# Patient Record
Sex: FEMALE | Race: WHITE | Employment: OTHER | URBAN - NONMETROPOLITAN AREA
[De-identification: names, ages, dates, MRNs, and addresses within clinical notes are randomized per-mention and may not be internally consistent; named-entity substitution may affect disease eponyms.]

---

## 2017-04-26 ENCOUNTER — HOSPITAL ENCOUNTER (OUTPATIENT)
Dept: PHYSICAL THERAPY | Age: 72
Discharge: HOME | End: 2017-04-30
Attending: PSYCHIATRY & NEUROLOGY | Admitting: PSYCHIATRY & NEUROLOGY

## 2017-05-01 ENCOUNTER — HOSPITAL ENCOUNTER (OUTPATIENT)
Dept: GENERAL RADIOLOGY | Age: 72
Discharge: HOME | End: 2017-05-01
Attending: PODIATRIST | Admitting: PODIATRIST

## 2017-05-01 ENCOUNTER — HOSPITAL ENCOUNTER (OUTPATIENT)
Dept: WOUND CARE | Age: 72
Discharge: HOME | End: 2017-05-01
Attending: PODIATRIST | Admitting: PODIATRIST

## 2017-05-01 ENCOUNTER — HOSPITAL ENCOUNTER (OUTPATIENT)
Dept: PHYSICAL THERAPY | Age: 72
Discharge: HOME | End: 2017-05-31
Attending: PSYCHIATRY & NEUROLOGY | Admitting: PSYCHIATRY & NEUROLOGY

## 2017-05-01 VITALS
DIASTOLIC BLOOD PRESSURE: 70 MMHG | WEIGHT: 215 LBS | TEMPERATURE: 96.8 F | HEIGHT: 63 IN | SYSTOLIC BLOOD PRESSURE: 189 MMHG | BODY MASS INDEX: 38.09 KG/M2 | RESPIRATION RATE: 16 BRPM | HEART RATE: 100 BPM

## 2017-05-01 DIAGNOSIS — L97.509 TYPE 2 DIABETES MELLITUS WITH FOOT ULCER, WITH LONG-TERM CURRENT USE OF INSULIN (HCC): ICD-10-CM

## 2017-05-01 DIAGNOSIS — M20.11 HALLUX VALGUS (ACQUIRED), RIGHT FOOT: ICD-10-CM

## 2017-05-01 DIAGNOSIS — L03.119 CELLULITIS AND ABSCESS OF FOOT, EXCEPT TOES: ICD-10-CM

## 2017-05-01 DIAGNOSIS — L97.512 ULCER OF RIGHT FOOT WITH FAT LAYER EXPOSED (HCC): Primary | ICD-10-CM

## 2017-05-01 DIAGNOSIS — L97.512 RIGHT FOOT ULCER, WITH FAT LAYER EXPOSED (HCC): ICD-10-CM

## 2017-05-01 DIAGNOSIS — Z79.4 TYPE 2 DIABETES MELLITUS WITH FOOT ULCER, WITH LONG-TERM CURRENT USE OF INSULIN (HCC): ICD-10-CM

## 2017-05-01 DIAGNOSIS — L02.619 CELLULITIS AND ABSCESS OF FOOT, EXCEPT TOES: ICD-10-CM

## 2017-05-01 DIAGNOSIS — E11.621 TYPE 2 DIABETES MELLITUS WITH FOOT ULCER, WITH LONG-TERM CURRENT USE OF INSULIN (HCC): ICD-10-CM

## 2017-05-01 RX ORDER — GABAPENTIN 300 MG/1
300 CAPSULE ORAL NIGHTLY
COMMUNITY
End: 2018-11-25 | Stop reason: ALTCHOICE

## 2017-05-01 RX ORDER — PIOGLITAZONEHYDROCHLORIDE 15 MG/1
15 TABLET ORAL DAILY
COMMUNITY
End: 2018-11-25 | Stop reason: ALTCHOICE

## 2017-05-01 RX ORDER — ARIPIPRAZOLE 2 MG/1
2 TABLET ORAL DAILY
COMMUNITY
End: 2018-11-25 | Stop reason: ALTCHOICE

## 2017-05-01 RX ORDER — AMLODIPINE BESYLATE 10 MG/1
10 TABLET ORAL DAILY
Status: ON HOLD | COMMUNITY
End: 2020-08-13 | Stop reason: SDUPTHER

## 2017-05-01 RX ORDER — ATORVASTATIN CALCIUM 20 MG/1
20 TABLET, FILM COATED ORAL DAILY
COMMUNITY

## 2017-05-05 LAB
CULTURE: NORMAL
ORGANISM: NORMAL
REPORT STATUS: NORMAL
REQUEST PROBLEM: NORMAL
SPECIMEN: NORMAL

## 2017-05-08 ENCOUNTER — HOSPITAL ENCOUNTER (OUTPATIENT)
Dept: WOUND CARE | Age: 72
Discharge: HOME | End: 2017-05-08
Attending: PODIATRIST | Admitting: PODIATRIST

## 2017-05-08 VITALS
SYSTOLIC BLOOD PRESSURE: 169 MMHG | RESPIRATION RATE: 16 BRPM | TEMPERATURE: 96.6 F | DIASTOLIC BLOOD PRESSURE: 77 MMHG | HEART RATE: 74 BPM

## 2017-05-08 DIAGNOSIS — L97.512 ULCER OF RIGHT FOOT WITH FAT LAYER EXPOSED (HCC): ICD-10-CM

## 2017-05-08 DIAGNOSIS — L97.509 TYPE 2 DIABETES MELLITUS WITH FOOT ULCER, WITH LONG-TERM CURRENT USE OF INSULIN (HCC): ICD-10-CM

## 2017-05-08 DIAGNOSIS — Z79.4 TYPE 2 DIABETES MELLITUS WITH FOOT ULCER, WITH LONG-TERM CURRENT USE OF INSULIN (HCC): ICD-10-CM

## 2017-05-08 DIAGNOSIS — E11.621 TYPE 2 DIABETES MELLITUS WITH FOOT ULCER, WITH LONG-TERM CURRENT USE OF INSULIN (HCC): ICD-10-CM

## 2017-05-08 DIAGNOSIS — M20.11 HALLUX VALGUS (ACQUIRED), RIGHT FOOT: ICD-10-CM

## 2017-05-08 DIAGNOSIS — R65.10 SIRS (SYSTEMIC INFLAMMATORY RESPONSE SYNDROME) (HCC): Primary | ICD-10-CM

## 2017-05-22 ENCOUNTER — HOSPITAL ENCOUNTER (OUTPATIENT)
Dept: WOUND CARE | Age: 72
Discharge: HOME | End: 2017-05-22
Attending: PODIATRIST | Admitting: PODIATRIST

## 2017-05-22 VITALS
SYSTOLIC BLOOD PRESSURE: 124 MMHG | DIASTOLIC BLOOD PRESSURE: 77 MMHG | RESPIRATION RATE: 16 BRPM | TEMPERATURE: 97.4 F | HEART RATE: 86 BPM

## 2017-05-22 DIAGNOSIS — L97.512 ULCER OF RIGHT FOOT WITH FAT LAYER EXPOSED (HCC): ICD-10-CM

## 2017-05-22 DIAGNOSIS — L97.509 TYPE 2 DIABETES MELLITUS WITH FOOT ULCER, WITH LONG-TERM CURRENT USE OF INSULIN (HCC): ICD-10-CM

## 2017-05-22 DIAGNOSIS — E11.621 TYPE 2 DIABETES MELLITUS WITH FOOT ULCER, WITH LONG-TERM CURRENT USE OF INSULIN (HCC): ICD-10-CM

## 2017-05-22 DIAGNOSIS — Z79.4 TYPE 2 DIABETES MELLITUS WITH FOOT ULCER, WITH LONG-TERM CURRENT USE OF INSULIN (HCC): ICD-10-CM

## 2017-05-22 DIAGNOSIS — M20.11 HALLUX VALGUS (ACQUIRED), RIGHT FOOT: ICD-10-CM

## 2017-05-27 ENCOUNTER — HOSPITAL ENCOUNTER (OUTPATIENT)
Dept: MRI IMAGING | Age: 72
Discharge: HOME | End: 2017-05-27
Attending: PODIATRIST | Admitting: PODIATRIST

## 2017-05-27 DIAGNOSIS — L97.519 DIABETIC ULCER OF RIGHT FOOT DUE TO TYPE 2 DIABETES MELLITUS (HCC): ICD-10-CM

## 2017-05-27 DIAGNOSIS — E11.621 DIABETIC ULCER OF RIGHT FOOT DUE TO TYPE 2 DIABETES MELLITUS (HCC): ICD-10-CM

## 2017-06-01 ENCOUNTER — HOSPITAL ENCOUNTER (OUTPATIENT)
Dept: PHYSICAL THERAPY | Age: 72
Discharge: HOME | End: 2017-06-30
Attending: PSYCHIATRY & NEUROLOGY | Admitting: PSYCHIATRY & NEUROLOGY

## 2017-06-05 ENCOUNTER — HOSPITAL ENCOUNTER (OUTPATIENT)
Dept: WOUND CARE | Age: 72
Discharge: HOME | End: 2017-06-05
Attending: PODIATRIST | Admitting: PODIATRIST

## 2017-06-05 VITALS
DIASTOLIC BLOOD PRESSURE: 91 MMHG | SYSTOLIC BLOOD PRESSURE: 169 MMHG | TEMPERATURE: 96.5 F | HEART RATE: 99 BPM | RESPIRATION RATE: 16 BRPM

## 2017-06-05 DIAGNOSIS — E11.621 TYPE 2 DIABETES MELLITUS WITH FOOT ULCER, WITH LONG-TERM CURRENT USE OF INSULIN (HCC): Primary | ICD-10-CM

## 2017-06-05 DIAGNOSIS — L97.512 ULCER OF RIGHT FOOT WITH FAT LAYER EXPOSED (HCC): ICD-10-CM

## 2017-06-05 DIAGNOSIS — L97.509 TYPE 2 DIABETES MELLITUS WITH FOOT ULCER, WITH LONG-TERM CURRENT USE OF INSULIN (HCC): Primary | ICD-10-CM

## 2017-06-05 DIAGNOSIS — Z79.4 TYPE 2 DIABETES MELLITUS WITH FOOT ULCER, WITH LONG-TERM CURRENT USE OF INSULIN (HCC): Primary | ICD-10-CM

## 2017-06-05 DIAGNOSIS — M20.11 HALLUX VALGUS (ACQUIRED), RIGHT FOOT: ICD-10-CM

## 2017-06-05 RX ORDER — HYDRALAZINE HYDROCHLORIDE 25 MG/1
25 TABLET, FILM COATED ORAL 3 TIMES DAILY
Status: ON HOLD | COMMUNITY
End: 2017-07-16 | Stop reason: HOSPADM

## 2017-06-05 ASSESSMENT — PAIN DESCRIPTION - LOCATION: LOCATION: FOOT

## 2017-06-05 ASSESSMENT — PAIN DESCRIPTION - ONSET: ONSET: ON-GOING

## 2017-06-05 ASSESSMENT — PAIN DESCRIPTION - DESCRIPTORS: DESCRIPTORS: ACHING

## 2017-06-05 ASSESSMENT — PAIN DESCRIPTION - PROGRESSION: CLINICAL_PROGRESSION: NOT CHANGED

## 2017-06-05 ASSESSMENT — PAIN DESCRIPTION - ORIENTATION: ORIENTATION: RIGHT

## 2017-06-05 ASSESSMENT — PAIN DESCRIPTION - FREQUENCY: FREQUENCY: CONTINUOUS

## 2017-06-21 ENCOUNTER — HOSPITAL ENCOUNTER (OUTPATIENT)
Dept: PHYSICAL THERAPY | Age: 72
Discharge: HOME | End: 2017-06-21

## 2017-06-26 ENCOUNTER — HOSPITAL ENCOUNTER (OUTPATIENT)
Dept: WOUND CARE | Age: 72
Discharge: HOME | End: 2017-06-26
Attending: PODIATRIST | Admitting: PODIATRIST

## 2017-06-26 VITALS
RESPIRATION RATE: 18 BRPM | SYSTOLIC BLOOD PRESSURE: 162 MMHG | DIASTOLIC BLOOD PRESSURE: 85 MMHG | TEMPERATURE: 97.6 F | HEART RATE: 97 BPM

## 2017-06-26 DIAGNOSIS — L97.509 TYPE 2 DIABETES MELLITUS WITH FOOT ULCER, WITH LONG-TERM CURRENT USE OF INSULIN (HCC): ICD-10-CM

## 2017-06-26 DIAGNOSIS — M20.11 HALLUX VALGUS (ACQUIRED), RIGHT FOOT: Primary | ICD-10-CM

## 2017-06-26 DIAGNOSIS — L97.512 ULCER OF RIGHT FOOT WITH FAT LAYER EXPOSED (HCC): ICD-10-CM

## 2017-06-26 DIAGNOSIS — E11.621 TYPE 2 DIABETES MELLITUS WITH FOOT ULCER, WITH LONG-TERM CURRENT USE OF INSULIN (HCC): ICD-10-CM

## 2017-06-26 DIAGNOSIS — Z79.4 TYPE 2 DIABETES MELLITUS WITH FOOT ULCER, WITH LONG-TERM CURRENT USE OF INSULIN (HCC): ICD-10-CM

## 2017-06-26 ASSESSMENT — PAIN DESCRIPTION - PROGRESSION: CLINICAL_PROGRESSION: NOT CHANGED

## 2017-06-26 ASSESSMENT — PAIN DESCRIPTION - LOCATION: LOCATION: FOOT

## 2017-06-26 ASSESSMENT — PAIN DESCRIPTION - DESCRIPTORS: DESCRIPTORS: ACHING

## 2017-06-26 ASSESSMENT — PAIN DESCRIPTION - ORIENTATION: ORIENTATION: RIGHT

## 2017-06-26 ASSESSMENT — PAIN DESCRIPTION - PAIN TYPE: TYPE: CHRONIC PAIN

## 2017-06-26 ASSESSMENT — PAIN SCALES - GENERAL: PAINLEVEL_OUTOF10: 3

## 2017-06-26 ASSESSMENT — PAIN DESCRIPTION - FREQUENCY: FREQUENCY: CONTINUOUS

## 2017-06-26 ASSESSMENT — PAIN DESCRIPTION - ONSET: ONSET: ON-GOING

## 2017-06-28 ENCOUNTER — HOSPITAL ENCOUNTER (OUTPATIENT)
Dept: PHYSICAL THERAPY | Age: 72
Discharge: HOME | End: 2017-06-28

## 2017-07-01 ENCOUNTER — HOSPITAL ENCOUNTER (OUTPATIENT)
Dept: PHYSICAL THERAPY | Age: 72
Discharge: HOME | End: 2017-07-31
Attending: PSYCHIATRY & NEUROLOGY | Admitting: PSYCHIATRY & NEUROLOGY

## 2017-07-07 ENCOUNTER — HOSPITAL ENCOUNTER (OUTPATIENT)
Dept: PHYSICAL THERAPY | Age: 72
Discharge: HOME | End: 2017-07-07

## 2017-07-10 ENCOUNTER — HOSPITAL ENCOUNTER (OUTPATIENT)
Dept: WOUND CARE | Age: 72
Discharge: HOME | End: 2017-07-10
Attending: PODIATRIST | Admitting: PODIATRIST

## 2017-07-10 VITALS — TEMPERATURE: 98.6 F

## 2017-07-10 DIAGNOSIS — L97.509 TYPE 2 DIABETES MELLITUS WITH FOOT ULCER, WITH LONG-TERM CURRENT USE OF INSULIN (HCC): ICD-10-CM

## 2017-07-10 DIAGNOSIS — L97.512 ULCER OF RIGHT FOOT WITH FAT LAYER EXPOSED (HCC): ICD-10-CM

## 2017-07-10 DIAGNOSIS — Z79.4 TYPE 2 DIABETES MELLITUS WITH FOOT ULCER, WITH LONG-TERM CURRENT USE OF INSULIN (HCC): ICD-10-CM

## 2017-07-10 DIAGNOSIS — M20.11 HALLUX VALGUS (ACQUIRED), RIGHT FOOT: Primary | ICD-10-CM

## 2017-07-10 DIAGNOSIS — E11.621 TYPE 2 DIABETES MELLITUS WITH FOOT ULCER, WITH LONG-TERM CURRENT USE OF INSULIN (HCC): ICD-10-CM

## 2017-07-12 PROBLEM — I10 HTN (HYPERTENSION): Status: ACTIVE | Noted: 2017-07-12

## 2017-07-24 ENCOUNTER — HOSPITAL ENCOUNTER (OUTPATIENT)
Dept: WOUND CARE | Age: 72
Discharge: HOME | End: 2017-07-24
Attending: PODIATRIST | Admitting: PODIATRIST

## 2017-07-24 VITALS
DIASTOLIC BLOOD PRESSURE: 67 MMHG | TEMPERATURE: 97.8 F | SYSTOLIC BLOOD PRESSURE: 169 MMHG | RESPIRATION RATE: 18 BRPM | HEART RATE: 78 BPM

## 2017-07-24 DIAGNOSIS — E11.621 TYPE 2 DIABETES MELLITUS WITH FOOT ULCER, WITH LONG-TERM CURRENT USE OF INSULIN (HCC): ICD-10-CM

## 2017-07-24 DIAGNOSIS — L97.509 TYPE 2 DIABETES MELLITUS WITH FOOT ULCER, WITH LONG-TERM CURRENT USE OF INSULIN (HCC): ICD-10-CM

## 2017-07-24 DIAGNOSIS — L97.512 ULCER OF RIGHT FOOT WITH FAT LAYER EXPOSED (HCC): ICD-10-CM

## 2017-07-24 DIAGNOSIS — M20.11 HALLUX VALGUS (ACQUIRED), RIGHT FOOT: Primary | ICD-10-CM

## 2017-07-24 DIAGNOSIS — Z79.4 TYPE 2 DIABETES MELLITUS WITH FOOT ULCER, WITH LONG-TERM CURRENT USE OF INSULIN (HCC): ICD-10-CM

## 2017-08-01 ENCOUNTER — HOSPITAL ENCOUNTER (OUTPATIENT)
Dept: PHYSICAL THERAPY | Age: 72
Discharge: HOME | End: 2017-08-31
Attending: PSYCHIATRY & NEUROLOGY | Admitting: PSYCHIATRY & NEUROLOGY

## 2017-08-07 ENCOUNTER — HOSPITAL ENCOUNTER (OUTPATIENT)
Dept: WOUND CARE | Age: 72
Discharge: HOME | End: 2017-08-07
Attending: PODIATRIST | Admitting: PODIATRIST

## 2017-08-07 VITALS
RESPIRATION RATE: 16 BRPM | TEMPERATURE: 96 F | DIASTOLIC BLOOD PRESSURE: 82 MMHG | HEART RATE: 66 BPM | SYSTOLIC BLOOD PRESSURE: 173 MMHG

## 2017-08-07 DIAGNOSIS — L97.512 ULCER OF RIGHT FOOT WITH FAT LAYER EXPOSED (HCC): ICD-10-CM

## 2017-08-07 DIAGNOSIS — E11.621 TYPE 2 DIABETES MELLITUS WITH FOOT ULCER, WITH LONG-TERM CURRENT USE OF INSULIN (HCC): Primary | ICD-10-CM

## 2017-08-07 DIAGNOSIS — Z79.4 TYPE 2 DIABETES MELLITUS WITH FOOT ULCER, WITH LONG-TERM CURRENT USE OF INSULIN (HCC): Primary | ICD-10-CM

## 2017-08-07 DIAGNOSIS — M20.11 HALLUX VALGUS (ACQUIRED), RIGHT FOOT: ICD-10-CM

## 2017-08-07 DIAGNOSIS — L97.509 TYPE 2 DIABETES MELLITUS WITH FOOT ULCER, WITH LONG-TERM CURRENT USE OF INSULIN (HCC): Primary | ICD-10-CM

## 2017-08-07 PROBLEM — G89.4 CHRONIC PAIN SYNDROME: Chronic | Status: ACTIVE | Noted: 2017-08-07

## 2017-08-07 PROBLEM — E13.10 DIABETIC KETOSIS (HCC): Status: ACTIVE | Noted: 2017-08-07

## 2017-08-07 PROBLEM — N34.2 INFECTIVE URETHRITIS: Status: ACTIVE | Noted: 2017-08-07

## 2017-08-09 PROBLEM — E13.10 DIABETIC KETOSIS (HCC): Status: RESOLVED | Noted: 2017-08-07 | Resolved: 2017-08-09

## 2017-08-21 ENCOUNTER — HOSPITAL ENCOUNTER (OUTPATIENT)
Dept: WOUND CARE | Age: 72
Discharge: HOME | End: 2017-08-21
Attending: PODIATRIST | Admitting: PODIATRIST

## 2017-08-21 DIAGNOSIS — L97.509 TYPE 2 DIABETES MELLITUS WITH FOOT ULCER, WITH LONG-TERM CURRENT USE OF INSULIN (HCC): Primary | ICD-10-CM

## 2017-08-21 DIAGNOSIS — L97.512 ULCER OF RIGHT FOOT WITH FAT LAYER EXPOSED (HCC): ICD-10-CM

## 2017-08-21 DIAGNOSIS — M20.11 HALLUX VALGUS (ACQUIRED), RIGHT FOOT: ICD-10-CM

## 2017-08-21 DIAGNOSIS — E11.621 TYPE 2 DIABETES MELLITUS WITH FOOT ULCER, WITH LONG-TERM CURRENT USE OF INSULIN (HCC): Primary | ICD-10-CM

## 2017-08-21 DIAGNOSIS — Z79.4 TYPE 2 DIABETES MELLITUS WITH FOOT ULCER, WITH LONG-TERM CURRENT USE OF INSULIN (HCC): Primary | ICD-10-CM

## 2017-08-28 ENCOUNTER — HOSPITAL ENCOUNTER (OUTPATIENT)
Dept: WOUND CARE | Age: 72
Discharge: HOME | End: 2017-08-28
Attending: PODIATRIST | Admitting: PODIATRIST

## 2017-08-28 VITALS
HEART RATE: 80 BPM | SYSTOLIC BLOOD PRESSURE: 143 MMHG | DIASTOLIC BLOOD PRESSURE: 80 MMHG | TEMPERATURE: 98.6 F | RESPIRATION RATE: 16 BRPM

## 2017-08-28 DIAGNOSIS — Z79.4 TYPE 2 DIABETES MELLITUS WITH FOOT ULCER, WITH LONG-TERM CURRENT USE OF INSULIN (HCC): ICD-10-CM

## 2017-08-28 DIAGNOSIS — L97.509 TYPE 2 DIABETES MELLITUS WITH FOOT ULCER, WITH LONG-TERM CURRENT USE OF INSULIN (HCC): ICD-10-CM

## 2017-08-28 DIAGNOSIS — E11.621 TYPE 2 DIABETES MELLITUS WITH FOOT ULCER, WITH LONG-TERM CURRENT USE OF INSULIN (HCC): ICD-10-CM

## 2017-08-28 DIAGNOSIS — L97.512 ULCER OF RIGHT FOOT WITH FAT LAYER EXPOSED (HCC): ICD-10-CM

## 2017-08-28 DIAGNOSIS — M20.11 HALLUX VALGUS (ACQUIRED), RIGHT FOOT: ICD-10-CM

## 2017-09-11 ENCOUNTER — HOSPITAL ENCOUNTER (OUTPATIENT)
Dept: WOUND CARE | Age: 72
Discharge: HOME | End: 2017-09-11
Attending: PODIATRIST | Admitting: PODIATRIST

## 2017-09-11 VITALS
SYSTOLIC BLOOD PRESSURE: 162 MMHG | HEART RATE: 65 BPM | DIASTOLIC BLOOD PRESSURE: 84 MMHG | RESPIRATION RATE: 16 BRPM | TEMPERATURE: 97.2 F

## 2017-09-11 DIAGNOSIS — E11.621 TYPE 2 DIABETES MELLITUS WITH FOOT ULCER, WITH LONG-TERM CURRENT USE OF INSULIN (HCC): Primary | ICD-10-CM

## 2017-09-11 DIAGNOSIS — L97.512 ULCER OF RIGHT FOOT WITH FAT LAYER EXPOSED (HCC): ICD-10-CM

## 2017-09-11 DIAGNOSIS — L97.509 TYPE 2 DIABETES MELLITUS WITH FOOT ULCER, WITH LONG-TERM CURRENT USE OF INSULIN (HCC): Primary | ICD-10-CM

## 2017-09-11 DIAGNOSIS — M20.11 HALLUX VALGUS (ACQUIRED), RIGHT FOOT: ICD-10-CM

## 2017-09-11 DIAGNOSIS — Z79.4 TYPE 2 DIABETES MELLITUS WITH FOOT ULCER, WITH LONG-TERM CURRENT USE OF INSULIN (HCC): Primary | ICD-10-CM

## 2017-09-25 ENCOUNTER — HOSPITAL ENCOUNTER (OUTPATIENT)
Dept: WOUND CARE | Age: 72
Discharge: HOME | End: 2017-09-25
Attending: PODIATRIST | Admitting: PODIATRIST

## 2017-09-25 VITALS
TEMPERATURE: 97 F | SYSTOLIC BLOOD PRESSURE: 172 MMHG | RESPIRATION RATE: 16 BRPM | DIASTOLIC BLOOD PRESSURE: 76 MMHG | HEART RATE: 103 BPM

## 2017-09-25 DIAGNOSIS — M20.11 HALLUX VALGUS (ACQUIRED), RIGHT FOOT: Primary | ICD-10-CM

## 2017-09-25 DIAGNOSIS — E11.621 TYPE 2 DIABETES MELLITUS WITH FOOT ULCER, WITH LONG-TERM CURRENT USE OF INSULIN (HCC): ICD-10-CM

## 2017-09-25 DIAGNOSIS — Z79.4 TYPE 2 DIABETES MELLITUS WITH FOOT ULCER, WITH LONG-TERM CURRENT USE OF INSULIN (HCC): ICD-10-CM

## 2017-09-25 DIAGNOSIS — L97.509 TYPE 2 DIABETES MELLITUS WITH FOOT ULCER, WITH LONG-TERM CURRENT USE OF INSULIN (HCC): ICD-10-CM

## 2017-09-25 DIAGNOSIS — L97.512 ULCER OF RIGHT FOOT WITH FAT LAYER EXPOSED (HCC): ICD-10-CM

## 2017-10-09 ENCOUNTER — HOSPITAL ENCOUNTER (OUTPATIENT)
Dept: WOUND CARE | Age: 72
Discharge: HOME | End: 2017-10-09
Attending: PODIATRIST | Admitting: PODIATRIST

## 2017-10-09 VITALS
TEMPERATURE: 96.8 F | SYSTOLIC BLOOD PRESSURE: 150 MMHG | HEART RATE: 77 BPM | DIASTOLIC BLOOD PRESSURE: 76 MMHG | RESPIRATION RATE: 16 BRPM

## 2017-10-09 DIAGNOSIS — Z79.4 TYPE 2 DIABETES MELLITUS WITH FOOT ULCER, WITH LONG-TERM CURRENT USE OF INSULIN (HCC): Primary | ICD-10-CM

## 2017-10-09 DIAGNOSIS — L97.512 ULCER OF RIGHT FOOT WITH FAT LAYER EXPOSED (HCC): ICD-10-CM

## 2017-10-09 DIAGNOSIS — M20.11 HALLUX VALGUS (ACQUIRED), RIGHT FOOT: ICD-10-CM

## 2017-10-09 DIAGNOSIS — E11.621 TYPE 2 DIABETES MELLITUS WITH FOOT ULCER, WITH LONG-TERM CURRENT USE OF INSULIN (HCC): Primary | ICD-10-CM

## 2017-10-09 DIAGNOSIS — L97.509 TYPE 2 DIABETES MELLITUS WITH FOOT ULCER, WITH LONG-TERM CURRENT USE OF INSULIN (HCC): Primary | ICD-10-CM

## 2017-10-09 ASSESSMENT — PAIN DESCRIPTION - LOCATION: LOCATION: FOOT

## 2017-10-09 ASSESSMENT — PAIN DESCRIPTION - PAIN TYPE: TYPE: CHRONIC PAIN

## 2017-10-09 ASSESSMENT — PAIN SCALES - GENERAL: PAINLEVEL_OUTOF10: 1

## 2017-10-09 ASSESSMENT — PAIN DESCRIPTION - DESCRIPTORS: DESCRIPTORS: ACHING

## 2017-10-09 NOTE — PROGRESS NOTES
[facility] Progress Note       Toby Waters  AGE: 71 y.o. GENDER: female  : 1945  TODAY'S DATE:  10/9/2017        Subjective:     Chief Complaint   Patient presents with    Wound Check     Right foot          HISTORY of PRESENT ILLNESS     Toby Waters is a 71 y.o. female who presents today for wound evaluation of Chronic diabetic wound(s) of Rt. foot sub 1st metatarsal head. The wound is of mild severity. The underlying cause of the wound is DM. Pt has underlying bunion that we have discussed fixing. Her blood sugar, however, was entirely too high for serious discussion regarding surgery. She has been following with an endocrinologist who has adjusted her medications and she is doing much better at this time. Pain is improved along with probable wound being healed. Wound Pain Timing/Severity: none  Quality of pain: N/A  Severity of pain:  0 / 10   Modifying Factors: diabetes and chronic pressure  Associated Signs/Symptoms: none        PAST MEDICAL HISTORY        Diagnosis Date    Arthritis     Cellulitis of right foot     Chronic kidney disease     Depression     Diabetes mellitus (HCC)     Frequent falls     GERD (gastroesophageal reflux disease)     Hallux valgus (acquired), right foot 2017    Hypertension     Other disorders of kidney and ureter     1 kidney.  Thyroid disease     hypothyroid    Type 2 diabetes mellitus with foot ulcer, with long-term current use of insulin ([address] 2012    Ulcer of right foot with fat layer exposed ([address] 2013       PAST SURGICAL HISTORY    Past Surgical History:   Procedure Laterality Date    ABDOMEN SURGERY      CYST REMOVAL      from kidney.     DILATATION, ESOPHAGUS      ENDOSCOPY, COLON, DIAGNOSTIC      FOOT SURGERY  11    had infection  and a biopsy was sent away for analysis    HERNIA REPAIR      HYSTERECTOMY     [facility] HISTORY    Family History   Problem Relation Age of Onset    Arthritis Mother     Cancer Mother     Diabetes Mother     High Blood Pressure Mother     Asthma Father     Heart Disease Father     Diabetes Brother        SOCIAL HISTORY    Social History   Substance Use Topics    Smoking status: Never Smoker    Smokeless tobacco: Never Used    Alcohol use No       ALLERGIES    Allergies   Allergen Reactions    Ritalin [Methylphenidate] Rash    Rocephin [Ceftriaxone Sodium-Lidocaine] Rash       MEDICATIONS    Current Outpatient Prescriptions on File Prior to Encounter   Medication Sig Dispense Refill    insulin regular human (HUMULIN R) 500 UNIT/ML concentrated injection vial Inject 80 Units into the skin 3 times daily (before meals)      insulin glargine (LANTUS) 100 UNIT/ML injection pen Inject 80 Units into the skin nightly 5 Pen 0    insulin lispro (HUMALOG) 100 UNIT/ML injection vial Inject 30 Units into the skin 3 times daily (with meals) 2 vial 0    pantoprazole (PROTONIX) 40 MG tablet   4    DULoxetine (CYMBALTA) 60 MG extended release capsule   0    ARIPiprazole (ABILIFY) 2 MG tablet Take 2 mg by mouth daily      pioglitazone (ACTOS) 15 MG tablet Take 15 mg by mouth daily      atorvastatin (LIPITOR) 20 MG tablet Take 20 mg by mouth daily      amLODIPine (NORVASC) 10 MG tablet Take 10 mg by mouth daily      vitamin D (CHOLECALCIFEROL) 1000 UNIT TABS tablet Take 1,000 Units by mouth daily      gabapentin (NEURONTIN) 300 MG capsule Take 300 mg by mouth nightly      Multiple Vitamins-Minerals (TRUEPLUS DIABETIC MULTIVITAMIN) TABS Take 1 tablet by mouth daily.  atenolol (TENORMIN) 50 MG tablet Take 50 mg by mouth 2 times daily.  levothyroxine (SYNTHROID) 200 MCG tablet Take 175 mcg by mouth Daily.  aspirin 81 MG EC tablet Take 81 mg by mouth daily.  fish oil-omega-3 fatty acids 1000 MG capsule Take 2 g by mouth daily.          No current facility-administered medications on file prior to encounter. REVIEW OF SYSTEMS    Pertinent items are noted in HPI. Constitutional: Negative for systemic symptoms including fever, chills and malaise. Objective:      BP (!) 150/76   Pulse 77   Temp 96.8 °F (36 °C) (Temporal)   Resp 16     PHYSICAL EXAM    General: The patient is in no acute distress. Mental status:  Patient is appropriate, is  oriented to place and plan of care. Dermatologic exam: Visual inspection of the periwound reveals the skin to be edematous. Wound exam:  see wound description below     All active wounds listed below with today's date are evaluated      Wound 08/21/17 Skin tear Wound #9 Right 4th Toe Plantar (onset x 1 week) (Active)   Wound Image   10/9/2017 10:08 AM   Wound Type Wound 8/28/2017 10:14 AM   Dressing Status Clean;Dry; Intact 9/25/2017 11:05 AM   Dressing Changed Changed/New 9/25/2017 11:05 AM   Dressing/Treatment Silver dressing 8/21/2017 11:21 AM   Wound Cleansed Rinsed/Irrigated with saline 10/9/2017 10:08 AM   Wound Length (cm) 0 cm 10/9/2017 10:08 AM   Wound Width (cm) 0 cm 10/9/2017 10:08 AM   Wound Depth (cm)  0 10/9/2017 10:08 AM   Calculated Wound Size (cm^2) (l*w) 0 cm^2 10/9/2017 10:08 AM   Change in Wound Size % (l*w) 100 10/9/2017 10:08 AM   Distance Tunneling (cm) 0 cm 10/9/2017 10:08 AM   Tunneling Position ___ O'Clock 0 10/9/2017 10:08 AM   Undermining Starts ___ O'Clock 0 10/9/2017 10:08 AM   Undermining Ends___ O'Clock 0 10/9/2017 10:08 AM   Undermining Maxium Distance (cm) 0 10/9/2017 10:08 AM   Wound Assessment Black 10/9/2017 10:08 AM   Margins Attached edges 10/9/2017 10:08 AM   Ava-wound Assessment Calloused;Dry 10/9/2017 10:08 AM   Non-staged Wound Description Not applicable 91/8/0330 07:45 AM   [place]%Wound Bed 0 10/9/2017 10:08 AM   Red%Wound Bed 0 10/9/2017 10:08 AM   Yellow%Wound Bed 0 10/9/2017 10:08 AM   Black%Wound Bed 100 10/9/2017 10:08 AM   Purple%Wound Bed 0 10/9/2017 10:08 AM   Other%Wound Bed 0 9/25/2017 10:26 AM   Drainage Amount None 10/9/2017 10:08 AM   Drainage Description Serosanguinous 8/28/2017 10:14 AM   Odor None 10/9/2017 10:08 AM   Debridement per physician Subcutaneous 9/11/2017 11:09 AM   Number of days: 49       Wound 08/21/17 Skin tear Wound #11 Right mid Plantar (Onset x 1 week) (Active)   Wound Image   10/9/2017 10:08 AM   Wound Type Wound 10/9/2017 10:08 AM   Dressing Status Clean;Dry; Intact 9/25/2017 11:05 AM   Dressing Changed Changed/New 9/25/2017 11:05 AM   Dressing/Treatment Silver dressing 8/21/2017 11:21 AM   Wound Cleansed Wound cleanser 10/9/2017 10:08 AM   Wound Length (cm) 0.2 cm 10/9/2017 10:08 AM   Wound Width (cm) 0.2 cm 10/9/2017 10:08 AM   Wound Depth (cm)  0.1 10/9/2017 10:08 AM   Calculated Wound Size (cm^2) (l*w) 0.04 cm^2 10/9/2017 10:08 AM   Change in Wound Size % (l*w) 97.78 10/9/2017 10:08 AM   Distance Tunneling (cm) 0 cm 10/9/2017 10:08 AM   Tunneling Position ___ O'Clock 0 10/9/2017 10:08 AM   Undermining Starts ___ O'Clock 0 10/9/2017 10:08 AM   Undermining Ends___ O'Clock 0 10/9/2017 10:08 AM   Undermining Maxium Distance (cm) 0 10/9/2017 10:08 AM   Wound Assessment Clean;Dry 10/9/2017 10:08 AM   Margins Attached edges 10/9/2017 10:08 AM   Ava-wound Assessment Calloused;Dry 10/9/2017 10:08 AM   Non-staged Wound Description Not applicable 05/6/8497 70:34 AM   Cyril%Wound Bed 0 10/9/2017 10:08 AM   Red%Wound Bed 100 10/9/2017 10:08 AM   Yellow%Wound Bed 0 10/9/2017 10:08 AM   Black%Wound Bed 0 10/9/2017 10:08 AM   Purple%Wound Bed 0 10/9/2017 10:08 AM   Other%Wound Bed 0 10/9/2017 10:08 AM   Drainage Amount None 10/9/2017 10:08 AM   Drainage Description Serosanguinous 8/21/2017 10:10 AM   Odor None 10/9/2017 10:08 AM   Debridement per physician Subcutaneous 9/11/2017 11:09 AM   Number of days: 49       Assessment:       Problem List Items Addressed This Visit     Type 2 diabetes mellitus with foot ulcer, with long-term current use of insulin ([address] - Primary    Ulcer of clinic    Follow up with Dr [facility] within 1 month for foot check      Call [phone] for any questions or concerns.      Date__________ Time____________        Treatment Note      Written Patient Dismissal Instructions Given            Electronically signed by Kenia Dean DPM on 10/9/2017 at 10:50 AM

## 2018-11-25 ENCOUNTER — HOSPITAL ENCOUNTER (EMERGENCY)
Age: 73
Discharge: HOME | End: 2018-11-25
Attending: EMERGENCY MEDICINE
Payer: MEDICARE

## 2018-11-25 VITALS
HEART RATE: 81 BPM | HEIGHT: 63 IN | RESPIRATION RATE: 14 BRPM | BODY MASS INDEX: 35.79 KG/M2 | TEMPERATURE: 98.7 F | OXYGEN SATURATION: 95 % | SYSTOLIC BLOOD PRESSURE: 162 MMHG | WEIGHT: 202 LBS | DIASTOLIC BLOOD PRESSURE: 61 MMHG

## 2018-11-25 DIAGNOSIS — T78.40XA ALLERGIC REACTION, INITIAL ENCOUNTER: Primary | ICD-10-CM

## 2018-11-25 PROCEDURE — 6360000002 HC RX W HCPCS

## 2018-11-25 PROCEDURE — S0028 INJECTION, FAMOTIDINE, 20 MG: HCPCS | Performed by: EMERGENCY MEDICINE

## 2018-11-25 PROCEDURE — 2500000003 HC RX 250 WO HCPCS: Performed by: EMERGENCY MEDICINE

## 2018-11-25 PROCEDURE — 6360000002 HC RX W HCPCS: Performed by: EMERGENCY MEDICINE

## 2018-11-25 PROCEDURE — 94640 AIRWAY INHALATION TREATMENT: CPT

## 2018-11-25 PROCEDURE — 96374 THER/PROPH/DIAG INJ IV PUSH: CPT

## 2018-11-25 PROCEDURE — 99283 EMERGENCY DEPT VISIT LOW MDM: CPT

## 2018-11-25 PROCEDURE — 96375 TX/PRO/DX INJ NEW DRUG ADDON: CPT

## 2018-11-25 RX ORDER — METHYLPREDNISOLONE SODIUM SUCCINATE 125 MG/2ML
60 INJECTION, POWDER, LYOPHILIZED, FOR SOLUTION INTRAMUSCULAR; INTRAVENOUS ONCE
Status: COMPLETED | OUTPATIENT
Start: 2018-11-25 | End: 2018-11-25

## 2018-11-25 RX ORDER — ALBUTEROL SULFATE 2.5 MG/3ML
2.5 SOLUTION RESPIRATORY (INHALATION) ONCE
Status: COMPLETED | OUTPATIENT
Start: 2018-11-25 | End: 2018-11-25

## 2018-11-25 RX ORDER — ONDANSETRON 2 MG/ML
4 INJECTION INTRAMUSCULAR; INTRAVENOUS EVERY 30 MIN PRN
Status: DISCONTINUED | OUTPATIENT
Start: 2018-11-25 | End: 2018-11-25 | Stop reason: HOSPADM

## 2018-11-25 RX ORDER — HYDROXYZINE 50 MG/1
50 TABLET, FILM COATED ORAL 3 TIMES DAILY PRN
Qty: 24 TABLET | Refills: 0 | Status: SHIPPED | OUTPATIENT
Start: 2018-11-25 | End: 2019-04-10 | Stop reason: ALTCHOICE

## 2018-11-25 RX ORDER — DIPHENHYDRAMINE HYDROCHLORIDE 50 MG/ML
50 INJECTION INTRAMUSCULAR; INTRAVENOUS ONCE
Status: COMPLETED | OUTPATIENT
Start: 2018-11-25 | End: 2018-11-25

## 2018-11-25 RX ORDER — ONDANSETRON 2 MG/ML
INJECTION INTRAMUSCULAR; INTRAVENOUS
Status: COMPLETED
Start: 2018-11-25 | End: 2018-11-25

## 2018-11-25 RX ORDER — PREDNISONE 20 MG/1
40 TABLET ORAL DAILY
Qty: 10 TABLET | Refills: 0 | Status: SHIPPED | OUTPATIENT
Start: 2018-11-25 | End: 2018-11-30

## 2018-11-25 RX ADMIN — METHYLPREDNISOLONE SODIUM SUCCINATE 60 MG: 125 INJECTION, POWDER, FOR SOLUTION INTRAMUSCULAR; INTRAVENOUS at 17:05

## 2018-11-25 RX ADMIN — DIPHENHYDRAMINE HYDROCHLORIDE 50 MG: 50 INJECTION, SOLUTION INTRAMUSCULAR; INTRAVENOUS at 17:05

## 2018-11-25 RX ADMIN — FAMOTIDINE 20 MG: 10 INJECTION, SOLUTION INTRAVENOUS at 17:05

## 2018-11-25 RX ADMIN — ALBUTEROL SULFATE 2.5 MG: 2.5 SOLUTION RESPIRATORY (INHALATION) at 17:13

## 2018-11-25 RX ADMIN — ONDANSETRON 4 MG: 2 INJECTION INTRAMUSCULAR; INTRAVENOUS at 17:16

## 2018-11-25 NOTE — ED PROVIDER NOTES
insulin ([facility] 1/18/2012    Ulcer of right foot with fat layer exposed ([facility] 2/6/2013     Past Surgical History:   Procedure Laterality Date    ABDOMEN SURGERY      CYST REMOVAL      from kidney.  DILATATION, ESOPHAGUS      ENDOSCOPY, COLON, DIAGNOSTIC      FOOT SURGERY  12/30/11    had infection  and a biopsy was sent away for analysis    HERNIA REPAIR      HYSTERECTOMY      KIDNEY REMOVAL  1973     Family History   Problem Relation Age of Onset    Arthritis Mother     Cancer Mother     Diabetes Mother     High Blood Pressure Mother     Asthma Father     Heart Disease Father     Diabetes Brother      Social History     Social History    Marital status:      Spouse name: N/A    Number of children: N/A    Years of education: N/A     Occupational History    Not on file.      Social History Main Topics    Smoking status: Never Smoker    Smokeless tobacco: Never Used    Alcohol use No    Drug use: No    Sexual activity: Not on file     Other Topics Concern    Not on file     Social History Narrative    No narrative on file     Current Facility-Administered Medications   Medication Dose Route Frequency Provider Last Rate Last Dose    ondansetron (ZOFRAN) injection 4 mg  4 mg Intravenous Q30 Min PRN Price Hernandez DO   4 mg at 11/25/18 0056     Current Outpatient Prescriptions   Medication Sig Dispense Refill    insulin regular human (HUMULIN R) 500 UNIT/ML concentrated injection vial Inject 60 Units into the skin 3 times daily (before meals)       insulin lispro (HUMALOG) 100 UNIT/ML injection vial Inject 30 Units into the skin 3 times daily (with meals) (Patient taking differently: Inject 50 Units into the skin 3 times daily (with meals) ) 2 vial 0    atorvastatin (LIPITOR) 20 MG tablet Take 20 mg by mouth daily      amLODIPine (NORVASC) 10 MG tablet Take 10 mg by mouth daily      vitamin D (CHOLECALCIFEROL) 1000 UNIT TABS tablet Take 1,000 Units by mouth daily      Multiple Vitamins-Minerals (TRUEPLUS DIABETIC MULTIVITAMIN) TABS Take 1 tablet by mouth daily.  atenolol (TENORMIN) 50 MG tablet Take 50 mg by mouth 2 times daily.  levothyroxine (SYNTHROID) 200 MCG tablet Take 300 mcg by mouth Daily       aspirin 81 MG EC tablet Take 81 mg by mouth daily.  fish oil-omega-3 fatty acids 1000 MG capsule Take 2 g by mouth daily. Allergies   Allergen Reactions    Ritalin [Methylphenidate] Rash    Rocephin [Ceftriaxone Sodium-Lidocaine] Rash       Nursing Notes Reviewed    Physical Exam:  ED Triage Vitals [11/25/18 1648]   Enc Vitals Group      BP (!) 202/62      Pulse 106      Resp 21      Temp 98.7 °F (37.1 °C)      Temp Source Oral      SpO2 100 %      Weight 202 lb (91.6 kg)      Height 5' 3\" (1.6 m)      Head Circumference       Peak Flow       Pain Score       Pain Loc       Pain Edu? Excl. in [address]? GENERAL APPEARANCE: Awake and alert. Cooperative. No acute distress. Nontoxic in appearance  HEAD: Normocephalic. Atraumatic. EYES: EOM's grossly intact. Sclera anicteric. ENT: Tolerates saliva. No trismus. NECK: Supple. Trachea midline. The patient does have some have some angioedema on the right submandibular region. Her tongue is slightly swollen. Her voice is hoarse. CARDIO: RRR. Radial pulse 2+. LUNGS: Respirations unlabored. CTAB. no wheezing  ABDOMEN: Soft. Non-distended. Non-tender. EXTREMITIES: No acute deformities. SKIN: Warm and dry. Patient does have an urticarial rash present on the right side of her neck, her back, her groin, and her upper extremities. NEUROLOGICAL: No gross facial drooping. Moves all 4 extremities spontaneously. PSYCHIATRIC: Normal mood. Labs:  No results found for this visit on 11/25/18.         Radiographs (if obtained):  [] The following radiograph was interpreted by myself in the absence of a radiologist:  [x] Radiologist's Report reviewed at time of ED visit:  No orders to display       ED Course and

## 2018-11-26 NOTE — ED PROVIDER NOTES
ADDENDUM:    Care of the patient was assumed  from Dr. Susy Grimaldo. I have reviewed the notes, assessments, and/or procedures performed, I concur with her/his documentation on [facility]. I reviewed the medical record and evaluated the patient. ED COURSE/MDM:  Laboratory and imaging data were reviewed and care plan was arranged with the patient(see separate lab/imaging reports). RADIOLOGY:  Already resulted studies have been reviewed. No orders to display       Labs Reviewed - No data to display    Medications   ondansetron (ZOFRAN) injection 4 mg (4 mg Intravenous Given 11/25/18 1716)   diphenhydrAMINE (BENADRYL) injection 50 mg (50 mg Intravenous Given 11/25/18 1705)   methylPREDNISolone sodium (SOLU-MEDROL) injection 60 mg (60 mg Intravenous Given 11/25/18 1705)   famotidine (PEPCID) injection 20 mg (20 mg Intravenous Given 11/25/18 1705)   albuterol (PROVENTIL) nebulizer solution 2.5 mg (2.5 mg Nebulization Given 11/25/18 1713)       Vitals:    11/25/18 1648 11/25/18 1715 11/25/18 1830   BP: (!) 202/62 (!) 161/58 (!) 161/58   Pulse: 106 87 79   Resp: 21 9 15   Temp: 98.7 °F (37.1 °C)     TempSrc: Oral     SpO2: 100% 100% 95%   Weight: 202 lb (91.6 kg)     Height: 5' 3\" (1.6 m)         Patient improved no rash and no SOB. Prednisone and Atarax  My typical dicussion, presentation, and considerations for this patients' chief complaint, diagnosis, differential diagnosis, medications, medication use,  medication safety and medication interactions have been explained and outlined to this patient for this patient encounter. I have stressed need for follow up and reexamination for this encounter and or return to the emergency department if any changes or any concern. FINAL IMPRESSION:  1.  Allergic reaction, initial encounter        New Prescriptions    HYDROXYZINE (ATARAX) 50 MG TABLET    Take 1 tablet by mouth 3 times daily as needed for Itching    PREDNISONE (DELTASONE) 20 MG TABLET    Take 2 tablets by mouth daily for 5 days                    Simi Aparicio DO  11/25/18 1919

## 2019-01-06 ENCOUNTER — HOSPITAL ENCOUNTER (OUTPATIENT)
Age: 74
Setting detail: OBSERVATION
Discharge: HOME | End: 2019-01-07
Attending: EMERGENCY MEDICINE | Admitting: HOSPITALIST
Payer: MEDICARE

## 2019-01-06 ENCOUNTER — APPOINTMENT (OUTPATIENT)
Dept: GENERAL RADIOLOGY | Age: 74
End: 2019-01-06
Payer: MEDICARE

## 2019-01-06 VITALS
DIASTOLIC BLOOD PRESSURE: 69 MMHG | WEIGHT: 205 LBS | OXYGEN SATURATION: 96 % | HEART RATE: 76 BPM | RESPIRATION RATE: 18 BRPM | BODY MASS INDEX: 36.32 KG/M2 | SYSTOLIC BLOOD PRESSURE: 157 MMHG | TEMPERATURE: 96.3 F | HEIGHT: 63 IN

## 2019-01-06 DIAGNOSIS — R07.9 CHEST PAIN, UNSPECIFIED TYPE: ICD-10-CM

## 2019-01-06 DIAGNOSIS — R06.00 DYSPNEA, UNSPECIFIED TYPE: ICD-10-CM

## 2019-01-06 DIAGNOSIS — R73.9 HYPERGLYCEMIA WITHOUT KETOSIS: ICD-10-CM

## 2019-01-06 DIAGNOSIS — R11.2 NAUSEA VOMITING AND DIARRHEA: Primary | ICD-10-CM

## 2019-01-06 DIAGNOSIS — R19.7 NAUSEA VOMITING AND DIARRHEA: Primary | ICD-10-CM

## 2019-01-06 PROBLEM — K21.9 GERD (GASTROESOPHAGEAL REFLUX DISEASE): Status: ACTIVE | Noted: 2019-01-06

## 2019-01-06 PROBLEM — E11.9 TYPE 2 DIABETES MELLITUS (HCC): Status: ACTIVE | Noted: 2019-01-06

## 2019-01-06 PROBLEM — I10 HYPERTENSION: Status: ACTIVE | Noted: 2019-01-06

## 2019-01-06 LAB
ALBUMIN SERPL-MCNC: 3.3 GM/DL (ref 3.4–5)
ALP BLD-CCNC: 98 IU/L (ref 40–129)
ALT SERPL-CCNC: 19 U/L (ref 10–40)
ANION GAP SERPL CALCULATED.3IONS-SCNC: 15 MMOL/L (ref 4–16)
AST SERPL-CCNC: 28 IU/L (ref 15–37)
BASOPHILS ABSOLUTE: 0 K/CU MM
BASOPHILS RELATIVE PERCENT: 0.2 % (ref 0–1)
BILIRUB SERPL-MCNC: 0.4 MG/DL (ref 0–1)
BUN BLDV-MCNC: 20 MG/DL (ref 6–23)
C-REACTIVE PROTEIN, HIGH SENSITIVITY: 31.1 MG/L
CALCIUM SERPL-MCNC: 8.8 MG/DL (ref 8.3–10.6)
CHLORIDE BLD-SCNC: 94 MMOL/L (ref 99–110)
CO2: 24 MMOL/L (ref 21–32)
CREAT SERPL-MCNC: 1 MG/DL (ref 0.6–1.1)
DIFFERENTIAL TYPE: ABNORMAL
EKG ATRIAL RATE: 97 BPM
EKG DIAGNOSIS: NORMAL
EKG P AXIS: 49 DEGREES
EKG P-R INTERVAL: 164 MS
EKG Q-T INTERVAL: 376 MS
EKG QRS DURATION: 82 MS
EKG QTC CALCULATION (BAZETT): 477 MS
EKG R AXIS: 30 DEGREES
EKG T AXIS: 66 DEGREES
EKG VENTRICULAR RATE: 97 BPM
EOSINOPHILS ABSOLUTE: 0.1 K/CU MM
EOSINOPHILS RELATIVE PERCENT: 1.4 % (ref 0–3)
GFR AFRICAN AMERICAN: >60 ML/MIN/1.73M2
GFR NON-AFRICAN AMERICAN: 55 ML/MIN/1.73M2
GLUCOSE BLD-MCNC: 210 MG/DL (ref 70–99)
GLUCOSE BLD-MCNC: 463 MG/DL (ref 70–99)
HCT VFR BLD CALC: 42.3 % (ref 37–47)
HEMOGLOBIN: 13.4 GM/DL (ref 12.5–16)
IMMATURE NEUTROPHIL %: 1 % (ref 0–0.43)
LIPASE: 29 IU/L (ref 13–60)
LYMPHOCYTES ABSOLUTE: 1.7 K/CU MM
LYMPHOCYTES RELATIVE PERCENT: 16.8 % (ref 24–44)
MCH RBC QN AUTO: 29.1 PG (ref 27–31)
MCHC RBC AUTO-ENTMCNC: 31.7 % (ref 32–36)
MCV RBC AUTO: 91.8 FL (ref 78–100)
MONOCYTES ABSOLUTE: 0.3 K/CU MM
MONOCYTES RELATIVE PERCENT: 3.2 % (ref 0–4)
PDW BLD-RTO: 14.1 % (ref 11.7–14.9)
PLATELET # BLD: 283 K/CU MM (ref 140–440)
PMV BLD AUTO: 10.7 FL (ref 7.5–11.1)
POTASSIUM SERPL-SCNC: 4.5 MMOL/L (ref 3.5–5.1)
PRO-BNP: 258.8 PG/ML
RBC # BLD: 4.61 M/CU MM (ref 4.2–5.4)
SEGMENTED NEUTROPHILS ABSOLUTE COUNT: 7.6 K/CU MM
SEGMENTED NEUTROPHILS RELATIVE PERCENT: 77.4 % (ref 36–66)
SODIUM BLD-SCNC: 133 MMOL/L (ref 135–145)
TOTAL IMMATURE NEUTOROPHIL: 0.1 K/CU MM
TOTAL PROTEIN: 6.9 GM/DL (ref 6.4–8.2)
TROPONIN T: <0.01 NG/ML
TROPONIN T: <0.01 NG/ML
WBC # BLD: 9.9 K/CU MM (ref 4–10.5)

## 2019-01-06 PROCEDURE — 93010 ELECTROCARDIOGRAM REPORT: CPT | Performed by: INTERNAL MEDICINE

## 2019-01-06 PROCEDURE — G0378 HOSPITAL OBSERVATION PER HR: HCPCS

## 2019-01-06 PROCEDURE — 96374 THER/PROPH/DIAG INJ IV PUSH: CPT

## 2019-01-06 PROCEDURE — 93005 ELECTROCARDIOGRAM TRACING: CPT | Performed by: EMERGENCY MEDICINE

## 2019-01-06 PROCEDURE — 36415 COLL VENOUS BLD VENIPUNCTURE: CPT

## 2019-01-06 PROCEDURE — 96372 THER/PROPH/DIAG INJ SC/IM: CPT

## 2019-01-06 PROCEDURE — 6360000002 HC RX W HCPCS: Performed by: EMERGENCY MEDICINE

## 2019-01-06 PROCEDURE — 86140 C-REACTIVE PROTEIN: CPT

## 2019-01-06 PROCEDURE — 80053 COMPREHEN METABOLIC PANEL: CPT

## 2019-01-06 PROCEDURE — 6370000000 HC RX 637 (ALT 250 FOR IP): Performed by: HOSPITALIST

## 2019-01-06 PROCEDURE — 6370000000 HC RX 637 (ALT 250 FOR IP): Performed by: NURSE PRACTITIONER

## 2019-01-06 PROCEDURE — 6360000002 HC RX W HCPCS: Performed by: HOSPITALIST

## 2019-01-06 PROCEDURE — 83880 ASSAY OF NATRIURETIC PEPTIDE: CPT

## 2019-01-06 PROCEDURE — 85025 COMPLETE CBC W/AUTO DIFF WBC: CPT

## 2019-01-06 PROCEDURE — 84484 ASSAY OF TROPONIN QUANT: CPT

## 2019-01-06 PROCEDURE — 6370000000 HC RX 637 (ALT 250 FOR IP): Performed by: EMERGENCY MEDICINE

## 2019-01-06 PROCEDURE — 2580000003 HC RX 258: Performed by: HOSPITALIST

## 2019-01-06 PROCEDURE — 83690 ASSAY OF LIPASE: CPT

## 2019-01-06 PROCEDURE — 99284 EMERGENCY DEPT VISIT MOD MDM: CPT

## 2019-01-06 PROCEDURE — 2580000003 HC RX 258: Performed by: EMERGENCY MEDICINE

## 2019-01-06 PROCEDURE — 71045 X-RAY EXAM CHEST 1 VIEW: CPT

## 2019-01-06 PROCEDURE — 82962 GLUCOSE BLOOD TEST: CPT

## 2019-01-06 RX ORDER — ATORVASTATIN CALCIUM 40 MG/1
40 TABLET, FILM COATED ORAL NIGHTLY
Status: DISCONTINUED | OUTPATIENT
Start: 2019-01-06 | End: 2019-01-07 | Stop reason: HOSPADM

## 2019-01-06 RX ORDER — DEXTROSE MONOHYDRATE 25 G/50ML
12.5 INJECTION, SOLUTION INTRAVENOUS PRN
Status: DISCONTINUED | OUTPATIENT
Start: 2019-01-06 | End: 2019-01-07 | Stop reason: HOSPADM

## 2019-01-06 RX ORDER — NITROGLYCERIN 0.4 MG/1
0.4 TABLET SUBLINGUAL EVERY 5 MIN PRN
Status: DISCONTINUED | OUTPATIENT
Start: 2019-01-06 | End: 2019-01-07 | Stop reason: HOSPADM

## 2019-01-06 RX ORDER — MAGNESIUM SULFATE 1 G/100ML
1 INJECTION INTRAVENOUS PRN
Status: DISCONTINUED | OUTPATIENT
Start: 2019-01-06 | End: 2019-01-07 | Stop reason: HOSPADM

## 2019-01-06 RX ORDER — ONDANSETRON 2 MG/ML
4 INJECTION INTRAMUSCULAR; INTRAVENOUS EVERY 30 MIN PRN
Status: DISCONTINUED | OUTPATIENT
Start: 2019-01-06 | End: 2019-01-07 | Stop reason: HOSPADM

## 2019-01-06 RX ORDER — POTASSIUM CHLORIDE 7.45 MG/ML
10 INJECTION INTRAVENOUS PRN
Status: DISCONTINUED | OUTPATIENT
Start: 2019-01-06 | End: 2019-01-07 | Stop reason: HOSPADM

## 2019-01-06 RX ORDER — DEXTROSE MONOHYDRATE 50 MG/ML
100 INJECTION, SOLUTION INTRAVENOUS PRN
Status: DISCONTINUED | OUTPATIENT
Start: 2019-01-06 | End: 2019-01-07 | Stop reason: HOSPADM

## 2019-01-06 RX ORDER — ESOMEPRAZOLE MAGNESIUM 40 MG/1
40 FOR SUSPENSION ORAL DAILY
COMMUNITY
End: 2019-04-16

## 2019-01-06 RX ORDER — SODIUM CHLORIDE 0.9 % (FLUSH) 0.9 %
10 SYRINGE (ML) INJECTION PRN
Status: DISCONTINUED | OUTPATIENT
Start: 2019-01-06 | End: 2019-01-07 | Stop reason: HOSPADM

## 2019-01-06 RX ORDER — 0.9 % SODIUM CHLORIDE 0.9 %
1000 INTRAVENOUS SOLUTION INTRAVENOUS ONCE
Status: COMPLETED | OUTPATIENT
Start: 2019-01-06 | End: 2019-01-06

## 2019-01-06 RX ORDER — SODIUM CHLORIDE 0.9 % (FLUSH) 0.9 %
10 SYRINGE (ML) INJECTION EVERY 12 HOURS SCHEDULED
Status: DISCONTINUED | OUTPATIENT
Start: 2019-01-06 | End: 2019-01-07 | Stop reason: HOSPADM

## 2019-01-06 RX ORDER — NICOTINE POLACRILEX 4 MG
15 LOZENGE BUCCAL PRN
Status: DISCONTINUED | OUTPATIENT
Start: 2019-01-06 | End: 2019-01-07 | Stop reason: HOSPADM

## 2019-01-06 RX ORDER — SODIUM CHLORIDE 0.9 % (FLUSH) 0.9 %
10 SYRINGE (ML) INJECTION 2 TIMES DAILY
Status: DISCONTINUED | OUTPATIENT
Start: 2019-01-06 | End: 2019-01-07 | Stop reason: HOSPADM

## 2019-01-06 RX ORDER — ASPIRIN 81 MG/1
81 TABLET, CHEWABLE ORAL DAILY
Status: DISCONTINUED | OUTPATIENT
Start: 2019-01-07 | End: 2019-01-07 | Stop reason: HOSPADM

## 2019-01-06 RX ORDER — POTASSIUM CHLORIDE 20 MEQ/1
40 TABLET, EXTENDED RELEASE ORAL PRN
Status: DISCONTINUED | OUTPATIENT
Start: 2019-01-06 | End: 2019-01-07 | Stop reason: HOSPADM

## 2019-01-06 RX ORDER — LOPERAMIDE HYDROCHLORIDE 2 MG/1
2 CAPSULE ORAL ONCE
Status: COMPLETED | OUTPATIENT
Start: 2019-01-06 | End: 2019-01-06

## 2019-01-06 RX ORDER — POTASSIUM CHLORIDE 20MEQ/15ML
40 LIQUID (ML) ORAL PRN
Status: DISCONTINUED | OUTPATIENT
Start: 2019-01-06 | End: 2019-01-07 | Stop reason: HOSPADM

## 2019-01-06 RX ORDER — LOSARTAN POTASSIUM 25 MG/1
25 TABLET ORAL DAILY
COMMUNITY
End: 2019-04-10

## 2019-01-06 RX ORDER — ONDANSETRON 2 MG/ML
4 INJECTION INTRAMUSCULAR; INTRAVENOUS EVERY 6 HOURS PRN
Status: DISCONTINUED | OUTPATIENT
Start: 2019-01-06 | End: 2019-01-07 | Stop reason: HOSPADM

## 2019-01-06 RX ADMIN — ATORVASTATIN CALCIUM 40 MG: 40 TABLET, FILM COATED ORAL at 19:41

## 2019-01-06 RX ADMIN — ENOXAPARIN SODIUM 30 MG: 30 INJECTION SUBCUTANEOUS at 19:41

## 2019-01-06 RX ADMIN — SODIUM CHLORIDE, PRESERVATIVE FREE 10 ML: 5 INJECTION INTRAVENOUS at 19:46

## 2019-01-06 RX ADMIN — SODIUM CHLORIDE, PRESERVATIVE FREE 10 ML: 5 INJECTION INTRAVENOUS at 15:22

## 2019-01-06 RX ADMIN — INSULIN LISPRO 1 UNITS: 100 INJECTION, SOLUTION INTRAVENOUS; SUBCUTANEOUS at 19:42

## 2019-01-06 RX ADMIN — ONDANSETRON 4 MG: 2 INJECTION INTRAMUSCULAR; INTRAVENOUS at 15:35

## 2019-01-06 RX ADMIN — SODIUM CHLORIDE 1000 ML: 9 INJECTION, SOLUTION INTRAVENOUS at 15:35

## 2019-01-06 RX ADMIN — INSULIN HUMAN 10 UNITS: 100 INJECTION, SOLUTION PARENTERAL at 17:29

## 2019-01-06 RX ADMIN — LOPERAMIDE HYDROCHLORIDE 2 MG: 2 CAPSULE ORAL at 15:35

## 2019-01-06 ASSESSMENT — PAIN SCALES - GENERAL: PAINLEVEL_OUTOF10: 0

## 2019-01-06 ASSESSMENT — HEART SCORE: ECG: 0

## 2019-01-07 LAB
ANION GAP SERPL CALCULATED.3IONS-SCNC: 14 MMOL/L (ref 4–16)
BUN BLDV-MCNC: 16 MG/DL (ref 6–23)
CALCIUM SERPL-MCNC: 8.7 MG/DL (ref 8.3–10.6)
CHLORIDE BLD-SCNC: 102 MMOL/L (ref 99–110)
CHOLESTEROL: 151 MG/DL
CO2: 24 MMOL/L (ref 21–32)
CREAT SERPL-MCNC: 0.8 MG/DL (ref 0.6–1.1)
EKG ATRIAL RATE: 86 BPM
EKG DIAGNOSIS: NORMAL
EKG P AXIS: 52 DEGREES
EKG P-R INTERVAL: 194 MS
EKG Q-T INTERVAL: 412 MS
EKG QRS DURATION: 86 MS
EKG QTC CALCULATION (BAZETT): 493 MS
EKG R AXIS: 13 DEGREES
EKG T AXIS: 83 DEGREES
EKG VENTRICULAR RATE: 86 BPM
ESTIMATED AVERAGE GLUCOSE: 220 MG/DL
GFR AFRICAN AMERICAN: >60 ML/MIN/1.73M2
GFR NON-AFRICAN AMERICAN: >60 ML/MIN/1.73M2
GLUCOSE BLD-MCNC: 132 MG/DL (ref 70–99)
GLUCOSE BLD-MCNC: 199 MG/DL (ref 70–99)
GLUCOSE BLD-MCNC: 323 MG/DL (ref 70–99)
HBA1C MFR BLD: 9.3 % (ref 4.2–6.3)
HCT VFR BLD CALC: 37.6 % (ref 37–47)
HDLC SERPL-MCNC: 40 MG/DL
HEMOGLOBIN: 11.7 GM/DL (ref 12.5–16)
LDL CHOLESTEROL DIRECT: 101 MG/DL
MCH RBC QN AUTO: 28.6 PG (ref 27–31)
MCHC RBC AUTO-ENTMCNC: 31.1 % (ref 32–36)
MCV RBC AUTO: 91.9 FL (ref 78–100)
PDW BLD-RTO: 14.4 % (ref 11.7–14.9)
PLATELET # BLD: 239 K/CU MM (ref 140–440)
PMV BLD AUTO: 10.7 FL (ref 7.5–11.1)
POTASSIUM SERPL-SCNC: 4.2 MMOL/L (ref 3.5–5.1)
RBC # BLD: 4.09 M/CU MM (ref 4.2–5.4)
SODIUM BLD-SCNC: 140 MMOL/L (ref 135–145)
TRIGL SERPL-MCNC: 154 MG/DL
TROPONIN T: <0.01 NG/ML
WBC # BLD: 9.1 K/CU MM (ref 4–10.5)

## 2019-01-07 PROCEDURE — 2580000003 HC RX 258: Performed by: HOSPITALIST

## 2019-01-07 PROCEDURE — 36415 COLL VENOUS BLD VENIPUNCTURE: CPT

## 2019-01-07 PROCEDURE — 6360000002 HC RX W HCPCS: Performed by: HOSPITALIST

## 2019-01-07 PROCEDURE — 96372 THER/PROPH/DIAG INJ SC/IM: CPT

## 2019-01-07 PROCEDURE — 6370000000 HC RX 637 (ALT 250 FOR IP): Performed by: NURSE PRACTITIONER

## 2019-01-07 PROCEDURE — 93010 ELECTROCARDIOGRAM REPORT: CPT | Performed by: INTERNAL MEDICINE

## 2019-01-07 PROCEDURE — 84484 ASSAY OF TROPONIN QUANT: CPT

## 2019-01-07 PROCEDURE — 85027 COMPLETE CBC AUTOMATED: CPT

## 2019-01-07 PROCEDURE — 93005 ELECTROCARDIOGRAM TRACING: CPT | Performed by: HOSPITALIST

## 2019-01-07 PROCEDURE — 83036 HEMOGLOBIN GLYCOSYLATED A1C: CPT

## 2019-01-07 PROCEDURE — 80048 BASIC METABOLIC PNL TOTAL CA: CPT

## 2019-01-07 PROCEDURE — 83721 ASSAY OF BLOOD LIPOPROTEIN: CPT

## 2019-01-07 PROCEDURE — 6370000000 HC RX 637 (ALT 250 FOR IP): Performed by: HOSPITALIST

## 2019-01-07 PROCEDURE — 80061 LIPID PANEL: CPT

## 2019-01-07 PROCEDURE — 82962 GLUCOSE BLOOD TEST: CPT

## 2019-01-07 PROCEDURE — G0378 HOSPITAL OBSERVATION PER HR: HCPCS

## 2019-01-07 PROCEDURE — 2580000003 HC RX 258: Performed by: EMERGENCY MEDICINE

## 2019-01-07 RX ORDER — DIPHENHYDRAMINE HCL 25 MG
25 TABLET ORAL ONCE
Status: COMPLETED | OUTPATIENT
Start: 2019-01-07 | End: 2019-01-07

## 2019-01-07 RX ADMIN — ENOXAPARIN SODIUM 30 MG: 30 INJECTION SUBCUTANEOUS at 09:23

## 2019-01-07 RX ADMIN — ASPIRIN 81 MG 81 MG: 81 TABLET ORAL at 09:25

## 2019-01-07 RX ADMIN — SODIUM CHLORIDE, PRESERVATIVE FREE 10 ML: 5 INJECTION INTRAVENOUS at 09:25

## 2019-01-07 RX ADMIN — SODIUM CHLORIDE, PRESERVATIVE FREE 10 ML: 5 INJECTION INTRAVENOUS at 09:52

## 2019-01-07 RX ADMIN — INSULIN LISPRO 1 UNITS: 100 INJECTION, SOLUTION INTRAVENOUS; SUBCUTANEOUS at 09:24

## 2019-01-07 RX ADMIN — DIPHENHYDRAMINE HYDROCHLORIDE 25 MG: 25 CAPSULE ORAL at 14:44

## 2019-01-07 ASSESSMENT — PAIN SCALES - GENERAL: PAINLEVEL_OUTOF10: 0

## 2019-04-10 ENCOUNTER — HOSPITAL ENCOUNTER (EMERGENCY)
Age: 74
Discharge: HOME | End: 2019-04-10
Attending: EMERGENCY MEDICINE
Payer: MEDICARE

## 2019-04-10 VITALS
WEIGHT: 205 LBS | BODY MASS INDEX: 36.32 KG/M2 | SYSTOLIC BLOOD PRESSURE: 151 MMHG | OXYGEN SATURATION: 95 % | DIASTOLIC BLOOD PRESSURE: 62 MMHG | TEMPERATURE: 98.9 F | HEART RATE: 79 BPM | HEIGHT: 63 IN | RESPIRATION RATE: 16 BRPM

## 2019-04-10 DIAGNOSIS — T78.3XXA ANGIOEDEMA, INITIAL ENCOUNTER: Primary | ICD-10-CM

## 2019-04-10 PROCEDURE — 96375 TX/PRO/DX INJ NEW DRUG ADDON: CPT

## 2019-04-10 PROCEDURE — 96374 THER/PROPH/DIAG INJ IV PUSH: CPT

## 2019-04-10 PROCEDURE — 6360000002 HC RX W HCPCS: Performed by: EMERGENCY MEDICINE

## 2019-04-10 PROCEDURE — 2500000003 HC RX 250 WO HCPCS: Performed by: EMERGENCY MEDICINE

## 2019-04-10 PROCEDURE — 99282 EMERGENCY DEPT VISIT SF MDM: CPT

## 2019-04-10 PROCEDURE — 94640 AIRWAY INHALATION TREATMENT: CPT

## 2019-04-10 PROCEDURE — 96376 TX/PRO/DX INJ SAME DRUG ADON: CPT

## 2019-04-10 RX ORDER — ALBUTEROL SULFATE 2.5 MG/3ML
2.5 SOLUTION RESPIRATORY (INHALATION) ONCE
Status: COMPLETED | OUTPATIENT
Start: 2019-04-10 | End: 2019-04-10

## 2019-04-10 RX ORDER — DIPHENHYDRAMINE HYDROCHLORIDE 50 MG/ML
50 INJECTION INTRAMUSCULAR; INTRAVENOUS ONCE
Status: COMPLETED | OUTPATIENT
Start: 2019-04-10 | End: 2019-04-10

## 2019-04-10 RX ORDER — DIPHENHYDRAMINE HCL 25 MG
25 CAPSULE ORAL EVERY 4 HOURS PRN
Qty: 30 CAPSULE | Refills: 0 | Status: SHIPPED | OUTPATIENT
Start: 2019-04-10 | End: 2019-04-16

## 2019-04-10 RX ORDER — METHYLPREDNISOLONE SODIUM SUCCINATE 125 MG/2ML
125 INJECTION, POWDER, LYOPHILIZED, FOR SOLUTION INTRAMUSCULAR; INTRAVENOUS ONCE
Status: COMPLETED | OUTPATIENT
Start: 2019-04-10 | End: 2019-04-10

## 2019-04-10 RX ORDER — PANTOPRAZOLE SODIUM 40 MG/1
40 TABLET, DELAYED RELEASE ORAL DAILY
Refills: 3 | COMMUNITY
Start: 2019-04-04

## 2019-04-10 RX ORDER — METHYLPREDNISOLONE 4 MG/1
TABLET ORAL
Qty: 1 KIT | Refills: 0 | Status: SHIPPED | OUTPATIENT
Start: 2019-04-10 | End: 2019-04-16

## 2019-04-10 RX ADMIN — DIPHENHYDRAMINE HYDROCHLORIDE 50 MG: 50 INJECTION INTRAMUSCULAR; INTRAVENOUS at 15:24

## 2019-04-10 RX ADMIN — FAMOTIDINE 20 MG: 10 INJECTION, SOLUTION INTRAVENOUS at 15:24

## 2019-04-10 RX ADMIN — ALBUTEROL SULFATE 2.5 MG: 2.5 SOLUTION RESPIRATORY (INHALATION) at 15:32

## 2019-04-10 RX ADMIN — DIPHENHYDRAMINE HYDROCHLORIDE 25 MG: 50 INJECTION INTRAMUSCULAR; INTRAVENOUS at 17:28

## 2019-04-10 RX ADMIN — METHYLPREDNISOLONE SODIUM SUCCINATE 125 MG: 125 INJECTION, POWDER, FOR SOLUTION INTRAMUSCULAR; INTRAVENOUS at 15:24

## 2019-04-10 NOTE — ED PROVIDER NOTES
Emergency Department Encounter  Location: [address]    Patient: Edgardo Sofia  MRN: [id number]  : 1945  Date of evaluation: 4/10/2019  ED Provider: Marisa Roth DO, FACEP    Chief Complaint:    Allergic Reaction (pt sts she feels as though her throat is beginning to swell; sts she's had similar reaction twice in the past; sts ears itching and beginning to have splotchy rash on bilateral forearms; sts she had haircolor done this morn and then went to [employer] for lunch; denies any changes in foods or meds; pt advised to check with hairstylist regarding possible change in product used)    [place]:  Edgardo Sofia is a 68 y.o. female that presents to the emergency department with complaints of her throat closing. The patient states she's had a reaction like this twice before. She complains for ear itching beginning to have splotchy rash on her bilateral forearms. She states about 3 PM she she started to feel like her throat was closing down. She denies any new education was and denies any known allergies. The patient is had reactions like this twice previously. ROS:  At least 10 systems reviewed and otherwise acutely negative except as in the [address] Past Medical History:   Diagnosis Date    Arthritis     Cellulitis of right foot     Chronic kidney disease     Depression     Diabetes mellitus ([address]     Frequent falls     GERD (gastroesophageal reflux disease)     Hallux valgus (acquired), right foot 2017    Hypertension     Other disorders of kidney and ureter     1 kidney.  Thyroid disease     hypothyroid    Type 2 diabetes mellitus with foot ulcer, with long-term current use of insulin ([address] 2012    Ulcer of right foot with fat layer exposed ([address] 2013     Past Surgical History:   Procedure Laterality Date    ABDOMEN SURGERY      CYST REMOVAL      from kidney.     DILATATION, ESOPHAGUS      ENDOSCOPY, COLON, DIAGNOSTIC      FOOT SURGERY  12/30/11    had infection  and a biopsy was sent away for analysis    HERNIA REPAIR      HYSTERECTOMY      KIDNEY REMOVAL  1973     Family History   Problem Relation Age of Onset    Arthritis Mother     Cancer Mother     Diabetes Mother     High Blood Pressure Mother     Asthma Father     Heart Disease Father     Diabetes Brother      Social History     Socioeconomic History    Marital status:      Spouse name: Not on file    Number of children: Not on file    Years of education: Not on file    Highest education level: Not on file   Occupational History    Not on file   Social Needs    Financial resource strain: Not on file    Food insecurity:     Worry: Not on file     Inability: Not on file    Transportation needs:     Medical: Not on file     Non-medical: Not on file   Tobacco Use    Smoking status: Never Smoker    Smokeless tobacco: Never Used   Substance and Sexual Activity    Alcohol use: No    Drug use: No    Sexual activity: Not on file   Lifestyle    Physical activity:     Days per week: Not on file     Minutes per session: Not on file    Stress: Not on file   Relationships    Social connections:     Talks on phone: Not on file     Gets together: Not on file     Attends Hindu service: Not on file     Active member of club or organization: Not on file     Attends meetings of clubs or organizations: Not on file     Relationship status: Not on file    Intimate partner violence:     Fear of current or ex partner: Not on file     Emotionally abused: Not on file     Physically abused: Not on file     Forced sexual activity: Not on file   Other Topics Concern    Not on file   Social History Narrative    Not on file     No current facility-administered medications for this encounter. Current Outpatient Medications   Medication Sig Dispense Refill    methylPREDNISolone (MEDROL, NAA,) 4 MG tablet Take by mouth.  1 kit 0    diphenhydrAMINE (BENADRYL) 25 MG capsule Soft. Non-distended. Non-tender. EXTREMITIES: No acute deformities. SKIN: Warm and dry. Blotchy rash present on her forearms and chest  NEUROLOGICAL: No gross facial drooping. Moves all 4 extremities spontaneously. PSYCHIATRIC: Normal mood. Labs:  No results found for this visit on 04/10/19. Radiographs (if obtained):  [] The following radiograph was interpreted by myself in the absence of a radiologist:  [x] Radiologist's Report reviewed at time of ED visit:  No orders to display       ED Course and MDM:  The patient is feeling better after having an albuterol breathing treatments some Benadryl, Pepcid and Solu-Medrol. The swelling is decreased significantly. Today this patient appeared more characteristic of angioedema event of an acute allergic reaction and I think this may be secondary to her Cozaar medication. I'm going to have her discontinue the Cozaar medication and follow-up with her primary caregiver for evaluation of her blood pressure and a substitute medication. She will be continued on steroids and Benadryl. She is instructed to return if her condition worsens. Final Impression:  1. Angioedema, initial encounter      DISPOSITION Decision To Discharge    Patient referred to:  Brent Prescott, [facility]  [phone]    In 2 days  For follow up    Discharge medications:  New Prescriptions    DIPHENHYDRAMINE (BENADRYL) 25 MG CAPSULE    Take 1 capsule by mouth every 4 hours as needed for Itching    METHYLPREDNISOLONE (MEDROL, NAA,) 4 MG TABLET    Take by mouth.      (Please note that portions of this note may have been completed with a voice recognition program. Efforts were made to edit the dictations but occasionally words are mis-transcribed.)    Jessica Wan DO, [facility]  Board certified in [address] [place] [address]  04/10/19 2841

## 2019-04-16 ENCOUNTER — APPOINTMENT (OUTPATIENT)
Dept: GENERAL RADIOLOGY | Age: 74
End: 2019-04-16
Payer: MEDICARE

## 2019-04-16 ENCOUNTER — HOSPITAL ENCOUNTER (EMERGENCY)
Age: 74
Discharge: HOME | End: 2019-04-16
Attending: EMERGENCY MEDICINE
Payer: MEDICARE

## 2019-04-16 VITALS
HEIGHT: 63 IN | RESPIRATION RATE: 15 BRPM | TEMPERATURE: 98.5 F | OXYGEN SATURATION: 97 % | WEIGHT: 205 LBS | DIASTOLIC BLOOD PRESSURE: 55 MMHG | HEART RATE: 71 BPM | SYSTOLIC BLOOD PRESSURE: 158 MMHG | BODY MASS INDEX: 36.32 KG/M2

## 2019-04-16 DIAGNOSIS — R00.2 PALPITATIONS: Primary | ICD-10-CM

## 2019-04-16 LAB
ALBUMIN SERPL-MCNC: 3.8 GM/DL (ref 3.4–5)
ALP BLD-CCNC: 105 IU/L (ref 40–129)
ALT SERPL-CCNC: 38 U/L (ref 10–40)
ANION GAP SERPL CALCULATED.3IONS-SCNC: 9 MMOL/L (ref 4–16)
AST SERPL-CCNC: 32 IU/L (ref 15–37)
BASOPHILS ABSOLUTE: 0 K/CU MM
BASOPHILS RELATIVE PERCENT: 0.3 % (ref 0–1)
BILIRUB SERPL-MCNC: 0.4 MG/DL (ref 0–1)
BILIRUBIN DIRECT: 0.2 MG/DL (ref 0–0.3)
BILIRUBIN, INDIRECT: 0.2 MG/DL (ref 0–0.7)
BUN BLDV-MCNC: 20 MG/DL (ref 6–23)
CALCIUM SERPL-MCNC: 9.2 MG/DL (ref 8.3–10.6)
CHLORIDE BLD-SCNC: 100 MMOL/L (ref 99–110)
CO2: 33 MMOL/L (ref 21–32)
CREAT SERPL-MCNC: 0.9 MG/DL (ref 0.6–1.1)
D DIMER: <200 NG/ML(DDU)
DIFFERENTIAL TYPE: ABNORMAL
EOSINOPHILS ABSOLUTE: 0.3 K/CU MM
EOSINOPHILS RELATIVE PERCENT: 2.9 % (ref 0–3)
GFR AFRICAN AMERICAN: >60 ML/MIN/1.73M2
GFR NON-AFRICAN AMERICAN: >60 ML/MIN/1.73M2
GLUCOSE BLD-MCNC: 171 MG/DL (ref 70–99)
HCT VFR BLD CALC: 41.4 % (ref 37–47)
HEMOGLOBIN: 13 GM/DL (ref 12.5–16)
IMMATURE NEUTROPHIL %: 1.2 % (ref 0–0.43)
LIPASE: 24 IU/L (ref 13–60)
LYMPHOCYTES ABSOLUTE: 2.1 K/CU MM
LYMPHOCYTES RELATIVE PERCENT: 22.3 % (ref 24–44)
MCH RBC QN AUTO: 29.2 PG (ref 27–31)
MCHC RBC AUTO-ENTMCNC: 31.4 % (ref 32–36)
MCV RBC AUTO: 93 FL (ref 78–100)
MONOCYTES ABSOLUTE: 0.5 K/CU MM
MONOCYTES RELATIVE PERCENT: 5.3 % (ref 0–4)
PDW BLD-RTO: 14.1 % (ref 11.7–14.9)
PLATELET # BLD: 255 K/CU MM (ref 140–440)
PMV BLD AUTO: 10.5 FL (ref 7.5–11.1)
POTASSIUM SERPL-SCNC: 4.4 MMOL/L (ref 3.5–5.1)
RBC # BLD: 4.45 M/CU MM (ref 4.2–5.4)
SEGMENTED NEUTROPHILS ABSOLUTE COUNT: 6.5 K/CU MM
SEGMENTED NEUTROPHILS RELATIVE PERCENT: 68 % (ref 36–66)
SODIUM BLD-SCNC: 142 MMOL/L (ref 135–145)
TOTAL IMMATURE NEUTOROPHIL: 0.11 K/CU MM
TOTAL PROTEIN: 7.2 GM/DL (ref 6.4–8.2)
TROPONIN T: <0.01 NG/ML
TSH HIGH SENSITIVITY: 1.56 UIU/ML (ref 0.27–4.2)
WBC # BLD: 9.5 K/CU MM (ref 4–10.5)

## 2019-04-16 PROCEDURE — 85025 COMPLETE CBC W/AUTO DIFF WBC: CPT

## 2019-04-16 PROCEDURE — 84436 ASSAY OF TOTAL THYROXINE: CPT

## 2019-04-16 PROCEDURE — 84443 ASSAY THYROID STIM HORMONE: CPT

## 2019-04-16 PROCEDURE — 99285 EMERGENCY DEPT VISIT HI MDM: CPT

## 2019-04-16 PROCEDURE — 84484 ASSAY OF TROPONIN QUANT: CPT

## 2019-04-16 PROCEDURE — 85379 FIBRIN DEGRADATION QUANT: CPT

## 2019-04-16 PROCEDURE — 80076 HEPATIC FUNCTION PANEL: CPT

## 2019-04-16 PROCEDURE — 83690 ASSAY OF LIPASE: CPT

## 2019-04-16 PROCEDURE — 80048 BASIC METABOLIC PNL TOTAL CA: CPT

## 2019-04-16 PROCEDURE — 71045 X-RAY EXAM CHEST 1 VIEW: CPT

## 2019-04-16 ASSESSMENT — HEART SCORE: ECG: 0

## 2019-04-16 NOTE — ED NOTES
The patient presents to the er today with complaints of a fluttering feeling in chest. She reports some shortness of breath, but does not appear to be in any distress.               Flavia Holley RN  04/16/19 0723

## 2019-04-17 NOTE — ED PROVIDER NOTES
Triage Chief Complaint:   Shortness of Breath (states fluttery feeling in abd and chest, denies pain, intermittent)    [place]:  Anne Kerns is a 68 y.o. female that presents to the emergency department today stating that she's had a fluttery feeling in her abdominal region and also her chest.  She states she's not had any chest pains in this fluttering is been intermittent. She states that she came in today for further evaluation. Patient states that she hasn't had feelings like this in the past.  She states that she wanted to be evaluated for this. She does have a scheduled doctor's appointment tomorrow. She states that she just was concerned that there could be something going on and wanted to have her heart evaluated. She states that there is no radiation of this discomfort that radiates into her back neck or jaw. She states that she is not any nausea or vomiting. She denies any dizziness or lightheadedness. Patient states that she has had a stress test that was years ago and she was unable to complete it but has not had any other cardiac complaints.     ROS:  General:  No fevers, no weakness  Eyes:  no discharge  ENT:  No sore throat, no nasal congestion  Cardiovascular:  No chest pain, + palpitations  Respiratory:  No shortness of breath, no cough, no wheezing  Gastrointestinal:  No pain, no nausea, no vomiting, no diarrhea  Musculoskeletal:  No muscle pain, no joint pain  Skin:  No rash, no pruritis  Neurologic:  No speech problems, no headache, no extremity numbness, no extremity tingling, no extremity weakness  Psychiatric:  No anxiety  Genitourinary:  No dysuria, no hematuria  Endocrine:  No unexpected weight gain, no unexpected weight loss  Extremities:  no edema, no pain    Past Medical History:   Diagnosis Date    Arthritis     Cellulitis of right foot     Chronic kidney disease     Depression     Diabetes mellitus (HCC)     Frequent falls     GERD (gastroesophageal reflux disease)  Hallux valgus (acquired), right foot 5/1/2017    Hypertension     Other disorders of kidney and ureter     1 kidney.  Thyroid disease     hypothyroid    Type 2 diabetes mellitus with foot ulcer, with long-term current use of insulin ([facility] 1/18/2012    Ulcer of right foot with fat layer exposed ([facility] 2/6/2013     Past Surgical History:   Procedure Laterality Date    ABDOMEN SURGERY      CYST REMOVAL      from kidney.     DILATATION, ESOPHAGUS      ENDOSCOPY, COLON, DIAGNOSTIC      FOOT SURGERY  12/30/11    had infection  and a biopsy was sent away for analysis    HERNIA REPAIR      HYSTERECTOMY      KIDNEY REMOVAL  1973     Family History   Problem Relation Age of Onset    Arthritis Mother     Cancer Mother     Diabetes Mother     High Blood Pressure Mother     Asthma Father     Heart Disease Father     Diabetes Brother      Social History     Socioeconomic History    Marital status:      Spouse name: Not on file    Number of children: Not on file    Years of education: Not on file    Highest education level: Not on file   Occupational History    Not on file   Social Needs    Financial resource strain: Not on file    Food insecurity:     Worry: Not on file     Inability: Not on file    Transportation needs:     Medical: Not on file     Non-medical: Not on file   Tobacco Use    Smoking status: Never Smoker    Smokeless tobacco: Never Used   Substance and Sexual Activity    Alcohol use: No    Drug use: No    Sexual activity: Not on file   Lifestyle    Physical activity:     Days per week: Not on file     Minutes per session: Not on file    Stress: Not on file   Relationships    Social connections:     Talks on phone: Not on file     Gets together: Not on file     Attends [employer] service: Not on file     Active member of club or organization: Not on file     Attends meetings of clubs or organizations: Not on file     Relationship status: Not on file    Intimate partner violence:     Fear of current or ex partner: Not on file     Emotionally abused: Not on file     Physically abused: Not on file     Forced sexual activity: Not on file   Other Topics Concern    Not on file   Social History Narrative    Not on file     No current facility-administered medications for this encounter. Current Outpatient Medications   Medication Sig Dispense Refill    pantoprazole (PROTONIX) 40 MG tablet   3    insulin lispro (HUMALOG) 100 UNIT/ML injection vial Inject 30 Units into the skin 3 times daily (with meals) (Patient taking differently: Inject 50 Units into the skin 3 times daily (with meals) ) 2 vial 0    atorvastatin (LIPITOR) 20 MG tablet Take 20 mg by mouth daily      amLODIPine (NORVASC) 10 MG tablet Take 10 mg by mouth daily      vitamin D (CHOLECALCIFEROL) 1000 UNIT TABS tablet Take 1,000 Units by mouth daily      Multiple Vitamins-Minerals (TRUEPLUS DIABETIC MULTIVITAMIN) TABS Take 1 tablet by mouth daily.  atenolol (TENORMIN) 50 MG tablet Take 50 mg by mouth 2 times daily.  levothyroxine (SYNTHROID) 200 MCG tablet Take 300 mcg by mouth Daily       aspirin 81 MG EC tablet Take 81 mg by mouth daily. Allergies   Allergen Reactions    Ritalin [Methylphenidate] Rash    Rocephin [Ceftriaxone Sodium-Lidocaine] Rash       Nursing Notes Reviewed    Physical Exam:  ED Triage Vitals [04/16/19 1752]   Enc Vitals Group      BP (!) 166/70      Pulse 75      Resp 16      Temp 98.5 °F (36.9 °C)      Temp Source Oral      SpO2 97 %      Weight 205 lb (93 kg)      Height 5' 3\" (1.6 m)      Head Circumference       Peak Flow       Pain Score       Pain Loc       Pain Edu? Excl. in [address]? My pulse ox interpretation is - normal    General appearance:  No acute distress. Skin:  Warm. Dry. Eye:  Extraocular movements intact. Ears, nose, mouth and throat:  Oral mucosa moist   Neck:  Trachea midline. Extremity:  No swelling.   Normal ROM     Heart:  Regular Hepatic Function Panel   Result Value Ref Range    Alb 3.8 3.4 - 5.0 GM/DL    Total Bilirubin 0.4 0.0 - 1.0 MG/DL    Bilirubin, Direct 0.2 0.0 - 0.3 MG/DL    Bilirubin, Indirect 0.2 0 - 0.7 MG/DL    Alkaline Phosphatase 105 40 - 129 IU/L    AST 32 15 - 37 IU/L    ALT 38 10 - 40 U/L    Total Protein 7.2 6.4 - 8.2 GM/DL   Lipase   Result Value Ref Range    Lipase 24 13 - 60 IU/L   D-Dimer, Quantitative   Result Value Ref Range    D-Dimer, Quant <200 <230 NG/mL(DDU)      Radiographs (if obtained):  [] The following radiograph was interpreted by myself in the absence of a radiologist:   [] Radiologist's Report Reviewed:  XR CHEST PORTABLE   Final Result   1. No acute cardiopulmonary process. EKG (if obtained): (All EKG's are interpreted by myself in the absence of a cardiologist)    EKG today shows normal sinus rhythm with a normal EKG. This sounds a ventricular rate of 74  QTc of 448. No previous for comparison. Chart review shows recent radiographs:  Xr Chest Portable    Result Date: 4/16/2019  EXAMINATION: SINGLE XRAY VIEW OF THE CHEST 4/16/2019 6:22 pm COMPARISON: Chest x-ray January 6, 2019 HISTORY: ORDERING SYSTEM PROVIDED HISTORY: sob TECHNOLOGIST PROVIDED HISTORY: Reason for exam:->sob Ordering Physician Provided Reason for Exam: Shortness of Breath (states fluttery feeling in abd and chest, denies pain, intermittent Acuity: Acute Type of Exam: Initial Relevant Medical/Surgical History: Shortness of Breath (states fluttery feeling in abd and chest, denies pain, intermittent FINDINGS: Cardiac silhouette is stable. No focal consolidation, pleural effusion, or pneumothorax identified. Osseous structures appear stable     1. No acute cardiopulmonary process. MDM:  Patient presented with palpitations. Given history and presentation cardiac workup initiated. Patient had labs, EKG, x-ray and troponin ordered. Patient was placed on monitor.   Patient's pulse ox is normal. Patient's chest x-ray is read by radiology as negative for acute abnormality making clinically significant pneumonia, pneumothorax or aortic pathology less likely. The patient's initial troponin is within the normal range. Patient's EKG did not show any acute diagnostic ischemic changes. The patient did have a heart score of 4 but this is primarily given her age. The patient has no discomfort at this time. Patient does not have any acute hemodynamic instability, given presentation, and patient's history, patient will require further cardiac evaluation and states that she would like to do this as an outpatient. This does seem reasonable and we shared and this decision after I offered the ability for this patient to be observed in the hospital overnight. Family was agreeable with this disposition and plan and the patient was discharged. Clinical Impression:  1. Palpitations      Disposition referral (if applicable):  Ana Jurado, Mason Vazquez [address]  [phone]    Go in 1 day  as scheduled for your appt tomorrow    Disposition medications (if applicable):  New Prescriptions    No medications on file       Comment: Please note this report has been produced using speech recognition software and may contain errors related to that system including errors in grammar, punctuation, and spelling, as well as words and phrases that may be inappropriate. If there are any questions or concerns please feel free to contact the dictating provider for clarification.      Cherise Davis DO  04/16/19 2050

## 2019-04-17 NOTE — ED NOTES
Discharge instructions given. Voices understanding. Ambulates without difficulty to private vehicle.       Med Hutchinson RN  04/16/19 4731

## 2019-04-19 LAB
T4 TOTAL: 11.24 UG/DL (ref 5.1–14.1)
T4 TOTAL: NORMAL UG/DL (ref 5.1–14.1)

## 2019-05-02 ENCOUNTER — HOSPITAL ENCOUNTER (OUTPATIENT)
Age: 74
Discharge: HOME | End: 2019-05-02
Payer: MEDICARE

## 2019-05-02 LAB
ALBUMIN SERPL-MCNC: 3.8 GM/DL (ref 3.4–5)
ALP BLD-CCNC: 106 IU/L (ref 40–129)
ALT SERPL-CCNC: 47 U/L (ref 10–40)
ANION GAP SERPL CALCULATED.3IONS-SCNC: 9 MMOL/L (ref 4–16)
AST SERPL-CCNC: 51 IU/L (ref 15–37)
BASOPHILS ABSOLUTE: 0 K/CU MM
BASOPHILS RELATIVE PERCENT: 0.5 % (ref 0–1)
BILIRUB SERPL-MCNC: 0.4 MG/DL (ref 0–1)
BUN BLDV-MCNC: 17 MG/DL (ref 6–23)
CALCIUM SERPL-MCNC: 9.2 MG/DL (ref 8.3–10.6)
CHLORIDE BLD-SCNC: 96 MMOL/L (ref 99–110)
CO2: 31 MMOL/L (ref 21–32)
CREAT SERPL-MCNC: 0.8 MG/DL (ref 0.6–1.1)
DIFFERENTIAL TYPE: ABNORMAL
EOSINOPHILS ABSOLUTE: 0.2 K/CU MM
EOSINOPHILS RELATIVE PERCENT: 3.1 % (ref 0–3)
ERYTHROCYTE SEDIMENTATION RATE: 45 MM/HR (ref 0–30)
GFR AFRICAN AMERICAN: >60 ML/MIN/1.73M2
GFR NON-AFRICAN AMERICAN: >60 ML/MIN/1.73M2
GLUCOSE BLD-MCNC: 451 MG/DL (ref 70–99)
HCT VFR BLD CALC: 41.3 % (ref 37–47)
HEMOGLOBIN: 12.8 GM/DL (ref 12.5–16)
IMMATURE NEUTROPHIL %: 1.3 % (ref 0–0.43)
LYMPHOCYTES ABSOLUTE: 1.8 K/CU MM
LYMPHOCYTES RELATIVE PERCENT: 23.9 % (ref 24–44)
MCH RBC QN AUTO: 29.4 PG (ref 27–31)
MCHC RBC AUTO-ENTMCNC: 31 % (ref 32–36)
MCV RBC AUTO: 94.7 FL (ref 78–100)
MONOCYTES ABSOLUTE: 0.3 K/CU MM
MONOCYTES RELATIVE PERCENT: 4.2 % (ref 0–4)
PDW BLD-RTO: 14.4 % (ref 11.7–14.9)
PLATELET # BLD: 267 K/CU MM (ref 140–440)
PMV BLD AUTO: 10.8 FL (ref 7.5–11.1)
POTASSIUM SERPL-SCNC: 4.5 MMOL/L (ref 3.5–5.1)
RBC # BLD: 4.36 M/CU MM (ref 4.2–5.4)
SEGMENTED NEUTROPHILS ABSOLUTE COUNT: 5.1 K/CU MM
SEGMENTED NEUTROPHILS RELATIVE PERCENT: 67 % (ref 36–66)
SODIUM BLD-SCNC: 136 MMOL/L (ref 135–145)
TOTAL IMMATURE NEUTOROPHIL: 0.1 K/CU MM
TOTAL PROTEIN: 7 GM/DL (ref 6.4–8.2)
WBC # BLD: 7.7 K/CU MM (ref 4–10.5)

## 2019-05-02 PROCEDURE — 85335 FACTOR INHIBITOR TEST: CPT

## 2019-05-02 PROCEDURE — 85652 RBC SED RATE AUTOMATED: CPT

## 2019-05-02 PROCEDURE — 36415 COLL VENOUS BLD VENIPUNCTURE: CPT

## 2019-05-02 PROCEDURE — 85025 COMPLETE CBC W/AUTO DIFF WBC: CPT

## 2019-05-02 PROCEDURE — 80053 COMPREHEN METABOLIC PANEL: CPT

## 2019-05-02 PROCEDURE — 86160 COMPLEMENT ANTIGEN: CPT

## 2019-05-02 PROCEDURE — 86332 IMMUNE COMPLEX ASSAY: CPT

## 2019-05-03 LAB
COMPLEMENT C4: 31 MG/DL (ref 10–40)
COMPLEMENT C4: NORMAL MG/DL (ref 10–40)

## 2019-05-05 LAB
ALLERGEN CLAMS IGE: <0.1 KU/L
ALLERGEN CODFISH IGE: <0.1 KU/L
ALLERGEN CORN IGE: <0.1 KU/L
ALLERGEN COW MILK IGE: <0.1 KU/L
ALLERGEN EGG WHITE IGE: <0.1 KU/L
ALLERGEN PEANUT (F13) IGE: <0.1 KU/L
ALLERGEN SCALLOP IGE: <0.1 KU/L
ALLERGEN SHRIMP IGE: <0.1 KU/L
ALLERGEN SOYBEAN IGE: <0.1 KU/L
ALLERGEN WALNUT IGE: <0.1 KU/L
ALLERGEN WHEAT IGE: <0.1 KU/L
C1 ESTERASE INHIBITOR FUNCTIONAL ASSAY: 106 %
C1 ESTERASE INHIBITOR FUNCTIONAL ASSAY: NORMAL %
IMMUNOGLOBULIN E: 256 KU/L
IMMUNOGLOBULIN E: ABNORMAL KU/L
INTERPRETATION: ABNORMAL
INTERPRETATION: ABNORMAL

## 2019-05-06 LAB
C1Q BINDING: 1.9 UG EQ/ML (ref 0–3.9)
C1Q BINDING: NORMAL UG EQ/ML (ref 0–3.9)

## 2019-05-22 ENCOUNTER — HOSPITAL ENCOUNTER (OUTPATIENT)
Dept: GENERAL RADIOLOGY | Age: 74
Discharge: HOME | End: 2019-05-22
Payer: MEDICARE

## 2019-05-22 ENCOUNTER — HOSPITAL ENCOUNTER (OUTPATIENT)
Age: 74
Discharge: HOME | End: 2019-05-22
Payer: MEDICARE

## 2019-05-22 DIAGNOSIS — R05.9 COUGH: ICD-10-CM

## 2019-05-22 LAB — ERYTHROCYTE SEDIMENTATION RATE: 82 MM/HR (ref 0–30)

## 2019-05-22 PROCEDURE — 71046 X-RAY EXAM CHEST 2 VIEWS: CPT

## 2019-05-22 PROCEDURE — 85652 RBC SED RATE AUTOMATED: CPT

## 2019-05-22 PROCEDURE — 85335 FACTOR INHIBITOR TEST: CPT

## 2019-05-22 PROCEDURE — 36415 COLL VENOUS BLD VENIPUNCTURE: CPT

## 2019-05-22 PROCEDURE — 83520 IMMUNOASSAY QUANT NOS NONAB: CPT

## 2019-05-24 LAB
TRYPTASE: 12.5 UG/L
TRYPTASE: ABNORMAL UG/L

## 2019-05-25 LAB
C1 ESTERASE INHIBITOR FUNCTIONAL ASSAY: 102 %
C1 ESTERASE INHIBITOR FUNCTIONAL ASSAY: NORMAL %

## 2019-08-08 ENCOUNTER — HOSPITAL ENCOUNTER (INPATIENT)
Age: 74
LOS: 2 days | Discharge: HOME | DRG: 638 | End: 2019-08-10
Attending: EMERGENCY MEDICINE | Admitting: INTERNAL MEDICINE
Payer: MEDICARE

## 2019-08-08 ENCOUNTER — APPOINTMENT (OUTPATIENT)
Dept: GENERAL RADIOLOGY | Age: 74
DRG: 638 | End: 2019-08-08
Payer: MEDICARE

## 2019-08-08 DIAGNOSIS — R73.9 HYPERGLYCEMIA: Primary | ICD-10-CM

## 2019-08-08 DIAGNOSIS — E08.10 DIABETIC KETOACIDOSIS WITHOUT COMA ASSOCIATED WITH DIABETES MELLITUS DUE TO UNDERLYING CONDITION (HCC): ICD-10-CM

## 2019-08-08 PROBLEM — E11.10 DKA, TYPE 2, NOT AT GOAL (HCC): Status: ACTIVE | Noted: 2019-08-08

## 2019-08-08 LAB
ALBUMIN SERPL-MCNC: 3.9 GM/DL (ref 3.4–5)
ALP BLD-CCNC: 129 IU/L (ref 40–129)
ALT SERPL-CCNC: 34 U/L (ref 10–40)
ANION GAP SERPL CALCULATED.3IONS-SCNC: 16 MMOL/L (ref 4–16)
ANION GAP SERPL CALCULATED.3IONS-SCNC: 17 MMOL/L (ref 4–16)
AST SERPL-CCNC: 33 IU/L (ref 15–37)
BACTERIA: ABNORMAL /HPF
BASOPHILS ABSOLUTE: 0 K/CU MM
BASOPHILS RELATIVE PERCENT: 0.2 % (ref 0–1)
BETA-HYDROXYBUTYRATE: 10 MG/DL (ref 0–3)
BILIRUB SERPL-MCNC: 0.9 MG/DL (ref 0–1)
BILIRUBIN URINE: NEGATIVE MG/DL
BLOOD, URINE: ABNORMAL
BUN BLDV-MCNC: 17 MG/DL (ref 6–23)
BUN BLDV-MCNC: 17 MG/DL (ref 6–23)
CALCIUM SERPL-MCNC: 9.4 MG/DL (ref 8.3–10.6)
CALCIUM SERPL-MCNC: 9.5 MG/DL (ref 8.3–10.6)
CAST TYPE: ABNORMAL /HPF
CHLORIDE BLD-SCNC: 91 MMOL/L (ref 99–110)
CHLORIDE BLD-SCNC: 93 MMOL/L (ref 99–110)
CHP ED QC CHECK: ABNORMAL
CLARITY: CLEAR
CO2: 23 MMOL/L (ref 21–32)
CO2: 25 MMOL/L (ref 21–32)
COLOR: YELLOW
CREAT SERPL-MCNC: 1 MG/DL (ref 0.6–1.1)
CREAT SERPL-MCNC: 1.1 MG/DL (ref 0.6–1.1)
CRYSTAL TYPE: ABNORMAL /HPF
DIFFERENTIAL TYPE: ABNORMAL
EOSINOPHILS ABSOLUTE: 0.1 K/CU MM
EOSINOPHILS RELATIVE PERCENT: 0.6 % (ref 0–3)
EPITHELIAL CELLS, UA: ABNORMAL /HPF
GFR AFRICAN AMERICAN: 59 ML/MIN/1.73M2
GFR AFRICAN AMERICAN: >60 ML/MIN/1.73M2
GFR NON-AFRICAN AMERICAN: 49 ML/MIN/1.73M2
GFR NON-AFRICAN AMERICAN: 55 ML/MIN/1.73M2
GLUCOSE BLD-MCNC: 551 MG/DL (ref 70–99)
GLUCOSE BLD-MCNC: 561 MG/DL (ref 70–99)
GLUCOSE BLD-MCNC: 574 MG/DL (ref 70–99)
GLUCOSE BLD-MCNC: 648 MG/DL (ref 70–99)
GLUCOSE, URINE: >1000 MG/DL
HCT VFR BLD CALC: 44.5 % (ref 37–47)
HEMOGLOBIN: 14 GM/DL (ref 12.5–16)
IMMATURE NEUTROPHIL %: 0.6 % (ref 0–0.43)
KETONES, URINE: ABNORMAL MG/DL
LEUKOCYTE ESTERASE, URINE: NEGATIVE
LYMPHOCYTES ABSOLUTE: 1.3 K/CU MM
LYMPHOCYTES RELATIVE PERCENT: 11.6 % (ref 24–44)
MCH RBC QN AUTO: 28.5 PG (ref 27–31)
MCHC RBC AUTO-ENTMCNC: 31.5 % (ref 32–36)
MCV RBC AUTO: 90.6 FL (ref 78–100)
MONOCYTES ABSOLUTE: 0.4 K/CU MM
MONOCYTES RELATIVE PERCENT: 3.5 % (ref 0–4)
MUCUS: NEGATIVE HPF
NITRITE URINE, QUANTITATIVE: POSITIVE
PDW BLD-RTO: 14.3 % (ref 11.7–14.9)
PH VENOUS: 7.3 (ref 7.32–7.42)
PH, URINE: 6 (ref 5–8)
PLATELET # BLD: 292 K/CU MM (ref 140–440)
PMV BLD AUTO: 10.7 FL (ref 7.5–11.1)
POTASSIUM SERPL-SCNC: 4.3 MMOL/L (ref 3.5–5.1)
POTASSIUM SERPL-SCNC: 5.2 MMOL/L (ref 3.5–5.1)
PROTEIN UA: 100 MG/DL
RBC # BLD: 4.91 M/CU MM (ref 4.2–5.4)
RBC URINE: ABNORMAL /HPF (ref 0–6)
SEGMENTED NEUTROPHILS ABSOLUTE COUNT: 9.1 K/CU MM
SEGMENTED NEUTROPHILS RELATIVE PERCENT: 83.5 % (ref 36–66)
SODIUM BLD-SCNC: 132 MMOL/L (ref 135–145)
SODIUM BLD-SCNC: 133 MMOL/L (ref 135–145)
SPECIFIC GRAVITY UA: 1.01 (ref 1–1.03)
TOTAL IMMATURE NEUTOROPHIL: 0.07 K/CU MM
TOTAL PROTEIN: 7.6 GM/DL (ref 6.4–8.2)
UROBILINOGEN, URINE: 1 MG/DL (ref 0.2–1)
VOLUME, (UVOL): 12 ML (ref 10–12)
WBC # BLD: 10.9 K/CU MM (ref 4–10.5)
WBC UA: ABNORMAL /HPF (ref 0–5)

## 2019-08-08 PROCEDURE — 99291 CRITICAL CARE FIRST HOUR: CPT

## 2019-08-08 PROCEDURE — 83930 ASSAY OF BLOOD OSMOLALITY: CPT

## 2019-08-08 PROCEDURE — 6370000000 HC RX 637 (ALT 250 FOR IP): Performed by: EMERGENCY MEDICINE

## 2019-08-08 PROCEDURE — 71045 X-RAY EXAM CHEST 1 VIEW: CPT

## 2019-08-08 PROCEDURE — 83036 HEMOGLOBIN GLYCOSYLATED A1C: CPT

## 2019-08-08 PROCEDURE — 2580000003 HC RX 258: Performed by: EMERGENCY MEDICINE

## 2019-08-08 PROCEDURE — 80053 COMPREHEN METABOLIC PANEL: CPT

## 2019-08-08 PROCEDURE — 82010 KETONE BODYS QUAN: CPT

## 2019-08-08 PROCEDURE — 81001 URINALYSIS AUTO W/SCOPE: CPT

## 2019-08-08 PROCEDURE — 85025 COMPLETE CBC W/AUTO DIFF WBC: CPT

## 2019-08-08 PROCEDURE — 2580000003 HC RX 258: Performed by: NURSE PRACTITIONER

## 2019-08-08 PROCEDURE — 6370000000 HC RX 637 (ALT 250 FOR IP): Performed by: NURSE PRACTITIONER

## 2019-08-08 PROCEDURE — 82800 BLOOD PH: CPT

## 2019-08-08 PROCEDURE — 2000000000 HC ICU R&B

## 2019-08-08 PROCEDURE — 36415 COLL VENOUS BLD VENIPUNCTURE: CPT

## 2019-08-08 PROCEDURE — 80048 BASIC METABOLIC PNL TOTAL CA: CPT

## 2019-08-08 RX ORDER — 0.9 % SODIUM CHLORIDE 0.9 %
1000 INTRAVENOUS SOLUTION INTRAVENOUS ONCE
Status: COMPLETED | OUTPATIENT
Start: 2019-08-08 | End: 2019-08-08

## 2019-08-08 RX ORDER — INSULIN HUMAN 500 [IU]/ML
100 INJECTION, SOLUTION SUBCUTANEOUS
Refills: 11 | Status: ON HOLD | COMMUNITY
Start: 2019-07-27 | End: 2019-08-10 | Stop reason: HOSPADM

## 2019-08-08 RX ORDER — ASPIRIN 81 MG/1
81 TABLET ORAL DAILY
Status: DISCONTINUED | OUTPATIENT
Start: 2019-08-09 | End: 2019-08-10 | Stop reason: HOSPADM

## 2019-08-08 RX ORDER — SODIUM CHLORIDE 450 MG/100ML
INJECTION, SOLUTION INTRAVENOUS CONTINUOUS
Status: DISCONTINUED | OUTPATIENT
Start: 2019-08-08 | End: 2019-08-09

## 2019-08-08 RX ORDER — DEXTROSE AND SODIUM CHLORIDE 5; .45 G/100ML; G/100ML
INJECTION, SOLUTION INTRAVENOUS CONTINUOUS PRN
Status: DISCONTINUED | OUTPATIENT
Start: 2019-08-08 | End: 2019-08-10 | Stop reason: HOSPADM

## 2019-08-08 RX ORDER — POTASSIUM CHLORIDE 7.45 MG/ML
10 INJECTION INTRAVENOUS PRN
Status: DISCONTINUED | OUTPATIENT
Start: 2019-08-08 | End: 2019-08-08

## 2019-08-08 RX ORDER — M-VIT,TX,IRON,MINS/CALC/FOLIC 27MG-0.4MG
1 TABLET ORAL DAILY
Status: DISCONTINUED | OUTPATIENT
Start: 2019-08-09 | End: 2019-08-10 | Stop reason: HOSPADM

## 2019-08-08 RX ORDER — PANTOPRAZOLE SODIUM 40 MG/1
40 TABLET, DELAYED RELEASE ORAL
Status: DISCONTINUED | OUTPATIENT
Start: 2019-08-09 | End: 2019-08-10 | Stop reason: HOSPADM

## 2019-08-08 RX ORDER — HYDRALAZINE HYDROCHLORIDE 10 MG/1
10 TABLET, FILM COATED ORAL 2 TIMES DAILY
Status: DISCONTINUED | OUTPATIENT
Start: 2019-08-08 | End: 2019-08-10 | Stop reason: HOSPADM

## 2019-08-08 RX ORDER — HYDRALAZINE HYDROCHLORIDE 10 MG/1
10 TABLET, FILM COATED ORAL 2 TIMES DAILY
Refills: 1 | Status: ON HOLD | COMMUNITY
Start: 2019-07-31 | End: 2020-08-13 | Stop reason: SDUPTHER

## 2019-08-08 RX ORDER — LEVOTHYROXINE SODIUM 0.15 MG/1
300 TABLET ORAL DAILY
Status: DISCONTINUED | OUTPATIENT
Start: 2019-08-09 | End: 2019-08-10 | Stop reason: HOSPADM

## 2019-08-08 RX ORDER — DULOXETIN HYDROCHLORIDE 60 MG/1
60 CAPSULE, DELAYED RELEASE ORAL DAILY
Refills: 1 | COMMUNITY
Start: 2019-07-24

## 2019-08-08 RX ORDER — DEXTROSE MONOHYDRATE 25 G/50ML
12.5 INJECTION, SOLUTION INTRAVENOUS PRN
Status: DISCONTINUED | OUTPATIENT
Start: 2019-08-08 | End: 2019-08-10 | Stop reason: HOSPADM

## 2019-08-08 RX ORDER — AMLODIPINE BESYLATE 10 MG/1
10 TABLET ORAL DAILY
Status: DISCONTINUED | OUTPATIENT
Start: 2019-08-09 | End: 2019-08-10 | Stop reason: HOSPADM

## 2019-08-08 RX ORDER — ATENOLOL 50 MG/1
50 TABLET ORAL 2 TIMES DAILY
Status: DISCONTINUED | OUTPATIENT
Start: 2019-08-08 | End: 2019-08-10 | Stop reason: HOSPADM

## 2019-08-08 RX ORDER — ATORVASTATIN CALCIUM 20 MG/1
20 TABLET, FILM COATED ORAL DAILY
Status: DISCONTINUED | OUTPATIENT
Start: 2019-08-09 | End: 2019-08-10 | Stop reason: HOSPADM

## 2019-08-08 RX ORDER — DULOXETIN HYDROCHLORIDE 30 MG/1
60 CAPSULE, DELAYED RELEASE ORAL DAILY
Status: DISCONTINUED | OUTPATIENT
Start: 2019-08-09 | End: 2019-08-10 | Stop reason: HOSPADM

## 2019-08-08 RX ORDER — MAGNESIUM SULFATE 1 G/100ML
1 INJECTION INTRAVENOUS PRN
Status: DISCONTINUED | OUTPATIENT
Start: 2019-08-08 | End: 2019-08-09

## 2019-08-08 RX ADMIN — SODIUM CHLORIDE 1000 ML: 9 INJECTION, SOLUTION INTRAVENOUS at 19:33

## 2019-08-08 RX ADMIN — SODIUM CHLORIDE 7 UNITS/HR: 9 INJECTION, SOLUTION INTRAVENOUS at 20:32

## 2019-08-08 RX ADMIN — SODIUM CHLORIDE: 4.5 INJECTION, SOLUTION INTRAVENOUS at 22:07

## 2019-08-08 RX ADMIN — ATENOLOL 50 MG: 50 TABLET ORAL at 22:16

## 2019-08-08 RX ADMIN — HYDRALAZINE HYDROCHLORIDE 10 MG: 10 TABLET, FILM COATED ORAL at 22:15

## 2019-08-08 ASSESSMENT — ENCOUNTER SYMPTOMS
SHORTNESS OF BREATH: 0
NAUSEA: 1
EYES NEGATIVE: 1
ABDOMINAL PAIN: 0
RESPIRATORY NEGATIVE: 1
BLURRED VISION: 0

## 2019-08-08 NOTE — ED NOTES
Assumed care of this patient. Pt is alert and oriented. Converses appropriately. Moves with all extremities spontaneously. Speech clear with appropriate responses. Airway patent with unlabored respirations. Good inspiratory depth bilaterally. Skin intact with good turgor; cap refill rapid all distal extremities. Plan of care reviewed with patient. Pt acknowledges understanding and voices agreement.        Yvonne Galvez RN  08/08/19 4677

## 2019-08-09 LAB
ANION GAP SERPL CALCULATED.3IONS-SCNC: 12 MMOL/L (ref 4–16)
ANION GAP SERPL CALCULATED.3IONS-SCNC: 12 MMOL/L (ref 4–16)
BASOPHILS ABSOLUTE: 0 K/CU MM
BASOPHILS RELATIVE PERCENT: 0.5 % (ref 0–1)
BUN BLDV-MCNC: 15 MG/DL (ref 6–23)
BUN BLDV-MCNC: 16 MG/DL (ref 6–23)
CALCIUM SERPL-MCNC: 9.1 MG/DL (ref 8.3–10.6)
CALCIUM SERPL-MCNC: 9.4 MG/DL (ref 8.3–10.6)
CHLORIDE BLD-SCNC: 104 MMOL/L (ref 99–110)
CHLORIDE BLD-SCNC: 99 MMOL/L (ref 99–110)
CO2: 25 MMOL/L (ref 21–32)
CO2: 25 MMOL/L (ref 21–32)
CREAT SERPL-MCNC: 0.9 MG/DL (ref 0.6–1.1)
CREAT SERPL-MCNC: 0.9 MG/DL (ref 0.6–1.1)
DIFFERENTIAL TYPE: ABNORMAL
EOSINOPHILS ABSOLUTE: 0.2 K/CU MM
EOSINOPHILS RELATIVE PERCENT: 2.6 % (ref 0–3)
ESTIMATED AVERAGE GLUCOSE: 237 MG/DL
GFR AFRICAN AMERICAN: >60 ML/MIN/1.73M2
GFR AFRICAN AMERICAN: >60 ML/MIN/1.73M2
GFR NON-AFRICAN AMERICAN: >60 ML/MIN/1.73M2
GFR NON-AFRICAN AMERICAN: >60 ML/MIN/1.73M2
GLUCOSE BLD-MCNC: 187 MG/DL (ref 70–99)
GLUCOSE BLD-MCNC: 203 MG/DL (ref 70–99)
GLUCOSE BLD-MCNC: 203 MG/DL (ref 70–99)
GLUCOSE BLD-MCNC: 209 MG/DL (ref 70–99)
GLUCOSE BLD-MCNC: 210 MG/DL (ref 70–99)
GLUCOSE BLD-MCNC: 227 MG/DL (ref 70–99)
GLUCOSE BLD-MCNC: 229 MG/DL (ref 70–99)
GLUCOSE BLD-MCNC: 294 MG/DL (ref 70–99)
GLUCOSE BLD-MCNC: 300 MG/DL (ref 70–99)
GLUCOSE BLD-MCNC: 332 MG/DL (ref 70–99)
GLUCOSE BLD-MCNC: 378 MG/DL (ref 70–99)
GLUCOSE BLD-MCNC: 413 MG/DL (ref 70–99)
HBA1C MFR BLD: 9.9 % (ref 4.2–6.3)
HCT VFR BLD CALC: 34.3 % (ref 37–47)
HEMOGLOBIN: 10.9 GM/DL (ref 12.5–16)
IMMATURE NEUTROPHIL %: 0.3 % (ref 0–0.43)
LYMPHOCYTES ABSOLUTE: 3 K/CU MM
LYMPHOCYTES RELATIVE PERCENT: 34.7 % (ref 24–44)
MAGNESIUM: 1.7 MG/DL (ref 1.8–2.4)
MAGNESIUM: 1.7 MG/DL (ref 1.8–2.4)
MAGNESIUM: 1.8 MG/DL (ref 1.8–2.4)
MCH RBC QN AUTO: 28.5 PG (ref 27–31)
MCHC RBC AUTO-ENTMCNC: 31.8 % (ref 32–36)
MCV RBC AUTO: 89.8 FL (ref 78–100)
MONOCYTES ABSOLUTE: 0.5 K/CU MM
MONOCYTES RELATIVE PERCENT: 6 % (ref 0–4)
PDW BLD-RTO: 14.1 % (ref 11.7–14.9)
PH VENOUS: 4.38 (ref 7.32–7.42)
PH VENOUS: 7.34 (ref 7.32–7.42)
PH VENOUS: 7.37 (ref 7.32–7.42)
PHOSPHORUS: 3 MG/DL (ref 2.5–4.9)
PHOSPHORUS: 3.1 MG/DL (ref 2.5–4.9)
PHOSPHORUS: 3.1 MG/DL (ref 2.5–4.9)
PLATELET # BLD: 210 K/CU MM (ref 140–440)
PMV BLD AUTO: 10.8 FL (ref 7.5–11.1)
POTASSIUM SERPL-SCNC: 3.7 MMOL/L (ref 3.5–5.1)
POTASSIUM SERPL-SCNC: 4 MMOL/L (ref 3.5–5.1)
RBC # BLD: 3.82 M/CU MM (ref 4.2–5.4)
SEGMENTED NEUTROPHILS ABSOLUTE COUNT: 4.9 K/CU MM
SEGMENTED NEUTROPHILS RELATIVE PERCENT: 55.9 % (ref 36–66)
SODIUM BLD-SCNC: 136 MMOL/L (ref 135–145)
SODIUM BLD-SCNC: 141 MMOL/L (ref 135–145)
TOTAL IMMATURE NEUTOROPHIL: 0.03 K/CU MM
WBC # BLD: 8.7 K/CU MM (ref 4–10.5)

## 2019-08-09 PROCEDURE — 83735 ASSAY OF MAGNESIUM: CPT

## 2019-08-09 PROCEDURE — 87086 URINE CULTURE/COLONY COUNT: CPT

## 2019-08-09 PROCEDURE — 2580000003 HC RX 258: Performed by: INTERNAL MEDICINE

## 2019-08-09 PROCEDURE — 82800 BLOOD PH: CPT

## 2019-08-09 PROCEDURE — 6360000002 HC RX W HCPCS: Performed by: NURSE PRACTITIONER

## 2019-08-09 PROCEDURE — 80048 BASIC METABOLIC PNL TOTAL CA: CPT

## 2019-08-09 PROCEDURE — 2580000003 HC RX 258: Performed by: NURSE PRACTITIONER

## 2019-08-09 PROCEDURE — 2140000000 HC CCU INTERMEDIATE R&B

## 2019-08-09 PROCEDURE — 82962 GLUCOSE BLOOD TEST: CPT

## 2019-08-09 PROCEDURE — 36415 COLL VENOUS BLD VENIPUNCTURE: CPT

## 2019-08-09 PROCEDURE — 87186 SC STD MICRODIL/AGAR DIL: CPT

## 2019-08-09 PROCEDURE — 2580000003 HC RX 258

## 2019-08-09 PROCEDURE — 84100 ASSAY OF PHOSPHORUS: CPT

## 2019-08-09 PROCEDURE — 6370000000 HC RX 637 (ALT 250 FOR IP): Performed by: INTERNAL MEDICINE

## 2019-08-09 PROCEDURE — 87077 CULTURE AEROBIC IDENTIFY: CPT

## 2019-08-09 PROCEDURE — 85025 COMPLETE CBC W/AUTO DIFF WBC: CPT

## 2019-08-09 PROCEDURE — 6370000000 HC RX 637 (ALT 250 FOR IP): Performed by: NURSE PRACTITIONER

## 2019-08-09 PROCEDURE — 6360000002 HC RX W HCPCS: Performed by: INTERNAL MEDICINE

## 2019-08-09 RX ORDER — NICOTINE POLACRILEX 4 MG
15 LOZENGE BUCCAL PRN
Status: DISCONTINUED | OUTPATIENT
Start: 2019-08-09 | End: 2019-08-10 | Stop reason: HOSPADM

## 2019-08-09 RX ORDER — MAGNESIUM SULFATE IN WATER 40 MG/ML
2 INJECTION, SOLUTION INTRAVENOUS ONCE
Status: COMPLETED | OUTPATIENT
Start: 2019-08-09 | End: 2019-08-09

## 2019-08-09 RX ORDER — SODIUM CHLORIDE 9 MG/ML
INJECTION, SOLUTION INTRAVENOUS CONTINUOUS
Status: DISCONTINUED | OUTPATIENT
Start: 2019-08-09 | End: 2019-08-10

## 2019-08-09 RX ORDER — DEXTROSE MONOHYDRATE 50 MG/ML
100 INJECTION, SOLUTION INTRAVENOUS PRN
Status: DISCONTINUED | OUTPATIENT
Start: 2019-08-09 | End: 2019-08-10 | Stop reason: HOSPADM

## 2019-08-09 RX ORDER — CIPROFLOXACIN 2 MG/ML
400 INJECTION, SOLUTION INTRAVENOUS EVERY 12 HOURS
Status: DISCONTINUED | OUTPATIENT
Start: 2019-08-09 | End: 2019-08-09

## 2019-08-09 RX ORDER — DEXTROSE MONOHYDRATE 25 G/50ML
12.5 INJECTION, SOLUTION INTRAVENOUS PRN
Status: DISCONTINUED | OUTPATIENT
Start: 2019-08-09 | End: 2019-08-10 | Stop reason: HOSPADM

## 2019-08-09 RX ORDER — NITROFURANTOIN 25; 75 MG/1; MG/1
100 CAPSULE ORAL EVERY 12 HOURS SCHEDULED
Status: DISCONTINUED | OUTPATIENT
Start: 2019-08-09 | End: 2019-08-09

## 2019-08-09 RX ADMIN — ENOXAPARIN SODIUM 30 MG: 30 INJECTION SUBCUTANEOUS at 09:53

## 2019-08-09 RX ADMIN — ATORVASTATIN CALCIUM 20 MG: 20 TABLET, FILM COATED ORAL at 09:50

## 2019-08-09 RX ADMIN — INSULIN LISPRO 8 UNITS: 100 INJECTION, SOLUTION INTRAVENOUS; SUBCUTANEOUS at 16:52

## 2019-08-09 RX ADMIN — HYDRALAZINE HYDROCHLORIDE 10 MG: 10 TABLET, FILM COATED ORAL at 09:51

## 2019-08-09 RX ADMIN — ASPIRIN 81 MG: 81 TABLET, COATED ORAL at 09:51

## 2019-08-09 RX ADMIN — SODIUM CHLORIDE: 9 INJECTION, SOLUTION INTRAVENOUS at 09:49

## 2019-08-09 RX ADMIN — INSULIN LISPRO 6 UNITS: 100 INJECTION, SOLUTION INTRAVENOUS; SUBCUTANEOUS at 20:34

## 2019-08-09 RX ADMIN — INSULIN LISPRO 8 UNITS: 100 INJECTION, SOLUTION INTRAVENOUS; SUBCUTANEOUS at 17:09

## 2019-08-09 RX ADMIN — HYDRALAZINE HYDROCHLORIDE 10 MG: 10 TABLET, FILM COATED ORAL at 16:52

## 2019-08-09 RX ADMIN — AMLODIPINE BESYLATE 10 MG: 10 TABLET ORAL at 09:51

## 2019-08-09 RX ADMIN — MULTIPLE VITAMINS W/ MINERALS TAB 1 TABLET: TAB at 09:50

## 2019-08-09 RX ADMIN — INSULIN LISPRO 6 UNITS: 100 INJECTION, SOLUTION INTRAVENOUS; SUBCUTANEOUS at 12:16

## 2019-08-09 RX ADMIN — VITAMIN D, TAB 1000IU (100/BT) 1000 UNITS: 25 TAB at 09:51

## 2019-08-09 RX ADMIN — ATENOLOL 50 MG: 50 TABLET ORAL at 09:51

## 2019-08-09 RX ADMIN — DEXTROSE AND SODIUM CHLORIDE: 5; 450 INJECTION, SOLUTION INTRAVENOUS at 03:34

## 2019-08-09 RX ADMIN — SODIUM CHLORIDE: 9 INJECTION, SOLUTION INTRAVENOUS at 20:27

## 2019-08-09 RX ADMIN — LEVOTHYROXINE SODIUM 300 MCG: 150 TABLET ORAL at 06:08

## 2019-08-09 RX ADMIN — ATENOLOL 50 MG: 50 TABLET ORAL at 20:27

## 2019-08-09 RX ADMIN — INSULIN LISPRO 8 UNITS: 100 INJECTION, SOLUTION INTRAVENOUS; SUBCUTANEOUS at 09:52

## 2019-08-09 RX ADMIN — INSULIN GLARGINE 30 UNITS: 100 INJECTION, SOLUTION SUBCUTANEOUS at 20:34

## 2019-08-09 RX ADMIN — INSULIN LISPRO 8 UNITS: 100 INJECTION, SOLUTION INTRAVENOUS; SUBCUTANEOUS at 12:11

## 2019-08-09 RX ADMIN — INSULIN LISPRO 4 UNITS: 100 INJECTION, SOLUTION INTRAVENOUS; SUBCUTANEOUS at 09:54

## 2019-08-09 RX ADMIN — INSULIN GLARGINE 23 UNITS: 100 INJECTION, SOLUTION SUBCUTANEOUS at 09:52

## 2019-08-09 RX ADMIN — MAGNESIUM SULFATE HEPTAHYDRATE 2 G: 40 INJECTION, SOLUTION INTRAVENOUS at 09:50

## 2019-08-09 RX ADMIN — DULOXETINE HYDROCHLORIDE 60 MG: 30 CAPSULE, DELAYED RELEASE ORAL at 09:50

## 2019-08-09 RX ADMIN — PANTOPRAZOLE SODIUM 40 MG: 40 TABLET, DELAYED RELEASE ORAL at 06:08

## 2019-08-09 ASSESSMENT — PAIN SCALES - GENERAL
PAINLEVEL_OUTOF10: 0

## 2019-08-09 NOTE — H&P
History and Physical      Name:  Cathie Larsen /Age/Sex:   (68 y.o. female)   MRN & CSN:  [id number] & [id number] Admission Date/Time: 2019  6:17 PM   Location:   PCP: Keanu Helms, [address] [place] [address] Crossing Day: 1    Assessment and Plan:   Cathie Larsen is a 68 y.o.  female  who presents with     1. DKA, Type 2 not at goal - POCT glucose 648. WBC 10.9. K 5.2. UA show positive nitrite. Venous pH 7.30. Beta hydroxybutyrate 10. sNA 132. Mag 1.7. Anion gap 16. UTI appears to be what started patient into DKA         Admit to ICU        Insulin gtt        POCT glucose hourly        BMP, Mg, Phos q 4 hour        PO Macrobid   2. Hypertension- BP: (120-163)/(39-73) Stable. Continue home atenolol, amlodipine, and hydralazine  3. Chronic kidney disease- CrCl 55 BUN 17. Cr 1.1. IVF Hydration     Diet NPO    DVT Prophylaxis [x] Lovenox, []  Heparin, [] SCDs, [] Ambulation   GI Prophylaxis [x] PPI,  [] H2 Blocker,  [] Carafate,  [] Diet/Tube Feeds   Code Status FULL   Disposition Patient requires continued admission due to need for IV insulin therapy to control DKA    MDM [] Low, [] Moderate,[x]  High  Patient's risk as above due to  complexity of medical data reviewed and treatment options available        History of Present Illness:     Chief Complaint:   Chief Complaint   Patient presents with    Hyperglycemia     the patient presents to the er today by medics reports that she had went to eat with her grandchildren was sitting in her chair when she felt like she was going to have diarrhea went to the bathroom and then felt like her throat was tight and then felt \"out of it\" medics has a blood sugar of 408     Pharyngitis       Cathie Larsen is a 68 y.o.  female  who presents with hyperglycemia, diarrhea, and overall feeling out of it. Patient was at [address] [place] [address] with grandson this afternoon after she ate a turkey sandwich she started to feel ill.  She had a bout of diarrhea and

## 2019-08-09 NOTE — ED PROVIDER NOTES
tenderness or deformity. Neurological: She is alert. No cranial nerve deficit. GCS eye subscore is 4. GCS verbal subscore is 5. GCS motor subscore is 6. Skin: Capillary refill takes less than 2 seconds. There is pallor.        MDM:    Results for orders placed or performed during the hospital encounter of 08/08/19   CBC Auto Differential   Result Value Ref Range    WBC 10.9 (H) 4.0 - 10.5 K/CU MM    RBC 4.91 4.2 - 5.4 M/CU MM    Hemoglobin 14.0 12.5 - 16.0 GM/DL    Hematocrit 44.5 37 - 47 %    MCV 90.6 78 - 100 FL    MCH 28.5 27 - 31 PG    MCHC 31.5 (L) 32.0 - 36.0 %    RDW 14.3 11.7 - 14.9 %    Platelets 260 953 - 699 K/CU MM    MPV 10.7 7.5 - 11.1 FL    Differential Type AUTOMATED DIFFERENTIAL     Segs Relative 83.5 (H) 36 - 66 %    Lymphocytes % 11.6 (L) 24 - 44 %    Monocytes % 3.5 0 - 4 %    Eosinophils % 0.6 0 - 3 %    Basophils % 0.2 0 - 1 %    Segs Absolute 9.1 K/CU MM    Lymphocytes # 1.3 K/CU MM    Monocytes # 0.4 K/CU MM    Eosinophils # 0.1 K/CU MM    Basophils # 0.0 K/CU MM    Immature Neutrophil % 0.6 (H) 0 - 0.43 %    Total Immature Neutrophil 0.07 K/CU MM   CMP   Result Value Ref Range    Sodium 132 (L) 135 - 145 MMOL/L    Potassium 5.2 (H) 3.5 - 5.1 MMOL/L    Chloride 91 (L) 99 - 110 mMol/L    CO2 25 21 - 32 MMOL/L    BUN 17 6 - 23 MG/DL    CREATININE 1.1 0.6 - 1.1 MG/DL    Glucose 648 (HH) 70 - 99 MG/DL    Calcium 9.5 8.3 - 10.6 MG/DL    Alb 3.9 3.4 - 5.0 GM/DL    Total Protein 7.6 6.4 - 8.2 GM/DL    Total Bilirubin 0.9 0.0 - 1.0 MG/DL    ALT 34 10 - 40 U/L    AST 33 15 - 37 IU/L    Alkaline Phosphatase 129 40 - 129 IU/L    GFR Non- 49 (L) >60 mL/min/1.73m2    GFR  59 (L) >60 mL/min/1.73m2    Anion Gap 16 4 - 16   pH, venous   Result Value Ref Range    pH, Jose 7.30 (L) 7.32 - 7.42   Beta-Hydroxybutyrate   Result Value Ref Range    Beta-Hydroxybutyrate 10.0 (H) 0.0 - 3.0 MG/DL   POC Blood Glucose   Result Value Ref Range    QC OK? ok            My typical

## 2019-08-09 NOTE — ED NOTES
Pt ambulated to the bathroom without difficulty. Urine sample obtained and sent to lab.      Hannah Mcknight RN  08/08/19 2015

## 2019-08-10 VITALS
HEIGHT: 63 IN | HEART RATE: 77 BPM | OXYGEN SATURATION: 91 % | BODY MASS INDEX: 36.68 KG/M2 | SYSTOLIC BLOOD PRESSURE: 144 MMHG | DIASTOLIC BLOOD PRESSURE: 59 MMHG | RESPIRATION RATE: 19 BRPM | WEIGHT: 207 LBS | TEMPERATURE: 96.8 F

## 2019-08-10 PROBLEM — E11.10 DKA, TYPE 2, NOT AT GOAL (HCC): Status: RESOLVED | Noted: 2019-08-08 | Resolved: 2019-08-10

## 2019-08-10 LAB
ANION GAP SERPL CALCULATED.3IONS-SCNC: 12 MMOL/L (ref 4–16)
BASOPHILS ABSOLUTE: 0 K/CU MM
BASOPHILS RELATIVE PERCENT: 0.5 % (ref 0–1)
BUN BLDV-MCNC: 9 MG/DL (ref 6–23)
CALCIUM SERPL-MCNC: 9.2 MG/DL (ref 8.3–10.6)
CHLORIDE BLD-SCNC: 101 MMOL/L (ref 99–110)
CO2: 26 MMOL/L (ref 21–32)
CREAT SERPL-MCNC: 0.7 MG/DL (ref 0.6–1.1)
DIFFERENTIAL TYPE: ABNORMAL
EOSINOPHILS ABSOLUTE: 0.3 K/CU MM
EOSINOPHILS RELATIVE PERCENT: 3.3 % (ref 0–3)
FERRITIN: 52 NG/ML (ref 15–150)
FOLATE: 9.4 NG/ML (ref 3.1–17.5)
GFR AFRICAN AMERICAN: >60 ML/MIN/1.73M2
GFR NON-AFRICAN AMERICAN: >60 ML/MIN/1.73M2
GLUCOSE BLD-MCNC: 287 MG/DL (ref 70–99)
GLUCOSE BLD-MCNC: 291 MG/DL (ref 70–99)
GLUCOSE BLD-MCNC: 314 MG/DL (ref 70–99)
GLUCOSE BLD-MCNC: 354 MG/DL (ref 70–99)
HCT VFR BLD CALC: 37.7 % (ref 37–47)
HEMOGLOBIN: 11.8 GM/DL (ref 12.5–16)
IMMATURE NEUTROPHIL %: 0.2 % (ref 0–0.43)
IRON: 49 UG/DL (ref 37–145)
LYMPHOCYTES ABSOLUTE: 2.5 K/CU MM
LYMPHOCYTES RELATIVE PERCENT: 31 % (ref 24–44)
MAGNESIUM: 1.7 MG/DL (ref 1.8–2.4)
MCH RBC QN AUTO: 28.4 PG (ref 27–31)
MCHC RBC AUTO-ENTMCNC: 31.3 % (ref 32–36)
MCV RBC AUTO: 90.6 FL (ref 78–100)
MONOCYTES ABSOLUTE: 0.4 K/CU MM
MONOCYTES RELATIVE PERCENT: 4.7 % (ref 0–4)
OSMOLALITY: ABNORMAL MOS/L (ref 280–300)
PCT TRANSFERRIN: 19 % (ref 10–44)
PDW BLD-RTO: 14.2 % (ref 11.7–14.9)
PHOSPHORUS: 2.8 MG/DL (ref 2.5–4.9)
PLATELET # BLD: 222 K/CU MM (ref 140–440)
PMV BLD AUTO: 10.9 FL (ref 7.5–11.1)
POTASSIUM SERPL-SCNC: 4.4 MMOL/L (ref 3.5–5.1)
RBC # BLD: 4.16 M/CU MM (ref 4.2–5.4)
SEGMENTED NEUTROPHILS ABSOLUTE COUNT: 4.9 K/CU MM
SEGMENTED NEUTROPHILS RELATIVE PERCENT: 60.3 % (ref 36–66)
SODIUM BLD-SCNC: 139 MMOL/L (ref 135–145)
TOTAL IMMATURE NEUTOROPHIL: 0.02 K/CU MM
TOTAL IRON BINDING CAPACITY: 260 UG/DL (ref 250–450)
TSH HIGH SENSITIVITY: 1.03 UIU/ML (ref 0.27–4.2)
UNSATURATED IRON BINDING CAPACITY: 211 UG/DL (ref 110–370)
VITAMIN B-12: 365.9 PG/ML (ref 211–911)
WBC # BLD: 8.2 K/CU MM (ref 4–10.5)

## 2019-08-10 PROCEDURE — 6370000000 HC RX 637 (ALT 250 FOR IP): Performed by: FAMILY MEDICINE

## 2019-08-10 PROCEDURE — 83540 ASSAY OF IRON: CPT

## 2019-08-10 PROCEDURE — 82728 ASSAY OF FERRITIN: CPT

## 2019-08-10 PROCEDURE — 6370000000 HC RX 637 (ALT 250 FOR IP): Performed by: INTERNAL MEDICINE

## 2019-08-10 PROCEDURE — 83735 ASSAY OF MAGNESIUM: CPT

## 2019-08-10 PROCEDURE — 80048 BASIC METABOLIC PNL TOTAL CA: CPT

## 2019-08-10 PROCEDURE — 82746 ASSAY OF FOLIC ACID SERUM: CPT

## 2019-08-10 PROCEDURE — 36415 COLL VENOUS BLD VENIPUNCTURE: CPT

## 2019-08-10 PROCEDURE — 84443 ASSAY THYROID STIM HORMONE: CPT

## 2019-08-10 PROCEDURE — 83550 IRON BINDING TEST: CPT

## 2019-08-10 PROCEDURE — 82607 VITAMIN B-12: CPT

## 2019-08-10 PROCEDURE — 82962 GLUCOSE BLOOD TEST: CPT

## 2019-08-10 PROCEDURE — 84100 ASSAY OF PHOSPHORUS: CPT

## 2019-08-10 PROCEDURE — 85025 COMPLETE CBC W/AUTO DIFF WBC: CPT

## 2019-08-10 RX ORDER — CIPROFLOXACIN 500 MG/1
500 TABLET, FILM COATED ORAL EVERY 12 HOURS SCHEDULED
Qty: 14 TABLET | Refills: 0 | Status: SHIPPED | OUTPATIENT
Start: 2019-08-10 | End: 2019-08-17

## 2019-08-10 RX ORDER — GLIMEPIRIDE 4 MG/1
4 TABLET ORAL EVERY MORNING
Qty: 30 TABLET | Refills: 0 | Status: SHIPPED | OUTPATIENT
Start: 2019-08-10 | End: 2019-12-04

## 2019-08-10 RX ORDER — CIPROFLOXACIN 500 MG/1
500 TABLET, FILM COATED ORAL EVERY 12 HOURS SCHEDULED
Status: DISCONTINUED | OUTPATIENT
Start: 2019-08-10 | End: 2019-08-10 | Stop reason: HOSPADM

## 2019-08-10 RX ADMIN — MULTIPLE VITAMINS W/ MINERALS TAB 1 TABLET: TAB at 09:12

## 2019-08-10 RX ADMIN — INSULIN LISPRO 12 UNITS: 100 INJECTION, SOLUTION INTRAVENOUS; SUBCUTANEOUS at 09:16

## 2019-08-10 RX ADMIN — INSULIN LISPRO 6 UNITS: 100 INJECTION, SOLUTION INTRAVENOUS; SUBCUTANEOUS at 09:19

## 2019-08-10 RX ADMIN — CIPROFLOXACIN 500 MG: 500 TABLET, FILM COATED ORAL at 09:16

## 2019-08-10 RX ADMIN — AMLODIPINE BESYLATE 10 MG: 10 TABLET ORAL at 09:11

## 2019-08-10 RX ADMIN — INSULIN LISPRO 12 UNITS: 100 INJECTION, SOLUTION INTRAVENOUS; SUBCUTANEOUS at 12:25

## 2019-08-10 RX ADMIN — ATENOLOL 50 MG: 50 TABLET ORAL at 09:12

## 2019-08-10 RX ADMIN — ATORVASTATIN CALCIUM 20 MG: 20 TABLET, FILM COATED ORAL at 09:11

## 2019-08-10 RX ADMIN — VITAMIN D, TAB 1000IU (100/BT) 1000 UNITS: 25 TAB at 09:11

## 2019-08-10 RX ADMIN — DULOXETINE HYDROCHLORIDE 60 MG: 30 CAPSULE, DELAYED RELEASE ORAL at 09:11

## 2019-08-10 RX ADMIN — INSULIN LISPRO 8 UNITS: 100 INJECTION, SOLUTION INTRAVENOUS; SUBCUTANEOUS at 12:23

## 2019-08-10 RX ADMIN — PANTOPRAZOLE SODIUM 40 MG: 40 TABLET, DELAYED RELEASE ORAL at 06:40

## 2019-08-10 RX ADMIN — LEVOTHYROXINE SODIUM 300 MCG: 150 TABLET ORAL at 06:39

## 2019-08-10 RX ADMIN — ASPIRIN 81 MG: 81 TABLET, COATED ORAL at 09:12

## 2019-08-10 RX ADMIN — HYDRALAZINE HYDROCHLORIDE 10 MG: 10 TABLET, FILM COATED ORAL at 09:11

## 2019-08-10 NOTE — PROGRESS NOTES
Prescriptions called in over the telephone to the [facility] in [place] per patient request. Patient also was educated the she would need to call her PCP on Monday to schedule a follow-up appointment within the week,

## 2019-08-10 NOTE — DISCHARGE SUMMARY
TABS  Take 1 tablet by mouth daily. pantoprazole (PROTONIX) 40 MG tablet  Take 40 mg by mouth daily              vitamin D (CHOLECALCIFEROL) 1000 UNIT TABS tablet  Take 1,000 Units by mouth daily                  Code Status: Full Code     Consults: None    Diet: Diabetic    Activity: As tolerated   work:    Discharged Condition: Stable    Prognosis: Good    Disposition: Home      Follow-up with   1. PCP within   2-3    Days    Follow up labs: Basic       Discharge Physician Signed: Phoenix Rincon M.D. Time spent on discharge in the examination, evaluation, counseling and review of medications and discharge plan: 45 minutes    Electronically signed by Phoenix Rincon MD on 8/10/2019 at 10:23 AM      Comment: Please note this report has been produced using speech recognition software and may contain errors related to that system including errors in grammar, punctuation, and spelling, as well as words and phrases that may be inappropriate.  If there are any questions or concerns please feel free to contact the dictating provider for clarification

## 2019-08-12 LAB
CULTURE: ABNORMAL
CULTURE: ABNORMAL
Lab: ABNORMAL
SPECIMEN: ABNORMAL
TOTAL COLONY COUNT: ABNORMAL

## 2019-08-13 NOTE — PROGRESS NOTES
Late Entry: C&S results received. Patient sent home on Cipro and is resistant to Cipro. New prescription called into [facility] for Augmentin 625mg BID x7days. Patient notified via telephone.      8/13/19 2672

## 2019-08-23 ENCOUNTER — APPOINTMENT (OUTPATIENT)
Dept: CT IMAGING | Age: 74
End: 2019-08-23
Payer: MEDICARE

## 2019-08-23 ENCOUNTER — HOSPITAL ENCOUNTER (EMERGENCY)
Age: 74
Discharge: HOME | End: 2019-08-23
Attending: EMERGENCY MEDICINE
Payer: MEDICARE

## 2019-08-23 VITALS
SYSTOLIC BLOOD PRESSURE: 144 MMHG | HEIGHT: 63 IN | WEIGHT: 205 LBS | HEART RATE: 86 BPM | TEMPERATURE: 97.8 F | OXYGEN SATURATION: 96 % | DIASTOLIC BLOOD PRESSURE: 77 MMHG | BODY MASS INDEX: 36.32 KG/M2 | RESPIRATION RATE: 14 BRPM

## 2019-08-23 DIAGNOSIS — E83.42 HYPOMAGNESEMIA: ICD-10-CM

## 2019-08-23 DIAGNOSIS — D72.829 LEUKOCYTOSIS, UNSPECIFIED TYPE: ICD-10-CM

## 2019-08-23 DIAGNOSIS — R10.9 ABDOMINAL PAIN, UNSPECIFIED ABDOMINAL LOCATION: ICD-10-CM

## 2019-08-23 DIAGNOSIS — R19.7 DIARRHEA, UNSPECIFIED TYPE: Primary | ICD-10-CM

## 2019-08-23 DIAGNOSIS — R11.0 NAUSEA: ICD-10-CM

## 2019-08-23 LAB
ALBUMIN SERPL-MCNC: 3.5 GM/DL (ref 3.4–5)
ALP BLD-CCNC: 107 IU/L (ref 40–129)
ALT SERPL-CCNC: 17 U/L (ref 10–40)
ANION GAP SERPL CALCULATED.3IONS-SCNC: 18 MMOL/L (ref 4–16)
AST SERPL-CCNC: 20 IU/L (ref 15–37)
BASOPHILS ABSOLUTE: 0.1 K/CU MM
BASOPHILS RELATIVE PERCENT: 0.6 % (ref 0–1)
BILIRUB SERPL-MCNC: 0.4 MG/DL (ref 0–1)
BUN BLDV-MCNC: 19 MG/DL (ref 6–23)
CALCIUM SERPL-MCNC: 9.5 MG/DL (ref 8.3–10.6)
CHLORIDE BLD-SCNC: 101 MMOL/L (ref 99–110)
CO2: 21 MMOL/L (ref 21–32)
CREAT SERPL-MCNC: 1.1 MG/DL (ref 0.6–1.1)
DIFFERENTIAL TYPE: ABNORMAL
EOSINOPHILS ABSOLUTE: 2.1 K/CU MM
EOSINOPHILS RELATIVE PERCENT: 15 % (ref 0–3)
GFR AFRICAN AMERICAN: 59 ML/MIN/1.73M2
GFR NON-AFRICAN AMERICAN: 49 ML/MIN/1.73M2
GLUCOSE BLD-MCNC: 340 MG/DL (ref 70–99)
HCT VFR BLD CALC: 42.3 % (ref 37–47)
HEMOGLOBIN: 13.3 GM/DL (ref 12.5–16)
IMMATURE NEUTROPHIL %: 5.1 % (ref 0–0.43)
LACTATE: 1.7 MMOL/L (ref 0.4–2)
LIPASE: 22 IU/L (ref 13–60)
LYMPHOCYTES ABSOLUTE: 4 K/CU MM
LYMPHOCYTES RELATIVE PERCENT: 28.6 % (ref 24–44)
MAGNESIUM: 1.3 MG/DL (ref 1.8–2.4)
MCH RBC QN AUTO: 28.4 PG (ref 27–31)
MCHC RBC AUTO-ENTMCNC: 31.4 % (ref 32–36)
MCV RBC AUTO: 90.4 FL (ref 78–100)
MONOCYTES ABSOLUTE: 0.6 K/CU MM
MONOCYTES RELATIVE PERCENT: 4.4 % (ref 0–4)
PDW BLD-RTO: 15 % (ref 11.7–14.9)
PLATELET # BLD: 242 K/CU MM (ref 140–440)
PMV BLD AUTO: 9.8 FL (ref 7.5–11.1)
POTASSIUM SERPL-SCNC: 3.9 MMOL/L (ref 3.5–5.1)
RBC # BLD: 4.68 M/CU MM (ref 4.2–5.4)
SEGMENTED NEUTROPHILS ABSOLUTE COUNT: 6.5 K/CU MM
SEGMENTED NEUTROPHILS RELATIVE PERCENT: 46.3 % (ref 36–66)
SODIUM BLD-SCNC: 140 MMOL/L (ref 135–145)
TOTAL IMMATURE NEUTOROPHIL: 0.71 K/CU MM
TOTAL PROTEIN: 6.7 GM/DL (ref 6.4–8.2)
WBC # BLD: 14 K/CU MM (ref 4–10.5)

## 2019-08-23 PROCEDURE — 80053 COMPREHEN METABOLIC PANEL: CPT

## 2019-08-23 PROCEDURE — 96372 THER/PROPH/DIAG INJ SC/IM: CPT

## 2019-08-23 PROCEDURE — 2500000003 HC RX 250 WO HCPCS: Performed by: EMERGENCY MEDICINE

## 2019-08-23 PROCEDURE — 87040 BLOOD CULTURE FOR BACTERIA: CPT

## 2019-08-23 PROCEDURE — 6360000004 HC RX CONTRAST MEDICATION: Performed by: EMERGENCY MEDICINE

## 2019-08-23 PROCEDURE — 83605 ASSAY OF LACTIC ACID: CPT

## 2019-08-23 PROCEDURE — 83735 ASSAY OF MAGNESIUM: CPT

## 2019-08-23 PROCEDURE — 99284 EMERGENCY DEPT VISIT MOD MDM: CPT

## 2019-08-23 PROCEDURE — 85025 COMPLETE CBC W/AUTO DIFF WBC: CPT

## 2019-08-23 PROCEDURE — 96365 THER/PROPH/DIAG IV INF INIT: CPT

## 2019-08-23 PROCEDURE — 74177 CT ABD & PELVIS W/CONTRAST: CPT

## 2019-08-23 PROCEDURE — 83690 ASSAY OF LIPASE: CPT

## 2019-08-23 PROCEDURE — 6360000002 HC RX W HCPCS: Performed by: EMERGENCY MEDICINE

## 2019-08-23 PROCEDURE — 96375 TX/PRO/DX INJ NEW DRUG ADDON: CPT

## 2019-08-23 PROCEDURE — 6370000000 HC RX 637 (ALT 250 FOR IP): Performed by: EMERGENCY MEDICINE

## 2019-08-23 PROCEDURE — 2580000003 HC RX 258: Performed by: EMERGENCY MEDICINE

## 2019-08-23 RX ORDER — VANCOMYCIN HYDROCHLORIDE 125 MG/1
125 CAPSULE ORAL 4 TIMES DAILY
Qty: 40 CAPSULE | Refills: 0 | Status: SHIPPED | OUTPATIENT
Start: 2019-08-23 | End: 2019-09-02

## 2019-08-23 RX ORDER — DICYCLOMINE HYDROCHLORIDE 10 MG/ML
20 INJECTION INTRAMUSCULAR ONCE
Status: COMPLETED | OUTPATIENT
Start: 2019-08-23 | End: 2019-08-23

## 2019-08-23 RX ORDER — FAMOTIDINE 20 MG/1
20 TABLET, FILM COATED ORAL DAILY
Qty: 14 TABLET | Refills: 0 | Status: SHIPPED | OUTPATIENT
Start: 2019-08-23 | End: 2019-12-04

## 2019-08-23 RX ORDER — ONDANSETRON 2 MG/ML
4 INJECTION INTRAMUSCULAR; INTRAVENOUS EVERY 30 MIN PRN
Status: DISCONTINUED | OUTPATIENT
Start: 2019-08-23 | End: 2019-08-23 | Stop reason: HOSPADM

## 2019-08-23 RX ORDER — ONDANSETRON 4 MG/1
4 TABLET, ORALLY DISINTEGRATING ORAL EVERY 8 HOURS PRN
Qty: 15 TABLET | Refills: 0 | Status: SHIPPED | OUTPATIENT
Start: 2019-08-23 | End: 2019-12-04

## 2019-08-23 RX ORDER — 0.9 % SODIUM CHLORIDE 0.9 %
1000 INTRAVENOUS SOLUTION INTRAVENOUS ONCE
Status: COMPLETED | OUTPATIENT
Start: 2019-08-23 | End: 2019-08-23

## 2019-08-23 RX ORDER — DICYCLOMINE HYDROCHLORIDE 10 MG/1
10 CAPSULE ORAL 3 TIMES DAILY
Qty: 15 CAPSULE | Refills: 3 | Status: SHIPPED | OUTPATIENT
Start: 2019-08-23 | End: 2019-12-04

## 2019-08-23 RX ORDER — MAGNESIUM SULFATE 1 G/100ML
1 INJECTION INTRAVENOUS ONCE
Status: COMPLETED | OUTPATIENT
Start: 2019-08-23 | End: 2019-08-23

## 2019-08-23 RX ORDER — VANCOMYCIN HYDROCHLORIDE 125 MG/1
125 CAPSULE ORAL ONCE
Status: COMPLETED | OUTPATIENT
Start: 2019-08-23 | End: 2019-08-23

## 2019-08-23 RX ADMIN — IOPAMIDOL 75 ML: 755 INJECTION, SOLUTION INTRAVENOUS at 13:12

## 2019-08-23 RX ADMIN — SODIUM CHLORIDE 1000 ML: 900 INJECTION INTRAVENOUS at 11:50

## 2019-08-23 RX ADMIN — DICYCLOMINE HYDROCHLORIDE 20 MG: 20 INJECTION, SOLUTION INTRAMUSCULAR at 11:56

## 2019-08-23 RX ADMIN — VANCOMYCIN HYDROCHLORIDE 125 MG: 125 CAPSULE ORAL at 14:47

## 2019-08-23 RX ADMIN — ONDANSETRON 4 MG: 2 INJECTION INTRAMUSCULAR; INTRAVENOUS at 11:51

## 2019-08-23 RX ADMIN — FAMOTIDINE 20 MG: 10 INJECTION, SOLUTION INTRAVENOUS at 11:51

## 2019-08-23 RX ADMIN — MAGNESIUM SULFATE HEPTAHYDRATE 1 G: 1 INJECTION, SOLUTION INTRAVENOUS at 13:12

## 2019-08-23 ASSESSMENT — ENCOUNTER SYMPTOMS
NAUSEA: 1
DIARRHEA: 1
EYE DISCHARGE: 0
ABDOMINAL PAIN: 1
VOMITING: 1
EYES NEGATIVE: 1
RESPIRATORY NEGATIVE: 1
ALLERGIC/IMMUNOLOGIC NEGATIVE: 1

## 2019-08-28 LAB
CULTURE: NORMAL
Lab: NORMAL
SPECIMEN: NORMAL

## 2019-12-04 ENCOUNTER — HOSPITAL ENCOUNTER (INPATIENT)
Age: 74
LOS: 1 days | Discharge: HOME HEALTH | DRG: 690 | End: 2019-12-05
Attending: EMERGENCY MEDICINE | Admitting: FAMILY MEDICINE
Payer: MEDICARE

## 2019-12-04 ENCOUNTER — APPOINTMENT (OUTPATIENT)
Dept: GENERAL RADIOLOGY | Age: 74
DRG: 690 | End: 2019-12-04
Payer: MEDICARE

## 2019-12-04 DIAGNOSIS — L97.509 TYPE 2 DIABETES MELLITUS WITH FOOT ULCER, WITH LONG-TERM CURRENT USE OF INSULIN (HCC): ICD-10-CM

## 2019-12-04 DIAGNOSIS — R11.0 NAUSEA: Primary | ICD-10-CM

## 2019-12-04 DIAGNOSIS — Z79.4 TYPE 2 DIABETES MELLITUS WITH FOOT ULCER, WITH LONG-TERM CURRENT USE OF INSULIN (HCC): ICD-10-CM

## 2019-12-04 DIAGNOSIS — R94.31 T WAVE INVERSION IN EKG: ICD-10-CM

## 2019-12-04 DIAGNOSIS — E11.621 TYPE 2 DIABETES MELLITUS WITH FOOT ULCER, WITH LONG-TERM CURRENT USE OF INSULIN (HCC): ICD-10-CM

## 2019-12-04 PROBLEM — R07.2 PRECORDIAL PAIN: Status: ACTIVE | Noted: 2019-12-04

## 2019-12-04 PROBLEM — N39.0 UTI (URINARY TRACT INFECTION): Status: ACTIVE | Noted: 2019-12-04

## 2019-12-04 PROBLEM — E07.9 THYROID DISEASE: Status: ACTIVE | Noted: 2019-12-04

## 2019-12-04 LAB
ALBUMIN SERPL-MCNC: 4.2 GM/DL (ref 3.4–5)
ALP BLD-CCNC: 107 IU/L (ref 40–129)
ALT SERPL-CCNC: 8 U/L (ref 10–40)
ANION GAP SERPL CALCULATED.3IONS-SCNC: 16 MMOL/L (ref 4–16)
ANION GAP SERPL CALCULATED.3IONS-SCNC: 8 MMOL/L (ref 4–16)
AST SERPL-CCNC: 37 IU/L (ref 15–37)
BACTERIA: ABNORMAL /HPF
BASOPHILS ABSOLUTE: 0.1 K/CU MM
BASOPHILS RELATIVE PERCENT: 0.4 % (ref 0–1)
BILIRUB SERPL-MCNC: 0.4 MG/DL (ref 0–1)
BILIRUBIN URINE: NEGATIVE MG/DL
BLOOD, URINE: ABNORMAL
BUN BLDV-MCNC: 11 MG/DL (ref 6–23)
BUN BLDV-MCNC: 12 MG/DL (ref 6–23)
CALCIUM SERPL-MCNC: 8.6 MG/DL (ref 8.3–10.6)
CALCIUM SERPL-MCNC: 9.6 MG/DL (ref 8.3–10.6)
CAST TYPE: ABNORMAL /HPF
CHLORIDE BLD-SCNC: 102 MMOL/L (ref 99–110)
CHLORIDE BLD-SCNC: 99 MMOL/L (ref 99–110)
CLARITY: CLEAR
CO2: 26 MMOL/L (ref 21–32)
CO2: 31 MMOL/L (ref 21–32)
COLOR: YELLOW
CREAT SERPL-MCNC: 0.7 MG/DL (ref 0.6–1.1)
CREAT SERPL-MCNC: 0.8 MG/DL (ref 0.6–1.1)
CRYSTAL TYPE: ABNORMAL /HPF
D DIMER: <200 NG/ML(DDU)
DIFFERENTIAL TYPE: ABNORMAL
EKG ATRIAL RATE: 69 BPM
EKG DIAGNOSIS: NORMAL
EKG P AXIS: -25 DEGREES
EKG P-R INTERVAL: 164 MS
EKG Q-T INTERVAL: 416 MS
EKG QRS DURATION: 84 MS
EKG QTC CALCULATION (BAZETT): 445 MS
EKG R AXIS: -1 DEGREES
EKG T AXIS: 127 DEGREES
EKG VENTRICULAR RATE: 69 BPM
EOSINOPHILS ABSOLUTE: 0.8 K/CU MM
EOSINOPHILS RELATIVE PERCENT: 7.4 % (ref 0–3)
EPITHELIAL CELLS, UA: ABNORMAL /HPF
GFR AFRICAN AMERICAN: >60 ML/MIN/1.73M2
GFR AFRICAN AMERICAN: >60 ML/MIN/1.73M2
GFR NON-AFRICAN AMERICAN: >60 ML/MIN/1.73M2
GFR NON-AFRICAN AMERICAN: >60 ML/MIN/1.73M2
GLUCOSE BLD-MCNC: 135 MG/DL (ref 70–99)
GLUCOSE BLD-MCNC: 186 MG/DL (ref 70–99)
GLUCOSE BLD-MCNC: 204 MG/DL (ref 70–99)
GLUCOSE BLD-MCNC: 209 MG/DL (ref 70–99)
GLUCOSE BLD-MCNC: 63 MG/DL (ref 70–99)
GLUCOSE BLD-MCNC: 73 MG/DL (ref 70–99)
GLUCOSE BLD-MCNC: 86 MG/DL (ref 70–99)
GLUCOSE, URINE: 100 MG/DL
HCT VFR BLD CALC: 39.3 % (ref 37–47)
HCT VFR BLD CALC: 43.4 % (ref 37–47)
HEMOGLOBIN: 12 GM/DL (ref 12.5–16)
HEMOGLOBIN: 13.4 GM/DL (ref 12.5–16)
HIGH SENSITIVE C-REACTIVE PROTEIN: 13.4 MG/L
IMMATURE NEUTROPHIL %: 0.7 % (ref 0–0.43)
KETONES, URINE: NEGATIVE MG/DL
LEUKOCYTE ESTERASE, URINE: ABNORMAL
LIPASE: 52 IU/L (ref 13–60)
LYMPHOCYTES ABSOLUTE: 3.7 K/CU MM
LYMPHOCYTES RELATIVE PERCENT: 32.2 % (ref 24–44)
MCH RBC QN AUTO: 28.2 PG (ref 27–31)
MCH RBC QN AUTO: 28.4 PG (ref 27–31)
MCHC RBC AUTO-ENTMCNC: 30.5 % (ref 32–36)
MCHC RBC AUTO-ENTMCNC: 30.9 % (ref 32–36)
MCV RBC AUTO: 91.9 FL (ref 78–100)
MCV RBC AUTO: 92.5 FL (ref 78–100)
MONOCYTES ABSOLUTE: 0.7 K/CU MM
MONOCYTES RELATIVE PERCENT: 5.8 % (ref 0–4)
MUCUS: NEGATIVE HPF
NITRITE URINE, QUANTITATIVE: NEGATIVE
PDW BLD-RTO: 14.7 % (ref 11.7–14.9)
PDW BLD-RTO: 14.9 % (ref 11.7–14.9)
PH, URINE: 7 (ref 5–8)
PLATELET # BLD: 262 K/CU MM (ref 140–440)
PLATELET # BLD: 322 K/CU MM (ref 140–440)
PMV BLD AUTO: 10.2 FL (ref 7.5–11.1)
PMV BLD AUTO: 10.7 FL (ref 7.5–11.1)
POTASSIUM SERPL-SCNC: 4 MMOL/L (ref 3.5–5.1)
POTASSIUM SERPL-SCNC: 4.2 MMOL/L (ref 3.5–5.1)
PRO-BNP: 784.3 PG/ML
PROTEIN UA: >300 MG/DL
RBC # BLD: 4.25 M/CU MM (ref 4.2–5.4)
RBC # BLD: 4.72 M/CU MM (ref 4.2–5.4)
RBC URINE: ABNORMAL /HPF (ref 0–6)
SEGMENTED NEUTROPHILS ABSOLUTE COUNT: 6.1 K/CU MM
SEGMENTED NEUTROPHILS RELATIVE PERCENT: 53.5 % (ref 36–66)
SODIUM BLD-SCNC: 141 MMOL/L (ref 135–145)
SODIUM BLD-SCNC: 141 MMOL/L (ref 135–145)
SPECIFIC GRAVITY UA: 1.02 (ref 1–1.03)
TOTAL IMMATURE NEUTOROPHIL: 0.08 K/CU MM
TOTAL PROTEIN: 8 GM/DL (ref 6.4–8.2)
TROPONIN T: <0.01 NG/ML
UROBILINOGEN, URINE: 4 MG/DL (ref 0.2–1)
WBC # BLD: 11.4 K/CU MM (ref 4–10.5)
WBC # BLD: 9.5 K/CU MM (ref 4–10.5)
WBC UA: ABNORMAL /HPF (ref 0–5)

## 2019-12-04 PROCEDURE — 87086 URINE CULTURE/COLONY COUNT: CPT

## 2019-12-04 PROCEDURE — 2580000003 HC RX 258: Performed by: EMERGENCY MEDICINE

## 2019-12-04 PROCEDURE — 87186 SC STD MICRODIL/AGAR DIL: CPT

## 2019-12-04 PROCEDURE — 80048 BASIC METABOLIC PNL TOTAL CA: CPT

## 2019-12-04 PROCEDURE — 99285 EMERGENCY DEPT VISIT HI MDM: CPT

## 2019-12-04 PROCEDURE — 80053 COMPREHEN METABOLIC PANEL: CPT

## 2019-12-04 PROCEDURE — 85027 COMPLETE CBC AUTOMATED: CPT

## 2019-12-04 PROCEDURE — 2580000003 HC RX 258: Performed by: NURSE PRACTITIONER

## 2019-12-04 PROCEDURE — 93005 ELECTROCARDIOGRAM TRACING: CPT | Performed by: EMERGENCY MEDICINE

## 2019-12-04 PROCEDURE — 71046 X-RAY EXAM CHEST 2 VIEWS: CPT

## 2019-12-04 PROCEDURE — 6370000000 HC RX 637 (ALT 250 FOR IP): Performed by: NURSE PRACTITIONER

## 2019-12-04 PROCEDURE — 96374 THER/PROPH/DIAG INJ IV PUSH: CPT

## 2019-12-04 PROCEDURE — 6360000002 HC RX W HCPCS: Performed by: NURSE PRACTITIONER

## 2019-12-04 PROCEDURE — 6370000000 HC RX 637 (ALT 250 FOR IP): Performed by: FAMILY MEDICINE

## 2019-12-04 PROCEDURE — 81001 URINALYSIS AUTO W/SCOPE: CPT

## 2019-12-04 PROCEDURE — 87077 CULTURE AEROBIC IDENTIFY: CPT

## 2019-12-04 PROCEDURE — 82962 GLUCOSE BLOOD TEST: CPT

## 2019-12-04 PROCEDURE — 86141 C-REACTIVE PROTEIN HS: CPT

## 2019-12-04 PROCEDURE — 2140000000 HC CCU INTERMEDIATE R&B

## 2019-12-04 PROCEDURE — 83690 ASSAY OF LIPASE: CPT

## 2019-12-04 PROCEDURE — 93010 ELECTROCARDIOGRAM REPORT: CPT | Performed by: INTERNAL MEDICINE

## 2019-12-04 PROCEDURE — 85379 FIBRIN DEGRADATION QUANT: CPT

## 2019-12-04 PROCEDURE — 6370000000 HC RX 637 (ALT 250 FOR IP): Performed by: EMERGENCY MEDICINE

## 2019-12-04 PROCEDURE — 85025 COMPLETE CBC W/AUTO DIFF WBC: CPT

## 2019-12-04 PROCEDURE — 83880 ASSAY OF NATRIURETIC PEPTIDE: CPT

## 2019-12-04 PROCEDURE — 6360000002 HC RX W HCPCS: Performed by: EMERGENCY MEDICINE

## 2019-12-04 PROCEDURE — 36415 COLL VENOUS BLD VENIPUNCTURE: CPT

## 2019-12-04 PROCEDURE — 84484 ASSAY OF TROPONIN QUANT: CPT

## 2019-12-04 RX ORDER — GLIMEPIRIDE 2 MG/1
4 TABLET ORAL
Status: DISCONTINUED | OUTPATIENT
Start: 2019-12-04 | End: 2019-12-05 | Stop reason: HOSPADM

## 2019-12-04 RX ORDER — NICOTINE POLACRILEX 4 MG
15 LOZENGE BUCCAL PRN
Status: DISCONTINUED | OUTPATIENT
Start: 2019-12-04 | End: 2019-12-05 | Stop reason: HOSPADM

## 2019-12-04 RX ORDER — FAMOTIDINE 20 MG/1
20 TABLET, FILM COATED ORAL DAILY
Status: DISCONTINUED | OUTPATIENT
Start: 2019-12-04 | End: 2019-12-05 | Stop reason: HOSPADM

## 2019-12-04 RX ORDER — AMLODIPINE BESYLATE 10 MG/1
10 TABLET ORAL DAILY
Status: DISCONTINUED | OUTPATIENT
Start: 2019-12-04 | End: 2019-12-05 | Stop reason: HOSPADM

## 2019-12-04 RX ORDER — ATORVASTATIN CALCIUM 20 MG/1
20 TABLET, FILM COATED ORAL DAILY
Status: DISCONTINUED | OUTPATIENT
Start: 2019-12-04 | End: 2019-12-04 | Stop reason: SDUPTHER

## 2019-12-04 RX ORDER — SULFAMETHOXAZOLE AND TRIMETHOPRIM 800; 160 MG/1; MG/1
1 TABLET ORAL EVERY 12 HOURS SCHEDULED
Status: DISCONTINUED | OUTPATIENT
Start: 2019-12-04 | End: 2019-12-05 | Stop reason: HOSPADM

## 2019-12-04 RX ORDER — ONDANSETRON 2 MG/ML
4 INJECTION INTRAMUSCULAR; INTRAVENOUS EVERY 6 HOURS PRN
Status: DISCONTINUED | OUTPATIENT
Start: 2019-12-04 | End: 2019-12-05 | Stop reason: HOSPADM

## 2019-12-04 RX ORDER — HYDRALAZINE HYDROCHLORIDE 10 MG/1
10 TABLET, FILM COATED ORAL 2 TIMES DAILY
Status: DISCONTINUED | OUTPATIENT
Start: 2019-12-04 | End: 2019-12-05 | Stop reason: HOSPADM

## 2019-12-04 RX ORDER — ACETAMINOPHEN 325 MG/1
650 TABLET ORAL EVERY 4 HOURS PRN
Status: DISCONTINUED | OUTPATIENT
Start: 2019-12-04 | End: 2019-12-05 | Stop reason: HOSPADM

## 2019-12-04 RX ORDER — VITAMIN B COMPLEX
1000 TABLET ORAL DAILY
Status: DISCONTINUED | OUTPATIENT
Start: 2019-12-04 | End: 2019-12-05 | Stop reason: HOSPADM

## 2019-12-04 RX ORDER — ATENOLOL 50 MG/1
50 TABLET ORAL 2 TIMES DAILY
Status: DISCONTINUED | OUTPATIENT
Start: 2019-12-04 | End: 2019-12-05 | Stop reason: HOSPADM

## 2019-12-04 RX ORDER — DEXTROSE MONOHYDRATE 50 MG/ML
100 INJECTION, SOLUTION INTRAVENOUS PRN
Status: DISCONTINUED | OUTPATIENT
Start: 2019-12-04 | End: 2019-12-05 | Stop reason: HOSPADM

## 2019-12-04 RX ORDER — ONDANSETRON 2 MG/ML
4 INJECTION INTRAMUSCULAR; INTRAVENOUS EVERY 6 HOURS PRN
Status: DISCONTINUED | OUTPATIENT
Start: 2019-12-04 | End: 2019-12-04 | Stop reason: SDUPTHER

## 2019-12-04 RX ORDER — DEXTROSE MONOHYDRATE 25 G/50ML
12.5 INJECTION, SOLUTION INTRAVENOUS PRN
Status: DISCONTINUED | OUTPATIENT
Start: 2019-12-04 | End: 2019-12-05 | Stop reason: HOSPADM

## 2019-12-04 RX ORDER — SODIUM CHLORIDE 0.9 % (FLUSH) 0.9 %
10 SYRINGE (ML) INJECTION PRN
Status: DISCONTINUED | OUTPATIENT
Start: 2019-12-04 | End: 2019-12-05 | Stop reason: HOSPADM

## 2019-12-04 RX ORDER — PANTOPRAZOLE SODIUM 40 MG/1
40 TABLET, DELAYED RELEASE ORAL DAILY
Status: DISCONTINUED | OUTPATIENT
Start: 2019-12-04 | End: 2019-12-05 | Stop reason: HOSPADM

## 2019-12-04 RX ORDER — ATORVASTATIN CALCIUM 40 MG/1
40 TABLET, FILM COATED ORAL NIGHTLY
Status: DISCONTINUED | OUTPATIENT
Start: 2019-12-04 | End: 2019-12-05 | Stop reason: HOSPADM

## 2019-12-04 RX ORDER — M-VIT,TX,IRON,MINS/CALC/FOLIC 27MG-0.4MG
1 TABLET ORAL DAILY
Status: DISCONTINUED | OUTPATIENT
Start: 2019-12-04 | End: 2019-12-05 | Stop reason: HOSPADM

## 2019-12-04 RX ORDER — LEVOTHYROXINE SODIUM 0.15 MG/1
300 TABLET ORAL DAILY
Status: DISCONTINUED | OUTPATIENT
Start: 2019-12-04 | End: 2019-12-05 | Stop reason: HOSPADM

## 2019-12-04 RX ORDER — ASPIRIN 81 MG/1
81 TABLET, CHEWABLE ORAL DAILY
Status: DISCONTINUED | OUTPATIENT
Start: 2019-12-05 | End: 2019-12-05 | Stop reason: HOSPADM

## 2019-12-04 RX ORDER — NITROGLYCERIN 0.4 MG/1
0.4 TABLET SUBLINGUAL EVERY 5 MIN PRN
Status: DISCONTINUED | OUTPATIENT
Start: 2019-12-04 | End: 2019-12-05 | Stop reason: HOSPADM

## 2019-12-04 RX ORDER — SODIUM CHLORIDE 0.9 % (FLUSH) 0.9 %
10 SYRINGE (ML) INJECTION EVERY 12 HOURS SCHEDULED
Status: DISCONTINUED | OUTPATIENT
Start: 2019-12-04 | End: 2019-12-05 | Stop reason: HOSPADM

## 2019-12-04 RX ORDER — DULOXETIN HYDROCHLORIDE 30 MG/1
60 CAPSULE, DELAYED RELEASE ORAL DAILY
Status: DISCONTINUED | OUTPATIENT
Start: 2019-12-04 | End: 2019-12-05 | Stop reason: HOSPADM

## 2019-12-04 RX ORDER — 0.9 % SODIUM CHLORIDE 0.9 %
1000 INTRAVENOUS SOLUTION INTRAVENOUS ONCE
Status: COMPLETED | OUTPATIENT
Start: 2019-12-04 | End: 2019-12-04

## 2019-12-04 RX ORDER — ASPIRIN 81 MG/1
81 TABLET ORAL DAILY
Status: DISCONTINUED | OUTPATIENT
Start: 2019-12-04 | End: 2019-12-04 | Stop reason: SDUPTHER

## 2019-12-04 RX ORDER — ONDANSETRON 4 MG/1
4 TABLET, ORALLY DISINTEGRATING ORAL EVERY 8 HOURS PRN
Status: DISCONTINUED | OUTPATIENT
Start: 2019-12-04 | End: 2019-12-05 | Stop reason: HOSPADM

## 2019-12-04 RX ORDER — ASPIRIN 81 MG/1
324 TABLET, CHEWABLE ORAL ONCE
Status: COMPLETED | OUTPATIENT
Start: 2019-12-04 | End: 2019-12-04

## 2019-12-04 RX ADMIN — HYDRALAZINE HYDROCHLORIDE 10 MG: 10 TABLET, FILM COATED ORAL at 10:32

## 2019-12-04 RX ADMIN — ASPIRIN 81 MG 324 MG: 81 TABLET ORAL at 03:17

## 2019-12-04 RX ADMIN — PANTOPRAZOLE SODIUM 40 MG: 40 TABLET, DELAYED RELEASE ORAL at 06:16

## 2019-12-04 RX ADMIN — ENOXAPARIN SODIUM 40 MG: 40 INJECTION SUBCUTANEOUS at 10:31

## 2019-12-04 RX ADMIN — ONDANSETRON 4 MG: 2 INJECTION INTRAMUSCULAR; INTRAVENOUS at 02:11

## 2019-12-04 RX ADMIN — VITAMIN D, TAB 1000IU (100/BT) 1000 UNITS: 25 TAB at 10:31

## 2019-12-04 RX ADMIN — SODIUM CHLORIDE, PRESERVATIVE FREE 10 ML: 5 INJECTION INTRAVENOUS at 20:27

## 2019-12-04 RX ADMIN — ACETAMINOPHEN 650 MG: 325 TABLET ORAL at 20:28

## 2019-12-04 RX ADMIN — SODIUM CHLORIDE 1000 ML: 9 INJECTION, SOLUTION INTRAVENOUS at 02:11

## 2019-12-04 RX ADMIN — ATORVASTATIN CALCIUM 40 MG: 40 TABLET, FILM COATED ORAL at 20:28

## 2019-12-04 RX ADMIN — INSULIN LISPRO 10 UNITS: 100 INJECTION, SOLUTION INTRAVENOUS; SUBCUTANEOUS at 13:45

## 2019-12-04 RX ADMIN — INSULIN LISPRO 10 UNITS: 100 INJECTION, SOLUTION INTRAVENOUS; SUBCUTANEOUS at 17:49

## 2019-12-04 RX ADMIN — INSULIN GLARGINE 30 UNITS: 100 INJECTION, SOLUTION SUBCUTANEOUS at 20:27

## 2019-12-04 RX ADMIN — ATENOLOL 50 MG: 50 TABLET ORAL at 10:32

## 2019-12-04 RX ADMIN — HYDRALAZINE HYDROCHLORIDE 10 MG: 10 TABLET, FILM COATED ORAL at 20:28

## 2019-12-04 RX ADMIN — SULFAMETHOXAZOLE AND TRIMETHOPRIM 1 TABLET: 800; 160 TABLET ORAL at 20:28

## 2019-12-04 RX ADMIN — INSULIN LISPRO 10 UNITS: 100 INJECTION, SOLUTION INTRAVENOUS; SUBCUTANEOUS at 10:39

## 2019-12-04 RX ADMIN — ATENOLOL 50 MG: 50 TABLET ORAL at 20:28

## 2019-12-04 RX ADMIN — GLIMEPIRIDE 4 MG: 2 TABLET ORAL at 10:32

## 2019-12-04 RX ADMIN — SODIUM CHLORIDE, PRESERVATIVE FREE 10 ML: 5 INJECTION INTRAVENOUS at 10:31

## 2019-12-04 RX ADMIN — SULFAMETHOXAZOLE AND TRIMETHOPRIM 1 TABLET: 800; 160 TABLET ORAL at 10:32

## 2019-12-04 RX ADMIN — LEVOTHYROXINE SODIUM 300 MCG: 150 TABLET ORAL at 06:16

## 2019-12-04 RX ADMIN — FAMOTIDINE 20 MG: 20 TABLET, FILM COATED ORAL at 13:44

## 2019-12-04 RX ADMIN — AMLODIPINE BESYLATE 10 MG: 10 TABLET ORAL at 10:32

## 2019-12-04 RX ADMIN — DULOXETINE HYDROCHLORIDE 60 MG: 30 CAPSULE, DELAYED RELEASE ORAL at 10:32

## 2019-12-04 ASSESSMENT — PAIN SCALES - GENERAL
PAINLEVEL_OUTOF10: 0
PAINLEVEL_OUTOF10: 2
PAINLEVEL_OUTOF10: 0

## 2019-12-05 VITALS
OXYGEN SATURATION: 95 % | BODY MASS INDEX: 35.42 KG/M2 | TEMPERATURE: 97.7 F | DIASTOLIC BLOOD PRESSURE: 72 MMHG | HEART RATE: 68 BPM | RESPIRATION RATE: 16 BRPM | SYSTOLIC BLOOD PRESSURE: 166 MMHG | HEIGHT: 63 IN | WEIGHT: 199.9 LBS

## 2019-12-05 PROBLEM — R07.2 PRECORDIAL PAIN: Status: RESOLVED | Noted: 2019-12-04 | Resolved: 2019-12-05

## 2019-12-05 PROBLEM — R07.9 CHEST PAIN: Status: RESOLVED | Noted: 2019-01-06 | Resolved: 2019-12-05

## 2019-12-05 LAB
ANION GAP SERPL CALCULATED.3IONS-SCNC: 15 MMOL/L (ref 4–16)
ANION GAP SERPL CALCULATED.3IONS-SCNC: 16 MMOL/L (ref 4–16)
BUN BLDV-MCNC: 15 MG/DL (ref 6–23)
BUN BLDV-MCNC: 15 MG/DL (ref 6–23)
CALCIUM SERPL-MCNC: 9.1 MG/DL (ref 8.3–10.6)
CALCIUM SERPL-MCNC: 9.1 MG/DL (ref 8.3–10.6)
CHLORIDE BLD-SCNC: 101 MMOL/L (ref 99–110)
CHLORIDE BLD-SCNC: 101 MMOL/L (ref 99–110)
CHOLESTEROL: 179 MG/DL
CO2: 22 MMOL/L (ref 21–32)
CO2: 24 MMOL/L (ref 21–32)
CREAT SERPL-MCNC: 0.9 MG/DL (ref 0.6–1.1)
CREAT SERPL-MCNC: 0.9 MG/DL (ref 0.6–1.1)
EKG ATRIAL RATE: 73 BPM
EKG DIAGNOSIS: NORMAL
EKG P AXIS: 58 DEGREES
EKG P-R INTERVAL: 200 MS
EKG Q-T INTERVAL: 438 MS
EKG QRS DURATION: 84 MS
EKG QTC CALCULATION (BAZETT): 482 MS
EKG R AXIS: 29 DEGREES
EKG T AXIS: 93 DEGREES
EKG VENTRICULAR RATE: 73 BPM
GFR AFRICAN AMERICAN: >60 ML/MIN/1.73M2
GFR AFRICAN AMERICAN: >60 ML/MIN/1.73M2
GFR NON-AFRICAN AMERICAN: >60 ML/MIN/1.73M2
GFR NON-AFRICAN AMERICAN: >60 ML/MIN/1.73M2
GLUCOSE BLD-MCNC: 209 MG/DL (ref 70–99)
GLUCOSE BLD-MCNC: 220 MG/DL (ref 70–99)
GLUCOSE BLD-MCNC: 255 MG/DL (ref 70–99)
GLUCOSE BLD-MCNC: 257 MG/DL (ref 70–99)
HCT VFR BLD CALC: 40.5 % (ref 37–47)
HDLC SERPL-MCNC: 39 MG/DL
HEMOGLOBIN: 12.2 GM/DL (ref 12.5–16)
HIGH SENSITIVE C-REACTIVE PROTEIN: 11.8 MG/L
LDL CHOLESTEROL DIRECT: 122 MG/DL
MCH RBC QN AUTO: 28.2 PG (ref 27–31)
MCHC RBC AUTO-ENTMCNC: 30.1 % (ref 32–36)
MCV RBC AUTO: 93.5 FL (ref 78–100)
PDW BLD-RTO: 15.2 % (ref 11.7–14.9)
PLATELET # BLD: 244 K/CU MM (ref 140–440)
PMV BLD AUTO: 10.2 FL (ref 7.5–11.1)
POTASSIUM SERPL-SCNC: 5 MMOL/L (ref 3.5–5.1)
POTASSIUM SERPL-SCNC: 5.1 MMOL/L (ref 3.5–5.1)
RBC # BLD: 4.33 M/CU MM (ref 4.2–5.4)
SODIUM BLD-SCNC: 139 MMOL/L (ref 135–145)
SODIUM BLD-SCNC: 140 MMOL/L (ref 135–145)
TRIGL SERPL-MCNC: 164 MG/DL
WBC # BLD: 8.8 K/CU MM (ref 4–10.5)

## 2019-12-05 PROCEDURE — 83721 ASSAY OF BLOOD LIPOPROTEIN: CPT

## 2019-12-05 PROCEDURE — 80048 BASIC METABOLIC PNL TOTAL CA: CPT

## 2019-12-05 PROCEDURE — 2580000003 HC RX 258: Performed by: NURSE PRACTITIONER

## 2019-12-05 PROCEDURE — 85027 COMPLETE CBC AUTOMATED: CPT

## 2019-12-05 PROCEDURE — 6360000002 HC RX W HCPCS: Performed by: NURSE PRACTITIONER

## 2019-12-05 PROCEDURE — 80061 LIPID PANEL: CPT

## 2019-12-05 PROCEDURE — 6370000000 HC RX 637 (ALT 250 FOR IP): Performed by: FAMILY MEDICINE

## 2019-12-05 PROCEDURE — 82962 GLUCOSE BLOOD TEST: CPT

## 2019-12-05 PROCEDURE — 36415 COLL VENOUS BLD VENIPUNCTURE: CPT

## 2019-12-05 PROCEDURE — 86141 C-REACTIVE PROTEIN HS: CPT

## 2019-12-05 PROCEDURE — 6370000000 HC RX 637 (ALT 250 FOR IP): Performed by: NURSE PRACTITIONER

## 2019-12-05 RX ORDER — GLIMEPIRIDE 4 MG/1
4 TABLET ORAL
Qty: 30 TABLET | Refills: 0 | Status: ON HOLD
Start: 2019-12-06 | End: 2020-08-13 | Stop reason: HOSPADM

## 2019-12-05 RX ORDER — SULFAMETHOXAZOLE AND TRIMETHOPRIM 800; 160 MG/1; MG/1
1 TABLET ORAL EVERY 12 HOURS SCHEDULED
Qty: 18 TABLET | Refills: 0 | Status: SHIPPED | OUTPATIENT
Start: 2019-12-05 | End: 2019-12-14

## 2019-12-05 RX ADMIN — SULFAMETHOXAZOLE AND TRIMETHOPRIM 1 TABLET: 800; 160 TABLET ORAL at 08:31

## 2019-12-05 RX ADMIN — LEVOTHYROXINE SODIUM 300 MCG: 150 TABLET ORAL at 06:16

## 2019-12-05 RX ADMIN — DULOXETINE HYDROCHLORIDE 60 MG: 30 CAPSULE, DELAYED RELEASE ORAL at 08:32

## 2019-12-05 RX ADMIN — GLIMEPIRIDE 4 MG: 2 TABLET ORAL at 08:31

## 2019-12-05 RX ADMIN — MULTIPLE VITAMINS W/ MINERALS TAB 1 TABLET: TAB at 08:32

## 2019-12-05 RX ADMIN — SODIUM CHLORIDE, PRESERVATIVE FREE 10 ML: 5 INJECTION INTRAVENOUS at 08:35

## 2019-12-05 RX ADMIN — FAMOTIDINE 20 MG: 20 TABLET, FILM COATED ORAL at 08:31

## 2019-12-05 RX ADMIN — ATENOLOL 50 MG: 50 TABLET ORAL at 08:32

## 2019-12-05 RX ADMIN — INSULIN LISPRO 10 UNITS: 100 INJECTION, SOLUTION INTRAVENOUS; SUBCUTANEOUS at 08:29

## 2019-12-05 RX ADMIN — ASPIRIN 81 MG 81 MG: 81 TABLET ORAL at 08:32

## 2019-12-05 RX ADMIN — AMLODIPINE BESYLATE 10 MG: 10 TABLET ORAL at 08:31

## 2019-12-05 RX ADMIN — INSULIN LISPRO 10 UNITS: 100 INJECTION, SOLUTION INTRAVENOUS; SUBCUTANEOUS at 12:09

## 2019-12-05 RX ADMIN — PANTOPRAZOLE SODIUM 40 MG: 40 TABLET, DELAYED RELEASE ORAL at 06:16

## 2019-12-05 RX ADMIN — ENOXAPARIN SODIUM 40 MG: 40 INJECTION SUBCUTANEOUS at 08:31

## 2019-12-05 RX ADMIN — HYDRALAZINE HYDROCHLORIDE 10 MG: 10 TABLET, FILM COATED ORAL at 08:32

## 2019-12-05 RX ADMIN — VITAMIN D, TAB 1000IU (100/BT) 1000 UNITS: 25 TAB at 08:31

## 2019-12-05 ASSESSMENT — PAIN SCALES - GENERAL: PAINLEVEL_OUTOF10: 0

## 2019-12-06 LAB
CULTURE: ABNORMAL
CULTURE: ABNORMAL
Lab: ABNORMAL
SPECIMEN: ABNORMAL
TOTAL COLONY COUNT: ABNORMAL

## 2019-12-10 ENCOUNTER — HOSPITAL ENCOUNTER (EMERGENCY)
Age: 74
Discharge: HOME | End: 2019-12-11
Attending: EMERGENCY MEDICINE
Payer: MEDICARE

## 2019-12-10 ENCOUNTER — APPOINTMENT (OUTPATIENT)
Dept: GENERAL RADIOLOGY | Age: 74
End: 2019-12-10
Payer: MEDICARE

## 2019-12-10 DIAGNOSIS — R73.9 HYPERGLYCEMIA: Primary | ICD-10-CM

## 2019-12-10 LAB
ALBUMIN SERPL-MCNC: 3.7 GM/DL (ref 3.4–5)
ALP BLD-CCNC: 112 IU/L (ref 40–129)
ALT SERPL-CCNC: 29 U/L (ref 10–40)
ANION GAP SERPL CALCULATED.3IONS-SCNC: 16 MMOL/L (ref 4–16)
AST SERPL-CCNC: 59 IU/L (ref 15–37)
BASOPHILS ABSOLUTE: 0 K/CU MM
BASOPHILS RELATIVE PERCENT: 0.4 % (ref 0–1)
BETA-HYDROXYBUTYRATE: 0.9 MG/DL (ref 0–3)
BILIRUB SERPL-MCNC: 0.3 MG/DL (ref 0–1)
BUN BLDV-MCNC: 20 MG/DL (ref 6–23)
CALCIUM SERPL-MCNC: 9.7 MG/DL (ref 8.3–10.6)
CHLORIDE BLD-SCNC: 90 MMOL/L (ref 99–110)
CO2: 24 MMOL/L (ref 21–32)
CREAT SERPL-MCNC: 0.9 MG/DL (ref 0.6–1.1)
DIFFERENTIAL TYPE: ABNORMAL
EOSINOPHILS ABSOLUTE: 0.2 K/CU MM
EOSINOPHILS RELATIVE PERCENT: 2.8 % (ref 0–3)
GFR AFRICAN AMERICAN: >60 ML/MIN/1.73M2
GFR NON-AFRICAN AMERICAN: >60 ML/MIN/1.73M2
GLUCOSE BLD-MCNC: 506 MG/DL (ref 70–99)
HCT VFR BLD CALC: 39.1 % (ref 37–47)
HEMOGLOBIN: 12.2 GM/DL (ref 12.5–16)
IMMATURE NEUTROPHIL %: 0.7 % (ref 0–0.43)
LYMPHOCYTES ABSOLUTE: 2.1 K/CU MM
LYMPHOCYTES RELATIVE PERCENT: 29.1 % (ref 24–44)
MCH RBC QN AUTO: 28.6 PG (ref 27–31)
MCHC RBC AUTO-ENTMCNC: 31.2 % (ref 32–36)
MCV RBC AUTO: 91.8 FL (ref 78–100)
MONOCYTES ABSOLUTE: 0.4 K/CU MM
MONOCYTES RELATIVE PERCENT: 5.1 % (ref 0–4)
PDW BLD-RTO: 15.1 % (ref 11.7–14.9)
PH VENOUS: 7.4 (ref 7.32–7.42)
PLATELET # BLD: 214 K/CU MM (ref 140–440)
PMV BLD AUTO: 10.6 FL (ref 7.5–11.1)
POTASSIUM SERPL-SCNC: 4.3 MMOL/L (ref 3.5–5.1)
RBC # BLD: 4.26 M/CU MM (ref 4.2–5.4)
SEGMENTED NEUTROPHILS ABSOLUTE COUNT: 4.4 K/CU MM
SEGMENTED NEUTROPHILS RELATIVE PERCENT: 61.9 % (ref 36–66)
SODIUM BLD-SCNC: 130 MMOL/L (ref 135–145)
TOTAL IMMATURE NEUTOROPHIL: 0.05 K/CU MM
TOTAL PROTEIN: 7.4 GM/DL (ref 6.4–8.2)
TROPONIN T: <0.01 NG/ML
WBC # BLD: 7.1 K/CU MM (ref 4–10.5)

## 2019-12-10 PROCEDURE — 82800 BLOOD PH: CPT

## 2019-12-10 PROCEDURE — 84484 ASSAY OF TROPONIN QUANT: CPT

## 2019-12-10 PROCEDURE — 71045 X-RAY EXAM CHEST 1 VIEW: CPT

## 2019-12-10 PROCEDURE — 2580000003 HC RX 258: Performed by: EMERGENCY MEDICINE

## 2019-12-10 PROCEDURE — 93005 ELECTROCARDIOGRAM TRACING: CPT | Performed by: EMERGENCY MEDICINE

## 2019-12-10 PROCEDURE — 82010 KETONE BODYS QUAN: CPT

## 2019-12-10 PROCEDURE — 85025 COMPLETE CBC W/AUTO DIFF WBC: CPT

## 2019-12-10 PROCEDURE — 80053 COMPREHEN METABOLIC PANEL: CPT

## 2019-12-10 PROCEDURE — 99285 EMERGENCY DEPT VISIT HI MDM: CPT

## 2019-12-10 RX ORDER — 0.9 % SODIUM CHLORIDE 0.9 %
1000 INTRAVENOUS SOLUTION INTRAVENOUS ONCE
Status: COMPLETED | OUTPATIENT
Start: 2019-12-10 | End: 2019-12-11

## 2019-12-10 RX ADMIN — SODIUM CHLORIDE 1000 ML: 9 INJECTION, SOLUTION INTRAVENOUS at 23:37

## 2019-12-11 VITALS
HEART RATE: 76 BPM | TEMPERATURE: 99 F | WEIGHT: 205 LBS | SYSTOLIC BLOOD PRESSURE: 162 MMHG | OXYGEN SATURATION: 98 % | HEIGHT: 63 IN | DIASTOLIC BLOOD PRESSURE: 68 MMHG | RESPIRATION RATE: 16 BRPM | BODY MASS INDEX: 36.32 KG/M2

## 2019-12-11 LAB
BACTERIA: ABNORMAL /HPF
BILIRUBIN URINE: NEGATIVE MG/DL
BLOOD, URINE: ABNORMAL
CAST TYPE: ABNORMAL /HPF
CLARITY: CLEAR
COLOR: YELLOW
CRYSTAL TYPE: ABNORMAL /HPF
EPITHELIAL CELLS, UA: ABNORMAL /HPF
GLUCOSE BLD-MCNC: 260 MG/DL (ref 70–99)
GLUCOSE, URINE: >1000 MG/DL
KETONES, URINE: NEGATIVE MG/DL
LEUKOCYTE ESTERASE, URINE: NEGATIVE
MUCUS: NEGATIVE HPF
NITRITE URINE, QUANTITATIVE: NEGATIVE
PH, URINE: 6 (ref 5–8)
PROTEIN UA: 100 MG/DL
RAPID INFLUENZA  B AGN: NEGATIVE
RAPID INFLUENZA A AGN: NEGATIVE
RBC URINE: ABNORMAL /HPF (ref 0–6)
SPECIFIC GRAVITY UA: 1.02 (ref 1–1.03)
UROBILINOGEN, URINE: 0.2 MG/DL (ref 0.2–1)
VOLUME, (UVOL): 12 ML (ref 10–12)
WBC UA: ABNORMAL /HPF (ref 0–5)

## 2019-12-11 PROCEDURE — 93010 ELECTROCARDIOGRAM REPORT: CPT | Performed by: INTERNAL MEDICINE

## 2019-12-11 PROCEDURE — 96374 THER/PROPH/DIAG INJ IV PUSH: CPT

## 2019-12-11 PROCEDURE — 82962 GLUCOSE BLOOD TEST: CPT

## 2019-12-11 PROCEDURE — 81001 URINALYSIS AUTO W/SCOPE: CPT

## 2019-12-11 PROCEDURE — 6370000000 HC RX 637 (ALT 250 FOR IP): Performed by: EMERGENCY MEDICINE

## 2019-12-11 PROCEDURE — 87804 INFLUENZA ASSAY W/OPTIC: CPT

## 2019-12-11 RX ADMIN — INSULIN HUMAN 10 UNITS: 100 INJECTION, SOLUTION PARENTERAL at 00:27

## 2019-12-11 ASSESSMENT — ENCOUNTER SYMPTOMS
BACK PAIN: 0
SHORTNESS OF BREATH: 0
COUGH: 0
SORE THROAT: 0
ABDOMINAL PAIN: 0
COLOR CHANGE: 0

## 2019-12-12 LAB
EKG ATRIAL RATE: 87 BPM
EKG DIAGNOSIS: NORMAL
EKG P AXIS: 26 DEGREES
EKG P-R INTERVAL: 200 MS
EKG Q-T INTERVAL: 372 MS
EKG QRS DURATION: 82 MS
EKG QTC CALCULATION (BAZETT): 447 MS
EKG R AXIS: 0 DEGREES
EKG T AXIS: 103 DEGREES
EKG VENTRICULAR RATE: 87 BPM

## 2019-12-28 ENCOUNTER — HOSPITAL ENCOUNTER (OUTPATIENT)
Dept: CT IMAGING | Age: 74
Discharge: HOME | End: 2019-12-28
Payer: MEDICARE

## 2019-12-28 DIAGNOSIS — S06.0X0A BRAIN CONCUSSION, WITHOUT LOSS OF CONSCIOUSNESS, INITIAL ENCOUNTER: ICD-10-CM

## 2019-12-28 PROCEDURE — 70450 CT HEAD/BRAIN W/O DYE: CPT

## 2020-01-04 PROBLEM — N39.0 UTI (URINARY TRACT INFECTION): Status: RESOLVED | Noted: 2019-12-04 | Resolved: 2020-01-04

## 2020-01-17 ENCOUNTER — HOSPITAL ENCOUNTER (EMERGENCY)
Age: 75
Discharge: HOME | End: 2020-01-17
Attending: EMERGENCY MEDICINE
Payer: MEDICARE

## 2020-01-17 VITALS
HEART RATE: 89 BPM | RESPIRATION RATE: 18 BRPM | HEIGHT: 63 IN | WEIGHT: 197 LBS | OXYGEN SATURATION: 97 % | TEMPERATURE: 96.8 F | SYSTOLIC BLOOD PRESSURE: 169 MMHG | DIASTOLIC BLOOD PRESSURE: 69 MMHG | BODY MASS INDEX: 34.91 KG/M2

## 2020-01-17 LAB
ALBUMIN SERPL-MCNC: 3.9 GM/DL (ref 3.4–5)
ALP BLD-CCNC: 110 IU/L (ref 40–129)
ALT SERPL-CCNC: 33 U/L (ref 10–40)
ANION GAP SERPL CALCULATED.3IONS-SCNC: 17 MMOL/L (ref 4–16)
AST SERPL-CCNC: 41 IU/L (ref 15–37)
BASE EXCESS MIXED: 0.4 (ref 0–2.3)
BASE EXCESS: ABNORMAL (ref 0–2.4)
BASOPHILS ABSOLUTE: 0 K/CU MM
BASOPHILS RELATIVE PERCENT: 0.4 % (ref 0–1)
BETA-HYDROXYBUTYRATE: 3.3 MG/DL (ref 0–3)
BILIRUB SERPL-MCNC: 0.3 MG/DL (ref 0–1)
BUN BLDV-MCNC: 18 MG/DL (ref 6–23)
CALCIUM SERPL-MCNC: 9.5 MG/DL (ref 8.3–10.6)
CHLORIDE BLD-SCNC: 88 MMOL/L (ref 99–110)
CHP ED QC CHECK: YES
CO2 CONTENT: 28.1 MMOL/L (ref 19–24)
CO2: 25 MMOL/L (ref 21–32)
CREAT SERPL-MCNC: 0.9 MG/DL (ref 0.6–1.1)
DIFFERENTIAL TYPE: ABNORMAL
EOSINOPHILS ABSOLUTE: 0.1 K/CU MM
EOSINOPHILS RELATIVE PERCENT: 1.5 % (ref 0–3)
GFR AFRICAN AMERICAN: >60 ML/MIN/1.73M2
GFR NON-AFRICAN AMERICAN: >60 ML/MIN/1.73M2
GLUCOSE BLD-MCNC: 311 MG/DL
GLUCOSE BLD-MCNC: 311 MG/DL (ref 70–99)
GLUCOSE BLD-MCNC: ABNORMAL MG/DL (ref 70–99)
HCO3 VENOUS: 26.7 MMOL/L (ref 19–25)
HCT VFR BLD CALC: 40.3 % (ref 37–47)
HEMOGLOBIN: 12.8 GM/DL (ref 12.5–16)
IMMATURE NEUTROPHIL %: 0.4 % (ref 0–0.43)
LYMPHOCYTES ABSOLUTE: 2.2 K/CU MM
LYMPHOCYTES RELATIVE PERCENT: 28 % (ref 24–44)
MAGNESIUM: 1.7 MG/DL (ref 1.8–2.4)
MCH RBC QN AUTO: 28.9 PG (ref 27–31)
MCHC RBC AUTO-ENTMCNC: 31.8 % (ref 32–36)
MCV RBC AUTO: 91 FL (ref 78–100)
MONOCYTES ABSOLUTE: 0.3 K/CU MM
MONOCYTES RELATIVE PERCENT: 4.3 % (ref 0–4)
O2 SAT, VEN: 48.2 % (ref 50–70)
PCO2, VEN: 48 MMHG (ref 38–52)
PDW BLD-RTO: 13.9 % (ref 11.7–14.9)
PH VENOUS: 7.35 (ref 7.32–7.42)
PH VENOUS: 7.35 (ref 7.32–7.42)
PLATELET # BLD: 258 K/CU MM (ref 140–440)
PMV BLD AUTO: 10.4 FL (ref 7.5–11.1)
PO2, VEN: 27.7 MMHG (ref 28–48)
POTASSIUM SERPL-SCNC: 4.8 MMOL/L (ref 3.5–5.1)
RBC # BLD: 4.43 M/CU MM (ref 4.2–5.4)
SEGMENTED NEUTROPHILS ABSOLUTE COUNT: 5.1 K/CU MM
SEGMENTED NEUTROPHILS RELATIVE PERCENT: 65.4 % (ref 36–66)
SODIUM BLD-SCNC: 130 MMOL/L (ref 135–145)
SOURCE, BLOOD GAS: ABNORMAL
TOTAL IMMATURE NEUTOROPHIL: 0.03 K/CU MM
TOTAL PROTEIN: 7.2 GM/DL (ref 6.4–8.2)
WBC # BLD: 7.8 K/CU MM (ref 4–10.5)

## 2020-01-17 PROCEDURE — 80053 COMPREHEN METABOLIC PANEL: CPT

## 2020-01-17 PROCEDURE — 83735 ASSAY OF MAGNESIUM: CPT

## 2020-01-17 PROCEDURE — 85025 COMPLETE CBC W/AUTO DIFF WBC: CPT

## 2020-01-17 PROCEDURE — 2580000003 HC RX 258: Performed by: EMERGENCY MEDICINE

## 2020-01-17 PROCEDURE — 82800 BLOOD PH: CPT

## 2020-01-17 PROCEDURE — 82010 KETONE BODYS QUAN: CPT

## 2020-01-17 PROCEDURE — 82962 GLUCOSE BLOOD TEST: CPT

## 2020-01-17 PROCEDURE — 99284 EMERGENCY DEPT VISIT MOD MDM: CPT

## 2020-01-17 RX ORDER — 0.9 % SODIUM CHLORIDE 0.9 %
1000 INTRAVENOUS SOLUTION INTRAVENOUS ONCE
Status: COMPLETED | OUTPATIENT
Start: 2020-01-17 | End: 2020-01-17

## 2020-01-17 RX ORDER — 0.9 % SODIUM CHLORIDE 0.9 %
1000 INTRAVENOUS SOLUTION INTRAVENOUS ONCE
Status: DISCONTINUED | OUTPATIENT
Start: 2020-01-17 | End: 2020-01-17

## 2020-01-17 RX ADMIN — SODIUM CHLORIDE 1000 ML: 9 INJECTION, SOLUTION INTRAVENOUS at 19:22

## 2020-01-17 NOTE — ED PROVIDER NOTES
Triage Chief Complaint:   Hyperglycemia (States has had an elevated blood sugar. States her meter read high which means it is over 600. States checked it around 1530 and 1730 with the same reading. States has not spoken to her physician. Denies nausea or vomiting. States is feeling weak. )    [place]:  Michael Crystal is a 69 y.o. female that presents with hyperglycemia. The patient states that over the course of today she has felt generally weak and somewhat faint. Denies any nausea, vomiting, diarrhea, abdominal pain, chest pain, shortness of breath, fever, cough, urinary symptoms. States that she has been struggling for years controlling her blood sugars. She is on insulin U500 and takes 120 units in the morning and the afternoon and then 70 units in the evening. She denies any recent changes to insulin regimen and she has been compliant with this. States that starting the afternoon, her blood glucose readings were unreadable. ROS:  At least 14 systems reviewed and otherwise acutely negative except as in the [address] Past Medical History:   Diagnosis Date    Arthritis     Cellulitis of right foot     Chronic kidney disease     Depression     Diabetes mellitus ([address]     Frequent falls     GERD (gastroesophageal reflux disease)     Hallux valgus (acquired), right foot 5/1/2017    Hypertension     Other disorders of kidney and ureter     1 kidney.  Thyroid disease     hypothyroid    Type 2 diabetes mellitus with foot ulcer, with long-term current use of insulin ([address] 1/18/2012    Ulcer of right foot with fat layer exposed ([address] 2/6/2013     Past Surgical History:   Procedure Laterality Date    ABDOMEN SURGERY      CYST REMOVAL      from kidney.     DILATATION, ESOPHAGUS      ENDOSCOPY, COLON, DIAGNOSTIC      FOOT SURGERY  12/30/11    had infection  and a biopsy was sent away for analysis    HERNIA REPAIR      HYSTERECTOMY      KIDNEY REMOVAL  1973     Family History   Problem Relation

## 2020-01-18 NOTE — ED NOTES
Pt discharged with instructions and to follow up with PCP. Pt stated understanding and wheeled out to wait car where  is waiting.       Lnyne Kennedy RN  01/17/20 4273

## 2020-04-06 ENCOUNTER — HOSPITAL ENCOUNTER (EMERGENCY)
Age: 75
Discharge: HOME | End: 2020-04-06
Attending: EMERGENCY MEDICINE
Payer: MEDICARE

## 2020-04-06 VITALS
HEART RATE: 86 BPM | WEIGHT: 195 LBS | SYSTOLIC BLOOD PRESSURE: 138 MMHG | OXYGEN SATURATION: 97 % | TEMPERATURE: 98.6 F | BODY MASS INDEX: 34.55 KG/M2 | DIASTOLIC BLOOD PRESSURE: 82 MMHG | RESPIRATION RATE: 18 BRPM | HEIGHT: 63 IN

## 2020-04-06 LAB
ALBUMIN SERPL-MCNC: 3.9 GM/DL (ref 3.4–5)
ALP BLD-CCNC: 103 IU/L (ref 40–129)
ALT SERPL-CCNC: 48 U/L (ref 10–40)
ANION GAP SERPL CALCULATED.3IONS-SCNC: 12 MMOL/L (ref 4–16)
AST SERPL-CCNC: 59 IU/L (ref 15–37)
BASOPHILS ABSOLUTE: 0.1 K/CU MM
BASOPHILS RELATIVE PERCENT: 0.5 % (ref 0–1)
BILIRUB SERPL-MCNC: 0.3 MG/DL (ref 0–1)
BUN BLDV-MCNC: 19 MG/DL (ref 6–23)
CALCIUM SERPL-MCNC: 9.6 MG/DL (ref 8.3–10.6)
CHLORIDE BLD-SCNC: 95 MMOL/L (ref 99–110)
CHP ED QC CHECK: YES
CO2: 30 MMOL/L (ref 21–32)
CREAT SERPL-MCNC: 0.8 MG/DL (ref 0.6–1.1)
DIFFERENTIAL TYPE: ABNORMAL
EKG ATRIAL RATE: 77 BPM
EKG DIAGNOSIS: NORMAL
EKG P AXIS: -10 DEGREES
EKG P-R INTERVAL: 158 MS
EKG Q-T INTERVAL: 410 MS
EKG QRS DURATION: 82 MS
EKG QTC CALCULATION (BAZETT): 463 MS
EKG R AXIS: 1 DEGREES
EKG T AXIS: 122 DEGREES
EKG VENTRICULAR RATE: 77 BPM
EOSINOPHILS ABSOLUTE: 0.2 K/CU MM
EOSINOPHILS RELATIVE PERCENT: 2.2 % (ref 0–3)
GFR AFRICAN AMERICAN: >60 ML/MIN/1.73M2
GFR NON-AFRICAN AMERICAN: >60 ML/MIN/1.73M2
GLUCOSE BLD-MCNC: 214 MG/DL
GLUCOSE BLD-MCNC: 214 MG/DL (ref 70–99)
GLUCOSE BLD-MCNC: 224 MG/DL (ref 70–99)
HCT VFR BLD CALC: 40.8 % (ref 37–47)
HEMOGLOBIN: 12.7 GM/DL (ref 12.5–16)
IMMATURE NEUTROPHIL %: 0.9 % (ref 0–0.43)
LYMPHOCYTES ABSOLUTE: 2.9 K/CU MM
LYMPHOCYTES RELATIVE PERCENT: 28.4 % (ref 24–44)
MCH RBC QN AUTO: 28.2 PG (ref 27–31)
MCHC RBC AUTO-ENTMCNC: 31.1 % (ref 32–36)
MCV RBC AUTO: 90.5 FL (ref 78–100)
MONOCYTES ABSOLUTE: 0.5 K/CU MM
MONOCYTES RELATIVE PERCENT: 5 % (ref 0–4)
PDW BLD-RTO: 13.9 % (ref 11.7–14.9)
PLATELET # BLD: 269 K/CU MM (ref 140–440)
PMV BLD AUTO: 10.5 FL (ref 7.5–11.1)
POTASSIUM SERPL-SCNC: 4.6 MMOL/L (ref 3.5–5.1)
RBC # BLD: 4.51 M/CU MM (ref 4.2–5.4)
SEGMENTED NEUTROPHILS ABSOLUTE COUNT: 6.4 K/CU MM
SEGMENTED NEUTROPHILS RELATIVE PERCENT: 63 % (ref 36–66)
SODIUM BLD-SCNC: 137 MMOL/L (ref 135–145)
TOTAL IMMATURE NEUTOROPHIL: 0.09 K/CU MM
TOTAL PROTEIN: 7.1 GM/DL (ref 6.4–8.2)
TROPONIN T: <0.01 NG/ML
WBC # BLD: 10.2 K/CU MM (ref 4–10.5)

## 2020-04-06 PROCEDURE — 84484 ASSAY OF TROPONIN QUANT: CPT

## 2020-04-06 PROCEDURE — 82962 GLUCOSE BLOOD TEST: CPT

## 2020-04-06 PROCEDURE — 93005 ELECTROCARDIOGRAM TRACING: CPT | Performed by: EMERGENCY MEDICINE

## 2020-04-06 PROCEDURE — 99285 EMERGENCY DEPT VISIT HI MDM: CPT

## 2020-04-06 PROCEDURE — 85025 COMPLETE CBC W/AUTO DIFF WBC: CPT

## 2020-04-06 PROCEDURE — 93010 ELECTROCARDIOGRAM REPORT: CPT | Performed by: INTERNAL MEDICINE

## 2020-04-06 PROCEDURE — 6370000000 HC RX 637 (ALT 250 FOR IP): Performed by: EMERGENCY MEDICINE

## 2020-04-06 PROCEDURE — 80053 COMPREHEN METABOLIC PANEL: CPT

## 2020-04-06 RX ORDER — ONDANSETRON 4 MG/1
4 TABLET, ORALLY DISINTEGRATING ORAL EVERY 8 HOURS PRN
Qty: 15 TABLET | Refills: 0 | Status: SHIPPED | OUTPATIENT
Start: 2020-04-06 | End: 2020-09-17

## 2020-04-06 RX ORDER — ONDANSETRON 4 MG/1
4 TABLET, ORALLY DISINTEGRATING ORAL ONCE
Status: COMPLETED | OUTPATIENT
Start: 2020-04-06 | End: 2020-04-06

## 2020-04-06 RX ADMIN — ONDANSETRON 4 MG: 4 TABLET, ORALLY DISINTEGRATING ORAL at 01:36

## 2020-04-06 NOTE — ED PROVIDER NOTES
tablet Take 10 mg by mouth daily      vitamin D (CHOLECALCIFEROL) 1000 UNIT TABS tablet Take 1,000 Units by mouth daily      Multiple Vitamins-Minerals (TRUEPLUS DIABETIC MULTIVITAMIN) TABS Take 1 tablet by mouth daily.  atenolol (TENORMIN) 50 MG tablet Take 50 mg by mouth 2 times daily.  levothyroxine (SYNTHROID) 300 MCG tablet Take 300 mcg by mouth Daily       aspirin 81 MG EC tablet Take 81 mg by mouth daily. Allergies   Allergen Reactions    Ritalin [Methylphenidate] Rash    Rocephin [Ceftriaxone Sodium-Lidocaine] Rash       Nursing Notes Reviewed    Physical Exam:  ED Triage Vitals [04/06/20 0058]   Enc Vitals Group      BP (!) 146/70      Pulse 78      Resp 18      Temp 98.6 °F (37 °C)      Temp Source Oral      SpO2 96 %      Weight 195 lb (88.5 kg)      Height 5' 3\" (1.6 m)      Head Circumference       Peak Flow       Pain Score       Pain Loc       Pain Edu? Excl. in [address]? GENERAL APPEARANCE: Awake and alert. Cooperative. No acute distress. HEAD: Normocephalic. Atraumatic. EYES: EOM's grossly intact. Sclera anicteric. ENT: Mucous membranes are moist. Tolerates saliva. No trismus. NECK: No meningismus. HEART:  Extremities pink  LUNGS: Respirations unlabored. Even chest rise bilaterally  ABDOMEN: Non distended. Non pulsatile  EXTREMITIES: No acute deformities. SKIN: Dry  NEUROLOGICAL: No gross facial drooping. Moves all 4 extremities spontaneously. PSYCHIATRIC: Normal mood.     I have reviewed and interpreted all of the currently available lab results from this visit (if applicable):  Results for orders placed or performed during the hospital encounter of 04/06/20   CBC Auto Differential   Result Value Ref Range    WBC 10.2 4.0 - 10.5 K/CU MM    RBC 4.51 4.2 - 5.4 M/CU MM    Hemoglobin 12.7 12.5 - 16.0 GM/DL    Hematocrit 40.8 37 - 47 %    MCV 90.5 78 - 100 FL    MCH 28.2 27 - 31 PG    MCHC 31.1 (L) 32.0 - 36.0 %    RDW 13.9 11.7 - 14.9 %    Platelets 180 153 - 695 K/CU

## 2020-04-09 ENCOUNTER — HOSPITAL ENCOUNTER (EMERGENCY)
Age: 75
Discharge: HOME | End: 2020-04-09
Attending: EMERGENCY MEDICINE
Payer: MEDICARE

## 2020-04-09 ENCOUNTER — APPOINTMENT (OUTPATIENT)
Dept: CT IMAGING | Age: 75
End: 2020-04-09
Payer: MEDICARE

## 2020-04-09 VITALS
SYSTOLIC BLOOD PRESSURE: 174 MMHG | OXYGEN SATURATION: 96 % | HEART RATE: 73 BPM | RESPIRATION RATE: 13 BRPM | HEIGHT: 63 IN | TEMPERATURE: 98.2 F | WEIGHT: 195 LBS | DIASTOLIC BLOOD PRESSURE: 69 MMHG | BODY MASS INDEX: 34.55 KG/M2

## 2020-04-09 LAB
ALBUMIN SERPL-MCNC: 4 GM/DL (ref 3.4–5)
ALP BLD-CCNC: 127 IU/L (ref 40–129)
ALT SERPL-CCNC: 33 U/L (ref 10–40)
ANION GAP SERPL CALCULATED.3IONS-SCNC: 13 MMOL/L (ref 4–16)
AST SERPL-CCNC: 35 IU/L (ref 15–37)
BASOPHILS ABSOLUTE: 0.1 K/CU MM
BASOPHILS RELATIVE PERCENT: 0.6 % (ref 0–1)
BILIRUB SERPL-MCNC: 0.3 MG/DL (ref 0–1)
BUN BLDV-MCNC: 22 MG/DL (ref 6–23)
CALCIUM SERPL-MCNC: 10.1 MG/DL (ref 8.3–10.6)
CHLORIDE BLD-SCNC: 87 MMOL/L (ref 99–110)
CO2: 30 MMOL/L (ref 21–32)
CREAT SERPL-MCNC: 0.8 MG/DL (ref 0.6–1.1)
DIFFERENTIAL TYPE: ABNORMAL
EOSINOPHILS ABSOLUTE: 0.2 K/CU MM
EOSINOPHILS RELATIVE PERCENT: 1.8 % (ref 0–3)
GFR AFRICAN AMERICAN: >60 ML/MIN/1.73M2
GFR NON-AFRICAN AMERICAN: >60 ML/MIN/1.73M2
GLUCOSE BLD-MCNC: 373 MG/DL (ref 70–99)
GLUCOSE BLD-MCNC: 524 MG/DL (ref 70–99)
GLUCOSE BLD-MCNC: 543 MG/DL (ref 70–99)
HCT VFR BLD CALC: 42.6 % (ref 37–47)
HEMOGLOBIN: 13.6 GM/DL (ref 12.5–16)
IMMATURE NEUTROPHIL %: 0.9 % (ref 0–0.43)
LIPASE: 82 IU/L (ref 13–60)
LYMPHOCYTES ABSOLUTE: 2.8 K/CU MM
LYMPHOCYTES RELATIVE PERCENT: 30.5 % (ref 24–44)
MCH RBC QN AUTO: 28.5 PG (ref 27–31)
MCHC RBC AUTO-ENTMCNC: 31.9 % (ref 32–36)
MCV RBC AUTO: 89.1 FL (ref 78–100)
MONOCYTES ABSOLUTE: 0.5 K/CU MM
MONOCYTES RELATIVE PERCENT: 4.9 % (ref 0–4)
PDW BLD-RTO: 14.1 % (ref 11.7–14.9)
PLATELET # BLD: 258 K/CU MM (ref 140–440)
PMV BLD AUTO: 10.6 FL (ref 7.5–11.1)
POTASSIUM SERPL-SCNC: 4.7 MMOL/L (ref 3.5–5.1)
RBC # BLD: 4.78 M/CU MM (ref 4.2–5.4)
SEGMENTED NEUTROPHILS ABSOLUTE COUNT: 5.7 K/CU MM
SEGMENTED NEUTROPHILS RELATIVE PERCENT: 61.3 % (ref 36–66)
SODIUM BLD-SCNC: 130 MMOL/L (ref 135–145)
TOTAL IMMATURE NEUTOROPHIL: 0.08 K/CU MM
TOTAL PROTEIN: 7.7 GM/DL (ref 6.4–8.2)
WBC # BLD: 9.2 K/CU MM (ref 4–10.5)

## 2020-04-09 PROCEDURE — 80053 COMPREHEN METABOLIC PANEL: CPT

## 2020-04-09 PROCEDURE — 83690 ASSAY OF LIPASE: CPT

## 2020-04-09 PROCEDURE — 70450 CT HEAD/BRAIN W/O DYE: CPT

## 2020-04-09 PROCEDURE — 2580000003 HC RX 258: Performed by: EMERGENCY MEDICINE

## 2020-04-09 PROCEDURE — 85025 COMPLETE CBC W/AUTO DIFF WBC: CPT

## 2020-04-09 PROCEDURE — 6370000000 HC RX 637 (ALT 250 FOR IP): Performed by: EMERGENCY MEDICINE

## 2020-04-09 PROCEDURE — 99285 EMERGENCY DEPT VISIT HI MDM: CPT

## 2020-04-09 PROCEDURE — 82962 GLUCOSE BLOOD TEST: CPT

## 2020-04-09 PROCEDURE — 96372 THER/PROPH/DIAG INJ SC/IM: CPT

## 2020-04-09 RX ORDER — 0.9 % SODIUM CHLORIDE 0.9 %
1000 INTRAVENOUS SOLUTION INTRAVENOUS ONCE
Status: COMPLETED | OUTPATIENT
Start: 2020-04-09 | End: 2020-04-09

## 2020-04-09 RX ADMIN — INSULIN HUMAN 20 UNITS: 100 INJECTION, SOLUTION PARENTERAL at 21:24

## 2020-04-09 RX ADMIN — SODIUM CHLORIDE 1000 ML: 9 INJECTION, SOLUTION INTRAVENOUS at 20:50

## 2020-04-10 ASSESSMENT — ENCOUNTER SYMPTOMS
RESPIRATORY NEGATIVE: 1
NAUSEA: 1
VOMITING: 0
EYES NEGATIVE: 1
SHORTNESS OF BREATH: 0
BLURRED VISION: 0

## 2020-04-10 NOTE — ED PROVIDER NOTES
insecurity     Worry: Not on file     Inability: Not on file    Transportation needs     Medical: Not on file     Non-medical: Not on file   Tobacco Use    Smoking status: Never Smoker    Smokeless tobacco: Never Used   Substance and Sexual Activity    Alcohol use: No    Drug use: No    Sexual activity: Not on file   Lifestyle    Physical activity     Days per week: Not on file     Minutes per session: Not on file    Stress: Not on file   Relationships    Social connections     Talks on phone: Not on file     Gets together: Not on file     Attends L[employer] day service: Not on file     Active member of club or organization: Not on file     Attends meetings of clubs or organizations: Not on file     Relationship status: Not on file    Intimate partner violence     Fear of current or ex partner: Not on file     Emotionally abused: Not on file     Physically abused: Not on file     Forced sexual activity: Not on file   Other Topics Concern    Not on file   Social History Narrative    Not on file     Past Surgical History:   Procedure Laterality Date    ABDOMEN SURGERY      CYST REMOVAL      from kidney.  DILATATION, ESOPHAGUS      ENDOSCOPY, COLON, DIAGNOSTIC      FOOT SURGERY  12/30/11    had infection  and a biopsy was sent away for analysis    HERNIA REPAIR      HYSTERECTOMY      KIDNEY REMOVAL  1973     Past Medical History:   Diagnosis Date    Arthritis     Cellulitis of right foot     Chronic kidney disease     Depression     Diabetes mellitus ([address]     Frequent falls     GERD (gastroesophageal reflux disease)     Hallux valgus (acquired), right foot 5/1/2017    Hypertension     Other disorders of kidney and ureter     1 kidney.     Thyroid disease     hypothyroid    Type 2 diabetes mellitus with foot ulcer, with long-term current use of insulin ([address] 1/18/2012    Ulcer of right foot with fat layer exposed ([address] 2/6/2013     Allergies   Allergen Reactions    Ritalin [Methylphenidate] Rash    Rocephin [Ceftriaxone Sodium-Lidocaine] Rash     Prior to Admission medications    Medication Sig Start Date End Date Taking? Authorizing Provider   ondansetron (ZOFRAN ODT) 4 MG disintegrating tablet Take 1 tablet by mouth every 8 hours as needed for Nausea 4/6/20   Marianna Clemons MD   glimepiride (AMARYL) 4 MG tablet Take 1 tablet by mouth daily (with breakfast) 12/6/19   Selam Flanagan MD   DULoxetine (CYMBALTA) 60 MG extended release capsule Take 60 mg by mouth daily  7/24/19   Historical Provider, MD   hydrALAZINE (APRESOLINE) 10 MG tablet Take 10 mg by mouth 2 times daily  7/31/19   Historical Provider, MD   pantoprazole (PROTONIX) 40 MG tablet Take 40 mg by mouth daily  4/4/19   Historical Provider, MD   atorvastatin (LIPITOR) 20 MG tablet Take 20 mg by mouth daily    Historical Provider, MD   amLODIPine (NORVASC) 10 MG tablet Take 10 mg by mouth daily    Historical Provider, MD   vitamin D (CHOLECALCIFEROL) 1000 UNIT TABS tablet Take 1,000 Units by mouth daily    Historical Provider, MD   Multiple Vitamins-Minerals (TRUEPLUS DIABETIC MULTIVITAMIN) TABS Take 1 tablet by mouth daily. Historical Provider, MD   atenolol (TENORMIN) 50 MG tablet Take 50 mg by mouth 2 times daily. Historical Provider, MD   levothyroxine (SYNTHROID) 300 MCG tablet Take 300 mcg by mouth Daily     Historical Provider, MD   aspirin 81 MG EC tablet Take 81 mg by mouth daily. Historical Provider, MD       BP (!) 174/69   Pulse 73   Temp 98.2 °F (36.8 °C) (Oral)   Resp 13   Ht 5' 3\" (1.6 m)   Wt 195 lb (88.5 kg)   SpO2 96%   BMI 34.54 kg/m²     Physical Exam  Constitutional:       Appearance: She is well-developed. HENT:      Head: Normocephalic and atraumatic. Right Ear: External ear normal.      Left Ear: External ear normal.      Nose: Nose normal.   Eyes:      Conjunctiva/sclera: Conjunctivae normal.      Pupils: Pupils are equal, round, and reactive to light.    Neck:

## 2020-04-10 NOTE — ED NOTES
Spoke with Patients , regarding patients mentality, confusion, and inability to voice what she is thinking. Lokesh Sernanohemy, her  stated that he noticed this a couple days ago and become worse this evening, so they decided to have her be evaluated.       Hailey Krishna RN  04/09/20 2026

## 2020-04-10 NOTE — ED NOTES
Pt placed on cardiac monitor and continuous pulse oximetry and blood pressure monitoring     Clementine Scott RN  04/09/20 2038

## 2020-07-08 NOTE — ED TRIAGE NOTES
Arrived ambulatory to room  for triage. Tolerated without difficulty. Bed in lowest position. Call light given.  Gowned for exam. Name band;

## 2020-07-11 ENCOUNTER — APPOINTMENT (OUTPATIENT)
Dept: CT IMAGING | Age: 75
End: 2020-07-11
Payer: MEDICARE

## 2020-07-11 ENCOUNTER — HOSPITAL ENCOUNTER (EMERGENCY)
Age: 75
Discharge: HOME | End: 2020-07-11
Attending: EMERGENCY MEDICINE
Payer: MEDICARE

## 2020-07-11 VITALS
WEIGHT: 195 LBS | SYSTOLIC BLOOD PRESSURE: 154 MMHG | BODY MASS INDEX: 31.34 KG/M2 | DIASTOLIC BLOOD PRESSURE: 54 MMHG | HEART RATE: 80 BPM | TEMPERATURE: 98.4 F | RESPIRATION RATE: 16 BRPM | OXYGEN SATURATION: 98 % | HEIGHT: 66 IN

## 2020-07-11 LAB
ANION GAP SERPL CALCULATED.3IONS-SCNC: 17 MMOL/L (ref 4–16)
BACTERIA: ABNORMAL /HPF
BASOPHILS ABSOLUTE: 0.1 K/CU MM
BASOPHILS RELATIVE PERCENT: 0.5 % (ref 0–1)
BILIRUBIN URINE: NEGATIVE MG/DL
BLOOD, URINE: ABNORMAL
BUN BLDV-MCNC: 20 MG/DL (ref 6–23)
CALCIUM SERPL-MCNC: 10.2 MG/DL (ref 8.3–10.6)
CAST TYPE: ABNORMAL /HPF
CHLORIDE BLD-SCNC: 99 MMOL/L (ref 99–110)
CLARITY: ABNORMAL
CO2: 23 MMOL/L (ref 21–32)
COLOR: YELLOW
CREAT SERPL-MCNC: 0.8 MG/DL (ref 0.6–1.1)
CRYSTAL TYPE: ABNORMAL /HPF
DIFFERENTIAL TYPE: ABNORMAL
EOSINOPHILS ABSOLUTE: 0.3 K/CU MM
EOSINOPHILS RELATIVE PERCENT: 2.8 % (ref 0–3)
EPITHELIAL CELLS, UA: ABNORMAL /HPF
GFR AFRICAN AMERICAN: >60 ML/MIN/1.73M2
GFR NON-AFRICAN AMERICAN: >60 ML/MIN/1.73M2
GLUCOSE BLD-MCNC: 85 MG/DL (ref 70–99)
GLUCOSE BLD-MCNC: 86 MG/DL (ref 70–99)
GLUCOSE, URINE: 500 MG/DL
HCT VFR BLD CALC: 45.4 % (ref 37–47)
HEMOGLOBIN: 14.7 GM/DL (ref 12.5–16)
IMMATURE NEUTROPHIL %: 0.5 % (ref 0–0.43)
KETONES, URINE: NEGATIVE MG/DL
LEUKOCYTE ESTERASE, URINE: ABNORMAL
LYMPHOCYTES ABSOLUTE: 4.3 K/CU MM
LYMPHOCYTES RELATIVE PERCENT: 39.1 % (ref 24–44)
MCH RBC QN AUTO: 29 PG (ref 27–31)
MCHC RBC AUTO-ENTMCNC: 32.4 % (ref 32–36)
MCV RBC AUTO: 89.5 FL (ref 78–100)
MONOCYTES ABSOLUTE: 0.7 K/CU MM
MONOCYTES RELATIVE PERCENT: 5.9 % (ref 0–4)
MUCUS: ABNORMAL HPF
NITRITE URINE, QUANTITATIVE: POSITIVE
PDW BLD-RTO: 13.6 % (ref 11.7–14.9)
PH, URINE: 5.5 (ref 5–8)
PLATELET # BLD: 258 K/CU MM (ref 140–440)
PMV BLD AUTO: 10.3 FL (ref 7.5–11.1)
POTASSIUM SERPL-SCNC: 3.8 MMOL/L (ref 3.5–5.1)
PROTEIN UA: >300 MG/DL
RBC # BLD: 5.07 M/CU MM (ref 4.2–5.4)
RBC URINE: ABNORMAL /HPF (ref 0–6)
SEGMENTED NEUTROPHILS ABSOLUTE COUNT: 5.7 K/CU MM
SEGMENTED NEUTROPHILS RELATIVE PERCENT: 51.2 % (ref 36–66)
SODIUM BLD-SCNC: 139 MMOL/L (ref 135–145)
SPECIFIC GRAVITY UA: 1.03 (ref 1–1.03)
TOTAL IMMATURE NEUTOROPHIL: 0.06 K/CU MM
UROBILINOGEN, URINE: 2 MG/DL (ref 0.2–1)
VOLUME, (UVOL): 12 ML (ref 10–12)
WBC # BLD: 11.1 K/CU MM (ref 4–10.5)
WBC UA: ABNORMAL /HPF (ref 0–5)

## 2020-07-11 PROCEDURE — 81001 URINALYSIS AUTO W/SCOPE: CPT

## 2020-07-11 PROCEDURE — 96375 TX/PRO/DX INJ NEW DRUG ADDON: CPT

## 2020-07-11 PROCEDURE — 70450 CT HEAD/BRAIN W/O DYE: CPT

## 2020-07-11 PROCEDURE — 6360000002 HC RX W HCPCS: Performed by: EMERGENCY MEDICINE

## 2020-07-11 PROCEDURE — 99285 EMERGENCY DEPT VISIT HI MDM: CPT

## 2020-07-11 PROCEDURE — 80048 BASIC METABOLIC PNL TOTAL CA: CPT

## 2020-07-11 PROCEDURE — 96365 THER/PROPH/DIAG IV INF INIT: CPT

## 2020-07-11 PROCEDURE — 2580000003 HC RX 258: Performed by: EMERGENCY MEDICINE

## 2020-07-11 PROCEDURE — 82962 GLUCOSE BLOOD TEST: CPT

## 2020-07-11 PROCEDURE — 85025 COMPLETE CBC W/AUTO DIFF WBC: CPT

## 2020-07-11 RX ORDER — SODIUM CHLORIDE 9 MG/ML
INJECTION, SOLUTION INTRAVENOUS CONTINUOUS
Status: DISCONTINUED | OUTPATIENT
Start: 2020-07-11 | End: 2020-07-11 | Stop reason: HOSPADM

## 2020-07-11 RX ORDER — CIPROFLOXACIN 2 MG/ML
400 INJECTION, SOLUTION INTRAVENOUS EVERY 12 HOURS
Status: DISCONTINUED | OUTPATIENT
Start: 2020-07-11 | End: 2020-07-11 | Stop reason: HOSPADM

## 2020-07-11 RX ORDER — CIPROFLOXACIN 500 MG/1
500 TABLET, FILM COATED ORAL 2 TIMES DAILY
Qty: 20 TABLET | Refills: 0 | Status: ON HOLD | OUTPATIENT
Start: 2020-07-11 | End: 2020-07-15 | Stop reason: ALTCHOICE

## 2020-07-11 RX ORDER — ONDANSETRON 2 MG/ML
4 INJECTION INTRAMUSCULAR; INTRAVENOUS ONCE
Status: COMPLETED | OUTPATIENT
Start: 2020-07-11 | End: 2020-07-11

## 2020-07-11 RX ORDER — PROMETHAZINE HYDROCHLORIDE 25 MG/1
25 TABLET ORAL EVERY 6 HOURS PRN
Qty: 12 TABLET | Refills: 0 | Status: SHIPPED | OUTPATIENT
Start: 2020-07-11 | End: 2020-07-14

## 2020-07-11 RX ORDER — MORPHINE SULFATE 4 MG/ML
4 INJECTION, SOLUTION INTRAMUSCULAR; INTRAVENOUS ONCE
Status: COMPLETED | OUTPATIENT
Start: 2020-07-11 | End: 2020-07-11

## 2020-07-11 RX ADMIN — ONDANSETRON 4 MG: 2 INJECTION INTRAMUSCULAR; INTRAVENOUS at 06:13

## 2020-07-11 RX ADMIN — ONDANSETRON 4 MG: 2 INJECTION INTRAMUSCULAR; INTRAVENOUS at 06:30

## 2020-07-11 RX ADMIN — CIPROFLOXACIN 400 MG: 2 INJECTION, SOLUTION INTRAVENOUS at 06:30

## 2020-07-11 RX ADMIN — SODIUM CHLORIDE: 9 INJECTION, SOLUTION INTRAVENOUS at 06:13

## 2020-07-11 RX ADMIN — MORPHINE SULFATE 4 MG: 4 INJECTION, SOLUTION INTRAMUSCULAR; INTRAVENOUS at 06:30

## 2020-07-11 ASSESSMENT — ENCOUNTER SYMPTOMS
SHORTNESS OF BREATH: 0
RESPIRATORY NEGATIVE: 1
EYES NEGATIVE: 1
VOMITING: 0
GASTROINTESTINAL NEGATIVE: 1
VISUAL CHANGE: 0

## 2020-07-11 ASSESSMENT — PAIN SCALES - GENERAL
PAINLEVEL_OUTOF10: 8
PAINLEVEL_OUTOF10: 8

## 2020-07-11 ASSESSMENT — PAIN DESCRIPTION - PAIN TYPE: TYPE: ACUTE PAIN

## 2020-07-11 ASSESSMENT — PAIN DESCRIPTION - LOCATION: LOCATION: HEAD

## 2020-07-11 NOTE — ED NOTES
Call placed to  to let him know pt is ready to be discharge to home.       Petra Tucker RN  07/11/20 [id number]

## 2020-07-11 NOTE — ED NOTES
Patient to bedside commode.  Returned to bed with assistance     Jose Raul Hercules RN  07/11/20 [id number]

## 2020-07-11 NOTE — ED NOTES
Discharge instructions and Rx given. Voices understanding. Pt refused to help with transfer to [facility]. Pt to private vehicle per . Son and nurse assisted into vehicle.       Jake Barakat RN  07/11/20 4158

## 2020-07-11 NOTE — ED NOTES
While patient in CT, she seemed to be unable to get up safely from CT table with assistance from Radiology. I went to CT to encourage the patient to get up. She was able to stand, turn and sit back into the ER bed.       Brittney Enciso RN  07/11/20 0895

## 2020-07-11 NOTE — ED PROVIDER NOTES
Patient transferred to room 111 via CT. Report given to Felix RUBIO   The history is provided by the patient. Hypoglycemia   Initial blood sugar:  49  Blood sugar after intervention:  90  Severity:  Moderate  Timing:  Sporadic  Progression:  Waxing and waning  Chronicity:  Recurrent  Diabetic status:  Controlled with insulin  Context: diet changes    Context: not recent illness and not treatment noncompliance    Context comment:  Patient has been taking her normal insulin regime and not eating as much and she feels tired. Relieved by:  Nothing  Ineffective treatments:  None tried  Associated symptoms: obesity    Associated symptoms: no altered mental status, no anxiety, no decreased responsiveness, no dizziness, no shortness of breath, no speech difficulty, no sweats, no tremors, no visual change, no vomiting and no weakness    Associated symptoms comment:  Patient states that she has had progressive ongoing weakness for the past week. She states she has had intermittent headaches for past couple of days. states that appetite is decreasing. BS was HI yesterday on meter at home and 49 for EMS . 7g oral glucose given by EMS PTA       Review of Systems   Constitutional: Positive for appetite change and fatigue. Negative for activity change, chills, decreased responsiveness, diaphoresis, fever and unexpected weight change. HENT: Negative. Eyes: Negative. Respiratory: Negative. Negative for shortness of breath. Cardiovascular: Negative. Gastrointestinal: Negative. Negative for vomiting. Genitourinary: Negative. Musculoskeletal: Negative. Skin: Negative. Neurological: Negative. Negative for dizziness, tremors, speech difficulty and weakness. Psychiatric/Behavioral: The patient is not nervous/anxious. All other systems reviewed and are negative.       Family History   Problem Relation Age of Onset    Arthritis Mother     Cancer Mother     Diabetes Mother     High Blood Pressure Mother     Asthma Father     Heart Disease Father     Diabetes Brother Hypertension     Other disorders of kidney and ureter     1 kidney.  Thyroid disease     hypothyroid    Type 2 diabetes mellitus with foot ulcer, with long-term current use of insulin ([facility] [address] 1/18/2012    [facility] right [facility] 2/6/2013     Allergies   Allergen Reactions    Ritalin [Methylphenidate] Rash    Rocephin [Ceftriaxone Sodium-Lidocaine] Rash     Prior to Admission medications    Medication Sig Start Date End Date Taking? Authorizing Provider   ciprofloxacin (CIPRO) 500 MG tablet Take 1 tablet by mouth 2 times daily for 10 days 7/11/20 7/21/20 Yes Marigene Alpha Ray, DO   promethazine (PHENERGAN) 25 MG tablet Take 1 tablet by mouth every 6 hours as needed for Nausea 7/11/20 7/14/20 Yes Marigene Alpha Ray, DO   insulin regular human (HUMULIN R U-500) 500 UNIT/ML concentrated injection vial Inject into the skin    Historical Provider, MD   ondansetron (ZOFRAN ODT) 4 MG disintegrating tablet Take 1 tablet by mouth every 8 hours as needed for Nausea 4/6/20   Kalyan Gandhi MD   glimepiride (AMARYL) 4 MG tablet Take 1 tablet by mouth daily (with breakfast) 12/6/19   Cezar Blanchard MD   DULoxetine (CYMBALTA) 60 MG extended release capsule Take 60 mg by mouth daily  7/24/19   Historical Provider, MD   hydrALAZINE (APRESOLINE) 10 MG tablet Take 10 mg by mouth 2 times daily  7/31/19   Historical Provider, MD   pantoprazole (PROTONIX) 40 MG tablet Take 40 mg by mouth daily  4/4/19   Historical Provider, MD   atorvastatin (LIPITOR) 20 MG tablet Take 20 mg by mouth daily    Historical Provider, MD   amLODIPine (NORVASC) 10 MG tablet Take 10 mg by mouth daily    Historical Provider, MD   vitamin D (CHOLECALCIFEROL) 1000 UNIT TABS tablet Take 1,000 Units by mouth daily    Historical Provider, MD   Multiple Vitamins-Minerals (TRUEPLUS DIABETIC MULTIVITAMIN) TABS Take 1 tablet by mouth daily. Historical Provider, MD   atenolol (TENORMIN) 50 MG tablet Take 50 mg by mouth 2 times daily. Historical Provider, MD   levothyroxine (SYNTHROID) 300 MCG tablet Take 300 mcg by mouth Daily     Historical Provider, MD   aspirin 81 MG EC tablet Take 81 mg by mouth daily. Historical Provider, MD       BP (!) 142/86   Pulse 68   Temp 98.4 °F (36.9 °C) (Oral)   Resp 18   Ht 5' 6\" (1.676 m)   Wt 195 lb (88.5 kg)   SpO2 96%   BMI 31.47 kg/m²     Physical Exam  Constitutional:       General: She is not in acute distress. Appearance: She is well-developed. She is not ill-appearing, toxic-appearing or diaphoretic. HENT:      Head: Normocephalic and atraumatic. Right Ear: External ear normal.      Left Ear: External ear normal.      Nose: Nose normal.   Eyes:      Conjunctiva/sclera: Conjunctivae normal.      Pupils: Pupils are equal, round, and reactive to light. Neck:      Musculoskeletal: Normal range of motion and neck supple. Cardiovascular:      Rate and Rhythm: Normal rate and regular rhythm. Heart sounds: Normal heart sounds. Pulmonary:      Effort: Pulmonary effort is normal.      Breath sounds: Normal breath sounds. Abdominal:      General: Bowel sounds are normal. There is no distension. Palpations: Abdomen is soft. Tenderness: There is no abdominal tenderness. There is no guarding or rebound. Musculoskeletal: Normal range of motion. General: No tenderness or deformity. Skin:     General: Skin is warm and dry. Neurological:      General: No focal deficit present. Mental Status: She is alert and oriented to person, place, and time. Mental status is at baseline. GCS: GCS eye subscore is 4. GCS verbal subscore is 5. GCS motor subscore is 6. Cranial Nerves: No cranial nerve deficit. Sensory: No sensory deficit. Motor: No weakness.       Coordination: Coordination normal.      Gait: Gait normal.      Deep Tendon Reflexes: Reflexes normal.         MDM:    Results for orders placed or performed during the hospital encounter of 07/11/20   CBC Auto Differential   Result Value Ref Range    WBC 11.1 (H) 4.0 - 10.5 K/CU MM    RBC 5.07 4.2 - 5.4 M/CU MM    Hemoglobin 14.7 12.5 - 16.0 GM/DL    Hematocrit 45.4 37 - 47 %    MCV 89.5 78 - 100 FL    MCH 29.0 27 - 31 PG    MCHC 32.4 32.0 - 36.0 %    RDW 13.6 11.7 - 14.9 %    Platelets 511 729 - 992 K/CU MM    MPV 10.3 7.5 - 11.1 FL    Differential Type AUTOMATED DIFFERENTIAL     Segs Relative 51.2 36 - 66 %    Lymphocytes % 39.1 24 - 44 %    Monocytes % 5.9 (H) 0 - 4 %    Eosinophils % 2.8 0 - 3 %    Basophils % 0.5 0 - 1 %    Segs Absolute 5.7 K/CU MM    Lymphocytes Absolute 4.3 K/CU MM    Monocytes Absolute 0.7 K/CU MM    Eosinophils Absolute 0.3 K/CU MM    Basophils Absolute 0.1 K/CU MM    Immature Neutrophil % 0.5 (H) 0 - 0.43 %    Total Immature Neutrophil 0.06 K/CU MM   Basic Metabolic Panel   Result Value Ref Range    Sodium 139 135 - 145 MMOL/L    Potassium 3.8 3.5 - 5.1 MMOL/L    Chloride 99 99 - 110 mMol/L    CO2 23 21 - 32 MMOL/L    Anion Gap 17 (H) 4 - 16    BUN 20 6 - 23 MG/DL    CREATININE 0.8 0.6 - 1.1 MG/DL    Glucose 85 70 - 99 MG/DL    Calcium 10.2 8.3 - 10.6 MG/DL    GFR Non-African American >60 >60 mL/min/1.73m2    GFR African American >60 >60 mL/min/1.73m2   Urinalysis (Lab)   Result Value Ref Range    Color, UA YELLOW YELLOW    Clarity, UA TURBID (A) CLEAR    Glucose, Urine 500 (A) NEGATIVE MG/DL    Bilirubin Urine NEGATIVE NEGATIVE MG/DL    Ketones, Urine NEGATIVE NEGATIVE MG/DL    Specific Gravity, UA 1.026 1.001 - 1.035    Blood, Urine SMALL (A) NEGATIVE    pH, Urine 5.5 5.0 - 8.0    Protein, UA >300 (HH) NEGATIVE MG/DL    Urobilinogen, Urine 2.0 (H) 0.2 - 1.0 MG/DL    Nitrite Urine, Quantitative POSITIVE (A) NEGATIVE    Leukocyte Esterase, Urine MODERATE (A) NEGATIVE    Volume, (UVOL) 12 10 - 12 ML    RBC, UA 6 TO 10 0 - 6 /HPF    WBC, UA TOO NUMEROUS TO COUNT 0 - 5 /HPF    Epithelial Cells, UA 2 TO 3 /HPF    Cast Type NO CAST FORMS SEEN NO CAST FORMS SEEN /HPF    Bacteria, UA MANY (A) NEGATIVE /HPF    Crystal Type NONE SEEN NEGATIVE /HPF    Mucus, UA 3+ (A) NEGATIVE HPF   POCT Glucose   Result Value Ref Range    POC Glucose 86 70 - 99 MG/DL         Patient was given IVF and glucose by EMS. Her fatigue is most likely secondary to her fluctuating blood sugars and non compliance with her eating habits. My typical dicussion, presentation,and considerations for this patients' chief complaint, diagnosis, differential diagnosis, medications, medication use,  medication safety and medication interactions have been explained and outlined to this patient for thispatient encounter. I have stressed need for follow up and reexamination for this encounter and or return to the emergency department if any changes or any concern. Final Impression    1. Fatigue, unspecified type    2. Hypoglycemia    3.  Urinary tract infection with hematuria, site unspecified              Marcella Jimenez DO  07/11/20 1632

## 2020-07-12 ENCOUNTER — HOSPITAL ENCOUNTER (INPATIENT)
Age: 75
LOS: 3 days | Discharge: NURSING, REHAB OR LONG-TERM CARE | DRG: 690 | End: 2020-07-15
Attending: EMERGENCY MEDICINE | Admitting: HOSPITALIST
Payer: MEDICARE

## 2020-07-12 ENCOUNTER — APPOINTMENT (OUTPATIENT)
Dept: GENERAL RADIOLOGY | Age: 75
DRG: 690 | End: 2020-07-12
Payer: MEDICARE

## 2020-07-12 PROBLEM — N39.0 UTI (URINARY TRACT INFECTION): Status: ACTIVE | Noted: 2020-07-12

## 2020-07-12 LAB
ALBUMIN SERPL-MCNC: 3.7 GM/DL (ref 3.4–5)
ALP BLD-CCNC: 109 IU/L (ref 40–129)
ALT SERPL-CCNC: 32 U/L (ref 10–40)
ANION GAP SERPL CALCULATED.3IONS-SCNC: 12 MMOL/L (ref 4–16)
ANION GAP SERPL CALCULATED.3IONS-SCNC: 13 MMOL/L (ref 4–16)
ANION GAP SERPL CALCULATED.3IONS-SCNC: 16 MMOL/L (ref 4–16)
AST SERPL-CCNC: 33 IU/L (ref 15–37)
BACTERIA: ABNORMAL /HPF
BASOPHILS ABSOLUTE: 0 K/CU MM
BASOPHILS RELATIVE PERCENT: 0.6 % (ref 0–1)
BETA-HYDROXYBUTYRATE: 16.8 MG/DL (ref 0–3)
BILIRUB SERPL-MCNC: 0.6 MG/DL (ref 0–1)
BILIRUBIN URINE: NEGATIVE MG/DL
BLOOD, URINE: ABNORMAL
BUN BLDV-MCNC: 22 MG/DL (ref 6–23)
BUN BLDV-MCNC: 24 MG/DL (ref 6–23)
BUN BLDV-MCNC: 30 MG/DL (ref 6–23)
CALCIUM SERPL-MCNC: 8.7 MG/DL (ref 8.3–10.6)
CALCIUM SERPL-MCNC: 9.1 MG/DL (ref 8.3–10.6)
CALCIUM SERPL-MCNC: 9.6 MG/DL (ref 8.3–10.6)
CAST TYPE: NEGATIVE /HPF
CHLORIDE BLD-SCNC: 102 MMOL/L (ref 99–110)
CHLORIDE BLD-SCNC: 103 MMOL/L (ref 99–110)
CHLORIDE BLD-SCNC: 93 MMOL/L (ref 99–110)
CLARITY: ABNORMAL
CO2: 24 MMOL/L (ref 21–32)
COLOR: YELLOW
CREAT SERPL-MCNC: 1 MG/DL (ref 0.6–1.1)
CREAT SERPL-MCNC: 1 MG/DL (ref 0.6–1.1)
CREAT SERPL-MCNC: 1.3 MG/DL (ref 0.6–1.1)
CRYSTAL TYPE: NEGATIVE /HPF
DIFFERENTIAL TYPE: ABNORMAL
EOSINOPHILS ABSOLUTE: 0.1 K/CU MM
EOSINOPHILS RELATIVE PERCENT: 1.6 % (ref 0–3)
EPITHELIAL CELLS, UA: ABNORMAL /HPF
ESTIMATED AVERAGE GLUCOSE: 298 MG/DL
GFR AFRICAN AMERICAN: 49 ML/MIN/1.73M2
GFR AFRICAN AMERICAN: >60 ML/MIN/1.73M2
GFR AFRICAN AMERICAN: >60 ML/MIN/1.73M2
GFR NON-AFRICAN AMERICAN: 40 ML/MIN/1.73M2
GFR NON-AFRICAN AMERICAN: 54 ML/MIN/1.73M2
GFR NON-AFRICAN AMERICAN: 54 ML/MIN/1.73M2
GLUCOSE BLD-MCNC: 175 MG/DL (ref 70–99)
GLUCOSE BLD-MCNC: 184 MG/DL (ref 70–99)
GLUCOSE BLD-MCNC: 191 MG/DL (ref 70–99)
GLUCOSE BLD-MCNC: 195 MG/DL (ref 70–99)
GLUCOSE BLD-MCNC: 208 MG/DL (ref 70–99)
GLUCOSE BLD-MCNC: 292 MG/DL (ref 70–99)
GLUCOSE BLD-MCNC: 326 MG/DL (ref 70–99)
GLUCOSE BLD-MCNC: 414 MG/DL (ref 70–99)
GLUCOSE BLD-MCNC: 478 MG/DL (ref 70–99)
GLUCOSE BLD-MCNC: >550 MG/DL (ref 70–99)
GLUCOSE BLD-MCNC: >550 MG/DL (ref 70–99)
GLUCOSE BLD-MCNC: ABNORMAL MG/DL (ref 70–99)
GLUCOSE, URINE: >1000 MG/DL
HBA1C MFR BLD: 12 % (ref 4.2–6.3)
HCT VFR BLD CALC: 46 % (ref 37–47)
HEMOGLOBIN: 14 GM/DL (ref 12.5–16)
IMMATURE NEUTROPHIL %: 0.3 % (ref 0–0.43)
KETONES, URINE: ABNORMAL MG/DL
LACTATE: 1.3 MMOL/L (ref 0.4–2)
LEUKOCYTE ESTERASE, URINE: ABNORMAL
LIPASE: 31 IU/L (ref 13–60)
LYMPHOCYTES ABSOLUTE: 1.8 K/CU MM
LYMPHOCYTES RELATIVE PERCENT: 25.7 % (ref 24–44)
MAGNESIUM: 2.2 MG/DL (ref 1.8–2.4)
MCH RBC QN AUTO: 28.8 PG (ref 27–31)
MCHC RBC AUTO-ENTMCNC: 30.4 % (ref 32–36)
MCV RBC AUTO: 94.7 FL (ref 78–100)
MONOCYTES ABSOLUTE: 0.4 K/CU MM
MONOCYTES RELATIVE PERCENT: 6.3 % (ref 0–4)
NITRITE URINE, QUANTITATIVE: NEGATIVE
PDW BLD-RTO: 14.2 % (ref 11.7–14.9)
PH, URINE: 5 (ref 5–8)
PHOSPHORUS: 5.2 MG/DL (ref 2.5–4.9)
PLATELET # BLD: 186 K/CU MM (ref 140–440)
PMV BLD AUTO: 10.5 FL (ref 7.5–11.1)
POTASSIUM SERPL-SCNC: 4.2 MMOL/L (ref 3.5–5.1)
POTASSIUM SERPL-SCNC: 4.4 MMOL/L (ref 3.5–5.1)
POTASSIUM SERPL-SCNC: 5.2 MMOL/L (ref 3.5–5.1)
PRO-BNP: 271 PG/ML
PROTEIN UA: 30 MG/DL
RBC # BLD: 4.86 M/CU MM (ref 4.2–5.4)
RBC URINE: ABNORMAL /HPF (ref 0–6)
SEGMENTED NEUTROPHILS ABSOLUTE COUNT: 4.5 K/CU MM
SEGMENTED NEUTROPHILS RELATIVE PERCENT: 65.5 % (ref 36–66)
SODIUM BLD-SCNC: 133 MMOL/L (ref 135–145)
SODIUM BLD-SCNC: 139 MMOL/L (ref 135–145)
SODIUM BLD-SCNC: 139 MMOL/L (ref 135–145)
SPECIFIC GRAVITY UA: 1.02 (ref 1–1.03)
TOTAL IMMATURE NEUTOROPHIL: 0.02 K/CU MM
TOTAL PROTEIN: 7.4 GM/DL (ref 6.4–8.2)
TROPONIN T: <0.01 NG/ML
UROBILINOGEN, URINE: 1 MG/DL (ref 0.2–1)
WBC # BLD: 6.9 K/CU MM (ref 4–10.5)
WBC UA: ABNORMAL /HPF (ref 0–5)

## 2020-07-12 PROCEDURE — 96372 THER/PROPH/DIAG INJ SC/IM: CPT

## 2020-07-12 PROCEDURE — 82010 KETONE BODYS QUAN: CPT

## 2020-07-12 PROCEDURE — 87186 SC STD MICRODIL/AGAR DIL: CPT

## 2020-07-12 PROCEDURE — 99285 EMERGENCY DEPT VISIT HI MDM: CPT

## 2020-07-12 PROCEDURE — 87086 URINE CULTURE/COLONY COUNT: CPT

## 2020-07-12 PROCEDURE — 2140000000 HC CCU INTERMEDIATE R&B

## 2020-07-12 PROCEDURE — 83690 ASSAY OF LIPASE: CPT

## 2020-07-12 PROCEDURE — 71045 X-RAY EXAM CHEST 1 VIEW: CPT

## 2020-07-12 PROCEDURE — 2580000003 HC RX 258: Performed by: EMERGENCY MEDICINE

## 2020-07-12 PROCEDURE — 93010 ELECTROCARDIOGRAM REPORT: CPT | Performed by: INTERNAL MEDICINE

## 2020-07-12 PROCEDURE — 83036 HEMOGLOBIN GLYCOSYLATED A1C: CPT

## 2020-07-12 PROCEDURE — 6360000002 HC RX W HCPCS: Performed by: EMERGENCY MEDICINE

## 2020-07-12 PROCEDURE — 6360000002 HC RX W HCPCS: Performed by: HOSPITALIST

## 2020-07-12 PROCEDURE — 96361 HYDRATE IV INFUSION ADD-ON: CPT

## 2020-07-12 PROCEDURE — 83880 ASSAY OF NATRIURETIC PEPTIDE: CPT

## 2020-07-12 PROCEDURE — 84484 ASSAY OF TROPONIN QUANT: CPT

## 2020-07-12 PROCEDURE — 87077 CULTURE AEROBIC IDENTIFY: CPT

## 2020-07-12 PROCEDURE — 82962 GLUCOSE BLOOD TEST: CPT

## 2020-07-12 PROCEDURE — 83735 ASSAY OF MAGNESIUM: CPT

## 2020-07-12 PROCEDURE — 96365 THER/PROPH/DIAG IV INF INIT: CPT

## 2020-07-12 PROCEDURE — 80048 BASIC METABOLIC PNL TOTAL CA: CPT

## 2020-07-12 PROCEDURE — 83605 ASSAY OF LACTIC ACID: CPT

## 2020-07-12 PROCEDURE — 80053 COMPREHEN METABOLIC PANEL: CPT

## 2020-07-12 PROCEDURE — 6370000000 HC RX 637 (ALT 250 FOR IP): Performed by: EMERGENCY MEDICINE

## 2020-07-12 PROCEDURE — 36415 COLL VENOUS BLD VENIPUNCTURE: CPT

## 2020-07-12 PROCEDURE — 85025 COMPLETE CBC W/AUTO DIFF WBC: CPT

## 2020-07-12 PROCEDURE — 84100 ASSAY OF PHOSPHORUS: CPT

## 2020-07-12 PROCEDURE — 6370000000 HC RX 637 (ALT 250 FOR IP): Performed by: HOSPITALIST

## 2020-07-12 PROCEDURE — 87040 BLOOD CULTURE FOR BACTERIA: CPT

## 2020-07-12 PROCEDURE — 93005 ELECTROCARDIOGRAM TRACING: CPT | Performed by: EMERGENCY MEDICINE

## 2020-07-12 PROCEDURE — 81001 URINALYSIS AUTO W/SCOPE: CPT

## 2020-07-12 PROCEDURE — 2580000003 HC RX 258: Performed by: HOSPITALIST

## 2020-07-12 RX ORDER — SODIUM CHLORIDE 0.9 % (FLUSH) 0.9 %
10 SYRINGE (ML) INJECTION EVERY 12 HOURS SCHEDULED
Status: DISCONTINUED | OUTPATIENT
Start: 2020-07-12 | End: 2020-07-15 | Stop reason: HOSPADM

## 2020-07-12 RX ORDER — CIPROFLOXACIN 2 MG/ML
400 INJECTION, SOLUTION INTRAVENOUS EVERY 12 HOURS
Status: DISCONTINUED | OUTPATIENT
Start: 2020-07-12 | End: 2020-07-14

## 2020-07-12 RX ORDER — ACETAMINOPHEN 650 MG/1
650 SUPPOSITORY RECTAL EVERY 6 HOURS PRN
Status: DISCONTINUED | OUTPATIENT
Start: 2020-07-12 | End: 2020-07-15 | Stop reason: HOSPADM

## 2020-07-12 RX ORDER — POLYETHYLENE GLYCOL 3350 17 G/17G
17 POWDER, FOR SOLUTION ORAL DAILY PRN
Status: DISCONTINUED | OUTPATIENT
Start: 2020-07-12 | End: 2020-07-15 | Stop reason: HOSPADM

## 2020-07-12 RX ORDER — ATORVASTATIN CALCIUM 20 MG/1
20 TABLET, FILM COATED ORAL DAILY
Status: DISCONTINUED | OUTPATIENT
Start: 2020-07-12 | End: 2020-07-15 | Stop reason: HOSPADM

## 2020-07-12 RX ORDER — ONDANSETRON 2 MG/ML
4 INJECTION INTRAMUSCULAR; INTRAVENOUS EVERY 6 HOURS PRN
Status: DISCONTINUED | OUTPATIENT
Start: 2020-07-12 | End: 2020-07-15 | Stop reason: HOSPADM

## 2020-07-12 RX ORDER — PANTOPRAZOLE SODIUM 40 MG/1
40 TABLET, DELAYED RELEASE ORAL DAILY
Status: DISCONTINUED | OUTPATIENT
Start: 2020-07-12 | End: 2020-07-15 | Stop reason: HOSPADM

## 2020-07-12 RX ORDER — AMLODIPINE BESYLATE 10 MG/1
10 TABLET ORAL DAILY
Status: DISCONTINUED | OUTPATIENT
Start: 2020-07-12 | End: 2020-07-15 | Stop reason: HOSPADM

## 2020-07-12 RX ORDER — ACETAMINOPHEN 325 MG/1
650 TABLET ORAL EVERY 6 HOURS PRN
Status: DISCONTINUED | OUTPATIENT
Start: 2020-07-12 | End: 2020-07-15 | Stop reason: HOSPADM

## 2020-07-12 RX ORDER — ASPIRIN 81 MG/1
81 TABLET ORAL DAILY
Status: DISCONTINUED | OUTPATIENT
Start: 2020-07-12 | End: 2020-07-15 | Stop reason: HOSPADM

## 2020-07-12 RX ORDER — 0.9 % SODIUM CHLORIDE 0.9 %
1000 INTRAVENOUS SOLUTION INTRAVENOUS ONCE
Status: COMPLETED | OUTPATIENT
Start: 2020-07-12 | End: 2020-07-12

## 2020-07-12 RX ORDER — LEVOTHYROXINE SODIUM 0.15 MG/1
300 TABLET ORAL DAILY
Status: DISCONTINUED | OUTPATIENT
Start: 2020-07-12 | End: 2020-07-15 | Stop reason: HOSPADM

## 2020-07-12 RX ORDER — DEXTROSE MONOHYDRATE 50 MG/ML
100 INJECTION, SOLUTION INTRAVENOUS PRN
Status: DISCONTINUED | OUTPATIENT
Start: 2020-07-12 | End: 2020-07-15 | Stop reason: HOSPADM

## 2020-07-12 RX ORDER — DULOXETIN HYDROCHLORIDE 30 MG/1
60 CAPSULE, DELAYED RELEASE ORAL DAILY
Status: DISCONTINUED | OUTPATIENT
Start: 2020-07-12 | End: 2020-07-13 | Stop reason: ALTCHOICE

## 2020-07-12 RX ORDER — DEXTROSE MONOHYDRATE 50 MG/ML
100 INJECTION, SOLUTION INTRAVENOUS PRN
Status: DISCONTINUED | OUTPATIENT
Start: 2020-07-12 | End: 2020-07-12 | Stop reason: SDUPTHER

## 2020-07-12 RX ORDER — ATENOLOL 50 MG/1
50 TABLET ORAL 2 TIMES DAILY
Status: DISCONTINUED | OUTPATIENT
Start: 2020-07-12 | End: 2020-07-15 | Stop reason: HOSPADM

## 2020-07-12 RX ORDER — NICOTINE POLACRILEX 4 MG
15 LOZENGE BUCCAL PRN
Status: DISCONTINUED | OUTPATIENT
Start: 2020-07-12 | End: 2020-07-12 | Stop reason: SDUPTHER

## 2020-07-12 RX ORDER — DEXTROSE MONOHYDRATE 25 G/50ML
12.5 INJECTION, SOLUTION INTRAVENOUS PRN
Status: DISCONTINUED | OUTPATIENT
Start: 2020-07-12 | End: 2020-07-15 | Stop reason: HOSPADM

## 2020-07-12 RX ORDER — SODIUM CHLORIDE 9 MG/ML
INJECTION, SOLUTION INTRAVENOUS CONTINUOUS
Status: DISCONTINUED | OUTPATIENT
Start: 2020-07-12 | End: 2020-07-13

## 2020-07-12 RX ORDER — NICOTINE POLACRILEX 4 MG
15 LOZENGE BUCCAL PRN
Status: DISCONTINUED | OUTPATIENT
Start: 2020-07-12 | End: 2020-07-15 | Stop reason: HOSPADM

## 2020-07-12 RX ORDER — SODIUM CHLORIDE 0.9 % (FLUSH) 0.9 %
10 SYRINGE (ML) INJECTION PRN
Status: DISCONTINUED | OUTPATIENT
Start: 2020-07-12 | End: 2020-07-15 | Stop reason: HOSPADM

## 2020-07-12 RX ORDER — CIPROFLOXACIN 2 MG/ML
400 INJECTION, SOLUTION INTRAVENOUS EVERY 12 HOURS
Status: DISCONTINUED | OUTPATIENT
Start: 2020-07-12 | End: 2020-07-12 | Stop reason: SDUPTHER

## 2020-07-12 RX ORDER — DEXTROSE MONOHYDRATE 25 G/50ML
12.5 INJECTION, SOLUTION INTRAVENOUS PRN
Status: DISCONTINUED | OUTPATIENT
Start: 2020-07-12 | End: 2020-07-12 | Stop reason: SDUPTHER

## 2020-07-12 RX ORDER — HYDRALAZINE HYDROCHLORIDE 10 MG/1
10 TABLET, FILM COATED ORAL 2 TIMES DAILY
Status: DISCONTINUED | OUTPATIENT
Start: 2020-07-12 | End: 2020-07-15 | Stop reason: HOSPADM

## 2020-07-12 RX ADMIN — LEVOTHYROXINE SODIUM 300 MCG: 0.15 TABLET ORAL at 13:48

## 2020-07-12 RX ADMIN — ATENOLOL 50 MG: 50 TABLET ORAL at 13:48

## 2020-07-12 RX ADMIN — ATORVASTATIN CALCIUM 20 MG: 20 TABLET, FILM COATED ORAL at 20:46

## 2020-07-12 RX ADMIN — INSULIN LISPRO 2 UNITS: 100 INJECTION, SOLUTION INTRAVENOUS; SUBCUTANEOUS at 17:08

## 2020-07-12 RX ADMIN — CIPROFLOXACIN 400 MG: 2 INJECTION, SOLUTION INTRAVENOUS at 09:20

## 2020-07-12 RX ADMIN — ASPIRIN 81 MG: 81 TABLET, COATED ORAL at 13:48

## 2020-07-12 RX ADMIN — SODIUM CHLORIDE 1000 ML: 9 INJECTION, SOLUTION INTRAVENOUS at 07:59

## 2020-07-12 RX ADMIN — PANTOPRAZOLE SODIUM 40 MG: 40 TABLET, DELAYED RELEASE ORAL at 13:48

## 2020-07-12 RX ADMIN — INSULIN GLARGINE 40 UNITS: 100 INJECTION, SOLUTION SUBCUTANEOUS at 20:45

## 2020-07-12 RX ADMIN — AMLODIPINE BESYLATE 10 MG: 10 TABLET ORAL at 13:49

## 2020-07-12 RX ADMIN — SODIUM CHLORIDE: 9 INJECTION, SOLUTION INTRAVENOUS at 18:24

## 2020-07-12 RX ADMIN — CIPROFLOXACIN 400 MG: 2 INJECTION, SOLUTION INTRAVENOUS at 20:46

## 2020-07-12 RX ADMIN — HYDRALAZINE HYDROCHLORIDE 10 MG: 10 TABLET, FILM COATED ORAL at 13:49

## 2020-07-12 RX ADMIN — DULOXETINE HYDROCHLORIDE 60 MG: 30 CAPSULE, DELAYED RELEASE ORAL at 13:48

## 2020-07-12 RX ADMIN — ENOXAPARIN SODIUM 40 MG: 40 INJECTION SUBCUTANEOUS at 13:48

## 2020-07-12 RX ADMIN — SODIUM CHLORIDE 0.5 UNITS/HR: 9 INJECTION, SOLUTION INTRAVENOUS at 09:45

## 2020-07-12 RX ADMIN — INSULIN HUMAN 15 UNITS: 100 INJECTION, SOLUTION PARENTERAL at 08:00

## 2020-07-12 RX ADMIN — SODIUM CHLORIDE: 9 INJECTION, SOLUTION INTRAVENOUS at 06:58

## 2020-07-12 RX ADMIN — ATENOLOL 50 MG: 50 TABLET ORAL at 20:46

## 2020-07-12 RX ADMIN — HYDRALAZINE HYDROCHLORIDE 10 MG: 10 TABLET, FILM COATED ORAL at 20:45

## 2020-07-12 RX ADMIN — SODIUM CHLORIDE 150 ML/HR: 9 INJECTION, SOLUTION INTRAVENOUS at 08:00

## 2020-07-12 ASSESSMENT — PAIN SCALES - GENERAL
PAINLEVEL_OUTOF10: 0

## 2020-07-12 NOTE — ED NOTES
Patients BG was checked and our meter read HIGH, labs drawn and sent to the lab.       Liana Bryan RN  07/12/20 1538

## 2020-07-12 NOTE — ED PROVIDER NOTES
Basic blood work, interventions, and/or imaging studies have been preordered in anticipation of need for the patient who has been placed in Emergency Department room.  This patient will be evaluated by next physician and additional testing and or imaging may be required based on their examination           287 Rosalind Mcgill DO  07/12/20 0436

## 2020-07-12 NOTE — ED PROVIDER NOTES
Emergency Department Encounter  Location: [facility] [address] [place] [address]    Patient: Desi Macias  MRN: [id number]  : 1945  Date of evaluation: 2020  ED Provider: Baldev Cardozo DO, FACEP    Chief Complaint:    Hyperglycemia (Was seen here yesterday, treated for UTI, now here because BS is 431)    [place]:  Desi Macias is a 69 y.o. female that presents to the emergency department for the second consecutive day with complaints today of generalized weakness and inability to ambulate secondary to general weakness. She is also having a blood sugar that is in the 400 range. The patient was seen yesterday and was diagnosed with urinary tract infection. She was started on Cipro. Here in the ER today she returns after starting the Cipro.  states she had 1 dose ED yesterday evening. She is unable to control her bladder and has wet herself. She states she just generally feels weak and unable to care for herself. The patient is morbidly obese and states that her  and her son try to get her to the bathroom today but she was having difficulty standing even with their help. She denies fever or chills. She denies chest pain or shortness of breath. She is had no nausea vomiting or diarrhea. She continues to have urinary symptoms. ROS - see HPI, below listed is current ROS at time of my eval:  At least 10 systems reviewed and otherwise acutely negative except as in the [address]   General:  No fevers, no chills, no weakness  Eyes:  No recent vison changes, no discharge  ENT:  No sore throat, no nasal congestion, no hearing changes  Cardiovascular:  No chest pain, no palpitations  Respiratory:  No shortness of breath, no cough, no wheezing  Gastrointestinal:  No pain, no nausea, no vomiting, no diarrhea  Musculoskeletal:  No muscle pain, no joint pain  Skin:  No rash, no pruritis, no easy bruising  Neurologic:  No speech problems, no headache, no extremity numbness, no extremity tingling, no extremity weakness  Psychiatric:  No anxiety  Genitourinary: Positive for incontinence  Endocrine:  No unexpected weight gain, no unexpected weight loss  Extremities:  no edema, no pain    Past Medical History:   Diagnosis Date    Arthritis     Cellulitis of right foot     Chronic kidney disease     Depression     Diabetes mellitus ([address]     Frequent falls     GERD (gastroesophageal reflux disease)     Hallux valgus (acquired), right foot 5/1/2017    Hypertension     Other disorders of kidney and ureter     1 kidney.  Thyroid disease     hypothyroid    Type 2 diabetes mellitus with foot ulcer, with long-term current use of insulin ([facility] 1/18/2012    Ulcer of right foot with fat layer exposed ([facility] 2/6/2013     Past Surgical History:   Procedure Laterality Date    ABDOMEN SURGERY      CYST REMOVAL      from kidney.     DILATATION, ESOPHAGUS      ENDOSCOPY, COLON, DIAGNOSTIC      FOOT SURGERY  12/30/11    had infection  and a biopsy was sent away for analysis    HERNIA REPAIR      HYSTERECTOMY      KIDNEY REMOVAL  1973     Family History   Problem Relation Age of Onset    Arthritis Mother     Cancer Mother     Diabetes Mother     High Blood Pressure Mother     Asthma Father     Heart Disease Father     Diabetes Brother      Social History     Socioeconomic History    Marital status:      Spouse name: Not on file    Number of children: Not on file    Years of education: Not on file    Highest education level: Not on file   Occupational History    Not on file   Social Needs    Financial resource strain: Not on file    Food insecurity     Worry: Not on file     Inability: Not on file    Transportation needs     Medical: Not on file     Non-medical: Not on file   Tobacco Use    Smoking status: Never Smoker    Smokeless tobacco: Never Used   Substance and Sexual Activity    Alcohol use: No    Drug use: No    Sexual activity: Not on file   Lifestyle    Physical activity     Days per week: Not on file     Minutes per session: Not on file    Stress: Not on file   Relationships    Social connections     Talks on phone: Not on file     Gets together: Not on file     Attends Tenri[employer] service: Not on file     Active member of club or organization: Not on file     Attends meetings of clubs or organizations: Not on file     Relationship status: Not on file    Intimate partner violence     Fear of current or ex partner: Not on file     Emotionally abused: Not on file     Physically abused: Not on file     Forced sexual activity: Not on file   Other Topics Concern    Not on file   Social History Narrative    Not on file     Current Facility-Administered Medications   Medication Dose Route Frequency Provider Last Rate Last Dose    0.9 % sodium chloride infusion   Intravenous Continuous Trinity Ana,  mL/hr at 07/12/20 0800 150 mL/hr at 07/12/20 0800    insulin regular (HUMULIN R;NOVOLIN R) 100 Units in sodium chloride 0.9 % 100 mL infusion  0.5 Units/hr Intravenous Continuous Ghanshyam Ka, DO        glucose (GLUTOSE) 40 % oral gel 15 g  15 g Oral PRN Ghanshyam Ka, DO        dextrose 50 % IV solution  12.5 g Intravenous PRN Ghanshyam Ka, DO        glucagon (rDNA) injection 1 mg  1 mg Intramuscular PRN Ghanshyam Ka, DO        dextrose 5 % solution  100 mL/hr Intravenous PRN Ghanshyam Ka, DO        ciprofloxacin (CIPRO) IVPB 400 mg  400 mg Intravenous Q12H Ghanshyam Ka,  mL/hr at 07/12/20 0920 400 mg at 07/12/20 0920     Current Outpatient Medications   Medication Sig Dispense Refill    insulin regular human (HUMULIN R U-500) 500 UNIT/ML concentrated injection vial Inject into the skin      ciprofloxacin (CIPRO) 500 MG tablet Take 1 tablet by mouth 2 times daily for 10 days 20 tablet 0    promethazine (PHENERGAN) 25 MG tablet Take 1 tablet by mouth every 6 hours as needed for Nausea 12 tablet 0    ondansetron (ZOFRAN ODT) 4 MG disintegrating tablet Take 1 tablet by mouth every 8 hours as needed for Nausea 15 tablet 0    glimepiride (AMARYL) 4 MG tablet Take 1 tablet by mouth daily (with breakfast) 30 tablet 0    DULoxetine (CYMBALTA) 60 MG extended release capsule Take 60 mg by mouth daily   1    hydrALAZINE (APRESOLINE) 10 MG tablet Take 10 mg by mouth 2 times daily   1    pantoprazole (PROTONIX) 40 MG tablet Take 40 mg by mouth daily   3    atorvastatin (LIPITOR) 20 MG tablet Take 20 mg by mouth daily      amLODIPine (NORVASC) 10 MG tablet Take 10 mg by mouth daily      vitamin D (CHOLECALCIFEROL) 1000 UNIT TABS tablet Take 1,000 Units by mouth daily      Multiple Vitamins-Minerals (TRUEPLUS DIABETIC MULTIVITAMIN) TABS Take 1 tablet by mouth daily.  atenolol (TENORMIN) 50 MG tablet Take 50 mg by mouth 2 times daily.  levothyroxine (SYNTHROID) 300 MCG tablet Take 300 mcg by mouth Daily       aspirin 81 MG EC tablet Take 81 mg by mouth daily. Allergies   Allergen Reactions    Ritalin [Methylphenidate] Rash    Rocephin [Ceftriaxone Sodium-Lidocaine] Rash       Nursing Notes Reviewed    Physical Exam:  ED Triage Vitals   Enc Vitals Group      BP 07/12/20 0635 (!) 152/68      Pulse 07/12/20 0635 85      Resp 07/12/20 0635 12      Temp 07/12/20 0635 97.8 °F (36.6 °C)      Temp Source 07/12/20 0635 Oral      SpO2 07/12/20 0635 (!) 88 %      Weight 07/12/20 0700 195 lb (88.5 kg)      Height 07/12/20 0700 5' 6\" (1.676 m)      Head Circumference --       Peak Flow --       Pain Score --       Pain Loc --       Pain Edu? --       Excl. in [address]? --      GENERAL APPEARANCE: Awake and alert. Cooperative. No acute distress. She appears generally weak in the bed. She is morbidly obese  HEAD: Normocephalic. Atraumatic. EYES: EOM's grossly intact. Sclera anicteric. ENT: Tolerates saliva. No trismus. NECK: Supple. Trachea midline. CARDIO: RRR. Radial pulse 2+. LUNGS: Respirations unlabored. CTAB. ABDOMEN: Soft. Non-distended. Non-tender. EXTREMITIES: No acute deformities. SKIN: Warm and dry. NEUROLOGICAL: No gross facial drooping. Moves all 4 extremities spontaneously. There is no pronator drift. She is able to hold her legs up bilaterally. Sensation is intact in her feet bilaterally. She is got good pulses in her lower extremities bilaterally. There is no pronator drift in the upper extremities. No evidence of CVA seen. PSYCHIATRIC: Normal mood.      Labs:  Results for orders placed or performed during the hospital encounter of 07/12/20   CBC Auto Differential   Result Value Ref Range    WBC 6.9 4.0 - 10.5 K/CU MM    RBC 4.86 4.2 - 5.4 M/CU MM    Hemoglobin 14.0 12.5 - 16.0 GM/DL    Hematocrit 46.0 37 - 47 %    MCV 94.7 78 - 100 FL    MCH 28.8 27 - 31 PG    MCHC 30.4 (L) 32.0 - 36.0 %    RDW 14.2 11.7 - 14.9 %    Platelets 585 779 - 080 K/CU MM    MPV 10.5 7.5 - 11.1 FL    Differential Type AUTOMATED DIFFERENTIAL     Segs Relative 65.5 36 - 66 %    Lymphocytes % 25.7 24 - 44 %    Monocytes % 6.3 (H) 0 - 4 %    Eosinophils % 1.6 0 - 3 %    Basophils % 0.6 0 - 1 %    Segs Absolute 4.5 K/CU MM    Lymphocytes Absolute 1.8 K/CU MM    Monocytes Absolute 0.4 K/CU MM    Eosinophils Absolute 0.1 K/CU MM    Basophils Absolute 0.0 K/CU MM    Immature Neutrophil % 0.3 0 - 0.43 %    Total Immature Neutrophil 0.02 K/CU MM   Comprehensive Metabolic Panel   Result Value Ref Range    Sodium 133 (L) 135 - 145 MMOL/L    Potassium 5.2 (H) 3.5 - 5.1 MMOL/L    Chloride 93 (L) 99 - 110 mMol/L    CO2 24 21 - 32 MMOL/L    BUN 30 (H) 6 - 23 MG/DL    CREATININE 1.3 (H) 0.6 - 1.1 MG/DL    Glucose  70 - 99 MG/DL     GLUCOSE 682 CALLED AND RB TO ED AMASSIE RN [id number] LUCY TOM    Calcium 9.6 8.3 - 10.6 MG/DL    Alb 3.7 3.4 - 5.0 GM/DL    Total Protein 7.4 6.4 - 8.2 GM/DL    Total Bilirubin 0.6 0.0 - 1.0 MG/DL    ALT 32 10 - 40 U/L    AST 33 15 - 37 IU/L    Alkaline Phosphatase 109 40 - 129 IU/L    GFR Non- 40 (L) >60 mL/min/1.73m2    GFR  49 (L) >60 mL/min/1.73m2    Anion Gap 16 4 - 16   Lipase   Result Value Ref Range    Lipase 31 13 - 60 IU/L   Lactic Acid, Plasma   Result Value Ref Range    Lactate 1.3 0.4 - 2.0 mMOL/L   Urinalysis with Microscopic   Result Value Ref Range    Color, UA YELLOW YELLOW    Clarity, UA CLOUDY CLEAR    Glucose, Urine >1,000 (A) NEGATIVE MG/DL    Bilirubin Urine NEGATIVE NEGATIVE MG/DL    Ketones, Urine TRACE NEGATIVE MG/DL    Specific Gravity, UA 1.020 1.001 - 1.035    Blood, Urine MODERATE NEGATIVE    pH, Urine 5.0 5.0 - 8.0    Protein, UA 30 (A) NEGATIVE MG/DL    Urobilinogen, Urine 1.0 0.2 - 1.0 MG/DL    Nitrite Urine, Quantitative NEGATIVE NEGATIVE    Leukocyte Esterase, Urine MODERATE NEGATIVE    RBC, UA 20 TO 31 0 - 6 /HPF    WBC, UA 58 TO 83 0 - 5 /HPF    Epithelial Cells, UA 2 TO 4 /HPF    Cast Type NEGATIVE (A) NO CAST FORMS SEEN /HPF    Bacteria, UA MANY NEGATIVE /HPF    Crystal Type NEGATIVE NEGATIVE /HPF   Beta-Hydroxybutyrate   Result Value Ref Range    Beta-Hydroxybutyrate 16.8 (H) 0.0 - 3.0 MG/DL   Troponin   Result Value Ref Range    Troponin T <0.010 <0.01 NG/ML   Brain Natriuretic Peptide   Result Value Ref Range    Pro-.0 <300 PG/ML   EKG 12 Lead   Result Value Ref Range    Ventricular Rate 80 BPM    Atrial Rate 80 BPM    P-R Interval 210 ms    QRS Duration 98 ms    Q-T Interval 422 ms    QTc Calculation (Bazett) 486 ms    P Axis 38 degrees    R Axis 8 degrees    T Axis 111 degrees    Diagnosis       Sinus rhythm with 1st degree AV block  Cannot rule out Inferior infarct (cited on or before 06-APR-2020)  Abnormal ECG  When compared with ECG of 06-APR-2020 01:12,  KS interval has increased         EKG (if obtained): (All EKG's are interpreted by myself in the absence of a cardiologist)  The Ekg interpreted by me in the absence of a cardiologist shows.   normal sinus rhythm with a rate of 80 with first-degree AV block noted  Axis is   Normal  QTc is  486  Intervals and Durations are unremarkable. No specific ST-T wave changes appreciated. No evidence of acute ischemia. No significant change from prior EKG dated 6 April 2020      Radiographs (if obtained):  [] The following radiograph was interpreted by myself in the absence of a radiologist:  [x] Radiologist's Report reviewed at time of ED visit:  XR CHEST PORTABLE   Final Result   No acute cardiopulmonary disease. ED Course and MDM:  This patient presents to the emergency department with hyperglycemia and decreased ability to ambulate. She is complaining of general weakness. She is fairly ketotic with a beta hydroxybutyrate that is elevated. She has received IV fluids and is started on an insulin drip at this time she is awake and alert. I think the etiology of her problems is a urinary tract infection. She was hypoglycemic yesterday. Today she has not taken any of her medications. We have started her on an insulin drip and I discussed her care with Dr. Malik Araya the hospitalist who is agreed to accept this patient to the floor on the insulin drip. She has remained stable through her ER course. Final Impression:  1. Hyperglycemia    2. Ketosis ([address]    3. Generalized weakness    4. Acute cystitis with hematuria      DISPOSITION Decision To Admit    Patient referred to: No follow-up provider specified.   Discharge medications:  New Prescriptions    No medications on file     (Please note that portions of this note may have been completed with a voice recognition program. Efforts were made to edit the dictations but occasionally words are mis-transcribed.)    Mable Buck DO, [facility]  Board certified in [address] [place] [address]  07/12/20 6790

## 2020-07-12 NOTE — ED NOTES
Pt incontinent of urine. Pt wearing same gown and hospital underwear that she was wearing when discharged yesterday. Wet gown placed into pt garment bag. Linen changed. Ava care completed. Straight cath for urine. Warm blanket given.       Lars Velarde RN  07/12/20 1950

## 2020-07-12 NOTE — ED NOTES
Patient presents to the ED per EMS, with complaint of Hyperglycemia, she was treated and discharged from our facility yesterday morning   Patient also states that she is unable to walk, when asked why patient states that when she got Morphine yesterday while in our ED it caused her to no longer walk. I explained to the patient that Morphine has a short half life and normally would not cause her to not be able to walk, patient then states well my BS is high. When asked if the patient took any insulin, she answered no, stating that her  wanted her to come to the ED to be evaluated.       Daniella Brito RN  07/12/20 3131

## 2020-07-12 NOTE — H&P
History and Physical      Name:  Rodney Ceja /Age/Sex:   (79 y.o. female)   MRN & CSN:  [id number] & [id number] Admission Date/Time: 2020  6:06 AM   Location:   PCP: TOÑITO Gage       Hospital Day: 1    Assessment and Plan:   Rodney Ceja is a 69 y.o.  female  who presents with <principal problem not specified>    1.  Uncontrolled hyperglycemia: Anion gap 16  Continue on insulin drip and check blood sugar every 1 hour  Continue on IV fluid  cc/h  Once blood sugar below 250, can switch to long-acting insulin and SSI  Closely monitor BMP q6h  Check serum magnesium and serum phosphorus    2-UTI failed outpatient treatment: Continue on IV Cipro and follow-up blood and urine culture  He still has urinary incontinence but no dysuria    3-hypertension resume on home blood pressure medication    4-hypothyroidism resume on home dose levothyroxine    5-insulin-dependent diabetes the patient has history of fluctuating blood sugar  She is on Humulin 500, 130 units in the morning afternoon and then 70 units at evening  Patient also may have hypoglycemia unawareness due to neuropathy      6-generalized weakness secondary to uncontrolled hyperglycemia and UTI  PT OT evaluation  Patient has no back pain    7-hyperlipidemia on statin    8-GERD on PPI      Diet No diet orders on file   DVT Prophylaxis [x] Lovenox, []  Heparin, [] SCDs, [] Ambulation   GI Prophylaxis [x] PPI,  [] H2 Blocker,  [] Carafate,  [] Diet/Tube Feeds   Code Status Prior   Disposition Patient requires continued admission due to    MDM [] Low, [] Moderate,[]  High  Patient's risk as above due to      History of Present Illness:     Chief Complaint: Generalized weakness with difficult ambulation, high blood sugar  Rodney Ceja is a 69 y.o.  female with history of insulin-dependent diabetes on high dose of insulin obesity hypertension, hypothyroidism presented to the ED with chief complaint of generalized weakness and high blood sugar. Patient had ER visits several days ago because of the UTI and was discharged on oral ciprofloxacin. Patient reported that she could not walk since then because of the generalized weakness and leg weakness, could not eat well, she has history of highly fluctuating blood sugar with hypoglycemia and hyperglycemia, she checked her blood sugar today was found above 400. In the ED blood sugar initially was 83 then more than 600 started on insulin drip  Started on IV antibiotic for UTI  Patient denies chest pain shortness of breath denies facial numbness extremity numbness, denies slurred speech or back pain. No fever or chills       Ten point ROS reviewed negative, unless as noted above    Objective:   No intake or output data in the 24 hours ending 07/12/20 1052   Vitals:   Vitals:    07/12/20 0717   BP:    Pulse: 90   Resp: 13   Temp:    SpO2: 100%     Physical Exam:   GEN Awake. Alert , not in respiratory distress, not in pain, looks dry  HEENT: PEERLA, , supple neck,   Chest: air entry equal bilaterally, no wheezing or crepitation  Heart: S1 and S2 heard, no murmur, no gallop or rub, regular rate  Abdomen: soft, ND , Nt, +BS  Extremities: no cyanosis, tenderness or erythema, peripheral pulses audible  Neurology: alert, awake  able to move 4 limbs    Past Medical History:      Past Medical History:   Diagnosis Date    Arthritis     Cellulitis of right foot     Chronic kidney disease     Depression     Diabetes mellitus ([address]     Frequent falls     GERD (gastroesophageal reflux disease)     Hallux valgus (acquired), right foot 5/1/2017    Hypertension     Other disorders of kidney and ureter     1 kidney.     Thyroid disease     hypothyroid    Type 2 diabetes mellitus with foot ulcer, with long-term current use of insulin ([address] 1/18/2012    Ulcer of right foot with fat layer exposed ([address] 2/6/2013     PSHX:  has a past surgical history that includes Hysterectomy; Foot surgery (12/30/11); cyst removal; Kidney removal (1973); Abdomen surgery; hernia repair; Endoscopy, colon, diagnostic; and Dilatation, esophagus. Allergies: Allergies   Allergen Reactions    Ritalin [Methylphenidate] Rash    Rocephin [Ceftriaxone Sodium-Lidocaine] Rash       FAM HX: family history includes Arthritis in her mother; Asthma in her father; Cancer in her mother; Diabetes in her brother and mother; Heart Disease in her father; High Blood Pressure in her mother.   Soc HX:   Social History     Socioeconomic History    Marital status:      Spouse name: None    Number of children: None    Years of education: None    Highest education level: None   Occupational History    None   Social Needs    Financial resource strain: None    Food insecurity     Worry: None     Inability: None    Transportation needs     Medical: None     Non-medical: None   Tobacco Use    Smoking status: Never Smoker    Smokeless tobacco: Never Used   Substance and Sexual Activity    Alcohol use: No    Drug use: No    Sexual activity: None   Lifestyle    Physical activity     Days per week: None     Minutes per session: None    Stress: None   Relationships    Social connections     Talks on phone: None     Gets together: None     Attends [employer]ism service: None     Active member of club or organization: None     Attends meetings of clubs or organizations: None     Relationship status: None    Intimate partner violence     Fear of current or ex partner: None     Emotionally abused: None     Physically abused: None     Forced sexual activity: None   Other Topics Concern    None   Social History Narrative    None       Medications:   Medications:    ciprofloxacin  400 mg Intravenous Q12H      Infusions:    sodium chloride 150 mL/hr (07/12/20 0800)    insulin 18.6 Units/hr (07/12/20 0948)    dextrose       PRN Meds: glucose, 15 g, PRN  dextrose, 12.5 g, PRN  glucagon (rDNA), 1 mg, PRN  dextrose, 100 mL/hr, PRN          Electronically signed by Joan Mckeon MD on 7/12/2020 at 10:52 AM

## 2020-07-12 NOTE — PROGRESS NOTES
Patient reports that she was feeling unwell for a few days last month as so she stopped taking all of her medicines except for her insulin and the baby aspirin.

## 2020-07-12 NOTE — PLAN OF CARE
Problem: Falls - Risk of:  Goal: Will remain free from falls  Description: Will remain free from falls  Outcome: Met This Shift  Goal: Absence of physical injury  Description: Absence of physical injury  Outcome: Met This Shift     Problem: Skin Integrity:  Goal: Will show no infection signs and symptoms  Description: Will show no infection signs and symptoms  Outcome: Met This Shift  Goal: Absence of new skin breakdown  Description: Absence of new skin breakdown  Outcome: Met This Shift     Problem: Serum Glucose Level - Abnormal:  Goal: Ability to maintain appropriate glucose levels will improve  Description: Ability to maintain appropriate glucose levels will improve  Outcome: Met This Shift

## 2020-07-12 NOTE — ED NOTES
During triage patient was falling asleep, and had to be addressed several times to answer questions. Her  is at the bed side and states that when he got her home yesterday that he and their son had to assist her with ambulation to and from the bathroom, that she could barley walk or stand.      Rob Ladd RN  07/12/20 3744

## 2020-07-13 LAB
ALBUMIN SERPL-MCNC: 3 GM/DL (ref 3.4–5)
ALP BLD-CCNC: 86 IU/L (ref 40–129)
ALT SERPL-CCNC: 17 U/L (ref 10–40)
ANION GAP SERPL CALCULATED.3IONS-SCNC: 10 MMOL/L (ref 4–16)
ANION GAP SERPL CALCULATED.3IONS-SCNC: 8 MMOL/L (ref 4–16)
AST SERPL-CCNC: 20 IU/L (ref 15–37)
BASOPHILS ABSOLUTE: 0 K/CU MM
BASOPHILS RELATIVE PERCENT: 0.5 % (ref 0–1)
BILIRUB SERPL-MCNC: 0.3 MG/DL (ref 0–1)
BUN BLDV-MCNC: 18 MG/DL (ref 6–23)
BUN BLDV-MCNC: 20 MG/DL (ref 6–23)
CALCIUM SERPL-MCNC: 8.6 MG/DL (ref 8.3–10.6)
CALCIUM SERPL-MCNC: 8.7 MG/DL (ref 8.3–10.6)
CHLORIDE BLD-SCNC: 101 MMOL/L (ref 99–110)
CHLORIDE BLD-SCNC: 101 MMOL/L (ref 99–110)
CO2: 23 MMOL/L (ref 21–32)
CO2: 26 MMOL/L (ref 21–32)
CREAT SERPL-MCNC: 0.9 MG/DL (ref 0.6–1.1)
CREAT SERPL-MCNC: 1 MG/DL (ref 0.6–1.1)
DIFFERENTIAL TYPE: ABNORMAL
EOSINOPHILS ABSOLUTE: 0.2 K/CU MM
EOSINOPHILS RELATIVE PERCENT: 2.9 % (ref 0–3)
GFR AFRICAN AMERICAN: >60 ML/MIN/1.73M2
GFR AFRICAN AMERICAN: >60 ML/MIN/1.73M2
GFR NON-AFRICAN AMERICAN: 54 ML/MIN/1.73M2
GFR NON-AFRICAN AMERICAN: >60 ML/MIN/1.73M2
GLUCOSE BLD-MCNC: 253 MG/DL (ref 70–99)
GLUCOSE BLD-MCNC: 286 MG/DL (ref 70–99)
GLUCOSE BLD-MCNC: 334 MG/DL (ref 70–99)
GLUCOSE BLD-MCNC: 372 MG/DL (ref 70–99)
GLUCOSE BLD-MCNC: 392 MG/DL (ref 70–99)
GLUCOSE BLD-MCNC: 418 MG/DL (ref 70–99)
GLUCOSE BLD-MCNC: 420 MG/DL (ref 70–99)
HCT VFR BLD CALC: 44 % (ref 37–47)
HEMOGLOBIN: 12.9 GM/DL (ref 12.5–16)
IMMATURE NEUTROPHIL %: 0.4 % (ref 0–0.43)
LACTATE: 1.8 MMOL/L (ref 0.4–2)
LYMPHOCYTES ABSOLUTE: 2.9 K/CU MM
LYMPHOCYTES RELATIVE PERCENT: 35 % (ref 24–44)
MCH RBC QN AUTO: 29.2 PG (ref 27–31)
MCHC RBC AUTO-ENTMCNC: 29.3 % (ref 32–36)
MCV RBC AUTO: 99.5 FL (ref 78–100)
MONOCYTES ABSOLUTE: 0.5 K/CU MM
MONOCYTES RELATIVE PERCENT: 6.5 % (ref 0–4)
PDW BLD-RTO: 14.2 % (ref 11.7–14.9)
PLATELET # BLD: 172 K/CU MM (ref 140–440)
PMV BLD AUTO: 10.4 FL (ref 7.5–11.1)
POTASSIUM SERPL-SCNC: 4.2 MMOL/L (ref 3.5–5.1)
POTASSIUM SERPL-SCNC: 4.6 MMOL/L (ref 3.5–5.1)
RBC # BLD: 4.42 M/CU MM (ref 4.2–5.4)
SEGMENTED NEUTROPHILS ABSOLUTE COUNT: 4.5 K/CU MM
SEGMENTED NEUTROPHILS RELATIVE PERCENT: 54.7 % (ref 36–66)
SODIUM BLD-SCNC: 134 MMOL/L (ref 135–145)
SODIUM BLD-SCNC: 135 MMOL/L (ref 135–145)
T4 FREE: 0.85 NG/DL (ref 0.9–1.8)
TOTAL IMMATURE NEUTOROPHIL: 0.03 K/CU MM
TOTAL PROTEIN: 6.5 GM/DL (ref 6.4–8.2)
TSH HIGH SENSITIVITY: 22.32 UIU/ML (ref 0.27–4.2)
WBC # BLD: 8.3 K/CU MM (ref 4–10.5)

## 2020-07-13 PROCEDURE — 97530 THERAPEUTIC ACTIVITIES: CPT

## 2020-07-13 PROCEDURE — 80048 BASIC METABOLIC PNL TOTAL CA: CPT

## 2020-07-13 PROCEDURE — 2140000000 HC CCU INTERMEDIATE R&B

## 2020-07-13 PROCEDURE — 84439 ASSAY OF FREE THYROXINE: CPT

## 2020-07-13 PROCEDURE — 2580000003 HC RX 258: Performed by: HOSPITALIST

## 2020-07-13 PROCEDURE — 97166 OT EVAL MOD COMPLEX 45 MIN: CPT

## 2020-07-13 PROCEDURE — 85025 COMPLETE CBC W/AUTO DIFF WBC: CPT

## 2020-07-13 PROCEDURE — 36415 COLL VENOUS BLD VENIPUNCTURE: CPT

## 2020-07-13 PROCEDURE — 82962 GLUCOSE BLOOD TEST: CPT

## 2020-07-13 PROCEDURE — 97162 PT EVAL MOD COMPLEX 30 MIN: CPT

## 2020-07-13 PROCEDURE — 84443 ASSAY THYROID STIM HORMONE: CPT

## 2020-07-13 PROCEDURE — 99222 1ST HOSP IP/OBS MODERATE 55: CPT | Performed by: PSYCHIATRY & NEUROLOGY

## 2020-07-13 PROCEDURE — 80053 COMPREHEN METABOLIC PANEL: CPT

## 2020-07-13 PROCEDURE — 6360000002 HC RX W HCPCS: Performed by: HOSPITALIST

## 2020-07-13 PROCEDURE — 6370000000 HC RX 637 (ALT 250 FOR IP): Performed by: HOSPITALIST

## 2020-07-13 PROCEDURE — 83605 ASSAY OF LACTIC ACID: CPT

## 2020-07-13 RX ORDER — DULOXETIN HYDROCHLORIDE 30 MG/1
60 CAPSULE, DELAYED RELEASE ORAL DAILY
Status: DISCONTINUED | OUTPATIENT
Start: 2020-07-14 | End: 2020-07-15 | Stop reason: HOSPADM

## 2020-07-13 RX ADMIN — PANTOPRAZOLE SODIUM 40 MG: 40 TABLET, DELAYED RELEASE ORAL at 08:00

## 2020-07-13 RX ADMIN — LEVOTHYROXINE SODIUM 300 MCG: 0.15 TABLET ORAL at 05:40

## 2020-07-13 RX ADMIN — SODIUM CHLORIDE: 9 INJECTION, SOLUTION INTRAVENOUS at 05:39

## 2020-07-13 RX ADMIN — INSULIN GLARGINE 50 UNITS: 100 INJECTION, SOLUTION SUBCUTANEOUS at 20:25

## 2020-07-13 RX ADMIN — SODIUM CHLORIDE, PRESERVATIVE FREE 10 ML: 5 INJECTION INTRAVENOUS at 20:27

## 2020-07-13 RX ADMIN — AMLODIPINE BESYLATE 10 MG: 10 TABLET ORAL at 08:00

## 2020-07-13 RX ADMIN — CIPROFLOXACIN 400 MG: 2 INJECTION, SOLUTION INTRAVENOUS at 08:01

## 2020-07-13 RX ADMIN — ASPIRIN 81 MG: 81 TABLET, COATED ORAL at 08:00

## 2020-07-13 RX ADMIN — SODIUM CHLORIDE, PRESERVATIVE FREE 10 ML: 5 INJECTION INTRAVENOUS at 08:02

## 2020-07-13 RX ADMIN — ATORVASTATIN CALCIUM 20 MG: 20 TABLET, FILM COATED ORAL at 20:25

## 2020-07-13 RX ADMIN — INSULIN LISPRO 10 UNITS: 100 INJECTION, SOLUTION INTRAVENOUS; SUBCUTANEOUS at 08:02

## 2020-07-13 RX ADMIN — HYDRALAZINE HYDROCHLORIDE 10 MG: 10 TABLET, FILM COATED ORAL at 08:00

## 2020-07-13 RX ADMIN — INSULIN LISPRO 12 UNITS: 100 INJECTION, SOLUTION INTRAVENOUS; SUBCUTANEOUS at 12:45

## 2020-07-13 RX ADMIN — CIPROFLOXACIN 400 MG: 2 INJECTION, SOLUTION INTRAVENOUS at 20:24

## 2020-07-13 RX ADMIN — HYDRALAZINE HYDROCHLORIDE 10 MG: 10 TABLET, FILM COATED ORAL at 20:25

## 2020-07-13 RX ADMIN — INSULIN LISPRO 6 UNITS: 100 INJECTION, SOLUTION INTRAVENOUS; SUBCUTANEOUS at 17:15

## 2020-07-13 RX ADMIN — DULOXETINE HYDROCHLORIDE 60 MG: 30 CAPSULE, DELAYED RELEASE ORAL at 08:00

## 2020-07-13 RX ADMIN — ATENOLOL 50 MG: 50 TABLET ORAL at 08:00

## 2020-07-13 RX ADMIN — ENOXAPARIN SODIUM 40 MG: 40 INJECTION SUBCUTANEOUS at 08:01

## 2020-07-13 RX ADMIN — ATENOLOL 50 MG: 50 TABLET ORAL at 20:25

## 2020-07-13 ASSESSMENT — PAIN SCALES - GENERAL
PAINLEVEL_OUTOF10: 0

## 2020-07-13 NOTE — PROGRESS NOTES
Lab called a physician alert to [facility]. Blood glucose with lab draw is 422 at 0200. It was 392 for this ALYSON Dill CNP notified of current blood glucose, no new orders at this time.    Ladi Vidal  2:55 AM

## 2020-07-13 NOTE — PROGRESS NOTES
Spoke with Jaydon Lezama at [facility], and notified her that patient is agreeable to Swing bed program.

## 2020-07-13 NOTE — CARE COORDINATION
SWING BED PROGRAM PRE-ADMISSION ASSESSMENT  (TRADITIONAL MEDICARE PATIENTS)  Patient Name: Edelmira Lipscomb      :   (24 y.o.) Gender: female   Inpatient Admission Date:  2020  Room:  MRN: [id number]      Inpatient Admission Date: 2020 Date & Time of Referral: 2020  10:40am     Referred By: Queta Santos MD Referred from:  [x] Alexandra   [] Other:     # of Skilled Care Days Used in Last 60 days: Insurance: [x]  Medicare                      []  Secondary - Type:     Present Condition/Diagnosis:    Ketosis ([address] [E88.89]  UTI (urinary tract infection) [N39.0]  Hyperglycemia [R73.9]  Generalized weakness [R53.1]  Acute cystitis with hematuria [N30.01]      Previous Medical History:   Past Medical History:   Diagnosis Date    Arthritis     Cellulitis of right foot     Chronic kidney disease     Depression     Diabetes mellitus ([address]     Frequent falls     GERD (gastroesophageal reflux disease)     Hallux valgus (acquired), right foot 2017    Hypertension     Other disorders of kidney and ureter     1 kidney.  Thyroid disease     hypothyroid    Type 2 diabetes mellitus with foot ulcer, with long-term current use of insulin ([address] 2012    Ulcer of right foot with fat layer exposed ([address] 2013        COGNITIVE/BEHAVIORAL  Change in cognitive status in last 90 days:  ( )no change  ( ) improved  ( ) deteriorated  Behavioral changes in last seven days:  ( ) wandering  ( ) verbally abusive  ( )phys.  Abusive                                                                         ( ) socially inappropriate  ( ) resists care  ADL Performance Last Seven (7) Days (Please Score)   Patients performance over all shifts during last seven (7) days   0 = Independent - No help or oversight  1 = Supervision - Oversight, encouragement or cueing provided  2 = Limited Assist - Receives physical help in guided maneuvering of limbs or other non-weight bearing assistance  3 = Extensive Assist - Patient performs part of the activity, weight bearing support was provided  4 = Dependence = Full staff performance of activity *NOTE: USE THE FOLLOWING SCORING FOR EATING ONLY:  1 = Supervision or Independent  2 = Limited Assist  3 = Dependent or Extensive Assist     SCORE   BED MOBILITY - How client moves to and from lying position, turns side to side, and positions body while in bed 3   TRANSFER - How resident moves between surfaces - to/from: bed, chair, wheelchair, standing position (EXCLUDE to/from bath/toilet) 3   TOILET USE - How client uses the toilet room (or commode, bedpan, urinal);transfers on/off toilet, cleanses, changes pad, manages ostomy or catheter, adjusts clothes 4   EATING (SCORE 1-3 ONLY*) - How client eats and drinks (regardless of skill). Includes intake of nourishment by other means (e.g., tube feeding, total parenteral nutrition) 1   Estimated Pre-Admission ADL Value: 11     PATIENT WILL RECEIVE THE FOLLOWING SKILLED SERVICES AS A SWING BED PATIENT:       REHABILITATION (PT/OT/SP)    [] ULTRA HIGH 720 or more minutes minimum per week of at least two (2) disciplines - 1st for at least five (5) days and 2nd for at least three (3) days   [] VERY HIGH 500 or more minutes minimum per week of at least one (1) discipline for at least five (5) days   [x] HIGH 325 or more minutes minimum per week of at least one (1) discipline for at least five (5) days   [] MEDIUM 150 or more minutes minimum per week at least five (5) days of any combination with three (3) therapies   [] LOW Restorative nursing at least six (6) days, two (2) activities, or therapies for at least three (3) days at least forty-five (45) minute per week minimum services.         EXTENSIVE SERVICES   [] Tracheostomy Care   [] Ventilator/Respirator   [] Infection Isolation   SPECIAL CARE HIGH   [] MS with ADL greater than or equal to 10  [] Quadriplegic with ADL greater than or equal to 5  [] emphysema/COPD and

## 2020-07-13 NOTE — CARE COORDINATION
LSW read PT/Ot notes and they are recommending SNF. RN spoke with pt and pt is agreeable to go to the Swing Bed Unit. Hattie Mauricio with admissions is looking over pt information.

## 2020-07-13 NOTE — PROGRESS NOTES
Occupational Therapy   Occupational Therapy Initial Assessment  Date: 2020   Patient Name: Jane Constantino  MRN: [id number]     : 1945    Date of Service: 2020    Discharge Recommendations:  Subacute/Skilled Nursing Facility       Assessment   Performance deficits / Impairments: Decreased functional mobility ; Decreased ADL status; Decreased strength;Decreased ROM; Decreased safe awareness;Decreased cognition;Decreased vision/visual deficit; Decreased balance;Decreased posture;Decreased endurance  Assessment: 68 yo female admitted to the hospital with hyperglycemia and UTI. She has a history of falls and when admitted was unable to stand for 2 up for 2 days. She states she had been independent using a 4 ww. She presents friendly and cooperative. She required mod x2 for supine to sit and sit to stand. She was able to side step with assist of 2 but unable to step forward to do a transfer. She was Min A for UB bathing and max A for LB, D for donning socks. Pt also seemed to have decreased STM and some delayed processing. Pt will benefit from OT and PT and a staying in a SNF for continues therapy to increase I  OT Education: OT Role;Plan of Care;Transfer Training;Orientation  Activity Tolerance  Activity Tolerance: Patient limited by fatigue(weakness)  Safety Devices  Safety Devices in place: Yes  Type of devices: Bed alarm in place;Call light within reach;Gait belt;Left in bed           Patient Diagnosis(es): The primary encounter diagnosis was Hyperglycemia. Diagnoses of Ketosis ([address] Generalized weakness, and Acute cystitis with hematuria were also pertinent to this visit.      has a past medical history of Arthritis, Cellulitis of right foot, Chronic kidney disease, Depression, Diabetes mellitus ([address] Frequent falls, GERD (gastroesophageal reflux disease), Hallux valgus (acquired), right foot, Hypertension, Other disorders of kidney and ureter, Thyroid disease, Type 2 diabetes mellitus with foot ulcer, with long-term current use of insulin ([facility] and Ulcer of right foot with fat layer exposed ([facility] has a past surgical history that includes Hysterectomy; Foot surgery (12/30/11); cyst removal; Kidney removal (1973); Abdomen surgery; hernia repair; Endoscopy, colon, diagnostic; and Dilatation, esophagus. Restrictions  Restrictions/Precautions  Restrictions/Precautions: Fall Risk  Position Activity Restriction  Other position/activity restrictions: telemery. O2 3L    Subjective   General  Chart Reviewed: Yes  Family / Caregiver Present: No  Patient Currently in Pain: No  Vital Signs  Patient Currently in Pain: No  Social/Functional History  Social/Functional History  Lives With: Spouse  Type of Home: House  Home Layout: One level, Laundry in basement  Home Access: Stairs to enter with rails  Entrance Stairs - Number of Steps: 5  Entrance Stairs - Rails: Both  Bathroom Shower/Tub: Tub/Shower unit  Bathroom Toilet: Standard  Bathroom Equipment: Grab bars in shower(has suction grab bars)  Home Equipment: 4 wheeled walker  ADL Assistance: [address] [place] [address]: Independent  Ambulation Assistance: Needs assistance  Transfer Assistance: Needs assistance  Active : No  Leisure & Hobbies: reading       Objective   Vision: Impaired  Vision Exceptions: Wears glasses at all times  Hearing: Exceptions to [facility]  Hearing Exceptions: Hard of hearing/hearing concerns    Orientation  Overall Orientation Status: Within Normal Limits  Observation/Palpation  Posture: Fair  Observation: lying in bed  Balance  Sitting Balance: Stand by assistance  Standing Balance: Moderate assistance  Functional Mobility  Functional - Mobility Device: Rolling Walker  Activity: (side step to [facility])  Assist Level:  Moderate assistance  Toilet Transfers  Toilet - Technique: (not assessed; weak and unable to attempt)  Toilet Transfer: Unable to assess  ADL  Feeding: Setup  UE Bathing: Contact guard assistance(slow movements)  LE Bathing: Maximum assistance(was unable to stand w/o assist to reach bottom; was unable to reach legs and feet)  LE Dressing: Dependent/Total  Toileting: Dependent/Total  Additional Comments: has pur wick  Tone RUE  RUE Tone: Normotonic  Tone LUE  LUE Tone: Normotonic  Coordination  Movements Are Fluid And Coordinated: Yes     Bed mobility  Bridgin Person assistance;Minimal assistance;Maximum assistance  Supine to Sit: 2 Person assistance; Moderate assistance  Sit to Supine: 2 Person assistance;Maximum assistance  Scooting: Minimal assistance  Transfers  Sit to stand: Moderate assistance;2 Person assistance  Stand to sit: Moderate assistance;2 Person assistance(+ max vc for hand placement)     Cognition  Overall Cognitive Status: Exceptions  Arousal/Alertness: Appropriate responses to stimuli  Following Commands: Follows one step commands with increased time; Follows one step commands with repetition  Attention Span: Attends with cues to redirect  Memory: Decreased recall of recent events;Decreased short term memory  Problem Solving: Assistance required to identify errors made;Assistance required to correct errors made;Decreased awareness of errors;Assistance required to generate solutions;Assistance required to implement solutions  Insights: Decreased awareness of deficits  Initiation: Requires cues for some  Sequencing: Requires cues for some        Sensation  Overall Sensation Status: WFL        LUE PROM (degrees)  LUE PROM: WFL  LUE AROM (degrees)  LUE General AROM: slightly decreased shoulder flex 0-95 and abd 0-95, elbow, wrist WFL  Left Hand AROM (degrees)  Left Hand AROM: WFL  RUE AROM (degrees)  RUE AROM : WFL  RUE General AROM: slightly decreased Shoulder flex/abd 0-95  LUE Strength  Gross LUE Strength: Exceptions to WFL(4-/5)  RUE Strength  Gross RUE Strength: Exceptions to WFL(4-/5)                   Plan   Plan  Times per week: 3+  Current Treatment Recommendations: Strengthening, ROM, Balance Training, Functional Mobility Training, Endurance Training, Safety Education & Training, Self-Care / ADL, Patient/Caregiver Education & Training, Cognitive/Perceptual Training    G-Code     OutComes Score                                                  AM-PAC Score  AM-PAC 6 click short form for inpatient daily activity:   How much help from another person does the patient currently need. .. Unable  Dep A Lot  Max A A Lot   Mod A A Little  Min A A Little   CGA  SBA None   Mod I  Indep  Sup   1. Putting on and taking off regular lower body clothing? [x] 1    [] 2   [] 2   [] 3   [] 3   [] 4      2. Bathing (including washing, rinsing, drying)? [] 1   [x] 2   [] 2 [] 3 [] 3 [] 4   3. Toileting, which includes using toilet, bedpan, or urinal? [x] 1    [] 2   [] 2   [] 3   [] 3   [] 4     4. Putting on and taking off regular upper body clothing? [] 1   [] 2   [x] 2   [] 3   [] 3    [] 4      5. Taking care of personal grooming such as brushing teeth? [] 1   [] 2    [] 2 [] 3    [x] 3   [] 4      6. Eating meals? [] 1   [] 2   [] 2   [] 3   [] 3   [x] 4      Raw Score:  13/24 60-79% impaired     [24=0% impaired(CH), 23=1-19%(CI), 20-22=20-39%(CJ), 15-19=40-59%(CK), 10-14=60-79%(CL), 7-9=80-99%(CM), 6=100%(CN)]                 Goals  Short term goals  Time Frame for Short term goals: until discharge or 1 wk 7/20/20  Short term goal 1: Pt will be min A for UB/LB bathing using good energy conservation techniques and with min vc  Short term goal 2: Pt will be Min A for UB/LB dressing using good energy conservation techniques and with min vc  Short term goal 3: Pt will be CGA for toilet transfers and SBA for toilet transfers  Patient Goals   Patient goals :  To be able to walk and to get stronger       Therapy Time   Individual Concurrent Group Co-treatment   Time In Rochelle Sawyer 45     0840   Time Out 0922     Rochelle Sawyer 45   Minutes 5     [address] [place], [place]

## 2020-07-13 NOTE — PROGRESS NOTES
Spoke with patient regarding the swing bed program and patient is agreeable to the program, but would like for someone to call her , Richard Eliana, and explain to him what the details of the program would be. I called Fred Bee and she is going to call Richard Monroy and talk to him.

## 2020-07-13 NOTE — PROGRESS NOTES
Physical Therapy    Facility/Department: [facility]  Initial Assessment    NAME: Jane [place]  : [birth year]  MRN: [id number]    Date of Service: 2020    Discharge Recommendations:  Subacute/Skilled Nursing Facility        Assessment   Body structures, Functions, Activity limitations: Decreased endurance;Decreased functional mobility ; Decreased high-level IADLs  Treatment Diagnosis: impaired mobility/debility  Prognosis: Good  Decision Making: Medium Complexity  History: PF  - Hx of falls  Exam: bed mobility /leg strength  Clinical Presentation: changing characterstics of function due to weakness/dbility  Barriers to Learning: none  REQUIRES PT FOLLOW UP: Yes  Activity Tolerance  Activity Tolerance: Patient Tolerated treatment well;Patient limited by endurance       Patient Diagnosis(es): The primary encounter diagnosis was Hyperglycemia. Diagnoses of Ketosis ([address] Generalized weakness, and Acute cystitis with hematuria were also pertinent to this visit. has a past medical history of Arthritis, Cellulitis of right foot, Chronic kidney disease, Depression, Diabetes mellitus ([address] Frequent falls, GERD (gastroesophageal reflux disease), Hallux valgus (acquired), right foot, Hypertension, Other disorders of kidney and ureter, Thyroid disease, Type 2 diabetes mellitus with foot ulcer, with long-term current use of insulin ([address] and Ulcer of right foot with fat layer exposed ([address] has a past surgical history that includes Hysterectomy; Foot surgery (11); cyst removal; Kidney removal (); Abdomen surgery; hernia repair; Endoscopy, colon, diagnostic; and Dilatation, esophagus. Restrictions  Restrictions/Precautions  Restrictions/Precautions: Fall Risk  Position Activity Restriction  Other position/activity restrictions: telemery.  O2 3L  Vision/Hearing  Vision: Impaired  Vision Exceptions: Wears glasses at all times  Hearing: Within functional limits     Subjective  General  Chart Training  Safety Devices  Type of devices: Bed alarm in place, Left in bed, Call light within reach, Gait belt    G-Code       OutComes Score                                                  AM-PAC Score        Basic Mobility Six Clicks Form [facility] AM-PAC Score Conversion Table   How much difficulty does the patient currently have Unable   (pt is unable to do activity) A Lot   (activity is a struggle, requires great effort/time) A Little   (pt can manage, but takes more effort/time than should) None   (pt has no difficulty) Raw Score Standardized Score CMS -100% Score CMS Modifier        6 23.55 100% CN   Turning over in bed (including adjusting bedclothes, sheets, and blankets)? []1 [x]2  []3  []4  7 26.42 92.36% CM        8 28.58 86.62% CM   Sitting down on and standing up from a chair with arms (e.g. wheelchair, bedside commode, etc.)? []1 [x]2 []3   []4   9 30.55 81.38% CM        10 32.29 76.75% CL   Moving from lying on back to sitting on the side of the bed? []1 [x]2  []3   []4   11 33.86 72.57% CL        12 35.33 68.66% CL   How much help from another person does the patient currently need Total   (Total/Dependent Assist) A Lot   (Max/Mod Assist) A Little   (Min/CGA/Supervision) None   (No human assistance) 13 36.74 64.91% CL        14 38.1 61.29% CL   Moving to and from a bed to a chair (including a wheelchair)? []1  [x]2   []3  []4   15 39.45 57.70% CK        16 40.78 54.16% CK   To walk in a hospital room? [x]1 []2   []3    []4  17 42.13 50.57% CK        18 43.63 46.58% CK   Climbing 3-5 steps with a railing?  [x]1  []2   []3    []4  19 45.44 41.77% CK        20 47.67 35.83% CJ   Raw Score 10  21 50.25 28.97% CJ   Standardized Score   22 53.28 20.91% CJ   CMS 0-100% Score   23 56.93 11.20% CI   CMS Modifier  24 61.14 0.00% CH     CH = 0% impaired  CI = 1-20% impaired  CJ = 20-40% impaired  CK = 40-60% impaired  CL = 60-80% impaired  CM = % impaired  CN = 100% impaired

## 2020-07-13 NOTE — PROGRESS NOTES
7/13/2020 0827  Last data filed at 7/13/2020 0802  Gross per 24 hour   Intake 2822 ml   Output 750 ml   Net 2072 ml      Vitals:   Vitals:    07/13/20 0715   BP: (!) 175/74   Pulse: 61   Resp: 13   Temp: 96.6 °F (35.9 °C)   SpO2: 99%     Physical Exam:   GEN Awake.  Alert , not in respiratory distress, not in pain  HEENT: PEERLA, , supple neck,   Chest: air entry equal bilaterally, no wheezing or crepitation  Heart: S1 and S2 heard, no murmur, no gallop or rub, regular rate  Abdomen: soft, ND , Nt, +BS  Extremities: no cyanosis, tenderness or erythema, peripheral pulses audible  Neurology: alert, oriented x3, able to move 4 limbs    Medications:   Medications:    insulin glargine  50 Units Subcutaneous Nightly    insulin lispro  15 Units Subcutaneous TID     [START ON 7/14/2020] DULoxetine  60 mg Oral Daily    amLODIPine  10 mg Oral Daily    aspirin  81 mg Oral Daily    atenolol  50 mg Oral BID    atorvastatin  20 mg Oral Daily    hydrALAZINE  10 mg Oral BID    levothyroxine  300 mcg Oral Daily    pantoprazole  40 mg Oral Daily    sodium chloride flush  10 mL Intravenous 2 times per day    enoxaparin  40 mg Subcutaneous Daily    ciprofloxacin  400 mg Intravenous Q12H    insulin lispro  0-12 Units Subcutaneous TID WC    insulin lispro  0-6 Units Subcutaneous Nightly      Infusions:    dextrose       PRN Meds: sodium chloride flush, 10 mL, PRN  acetaminophen, 650 mg, Q6H PRN    Or  acetaminophen, 650 mg, Q6H PRN  polyethylene glycol, 17 g, Daily PRN  ondansetron, 4 mg, Q6H PRN  glucose, 15 g, PRN  dextrose, 12.5 g, PRN  glucagon (rDNA), 1 mg, PRN  dextrose, 100 mL/hr, PRN          Electronically signed by Brown Mccormack MD on 7/13/2020 at 8:27 AM

## 2020-07-13 NOTE — CONSULTS
Initial Psychiatric History and Physical    Kimmie Tracey  [id number]  7/12/2020 07/13/20    ID: Patient is a 69 yrs y.o. female    CC: I am depressed but better     HPI:  Pt is a 70 yo  female who presents for exacerbation of depression. Pt noted recent exacarbation of mood but feels safe on her current medications. Pt noted she currently feels safe and comfortable on the unit. Pt was in agreement with treatment team.  Pt was polite and cordial during the interview process. Pt noted she is doing [place] today. \"  Pt noted she is sleeping \"okay. ..about 6 hours last night. \"  Pt noted her apptetite is okay. Pt rated her depresssion a \"4,\" on a scale of zero to ten with ten being the worst and zero being none. Pt rated her anxiety a \"4,\" on the same scale. Pt denied any thoughts to harm herself or anyone else. Pt denied any auditory or visiual hallucintations. Pt denied any hx of seizures, TBIs, Hep C or HIV  No TD noted, AIMS=0,     Pt noted hx of previous inpt psychiatric admissions  Pt denied any previous suicide attempts  Pt denied any family hx of suicides  Pt denied any family mental health hx    Pt denied any hx of abuse trauma or neglect, physical sexual or emotional.      Alcohol: denies any current  Street drugs: denies any current  Tobacco: denies any current  Caffeine: 2-3 per day      Past Psychiatric History:   See note above    Family Psychiatric History:   See note above    Family Psychiatric History:   Family History   Problem Relation Age of Onset    Arthritis Mother     Cancer Mother     Diabetes Mother     High Blood Pressure Mother     Asthma Father     Heart Disease Father     Diabetes Brother         Allergies:   Allergies   Allergen Reactions    Ritalin [Methylphenidate] Rash    Rocephin [Ceftriaxone Sodium-Lidocaine] Rash        OBJECTIVE  Vital Signs:  Vitals:    07/13/20 0715   BP: (!) 175/74   Pulse: 61   Resp: 13   Temp: 96.6 °F (35.9 °C)   SpO2: 99% Beta-Hydroxybutyrate    Collection Time: 07/12/20  7:15 AM   Result Value Ref Range    Beta-Hydroxybutyrate 16.8 (H) 0.0 - 3.0 MG/DL   Troponin    Collection Time: 07/12/20  7:15 AM   Result Value Ref Range    Troponin T <0.010 <0.01 NG/ML   Brain Natriuretic Peptide    Collection Time: 07/12/20  7:15 AM   Result Value Ref Range    Pro-.0 <300 PG/ML   Urinalysis with Microscopic    Collection Time: 07/12/20  7:30 AM   Result Value Ref Range    Color, UA YELLOW YELLOW    Clarity, UA CLOUDY CLEAR    Glucose, Urine >1,000 (A) NEGATIVE MG/DL    Bilirubin Urine NEGATIVE NEGATIVE MG/DL    Ketones, Urine TRACE NEGATIVE MG/DL    Specific Gravity, UA 1.020 1.001 - 1.035    Blood, Urine MODERATE NEGATIVE    pH, Urine 5.0 5.0 - 8.0    Protein, UA 30 (A) NEGATIVE MG/DL    Urobilinogen, Urine 1.0 0.2 - 1.0 MG/DL    Nitrite Urine, Quantitative NEGATIVE NEGATIVE    Leukocyte Esterase, Urine MODERATE NEGATIVE    RBC, UA 20 TO 31 0 - 6 /HPF    WBC, UA 58 TO 83 0 - 5 /HPF    Epithelial Cells, UA 2 TO 4 /HPF    Cast Type NEGATIVE (A) NO CAST FORMS SEEN /HPF    Bacteria, UA MANY NEGATIVE /HPF    Crystal Type NEGATIVE NEGATIVE /HPF   EKG 12 Lead    Collection Time: 07/12/20  7:45 AM   Result Value Ref Range    Ventricular Rate 80 BPM    Atrial Rate 80 BPM    P-R Interval 210 ms    QRS Duration 98 ms    Q-T Interval 422 ms    QTc Calculation (Bazett) 486 ms    P Axis 38 degrees    R Axis 8 degrees    T Axis 111 degrees    Diagnosis       Sinus rhythm with 1st degree AV block  Cannot rule out Inferior infarct (cited on or before 06-APR-2020)  Abnormal ECG  When compared with ECG of 06-APR-2020 01:12,  No significant change was found    Confirmed by [facility] Kalyn DAVISON ([id number]) on 7/12/2020 4:35:34 PM     POCT Glucose    Collection Time: 07/12/20  8:34 AM   Result Value Ref Range    POC Glucose >550 (H) 70 - 99 MG/DL   Hemoglobin A1c    Collection Time: 07/12/20  8:45 AM   Result Value Ref Range    Hemoglobin A1C 12.0 (H) 4.2 - 6.3 % eAG 298 mg/dL   Phosphorus    Collection Time: 07/12/20 11:00 AM   Result Value Ref Range    Phosphorus 5.2 (H) 2.5 - 4.9 MG/DL   Magnesium    Collection Time: 07/12/20 11:00 AM   Result Value Ref Range    Magnesium 2.2 1.8 - 2.4 mg/dl   POCT Glucose    Collection Time: 07/12/20 11:06 AM   Result Value Ref Range    POC Glucose 478 (H) 70 - 99 MG/DL   POCT Glucose    Collection Time: 07/12/20 12:11 PM   Result Value Ref Range    POC Glucose 414 (H) 70 - 99 MG/DL   POCT Glucose    Collection Time: 07/12/20  1:23 PM   Result Value Ref Range    POC Glucose 326 (H) 70 - 99 MG/DL   POCT Glucose    Collection Time: 07/12/20  2:15 PM   Result Value Ref Range    POC Glucose 292 (H) 70 - 99 MG/DL   POCT Glucose    Collection Time: 07/12/20  3:11 PM   Result Value Ref Range    POC Glucose 184 (H) 70 - 99 MG/DL   Basic metabolic panel    Collection Time: 07/12/20  4:00 PM   Result Value Ref Range    Sodium 139 135 - 145 MMOL/L    Potassium 4.2 3.5 - 5.1 MMOL/L    Chloride 102 99 - 110 mMol/L    CO2 24 21 - 32 MMOL/L    Anion Gap 13 4 - 16    BUN 24 (H) 6 - 23 MG/DL    CREATININE 1.0 0.6 - 1.1 MG/DL    Glucose 175 (H) 70 - 99 MG/DL    Calcium 9.1 8.3 - 10.6 MG/DL    GFR Non- 54 (L) >60 mL/min/1.73m2    GFR African American >60 >60 mL/min/1.73m2   POCT Glucose    Collection Time: 07/12/20  4:16 PM   Result Value Ref Range    POC Glucose 191 (H) 70 - 99 MG/DL   Basic metabolic panel    Collection Time: 07/12/20  8:00 PM   Result Value Ref Range    Sodium 139 135 - 145 MMOL/L    Potassium 4.4 3.5 - 5.1 MMOL/L    Chloride 103 99 - 110 mMol/L    CO2 24 21 - 32 MMOL/L    Anion Gap 12 4 - 16    BUN 22 6 - 23 MG/DL    CREATININE 1.0 0.6 - 1.1 MG/DL    Glucose 208 (H) 70 - 99 MG/DL    Calcium 8.7 8.3 - 10.6 MG/DL    GFR Non- 54 (L) >60 mL/min/1.73m2    GFR African American >60 >60 mL/min/1.73m2   POCT Glucose    Collection Time: 07/12/20  8:38 PM   Result Value Ref Range    POC Glucose 195 (H) 70 - 99 MG/DL   Comprehensive Metabolic Panel w/ Reflex to MG    Collection Time: 07/13/20  2:00 AM   Result Value Ref Range    Sodium 134 (L) 135 - 145 MMOL/L    Potassium 4.6 3.5 - 5.1 MMOL/L    Chloride 101 99 - 110 mMol/L    CO2 23 21 - 32 MMOL/L    BUN 20 6 - 23 MG/DL    CREATININE 1.0 0.6 - 1.1 MG/DL    Glucose 420 (HH) 70 - 99 MG/DL    Calcium 8.6 8.3 - 10.6 MG/DL    Alb 3.0 (L) 3.4 - 5.0 GM/DL    Total Protein 6.5 6.4 - 8.2 GM/DL    Total Bilirubin 0.3 0.0 - 1.0 MG/DL    ALT 17 10 - 40 U/L    AST 20 15 - 37 IU/L    Alkaline Phosphatase 86 40 - 129 IU/L    GFR Non- 54 (L) >60 mL/min/1.73m2    GFR African American >60 >60 mL/min/1.73m2    Anion Gap 10 4 - 16   Lactic acid, plasma    Collection Time: 07/13/20  2:00 AM   Result Value Ref Range    Lactate 1.8 0.4 - 2.0 mMOL/L   CBC auto differential    Collection Time: 07/13/20  2:00 AM   Result Value Ref Range    WBC 8.3 4.0 - 10.5 K/CU MM    RBC 4.42 4.2 - 5.4 M/CU MM    Hemoglobin 12.9 12.5 - 16.0 GM/DL    Hematocrit 44.0 37 - 47 %    MCV 99.5 78 - 100 FL    MCH 29.2 27 - 31 PG    MCHC 29.3 (L) 32.0 - 36.0 %    RDW 14.2 11.7 - 14.9 %    Platelets 789 882 - 247 K/CU MM    MPV 10.4 7.5 - 11.1 FL    Differential Type AUTOMATED DIFFERENTIAL     Segs Relative 54.7 36 - 66 %    Lymphocytes % 35.0 24 - 44 %    Monocytes % 6.5 (H) 0 - 4 %    Eosinophils % 2.9 0 - 3 %    Basophils % 0.5 0 - 1 %    Segs Absolute 4.5 K/CU MM    Lymphocytes Absolute 2.9 K/CU MM    Monocytes Absolute 0.5 K/CU MM    Eosinophils Absolute 0.2 K/CU MM    Basophils Absolute 0.0 K/CU MM    Immature Neutrophil % 0.4 0 - 0.43 %    Total Immature Neutrophil 0.03 K/CU MM   POCT Glucose    Collection Time: 07/13/20  2:01 AM   Result Value Ref Range    POC Glucose 392 (H) 70 - 99 MG/DL   POCT Glucose    Collection Time: 07/13/20  7:37 AM   Result Value Ref Range    POC Glucose 372 (H) 70 - 99 MG/DL   Basic Metabolic Panel    Collection Time: 07/13/20  8:39 AM   Result Value Ref Range    Sodium 135 135 - 145 MMOL/L    Potassium 4.2 3.5 - 5.1 MMOL/L    Chloride 101 99 - 110 mMol/L    CO2 26 21 - 32 MMOL/L    Anion Gap 8 4 - 16    BUN 18 6 - 23 MG/DL    CREATININE 0.9 0.6 - 1.1 MG/DL    Glucose 418 (HH) 70 - 99 MG/DL    Calcium 8.7 8.3 - 10.6 MG/DL    GFR Non-African American >60 >60 mL/min/1.73m2    GFR African American >60 >60 mL/min/1.73m2       Review of Systems:  Reports of no current cardiovascular, respiratory, gastrointestinal, genitourinary, integumentary, neurological, muscuoskeletal, or immunological symptoms today. PSYCHIATRIC: See HPI above. Neurologic examination:  Mental status: The patient is alert, attentive, and oriented. Speech is clear and fluent with good repetition, comprehension, and naming. She recalls 3/3 objects at 5 minutes. PSYCHIATRIC EXAMINATION / MENTAL STATUS EXAM    CONSTITUTIONAL:    Vitals:   Vitals:    07/13/20 0715   BP: (!) 175/74   Pulse: 61   Resp: 13   Temp: 96.6 °F (35.9 °C)   SpO2: 99%      General appearance: [x] appears age, []  appears older than stated age,               [x]  adequately dressed and groomed, [] disheveled,               [x]  in no acute distress, [] appears mildly distressed, [] other           MUSCULOSKELETAL:   Gait:   [] normal, [] antalgic, [] unsteady, [x] gait not evaluated   Station:             [] erect, [] sitting, [x] recumbent, [] other        Strength/tone:  [x] muscle strength and tone appear consistent with age and                                        condition     [] atrophy      [] abnormal movements  PSYCHIATRIC:    Appearance: appears stated age. alert and oriented to person, place, time & situation. no acute distress. Adequate grooming and hygeine. Good eye contact. No prominent physical abnormalities. Attitude: Manner is cooperative and pleasant  Motor: No psychomotor agitation, retardation or abnormal movements noted  Speech: Clearly articulated; normal rate, volume, tone & amount.   Language: intact understanding and production  Mood: okay  Affect: euthymic, full range, non-labile, congruent with mood and content of speech  Thought Production: Spontaneous. Thought Form: Coherent, linear, logical & goal-directed. No tangentiality or circumstantiality. No flight of ideas or loosening of associations. Thought Content/Perceptions: No BRANDYN, no AVH, no delusion  Insight: fair  Judgment fair  Memory: Immediate, recent, and remote appear intact, though not formally tested. Attention: maintained throughout interview  Fund of knowledge: Average  Gait/Balance: WNL/WNL           Impression:   MDD severe recurrent    Problem List:   <principal problem not specified>    Plan:  1. Reviewed treatment plan with patient including medication risks, benefits, side effects. Obtained informed consent for treatment. 2. Psychiatric management:medication initiation and titration, recommend outpt mental health follow up, safe and theraputic environment. 3. Status of problem/condition: ?Improving  4. Medical co-morbidities: Management per [facility], appreciate assistance  5. Legal Status: voluntary  6. The treatment team reviewed with the patient the diagnosis and treatment recommendations to include the risks, benefits, and side effects of chosen medications. 7. The patient verbalized understanding and agreed with the treatment regimen as outlined above. 8. Medical records, Labs, Diagnotic tests reviewed  9. Interval History. 10. Review current labs  11. Continue current medications  12. Supportive Therapy Provided  13. Pt had an opportunity to ask questions and address concerns  14. Pt encouraged to continue outpt  Therapy. 15. Pt was in agreement with treatment plan. 16. The risks benefits and side effects of medications were discussed with the patient, including alternatives and no treatment.           Electronically signed by Angle Larsen DO on 7/13/2020 at 11:29 AM

## 2020-07-13 NOTE — PLAN OF CARE
Problem: Falls - Risk of:  Goal: Will remain free from falls  Description: Will remain free from falls  7/12/2020 2148 by Guillaume Plata RN  Outcome: Ongoing  7/12/2020 1455 by Shi Adair RN  Outcome: Met This Shift  Goal: Absence of physical injury  Description: Absence of physical injury  7/12/2020 2148 by Guillaume Plata RN  Outcome: Ongoing  7/12/2020 1455 by Shi Adair RN  Outcome: Met This Shift     Problem: Skin Integrity:  Goal: Will show no infection signs and symptoms  Description: Will show no infection signs and symptoms  7/12/2020 2148 by Guillaume Plata RN  Outcome: Ongoing  7/12/2020 1455 by Shi Adair RN  Outcome: Met This Shift  Goal: Absence of new skin breakdown  Description: Absence of new skin breakdown  7/12/2020 2148 by Guillaume Plata RN  Outcome: Ongoing  7/12/2020 1455 by Shi Adair RN  Outcome: Met This Shift     Problem:  Activity:  Goal: Ability to tolerate increased activity will improve  Description: Ability to tolerate increased activity will improve  7/12/2020 2148 by Guillaume Plata RN  Outcome: Ongoing  7/12/2020 1455 by Shi Adair RN  Outcome: Ongoing     Problem: Health Behavior:  Goal: Compliance with therapeutic regimen will improve  Description: Compliance with therapeutic regimen will improve  7/12/2020 2148 by Guillaume Plata RN  Outcome: Ongoing  7/12/2020 1455 by Shi Adair RN  Outcome: Ongoing  Goal: Ability to keep healthcare appointments will improve  Description: Ability to keep healthcare appointments will improve  7/12/2020 2148 by Guillaume Plata RN  Outcome: Ongoing  7/12/2020 1455 by Shi Adair RN  Outcome: Ongoing     Problem: Discharge Planning:  Goal: Discharged to appropriate level of care  Description: Discharged to appropriate level of care  7/12/2020 2148 by Guillaume Plata RN  Outcome: Ongoing  7/12/2020 1455 by Shi Adair RN  Outcome: Ongoing     Problem: Serum Glucose Level - Abnormal:  Goal: Ability to maintain appropriate glucose levels will improve  Description: Ability to maintain appropriate glucose levels will improve  7/12/2020 2148 by Filemon Mota RN  Outcome: Ongoing  7/12/2020 1455 by Vandana Reinoso RN  Outcome: Met This Shift     Problem: Pain:  Goal: Pain level will decrease  Description: Pain level will decrease  Outcome: Ongoing  Goal: Control of acute pain  Description: Control of acute pain  Outcome: Ongoing  Goal: Control of chronic pain  Description: Control of chronic pain  Outcome: Ongoing

## 2020-07-14 LAB
ALBUMIN SERPL-MCNC: 3.3 GM/DL (ref 3.4–5)
ALP BLD-CCNC: 75 IU/L (ref 40–129)
ALT SERPL-CCNC: 13 U/L (ref 10–40)
ANION GAP SERPL CALCULATED.3IONS-SCNC: 15 MMOL/L (ref 4–16)
AST SERPL-CCNC: 28 IU/L (ref 15–37)
BILIRUB SERPL-MCNC: 0.4 MG/DL (ref 0–1)
BUN BLDV-MCNC: 19 MG/DL (ref 6–23)
CALCIUM SERPL-MCNC: 9.1 MG/DL (ref 8.3–10.6)
CHLORIDE BLD-SCNC: 101 MMOL/L (ref 99–110)
CO2: 22 MMOL/L (ref 21–32)
CREAT SERPL-MCNC: 1 MG/DL (ref 0.6–1.1)
CULTURE: ABNORMAL
GFR AFRICAN AMERICAN: >60 ML/MIN/1.73M2
GFR NON-AFRICAN AMERICAN: 54 ML/MIN/1.73M2
GLUCOSE BLD-MCNC: 251 MG/DL (ref 70–99)
GLUCOSE BLD-MCNC: 287 MG/DL (ref 70–99)
GLUCOSE BLD-MCNC: 299 MG/DL (ref 70–99)
GLUCOSE BLD-MCNC: 310 MG/DL (ref 70–99)
GLUCOSE BLD-MCNC: 355 MG/DL (ref 70–99)
HCT VFR BLD CALC: 42.9 % (ref 37–47)
HEMOGLOBIN: 13.2 GM/DL (ref 12.5–16)
Lab: ABNORMAL
MAGNESIUM: 1.7 MG/DL (ref 1.8–2.4)
MCH RBC QN AUTO: 28.9 PG (ref 27–31)
MCHC RBC AUTO-ENTMCNC: 30.8 % (ref 32–36)
MCV RBC AUTO: 94.1 FL (ref 78–100)
PDW BLD-RTO: 13.9 % (ref 11.7–14.9)
PHOSPHORUS: 2.9 MG/DL (ref 2.5–4.9)
PLATELET # BLD: 197 K/CU MM (ref 140–440)
PMV BLD AUTO: 10.6 FL (ref 7.5–11.1)
POTASSIUM SERPL-SCNC: 4.7 MMOL/L (ref 3.5–5.1)
RBC # BLD: 4.56 M/CU MM (ref 4.2–5.4)
SODIUM BLD-SCNC: 138 MMOL/L (ref 135–145)
SPECIMEN: ABNORMAL
TOTAL PROTEIN: 6.7 GM/DL (ref 6.4–8.2)
WBC # BLD: 8.8 K/CU MM (ref 4–10.5)

## 2020-07-14 PROCEDURE — 84100 ASSAY OF PHOSPHORUS: CPT

## 2020-07-14 PROCEDURE — 97530 THERAPEUTIC ACTIVITIES: CPT

## 2020-07-14 PROCEDURE — 6370000000 HC RX 637 (ALT 250 FOR IP): Performed by: INTERNAL MEDICINE

## 2020-07-14 PROCEDURE — 80053 COMPREHEN METABOLIC PANEL: CPT

## 2020-07-14 PROCEDURE — 83735 ASSAY OF MAGNESIUM: CPT

## 2020-07-14 PROCEDURE — 6370000000 HC RX 637 (ALT 250 FOR IP): Performed by: HOSPITALIST

## 2020-07-14 PROCEDURE — 36415 COLL VENOUS BLD VENIPUNCTURE: CPT

## 2020-07-14 PROCEDURE — 2580000003 HC RX 258: Performed by: HOSPITALIST

## 2020-07-14 PROCEDURE — 6360000002 HC RX W HCPCS: Performed by: INTERNAL MEDICINE

## 2020-07-14 PROCEDURE — 82962 GLUCOSE BLOOD TEST: CPT

## 2020-07-14 PROCEDURE — 2140000000 HC CCU INTERMEDIATE R&B

## 2020-07-14 PROCEDURE — 6360000002 HC RX W HCPCS: Performed by: HOSPITALIST

## 2020-07-14 PROCEDURE — 85027 COMPLETE CBC AUTOMATED: CPT

## 2020-07-14 RX ORDER — SULFAMETHOXAZOLE AND TRIMETHOPRIM 800; 160 MG/1; MG/1
1 TABLET ORAL EVERY 12 HOURS SCHEDULED
Status: DISCONTINUED | OUTPATIENT
Start: 2020-07-14 | End: 2020-07-15 | Stop reason: HOSPADM

## 2020-07-14 RX ORDER — MAGNESIUM SULFATE IN WATER 40 MG/ML
2 INJECTION, SOLUTION INTRAVENOUS ONCE
Status: COMPLETED | OUTPATIENT
Start: 2020-07-14 | End: 2020-07-14

## 2020-07-14 RX ORDER — LISINOPRIL 5 MG/1
5 TABLET ORAL DAILY
Status: DISCONTINUED | OUTPATIENT
Start: 2020-07-14 | End: 2020-07-15 | Stop reason: HOSPADM

## 2020-07-14 RX ADMIN — MAGNESIUM SULFATE 2 G: 2 INJECTION INTRAVENOUS at 17:45

## 2020-07-14 RX ADMIN — CIPROFLOXACIN 400 MG: 2 INJECTION, SOLUTION INTRAVENOUS at 09:17

## 2020-07-14 RX ADMIN — INSULIN LISPRO 8 UNITS: 100 INJECTION, SOLUTION INTRAVENOUS; SUBCUTANEOUS at 17:39

## 2020-07-14 RX ADMIN — HYDRALAZINE HYDROCHLORIDE 10 MG: 10 TABLET, FILM COATED ORAL at 12:54

## 2020-07-14 RX ADMIN — ATENOLOL 50 MG: 50 TABLET ORAL at 20:49

## 2020-07-14 RX ADMIN — SODIUM CHLORIDE, PRESERVATIVE FREE 10 ML: 5 INJECTION INTRAVENOUS at 23:41

## 2020-07-14 RX ADMIN — HYDRALAZINE HYDROCHLORIDE 10 MG: 10 TABLET, FILM COATED ORAL at 20:50

## 2020-07-14 RX ADMIN — LEVOTHYROXINE SODIUM 300 MCG: 0.15 TABLET ORAL at 05:50

## 2020-07-14 RX ADMIN — DULOXETINE HYDROCHLORIDE 60 MG: 30 CAPSULE, DELAYED RELEASE ORAL at 12:53

## 2020-07-14 RX ADMIN — SULFAMETHOXAZOLE AND TRIMETHOPRIM 1 TABLET: 800; 160 TABLET ORAL at 20:49

## 2020-07-14 RX ADMIN — INSULIN LISPRO 6 UNITS: 100 INJECTION, SOLUTION INTRAVENOUS; SUBCUTANEOUS at 09:17

## 2020-07-14 RX ADMIN — ATORVASTATIN CALCIUM 20 MG: 20 TABLET, FILM COATED ORAL at 20:50

## 2020-07-14 RX ADMIN — AMLODIPINE BESYLATE 10 MG: 10 TABLET ORAL at 12:53

## 2020-07-14 RX ADMIN — INSULIN LISPRO 10 UNITS: 100 INJECTION, SOLUTION INTRAVENOUS; SUBCUTANEOUS at 12:54

## 2020-07-14 RX ADMIN — LISINOPRIL 5 MG: 5 TABLET ORAL at 12:53

## 2020-07-14 RX ADMIN — ATENOLOL 50 MG: 50 TABLET ORAL at 12:53

## 2020-07-14 RX ADMIN — ASPIRIN 81 MG: 81 TABLET, COATED ORAL at 12:53

## 2020-07-14 RX ADMIN — ENOXAPARIN SODIUM 40 MG: 40 INJECTION SUBCUTANEOUS at 08:30

## 2020-07-14 RX ADMIN — INSULIN GLARGINE 50 UNITS: 100 INJECTION, SOLUTION SUBCUTANEOUS at 20:55

## 2020-07-14 RX ADMIN — PANTOPRAZOLE SODIUM 40 MG: 40 TABLET, DELAYED RELEASE ORAL at 08:30

## 2020-07-14 ASSESSMENT — PAIN SCALES - GENERAL
PAINLEVEL_OUTOF10: 0

## 2020-07-14 NOTE — PROGRESS NOTES
Hospitalist Progress Note       Sonia Neil M.D.  7/14/2020 7:50 AM  Admit Date: 7/12/2020    PCP: Marilyn Duverney, PA     Assessment and Plan:   1.  E.coli uti  - sensitivities pending: resistant to cipro  - will change to PO once ready- bactrim  - patient is medically cleared for discharge, awaiting ptot assessment    2. Uncontrolled DM2 with hyperglycemia with HAGMA 16 a1c on 7/12 is 12  - insulin infusion dc yesterday  - is on amaryl at home; not on this here  - may be related to uti  - gap reopening  - was started on lantus 50 units at night, and is on medium sliding scale along with fixed dose humalog TID; fixed dose is increased from 15 to 18 units tid w meals  - insulin may need adjustments further, possible dc back to [facility] tomorrow    3. HTN  - BP reviewed  - is obese as well bmi 32.2; may benefit from spironolactone, however is poorly controlled dm2, may consider lisinopril instead    4. Obesity with BMI 32.2  - diet and lifestyle modification    5. weakness and debility  - ptot  - medically stable for discharge    6. Hypomagnesemia  - replace    Patient Active Problem List:     SIRS (systemic inflammatory response syndrome) (HCC)     Type 2 diabetes mellitus with foot ulcer, with long-term current use of insulin (HCC)     Ulcer of right foot with fat layer exposed ([address]     Hallux valgus (acquired), right foot     Essential hypertension     Chronic pain syndrome     Infective urethritis     Chest pain     Hypertension     Type 2 diabetes mellitus (HCC)     GERD (gastroesophageal reflux disease)     Nausea and vomiting     Thyroid disease     UTI (urinary tract infection)      Subjective:     Chief Complaint   Patient presents with    Hyperglycemia     Was seen here yesterday, treated for UTI, now here because BS is 431       F/U:  Interval History: just feels weak, not light headed is not dizzy, denied fever chills nausea vomiting constipation or diarrhea.  Reports that for a couple of weeks she has been feeling more weak,normally able to get around with her walker, now less     Objective: Intake/Output Summary (Last 24 hours) at 7/14/2020 0750  Last data filed at 7/13/2020 1825  Gross per 24 hour   Intake 400 ml   Output 1400 ml   Net -1000 ml      Vitals:   Vitals:    07/14/20 0010   BP: 135/75   Pulse: 66   Resp: 12   Temp: 98.6 °F (37 °C)   SpO2:      Physical Exam:  Gen:  awake, alert, cooperative, no apparent distress   EYES:  Lids and lashes normal, pupils equal, round and reactive to light, extra ocular muscles intact, sclera clear, conjunctiva normal  ENT:  Normocephalic, oral pharynx with moist mucus membranes, tonsils without erythema or exudates,  NECK:  Supple, symmetrical, trachea midline, no adenopathy,  LUNGS:  Clear to auscultate bilaterally, no rales ronchi or wheezing noted. CARDIOVASCULAR:  regular rate and rhythm, normal S1 and S2, no S3 or S4, and no murmur noted  ABDOMEN: Normal BS, Non tender, non distended, no HSM noted. MUSCULOSKELETAL: rom intact throughout, proximal muscle weakness, inability to maintaine erect posture on own  NEUROLOGIC: AOx 3,  Cranial nerves II-XII are grossly intact. Motor is 5 out of 5 bilaterally. Sensory is intact  SKIN:  no bruising or bleeding, normal skin color, texture, turgor and no redness, warmth, or swelling    Ct Head Wo Contrast    Result Date: 7/11/2020  EXAMINATION: CT OF THE HEAD WITHOUT CONTRAST  7/11/2020 4:37 am TECHNIQUE: CT of the head was performed without the administration of intravenous contrast. Dose modulation, iterative reconstruction, and/or weight based adjustment of the mA/kV was utilized to reduce the radiation dose to as low as reasonably achievable. COMPARISON: 04/09/2020 HISTORY: ORDERING SYSTEM PROVIDED HISTORY: head pain TECHNOLOGIST PROVIDED HISTORY: Has a \"code stroke\" or \"stroke alert\" been called? ->No Reason for exam:->head pain Reason for Exam: headache Acuity: Acute Type of Exam: Initial Additional signs and symptoms: headache Relevant Medical/Surgical History: headache FINDINGS: BRAIN/VENTRICLES: There is no acute intracranial hemorrhage, mass effect or midline shift. No abnormal extra-axial fluid collection. The gray-white differentiation is maintained without evidence of an acute infarct. There is no evidence of hydrocephalus. Mild generalized age-appropriate cortical atrophy. Periventricular and subcortical white matter low attenuation is seen. No wedge-shaped area of acute ischemia is identified. No intracranial mass is identified. No basilar cistern or sulcal effacement is identified. ORBITS: The visualized portion of the orbits demonstrate no acute abnormality. SINUSES: The visualized paranasal sinuses and mastoid air cells demonstrate no acute abnormality. SOFT TISSUES/SKULL:  No acute abnormality of the visualized skull or soft tissues. Stable appearing CT scan of the head without acute intracranial abnormality. Chronic microvascular ischemic changes and age-appropriate atrophy. Xr Chest Portable    Result Date: 7/12/2020  EXAMINATION: ONE XRAY VIEW OF THE CHEST 7/12/2020 7:46 am COMPARISON: 12/10/2019 HISTORY: ORDERING SYSTEM PROVIDED HISTORY: chest pain TECHNOLOGIST PROVIDED HISTORY: Reason for exam:->chest pain Reason for Exam: pt. states trouble with blood sugar Acuity: Acute Type of Exam: Initial Additional signs and symptoms: pt. states trouble with blood sugar Relevant Medical/Surgical History: pt. states trouble with blood sugar FINDINGS: Monitor wires project over the thorax. Lungs are clear. No pleural effusion or pneumothorax. Normal cardiomediastinal silhouette and pulmonary vascularity. No acute osseous abnormality.      No acute cardiopulmonary disease.   -    DATA:    CBC   Recent Labs     07/12/20  0630 07/13/20  0200 07/14/20  0455   WBC 6.9 8.3 8.8   HGB 14.0 12.9 13.2   HCT 46.0 44.0 42.9    172 197      BMP   Recent Labs     07/12/20  1100 07/13/20  0200 07/13/20  0839 07/14/20  0455   NA  --    < > 134* 135 138   K  --    < > 4.6 4.2 4.7   CL  --    < > 101 101 101   CO2  --    < > 23 26 22   PHOS 5.2*  --   --   --  2.9   BUN  --    < > 20 18 19   CREATININE  --    < > 1.0 0.9 1.0    < > = values in this interval not displayed. LFT'S   Recent Labs     07/12/20  0630 07/13/20  0200 07/14/20 0455   AST 33 20 28   ALT 32 17 13   BILITOT 0.6 0.3 0.4   ALKPHOS 109 86 75     COAG No results for input(s): INR in the last 72 hours. CARDIAC ENZYMES  No results for input(s): CKTOTAL, CKMB, CKMBINDEX, TROPONINI in the last 72 hours.   U/A:    Lab Results   Component Value Date    COLORU YELLOW 07/12/2020    WBCUA 58 TO 83 07/12/2020    RBCUA 20 TO 31 07/12/2020    MUCUS 3+ 07/11/2020    BACTERIA MANY 07/12/2020    CLARITYU CLOUDY 07/12/2020    SPECGRAV 1.020 07/12/2020    LEUKOCYTESUR MODERATE 07/12/2020    BLOODU MODERATE 07/12/2020         Lilly Garcia MD  [facility]

## 2020-07-14 NOTE — PROGRESS NOTES
Occupational Therapy    Occupational Therapy Treatment Note  Name: Blanca Negron MRN: [id number] :   1945   Date:  2020   Admission Date: 2020 Room:  005/005-01   Restrictions/Precautions:  Restrictions/Precautions  Restrictions/Precautions: Fall Risk   Communication with other providers:   Cleared for treatment by RN. Subjective:  Patient states: \"I need to get up but I'm scared\"  Pain:   Location, Type, Intensity (0/10 to 10/10):  0/10  Objective:    Observation:  Pt alert and oriented. Objective Measures:  HR 68  Treatment, including education:  Transfers  Supine to sit :Mod A  Scooting : Max A   Rolling : Mod A  Sit to stand : Mod A  Stand to sit :Min A  SPT:Mod A          Therapeutic Activity Training:   Therapeutic activity training was instructed today. Cues were given for safety, sequence, UE/LE placement, awareness, and balance. Activities performed today included bed mobility training, sup-sit, sit-stand, SPT. Pt sat at EOB x 10 min with SBA. Pt completed SPT with Mod A requiring assistance and cues to turns hips towards chair but pt able to make small movements with stepping and was slow for initiating movements. Pt completed static standing for 1 min before requiring rest break with CGA to Min A for support and mod verbal cues for posture. Safety Measures: Gait belt used, Left in bed, Pull/Bed Alarm activated and call light left in reach          Assessment / Impression:        Patient's tolerance of treatment: Good   Adverse Reaction: None  Significant change in status and impact:  None  Barriers to improvement:  Dec strength and endurance    Plan for Next Session:    Continue with POC. Time in:  1135  Time out:  1205  Timed treatment minutes:  30  Total treatment time:  30  Electronically signed by:    AVERY Barr 2020, 12:06 PM    Previously filed values:  Patient Goals   Patient goals :  To be able to walk and to get stronger  Short term goals  Time Frame for Short term goals: until discharge or 1 wk 7/20/20  Short term goal 1: Pt will be min A for UB/LB bathing using good energy conservation techniques and with min vc  Short term goal 2: Pt will be Min A for UB/LB dressing using good energy conservation techniques and with min vc  Short term goal 3: Pt will be CGA for toilet transfers and SBA for toilet transfers

## 2020-07-15 ENCOUNTER — HOSPITAL ENCOUNTER (INPATIENT)
Age: 75
LOS: 29 days | Discharge: HOME HEALTH | DRG: 948 | End: 2020-08-13
Attending: INTERNAL MEDICINE | Admitting: INTERNAL MEDICINE
Payer: MEDICARE

## 2020-07-15 VITALS
HEIGHT: 66 IN | TEMPERATURE: 98.4 F | SYSTOLIC BLOOD PRESSURE: 142 MMHG | HEART RATE: 65 BPM | BODY MASS INDEX: 31.34 KG/M2 | OXYGEN SATURATION: 95 % | WEIGHT: 195 LBS | DIASTOLIC BLOOD PRESSURE: 73 MMHG | RESPIRATION RATE: 13 BRPM

## 2020-07-15 PROBLEM — R53.1 WEAKNESS: Status: ACTIVE | Noted: 2020-07-15

## 2020-07-15 LAB
GLUCOSE BLD-MCNC: 123 MG/DL (ref 70–99)
GLUCOSE BLD-MCNC: 286 MG/DL (ref 70–99)
GLUCOSE BLD-MCNC: 290 MG/DL (ref 70–99)
GLUCOSE BLD-MCNC: 344 MG/DL (ref 70–99)
MAGNESIUM: 2 MG/DL (ref 1.8–2.4)

## 2020-07-15 PROCEDURE — 97162 PT EVAL MOD COMPLEX 30 MIN: CPT

## 2020-07-15 PROCEDURE — 6370000000 HC RX 637 (ALT 250 FOR IP): Performed by: INTERNAL MEDICINE

## 2020-07-15 PROCEDURE — 6360000002 HC RX W HCPCS: Performed by: HOSPITALIST

## 2020-07-15 PROCEDURE — 2580000003 HC RX 258: Performed by: HOSPITALIST

## 2020-07-15 PROCEDURE — 97116 GAIT TRAINING THERAPY: CPT

## 2020-07-15 PROCEDURE — 83735 ASSAY OF MAGNESIUM: CPT

## 2020-07-15 PROCEDURE — 97530 THERAPEUTIC ACTIVITIES: CPT

## 2020-07-15 PROCEDURE — 97166 OT EVAL MOD COMPLEX 45 MIN: CPT

## 2020-07-15 PROCEDURE — 82962 GLUCOSE BLOOD TEST: CPT

## 2020-07-15 PROCEDURE — 6370000000 HC RX 637 (ALT 250 FOR IP): Performed by: HOSPITALIST

## 2020-07-15 PROCEDURE — 36415 COLL VENOUS BLD VENIPUNCTURE: CPT

## 2020-07-15 PROCEDURE — 1200000002 HC SEMI PRIVATE SWING BED

## 2020-07-15 RX ORDER — DULOXETIN HYDROCHLORIDE 30 MG/1
60 CAPSULE, DELAYED RELEASE ORAL DAILY
Status: CANCELLED | OUTPATIENT
Start: 2020-07-16

## 2020-07-15 RX ORDER — POLYETHYLENE GLYCOL 3350 17 G/17G
17 POWDER, FOR SOLUTION ORAL DAILY PRN
Status: DISCONTINUED | OUTPATIENT
Start: 2020-07-15 | End: 2020-08-13 | Stop reason: HOSPADM

## 2020-07-15 RX ORDER — ACETAMINOPHEN 325 MG/1
650 TABLET ORAL EVERY 6 HOURS PRN
Status: DISCONTINUED | OUTPATIENT
Start: 2020-07-15 | End: 2020-08-13 | Stop reason: HOSPADM

## 2020-07-15 RX ORDER — ACETAMINOPHEN 650 MG/1
650 SUPPOSITORY RECTAL EVERY 6 HOURS PRN
Status: CANCELLED | OUTPATIENT
Start: 2020-07-15

## 2020-07-15 RX ORDER — SODIUM CHLORIDE 0.9 % (FLUSH) 0.9 %
10 SYRINGE (ML) INJECTION PRN
Status: DISCONTINUED | OUTPATIENT
Start: 2020-07-15 | End: 2020-08-13 | Stop reason: HOSPADM

## 2020-07-15 RX ORDER — NICOTINE POLACRILEX 4 MG
15 LOZENGE BUCCAL PRN
Status: DISCONTINUED | OUTPATIENT
Start: 2020-07-15 | End: 2020-08-13 | Stop reason: HOSPADM

## 2020-07-15 RX ORDER — AMLODIPINE BESYLATE 10 MG/1
10 TABLET ORAL DAILY
Status: CANCELLED | OUTPATIENT
Start: 2020-07-16

## 2020-07-15 RX ORDER — PREDNISONE 1 MG/1
5 TABLET ORAL DAILY
Status: DISCONTINUED | OUTPATIENT
Start: 2020-07-15 | End: 2020-07-16

## 2020-07-15 RX ORDER — LISINOPRIL 5 MG/1
5 TABLET ORAL DAILY
Status: CANCELLED | OUTPATIENT
Start: 2020-07-16

## 2020-07-15 RX ORDER — SODIUM CHLORIDE 0.9 % (FLUSH) 0.9 %
10 SYRINGE (ML) INJECTION EVERY 12 HOURS SCHEDULED
Status: DISCONTINUED | OUTPATIENT
Start: 2020-07-15 | End: 2020-07-20 | Stop reason: ALTCHOICE

## 2020-07-15 RX ORDER — ATORVASTATIN CALCIUM 20 MG/1
20 TABLET, FILM COATED ORAL NIGHTLY
Status: DISCONTINUED | OUTPATIENT
Start: 2020-07-15 | End: 2020-08-13 | Stop reason: HOSPADM

## 2020-07-15 RX ORDER — SODIUM CHLORIDE 0.9 % (FLUSH) 0.9 %
10 SYRINGE (ML) INJECTION EVERY 12 HOURS SCHEDULED
Status: CANCELLED | OUTPATIENT
Start: 2020-07-15

## 2020-07-15 RX ORDER — POLYETHYLENE GLYCOL 3350 17 G/17G
17 POWDER, FOR SOLUTION ORAL DAILY PRN
Status: CANCELLED | OUTPATIENT
Start: 2020-07-15

## 2020-07-15 RX ORDER — SULFAMETHOXAZOLE AND TRIMETHOPRIM 800; 160 MG/1; MG/1
1 TABLET ORAL EVERY 12 HOURS SCHEDULED
Status: COMPLETED | OUTPATIENT
Start: 2020-07-15 | End: 2020-07-19

## 2020-07-15 RX ORDER — ASPIRIN 81 MG/1
81 TABLET ORAL DAILY
Status: DISCONTINUED | OUTPATIENT
Start: 2020-07-16 | End: 2020-08-13 | Stop reason: HOSPADM

## 2020-07-15 RX ORDER — ATENOLOL 50 MG/1
50 TABLET ORAL 2 TIMES DAILY
Status: CANCELLED | OUTPATIENT
Start: 2020-07-15

## 2020-07-15 RX ORDER — PANTOPRAZOLE SODIUM 40 MG/1
40 TABLET, DELAYED RELEASE ORAL DAILY
Status: CANCELLED | OUTPATIENT
Start: 2020-07-16

## 2020-07-15 RX ORDER — LEVOTHYROXINE SODIUM 0.15 MG/1
300 TABLET ORAL DAILY
Status: DISCONTINUED | OUTPATIENT
Start: 2020-07-16 | End: 2020-08-12

## 2020-07-15 RX ORDER — ACETAMINOPHEN 325 MG/1
650 TABLET ORAL EVERY 6 HOURS PRN
Status: CANCELLED | OUTPATIENT
Start: 2020-07-15

## 2020-07-15 RX ORDER — ACETAMINOPHEN 650 MG/1
650 SUPPOSITORY RECTAL EVERY 6 HOURS PRN
Status: DISCONTINUED | OUTPATIENT
Start: 2020-07-15 | End: 2020-08-13 | Stop reason: HOSPADM

## 2020-07-15 RX ORDER — NICOTINE POLACRILEX 4 MG
15 LOZENGE BUCCAL PRN
Status: CANCELLED | OUTPATIENT
Start: 2020-07-15

## 2020-07-15 RX ORDER — LISINOPRIL 5 MG/1
5 TABLET ORAL DAILY
Status: DISCONTINUED | OUTPATIENT
Start: 2020-07-16 | End: 2020-07-19

## 2020-07-15 RX ORDER — ONDANSETRON 2 MG/ML
4 INJECTION INTRAMUSCULAR; INTRAVENOUS EVERY 6 HOURS PRN
Status: DISCONTINUED | OUTPATIENT
Start: 2020-07-15 | End: 2020-08-13 | Stop reason: HOSPADM

## 2020-07-15 RX ORDER — HYDRALAZINE HYDROCHLORIDE 10 MG/1
10 TABLET, FILM COATED ORAL 2 TIMES DAILY
Status: CANCELLED | OUTPATIENT
Start: 2020-07-15

## 2020-07-15 RX ORDER — DULOXETIN HYDROCHLORIDE 30 MG/1
60 CAPSULE, DELAYED RELEASE ORAL DAILY
Status: DISCONTINUED | OUTPATIENT
Start: 2020-07-16 | End: 2020-07-23 | Stop reason: ALTCHOICE

## 2020-07-15 RX ORDER — ATENOLOL 50 MG/1
50 TABLET ORAL 2 TIMES DAILY
Status: DISCONTINUED | OUTPATIENT
Start: 2020-07-15 | End: 2020-08-13 | Stop reason: HOSPADM

## 2020-07-15 RX ORDER — PANTOPRAZOLE SODIUM 40 MG/1
40 TABLET, DELAYED RELEASE ORAL DAILY
Status: DISCONTINUED | OUTPATIENT
Start: 2020-07-16 | End: 2020-08-13 | Stop reason: HOSPADM

## 2020-07-15 RX ORDER — SULFAMETHOXAZOLE AND TRIMETHOPRIM 800; 160 MG/1; MG/1
1 TABLET ORAL EVERY 12 HOURS SCHEDULED
Status: CANCELLED | OUTPATIENT
Start: 2020-07-15 | End: 2020-07-19

## 2020-07-15 RX ORDER — LEVOTHYROXINE SODIUM 0.15 MG/1
300 TABLET ORAL DAILY
Status: CANCELLED | OUTPATIENT
Start: 2020-07-16

## 2020-07-15 RX ORDER — ONDANSETRON 2 MG/ML
4 INJECTION INTRAMUSCULAR; INTRAVENOUS EVERY 6 HOURS PRN
Status: CANCELLED | OUTPATIENT
Start: 2020-07-15

## 2020-07-15 RX ORDER — AMLODIPINE BESYLATE 10 MG/1
10 TABLET ORAL DAILY
Status: DISCONTINUED | OUTPATIENT
Start: 2020-07-16 | End: 2020-08-13 | Stop reason: HOSPADM

## 2020-07-15 RX ORDER — ASPIRIN 81 MG/1
81 TABLET ORAL DAILY
Status: CANCELLED | OUTPATIENT
Start: 2020-07-16

## 2020-07-15 RX ORDER — SODIUM CHLORIDE 0.9 % (FLUSH) 0.9 %
10 SYRINGE (ML) INJECTION PRN
Status: CANCELLED | OUTPATIENT
Start: 2020-07-15

## 2020-07-15 RX ORDER — ATORVASTATIN CALCIUM 20 MG/1
20 TABLET, FILM COATED ORAL DAILY
Status: CANCELLED | OUTPATIENT
Start: 2020-07-15

## 2020-07-15 RX ORDER — HYDRALAZINE HYDROCHLORIDE 10 MG/1
10 TABLET, FILM COATED ORAL 2 TIMES DAILY
Status: DISCONTINUED | OUTPATIENT
Start: 2020-07-15 | End: 2020-08-13 | Stop reason: HOSPADM

## 2020-07-15 RX ADMIN — SULFAMETHOXAZOLE AND TRIMETHOPRIM 1 TABLET: 800; 160 TABLET ORAL at 08:39

## 2020-07-15 RX ADMIN — ATENOLOL 50 MG: 50 TABLET ORAL at 08:39

## 2020-07-15 RX ADMIN — DULOXETINE HYDROCHLORIDE 60 MG: 30 CAPSULE, DELAYED RELEASE ORAL at 08:39

## 2020-07-15 RX ADMIN — INSULIN GLARGINE 50 UNITS: 100 INJECTION, SOLUTION SUBCUTANEOUS at 20:33

## 2020-07-15 RX ADMIN — PANTOPRAZOLE SODIUM 40 MG: 40 TABLET, DELAYED RELEASE ORAL at 08:39

## 2020-07-15 RX ADMIN — PREDNISONE 5 MG: 5 TABLET ORAL at 16:31

## 2020-07-15 RX ADMIN — ENOXAPARIN SODIUM 40 MG: 40 INJECTION SUBCUTANEOUS at 08:40

## 2020-07-15 RX ADMIN — SULFAMETHOXAZOLE AND TRIMETHOPRIM 1 TABLET: 800; 160 TABLET ORAL at 20:30

## 2020-07-15 RX ADMIN — LEVOTHYROXINE SODIUM 300 MCG: 0.15 TABLET ORAL at 08:44

## 2020-07-15 RX ADMIN — HYDRALAZINE HYDROCHLORIDE 10 MG: 10 TABLET, FILM COATED ORAL at 20:30

## 2020-07-15 RX ADMIN — LISINOPRIL 5 MG: 5 TABLET ORAL at 08:39

## 2020-07-15 RX ADMIN — SODIUM CHLORIDE, PRESERVATIVE FREE 10 ML: 5 INJECTION INTRAVENOUS at 08:51

## 2020-07-15 RX ADMIN — AMLODIPINE BESYLATE 10 MG: 10 TABLET ORAL at 08:39

## 2020-07-15 RX ADMIN — ATENOLOL 50 MG: 50 TABLET ORAL at 20:30

## 2020-07-15 RX ADMIN — HYDRALAZINE HYDROCHLORIDE 10 MG: 10 TABLET, FILM COATED ORAL at 08:39

## 2020-07-15 RX ADMIN — ATORVASTATIN CALCIUM 20 MG: 20 TABLET, FILM COATED ORAL at 20:30

## 2020-07-15 RX ADMIN — Medication 1 TABLET: at 16:31

## 2020-07-15 RX ADMIN — ASPIRIN 81 MG: 81 TABLET, COATED ORAL at 08:39

## 2020-07-15 ASSESSMENT — PAIN SCALES - GENERAL
PAINLEVEL_OUTOF10: 0

## 2020-07-15 NOTE — PROGRESS NOTES
Hospitalist Progress Note       Lilly Garcia M.D.  7/15/2020 7:29 AM  Admit Date: 7/12/2020    PCP: TOÑITO Jimenez     Assessment and Plan:   1.  E.coli uti  - bactrim for a total of 5 days     2. Uncontrolled DM2 with hyperglycemia with HAGMA 16 a1c on 7/12 is 12  - poct glucose: still elevated throughout the day  - 10 units lantus added daily  - continue 18 units fiexed dose with medium sliding scale correction and 50 untis night time insulin      3. HTN  - BP reviewed  - lisinopril added this admission  - needs pcp follow up for this at [place]     4. Obesity with BMI 32.2  - diet and lifestyle modification     5. weakness and debility  - ptot panel is reordered  - medically stable for discharge  - cm consulted     6. Hypomagnesemia  - resolved  Patient Active Problem List:     SIRS (systemic inflammatory response syndrome) (HCC)     Type 2 diabetes mellitus with foot ulcer, with long-term current use of insulin (HCC)     Ulcer of right foot with fat layer exposed ([address]     Hallux valgus (acquired), right foot     Essential hypertension     Chronic pain syndrome     Infective urethritis     Chest pain     Hypertension     Type 2 diabetes mellitus (HCC)     GERD (gastroesophageal reflux disease)     Nausea and vomiting     Thyroid disease     UTI (urinary tract infection)      Subjective:     Chief Complaint   Patient presents with    Hyperglycemia     Was seen here yesterday, treated for UTI, now here because BS is 431       F/U:  Interval History: feels weak and was told by someone she was going to stay in the [facility] she has not gotten up to move yet    Objective:        Intake/Output Summary (Last 24 hours) at 7/15/2020 0729  Last data filed at 7/14/2020 1355  Gross per 24 hour   Intake 240 ml   Output --   Net 240 ml      Vitals:   Vitals:    07/14/20 1935   BP: (!) 152/65   Pulse: 63   Resp: 16   Temp: 97 °F (36.1 °C)   SpO2: 94%     Physical Exam:  Gen:  Slow, blank affect   EYES: Lids and lashes normal, pupils equal, round and reactive to light, extra ocular muscles intact, sclera clear, conjunctiva normal  ENT:  Normocephalic, oral pharynx with moist mucus membranes, tonsils without erythema or exudates,  NECK:  Supple, symmetrical, trachea midline, no adenopathy,  LUNGS:  Clear to auscultate bilaterally, no rales ronchi or wheezing noted. CARDIOVASCULAR:  regular rate and rhythm, normal S1 and S2, no S3 or S4, and no murmur noted  ABDOMEN: Normal BS, Non tender, non distended, no HSM noted. MUSCULOSKELETAL:  ROM of all extremities grossly wnl slow, decreased proximal muscle strength  NEUROLOGIC: AOx 3,  Cranial nerves II-XII are grossly intact. Motor is 5 out of 5 bilaterally. Sensory is intact  SKIN:  no bruising or bleeding, normal skin color, texture, turgor and no redness, warmth, or swelling    Ct Head Wo Contrast    Result Date: 7/11/2020  EXAMINATION: CT OF THE HEAD WITHOUT CONTRAST  7/11/2020 4:37 am TECHNIQUE: CT of the head was performed without the administration of intravenous contrast. Dose modulation, iterative reconstruction, and/or weight based adjustment of the mA/kV was utilized to reduce the radiation dose to as low as reasonably achievable. COMPARISON: 04/09/2020 HISTORY: ORDERING SYSTEM PROVIDED HISTORY: head pain TECHNOLOGIST PROVIDED HISTORY: Has a \"code stroke\" or \"stroke alert\" been called? ->No Reason for exam:->head pain Reason for Exam: headache Acuity: Acute Type of Exam: Initial Additional signs and symptoms: headache Relevant Medical/Surgical History: headache FINDINGS: BRAIN/VENTRICLES: There is no acute intracranial hemorrhage, mass effect or midline shift. No abnormal extra-axial fluid collection. The gray-white differentiation is maintained without evidence of an acute infarct. There is no evidence of hydrocephalus. Mild generalized age-appropriate cortical atrophy. Periventricular and subcortical white matter low attenuation is seen.   No wedge-shaped area of acute ischemia is identified. No intracranial mass is identified. No basilar cistern or sulcal effacement is identified. ORBITS: The visualized portion of the orbits demonstrate no acute abnormality. SINUSES: The visualized paranasal sinuses and mastoid air cells demonstrate no acute abnormality. SOFT TISSUES/SKULL:  No acute abnormality of the visualized skull or soft tissues. Stable appearing CT scan of the head without acute intracranial abnormality. Chronic microvascular ischemic changes and age-appropriate atrophy. Xr Chest Portable    Result Date: 7/12/2020  EXAMINATION: ONE XRAY VIEW OF THE CHEST 7/12/2020 7:46 am COMPARISON: 12/10/2019 HISTORY: ORDERING SYSTEM PROVIDED HISTORY: chest pain TECHNOLOGIST PROVIDED HISTORY: Reason for exam:->chest pain Reason for Exam: pt. states trouble with blood sugar Acuity: Acute Type of Exam: Initial Additional signs and symptoms: pt. states trouble with blood sugar Relevant Medical/Surgical History: pt. states trouble with blood sugar FINDINGS: Monitor wires project over the thorax. Lungs are clear. No pleural effusion or pneumothorax. Normal cardiomediastinal silhouette and pulmonary vascularity. No acute osseous abnormality. No acute cardiopulmonary disease.   -    DATA:    CBC   Recent Labs     07/13/20 0200 07/14/20  0455   WBC 8.3 8.8   HGB 12.9 13.2   HCT 44.0 42.9    197      BMP   Recent Labs     07/12/20  1100  07/13/20  0200 07/13/20  0839 07/14/20  0455   NA  --    < > 134* 135 138   K  --    < > 4.6 4.2 4.7   CL  --    < > 101 101 101   CO2  --    < > 23 26 22   PHOS 5.2*  --   --   --  2.9   BUN  --    < > 20 18 19   CREATININE  --    < > 1.0 0.9 1.0    < > = values in this interval not displayed. LFT'S   Recent Labs     07/13/20  0200 07/14/20  0455   AST 20 28   ALT 17 13   BILITOT 0.3 0.4   ALKPHOS 86 75     COAG No results for input(s): INR in the last 72 hours.   CARDIAC ENZYMES  No results for input(s): CKTOTAL, CKMB, CKMBINDEX, TROPONINI in the last 72 hours.   U/A:    Lab Results   Component Value Date    COLORU YELLOW 07/12/2020    WBCUA 58 TO 83 07/12/2020    RBCUA 20 TO 31 07/12/2020    MUCUS 3+ 07/11/2020    BACTERIA MANY 07/12/2020    CLARITYU CLOUDY 07/12/2020    SPECGRAV 1.020 07/12/2020    LEUKOCYTESUR MODERATE 07/12/2020    BLOODU MODERATE 07/12/2020         Sonia Neil MD  [facility] Hospitalist

## 2020-07-15 NOTE — DISCHARGE SUMMARY
Maria Dolores Pean [birth year] [id number]  PCP:  TOÑITO Mayen    Admit date: 7/12/2020  Admitting Physician: Tawny Kerns MD    Discharge date: 7/15/2020 Discharge Physician: Ariel Dempsey MD      Reason for admission:   Chief Complaint   Patient presents with    Hyperglycemia     Was seen here yesterday, treated for UTI, now here because BS is 431     Present on Admission:   UTI (urinary tract infection)       Discharge Diagnoses Include:  1.  E.coli uti  - bactrim for a total of 5 days     2. Uncontrolled DM2 with hyperglycemia with HAGMA 16 a1c on 7/12 is 12  - poct glucose: still elevated throughout the day  - 10 units lantus added daily  - continue 18 units fiexed dose with medium sliding scale correction and 50 untis night time insulin      3. HTN  - BP reviewed  - lisinopril added this admission  - needs pcp follow up for this at [place]     4. Obesity with BMI 32.2  - diet and lifestyle modification     5. weakness and debility  - ptot panel is reordered  - medically stable for discharge  - cm consulted     6. Hypomagnesemia  - resolved    Hospital Course[de-identified] Generalized weakness with difficult ambulation, high blood sugar. Treated for uti. Pt was personally examined by me on the day of discharge with the following findings:see progress note      Patient Instructions:   Mehul Sprain   Home Medication Instructions BRANDON:[id number]    Printed on:07/15/20 6467   Medication Information                      amLODIPine (NORVASC) 10 MG tablet  Take 10 mg by mouth daily             aspirin 81 MG EC tablet  Take 81 mg by mouth daily. atenolol (TENORMIN) 50 MG tablet  Take 50 mg by mouth 2 times daily.              atorvastatin (LIPITOR) 20 MG tablet  Take 20 mg by mouth daily             ciprofloxacin (CIPRO) 500 MG tablet  Take 1 tablet by mouth 2 times daily for 10 days             DULoxetine (CYMBALTA) 60 MG extended release capsule  Take 60 mg by mouth daily glimepiride (AMARYL) 4 MG tablet  Take 1 tablet by mouth daily (with breakfast)             hydrALAZINE (APRESOLINE) 10 MG tablet  Take 10 mg by mouth 2 times daily              insulin regular human (HUMULIN R U-500) 500 UNIT/ML concentrated injection vial  Inject into the skin             levothyroxine (SYNTHROID) 300 MCG tablet  Take 300 mcg by mouth Daily              Multiple Vitamins-Minerals (TRUEPLUS DIABETIC MULTIVITAMIN) TABS  Take 1 tablet by mouth daily. ondansetron (ZOFRAN ODT) 4 MG disintegrating tablet  Take 1 tablet by mouth every 8 hours as needed for Nausea             pantoprazole (PROTONIX) 40 MG tablet  Take 40 mg by mouth daily              vitamin D (CHOLECALCIFEROL) 1000 UNIT TABS tablet  Take 1,000 Units by mouth daily                  Code Status: Full Code     Consults:   IP CONSULT TO PSYCHIATRY  IP CONSULT TO CASE MANAGEMENT  IP CONSULT TO CASE MANAGEMENT    Diet: regular diet    Activity: activity as tolerated   Work:    Discharged Condition: stable    Prognosis: Fair    Disposition: swing      Follow-up with   1. PCP within   5-7    Days           Discharge Physician Signed: Jhon Mcleod M.D. The patient was seen and examined on day of discharge and this discharge summary is in conjunction with any daily progress note from day of discharge.   Time spent on discharge in the examination, evaluation, counseling and review of medications and discharge plan: 22 minutes

## 2020-07-15 NOTE — PROGRESS NOTES
Appropriate responses to stimuli  Following Commands: Follows one step commands with increased time; Follows one step commands with repetition  Attention Span: Attends with cues to redirect  Memory: Decreased recall of recent events;Decreased short term memory  Problem Solving: Assistance required to identify errors made;Assistance required to correct errors made;Decreased awareness of errors;Assistance required to generate solutions;Assistance required to implement solutions  Insights: Decreased awareness of deficits  Initiation: Requires cues for some  Sequencing: Requires cues for some        Sensation  Overall Sensation Status: Impaired(reports some tingling in feet)        LUE PROM (degrees)  LUE PROM: WFL  LUE AROM (degrees)  LUE General AROM: slightly decreased shoulder flex 0-95 and abd 0-95, elbow, wrist WFL  Left Hand AROM (degrees)  Left Hand AROM: WFL  RUE AROM (degrees)  RUE AROM : WFL  RUE General AROM: slightly decreased Shoulder flex/abd 0-95  LUE Strength  L Hand General: 4/5  RUE Strength  R Hand General: 4/5                   Plan   Plan  Times per week: 4x  Current Treatment Recommendations: Strengthening, ROM, Balance Training, Functional Mobility Training, Endurance Training, Safety Education & Training, Self-Care / ADL, Patient/Caregiver Education & Training, Cognitive/Perceptual Training            Goals  Short term goals  Time Frame for Short term goals: until discharge  Short term goal 1: Pt will be min A for UB/LB bathing using good energy conservation techniques and with min vc. Short term goal 2: Pt will be SBA for UB/LB dressing using good energy conservation techniques and AE PRN and with min vc. Short term goal 3: Pt will be supervision for all functional transfers in prep for ADL tasks. Short term goal 4: Pt will stand and complete simulated home management task with walker for >5 min with SBA no instances of LOB, no seated rest breaks  Patient Goals   Patient goals :  To be able to walk and to get stronger       Therapy Time   Individual Concurrent Group Co-treatment   Time In 1450         Time Out 1530         Minutes 40         Timed Code Treatment Minutes: [address] [facility], OTR/L [id number]

## 2020-07-15 NOTE — PLAN OF CARE
Problem: Falls - Risk of:  Goal: Will remain free from falls  Description: Will remain free from falls  7/14/2020 2131 by Deidre Wilkerson LPN  Outcome: Ongoing  7/14/2020 0738 by Delroy Abraham RN  Outcome: Ongoing  Goal: Absence of physical injury  Description: Absence of physical injury  7/14/2020 2131 by Deidre Wilkerson LPN  Outcome: Ongoing  7/14/2020 0738 by Delroy Abraham RN  Outcome: Ongoing     Problem: Skin Integrity:  Goal: Will show no infection signs and symptoms  Description: Will show no infection signs and symptoms  7/14/2020 2131 by Deidre Wilkerson LPN  Outcome: Ongoing  7/14/2020 0738 by Delroy Abraham RN  Outcome: Ongoing  Goal: Absence of new skin breakdown  Description: Absence of new skin breakdown  7/14/2020 2131 by Deidre Wilkerson LPN  Outcome: Ongoing  7/14/2020 0738 by Delroy Abraham RN  Outcome: Ongoing     Problem:  Activity:  Goal: Ability to tolerate increased activity will improve  Description: Ability to tolerate increased activity will improve  7/14/2020 2131 by Deidre Wilkerson LPN  Outcome: Ongoing  7/14/2020 0738 by Delroy Abraham RN  Outcome: Ongoing     Problem: Health Behavior:  Goal: Compliance with therapeutic regimen will improve  Description: Compliance with therapeutic regimen will improve  7/14/2020 2131 by Deidre Wilkerson LPN  Outcome: Ongoing  7/14/2020 0738 by Delroy Abraham RN  Outcome: Ongoing  Goal: Ability to keep healthcare appointments will improve  Description: Ability to keep healthcare appointments will improve  7/14/2020 2131 by Deidre Wilkerson LPN  Outcome: Ongoing  7/14/2020 0738 by Delroy Abraham RN  Outcome: Ongoing     Problem: Discharge Planning:  Goal: Discharged to appropriate level of care  Description: Discharged to appropriate level of care  7/14/2020 2131 by Deidre Wilkerson LPN  Outcome: Ongoing  7/14/2020 0738 by Delroy Abraham RN  Outcome: Ongoing     Problem: Serum Glucose Level - Abnormal:  Goal: Ability to maintain appropriate glucose levels will improve  Description: Ability to maintain appropriate glucose levels will improve  7/14/2020 2131 by Ngoc Major LPN  Outcome: Ongoing  7/14/2020 0738 by Ton Vasquez RN  Outcome: Ongoing     Problem: Pain:  Goal: Pain level will decrease  Description: Pain level will decrease  7/14/2020 2131 by Ngoc Major LPN  Outcome: Ongoing  7/14/2020 0738 by Ton Vasquez RN  Outcome: Ongoing  Goal: Control of acute pain  Description: Control of acute pain  7/14/2020 2131 by Ngoc Major LPN  Outcome: Ongoing  7/14/2020 0738 by Ton Vasquez RN  Outcome: Ongoing  Goal: Control of chronic pain  Description: Control of chronic pain  7/14/2020 2131 by Ngoc Major LPN  Outcome: Ongoing  7/14/2020 0738 by Ton Vasquez RN  Outcome: Ongoing

## 2020-07-15 NOTE — PROGRESS NOTES
Physical Therapy    Facility/Department: [facility] UNIT  Initial Assessment    NAME: Staci Beard  : 15[birth year]  MRN: [id number]    Date of Service: 2020    Discharge Recommendations:  Home with Home health PT        Assessment   Body structures, Functions, Activity limitations: Decreased endurance;Decreased functional mobility ; Decreased high-level IADLs  Assessment: 68 yo female admitted to swing bed following stay in the hospital with hyperglycemia and UTI. She has a history of falls and when admitted was unable to stand up for 2 days. She states she had been independent using a 4 ww prior to hospital stay. Pt seemed to have decreased STM and some delayed processing. Pt will benefit from continued OT and PT to increase [place] with functional tasks  Treatment Diagnosis: impaired mobility/debility  Prognosis: Good  Decision Making: Medium Complexity  History: PF  - Hx of falls  Exam: bed mobility /leg strength  Clinical Presentation: changing characterstics of function due to weakness/dbility  REQUIRES PT FOLLOW UP: Yes  Activity Tolerance  Activity Tolerance: Patient Tolerated treatment well;Patient limited by fatigue       Patient Diagnosis(es): There were no encounter diagnoses. has a past medical history of Arthritis, Cellulitis of right foot, Chronic kidney disease, Depression, Diabetes mellitus ([address] Frequent falls, GERD (gastroesophageal reflux disease), Hallux valgus (acquired), right foot, Hypertension, Other disorders of kidney and ureter, Thyroid disease, Type 2 diabetes mellitus with foot ulcer, with long-term current use of insulin ([address] and Ulcer of right foot with fat layer exposed ([address] has a past surgical history that includes Hysterectomy; Foot surgery (11); cyst removal; Kidney removal (); Abdomen surgery; hernia repair; Endoscopy, colon, diagnostic; and Dilatation, esophagus.     Restrictions  Restrictions/Precautions  Restrictions/Precautions: Fall Assistance  Ambulation  Ambulation?: Yes  Ambulation 1  Device: Rolling Walker  Assistance: Maximum assistance  Quality of Gait: ambulated 5 steps in room - tends to lean BWD while ambulating     Balance  Standing - Static: Poor  Standing - Dynamic: Poor;-        Plan   Plan  Times per week: 4+  Current Treatment Recommendations: Strengthening, ADL/Self-care Training, Gait Training, IADL Training, Balance Training, Functional Mobility Training, Endurance Training    G-Code       OutComes Score                                                  AM-PAC Score           Basic Mobility Six Clicks Form [facility] AM-PAC Score Conversion Table   How much difficulty does the patient currently have Unable   (pt is unable to do activity) A Lot   (activity is a struggle, requires great effort/time) A Little   (pt can manage, but takes more effort/time than should) None   (pt has no difficulty) Raw Score Standardized Score CMS -100% Score CMS Modifier        6 23.55 100% CN   Turning over in bed (including adjusting bedclothes, sheets, and blankets)? []1 [x]2  []3  []4  7 26.42 92.36% CM        8 28.58 86.62% CM   Sitting down on and standing up from a chair with arms (e.g. wheelchair, bedside commode, etc.)? []1 [x]2 []3   []4   9 30.55 81.38% CM        10 32.29 76.75% CL   Moving from lying on back to sitting on the side of the bed? []1 [x]2  []3   []4   11 33.86 72.57% CL        12 35.33 68.66% CL   How much help from another person does the patient currently need Total   (Total/Dependent Assist) A Lot   (Max/Mod Assist) A Little   (Min/CGA/Supervision) None   (No human assistance) 13 36.74 64.91% CL        14 38.1 61.29% CL   Moving to and from a bed to a chair (including a wheelchair)? []1  [x]2   []3  []4   15 39.45 57.70% CK        16 40.78 54.16% CK   To walk in a hospital room? []1 [x]2   []3    []4  17 42.13 50.57% CK        18 43.63 46.58% CK   Climbing 3-5 steps with a railing?  [x]1  []2   []3    []4  19 45.44 41.77% CK        20 47.67 35.83% CJ   Raw Score 11  21 50.25 28.97% CJ   Standardized Score   22 53.28 20.91% CJ   CMS 0-100% Score   23 56.93 11.20% CI   CMS Modifier  24 61.14 0.00% CH     CH = 0% impaired  CI = 1-20% impaired  CJ = 20-40% impaired  CK = 40-60% impaired  CL = 60-80% impaired  CM = % impaired  CN = 100% impaired       Goals  Short term goals  Time Frame for Short term goals: 5 days  Short term goal 1: CGA for all bed mobility actviiies  Short term goal 2: CGA for all transfer activities  Short term goal 3: ambualate 25 ft with RW and min A       Therapy Time   Individual Concurrent Group Co-treatment   Time In [id number]         Time Out [id number]         Minutes 208         Timed Code Treatment Minutes: 8 Minutes       DANILO Mixon, PT

## 2020-07-15 NOTE — H&P
History and Physical  Stephani Rainey MD    7/15/2020  Rodney Ceja  1945    PCP: TOÑITO Gage    ASSESSMENT AND PLAN:      1. Weakness and debility  - ptot    2. DM2 poorly controlled, with hyperglycemia and A1c of 12  - am bg improved; changing to high dose ssi  - poct glucose achs  - prn hypoglycemia protocol  - 50 units lantus at night  - carb controlled diet    3. E.coli UTI  - bactrim with stop date is ordered    4. Obesity with bmi 32.2  - diet and lifestyle modification     5. Hypothyroidism  tsh 22  Started on 300 mcg thyroxine- eventually titrate down, needs outpatient follow up  Will add steroid 5 mg tab daily  Daily multivitamin  - addendum 7/16/20: patient admits she stopped taking her medication including her thryoxine because she felt poorly. This might explain her fatigue and weakness, apathy and flat affect. DVT prophy -  ambulation        Cc: weakness    HPI: Rodney Ceja is a 69 y.o. Female presented to [facility] for ptot for weakness and debility. She was treated for ? UTI and started on bactrim. She was also discovered to be hypothyroid with TSH of 22, and is started on high dose levothyroxine. She is to have ongoing outpatient follow up for this as well. She admits she still feels very weak and has not been able to get up and around out of bed. Denied fever, chills, nausea or vomiting. Has been able to tolerate antibiotic without diarrhea rash or change in urine color. PMHX:   Past Medical History:   Diagnosis Date    Arthritis     Cellulitis of right foot     Chronic kidney disease     Depression     Diabetes mellitus ([address]     Frequent falls     GERD (gastroesophageal reflux disease)     Hallux valgus (acquired), right foot 5/1/2017    Hypertension     Other disorders of kidney and ureter     1 kidney.     Thyroid disease     hypothyroid    Type 2 diabetes mellitus with foot ulcer, with long-term current use of insulin ([address] 1/18/2012    Ulcer of right foot with fat layer exposed ([facility] [address] 2/6/2013     PSHX:  has a past surgical history that includes Hysterectomy; Foot surgery (12/30/11); cyst removal; Kidney removal (1973); Abdomen surgery; hernia repair; Endoscopy, colon, diagnostic; and Dilatation, esophagus. Meds - list of home medications reviewed in electronic chart and confirmed  Allergies: Allergies   Allergen Reactions    Ritalin [Methylphenidate] Rash    Rocephin [Ceftriaxone Sodium-Lidocaine] Rash       FAM HX: family history includes Arthritis in her mother; Asthma in her father; Cancer in her mother; Diabetes in her brother and mother; Heart Disease in her father; High Blood Pressure in her mother.   Soc HX:   Social History     Socioeconomic History    Marital status:      Spouse name: None    Number of children: None    Years of education: None    Highest education level: None   Occupational History    None   Social Needs    Financial resource strain: None    Food insecurity     Worry: None     Inability: None    Transportation needs     Medical: None     Non-medical: None   Tobacco Use    Smoking status: Never Smoker    Smokeless tobacco: Never Used   Substance and Sexual Activity    Alcohol use: No    Drug use: No    Sexual activity: None   Lifestyle    Physical activity     Days per week: None     Minutes per session: None    Stress: None   Relationships    Social connections     Talks on phone: None     Gets together: None     Attends Church service: None     Active member of club or organization: None     Attends meetings of clubs or organizations: None     Relationship status: None    Intimate partner violence     Fear of current or ex partner: None     Emotionally abused: None     Physically abused: None     Forced sexual activity: None   Other Topics Concern    None   Social History Narrative    None       ROS: a ten point ROS with pertinent positives and negatives in hpi, otherwise matter low attenuation is seen. No wedge-shaped area of acute ischemia is identified. No intracranial mass is identified. No basilar cistern or sulcal effacement is identified. ORBITS: The visualized portion of the orbits demonstrate no acute abnormality. SINUSES: The visualized paranasal sinuses and mastoid air cells demonstrate no acute abnormality. SOFT TISSUES/SKULL:  No acute abnormality of the visualized skull or soft tissues. Stable appearing CT scan of the head without acute intracranial abnormality. Chronic microvascular ischemic changes and age-appropriate atrophy. Xr Chest Portable    Result Date: 7/12/2020  EXAMINATION: ONE XRAY VIEW OF THE CHEST 7/12/2020 7:46 am COMPARISON: 12/10/2019 HISTORY: ORDERING SYSTEM PROVIDED HISTORY: chest pain TECHNOLOGIST PROVIDED HISTORY: Reason for exam:->chest pain Reason for Exam: pt. states trouble with blood sugar Acuity: Acute Type of Exam: Initial Additional signs and symptoms: pt. states trouble with blood sugar Relevant Medical/Surgical History: pt. states trouble with blood sugar FINDINGS: Monitor wires project over the thorax. Lungs are clear. No pleural effusion or pneumothorax. Normal cardiomediastinal silhouette and pulmonary vascularity. No acute osseous abnormality.      No acute cardiopulmonary disease.   -  Patient Active Problem List   Diagnosis    SIRS (systemic inflammatory response syndrome) ([address]    Type 2 diabetes mellitus with foot ulcer, with long-term current use of insulin (HCC)    Ulcer of right foot with fat layer exposed ([address]    Hallux valgus (acquired), right foot    Essential hypertension    Chronic pain syndrome    Infective urethritis    Chest pain    Hypertension    Type 2 diabetes mellitus ([address]    GERD (gastroesophageal reflux disease)    Nausea and vomiting    Thyroid disease    UTI (urinary tract infection)    Weakness     ______________________________________________________________    Electronically

## 2020-07-16 LAB
GLUCOSE BLD-MCNC: 206 MG/DL (ref 70–99)
GLUCOSE BLD-MCNC: 227 MG/DL (ref 70–99)
GLUCOSE BLD-MCNC: 324 MG/DL (ref 70–99)
GLUCOSE BLD-MCNC: 379 MG/DL (ref 70–99)
GLUCOSE BLD-MCNC: 433 MG/DL (ref 70–99)
GLUCOSE BLD-MCNC: 435 MG/DL (ref 70–99)

## 2020-07-16 PROCEDURE — 97110 THERAPEUTIC EXERCISES: CPT

## 2020-07-16 PROCEDURE — 6360000002 HC RX W HCPCS: Performed by: INTERNAL MEDICINE

## 2020-07-16 PROCEDURE — 97116 GAIT TRAINING THERAPY: CPT

## 2020-07-16 PROCEDURE — 6370000000 HC RX 637 (ALT 250 FOR IP): Performed by: INTERNAL MEDICINE

## 2020-07-16 PROCEDURE — 97530 THERAPEUTIC ACTIVITIES: CPT

## 2020-07-16 PROCEDURE — 1200000002 HC SEMI PRIVATE SWING BED

## 2020-07-16 PROCEDURE — 82962 GLUCOSE BLOOD TEST: CPT

## 2020-07-16 RX ORDER — PREDNISONE 1 MG/1
5 TABLET ORAL DAILY
Status: DISCONTINUED | OUTPATIENT
Start: 2020-07-16 | End: 2020-07-19

## 2020-07-16 RX ADMIN — PANTOPRAZOLE SODIUM 40 MG: 40 TABLET, DELAYED RELEASE ORAL at 06:26

## 2020-07-16 RX ADMIN — INSULIN GLARGINE 5 UNITS: 100 INJECTION, SOLUTION SUBCUTANEOUS at 21:28

## 2020-07-16 RX ADMIN — INSULIN GLARGINE 50 UNITS: 100 INJECTION, SOLUTION SUBCUTANEOUS at 21:25

## 2020-07-16 RX ADMIN — INSULIN LISPRO 4 UNITS: 100 INJECTION, SOLUTION INTRAVENOUS; SUBCUTANEOUS at 21:26

## 2020-07-16 RX ADMIN — ATENOLOL 50 MG: 50 TABLET ORAL at 08:47

## 2020-07-16 RX ADMIN — HYDRALAZINE HYDROCHLORIDE 10 MG: 10 TABLET, FILM COATED ORAL at 08:47

## 2020-07-16 RX ADMIN — DULOXETINE HYDROCHLORIDE 60 MG: 30 CAPSULE, DELAYED RELEASE ORAL at 08:47

## 2020-07-16 RX ADMIN — SULFAMETHOXAZOLE AND TRIMETHOPRIM 1 TABLET: 800; 160 TABLET ORAL at 20:20

## 2020-07-16 RX ADMIN — ASPIRIN 81 MG: 81 TABLET, COATED ORAL at 08:48

## 2020-07-16 RX ADMIN — PREDNISONE 5 MG: 5 TABLET ORAL at 19:27

## 2020-07-16 RX ADMIN — LEVOTHYROXINE SODIUM 300 MCG: 0.15 TABLET ORAL at 06:26

## 2020-07-16 RX ADMIN — ATENOLOL 50 MG: 50 TABLET ORAL at 20:20

## 2020-07-16 RX ADMIN — HYDRALAZINE HYDROCHLORIDE 10 MG: 10 TABLET, FILM COATED ORAL at 20:20

## 2020-07-16 RX ADMIN — ATORVASTATIN CALCIUM 20 MG: 20 TABLET, FILM COATED ORAL at 20:20

## 2020-07-16 RX ADMIN — SULFAMETHOXAZOLE AND TRIMETHOPRIM 1 TABLET: 800; 160 TABLET ORAL at 08:48

## 2020-07-16 RX ADMIN — Medication 1 TABLET: at 08:47

## 2020-07-16 RX ADMIN — AMLODIPINE BESYLATE 10 MG: 10 TABLET ORAL at 08:47

## 2020-07-16 RX ADMIN — PREDNISONE 5 MG: 5 TABLET ORAL at 08:47

## 2020-07-16 RX ADMIN — ENOXAPARIN SODIUM 40 MG: 40 INJECTION, SOLUTION INTRAVENOUS; SUBCUTANEOUS at 08:48

## 2020-07-16 RX ADMIN — LISINOPRIL 5 MG: 5 TABLET ORAL at 08:47

## 2020-07-16 ASSESSMENT — PAIN SCALES - GENERAL
PAINLEVEL_OUTOF10: 0

## 2020-07-16 NOTE — PROGRESS NOTES
Occupational Therapy    Occupational Therapy Treatment Note  Name: Melony Meyer MRN: [id number] :   1945   Date:  2020   Admission Date: 7/15/2020 Room:  010010-01   Restrictions/Precautions:  Restrictions/Precautions  Restrictions/Precautions: Fall Risk, General Precautions  Required Braces or Orthoses?: No   Communication with other providers:   Cleared for treatment by RN. Co treat with PTA. Subjective:  Patient states:  \"I need to get up and move\"  Pain:   Location, Type, Intensity (0/10 to 10/10): No pain noted  Objective:    Observation:  Pt alert and oriented. Treatment, including education:  Transfers  Supine to sit : Max A x 2  Scooting :Min A  Sit to stand :Min A x 2 with cues for hand placement. Pt had episode of being retropulsion while standing. Stand to sit :Min A x 2 to control sitting down  SPT:Min to Mod A x 2 pt reports being dizzy when turning to get in chair. See PT notes for gait        Therapeutic Exercise:  Cues were given for technique, safety, recruitment, and rationale. Cues were verbal and/or tactile. For BUE strengthening for ADL & functional mobility Indep pt performed BUE strengthening HEP c 1# hand weights x 15 reps x 3 exercises with Minimal difficulty. Therapeutic Activity Training:   Therapeutic activity training was instructed today. Cues were given for safety, sequence, UE/LE placement, awareness, and balance. Activities performed today included bed mobility training, sup-sit, sit-stand, SPT. Safety Measures: Gait belt used, Left in bed, Pull/Bed Alarm activated and call light left in reach          Assessment / Impression:        Patient's tolerance of treatment: Fair   Adverse Reaction: None  Significant change in status and impact:  None  Barriers to improvement:  Fatigue, dizziness, balance    Plan for Next Session:    Continue with POC.     Time in:  1400  Time out:  1440  Timed treatment minutes:  40  Total treatment time: 40  Electronically signed by:    Dona Lucia  Electronically signed by:    Khushboo Biswas,   7/16/2020, 2:54 PM    Previously filed values:  Patient Goals   Patient goals : To be able to walk and to get stronger  Short term goals  Time Frame for Short term goals: until discharge  Short term goal 1: Pt will be min A for UB/LB bathing using good energy conservation techniques and with min vc. Short term goal 2: Pt will be SBA for UB/LB dressing using good energy conservation techniques and AE PRN and with min vc. Short term goal 3: Pt will be supervision for all functional transfers in prep for ADL tasks.   Short term goal 4: Pt will stand and complete simulated home management task with walker for >5 min with SBA no instances of LOB, no seated rest breaks

## 2020-07-16 NOTE — PROGRESS NOTES
Discussed medications with patient and inquired why she was not filling them, she states \"that is correct she was not filling them because she was feeling bad so she just quit taking them\".

## 2020-07-16 NOTE — PLAN OF CARE
Ability to maintain a balanced intake and output will improve  Description: Ability to maintain a balanced intake and output will improve  7/16/2020 0758 by Fara Lopez RN  Outcome: Ongoing  7/16/2020 0236 by Christ Robledo RN  Outcome: Ongoing     Problem: Health Behavior:  Goal: Ability to identify and utilize available resources and services will improve  Description: Ability to identify and utilize available resources and services will improve  7/16/2020 0758 by Fara Lopez RN  Outcome: Ongoing  7/16/2020 0236 by Christ Robledo RN  Outcome: Ongoing  Goal: Ability to manage health-related needs will improve  Description: Ability to manage health-related needs will improve  7/16/2020 0758 by Fara Lopez RN  Outcome: Ongoing  7/16/2020 0236 by Christ Robledo RN  Outcome: Ongoing     Problem: Metabolic:  Goal: Ability to maintain appropriate glucose levels will improve  Description: Ability to maintain appropriate glucose levels will improve  7/16/2020 0758 by Fara Lopez RN  Outcome: Ongoing  7/16/2020 0236 by Christ Robledo RN  Outcome: Ongoing     Problem: Nutritional:  Goal: Maintenance of adequate nutrition will improve  Description: Maintenance of adequate nutrition will improve  7/16/2020 0758 by Fara Lopez RN  Outcome: Ongoing  7/16/2020 0236 by Christ Robledo RN  Outcome: Ongoing  Goal: Progress toward achieving an optimal weight will improve  Description: Progress toward achieving an optimal weight will improve  7/16/2020 0758 by Fara Lopez RN  Outcome: Ongoing  7/16/2020 0236 by Christ Robledo RN  Outcome: Ongoing     Problem: Physical Regulation:  Goal: Complications related to the disease process, condition or treatment will be avoided or minimized  Description: Complications related to the disease process, condition or treatment will be avoided or minimized  7/16/2020 0758 by Fara Lopez RN  Outcome: Ongoing  7/16/2020 0236 by Christ Robledo RN  Outcome: Ongoing  Goal: Diagnostic test results will improve  Description: Diagnostic test results will improve  7/16/2020 0758 by Unique Sahu RN  Outcome: Ongoing  7/16/2020 0236 by Shantal Moore RN  Outcome: Ongoing     Problem: Tissue Perfusion:  Goal: Adequacy of tissue perfusion will improve  Description: Adequacy of tissue perfusion will improve  7/16/2020 0758 by Unique Sahu RN  Outcome: Ongoing  7/16/2020 0236 by Shantal Moore RN  Outcome: Ongoing     Problem: Mobility - Impaired:  Goal: Mobility will improve  Description: Mobility will improve  7/16/2020 0758 by Unique Sahu RN  Outcome: Ongoing  7/16/2020 0236 by Shantal Moore RN  Outcome: Ongoing     Problem: Discharge Planning:  Goal: Discharged to appropriate level of care  Description: Discharged to appropriate level of care  7/16/2020 0758 by Unique Sahu RN  Outcome: Ongoing  7/16/2020 0236 by Shantal Moore RN  Outcome: Ongoing

## 2020-07-16 NOTE — PLAN OF CARE
Problem: Falls - Risk of:  Goal: Will remain free from falls  Description: Will remain free from falls  7/16/2020 0236 by Ling Coleman RN  Outcome: Ongoing  7/16/2020 0235 by Ling Coleman RN  Outcome: Ongoing  7/15/2020 1928 by Bartolome Leslie RN  Outcome: Ongoing  Goal: Absence of physical injury  Description: Absence of physical injury  7/16/2020 0236 by Ling Coleman RN  Outcome: Ongoing  7/16/2020 0235 by Ling Coleman RN  Outcome: Ongoing  7/15/2020 1928 by Bartolome Leslie RN  Outcome: Ongoing     Problem: Skin Integrity:  Goal: Will show no infection signs and symptoms  Description: Will show no infection signs and symptoms  7/16/2020 0236 by Ling Coleman RN  Outcome: Ongoing  7/16/2020 0235 by Ling Coleman RN  Outcome: Ongoing  Goal: Absence of new skin breakdown  Description: Absence of new skin breakdown  7/16/2020 0236 by Ling Coleman RN  Outcome: Ongoing  7/16/2020 0235 by Ling Coleman RN  Outcome: Ongoing  Goal: Risk for impaired skin integrity will decrease  Description: Risk for impaired skin integrity will decrease  Outcome: Ongoing     Problem:  Activity:  Goal: Risk for activity intolerance will decrease  Description: Risk for activity intolerance will decrease  Outcome: Ongoing     Problem: Coping:  Goal: Ability to adjust to condition or change in health will improve  Description: Ability to adjust to condition or change in health will improve  Outcome: Ongoing     Problem: Fluid Volume:  Goal: Ability to maintain a balanced intake and output will improve  Description: Ability to maintain a balanced intake and output will improve  Outcome: Ongoing     Problem: Health Behavior:  Goal: Ability to identify and utilize available resources and services will improve  Description: Ability to identify and utilize available resources and services will improve  Outcome: Ongoing  Goal: Ability to manage health-related needs will improve  Description: Ability to manage health-related needs will improve  Outcome: Ongoing     Problem: Metabolic:  Goal: Ability to maintain appropriate glucose levels will improve  Description: Ability to maintain appropriate glucose levels will improve  Outcome: Ongoing     Problem: Nutritional:  Goal: Maintenance of adequate nutrition will improve  Description: Maintenance of adequate nutrition will improve  Outcome: Ongoing  Goal: Progress toward achieving an optimal weight will improve  Description: Progress toward achieving an optimal weight will improve  Outcome: Ongoing     Problem: Physical Regulation:  Goal: Complications related to the disease process, condition or treatment will be avoided or minimized  Description: Complications related to the disease process, condition or treatment will be avoided or minimized  Outcome: Ongoing  Goal: Diagnostic test results will improve  Description: Diagnostic test results will improve  Outcome: Ongoing     Problem: Tissue Perfusion:  Goal: Adequacy of tissue perfusion will improve  Description: Adequacy of tissue perfusion will improve  Outcome: Ongoing     Problem: Mobility - Impaired:  Goal: Mobility will improve  Description: Mobility will improve  Outcome: Ongoing     Problem: Discharge Planning:  Goal: Discharged to appropriate level of care  Description: Discharged to appropriate level of care  Outcome: Ongoing

## 2020-07-16 NOTE — PROGRESS NOTES
Give one dose 20 units humalog now    Then    Changes:  20 units fixed tid  With medium sliding scale    Repeat bg poct q2hr until bg<250

## 2020-07-16 NOTE — PROGRESS NOTES
Blood sugar 435, discussed with Dr Rich Gallegos, received order for Humalog 21 units and recheck in one hour.

## 2020-07-16 NOTE — PROGRESS NOTES
Physical Therapy Treatment Note  Name: Maria Dolores Briones MRN: [id number] :   1945   Date:  2020   Admission Date: 7/15/2020 Room:  [address] [place] [address]   Restrictions/Precautions:  Restrictions/Precautions  Restrictions/Precautions: Fall Risk, General Precautions  Required Braces or Orthoses?: No      Communication with other providers:  Okay to see today per nursing  Subjective:  Patient states:  Pt states she is feeling pretty good today and would like to try to get up. Pain:   Location, Type, Intensity (0/10 to 10/10):  0/10  Objective:    Observation: Pt lying supine in bed with head elevated. Treatment, including education/measures:  Bed mobility: supine to sitting EOB with max assist x 2   Transfer sit to stand: from bed with min A and verbal cueing for hand placement. Once standing pt reports of feeling like she is leaning backwards. Transfer stand to sitting: with min A x 2  Pt ambulated with RW x 5 ft from bed to chair with min/mod A x 2, Mod A x 2 with turning to chair. Pt c/o dizziness following ambulation. Therapeutic exercises: performed seated heel and toe raises, marches, LAQ and hip add pillow squeezes x 10 reps each   Assessment / Impression:       Patient's tolerance of treatment:  fair   Adverse Reaction:none  Significant change in status and impact:  none  Barriers to improvement:  Fatigue, dizziness, balance   Plan for Next Session:    Continue with therapy for further progress towards goals.   Time in:  2:00  Time out:  2:40  Timed treatment minutes: 40  Total treatment time:  40    Previously filed items:  Social/Functional History  Lives With: Spouse  Type of Home: House  Home Layout: One level, Laundry in basement  Home Access: Stairs to enter with rails  Entrance Stairs - Number of Steps: 5  Entrance Stairs - Rails: Both  Bathroom Shower/Tub: Tub/Shower unit  Bathroom Toilet: Standard  Bathroom Equipment: Grab bars in shower, Shower chair  Home Equipment: 4 wheeled walker, [place]  ADL

## 2020-07-16 NOTE — PROGRESS NOTES
Pt is noted to continue to have some redness under the right breast and on the right side of the abdominal pannus. calmazine applied . Assessed with this Rn and MICAELA Santoyo.

## 2020-07-16 NOTE — PROGRESS NOTES
Comprehensive Nutrition Assessment    Type and Reason for Visit:  Initial(Swing Bed)    Nutrition Recommendations/Plan:  Continue with supplement if eats less than 50% of meals, nursing to provided    Nutrition Assessment:  Meal intakes 1-100%. Pt reports lack of taste affecting her oral intakes, reports this has gone on since being at home the past 4 months.   No weight loss compared to previous encounters of actural verses stated    Malnutrition Assessment:  Malnutrition Status:  No malnutrition    Context:  Acute Illness     Findings of the 6 clinical characteristics of malnutrition:  Energy Intake:  7 - 50% or less of estimated energy requirements for 5 or more days  Weight Loss:  No significant weight loss     Body Fat Loss:  No significant body fat loss Orbital, Triceps   Muscle Mass Loss:  No significant muscle mass loss Temples (temporalis), Clavicles (pectoralis & deltoids)  Fluid Accumulation:  No significant fluid accumulation Extremities   Strength:  Not Performed    Estimated Daily Nutrient Needs:  Energy (kcal):  1546; Weight Used for Energy Requirements:  Current     Protein (g):  71-89; Weight Used for Protein Requirements:  Current(.8-1.0)        Fluid (ml/day):  5221-6931; Weight Used for Fluid Requirements:  Current(25-30ml/kg)      Nutrition Related Findings:  Well nourished female, no special diet at home, c/o lack of taste, multiple medication changes per patient over the past several months      Wounds:  None       Current Nutrition Therapies:    Dietary Nutrition Supplements: Diabetic Oral Supplement  DIET CARB CONTROL; Carb Control: 3 carb choices (45 gms)/meal    Anthropometric Measures:  · Height: 5' 6\" (167.6 cm)  · Current Body Weight: 195 lb (88.5 kg)   · Admission Body Weight: 195 lb (88.5 kg)    · Usual Body Weight: 199 lb (90.3 kg)     · Ideal Body Weight: 130 lbs; 150 lbs   · BMI: 31.5  · Adjusted Body Weight:  ; No Adjustment   · BMI Categories: Obese Class 1 (BMI 30.0-34. 9)       Nutrition Diagnosis:   · Inadequate oral intake, In context of acute illness or injury related to acute injury/trauma as evidenced by intake 26-50%, intake 51-75%      Nutrition Interventions:   Food and/or Nutrient Delivery:  Continue Current Diet, Start Oral Nutrition Supplement  Nutrition Education/Counseling:  Survival skills/brief education completed   Coordination of Nutrition Care:  Continued Inpatient Monitoring, Coordination of Community Care    Goals:  Oral intakes will improve to greater than 50% of meals consistently during her stay       Nutrition Monitoring and Evaluation:   Behavioral-Environmental Outcomes:  Beliefs and Attitutes   Food/Nutrient Intake Outcomes:  Food and Nutrient Intake, Supplement Intake  Physical Signs/Symptoms Outcomes:  Biochemical Data, Meal Time Behavior     Discharge Planning:     Too soon to determine     Electronically signed by Cyndy Moreno RD, LD on 7/16/20 at 4:15 PM EDT    Contact: [phone]

## 2020-07-17 LAB
GLUCOSE BLD-MCNC: 214 MG/DL (ref 70–99)
GLUCOSE BLD-MCNC: 222 MG/DL (ref 70–99)
GLUCOSE BLD-MCNC: 223 MG/DL (ref 70–99)
GLUCOSE BLD-MCNC: 226 MG/DL (ref 70–99)
GLUCOSE BLD-MCNC: 329 MG/DL (ref 70–99)

## 2020-07-17 PROCEDURE — 97110 THERAPEUTIC EXERCISES: CPT

## 2020-07-17 PROCEDURE — 97530 THERAPEUTIC ACTIVITIES: CPT

## 2020-07-17 PROCEDURE — 82962 GLUCOSE BLOOD TEST: CPT

## 2020-07-17 PROCEDURE — 6370000000 HC RX 637 (ALT 250 FOR IP): Performed by: INTERNAL MEDICINE

## 2020-07-17 PROCEDURE — 97116 GAIT TRAINING THERAPY: CPT

## 2020-07-17 PROCEDURE — 6360000002 HC RX W HCPCS: Performed by: INTERNAL MEDICINE

## 2020-07-17 PROCEDURE — 1200000002 HC SEMI PRIVATE SWING BED

## 2020-07-17 RX ADMIN — ATENOLOL 50 MG: 50 TABLET ORAL at 08:19

## 2020-07-17 RX ADMIN — INSULIN GLARGINE 5 UNITS: 100 INJECTION, SOLUTION SUBCUTANEOUS at 20:36

## 2020-07-17 RX ADMIN — SULFAMETHOXAZOLE AND TRIMETHOPRIM 1 TABLET: 800; 160 TABLET ORAL at 08:19

## 2020-07-17 RX ADMIN — LISINOPRIL 5 MG: 5 TABLET ORAL at 08:19

## 2020-07-17 RX ADMIN — LEVOTHYROXINE SODIUM 300 MCG: 0.15 TABLET ORAL at 06:19

## 2020-07-17 RX ADMIN — DULOXETINE HYDROCHLORIDE 60 MG: 30 CAPSULE, DELAYED RELEASE ORAL at 08:20

## 2020-07-17 RX ADMIN — INSULIN LISPRO 4 UNITS: 100 INJECTION, SOLUTION INTRAVENOUS; SUBCUTANEOUS at 20:35

## 2020-07-17 RX ADMIN — HYDRALAZINE HYDROCHLORIDE 10 MG: 10 TABLET, FILM COATED ORAL at 08:20

## 2020-07-17 RX ADMIN — SULFAMETHOXAZOLE AND TRIMETHOPRIM 1 TABLET: 800; 160 TABLET ORAL at 20:34

## 2020-07-17 RX ADMIN — INSULIN LISPRO 4 UNITS: 100 INJECTION, SOLUTION INTRAVENOUS; SUBCUTANEOUS at 12:13

## 2020-07-17 RX ADMIN — PANTOPRAZOLE SODIUM 40 MG: 40 TABLET, DELAYED RELEASE ORAL at 06:19

## 2020-07-17 RX ADMIN — PREDNISONE 5 MG: 5 TABLET ORAL at 08:20

## 2020-07-17 RX ADMIN — INSULIN GLARGINE 50 UNITS: 100 INJECTION, SOLUTION SUBCUTANEOUS at 20:35

## 2020-07-17 RX ADMIN — INSULIN LISPRO 4 UNITS: 100 INJECTION, SOLUTION INTRAVENOUS; SUBCUTANEOUS at 08:22

## 2020-07-17 RX ADMIN — ASPIRIN 81 MG: 81 TABLET, COATED ORAL at 08:20

## 2020-07-17 RX ADMIN — Medication 1 TABLET: at 08:20

## 2020-07-17 RX ADMIN — ATENOLOL 50 MG: 50 TABLET ORAL at 20:35

## 2020-07-17 RX ADMIN — AMLODIPINE BESYLATE 10 MG: 10 TABLET ORAL at 08:20

## 2020-07-17 RX ADMIN — HYDRALAZINE HYDROCHLORIDE 10 MG: 10 TABLET, FILM COATED ORAL at 20:35

## 2020-07-17 RX ADMIN — ATORVASTATIN CALCIUM 20 MG: 20 TABLET, FILM COATED ORAL at 20:35

## 2020-07-17 RX ADMIN — INSULIN LISPRO 4 UNITS: 100 INJECTION, SOLUTION INTRAVENOUS; SUBCUTANEOUS at 17:31

## 2020-07-17 RX ADMIN — ENOXAPARIN SODIUM 40 MG: 40 INJECTION, SOLUTION INTRAVENOUS; SUBCUTANEOUS at 08:19

## 2020-07-17 ASSESSMENT — PAIN SCALES - GENERAL
PAINLEVEL_OUTOF10: 0

## 2020-07-17 NOTE — PROGRESS NOTES
walker, Cane  ADL Assistance: Independent  Homemaking Assistance: Independent  Homemaking Responsibilities: Yes  Ambulation Assistance: Needs assistance  Transfer Assistance: Needs assistance  Active : No  Leisure & Hobbies: reading  Short term goals  Time Frame for Short term goals: 5 days  Short term goal 1: CGA for all bed mobility actviiies  Short term goal 2: CGA for all transfer activities  Short term goal 3: ambualate 25 ft with RW and min A       Electronically signed by:    Poppy Reynolds PTA  7/17/2020, 9:53 AM

## 2020-07-17 NOTE — PLAN OF CARE
optimal weight will improve  Outcome: Ongoing     Problem: Physical Regulation:  Goal: Complications related to the disease process, condition or treatment will be avoided or minimized  Description: Complications related to the disease process, condition or treatment will be avoided or minimized  Outcome: Ongoing  Goal: Diagnostic test results will improve  Description: Diagnostic test results will improve  Outcome: Ongoing     Problem: Tissue Perfusion:  Goal: Adequacy of tissue perfusion will improve  Description: Adequacy of tissue perfusion will improve  Outcome: Ongoing     Problem: Mobility - Impaired:  Goal: Mobility will improve  Description: Mobility will improve  Outcome: Ongoing     Problem: Discharge Planning:  Goal: Discharged to appropriate level of care  Description: Discharged to appropriate level of care  Outcome: Ongoing

## 2020-07-17 NOTE — PROGRESS NOTES
Occupational Therapy    Occupational Therapy Treatment Note  Name: Melony Meyer MRN: [id number] :   1945   Date:  2020   Admission Date: 7/15/2020 Room:  [address] [place] [address]   Restrictions/Precautions:  Restrictions/Precautions  Restrictions/Precautions: Fall Risk, General Precautions  Required Braces or Orthoses?: No   Communication with other providers:   Cleared for treatment by RN. Co treat with PTA. Subjective:  Patient states:  \"I need to go to the bathroom  Pain:   Location, Type, Intensity (0/10 to 10/10): No pain noted  Objective:    Observation:  Pt alert and oriented. Treatment, including education:  Transfers  Supine to sit :Mod A x1  Scooting :Min A  Sit to stand :Min A x1 with cues for hand placement. + vc to lean forward  Stand to sit :Min A x 1 to control sitting down    Toilet trransfer: Min A x2 for [facility] transfer; D for wiping; Min A x2 sit to stand from [facility]. Ambulated 5 ft to chair Min A x 2        Therapeutic Exercise:  Cues were given for technique, safety, recruitment, and rationale. Cues were verbal and/or tactile. F      Therapeutic Activity Training:   Therapeutic activity training was instructed today. Cues were given for safety, sequence, UE/LE placement, awareness, and balance. Activities performed today included bed mobility training, sup-sit, sit-stand, SPT. Safety Measures: Gait belt used, Left in chair. Chair alarm on          Assessment / Impression:        Patient's tolerance of treatment: Fair   Adverse Reaction: None  Significant change in status and impact:  None  Barriers to improvement:  Fatigue, dizziness, balance    Plan for Next Session:    Continue with POC. Time in:  1010  1300  Time out:  1033 1305  Timed treatment minutes:  25  Total treatment time:  25    Electronically signed by:    Aditi Silvestre,   2020, 4:41 PM    Previously filed values:  Patient Goals   Patient goals :  To be able to walk and to get stronger  Short term goals  Time Frame for Short term goals: until discharge  Short term goal 1: Pt will be min A for UB/LB bathing using good energy conservation techniques and with min vc. Short term goal 2: Pt will be SBA for UB/LB dressing using good energy conservation techniques and AE PRN and with min vc. Short term goal 3: Pt will be supervision for all functional transfers in prep for ADL tasks.   Short term goal 4: Pt will stand and complete simulated home management task with walker for >5 min with SBA no instances of LOB, no seated rest breaks

## 2020-07-17 NOTE — PROGRESS NOTES
fsbs 222, notified Victoriano Fleming who stated to continue with the normal checks before breakfast.

## 2020-07-18 LAB
CULTURE: NORMAL
CULTURE: NORMAL
GLUCOSE BLD-MCNC: 184 MG/DL (ref 70–99)
GLUCOSE BLD-MCNC: 199 MG/DL (ref 70–99)
GLUCOSE BLD-MCNC: 268 MG/DL (ref 70–99)
GLUCOSE BLD-MCNC: 92 MG/DL (ref 70–99)
Lab: NORMAL
Lab: NORMAL
SPECIMEN: NORMAL
SPECIMEN: NORMAL

## 2020-07-18 PROCEDURE — 97110 THERAPEUTIC EXERCISES: CPT

## 2020-07-18 PROCEDURE — 82962 GLUCOSE BLOOD TEST: CPT

## 2020-07-18 PROCEDURE — 1200000002 HC SEMI PRIVATE SWING BED

## 2020-07-18 PROCEDURE — 6360000002 HC RX W HCPCS: Performed by: INTERNAL MEDICINE

## 2020-07-18 PROCEDURE — 97116 GAIT TRAINING THERAPY: CPT

## 2020-07-18 PROCEDURE — 6370000000 HC RX 637 (ALT 250 FOR IP): Performed by: INTERNAL MEDICINE

## 2020-07-18 RX ADMIN — SULFAMETHOXAZOLE AND TRIMETHOPRIM 1 TABLET: 800; 160 TABLET ORAL at 08:39

## 2020-07-18 RX ADMIN — INSULIN LISPRO 1 UNITS: 100 INJECTION, SOLUTION INTRAVENOUS; SUBCUTANEOUS at 20:36

## 2020-07-18 RX ADMIN — HYDRALAZINE HYDROCHLORIDE 10 MG: 10 TABLET, FILM COATED ORAL at 20:32

## 2020-07-18 RX ADMIN — Medication 1 TABLET: at 08:39

## 2020-07-18 RX ADMIN — INSULIN GLARGINE 50 UNITS: 100 INJECTION, SOLUTION SUBCUTANEOUS at 20:36

## 2020-07-18 RX ADMIN — PANTOPRAZOLE SODIUM 40 MG: 40 TABLET, DELAYED RELEASE ORAL at 06:12

## 2020-07-18 RX ADMIN — ASPIRIN 81 MG: 81 TABLET, COATED ORAL at 08:39

## 2020-07-18 RX ADMIN — ATENOLOL 50 MG: 50 TABLET ORAL at 08:39

## 2020-07-18 RX ADMIN — ATENOLOL 50 MG: 50 TABLET ORAL at 20:32

## 2020-07-18 RX ADMIN — ATORVASTATIN CALCIUM 20 MG: 20 TABLET, FILM COATED ORAL at 20:32

## 2020-07-18 RX ADMIN — DULOXETINE HYDROCHLORIDE 60 MG: 30 CAPSULE, DELAYED RELEASE ORAL at 08:39

## 2020-07-18 RX ADMIN — HYDRALAZINE HYDROCHLORIDE 10 MG: 10 TABLET, FILM COATED ORAL at 08:39

## 2020-07-18 RX ADMIN — LEVOTHYROXINE SODIUM 300 MCG: 0.15 TABLET ORAL at 06:12

## 2020-07-18 RX ADMIN — LISINOPRIL 5 MG: 5 TABLET ORAL at 08:39

## 2020-07-18 RX ADMIN — AMLODIPINE BESYLATE 10 MG: 10 TABLET ORAL at 08:39

## 2020-07-18 RX ADMIN — PREDNISONE 5 MG: 5 TABLET ORAL at 08:39

## 2020-07-18 RX ADMIN — SULFAMETHOXAZOLE AND TRIMETHOPRIM 1 TABLET: 800; 160 TABLET ORAL at 20:32

## 2020-07-18 RX ADMIN — INSULIN LISPRO 6 UNITS: 100 INJECTION, SOLUTION INTRAVENOUS; SUBCUTANEOUS at 13:16

## 2020-07-18 RX ADMIN — ENOXAPARIN SODIUM 40 MG: 40 INJECTION, SOLUTION INTRAVENOUS; SUBCUTANEOUS at 08:39

## 2020-07-18 ASSESSMENT — PAIN SCALES - GENERAL
PAINLEVEL_OUTOF10: 0

## 2020-07-18 NOTE — PROGRESS NOTES
Physical Therapy Treatment Note  Name: Nano Prince MRN: [id number] :   1945   Date:  2020   Admission Date: 7/15/2020 Room:  [address] [place] [address]   Restrictions/Precautions:  Restrictions/Precautions  Restrictions/Precautions: Fall Risk, General Precautions  Required Braces or Orthoses?: No      Communication with other providers:  Okay to see today per nursing  Subjective:  Patient states:  Pt states she  Feels jittery   Pain:   Location, Type, Intensity (0/10 to 10/10):  0/10 - denies any pain  Objective:    Observation: Pt lying supine in bed with head elevated. Treatment, including education/measures:  Bed mobility: supine to right sidelying then to sitting EOB with mod assist x 1  Transfer sit <> stand: from bedwith min A and verbal cueing for hand placement. - periodic posterior LOB  Pt ambulated with RW x 10 ft x 3 reps in room with mod A x 1, Has some difficulty advancing RLE  Therapeutic exercises: performed SLR,.heel slides, AP, hip AABD/ADD, LAQ  All R/L x 15 reps each   Assessment / Impression:       Patient's tolerance of treatment:  Good - no complaints of dizziness today   Adverse Reaction:none  Significant change in status and impact:  none  Barriers to improvement:  Fatigue, dizziness, balance   Plan for Next Session:    Continue with therapy for further progress towards goals.   Time in:  1025  Time out:1050  Timed treatment minutes: 25  Total treatment time:  25    Previously filed items:  Social/Functional History  Lives With: Spouse  Type of Home: House  Home Layout: One level, Laundry in basement  Home Access: Stairs to enter with rails  Entrance Stairs - Number of Steps: 5  Entrance Stairs - Rails: Both  Bathroom Shower/Tub: Tub/Shower unit  Bathroom Toilet: Standard  Bathroom Equipment: Grab bars in shower, Shower chair  Home Equipment: 4 wheeled walker, Cane  ADL Assistance: Independent  Homemaking Assistance: Independent  Homemaking Responsibilities: Yes  Ambulation Assistance: Needs assistance  Transfer Assistance: Needs assistance  Active : No  Leisure & Hobbies: reading  Short term goals  Time Frame for Short term goals: 5 days  Short term goal 1: CGA for all bed mobility actviiies  Short term goal 2: CGA for all transfer activities  Short term goal 3: ambualate 25 ft with RW and min A       Electronically signed by:    Milton Mason, PT  7/18/2020, 11:35 AM

## 2020-07-18 NOTE — PLAN OF CARE
Problem: Falls - Risk of:  Goal: Will remain free from falls  Description: Will remain free from falls  Outcome: Ongoing  Goal: Absence of physical injury  Description: Absence of physical injury  Outcome: Ongoing     Problem: Skin Integrity:  Goal: Will show no infection signs and symptoms  Description: Will show no infection signs and symptoms  Outcome: Ongoing  Goal: Absence of new skin breakdown  Description: Absence of new skin breakdown  Outcome: Ongoing  Goal: Risk for impaired skin integrity will decrease  Description: Risk for impaired skin integrity will decrease  Outcome: Ongoing     Problem:  Activity:  Goal: Risk for activity intolerance will decrease  Description: Risk for activity intolerance will decrease  Outcome: Ongoing     Problem: Coping:  Goal: Ability to adjust to condition or change in health will improve  Description: Ability to adjust to condition or change in health will improve  Outcome: Ongoing     Problem: Fluid Volume:  Goal: Ability to maintain a balanced intake and output will improve  Description: Ability to maintain a balanced intake and output will improve  Outcome: Ongoing     Problem: Health Behavior:  Goal: Ability to identify and utilize available resources and services will improve  Description: Ability to identify and utilize available resources and services will improve  Outcome: Ongoing  Goal: Ability to manage health-related needs will improve  Description: Ability to manage health-related needs will improve  Outcome: Ongoing     Problem: Metabolic:  Goal: Ability to maintain appropriate glucose levels will improve  Description: Ability to maintain appropriate glucose levels will improve  Outcome: Ongoing     Problem: Nutritional:  Goal: Maintenance of adequate nutrition will improve  Description: Maintenance of adequate nutrition will improve  Outcome: Ongoing  Goal: Progress toward achieving an optimal weight will improve  Description: Progress toward achieving an optimal weight will improve  Outcome: Ongoing     Problem: Physical Regulation:  Goal: Complications related to the disease process, condition or treatment will be avoided or minimized  Description: Complications related to the disease process, condition or treatment will be avoided or minimized  Outcome: Ongoing  Goal: Diagnostic test results will improve  Description: Diagnostic test results will improve  Outcome: Ongoing     Problem: Tissue Perfusion:  Goal: Adequacy of tissue perfusion will improve  Description: Adequacy of tissue perfusion will improve  Outcome: Ongoing     Problem: Mobility - Impaired:  Goal: Mobility will improve  Description: Mobility will improve  Outcome: Ongoing     Problem: Discharge Planning:  Goal: Discharged to appropriate level of care  Description: Discharged to appropriate level of care  Outcome: Ongoing

## 2020-07-19 LAB
GLUCOSE BLD-MCNC: 172 MG/DL (ref 70–99)
GLUCOSE BLD-MCNC: 208 MG/DL (ref 70–99)
GLUCOSE BLD-MCNC: 236 MG/DL (ref 70–99)
GLUCOSE BLD-MCNC: 93 MG/DL (ref 70–99)

## 2020-07-19 PROCEDURE — 1200000002 HC SEMI PRIVATE SWING BED

## 2020-07-19 PROCEDURE — 6360000002 HC RX W HCPCS: Performed by: INTERNAL MEDICINE

## 2020-07-19 PROCEDURE — 6370000000 HC RX 637 (ALT 250 FOR IP): Performed by: INTERNAL MEDICINE

## 2020-07-19 PROCEDURE — 2580000003 HC RX 258: Performed by: INTERNAL MEDICINE

## 2020-07-19 PROCEDURE — 82962 GLUCOSE BLOOD TEST: CPT

## 2020-07-19 RX ORDER — LISINOPRIL 20 MG/1
20 TABLET ORAL DAILY
Status: DISCONTINUED | OUTPATIENT
Start: 2020-07-20 | End: 2020-08-13

## 2020-07-19 RX ADMIN — LEVOTHYROXINE SODIUM 300 MCG: 0.15 TABLET ORAL at 08:21

## 2020-07-19 RX ADMIN — ENOXAPARIN SODIUM 40 MG: 40 INJECTION, SOLUTION INTRAVENOUS; SUBCUTANEOUS at 08:20

## 2020-07-19 RX ADMIN — PREDNISONE 5 MG: 5 TABLET ORAL at 08:21

## 2020-07-19 RX ADMIN — ASPIRIN 81 MG: 81 TABLET, COATED ORAL at 08:21

## 2020-07-19 RX ADMIN — HYDRALAZINE HYDROCHLORIDE 10 MG: 10 TABLET, FILM COATED ORAL at 21:51

## 2020-07-19 RX ADMIN — DULOXETINE HYDROCHLORIDE 60 MG: 30 CAPSULE, DELAYED RELEASE ORAL at 08:21

## 2020-07-19 RX ADMIN — AMLODIPINE BESYLATE 10 MG: 10 TABLET ORAL at 08:21

## 2020-07-19 RX ADMIN — ATENOLOL 50 MG: 50 TABLET ORAL at 21:51

## 2020-07-19 RX ADMIN — INSULIN GLARGINE 50 UNITS: 100 INJECTION, SOLUTION SUBCUTANEOUS at 21:52

## 2020-07-19 RX ADMIN — LISINOPRIL 5 MG: 5 TABLET ORAL at 08:21

## 2020-07-19 RX ADMIN — PANTOPRAZOLE SODIUM 40 MG: 40 TABLET, DELAYED RELEASE ORAL at 08:21

## 2020-07-19 RX ADMIN — INSULIN LISPRO 4 UNITS: 100 INJECTION, SOLUTION INTRAVENOUS; SUBCUTANEOUS at 13:32

## 2020-07-19 RX ADMIN — Medication 1 TABLET: at 08:21

## 2020-07-19 RX ADMIN — HYDRALAZINE HYDROCHLORIDE 10 MG: 10 TABLET, FILM COATED ORAL at 08:21

## 2020-07-19 RX ADMIN — INSULIN LISPRO 4 UNITS: 100 INJECTION, SOLUTION INTRAVENOUS; SUBCUTANEOUS at 17:31

## 2020-07-19 RX ADMIN — ATORVASTATIN CALCIUM 20 MG: 20 TABLET, FILM COATED ORAL at 21:51

## 2020-07-19 RX ADMIN — INSULIN LISPRO 1 UNITS: 100 INJECTION, SOLUTION INTRAVENOUS; SUBCUTANEOUS at 22:01

## 2020-07-19 RX ADMIN — ATENOLOL 50 MG: 50 TABLET ORAL at 08:21

## 2020-07-19 RX ADMIN — SULFAMETHOXAZOLE AND TRIMETHOPRIM 1 TABLET: 800; 160 TABLET ORAL at 08:21

## 2020-07-19 ASSESSMENT — PAIN SCALES - GENERAL
PAINLEVEL_OUTOF10: 0

## 2020-07-20 LAB
GLUCOSE BLD-MCNC: 119 MG/DL (ref 70–99)
GLUCOSE BLD-MCNC: 201 MG/DL (ref 70–99)
GLUCOSE BLD-MCNC: 218 MG/DL (ref 70–99)
GLUCOSE BLD-MCNC: 244 MG/DL (ref 70–99)
GLUCOSE BLD-MCNC: 262 MG/DL (ref 70–99)

## 2020-07-20 PROCEDURE — 82962 GLUCOSE BLOOD TEST: CPT

## 2020-07-20 PROCEDURE — 6370000000 HC RX 637 (ALT 250 FOR IP): Performed by: INTERNAL MEDICINE

## 2020-07-20 PROCEDURE — 97116 GAIT TRAINING THERAPY: CPT

## 2020-07-20 PROCEDURE — 6360000002 HC RX W HCPCS: Performed by: INTERNAL MEDICINE

## 2020-07-20 PROCEDURE — 97110 THERAPEUTIC EXERCISES: CPT

## 2020-07-20 PROCEDURE — 1200000002 HC SEMI PRIVATE SWING BED

## 2020-07-20 PROCEDURE — 97530 THERAPEUTIC ACTIVITIES: CPT

## 2020-07-20 RX ADMIN — HYDRALAZINE HYDROCHLORIDE 10 MG: 10 TABLET, FILM COATED ORAL at 21:21

## 2020-07-20 RX ADMIN — ATENOLOL 50 MG: 50 TABLET ORAL at 10:50

## 2020-07-20 RX ADMIN — HYDRALAZINE HYDROCHLORIDE 10 MG: 10 TABLET, FILM COATED ORAL at 10:50

## 2020-07-20 RX ADMIN — AMLODIPINE BESYLATE 10 MG: 10 TABLET ORAL at 10:50

## 2020-07-20 RX ADMIN — ASPIRIN 81 MG: 81 TABLET, COATED ORAL at 10:51

## 2020-07-20 RX ADMIN — LEVOTHYROXINE SODIUM 300 MCG: 0.15 TABLET ORAL at 06:22

## 2020-07-20 RX ADMIN — Medication 1 TABLET: at 10:51

## 2020-07-20 RX ADMIN — ATENOLOL 50 MG: 50 TABLET ORAL at 21:22

## 2020-07-20 RX ADMIN — ATORVASTATIN CALCIUM 20 MG: 20 TABLET, FILM COATED ORAL at 21:21

## 2020-07-20 RX ADMIN — INSULIN LISPRO 2 UNITS: 100 INJECTION, SOLUTION INTRAVENOUS; SUBCUTANEOUS at 21:22

## 2020-07-20 RX ADMIN — INSULIN LISPRO 4 UNITS: 100 INJECTION, SOLUTION INTRAVENOUS; SUBCUTANEOUS at 17:21

## 2020-07-20 RX ADMIN — INSULIN GLARGINE 50 UNITS: 100 INJECTION, SOLUTION SUBCUTANEOUS at 21:22

## 2020-07-20 RX ADMIN — INSULIN LISPRO 6 UNITS: 100 INJECTION, SOLUTION INTRAVENOUS; SUBCUTANEOUS at 12:50

## 2020-07-20 RX ADMIN — PANTOPRAZOLE SODIUM 40 MG: 40 TABLET, DELAYED RELEASE ORAL at 06:22

## 2020-07-20 RX ADMIN — ENOXAPARIN SODIUM 40 MG: 40 INJECTION, SOLUTION INTRAVENOUS; SUBCUTANEOUS at 10:49

## 2020-07-20 RX ADMIN — DULOXETINE HYDROCHLORIDE 60 MG: 30 CAPSULE, DELAYED RELEASE ORAL at 10:50

## 2020-07-20 RX ADMIN — LISINOPRIL 20 MG: 20 TABLET ORAL at 10:50

## 2020-07-20 ASSESSMENT — PAIN SCALES - GENERAL
PAINLEVEL_OUTOF10: 0

## 2020-07-20 NOTE — PROGRESS NOTES
Responsibilities: Yes  Ambulation Assistance: Needs assistance  Transfer Assistance: Needs assistance  Active : No  Leisure & Hobbies: reading  Short term goals  Time Frame for Short term goals: 5 days  Short term goal 1: CGA for all bed mobility actviiies  Short term goal 2: CGA for all transfer activities  Short term goal 3: ambualate 25 ft with RW and min A       Electronically signed by:    Jodi Palm, PT  7/20/2020, 3:58 PM

## 2020-07-20 NOTE — PROGRESS NOTES
Occupational Therapy    Occupational Therapy Treatment Note  Name: Humberto Castillo MRN: [id number] :   1945   Date:  2020   Admission Date: 7/15/2020 Room:  010/010-01   Restrictions/Precautions:  Restrictions/Precautions  Restrictions/Precautions: Fall Risk, General Precautions  Required Braces or Orthoses?: No     Communication with other providers:   Cleared for treatment by RN. Subjective:  Patient states:  \"I will try to get my  to bring in clothes\"  Pain:   Location, Type, Intensity (0/10 to 10/10): No Pain  Objective:    Observation: Pt alert and oriented. Treatment, including education:    Therapeutic Exercise:  Cues were given for technique, safety, recruitment, and rationale. Cues were verbal and/or tactile. For BUE strengthening for ADL & functional mobility Indep pt performed BUE strengthening HEP c 2# cane x 20 reps x 6 exercises with Minimal difficulty. For BUE strengthening for ADL & functional mobility Indep pt performed BUE strengthening HEP using Medium strength theraband  X 3 exer for R/L UE x 15 reps c minimal rest breaks. Therapeutic Activity Training:   Therapeutic activity training was instructed today. Cues were given for safety, sequence, UE/LE placement, awareness, and balance. Activities performed today included bed mobility training, sup-sit, sit-stand, SPT. Pt able to kerline/doff socks with supervision. Safety Measures: Gait belt used, Left in bed, Pull/Bed Alarm activated and call light left in reach          Assessment / Impression:        Patient's tolerance of treatment: Good   Adverse Reaction: None  Significant change in status and impact:  None  Barriers to improvement:  Decreased strength and endurance. Plan for Next Session:    Continue with POC.     Time in:  1550  Time out:  1630  Timed treatment minutes:  40  Total treatment time:  40  Electronically signed by:    Lenin [address]    2020, 3:55 PM    Previously filed values:  Patient Goals   Patient goals : To be able to walk and to get stronger  Short term goals  Time Frame for Short term goals: until discharge  Short term goal 1: Pt will be min A for UB/LB bathing using good energy conservation techniques and with min vc. Short term goal 2: Pt will be SBA for UB/LB dressing using good energy conservation techniques and AE PRN and with min vc. Short term goal 3: Pt will be supervision for all functional transfers in prep for ADL tasks.   Short term goal 4: Pt will stand and complete simulated home management task with walker for >5 min with SBA no instances of LOB, no seated rest breaks

## 2020-07-20 NOTE — PLAN OF CARE
Problem: Falls - Risk of:  Goal: Will remain free from falls  Description: Will remain free from falls  7/20/2020 1337 by Brittney Sandoval RN  Outcome: Met This Shift  7/20/2020 0405 by Neeraj Watts RN  Outcome: Ongoing  Goal: Absence of physical injury  Description: Absence of physical injury  7/20/2020 1337 by Brittney Sandoval RN  Outcome: Met This Shift  7/20/2020 0405 by Neeraj Watts RN  Outcome: Ongoing     Problem: Skin Integrity:  Goal: Will show no infection signs and symptoms  Description: Will show no infection signs and symptoms  7/20/2020 1337 by Brittney Sandoval RN  Outcome: Met This Shift  7/20/2020 0405 by Neeraj Watts RN  Outcome: Ongoing  Goal: Absence of new skin breakdown  Description: Absence of new skin breakdown  7/20/2020 1337 by Brittney Sandoval RN  Outcome: Met This Shift  7/20/2020 0405 by Neeraj Watts RN  Outcome: Ongoing  Goal: Risk for impaired skin integrity will decrease  Description: Risk for impaired skin integrity will decrease  7/20/2020 1337 by Brittney Sandoval RN  Outcome: Met This Shift  7/20/2020 0405 by Neeraj Watts RN  Outcome: Ongoing     Problem: Coping:  Goal: Ability to adjust to condition or change in health will improve  Description: Ability to adjust to condition or change in health will improve  7/20/2020 1337 by Brittney Sandoval RN  Outcome: Met This Shift  7/20/2020 0405 by Neeraj Watts RN  Outcome: Ongoing     Problem: Fluid Volume:  Goal: Ability to maintain a balanced intake and output will improve  Description: Ability to maintain a balanced intake and output will improve  7/20/2020 1337 by Brittney Sandoval RN  Outcome: Met This Shift  7/20/2020 0405 by Neeraj Watts RN  Outcome: Ongoing     Problem: Health Behavior:  Goal: Ability to identify and utilize available resources and services will improve  Description: Ability to identify and utilize available resources and services will improve  7/20/2020 1337 by Brittney Sandoval RN  Outcome: Met This Shift  7/20/2020 0405 by Ian Berry RN  Outcome: Ongoing  Goal: Ability to manage health-related needs will improve  Description: Ability to manage health-related needs will improve  7/20/2020 1337 by Kirstie Wong RN  Outcome: Met This Shift  7/20/2020 0405 by Ian Berry RN  Outcome: Ongoing     Problem: Metabolic:  Goal: Ability to maintain appropriate glucose levels will improve  Description: Ability to maintain appropriate glucose levels will improve  7/20/2020 1337 by Kirstie Wong RN  Outcome: Met This Shift  7/20/2020 0405 by Ian Berry RN  Outcome: Ongoing     Problem: Nutritional:  Goal: Maintenance of adequate nutrition will improve  Description: Maintenance of adequate nutrition will improve  7/20/2020 1337 by Kirstie Wong RN  Outcome: Met This Shift  7/20/2020 0405 by Ian Berry RN  Outcome: Ongoing  Goal: Progress toward achieving an optimal weight will improve  Description: Progress toward achieving an optimal weight will improve  7/20/2020 1337 by Kirstie Wong RN  Outcome: Met This Shift  7/20/2020 0405 by Ian Berry RN  Outcome: Ongoing     Problem: Physical Regulation:  Goal: Complications related to the disease process, condition or treatment will be avoided or minimized  Description: Complications related to the disease process, condition or treatment will be avoided or minimized  7/20/2020 1337 by Kirstie Wong RN  Outcome: Met This Shift  7/20/2020 0405 by Ian Berry RN  Outcome: Ongoing  Goal: Diagnostic test results will improve  Description: Diagnostic test results will improve  7/20/2020 1337 by Kirstie Wong RN  Outcome: Met This Shift  7/20/2020 0405 by Ian Berry RN  Outcome: Ongoing     Problem: Tissue Perfusion:  Goal: Adequacy of tissue perfusion will improve  Description: Adequacy of tissue perfusion will improve  7/20/2020 1337 by Kirstie Wong RN  Outcome: Met This Shift  7/20/2020 0405 by Samra Mendoza Hay Mercado RN  Outcome: Ongoing     Problem: Mobility - Impaired:  Goal: Mobility will improve  Description: Mobility will improve  7/20/2020 1337 by Husam Lazcano RN  Outcome: Met This Shift  7/20/2020 0405 by Mag Riggs RN  Outcome: Ongoing

## 2020-07-21 LAB
GLUCOSE BLD-MCNC: 119 MG/DL (ref 70–99)
GLUCOSE BLD-MCNC: 191 MG/DL (ref 70–99)
GLUCOSE BLD-MCNC: 220 MG/DL (ref 70–99)
GLUCOSE BLD-MCNC: 230 MG/DL (ref 70–99)

## 2020-07-21 PROCEDURE — 97110 THERAPEUTIC EXERCISES: CPT

## 2020-07-21 PROCEDURE — 6370000000 HC RX 637 (ALT 250 FOR IP): Performed by: INTERNAL MEDICINE

## 2020-07-21 PROCEDURE — 6360000002 HC RX W HCPCS: Performed by: INTERNAL MEDICINE

## 2020-07-21 PROCEDURE — 97530 THERAPEUTIC ACTIVITIES: CPT

## 2020-07-21 PROCEDURE — 97535 SELF CARE MNGMENT TRAINING: CPT

## 2020-07-21 PROCEDURE — 1200000002 HC SEMI PRIVATE SWING BED

## 2020-07-21 PROCEDURE — 97116 GAIT TRAINING THERAPY: CPT

## 2020-07-21 PROCEDURE — 82962 GLUCOSE BLOOD TEST: CPT

## 2020-07-21 RX ADMIN — INSULIN LISPRO 1 UNITS: 100 INJECTION, SOLUTION INTRAVENOUS; SUBCUTANEOUS at 20:39

## 2020-07-21 RX ADMIN — ATENOLOL 50 MG: 50 TABLET ORAL at 20:39

## 2020-07-21 RX ADMIN — PANTOPRAZOLE SODIUM 40 MG: 40 TABLET, DELAYED RELEASE ORAL at 05:45

## 2020-07-21 RX ADMIN — ATENOLOL 50 MG: 50 TABLET ORAL at 08:27

## 2020-07-21 RX ADMIN — DULOXETINE HYDROCHLORIDE 60 MG: 30 CAPSULE, DELAYED RELEASE ORAL at 08:27

## 2020-07-21 RX ADMIN — LEVOTHYROXINE SODIUM 300 MCG: 0.15 TABLET ORAL at 05:44

## 2020-07-21 RX ADMIN — ENOXAPARIN SODIUM 40 MG: 40 INJECTION, SOLUTION INTRAVENOUS; SUBCUTANEOUS at 08:40

## 2020-07-21 RX ADMIN — LISINOPRIL 20 MG: 20 TABLET ORAL at 08:27

## 2020-07-21 RX ADMIN — INSULIN GLARGINE 50 UNITS: 100 INJECTION, SOLUTION SUBCUTANEOUS at 20:39

## 2020-07-21 RX ADMIN — HYDRALAZINE HYDROCHLORIDE 10 MG: 10 TABLET, FILM COATED ORAL at 08:27

## 2020-07-21 RX ADMIN — Medication 1 TABLET: at 08:27

## 2020-07-21 RX ADMIN — HYDRALAZINE HYDROCHLORIDE 10 MG: 10 TABLET, FILM COATED ORAL at 20:38

## 2020-07-21 RX ADMIN — ASPIRIN 81 MG: 81 TABLET, COATED ORAL at 08:27

## 2020-07-21 RX ADMIN — AMLODIPINE BESYLATE 10 MG: 10 TABLET ORAL at 08:27

## 2020-07-21 RX ADMIN — INSULIN LISPRO 4 UNITS: 100 INJECTION, SOLUTION INTRAVENOUS; SUBCUTANEOUS at 12:36

## 2020-07-21 RX ADMIN — ATORVASTATIN CALCIUM 20 MG: 20 TABLET, FILM COATED ORAL at 20:39

## 2020-07-21 RX ADMIN — INSULIN LISPRO 4 UNITS: 100 INJECTION, SOLUTION INTRAVENOUS; SUBCUTANEOUS at 08:28

## 2020-07-21 NOTE — PROGRESS NOTES
Occupational Therapy    Occupational Therapy Treatment Note  Name: Xiao Hudson MRN: [id number] :   1945   Date:  2020   Admission Date: 7/15/2020 Room:  010/010-01   Restrictions/Precautions:  Restrictions/Precautions  Restrictions/Precautions: Fall Risk, General Precautions  Required Braces or Orthoses?: No     Communication with other providers:   Cleared for treatment by RN. CO treat with PT. Subjective:  Patient states:  \"I'm kind of tired today\"  Pain:   Location, Type, Intensity (0/10 to 10/10): None noted  Objective:    Observation:  Pt alert and oriented. Treatment, including education:  Transfers    Sit to stand :SBA  Stand to sit :SBA with min verbal cues to reach back for chair  SPT:SBA          Therapeutic Exercise:  Cues were given for technique, safety, recruitment, and rationale. Cues were verbal and/or tactile. For BUE strengthening for ADL & functional mobility Indep pt performed BUE strengthening HEP using Medium strength theraband  X 3 exer for R/L UE x 15 reps c minimal rest breaks. Therapeutic Activity Training:   Therapeutic activity training was instructed today. Cues were given for safety, sequence, UE/LE placement, awareness, and balance. Activities performed today included bed mobility training, sup-sit, sit-stand, SPT. Pt completed functional mobility x 40-50' with RW x 2 attempts. Pt completed 4\" step with CGA with min verbal cues. Safety Measures: Gait belt used, up in chair, Pull/Bed Alarm activated and call light left in reach          Assessment / Impression:        Patient's tolerance of treatment: Good   Adverse Reaction: None  Significant change in status and impact:  None  Barriers to improvement:  Decreased strength and endurance. Plan for Next Session:    Continue with POC.     Time in:  1417  Time out:  1456  Timed treatment minutes:  39  Total treatment time:  39  Electronically signed by:    EMILY Linares/DEBI HORN 1991 7/21/2020, 4:08 PM    Previously filed values:  Patient Goals   Patient goals : To be able to walk and to get stronger  Short term goals  Time Frame for Short term goals: until discharge  Short term goal 1: Pt will be min A for UB/LB bathing using good energy conservation techniques and with min vc. Short term goal 2: Pt will be SBA for UB/LB dressing using good energy conservation techniques and AE PRN and with min vc. Short term goal 3: Pt will be supervision for all functional transfers in prep for ADL tasks.   Short term goal 4: Pt will stand and complete simulated home management task with walker for >5 min with SBA no instances of LOB, no seated rest breaks

## 2020-07-21 NOTE — PROGRESS NOTES
Occupational Therapy  Physical Rehabilitation: OCCUPATIONAL THERAPY     [x] daily progress note       [] discharge       Patient Name:  Colleen Goncalves   :  [birth year] MRN: [id number]  Room:  [address] [address] Date of Admission: 7/15/2020  Rehabilitation Diagnosis:   Debility [R53.81]  Weakness [R53.1]       Date 2020       Day of ARU Week:  7   Time IN/OUT 1617/1644   Individual Tx Minutes 27   Group Tx Minutes    Co-Treat Minutes    Concurrent Tx Minutes    TOTAL Tx Time Mins 27   Variance Time    Variance Time []   Refusal due to:     []   Medical hold/reason:    []   Illness   []   Off Unit for test/procedure  []   Extra time needed to complete task  []   Therapeutic need  []   Other (specify):   Restrictions Restrictions/Precautions  Restrictions/Precautions: Fall Risk, General Precautions  Required Braces or Orthoses?: No  Position Activity Restriction  Other position/activity restrictions: fall risk   Communication with other providers: [x]   OK to see per nursing:     []   Spoke with team member regarding:      Subjective observations and cognitive status: Pt wanted to go to the bathroom   Pain level/location:  0  /10       Location:    Discharge recommendations  Anticipated discharge date:   To be determined  Destination: []home alone   []home alone w assist prn [] home w/ family    [] Continuous supervision       []SNF            [] Assisted living     [] Other:   Continued therapy: []HHC OT  []OUTPATIENT  OT   [] No Further OT  Equipment needs:x   (HIT F2 to transition between stars)    Eating/Feeding:     x  Extremity Used:        []    R UE []    L UE         Dentures: []   N/A    []    Partial []    Full  Adaptive Equipment Used:        Grooming:   x  Oral Hygiene:  x      Bathing:   D for wiping bottom      Dressing:      Upper Body Dressing:  x  Lower Body Dressing: Min A for donning pull up  Footwear: SBA    Toileting:   D      Toilet Transfers:   CGA/SBA  Device Used:    [x]   Standard Toilet []   Thedore Jose           []  Bedside Commode       []   Elevated Toilet          []   Other:        Tub/Shower Transfer:  x  Device Used:    []   Shower Bench      []   Shower Chair      []   Tub Transfer Bench           []   Bathtub    []   Shower         []   Other:         Bed Mobility:           [x]   Pt received out of bed       Transfers:    Sit--> Stand: SBA  Stand --> Sit:  SBA  Stand-Pivot:  xOther:    Assistive device required for transfer:  RW      Functional Mobility:  To/from toiletAssistance: x  Device:   [x]   Rolling Walker     []   Standard Walker []   Wheelchair        []   [address] [facility]       []   4-Wheeled Minh Mitul         []   Cardiac Minh Mitul       []   Other:        Homemaking Tasks:  x    Additional Therapeutic activities/exercises completed this date:     [x]   ADL Training   [x]   Balance/Postural training     []   Transfer Training   []   Endurance Training   []   Neuromuscular Re-ed   []   Nu-step:  Time:        Level:         #Steps:       []   Rebounder:    []  Seated     []  Standing        []   Supine Ther Ex (reps/sets):     []   Seated Ther Ex (reps/sets):     []   Standing Ther Ex (reps/sets):     []   Other:      Comments:      Patient/Caregiver Education and Training:   []   [employer] Equipment Use  [x]   Eli Tucker Technique/Safety  []   Energy Conservation Tips  []   Family training  [x]   Postural Awareness  [x]   Safety During Functional Activities  []   Reinforced Patient's Precautions   []   Progress was updated and reviewed in Rehabtracker with patient and/or family this         date.      Treatment Plan for Next Session: adls, transfers, or exercise    Assessment:        Treatment/Activity Tolerance:   [x] Tolerated treatment with no adverse effects    [] Patient limited by fatigue  [] Patient limited by pain   [] Patient limited by medical complications:    [] Adverse reaction to Tx:   [] Significant change in status    Safety:       []  bed alarm set    []  chair alarm set    []  Pt

## 2020-07-21 NOTE — PATIENT CARE CONFERENCE
Physical Therapy     SWING BED WEEKLY TEAM SHEET      Agus Ricks   7/21/2020             WEEK# 2    PHYSICAL THERAPY       Ambulation: Distance:  48'    Device:   RW  Assist: CGA     Wt bearing: full     W/C skills:  n/a   Stairs:  Amount/size:  2 4\"     Assist:    Min A    Device: RW     Pain:  none   Transfers:   Sit to stand:  CGA  Stand to sit:  CGA             Bed Mobility:  Rolls right:  CGA   Rolls left:     Positioning:     Supine to sit:   CGA    Sit to supine:          Strength/ROM: 3+/5 strength, WFL ROM     Balance:     Static sitting    [] P  [] F-   [] F    [] F+    [x] G               Dynamic Sitting           [] P  [] F-   [] F    [] F+    [x] G                     Static standing            [] P  [] F-   [x] F    [] F+    [] G   [x]  With  [] Without device Dynamic standing       [] P  [] F-   [x] F    [] F+    [] G   [x]  With  [] Without device  (P= poor   F= fair   G= good)    Issues to be resolved before discharge stair negotiation, balance    Goals of previous week  [] 1st team   [x] met   [] partially met    [] not met                                          Why the goals were not met n/a goals updated to return to PLOF    Goals:    Short term goal 1: CGA for all bed mobility actviiies  Short term goal 2: CGA for all transfer activities  Short term goal 3: ambualate 25 ft with RW and min A    Updated Goals:   Short term goal 1: P will perform bed mobility with lisa  Short term goal 2: P will perform transfer to LRAD with Lisa  Short term goal 3: P will ambulate 48' with RW Lisa  Short term goal 4: P will ascend/descend 5 steps with KILEY HRs and Lisa.        Physical Therapy Signature:  Sandro Rosado, PT

## 2020-07-21 NOTE — CARE COORDINATION
SWING BED WEEKLY TEAM SHEET     Staci Kisha   7/21/2020 WEEK # 1    Care Management    Issues to be resolved before discharge: Increased strength, balance, and mobility    Family Education: Patient/staff to update    Discharge Plan: Home with [facility]  Patient/Family Adjustment: N/A     Goals of previous week:   [] 1st team   [] met   [] partially met    [x] not met             Why goals were not met: Continued weakness and unsteady gait    Skilled Level of Care Remains   [x] yes   [] no    Estimated length of stay: 1-2 weeks   Patient/Family Concerns/input: N/A    Patient/Family  Signature _________________  7/21/2020       Care Management Signature:  Luisana Nicholas RN

## 2020-07-21 NOTE — PATIENT CARE CONFERENCE
SWING BED WEEKLY TEAM SHEET     Victor M Taylor   7/21/2020   WEEK #2    Occupational Therapy    Feeding  [x] Independ [] SBA [] MIN assist  [] mod assist  [] max assist [] tot assist  Grooming [x] Independ [] SBA [] MIN assist  [] mod assist  [] max assist [] tot assist   Bathing upper body [] Independ [x] SBA [] MIN assist  [] mod assist  [] max assist              [] tot assist    Dressing upper body [] Independ [x] SBA [] MIN assist  [] mod assist  [] max assist              [] tot assist    Dressing lower body [] Independ [] SBA [x] MIN assist  [] mod assist  [] max assist              [] tot assist   Toileting [] Independ [] SBA [] MIN assist  [] mod assist  [] max assist [x] tot assist    Home Management: x    Cognitive/Perception: difficulties problem solving using walker , standing and wiping    Upper extremity motor control: WFL    Otherx  Goals of previous week:   [] 1st team   [] met   [x] partially met    [] not met             Why goals were not met    Issues to be resolved before discharge:  Improved I with adls, transfers;  Pt needs to be able to stand with walker and be I with wiping self and pulling up underwear    Occupational Therapy Signature: Jacey Pollock, OT

## 2020-07-21 NOTE — PLAN OF CARE
Problem: Falls - Risk of:  Goal: Will remain free from falls  Description: Will remain free from falls  Outcome: Met This Shift  Goal: Absence of physical injury  Description: Absence of physical injury  Outcome: Met This Shift     Problem: Skin Integrity:  Goal: Will show no infection signs and symptoms  Description: Will show no infection signs and symptoms  Outcome: Met This Shift  Goal: Absence of new skin breakdown  Description: Absence of new skin breakdown  Outcome: Met This Shift  Goal: Risk for impaired skin integrity will decrease  Description: Risk for impaired skin integrity will decrease  Outcome: Met This Shift     Problem:  Activity:  Goal: Risk for activity intolerance will decrease  Description: Risk for activity intolerance will decrease  Outcome: Met This Shift     Problem: Coping:  Goal: Ability to adjust to condition or change in health will improve  Description: Ability to adjust to condition or change in health will improve  Outcome: Met This Shift     Problem: Fluid Volume:  Goal: Ability to maintain a balanced intake and output will improve  Description: Ability to maintain a balanced intake and output will improve  Outcome: Met This Shift     Problem: Health Behavior:  Goal: Ability to identify and utilize available resources and services will improve  Description: Ability to identify and utilize available resources and services will improve  Outcome: Met This Shift  Goal: Ability to manage health-related needs will improve  Description: Ability to manage health-related needs will improve  Outcome: Met This Shift     Problem: Metabolic:  Goal: Ability to maintain appropriate glucose levels will improve  Description: Ability to maintain appropriate glucose levels will improve  Outcome: Met This Shift     Problem: Nutritional:  Goal: Maintenance of adequate nutrition will improve  Description: Maintenance of adequate nutrition will improve  Outcome: Met This Shift  Goal: Progress toward achieving an optimal weight will improve  Description: Progress toward achieving an optimal weight will improve  Outcome: Met This Shift     Problem: Physical Regulation:  Goal: Complications related to the disease process, condition or treatment will be avoided or minimized  Description: Complications related to the disease process, condition or treatment will be avoided or minimized  Outcome: Met This Shift  Goal: Diagnostic test results will improve  Description: Diagnostic test results will improve  Outcome: Met This Shift     Problem: Tissue Perfusion:  Goal: Adequacy of tissue perfusion will improve  Description: Adequacy of tissue perfusion will improve  Outcome: Met This Shift     Problem: Mobility - Impaired:  Goal: Mobility will improve  Description: Mobility will improve  Outcome: Met This Shift     Problem: Discharge Planning:  Goal: Discharged to appropriate level of care  Description: Discharged to appropriate level of care  Outcome: Met This Shift

## 2020-07-21 NOTE — PROGRESS NOTES
Physical Therapy    Physical Therapy Treatment Note  Name: Jessica Moore MRN: [id number] :   1945   Date:  2020   Admission Date: 7/15/2020 Room:  [address] [place] [address]   Restrictions/Precautions:  Restrictions/Precautions  Restrictions/Precautions: Fall Risk, General Precautions  Required Braces or Orthoses?: No       Communication with other providers:  Co-treat with OT. Nursing aware PT is working with patient. Subjective:  Patient states:  P agreeable to working with therapy. Pain:   Location, Type, Intensity (0/10 to 10/10):  denies  Objective:    Observation:  P sitting up in chair. Treatment, including education/measures:  Transfers: Sit <> stand with CGA from chair x 4 reps  Gait: P ambulates with RW x 50' with CGA with decreased step length on R side. P benefits from cues for stepping into RW. P ambulates x 15' x 2 bouts. Stairs: P performs step up/down 4\" block with RW with BUE support. P with CGA to min A to correct for 1 LOB posteriorly. There. Ex: P performs seated marching and LAQ x 15 reps with cues for range and hold. P with decreased strength on R compared to L  P left sitting up in chair with call light within reach. Assessment / Impression:    P with improved consistency with transfers. P with continued deficits in gait and stairs.  Recommend continued skilled PT to address deficits and maximize functional potential.   Patient's tolerance of treatment:  good   Adverse Reaction: none  Significant change in status and impact:  Improved gait  Barriers to improvement:  none  Plan for Next Session:    Continue working on stairs, gait and standing balance  Time in:  1417  Time out:  1456  Timed treatment minutes:  39  Total treatment time:  39    Previously filed items:  Social/Functional History  Lives With: Spouse  Type of Home: House  Home Layout: One level, Laundry in basement  Home Access: Stairs to enter with rails  Entrance Stairs - Number of Steps: 5  Entrance Stairs - Rails:

## 2020-07-22 LAB
GLUCOSE BLD-MCNC: 113 MG/DL (ref 70–99)
GLUCOSE BLD-MCNC: 177 MG/DL (ref 70–99)
GLUCOSE BLD-MCNC: 199 MG/DL (ref 70–99)
GLUCOSE BLD-MCNC: 248 MG/DL (ref 70–99)

## 2020-07-22 PROCEDURE — 97116 GAIT TRAINING THERAPY: CPT

## 2020-07-22 PROCEDURE — 6370000000 HC RX 637 (ALT 250 FOR IP): Performed by: INTERNAL MEDICINE

## 2020-07-22 PROCEDURE — 97110 THERAPEUTIC EXERCISES: CPT

## 2020-07-22 PROCEDURE — 97112 NEUROMUSCULAR REEDUCATION: CPT

## 2020-07-22 PROCEDURE — 82962 GLUCOSE BLOOD TEST: CPT

## 2020-07-22 PROCEDURE — 1200000002 HC SEMI PRIVATE SWING BED

## 2020-07-22 PROCEDURE — 97530 THERAPEUTIC ACTIVITIES: CPT

## 2020-07-22 PROCEDURE — 6360000002 HC RX W HCPCS: Performed by: INTERNAL MEDICINE

## 2020-07-22 RX ADMIN — INSULIN LISPRO 2 UNITS: 100 INJECTION, SOLUTION INTRAVENOUS; SUBCUTANEOUS at 09:22

## 2020-07-22 RX ADMIN — DULOXETINE HYDROCHLORIDE 60 MG: 30 CAPSULE, DELAYED RELEASE ORAL at 09:22

## 2020-07-22 RX ADMIN — INSULIN LISPRO 2 UNITS: 100 INJECTION, SOLUTION INTRAVENOUS; SUBCUTANEOUS at 18:15

## 2020-07-22 RX ADMIN — ATORVASTATIN CALCIUM 20 MG: 20 TABLET, FILM COATED ORAL at 21:36

## 2020-07-22 RX ADMIN — LISINOPRIL 20 MG: 20 TABLET ORAL at 09:22

## 2020-07-22 RX ADMIN — LEVOTHYROXINE SODIUM 300 MCG: 0.15 TABLET ORAL at 06:00

## 2020-07-22 RX ADMIN — ATENOLOL 50 MG: 50 TABLET ORAL at 09:23

## 2020-07-22 RX ADMIN — HYDRALAZINE HYDROCHLORIDE 10 MG: 10 TABLET, FILM COATED ORAL at 21:36

## 2020-07-22 RX ADMIN — ENOXAPARIN SODIUM 40 MG: 40 INJECTION, SOLUTION INTRAVENOUS; SUBCUTANEOUS at 09:22

## 2020-07-22 RX ADMIN — AMLODIPINE BESYLATE 10 MG: 10 TABLET ORAL at 09:22

## 2020-07-22 RX ADMIN — HYDRALAZINE HYDROCHLORIDE 10 MG: 10 TABLET, FILM COATED ORAL at 09:22

## 2020-07-22 RX ADMIN — ASPIRIN 81 MG: 81 TABLET, COATED ORAL at 09:23

## 2020-07-22 RX ADMIN — Medication 1 TABLET: at 09:22

## 2020-07-22 RX ADMIN — INSULIN LISPRO 4 UNITS: 100 INJECTION, SOLUTION INTRAVENOUS; SUBCUTANEOUS at 12:53

## 2020-07-22 RX ADMIN — ATENOLOL 50 MG: 50 TABLET ORAL at 21:36

## 2020-07-22 RX ADMIN — PANTOPRAZOLE SODIUM 40 MG: 40 TABLET, DELAYED RELEASE ORAL at 06:01

## 2020-07-22 ASSESSMENT — PAIN SCALES - GENERAL
PAINLEVEL_OUTOF10: 0

## 2020-07-22 NOTE — PLAN OF CARE
Problem: Falls - Risk of:  Goal: Will remain free from falls  Description: Will remain free from falls  7/22/2020 0052 by Jaky Fournier LPN  Outcome: Ongoing  7/21/2020 1539 by Grace See RN  Outcome: Met This Shift  Goal: Absence of physical injury  Description: Absence of physical injury  7/22/2020 0052 by Jaky Fournier LPN  Outcome: Ongoing  7/21/2020 1539 by Grace See RN  Outcome: Met This Shift     Problem: Skin Integrity:  Goal: Will show no infection signs and symptoms  Description: Will show no infection signs and symptoms  7/22/2020 0052 by Jaky Fournier LPN  Outcome: Ongoing  7/21/2020 1539 by Grace See RN  Outcome: Met This Shift  Goal: Absence of new skin breakdown  Description: Absence of new skin breakdown  7/22/2020 0052 by Jaky Fournier LPN  Outcome: Ongoing  7/21/2020 1539 by Grace See RN  Outcome: Met This Shift  Goal: Risk for impaired skin integrity will decrease  Description: Risk for impaired skin integrity will decrease  7/22/2020 0052 by Jaky Fournier LPN  Outcome: Ongoing  7/21/2020 1539 by Grace See RN  Outcome: Met This Shift     Problem:  Activity:  Goal: Risk for activity intolerance will decrease  Description: Risk for activity intolerance will decrease  7/22/2020 0052 by Jaky Fournier LPN  Outcome: Ongoing  7/21/2020 1539 by Grace See RN  Outcome: Met This Shift     Problem: Coping:  Goal: Ability to adjust to condition or change in health will improve  Description: Ability to adjust to condition or change in health will improve  7/22/2020 0052 by Jaky Fournier LPN  Outcome: Ongoing  7/21/2020 1539 by Grace See RN  Outcome: Met This Shift     Problem: Fluid Volume:  Goal: Ability to maintain a balanced intake and output will improve  Description: Ability to maintain a balanced intake and output will improve  7/22/2020 0052 by Jaky Fournier LPN  Outcome: Ongoing  7/21/2020 1539 by Grace See RN  Outcome: Met This Shift     Problem: Health Behavior:  Goal: Ability to identify and utilize available resources and services will improve  Description: Ability to identify and utilize available resources and services will improve  7/22/2020 0052 by Tasia Archuleta LPN  Outcome: Ongoing  7/21/2020 1539 by Greta Suazo RN  Outcome: Met This Shift  Goal: Ability to manage health-related needs will improve  Description: Ability to manage health-related needs will improve  7/22/2020 0052 by Tasia Archuleta LPN  Outcome: Ongoing  7/21/2020 1539 by Greta Suazo RN  Outcome: Met This Shift     Problem: Metabolic:  Goal: Ability to maintain appropriate glucose levels will improve  Description: Ability to maintain appropriate glucose levels will improve  7/22/2020 0052 by Tasia Archuleta LPN  Outcome: Ongoing  7/21/2020 1539 by Greta Suazo RN  Outcome: Met This Shift     Problem: Nutritional:  Goal: Maintenance of adequate nutrition will improve  Description: Maintenance of adequate nutrition will improve  7/22/2020 0052 by Tasia Archuleta LPN  Outcome: Ongoing  7/21/2020 1539 by Greta Suazo RN  Outcome: Met This Shift  Goal: Progress toward achieving an optimal weight will improve  Description: Progress toward achieving an optimal weight will improve  7/22/2020 0052 by Tasia Archuleta LPN  Outcome: Ongoing  7/21/2020 1539 by Greta Suazo RN  Outcome: Met This Shift     Problem: Physical Regulation:  Goal: Complications related to the disease process, condition or treatment will be avoided or minimized  Description: Complications related to the disease process, condition or treatment will be avoided or minimized  7/22/2020 0052 by Tasia Archuleta LPN  Outcome: Ongoing  7/21/2020 1539 by Greta Suazo RN  Outcome: Met This Shift  Goal: Diagnostic test results will improve  Description: Diagnostic test results will improve  7/22/2020 0052 by Tasia Archuleta LPN  Outcome: Ongoing  7/21/2020 1539 by Elle Anguiano RN  Outcome: Met This Shift     Problem: Tissue Perfusion:  Goal: Adequacy of tissue perfusion will improve  Description: Adequacy of tissue perfusion will improve  7/22/2020 0052 by Riccardo Rice LPN  Outcome: Ongoing  7/21/2020 1539 by Elle Anguiano RN  Outcome: Met This Shift     Problem: Mobility - Impaired:  Goal: Mobility will improve  Description: Mobility will improve  7/22/2020 0052 by Riccardo Rice LPN  Outcome: Ongoing  7/21/2020 1539 by Elle Anguiano RN  Outcome: Met This Shift     Problem: Discharge Planning:  Goal: Discharged to appropriate level of care  Description: Discharged to appropriate level of care  7/22/2020 0052 by Riccardo Rice LPN  Outcome: Ongoing  7/21/2020 1539 by Elle Anguiano RN  Outcome: Met This Shift

## 2020-07-22 NOTE — PROGRESS NOTES
Physical Therapy    Physical Therapy Treatment Note  Name: Carlton Mitchell MRN: [id number] :   1945   Date:  2020   Admission Date: 7/15/2020 Room:  [facility]   Restrictions/Precautions:  Restrictions/Precautions  Restrictions/Precautions: Fall Risk, General Precautions  Required Braces or Orthoses?: No       Communication with other providers:  Co-treat with OT. Nursing aware PT is working with patient. Subjective:  Patient states:  Pt agreeable to working with therapy. Pain:   Location, Type, Intensity (0/10 to 10/10):  denies  Objective:    Observation:  Pt sitting up in chair at bedside. Treatment, including education/measures:  Transfers: Sit <> stand with SBA from chair x 4 reps with verbal cueing to scoot fwd and for hand placement. Gait: Pt ambulates with RW x 80' with CGA with decreased step length on R side and occasionally kicking walker on R side. Pt benefits from cues for stepping into RW. Stairs:  Ther. Ex: Pt performs seated marching , hip abd with YTB and LAQ x 15 reps ea with cues for range and hold. Standing heel raises, marches and hip abd x 10 reps ea holding onto walker and with CGA. Pt with decreased strength on R compared to L  Standing Balance: performed wt shifts fwb/back, side/side with light UE support on walker and CGA, static holds w/o UE support 20 sec x 2 reps with SBA/CGA, slow head turns R/L x 5 reps SBA/CGA. Demonstrates intermittent LOB posteriorly with balance activities. Pt left sitting up in chair with call light within reach. Assessment / Impression:    Pt with improved consistency with transfers. Pt with continued deficits in gait, balance and stairs.  Recommend continued skilled PT to address deficits and maximize functional potential.   Patient's tolerance of treatment:  good   Adverse Reaction: none  Significant change in status and impact:  Improved gait  Barriers to improvement:  none  Plan for Next Session:    Continue working on stairs, gait and standing balance  Time in:  1030  Time out:  1109  Timed treatment minutes:  39  Total treatment time:  39    Previously filed items:  Social/Functional History  Lives With: Spouse  Type of Home: House  Home Layout: One level, Laundry in basement  Home Access: Stairs to enter with rails  Entrance Stairs - Number of Steps: 5  Entrance Stairs - Rails: Both  Bathroom Shower/Tub: Tub/Shower unit  Bathroom Toilet: Standard  Bathroom Equipment: Grab bars in shower, Shower chair  Home Equipment: 4 wheeled walker, Cane  ADL Assistance: Independent  Homemaking Assistance: Independent  Homemaking Responsibilities: Yes  Ambulation Assistance: Needs assistance  Transfer Assistance: Needs assistance  Active : No  Leisure & Hobbies: reading  Short term goals  Time Frame for Short term goals: 1 week  Short term goal 1: P will perform bed mobility with lisa  Short term goal 2: P will perform transfer to LRAD with Lisa  Short term goal 3: P will ambulate 48' with RW Lisa  Short term goal 4: P will ascend/descend 5 steps with KILEY HRs and Lisa.        Electronically signed by:    Daisy Goodman PTA  7/22/2020, 11:19 AM

## 2020-07-22 NOTE — PROGRESS NOTES
Occupational Therapy    Occupational Therapy Treatment Note  Name: Xiao Hudson MRN: [id number] :   1945   Date:  2020   Admission Date: 7/15/2020 Room:  [address] [place] [address]   Restrictions/Precautions:  Restrictions/Precautions  Restrictions/Precautions: Fall Risk, General Precautions  Required Braces or Orthoses?: No  Communication with other providers: Nursing  Subjective:  Patient states: \"I have to go to the bathroom. \"  Pain:   Location, Type, Intensity (0/10 to 10/10): Does not report  Objective:    Observation: Pt supine in bed     Treatment, including education:  Therapeutic Activity Training:   Therapeutic activity training was instructed today. Cues were given for safety, sequence, UE/LE placement, awareness, and balance. Activities performed today included bed mobility training, sup-sit, sit-stand. Supine to sit with min a for trunk. Sit<>stand from EOB using RW with CGA cues for walker safety. Toilet transfer sit<>stand CGA using RW cues for use of hand rail  toileting min A to assist with clothing management pulling depends up. Kalina gown supervision with setup on toilet. Pt performs functional mobility to recliner with RW CGA with cues for walker safety. Pt left in recliner with all needs in reach nursing aware. Assessment / Impression:     Patient's tolerance of treatment:  fair   Adverse Reaction: none noted   Significant change in status and impact:  Pt demos some decreased performance with functional tasks, reports she is feeling tired. Barriers to improvement:  confusion  Plan for Next Session:    Supine<>sit transfers, toileting, ADLs, strength  Time in: 908  Time out: 938  Timed treatment minutes:  30  Total treatment time:  30  Electronically signed by:    JAMES Chaney/DEBI [id number]  2020, 12:25 PM    Previously filed values:  Patient Goals   Patient goals :  To be able to walk and to get stronger  Short term goals  Time Frame for Short term goals: until discharge  Short term goal 1: Pt will be min A for UB/LB bathing using good energy conservation techniques and with min vc. Short term goal 2: Pt will be SBA for UB/LB dressing using good energy conservation techniques and AE PRN and with min vc. Short term goal 3: Pt will be supervision for all functional transfers in prep for ADL tasks.   Short term goal 4: Pt will stand and complete simulated home management task with walker for >5 min with SBA no instances of LOB, no seated rest breaks

## 2020-07-23 LAB
GLUCOSE BLD-MCNC: 176 MG/DL (ref 70–99)
GLUCOSE BLD-MCNC: 204 MG/DL (ref 70–99)
GLUCOSE BLD-MCNC: 213 MG/DL (ref 70–99)
GLUCOSE BLD-MCNC: 301 MG/DL (ref 70–99)

## 2020-07-23 PROCEDURE — 82962 GLUCOSE BLOOD TEST: CPT

## 2020-07-23 PROCEDURE — 1200000002 HC SEMI PRIVATE SWING BED

## 2020-07-23 PROCEDURE — 97116 GAIT TRAINING THERAPY: CPT

## 2020-07-23 PROCEDURE — 97535 SELF CARE MNGMENT TRAINING: CPT

## 2020-07-23 PROCEDURE — 6360000002 HC RX W HCPCS: Performed by: INTERNAL MEDICINE

## 2020-07-23 PROCEDURE — 97530 THERAPEUTIC ACTIVITIES: CPT

## 2020-07-23 PROCEDURE — 6370000000 HC RX 637 (ALT 250 FOR IP): Performed by: INTERNAL MEDICINE

## 2020-07-23 RX ORDER — DULOXETIN HYDROCHLORIDE 60 MG/1
60 CAPSULE, DELAYED RELEASE ORAL DAILY
Status: DISCONTINUED | OUTPATIENT
Start: 2020-07-24 | End: 2020-08-13 | Stop reason: HOSPADM

## 2020-07-23 RX ADMIN — ENOXAPARIN SODIUM 40 MG: 40 INJECTION, SOLUTION INTRAVENOUS; SUBCUTANEOUS at 09:33

## 2020-07-23 RX ADMIN — INSULIN LISPRO 8 UNITS: 100 INJECTION, SOLUTION INTRAVENOUS; SUBCUTANEOUS at 12:30

## 2020-07-23 RX ADMIN — HYDRALAZINE HYDROCHLORIDE 10 MG: 10 TABLET, FILM COATED ORAL at 20:59

## 2020-07-23 RX ADMIN — HYDRALAZINE HYDROCHLORIDE 10 MG: 10 TABLET, FILM COATED ORAL at 09:34

## 2020-07-23 RX ADMIN — Medication 1 TABLET: at 09:34

## 2020-07-23 RX ADMIN — INSULIN LISPRO 4 UNITS: 100 INJECTION, SOLUTION INTRAVENOUS; SUBCUTANEOUS at 09:36

## 2020-07-23 RX ADMIN — LISINOPRIL 20 MG: 20 TABLET ORAL at 09:34

## 2020-07-23 RX ADMIN — LEVOTHYROXINE SODIUM 300 MCG: 0.15 TABLET ORAL at 06:19

## 2020-07-23 RX ADMIN — DULOXETINE HYDROCHLORIDE 60 MG: 30 CAPSULE, DELAYED RELEASE ORAL at 09:34

## 2020-07-23 RX ADMIN — ASPIRIN 81 MG: 81 TABLET, COATED ORAL at 09:34

## 2020-07-23 RX ADMIN — INSULIN LISPRO 24 UNITS: 100 INJECTION, SOLUTION INTRAVENOUS; SUBCUTANEOUS at 17:21

## 2020-07-23 RX ADMIN — ATENOLOL 50 MG: 50 TABLET ORAL at 20:59

## 2020-07-23 RX ADMIN — ATORVASTATIN CALCIUM 20 MG: 20 TABLET, FILM COATED ORAL at 20:59

## 2020-07-23 RX ADMIN — INSULIN LISPRO 4 UNITS: 100 INJECTION, SOLUTION INTRAVENOUS; SUBCUTANEOUS at 17:29

## 2020-07-23 RX ADMIN — AMLODIPINE BESYLATE 10 MG: 10 TABLET ORAL at 09:34

## 2020-07-23 RX ADMIN — INSULIN GLARGINE 50 UNITS: 100 INJECTION, SOLUTION SUBCUTANEOUS at 20:59

## 2020-07-23 RX ADMIN — ATENOLOL 50 MG: 50 TABLET ORAL at 09:34

## 2020-07-23 RX ADMIN — PANTOPRAZOLE SODIUM 40 MG: 40 TABLET, DELAYED RELEASE ORAL at 06:19

## 2020-07-23 ASSESSMENT — PAIN SCALES - GENERAL
PAINLEVEL_OUTOF10: 0

## 2020-07-23 NOTE — PROGRESS NOTES
Occupational Therapy  Physical Rehabilitation: OCCUPATIONAL THERAPY     [x] daily progress note       [] discharge       Patient Name:  Cecilia Rice   :   MRN: [id number]  Room:  [address] [place] [address] Date of Admission: 7/15/2020  Rehabilitation Diagnosis:   Debility [R53.81]  Weakness [R53.1]       Date 2020       Day of ARU Week:  2   Time IN/OUT 1420/1514   Individual Tx Minutes 47   Group Tx Minutes    Co-Treat Minutes    Concurrent Tx Minutes    TOTAL Tx Time Mins 54   Variance Time    Variance Time []   Refusal due to:     []   Medical hold/reason:    []   Illness   []   Off Unit for test/procedure  []   Extra time needed to complete task  []   Therapeutic need  []   Other (specify):   Restrictions Restrictions/Precautions  Restrictions/Precautions: Fall Risk, General Precautions  Required Braces or Orthoses?: No  Position Activity Restriction  Other position/activity restrictions: fall risk   Communication with other providers: [x]   OK to see per nursing:     []   Spoke with team member regarding:      Subjective observations and cognitive status:   I will wash up   Pain level/location:  0  /10       Location:    Discharge recommendations  Anticipated discharge date:to be determined  Destination: []home alone   []home alone w assist prn [] home w/ family    [] Continuous supervision       []SNF            [] Assisted living     [] Other:   Continued therapy: []HHC OT  []OUTPATIENT  OT   [] No Further OT  Equipment needs: x   (HIT F2 to transition between stars)    Eating/Feeding:    x  Extremity Used:        []    R UE []    L UE         Dentures: []   N/A    []    Partial []    Full  Adaptive Equipment Used:        Grooming:   SBA  Oral Hygiene:  SBA after set up    Bathing:  UB SBA/LB min A for feet      Dressing:      Upper Body Dressing:  SBA to don shirt  Lower Body Dressing: Min-Mod A to don pants over feet and then to pull all the way up over her bottom  Footwear: SBA     Toileting:   x Toilet Transfers:   x  Device Used:    []   Standard Toilet         []   [facility]           []  Bedside Commode       []   Elevated Toilet          []   Other:        Tub/Shower Transfer:  x  Device Used:    []   Shower Bench      []   Shower Chair      []   Tub Transfer Bench           []   Bathtub    []   Shower         []   Other:         Bed Mobility:           [x]   Pt received out of bed       Transfers:    Sit--> Stand:  CGA/SBA + min vc to lean forward  Stand --> Sit:  CGA  Stand-Pivot:   x  Other:    Assistive device required for transfer: RW      Functional Mobility:    Assistance:  Just did sit to stands for bathing and dressing  Device:   [x]   Rolling Walker     []   Standard Walker []   Wheelchair        []   [employer]       []   4-Wheeled [employer]ley [employer]         []   Cardiac Presley Todd       []   Other:        Homemaking Tasks:  x  Additional Therapeutic activities/exercises completed this date:     [x]   ADL Training   [x]   Balance/Postural training     []   Bed/Transfer Training   [x]   Endurance Training   []   Neuromuscular Re-ed   []   Nu-step:  Time:        Level:         #Steps:       []   Rebounder:    []  Seated     []  Standing        []   Supine Ther Ex (reps/sets):     []   Seated Ther Ex (reps/sets):     []   Standing Ther Ex (reps/sets):     []   Other:      Comments:      Patient/Caregiver Education and Training:   []   [employer] Equipment Use  []   Bed Mobility/Transfer Technique/Safety  []   Energy Conservation Tips  []   Family training  [x]   Postural Awareness  [x]   Safety During Functional Activities  []   Reinforced Patient's Precautions   []   Progress was updated and reviewed in Rehabtracker with patient and/or family this         date.      Treatment Plan for Next Session: shower if possible      Assessment:        Treatment/Activity Tolerance:   [x] Tolerated treatment with no adverse effects    [] Patient limited by fatigue  [] Patient limited by pain   [] Patient limited by medical complications:    [] Adverse reaction to Tx:   [] Significant change in status    Safety:       []  bed alarm set    []  chair alarm set    []  Pt refused alarms                []  Telesitter activated      []  Gait belt used during tx session      []other:       Number of Minutes/Billable Intervention  Therapeutic Exercise    ADL Self-care 44   Neuro Re-Ed    Therapeutic Activity 10   Group    Other:    TOTAL 54       Social History  Social/Functional History  Lives With: Spouse  Type of Home: House  Home Layout: One level, Laundry in basement  Home Access: Stairs to enter with rails  Entrance Stairs - Number of Steps: 5  Entrance Stairs - Rails: Both  Bathroom Shower/Tub: Tub/Shower unit  Bathroom Toilet: Standard  Bathroom Equipment: Grab bars in shower, Shower chair  Home Equipment: 4 wheeled walker, Cane  ADL Assistance: Independent  Homemaking Assistance: Independent  Homemaking Responsibilities: Yes  Ambulation Assistance: Needs assistance  Transfer Assistance: Needs assistance  Active : No  Leisure & Hobbies: reading    Objective                                                                                    Goals:  (Update in navigator)  Short term goals  Time Frame for Short term goals: until discharge  Short term goal 1: Pt will be min A for UB/LB bathing using good energy conservation techniques and with min vc. Short term goal 2: Pt will be SBA for UB/LB dressing using good energy conservation techniques and AE PRN and with min vc. Short term goal 3: Pt will be supervision for all functional transfers in prep for ADL tasks. Short term goal 4: Pt will stand and complete simulated home management task with walker for >5 min with SBA no instances of LOB, no seated rest breaks:   :        Plan of Care                                                                              Times per week: 5 days per week for a minimum of 60 minutes/day plus group as appropriate for 60 minutes.   Treatment to include Plan  Times per week: 4x  Current Treatment Recommendations: Strengthening, ROM, Balance Training, Functional Mobility Training, Endurance Training, Safety Education & Training, Self-Care / ADL, Patient/Caregiver Education & Training, Cognitive/Perceptual Training    Electronically signed by   Yadira López,  7/23/2020, 4:02 PM

## 2020-07-23 NOTE — PROGRESS NOTES
Nutrition Assessment     Type and Reason for Visit: Reassess(Swing Bed)    Nutrition Recommendations/Plan: no new interventions    Nutrition Assessment:  Meal intakes improved to % of most meals which is appropriate to meet her nutritional needs    Malnutrition Assessment:  Malnutrition Status: No malnutrition    Estimated Daily Nutrient Needs:  Energy (kcal): 1546; Weight Used for Energy Requirements:  Current     Protein (g): 71-89; Weight Used for Protein Requirements:  Current(.8-1.0)        Fluid (ml/day): 7952-8118; Weight Used for Fluid Requirements:  Current(25-30ml/kg)      Nutrition Related Findings: Well nourished female, no special diet at home, c/o lack of taste, multiple medication changes per patient over the past several months      Current Nutrition Therapies:    Dietary Nutrition Supplements: Diabetic Oral Supplement  DIET CARB CONTROL; Carb Control: 3 carb choices (45 gms)/meal    Anthropometric Measures:  · Height: 5' 6\" (167.6 cm)  · Current Body Wt: 194 lb (88 kg)   · BMI: 31.3    Nutrition Diagnosis:   · Inadequate oral intake, In context of acute illness or injury related to acute injury/trauma as evidenced by intake 26-50%, intake 51-75%      Nutrition Interventions:   Food and/or Nutrient Delivery:  Continue Current Diet  Nutrition Education/Counseling:  Survival skills/brief education completed   Coordination of Nutrition Care:  Continued Inpatient Monitoring, Coordination of Community Care    Goals:  Oral intakes will improve to greater than 50% of meals consistently during her stay       Nutrition Monitoring and Evaluation:   Behavioral-Environmental Outcomes:  Beliefs and Attitutes   Food/Nutrient Intake Outcomes:  Food and Nutrient Intake  Physical Signs/Symptoms Outcomes:  Biochemical Data, Meal Time Behavior, Weight     Discharge Planning:    Continue current diet     Electronically signed by Amairani Mcadams RD, LD on 7/23/20 at 4:23 PM EDT    Contact: [phone]

## 2020-07-23 NOTE — PROGRESS NOTES
Hospitalist Progress Note         Admit Date: 7/15/2020    PCP: TOÑITO Ricci     No chief complaint on file. Assessment and Plan:     -Weakness/debility continue with PT OT recommendations.  -Diabetes mellitus with A1c of 12. Continue current Lantus. Blood sugar still uncontrolled-increase prandial insulin dose. Continue SSI and monitor Accu-Cheks. -UTI from E. coli continue Bactrim. -Morbid obesity diet and exercise counseling.  -Hypothyroidism with TSH 22 continue Synthroid 300 MCG. Need outpatient follow-up for TSH monitoring in 4 to 6 weeks. Blood sugar slightly uncontrolled due to prednisone. -HTN continue lisinopril, Norvasc and hydralazine.   Follow vitals      VTE prophylaxis LMWH    Current Facility-Administered Medications   Medication Dose Route Frequency Provider Last Rate Last Dose    [START ON 7/24/2020] DULoxetine (CYMBALTA) extended release capsule 60 mg  60 mg Oral Daily Calin Zapata MD        insulin lispro (HUMALOG) injection vial 24 Units  24 Units Subcutaneous TID  Sheree Chaney MD        lisinopril (PRINIVIL;ZESTRIL) tablet 20 mg  20 mg Oral Daily Calin Zapata MD   20 mg at 07/23/20 0934    insulin glargine (LANTUS;BASAGLAR) injection pen 5 Units  5 Units Subcutaneous Daily PRN Calin Zapata MD   5 Units at 07/17/20 2036    insulin lispro (HUMALOG) injection vial 0-12 Units  0-12 Units Subcutaneous TID  Timoteo Broussard MD   8 Units at 07/23/20 1230    insulin lispro (HUMALOG) injection vial 0-6 Units  0-6 Units Subcutaneous Nightly Calin Zapata MD   1 Units at 07/21/20 2039    acetaminophen (TYLENOL) tablet 650 mg  650 mg Oral Q6H PRN Calin Zapata MD        Or    acetaminophen (TYLENOL) suppository 650 mg  650 mg Rectal Q6H PRN Calin Zapata MD        enoxaparin (LOVENOX) injection 40 mg  40 mg Subcutaneous Daily Fazal Broussard MD   40 mg at 07/23/20 0933    ondansetron (ZOFRAN) injection 4 mg  4 mg Intravenous Q6H PRN Shira Fairbanks MD        polyethylene glycol (GLYCOLAX) packet 17 g  17 g Oral Daily PRN Shira Fairbanks MD        sodium chloride flush 0.9 % injection 10 mL  10 mL Intravenous PRN Shira Fairbanks MD        glucagon (rDNA) injection 1 mg  1 mg Intramuscular PRN Shira Fairbanks MD        glucose (GLUTOSE) 40 % oral gel 15 g  15 g Oral PRN Shira Fairbanks MD        insulin glargine (LANTUS;BASAGLAR) injection pen 50 Units  50 Units Subcutaneous Nightly Shira Fairbanks MD   50 Units at 07/21/20 2039    amLODIPine (NORVASC) tablet 10 mg  10 mg Oral Daily Shira Fairbanks MD   10 mg at 07/23/20 0934    aspirin EC tablet 81 mg  81 mg Oral Daily Fazal Broussard MD   81 mg at 07/23/20 0934    atenolol (TENORMIN) tablet 50 mg  50 mg Oral BID Shira Fairbanks MD   50 mg at 07/23/20 0934    atorvastatin (LIPITOR) tablet 20 mg  20 mg Oral Nightly Shira Fairbanks MD   20 mg at 07/22/20 2136    hydrALAZINE (APRESOLINE) tablet 10 mg  10 mg Oral BID Shira Fairbanks MD   10 mg at 07/23/20 0934    levothyroxine (SYNTHROID) tablet 300 mcg  300 mcg Oral Daily Shira Fairbanks MD   300 mcg at 07/23/20 7123    pantoprazole (PROTONIX) tablet 40 mg  40 mg Oral Daily Shira Fairbanks MD   40 mg at 07/23/20 0634    b complex-C-E-zinc (STRESS FORMULA W/ ZINC) 1 tablet  1 tablet Oral Daily Shira Fairbanks MD   1 tablet at 07/23/20 2477       Subjective:     Patient denied any new complaints. No acute overnight events  Working with PT/OT      Objective:        Intake/Output Summary (Last 24 hours) at 7/23/2020 1342  Last data filed at 7/23/2020 1310  Gross per 24 hour   Intake 240 ml   Output --   Net 240 ml      Vitals:   Vitals:    07/23/20 0804   BP: (!) 140/63   Pulse: 66   Resp: 16   Temp: 97.5 °F (36.4 °C)   SpO2: 96%     Physical Exam:  General Appearance:    Alert, cooperative, no distress,  Head:      Normocephalic, without obvious abnormality, atraumatic  Eyes:       Conjunctiva/corneas clear, EOM's intact  Lungs:    Clear to auscultation bilaterally, respirations unlabored  Heart:                Regular rate and rhythm, S1 and S2 normal, no murmur,   rub or gallop  Abdomen:     Soft, non-tender, bowel sounds active, no masses, no organomegaly  Extremities:   Extremities normal, atraumatic, no cyanosis or edema  Neurological:   Grossly Intact. Significant Diagnostic Studies:   DATA:    CBC No results for input(s): WBC, HGB, HCT, PLT in the last 72 hours. BMP No results for input(s): NA, K, CL, CO2, PHOS, BUN, CREATININE in the last 72 hours. Invalid input(s): CA  LFT'S No results for input(s): AST, ALT, ALB, BILIDIR, BILITOT, ALKPHOS in the last 72 hours. COAG No results for input(s): INR in the last 72 hours. POC:   Lab Results   Component Value Date    POCGLU 301 07/23/2020    POCGLU 204 07/23/2020    POCGLU 113 07/22/2020    POCGLU 177 07/22/2020     [id number]:  Lab Results   Component Value Date    LABA1C 12.0 07/12/2020     CARDIAC ENZYMES  No results for input(s): CKTOTAL, CKMB, CKMBINDEX, TROPONINI in the last 72 hours. Troponin: No results for input(s): TROPONINT in the last 72 hours. BNP: No results for input(s): PROBNP in the last 72 hours. U/A:    Lab Results   Component Value Date    COLORU YELLOW 07/12/2020    WBCUA 58 TO 83 07/12/2020    RBCUA 20 TO 31 07/12/2020    MUCUS 3+ 07/11/2020    BACTERIA MANY 07/12/2020    CLARITYU CLOUDY 07/12/2020    SPECGRAV 1.020 07/12/2020    LEUKOCYTESUR MODERATE 07/12/2020    BLOODU MODERATE 07/12/2020       Ct Head Wo Contrast    Result Date: 7/11/2020  EXAMINATION: CT OF THE HEAD WITHOUT CONTRAST  7/11/2020 4:37 am TECHNIQUE: CT of the head was performed without the administration of intravenous contrast. Dose modulation, iterative reconstruction, and/or weight based adjustment of the mA/kV was utilized to reduce the radiation dose to as low as reasonably achievable.  COMPARISON: 04/09/2020 HISTORY: ORDERING SYSTEM PROVIDED HISTORY: head pain TECHNOLOGIST PROVIDED HISTORY: Has a \"code stroke\" or \"stroke alert\" been called? ->No Reason for exam:->head pain Reason for Exam: headache Acuity: Acute Type of Exam: Initial Additional signs and symptoms: headache Relevant Medical/Surgical History: headache FINDINGS: BRAIN/VENTRICLES: There is no acute intracranial hemorrhage, mass effect or midline shift. No abnormal extra-axial fluid collection. The gray-white differentiation is maintained without evidence of an acute infarct. There is no evidence of hydrocephalus. Mild generalized age-appropriate cortical atrophy. Periventricular and subcortical white matter low attenuation is seen. No wedge-shaped area of acute ischemia is identified. No intracranial mass is identified. No basilar cistern or sulcal effacement is identified. ORBITS: The visualized portion of the orbits demonstrate no acute abnormality. SINUSES: The visualized paranasal sinuses and mastoid air cells demonstrate no acute abnormality. SOFT TISSUES/SKULL:  No acute abnormality of the visualized skull or soft tissues. Stable appearing CT scan of the head without acute intracranial abnormality. Chronic microvascular ischemic changes and age-appropriate atrophy. Xr Chest Portable    Result Date: 7/12/2020  EXAMINATION: ONE XRAY VIEW OF THE CHEST 7/12/2020 7:46 am COMPARISON: 12/10/2019 HISTORY: ORDERING SYSTEM PROVIDED HISTORY: chest pain TECHNOLOGIST PROVIDED HISTORY: Reason for exam:->chest pain Reason for Exam: pt. states trouble with blood sugar Acuity: Acute Type of Exam: Initial Additional signs and symptoms: pt. states trouble with blood sugar Relevant Medical/Surgical History: pt. states trouble with blood sugar FINDINGS: Monitor wires project over the thorax. Lungs are clear. No pleural effusion or pneumothorax. Normal cardiomediastinal silhouette and pulmonary vascularity. No acute osseous abnormality. No acute cardiopulmonary disease.            Alie Salvage [employer]

## 2020-07-23 NOTE — PROGRESS NOTES
Physical Therapy    Physical Therapy Treatment Note  Name: Melony Meyer MRN: [id number] :   1945   Date:  2020   Admission Date: 7/15/2020 Room:  [address] [place] [address]   Restrictions/Precautions:  Restrictions/Precautions  Restrictions/Precautions: Fall Risk, General Precautions  Required Braces or Orthoses?: No       Communication with other providers:  Spoke to OT regarding progress  Subjective:  Patient states: \"The other girl was here a bit ago\"  Pain:   Location, Type, Intensity (0/10 to 10/10):  denies  Objective:    Observation:  P sitting up in chair  Treatment, including education/measures:  Transfers: sit <> stand to RW with CGA  Gait: Ambulates x 90' with CGA and RW. Mod cues to improve step length and posture. P with R foot stepping outside of walker despite cueing. P demos increased fatigue in RLE compared to LLE  There. Ex: P performs seated marching, LAQ, lateral step out x 12 reps KILEY. P with mod cues to maintain activity and demonstrates impaired command following requiring more cues  P educated on plan of care  P left sitting up in chair with call light within reach  Assessment / Impression:    P with improved transfers and no evidence of retropulsion. P continues to demonstrate deficits in balance and gait.  Recommend skilled PT to address deficits and maximize functional potential.   Patient's tolerance of treatment:  good   Adverse Reaction: fatigue  Significant change in status and impact:  Improved transfers  Barriers to improvement:  Weakness  Plan for Next Session:    Continue working on balance and stairs  Time in:  1531  Time out:  1545  Timed treatment minutes:  14  Total treatment time:  14    Previously filed items:  Social/Functional History  Lives With: Spouse  Type of Home: House  Home Layout: One level, Laundry in basement  Home Access: Stairs to enter with rails  Entrance Stairs - Number of Steps: 5  Entrance Stairs - Rails: Both  Bathroom Shower/Tub: Tub/Shower unit  Bathroom Toilet: Standard  Bathroom Equipment: Grab bars in shower, Shower chair  Home Equipment: 4 wheeled walker, Cane  ADL Assistance: Independent  Homemaking Assistance: Independent  Homemaking Responsibilities: Yes  Ambulation Assistance: Needs assistance  Transfer Assistance: Needs assistance  Active : No  Leisure & Hobbies: reading  Short term goals  Time Frame for Short term goals: 1 week  Short term goal 1: P will perform bed mobility with lisa  Short term goal 2: P will perform transfer to LRAD with Lisa  Short term goal 3: P will ambulate 48' with RW Lisa  Short term goal 4: P will ascend/descend 5 steps with KILEY HRs and Lisa.        Electronically signed by:    Steven Wen, PT  7/23/2020, 4:08 PM

## 2020-07-24 LAB
GLUCOSE BLD-MCNC: 203 MG/DL (ref 70–99)
GLUCOSE BLD-MCNC: 212 MG/DL (ref 70–99)
GLUCOSE BLD-MCNC: 212 MG/DL (ref 70–99)
GLUCOSE BLD-MCNC: 241 MG/DL (ref 70–99)

## 2020-07-24 PROCEDURE — 6360000002 HC RX W HCPCS: Performed by: INTERNAL MEDICINE

## 2020-07-24 PROCEDURE — 97530 THERAPEUTIC ACTIVITIES: CPT

## 2020-07-24 PROCEDURE — 97535 SELF CARE MNGMENT TRAINING: CPT

## 2020-07-24 PROCEDURE — 6370000000 HC RX 637 (ALT 250 FOR IP): Performed by: INTERNAL MEDICINE

## 2020-07-24 PROCEDURE — 1200000002 HC SEMI PRIVATE SWING BED

## 2020-07-24 PROCEDURE — 82962 GLUCOSE BLOOD TEST: CPT

## 2020-07-24 RX ADMIN — LEVOTHYROXINE SODIUM 300 MCG: 0.15 TABLET ORAL at 06:54

## 2020-07-24 RX ADMIN — LISINOPRIL 20 MG: 20 TABLET ORAL at 09:01

## 2020-07-24 RX ADMIN — ATENOLOL 50 MG: 50 TABLET ORAL at 20:53

## 2020-07-24 RX ADMIN — ATORVASTATIN CALCIUM 20 MG: 20 TABLET, FILM COATED ORAL at 20:53

## 2020-07-24 RX ADMIN — INSULIN LISPRO 4 UNITS: 100 INJECTION, SOLUTION INTRAVENOUS; SUBCUTANEOUS at 17:59

## 2020-07-24 RX ADMIN — INSULIN GLARGINE 50 UNITS: 100 INJECTION, SOLUTION SUBCUTANEOUS at 20:50

## 2020-07-24 RX ADMIN — INSULIN LISPRO 24 UNITS: 100 INJECTION, SOLUTION INTRAVENOUS; SUBCUTANEOUS at 09:10

## 2020-07-24 RX ADMIN — PANTOPRAZOLE SODIUM 40 MG: 40 TABLET, DELAYED RELEASE ORAL at 06:54

## 2020-07-24 RX ADMIN — INSULIN LISPRO 4 UNITS: 100 INJECTION, SOLUTION INTRAVENOUS; SUBCUTANEOUS at 12:46

## 2020-07-24 RX ADMIN — ATENOLOL 50 MG: 50 TABLET ORAL at 09:02

## 2020-07-24 RX ADMIN — Medication 1 TABLET: at 09:01

## 2020-07-24 RX ADMIN — HYDRALAZINE HYDROCHLORIDE 10 MG: 10 TABLET, FILM COATED ORAL at 09:01

## 2020-07-24 RX ADMIN — AMLODIPINE BESYLATE 10 MG: 10 TABLET ORAL at 09:01

## 2020-07-24 RX ADMIN — ENOXAPARIN SODIUM 40 MG: 40 INJECTION, SOLUTION INTRAVENOUS; SUBCUTANEOUS at 09:02

## 2020-07-24 RX ADMIN — INSULIN LISPRO 24 UNITS: 100 INJECTION, SOLUTION INTRAVENOUS; SUBCUTANEOUS at 18:00

## 2020-07-24 RX ADMIN — ASPIRIN 81 MG: 81 TABLET, COATED ORAL at 09:01

## 2020-07-24 RX ADMIN — INSULIN LISPRO 4 UNITS: 100 INJECTION, SOLUTION INTRAVENOUS; SUBCUTANEOUS at 09:12

## 2020-07-24 RX ADMIN — DULOXETINE 60 MG: 60 CAPSULE, DELAYED RELEASE ORAL at 09:01

## 2020-07-24 RX ADMIN — HYDRALAZINE HYDROCHLORIDE 10 MG: 10 TABLET, FILM COATED ORAL at 20:53

## 2020-07-24 RX ADMIN — INSULIN LISPRO 24 UNITS: 100 INJECTION, SOLUTION INTRAVENOUS; SUBCUTANEOUS at 12:51

## 2020-07-24 RX ADMIN — INSULIN LISPRO 2 UNITS: 100 INJECTION, SOLUTION INTRAVENOUS; SUBCUTANEOUS at 20:49

## 2020-07-24 ASSESSMENT — PAIN SCALES - GENERAL: PAINLEVEL_OUTOF10: 0

## 2020-07-24 NOTE — PROGRESS NOTES
Occupational Therapy  Physical Rehabilitation: OCCUPATIONAL THERAPY     [x] daily progress note       [] discharge       Patient Name:  Jane Constantino   :   MRN: [id number]  Room:  [address] [place] [address] Date of Admission: 7/15/2020  Rehabilitation Diagnosis:   Debility [R53.81]  Weakness [R53.1]       Date 2020       Day of ARU Week:  3   Time IN/OUT 1525/1510    39   Group Tx Minutes    Co-Treat Minutes    Concurrent Tx Minutes    TOTAL Tx Time Mins 45   Variance Time    Variance Time []   Refusal due to:     []   Medical hold/reason:    []   Illness   []   Off Unit for test/procedure  []   Extra time needed to complete task  []   Therapeutic need  []   Other (specify):   Restrictions Restrictions/Precautions  Restrictions/Precautions: Fall Risk, General Precautions  Required Braces or Orthoses?: No  Position Activity Restriction  Other position/activity restrictions: fall risk   Communication with other providers: [x]   OK to see per nursing:     []   Spoke with team member regarding:      Subjective observations and cognitive status:   I will wash up   Pain level/location:  0  /10       Location:    Discharge recommendations  Anticipated discharge date:to be determined  Destination: []home alone   []home alone w assist prn [] home w/ family    [] Continuous supervision       []SNF            [] Assisted living     [] Other:   Continued therapy: []HHC OT  []OUTPATIENT  OT   [] No Further OT  Equipment needs: x   (HIT F2 to transition between stars)    Eating/Feeding:    x  Extremity Used:        []    R UE []    L UE         Dentures: []   N/A    []    Partial []    Full  Adaptive Equipment Used:        Grooming:   SBA  Oral Hygiene:  SBA after set up    Bathing:  UB SBA/LB min A for feet and CGA bottom for thoroughness      Dressing:      Upper Body Dressing:  SBA to don shirt  Lower Body Dressing: Min-Mod A to don pants over feet and then to pull all the way up over her bottom  Footwear: SBA Toileting:   x       Toilet Transfers:   x  Device Used:    []   Standard Toilet         []   Nina Spindle           []  Bedside Commode       []   Elevated Toilet          []   Other:        Tub/Shower Transfer: Min A to tub transfer bench  Device Used:    []   Shower Bench      []   Shower Chair      [x]   Tub Transfer Bench           []   Bathtub    []   Shower         []   Other:         Bed Mobility:           [x]   Pt received out of bed       Transfers:    Sit--> Stand:  SBA   Stand --> Sit:  SBA  Stand-Pivot:   x  Other:    Assistive device required for transfer: RW      Functional Mobility:    Assistance:  Just did sit to stands for bathing and dressing  Device:   [x]   Rolling Walker     []   Standard Walker []   Wheelchair        []   [employer]       []   4-Wheeled Duaine Rueter         []   Cardiac Duaine Rueter       []   Other:        Homemaking Tasks:  x  Additional Therapeutic activities/exercises completed this date:     [x]   ADL Training   [x]   Balance/Postural training     []   Bed/Transfer Training   [x]   Endurance Training   []   Neuromuscular Re-ed   []   Nu-step:  Time:        Level:         #Steps:       []   Rebounder:    []  Seated     []  Standing        []   Supine Ther Ex (reps/sets):     []   Seated Ther Ex (reps/sets):     []   Standing Ther Ex (reps/sets):     []   Other:      Comments:      Patient/Caregiver Education and Training:   []   [employer] Equipment Use  [x]   Bed Mobility/Transfer Technique/Safety  []   Energy Conservation Tips  []   Family training  [x]   Postural Awareness  [x]   Safety During Functional Activities  []   Reinforced Patient's Precautions   []   Progress was updated and reviewed in Rehabtracker with patient and/or family this         date.      Treatment Plan for Next Session: adls,or transfers or exercise    Assessment:        Treatment/Activity Tolerance:   [x] Tolerated treatment with no adverse effects    [] Patient limited by fatigue  [] Patient limited by pain   [] Patient limited by medical complications:    [] Adverse reaction to Tx:   [] Significant change in status    Safety:       []  bed alarm set    []  chair alarm set    []  Pt refused alarms                []  Telesitter activated      []  Gait belt used during tx session      []other:       Number of Minutes/Billable Intervention  Therapeutic Exercise    ADL Self-care 30   Neuro Re-Ed    Therapeutic Activity 15   Group    Other:    TOTAL 45       Social History  Social/Functional History  Lives With: Spouse  Type of Home: House  Home Layout: One level, Laundry in basement  Home Access: Stairs to enter with rails  Entrance Stairs - Number of Steps: 5  Entrance Stairs - Rails: Both  Bathroom Shower/Tub: Tub/Shower unit  Bathroom Toilet: Standard  Bathroom Equipment: Grab bars in shower, Shower chair  Home Equipment: 4 wheeled walker, Cane  ADL Assistance: Independent  Homemaking Assistance: Independent  Homemaking Responsibilities: Yes  Ambulation Assistance: Needs assistance  Transfer Assistance: Needs assistance  Active : No  Leisure & Hobbies: reading    Objective                                                                                    Goals:  (Update in navigator)  Short term goals  Time Frame for Short term goals: until discharge  Short term goal 1: Pt will be min A for UB/LB bathing using good energy conservation techniques and with min vc. Short term goal 2: Pt will be SBA for UB/LB dressing using good energy conservation techniques and AE PRN and with min vc. Short term goal 3: Pt will be supervision for all functional transfers in prep for ADL tasks.   Short term goal 4: Pt will stand and complete simulated home management task with walker for >5 min with SBA no instances of LOB, no seated rest breaks:   :        Plan of Care                                                                              Times per week: 5 days per week for a minimum of 60 minutes/day plus group as appropriate for 60 minutes.   Treatment to include Plan  Times per week: 4x  Current Treatment Recommendations: Strengthening, ROM, Balance Training, Functional Mobility Training, Endurance Training, Safety Education & Training, Self-Care / ADL, Patient/Caregiver Education & Training, Cognitive/Perceptual Training    Electronically signed by   Aditi Silvestre,  7/24/2020, 5:00 PM

## 2020-07-24 NOTE — PLAN OF CARE
Problem: Falls - Risk of:  Goal: Will remain free from falls  Description: Will remain free from falls  7/24/2020 0007 by Negro Espinosa LPN  Outcome: Ongoing  7/23/2020 1018 by Eduar Roberts RN  Outcome: Ongoing  Note: Pt at risk for falls. Bed is low. Bed alarm is activated. Call light and assistive devices within reach. Goal: Absence of physical injury  Description: Absence of physical injury  7/24/2020 0007 by Negro Espinosa LPN  Outcome: Ongoing  7/23/2020 1018 by Eduar Roberts RN  Outcome: Ongoing     Problem: Skin Integrity:  Goal: Will show no infection signs and symptoms  Description: Will show no infection signs and symptoms  7/24/2020 0007 by Negro Espinosa LPN  Outcome: Ongoing  7/23/2020 1018 by Eduar Roberts RN  Outcome: Ongoing  Goal: Absence of new skin breakdown  Description: Absence of new skin breakdown  7/24/2020 0007 by Negro Espinosa LPN  Outcome: Ongoing  7/23/2020 1018 by Eduar Roberts RN  Outcome: Ongoing  Goal: Risk for impaired skin integrity will decrease  Description: Risk for impaired skin integrity will decrease  7/24/2020 0007 by Negro Espinosa LPN  Outcome: Ongoing  7/23/2020 1018 by Eduar Roberts RN  Outcome: Ongoing     Problem:  Activity:  Goal: Risk for activity intolerance will decrease  Description: Risk for activity intolerance will decrease  7/24/2020 0007 by Negro Espinosa LPN  Outcome: Ongoing  7/23/2020 1018 by Eduar Roberts RN  Outcome: Ongoing     Problem: Coping:  Goal: Ability to adjust to condition or change in health will improve  Description: Ability to adjust to condition or change in health will improve  7/24/2020 0007 by Negro Espinosa LPN  Outcome: Ongoing  7/23/2020 1018 by Eduar Roberts RN  Outcome: Ongoing     Problem: Fluid Volume:  Goal: Ability to maintain a balanced intake and output will improve  Description: Ability to maintain a balanced intake and output will improve  7/24/2020 improve  7/24/2020 0007 by Kobe Herrera LPN  Outcome: Ongoing  7/23/2020 1018 by Emilia Alexadnre RN  Outcome: Ongoing     Problem: Tissue Perfusion:  Goal: Adequacy of tissue perfusion will improve  Description: Adequacy of tissue perfusion will improve  7/24/2020 0007 by Kobe Herrera LPN  Outcome: Ongoing  7/23/2020 1018 by Emilia Alexandre RN  Outcome: Ongoing     Problem: Mobility - Impaired:  Goal: Mobility will improve  Description: Mobility will improve  7/24/2020 0007 by Kobe Herrera LPN  Outcome: Ongoing  7/23/2020 1018 by Emilia Alexandre RN  Outcome: Ongoing     Problem: Discharge Planning:  Goal: Discharged to appropriate level of care  Description: Discharged to appropriate level of care  7/24/2020 0007 by Kobe Herrera LPN  Outcome: Ongoing  7/23/2020 1018 by Emilia Alexandre RN  Outcome: Ongoing

## 2020-07-24 NOTE — PLAN OF CARE
improve  7/24/2020 1201 by Agata Kaye RN  Outcome: Ongoing  7/24/2020 0007 by Lexie Hartman LPN  Outcome: Ongoing     Problem: Nutritional:  Goal: Maintenance of adequate nutrition will improve  7/24/2020 1201 by Agata Kaye RN  Outcome: Ongoing  7/24/2020 0007 by Lexie Hartman LPN  Outcome: Ongoing  Goal: Progress toward achieving an optimal weight will improve  7/24/2020 1201 by Agata Kaye RN  Outcome: Ongoing  7/24/2020 0007 by Lexie Hartman LPN  Outcome: Ongoing     Problem: Physical Regulation:  Goal: Complications related to the disease process, condition or treatment will be avoided or minimized  7/24/2020 1201 by Agata Kaye RN  Outcome: Ongoing  7/24/2020 0007 by Lexie Hartman LPN  Outcome: Ongoing  Goal: Diagnostic test results will improve  7/24/2020 1201 by Agata Kaye RN  Outcome: Ongoing  7/24/2020 0007 by Lexie Hartman LPN  Outcome: Ongoing     Problem: Tissue Perfusion:  Goal: Adequacy of tissue perfusion will improve  7/24/2020 1201 by Agata Kaye RN  Outcome: Ongoing  7/24/2020 0007 by Lexie Hartman LPN  Outcome: Ongoing     Problem: Mobility - Impaired:  Goal: Mobility will improve  7/24/2020 1201 by Agata Kaye RN  Outcome: Ongoing  7/24/2020 0007 by Lexie Hartman LPN  Outcome: Ongoing     Problem: Discharge Planning:  Goal: Discharged to appropriate level of care  7/24/2020 1201 by Agata Kaye RN  Outcome: Ongoing  7/24/2020 0007 by Lexie Hartman LPN  Outcome: Ongoing

## 2020-07-25 LAB
GLUCOSE BLD-MCNC: 117 MG/DL (ref 70–99)
GLUCOSE BLD-MCNC: 149 MG/DL (ref 70–99)
GLUCOSE BLD-MCNC: 185 MG/DL (ref 70–99)
GLUCOSE BLD-MCNC: 296 MG/DL (ref 70–99)

## 2020-07-25 PROCEDURE — 1200000002 HC SEMI PRIVATE SWING BED

## 2020-07-25 PROCEDURE — 6370000000 HC RX 637 (ALT 250 FOR IP): Performed by: INTERNAL MEDICINE

## 2020-07-25 PROCEDURE — 82962 GLUCOSE BLOOD TEST: CPT

## 2020-07-25 PROCEDURE — 97110 THERAPEUTIC EXERCISES: CPT

## 2020-07-25 PROCEDURE — 6360000002 HC RX W HCPCS: Performed by: INTERNAL MEDICINE

## 2020-07-25 PROCEDURE — 97116 GAIT TRAINING THERAPY: CPT

## 2020-07-25 RX ADMIN — LEVOTHYROXINE SODIUM 300 MCG: 0.15 TABLET ORAL at 06:14

## 2020-07-25 RX ADMIN — PANTOPRAZOLE SODIUM 40 MG: 40 TABLET, DELAYED RELEASE ORAL at 06:14

## 2020-07-25 RX ADMIN — INSULIN LISPRO 2 UNITS: 100 INJECTION, SOLUTION INTRAVENOUS; SUBCUTANEOUS at 10:38

## 2020-07-25 RX ADMIN — LISINOPRIL 20 MG: 20 TABLET ORAL at 10:37

## 2020-07-25 RX ADMIN — DULOXETINE 60 MG: 60 CAPSULE, DELAYED RELEASE ORAL at 10:37

## 2020-07-25 RX ADMIN — INSULIN LISPRO 6 UNITS: 100 INJECTION, SOLUTION INTRAVENOUS; SUBCUTANEOUS at 12:17

## 2020-07-25 RX ADMIN — Medication 1 TABLET: at 10:37

## 2020-07-25 RX ADMIN — AMLODIPINE BESYLATE 10 MG: 10 TABLET ORAL at 10:37

## 2020-07-25 RX ADMIN — ENOXAPARIN SODIUM 40 MG: 40 INJECTION, SOLUTION INTRAVENOUS; SUBCUTANEOUS at 10:37

## 2020-07-25 RX ADMIN — ATENOLOL 50 MG: 50 TABLET ORAL at 20:29

## 2020-07-25 RX ADMIN — INSULIN LISPRO 24 UNITS: 100 INJECTION, SOLUTION INTRAVENOUS; SUBCUTANEOUS at 12:17

## 2020-07-25 RX ADMIN — HYDRALAZINE HYDROCHLORIDE 10 MG: 10 TABLET, FILM COATED ORAL at 10:37

## 2020-07-25 RX ADMIN — HYDRALAZINE HYDROCHLORIDE 10 MG: 10 TABLET, FILM COATED ORAL at 20:29

## 2020-07-25 RX ADMIN — INSULIN LISPRO 2 UNITS: 100 INJECTION, SOLUTION INTRAVENOUS; SUBCUTANEOUS at 17:54

## 2020-07-25 RX ADMIN — ATENOLOL 50 MG: 50 TABLET ORAL at 10:37

## 2020-07-25 RX ADMIN — INSULIN GLARGINE 50 UNITS: 100 INJECTION, SOLUTION SUBCUTANEOUS at 21:06

## 2020-07-25 RX ADMIN — ASPIRIN 81 MG: 81 TABLET, COATED ORAL at 10:37

## 2020-07-25 RX ADMIN — ATORVASTATIN CALCIUM 20 MG: 20 TABLET, FILM COATED ORAL at 20:29

## 2020-07-25 RX ADMIN — INSULIN LISPRO 24 UNITS: 100 INJECTION, SOLUTION INTRAVENOUS; SUBCUTANEOUS at 17:55

## 2020-07-25 ASSESSMENT — PAIN SCALES - GENERAL
PAINLEVEL_OUTOF10: 0

## 2020-07-25 NOTE — FLOWSHEET NOTE
Physical Therapy DailyTreatment Note   Date: 2020 Room: [unfilled]   Name: Ara De Santiago :    MRN: [id number]   Admission Date:7/15/2020        Rehabilitation Diagnosis: Debility [R53.81]  Weakness [R53.1]    Objective                                                                                    Goals:  (Update in navigator)  Short term goals  Time Frame for Short term goals: 1 week  Short term goal 1: P will perform bed mobility with lisa  Short term goal 2: P will perform transfer to LRAD with Lisa  Short term goal 3: P will ambulate 48' with RW Lisa  Short term goal 4: P will ascend/descend 5 steps with KILEY HRs and Lisa.:   :        Plan of Care                                                                              Times per week: 4+ days per week for a minimum of 15 minutes/day  Treatment to include Current Treatment Recommendations: Strengthening, ADL/Self-care Training, Gait Training, IADL Training, Balance Training, Functional Mobility Training, Endurance Training    Date  2020   TIMES IN/OUT   2:05-2:30 pm   Restrictions/Precautions Restrictions/Precautions: Fall Risk, General Precautions     Current Diet/Swallowing Issues Dietary Nutrition Supplements: Diabetic Oral Supplement  DIET CARB CONTROL; Carb Control: 3 carb choices (45 gms)/meal   Communication with other providers: [x] Ok to see per nursing  [] Medical hold and reason  [] Spoke with (team member) regarding   Subjective observations:  Pt presented in chair with feet up. Pt reports she is sleepy. [x]   Gait belt used during tx session   Pain level/location: Pre-tx 0/10  Post-tx 0/10   Bed Mobility   Not performed     Transfers Sit to stand CGA with cues for hand placement in 3/3 trials. Stand to sit CGA with cues for hand placement in 3/3 trials. Commode not performed   Standing Tolerance Not performed    Amb/Locomotion: AD/Distance/Assist Pt ambulated 60 feet with CGA and RW.     Pt ambulated 140 feet with CGA fatigue/pain:       [] Patient limited by medical complications:    [] Adverse Reaction to Tx:   [] Significant change in status    Plan:   [x] Continue per plan of care [ ] Juan David Carpenter current plan   [ ] Plan of care initiated [ ] Hold pending MD visit [ ] Discharged    Billing:  Billed units: 1 therapeutic exercise, 1 gait training      Signed: Lauryn Mccracken DPT [id number]  7/25/2020, 2:40 PM

## 2020-07-26 LAB
ANION GAP SERPL CALCULATED.3IONS-SCNC: 8 MMOL/L (ref 4–16)
BUN BLDV-MCNC: 20 MG/DL (ref 6–23)
CALCIUM SERPL-MCNC: 9 MG/DL (ref 8.3–10.6)
CHLORIDE BLD-SCNC: 108 MMOL/L (ref 99–110)
CO2: 27 MMOL/L (ref 21–32)
CREAT SERPL-MCNC: 0.9 MG/DL (ref 0.6–1.1)
GFR AFRICAN AMERICAN: >60 ML/MIN/1.73M2
GFR NON-AFRICAN AMERICAN: >60 ML/MIN/1.73M2
GLUCOSE BLD-MCNC: 153 MG/DL (ref 70–99)
GLUCOSE BLD-MCNC: 162 MG/DL (ref 70–99)
GLUCOSE BLD-MCNC: 164 MG/DL (ref 70–99)
GLUCOSE BLD-MCNC: 173 MG/DL (ref 70–99)
GLUCOSE BLD-MCNC: 179 MG/DL (ref 70–99)
HCT VFR BLD CALC: 35.1 % (ref 37–47)
HEMOGLOBIN: 10.8 GM/DL (ref 12.5–16)
MCH RBC QN AUTO: 29.1 PG (ref 27–31)
MCHC RBC AUTO-ENTMCNC: 30.8 % (ref 32–36)
MCV RBC AUTO: 94.6 FL (ref 78–100)
PDW BLD-RTO: 13.8 % (ref 11.7–14.9)
PLATELET # BLD: 169 K/CU MM (ref 140–440)
PMV BLD AUTO: 10 FL (ref 7.5–11.1)
POTASSIUM SERPL-SCNC: 4.5 MMOL/L (ref 3.5–5.1)
RBC # BLD: 3.71 M/CU MM (ref 4.2–5.4)
SODIUM BLD-SCNC: 143 MMOL/L (ref 135–145)
WBC # BLD: 7.4 K/CU MM (ref 4–10.5)

## 2020-07-26 PROCEDURE — 82962 GLUCOSE BLOOD TEST: CPT

## 2020-07-26 PROCEDURE — 6360000002 HC RX W HCPCS: Performed by: INTERNAL MEDICINE

## 2020-07-26 PROCEDURE — 1200000002 HC SEMI PRIVATE SWING BED

## 2020-07-26 PROCEDURE — 6370000000 HC RX 637 (ALT 250 FOR IP): Performed by: INTERNAL MEDICINE

## 2020-07-26 PROCEDURE — 36415 COLL VENOUS BLD VENIPUNCTURE: CPT

## 2020-07-26 PROCEDURE — 80048 BASIC METABOLIC PNL TOTAL CA: CPT

## 2020-07-26 PROCEDURE — 85027 COMPLETE CBC AUTOMATED: CPT

## 2020-07-26 RX ADMIN — ASPIRIN 81 MG: 81 TABLET, COATED ORAL at 09:47

## 2020-07-26 RX ADMIN — ENOXAPARIN SODIUM 40 MG: 40 INJECTION, SOLUTION INTRAVENOUS; SUBCUTANEOUS at 09:47

## 2020-07-26 RX ADMIN — AMLODIPINE BESYLATE 10 MG: 10 TABLET ORAL at 09:47

## 2020-07-26 RX ADMIN — INSULIN LISPRO 2 UNITS: 100 INJECTION, SOLUTION INTRAVENOUS; SUBCUTANEOUS at 09:47

## 2020-07-26 RX ADMIN — LISINOPRIL 20 MG: 20 TABLET ORAL at 09:47

## 2020-07-26 RX ADMIN — INSULIN LISPRO 1 UNITS: 100 INJECTION, SOLUTION INTRAVENOUS; SUBCUTANEOUS at 20:03

## 2020-07-26 RX ADMIN — HYDRALAZINE HYDROCHLORIDE 10 MG: 10 TABLET, FILM COATED ORAL at 09:52

## 2020-07-26 RX ADMIN — ATENOLOL 50 MG: 50 TABLET ORAL at 20:02

## 2020-07-26 RX ADMIN — INSULIN LISPRO 24 UNITS: 100 INJECTION, SOLUTION INTRAVENOUS; SUBCUTANEOUS at 09:48

## 2020-07-26 RX ADMIN — ATORVASTATIN CALCIUM 20 MG: 20 TABLET, FILM COATED ORAL at 20:02

## 2020-07-26 RX ADMIN — ATENOLOL 50 MG: 50 TABLET ORAL at 09:47

## 2020-07-26 RX ADMIN — Medication 1 TABLET: at 09:47

## 2020-07-26 RX ADMIN — LEVOTHYROXINE SODIUM 300 MCG: 0.15 TABLET ORAL at 05:35

## 2020-07-26 RX ADMIN — PANTOPRAZOLE SODIUM 40 MG: 40 TABLET, DELAYED RELEASE ORAL at 05:35

## 2020-07-26 RX ADMIN — INSULIN LISPRO 2 UNITS: 100 INJECTION, SOLUTION INTRAVENOUS; SUBCUTANEOUS at 17:20

## 2020-07-26 RX ADMIN — HYDRALAZINE HYDROCHLORIDE 10 MG: 10 TABLET, FILM COATED ORAL at 20:02

## 2020-07-26 RX ADMIN — INSULIN LISPRO 2 UNITS: 100 INJECTION, SOLUTION INTRAVENOUS; SUBCUTANEOUS at 12:58

## 2020-07-26 RX ADMIN — INSULIN GLARGINE 50 UNITS: 100 INJECTION, SOLUTION SUBCUTANEOUS at 20:03

## 2020-07-26 RX ADMIN — DULOXETINE 60 MG: 60 CAPSULE, DELAYED RELEASE ORAL at 09:47

## 2020-07-26 RX ADMIN — INSULIN LISPRO 24 UNITS: 100 INJECTION, SOLUTION INTRAVENOUS; SUBCUTANEOUS at 17:23

## 2020-07-26 RX ADMIN — INSULIN LISPRO 24 UNITS: 100 INJECTION, SOLUTION INTRAVENOUS; SUBCUTANEOUS at 12:59

## 2020-07-26 ASSESSMENT — PAIN SCALES - GENERAL
PAINLEVEL_OUTOF10: 0

## 2020-07-27 LAB
GLUCOSE BLD-MCNC: 134 MG/DL (ref 70–99)
GLUCOSE BLD-MCNC: 179 MG/DL (ref 70–99)
GLUCOSE BLD-MCNC: 208 MG/DL (ref 70–99)
GLUCOSE BLD-MCNC: 274 MG/DL (ref 70–99)

## 2020-07-27 PROCEDURE — 97110 THERAPEUTIC EXERCISES: CPT

## 2020-07-27 PROCEDURE — 82962 GLUCOSE BLOOD TEST: CPT

## 2020-07-27 PROCEDURE — 97535 SELF CARE MNGMENT TRAINING: CPT

## 2020-07-27 PROCEDURE — 97530 THERAPEUTIC ACTIVITIES: CPT

## 2020-07-27 PROCEDURE — 1200000002 HC SEMI PRIVATE SWING BED

## 2020-07-27 PROCEDURE — 6360000002 HC RX W HCPCS: Performed by: INTERNAL MEDICINE

## 2020-07-27 PROCEDURE — 97116 GAIT TRAINING THERAPY: CPT

## 2020-07-27 PROCEDURE — 6370000000 HC RX 637 (ALT 250 FOR IP): Performed by: INTERNAL MEDICINE

## 2020-07-27 RX ADMIN — INSULIN LISPRO 4 UNITS: 100 INJECTION, SOLUTION INTRAVENOUS; SUBCUTANEOUS at 10:09

## 2020-07-27 RX ADMIN — PANTOPRAZOLE SODIUM 40 MG: 40 TABLET, DELAYED RELEASE ORAL at 06:30

## 2020-07-27 RX ADMIN — LISINOPRIL 20 MG: 20 TABLET ORAL at 10:19

## 2020-07-27 RX ADMIN — ATORVASTATIN CALCIUM 20 MG: 20 TABLET, FILM COATED ORAL at 20:09

## 2020-07-27 RX ADMIN — ENOXAPARIN SODIUM 40 MG: 40 INJECTION, SOLUTION INTRAVENOUS; SUBCUTANEOUS at 10:19

## 2020-07-27 RX ADMIN — INSULIN LISPRO 1 UNITS: 100 INJECTION, SOLUTION INTRAVENOUS; SUBCUTANEOUS at 20:12

## 2020-07-27 RX ADMIN — ATENOLOL 50 MG: 50 TABLET ORAL at 10:19

## 2020-07-27 RX ADMIN — HYDRALAZINE HYDROCHLORIDE 10 MG: 10 TABLET, FILM COATED ORAL at 10:18

## 2020-07-27 RX ADMIN — INSULIN LISPRO 24 UNITS: 100 INJECTION, SOLUTION INTRAVENOUS; SUBCUTANEOUS at 13:09

## 2020-07-27 RX ADMIN — INSULIN GLARGINE 50 UNITS: 100 INJECTION, SOLUTION SUBCUTANEOUS at 20:12

## 2020-07-27 RX ADMIN — HYDRALAZINE HYDROCHLORIDE 10 MG: 10 TABLET, FILM COATED ORAL at 20:10

## 2020-07-27 RX ADMIN — Medication 1 TABLET: at 10:19

## 2020-07-27 RX ADMIN — DULOXETINE 60 MG: 60 CAPSULE, DELAYED RELEASE ORAL at 10:18

## 2020-07-27 RX ADMIN — INSULIN LISPRO 24 UNITS: 100 INJECTION, SOLUTION INTRAVENOUS; SUBCUTANEOUS at 10:17

## 2020-07-27 RX ADMIN — ASPIRIN 81 MG: 81 TABLET, COATED ORAL at 10:18

## 2020-07-27 RX ADMIN — ATENOLOL 50 MG: 50 TABLET ORAL at 20:10

## 2020-07-27 RX ADMIN — AMLODIPINE BESYLATE 10 MG: 10 TABLET ORAL at 10:19

## 2020-07-27 RX ADMIN — INSULIN LISPRO 24 UNITS: 100 INJECTION, SOLUTION INTRAVENOUS; SUBCUTANEOUS at 17:51

## 2020-07-27 RX ADMIN — INSULIN LISPRO 6 UNITS: 100 INJECTION, SOLUTION INTRAVENOUS; SUBCUTANEOUS at 13:08

## 2020-07-27 RX ADMIN — LEVOTHYROXINE SODIUM 300 MCG: 0.15 TABLET ORAL at 06:30

## 2020-07-27 ASSESSMENT — PAIN SCALES - GENERAL
PAINLEVEL_OUTOF10: 0

## 2020-07-27 NOTE — PROGRESS NOTES
Occupational Therapy    Occupational Therapy Treatment Note  Name: Ara De Santiago MRN: [id number] :   1945   Date:  2020   Admission Date: 7/15/2020 Room:  [address] [place] [address]   Restrictions/Precautions:  Restrictions/Precautions  Restrictions/Precautions: Fall Risk, General Precautions  Required Braces or Orthoses?: No  Communication with other providers:  Co trat in AM with PT, OK to see per nursing  Subjective:  Patient states: I need to use the bathroom; I would like to get dressed  Pain:   Location, Type, Intensity (0/10 to 10/10): 0  Objective:    Observation:  Pt was up in the chair  Objective Measures:  Pt was seen for transfers to toilet 2x, ambulate to therapy room and  dressing  Treatment, including education:  Pt was SBA sit to stand from chair. Toilet transfer CGA-SBA + min vc for moving to R towards toilet. Pt was S for wiping and min A to pull pants up over hips. Pt was S for donning shirt. Pt walked to therapy room CGA/SBA and was S stand to sit in chair  In pm, pt needed to use the bathroom. Pt was SBA sit to stand from chair and walk to the toilet. She required min A to wipe bottom from bowel movement and Min A to change her pull up and remove her pants. She required min A to don pull up over her feet but pulled it up S. She required min A to don pants putting over her feet and pull them up over her hips. Pt was SBA to stand from toilet and ambulate to her chair  Assessment / Impression:        Patient's tolerance of treatment:  good  Adverse Reaction:no  Significant change in status and impact:  no  Barriers to improvement:  incontinent of urine  Plan for Next Session:    adls or there ex or transfers  Time in:  1130  1600  Time out: 1145  1610  Timed treatment minutes: 25  Total treatment time:  25  Electronically signed by:    Connor Gutiérrez,   2020, 5:36 PM    Previously filed values:  Patient Goals   Patient goals :  To be able to walk and to get stronger  Short term goals  Time Frame for Short term goals: until discharge  Short term goal 1: Pt will be min A for UB/LB bathing using good energy conservation techniques and with min vc. Short term goal 2: Pt will be SBA for UB/LB dressing using good energy conservation techniques and AE PRN and with min vc. Short term goal 3: Pt will be supervision for all functional transfers in prep for ADL tasks.   Short term goal 4: Pt will stand and complete simulated home management task with walker for >5 min with SBA no instances of LOB, no seated rest breaks

## 2020-07-27 NOTE — PLAN OF CARE
Patient continues to work with therapy twice daily to increase independence and mobility.    Problem: Mobility - Impaired:  Goal: Mobility will improve  Description: Mobility will improve  Outcome: Ongoing

## 2020-07-27 NOTE — PROGRESS NOTES
Physical Therapy    Physical Therapy Treatment Note  Name: Maria Dolores Briones MRN: [id number] :   1945   Date:  2020   Admission Date: 7/15/2020 Room:  [address] [place] [address]   Restrictions/Precautions:  Restrictions/Precautions  Restrictions/Precautions: Fall Risk, General Precautions  Required Braces or Orthoses?: No       Communication with other providers:    Subjective:  Patient states:  Pt agreeable to working with therapy. Pain:   Location, Type, Intensity (0/10 to 10/10): Denies having any pain  Objective:    Observation:  Pt sitting up in chair at bedside in both a.m. and p.m. .  Treatment, including education/measures:  Transfers: Sit <> stand with SBA from chair x 6 reps with verbal cueing for hand placement. Required CGA from commode for safety. Was able to balance herself to pull pants up with CGA. Gait: Pt ambulates with RW  75' x 2 in a.m. and in p.m., to and from therapy room, with CGA with decreased step length on R side. Pt benefits from cues for stepping into RW. Stairs: ambulated up and down 6\" step x 4 reps in parallel bars with use of bilat handrails and CGA. Verbal cueing for sequencing. In p.m. practiced 6\" step using single handrail but with both hands on railing with CGA and mod cues. Pt has only single handrail at home. Ther. Ex: Pt performed standing heel raises, marches and hip abd 1x10 ea . Also performed LE alt touch on 4\" step x 10 reps using bilat handrails for balance. In p.m performed Nustep x 7 min WL 3.  Standing Balance:   Pt left sitting up in chair with call light within reach. Assessment / Impression:    Pt with improved consistency with transfers. Pt with continued deficits in gait, balance and stairs.  Recommend continued skilled PT to address deficits and maximize functional potential.   Patient's tolerance of treatment:  good   Adverse Reaction: none  Significant change in status and impact:  Improved gait  Barriers to improvement:  none  Plan for Next Session: Continue working on stairs, gait and standing balance  Time in:  1130/1423  Time out:  1205/1455  Timed treatment minutes:  35+ 32  Total treatment time:  79    Previously filed items:  Social/Functional History  Lives With: Spouse  Type of Home: House  Home Layout: One level, Laundry in basement  Home Access: Stairs to enter with rails  Entrance Stairs - Number of Steps: 5  Entrance Stairs - Rails: Both  Bathroom Shower/Tub: Tub/Shower unit  Bathroom Toilet: Standard  Bathroom Equipment: Grab bars in shower, Shower chair  Home Equipment: 4 wheeled walker, Cane  ADL Assistance: Independent  Homemaking Assistance: Independent  Homemaking Responsibilities: Yes  Ambulation Assistance: Needs assistance  Transfer Assistance: Needs assistance  Active : No  Leisure & Hobbies: reading  Short term goals  Time Frame for Short term goals: 1 week  Short term goal 1: P will perform bed mobility with lisa  Short term goal 2: P will perform transfer to LRAD with Lisa  Short term goal 3: P will ambulate 48' with RW Lisa  Short term goal 4: P will ascend/descend 5 steps with KILEY HRs and Lisa.        Electronically signed by:    Poppy Reynolds PTA  7/27/2020, 1:01 PM

## 2020-07-28 LAB
GLUCOSE BLD-MCNC: 166 MG/DL (ref 70–99)
GLUCOSE BLD-MCNC: 178 MG/DL (ref 70–99)
GLUCOSE BLD-MCNC: 190 MG/DL (ref 70–99)
GLUCOSE BLD-MCNC: 234 MG/DL (ref 70–99)

## 2020-07-28 PROCEDURE — 97110 THERAPEUTIC EXERCISES: CPT

## 2020-07-28 PROCEDURE — 97116 GAIT TRAINING THERAPY: CPT

## 2020-07-28 PROCEDURE — 97530 THERAPEUTIC ACTIVITIES: CPT

## 2020-07-28 PROCEDURE — 6370000000 HC RX 637 (ALT 250 FOR IP): Performed by: INTERNAL MEDICINE

## 2020-07-28 PROCEDURE — 1200000002 HC SEMI PRIVATE SWING BED

## 2020-07-28 PROCEDURE — 6360000002 HC RX W HCPCS: Performed by: INTERNAL MEDICINE

## 2020-07-28 PROCEDURE — 82962 GLUCOSE BLOOD TEST: CPT

## 2020-07-28 RX ADMIN — ATORVASTATIN CALCIUM 20 MG: 20 TABLET, FILM COATED ORAL at 20:30

## 2020-07-28 RX ADMIN — INSULIN LISPRO 4 UNITS: 100 INJECTION, SOLUTION INTRAVENOUS; SUBCUTANEOUS at 11:43

## 2020-07-28 RX ADMIN — ENOXAPARIN SODIUM 40 MG: 40 INJECTION, SOLUTION INTRAVENOUS; SUBCUTANEOUS at 09:07

## 2020-07-28 RX ADMIN — HYDRALAZINE HYDROCHLORIDE 10 MG: 10 TABLET, FILM COATED ORAL at 09:05

## 2020-07-28 RX ADMIN — PANTOPRAZOLE SODIUM 40 MG: 40 TABLET, DELAYED RELEASE ORAL at 05:51

## 2020-07-28 RX ADMIN — ATENOLOL 50 MG: 50 TABLET ORAL at 09:05

## 2020-07-28 RX ADMIN — ATENOLOL 50 MG: 50 TABLET ORAL at 20:30

## 2020-07-28 RX ADMIN — INSULIN LISPRO 24 UNITS: 100 INJECTION, SOLUTION INTRAVENOUS; SUBCUTANEOUS at 09:10

## 2020-07-28 RX ADMIN — INSULIN GLARGINE 50 UNITS: 100 INJECTION, SOLUTION SUBCUTANEOUS at 20:30

## 2020-07-28 RX ADMIN — ASPIRIN 81 MG: 81 TABLET, COATED ORAL at 09:05

## 2020-07-28 RX ADMIN — DULOXETINE 60 MG: 60 CAPSULE, DELAYED RELEASE ORAL at 09:05

## 2020-07-28 RX ADMIN — INSULIN LISPRO 2 UNITS: 100 INJECTION, SOLUTION INTRAVENOUS; SUBCUTANEOUS at 17:09

## 2020-07-28 RX ADMIN — INSULIN LISPRO 2 UNITS: 100 INJECTION, SOLUTION INTRAVENOUS; SUBCUTANEOUS at 09:07

## 2020-07-28 RX ADMIN — LEVOTHYROXINE SODIUM 300 MCG: 0.15 TABLET ORAL at 05:51

## 2020-07-28 RX ADMIN — HYDRALAZINE HYDROCHLORIDE 10 MG: 10 TABLET, FILM COATED ORAL at 20:30

## 2020-07-28 RX ADMIN — INSULIN LISPRO 24 UNITS: 100 INJECTION, SOLUTION INTRAVENOUS; SUBCUTANEOUS at 11:44

## 2020-07-28 RX ADMIN — INSULIN LISPRO 1 UNITS: 100 INJECTION, SOLUTION INTRAVENOUS; SUBCUTANEOUS at 20:30

## 2020-07-28 RX ADMIN — AMLODIPINE BESYLATE 10 MG: 10 TABLET ORAL at 09:05

## 2020-07-28 RX ADMIN — Medication 1 TABLET: at 09:05

## 2020-07-28 RX ADMIN — INSULIN LISPRO 24 UNITS: 100 INJECTION, SOLUTION INTRAVENOUS; SUBCUTANEOUS at 17:12

## 2020-07-28 RX ADMIN — LISINOPRIL 20 MG: 20 TABLET ORAL at 09:05

## 2020-07-28 ASSESSMENT — PAIN SCALES - GENERAL
PAINLEVEL_OUTOF10: 0

## 2020-07-28 NOTE — PROGRESS NOTES
Physical Therapy    Physical Therapy Treatment Note  Name: Sebas Khalil MRN: [id number] :   1945   Date:  2020   Admission Date: 7/15/2020 Room:  [address] [place] [address]   Restrictions/Precautions:  Restrictions/Precautions  Restrictions/Precautions: Fall Risk, General Precautions  Required Braces or Orthoses?: No       Communication with other providers:  Co treat with DIAZ  Subjective:  Patient states:  Pt agreeable to working with therapy. Pain:   Location, Type, Intensity (0/10 to 10/10): Denies having any pain  Objective:    Observation:  Patient sitting up in chair    Treatment, including education/measures:  Transfers: Sit <> stand with SBA from chair   Gait: Pt ambulates with RW  67' x 2   Stairs: ambulated up and down 6\" step x 7 reps in parallel bars with use of bilat handrails and CGA. Ther. Ex:None today  Standing Balance:   Pt left sitting up in chair with call light within reach. Assessment / Impression:    Patient very slow with ambulation today. Verbal cues provided for taking bigger steps. Required multiple, short standing rest breaks to complete ambulation.    Patient's tolerance of treatment:  good   Adverse Reaction: none  Significant change in status and impact:  Improved gait  Barriers to improvement:  none  Plan for Next Session:    Continue working on stairs, gait and standing balance  Time in:  1355  Time out:  1435  Timed treatment minutes:  40'  Total treatment time:  36'    Previously filed items:  Social/Functional History  Lives With: Spouse  Type of Home: House  Home Layout: One level, Laundry in basement  Home Access: Stairs to enter with rails  Entrance Stairs - Number of Steps: 5  Entrance Stairs - Rails: Both  Bathroom Shower/Tub: Tub/Shower unit  Bathroom Toilet: Standard  Bathroom Equipment: Grab bars in shower, Shower chair  Home Equipment: 4 wheeled walker, [address]: Tania: [address] Responsibilities: Yes  Ambulation Assistance: Needs assistance  Transfer Assistance: Needs assistance  Active : No  Leisure & Hobbies: reading  Short term goals  Time Frame for Short term goals: 1 week  Short term goal 1: P will perform bed mobility with lisa  Short term goal 2: P will perform transfer to LRAD with Lisa  Short term goal 3: P will ambulate 48' with RW Lisa  Short term goal 4: P will ascend/descend 5 steps with KILEY HRs and Lisa.        Electronically signed by:    Shruthi Robins PTA  7/28/2020, 2:58 PM

## 2020-07-28 NOTE — PLAN OF CARE
Problem: Falls - Risk of:  Goal: Will remain free from falls  Description: Will remain free from falls  7/27/2020 2322 by Yoana Quiñones LPN  Outcome: Ongoing  7/27/2020 1636 by Lashanda Blancas RN  Outcome: Ongoing  Goal: Absence of physical injury  Description: Absence of physical injury  7/27/2020 2322 by Yoana Quiñones LPN  Outcome: Ongoing  7/27/2020 1636 by Lashanda Blancas RN  Outcome: Ongoing     Problem: Skin Integrity:  Goal: Will show no infection signs and symptoms  Description: Will show no infection signs and symptoms  7/27/2020 2322 by Yoana Quiñones LPN  Outcome: Ongoing  7/27/2020 1636 by Lashanda Blancas RN  Outcome: Ongoing  Goal: Absence of new skin breakdown  Description: Absence of new skin breakdown  7/27/2020 2322 by Yoana Quiñones LPN  Outcome: Ongoing  7/27/2020 1636 by Lashanda Blancas RN  Outcome: Ongoing  Goal: Risk for impaired skin integrity will decrease  Description: Risk for impaired skin integrity will decrease  7/27/2020 2322 by Yoana Quiñones LPN  Outcome: Ongoing  7/27/2020 1636 by Lashanda Blancas RN  Outcome: Ongoing     Problem:  Activity:  Goal: Risk for activity intolerance will decrease  Description: Risk for activity intolerance will decrease  7/27/2020 2322 by Yoana Quiñones LPN  Outcome: Ongoing  7/27/2020 1636 by Lashanda Blancas RN  Outcome: Ongoing     Problem: Coping:  Goal: Ability to adjust to condition or change in health will improve  Description: Ability to adjust to condition or change in health will improve  7/27/2020 2322 by Yoana Quiñones LPN  Outcome: Ongoing  7/27/2020 1636 by Lashanda Blancas RN  Outcome: Ongoing     Problem: Fluid Volume:  Goal: Ability to maintain a balanced intake and output will improve  Description: Ability to maintain a balanced intake and output will improve  7/27/2020 2322 by Yoana Quiñones LPN  Outcome: Ongoing  7/27/2020 1636 by Lashanda Blancas RN  Outcome: Ongoing     Problem: Health Behavior:  Goal: Ability to identify and utilize available resources and services will improve  Description: Ability to identify and utilize available resources and services will improve  7/27/2020 2322 by Pretty Lloyd LPN  Outcome: Ongoing  7/27/2020 1636 by Carmelina Zhou RN  Outcome: Ongoing  Goal: Ability to manage health-related needs will improve  Description: Ability to manage health-related needs will improve  7/27/2020 2322 by Pretty Lloyd LPN  Outcome: Ongoing  7/27/2020 1636 by Carmelina Zhou RN  Outcome: Ongoing     Problem: Metabolic:  Goal: Ability to maintain appropriate glucose levels will improve  Description: Ability to maintain appropriate glucose levels will improve  7/27/2020 2322 by Pretty Lloyd LPN  Outcome: Ongoing  7/27/2020 1636 by Carmelina Zhou RN  Outcome: Ongoing     Problem: Nutritional:  Goal: Maintenance of adequate nutrition will improve  Description: Maintenance of adequate nutrition will improve  7/27/2020 2322 by Pretty Lloyd LPN  Outcome: Ongoing  7/27/2020 1636 by Carmelina Zhou RN  Outcome: Ongoing  Goal: Progress toward achieving an optimal weight will improve  Description: Progress toward achieving an optimal weight will improve  7/27/2020 2322 by Pretty Lloyd LPN  Outcome: Ongoing  7/27/2020 1636 by Carmelina Zhou RN  Outcome: Ongoing     Problem: Physical Regulation:  Goal: Complications related to the disease process, condition or treatment will be avoided or minimized  Description: Complications related to the disease process, condition or treatment will be avoided or minimized  7/27/2020 2322 by Pretty Lloyd LPN  Outcome: Ongoing  7/27/2020 1636 by Carmelina Zhou RN  Outcome: Ongoing  Goal: Diagnostic test results will improve  Description: Diagnostic test results will improve  7/27/2020 2322 by Pretty Lloyd LPN  Outcome: Ongoing  7/27/2020 1636 by Carmelina Zhou RN  Outcome: Ongoing     Problem: Tissue Perfusion:  Goal: Adequacy of

## 2020-07-28 NOTE — PROGRESS NOTES
Occupational Therapy    Occupational Therapy Treatment Note  Name: Melony Meyer MRN: [id number] :   1945   Date:  2020   Admission Date: 7/15/2020 Room:  [address] [place] [address]   Restrictions/Precautions:  Restrictions/Precautions  Restrictions/Precautions: Fall Risk, General Precautions  Required Braces or Orthoses?: No   Communication with other providers:  Naomy Mcgraw to see per nurseSubjective:  Patient states:  My feet did not want to work today    Pain:   Location, Type, Intensity (0/10 to 10/10): 0/10  Objective:    Observation: Pt was slouched down, almost out of the recliner. Objective Measures:Pt was seen for sit to stand and repositioning in chair and exercise  Treatment, including education:  Pt was SBA sit to stand from chair, OT asked pt to take a deep bow to scoot back into the chair. Pt was able to scoot further back in chair. Pt did 10 reps of shoulder flex/ext, diagonals, shoulder abd/add, elbow flex/ext, wrist curls  Assessment / Impression:        Patient's tolerance of treatment: good  Adverse Reaction:no  Significant change in status and impact:  Pt had more difficulties walking today, shorter steps and decreased endurance  Barriers to improvement: decreased cognition, general weakness  Plan for Next Session:    Transfers, adls or exercise  Time in:  1630  Time NNI:[id number]  Timed treatment minutes:  15  Total treatment time:15  Electronically signed by:    Aditi Silvestre,   2020, 5:02 PM    Previously filed values:  Patient Goals   Patient goals : To be able to walk and to get stronger  Short term goals  Time Frame for Short term goals: until discharge  Short term goal 1: Pt will be min A for UB/LB bathing using good energy conservation techniques and with min vc. Short term goal 2: Pt will be SBA for UB/LB dressing using good energy conservation techniques and AE PRN and with min vc. Short term goal 3: Pt will be supervision for all functional transfers in prep for ADL tasks.   Short term goal

## 2020-07-28 NOTE — PATIENT CARE CONFERENCE
SWING BED WEEKLY TEAM SHEET     Agus Ricks   7/28/2020   WEEK # 3    Occupational Therapy    Feeding  [x] Independ [] SBA [] MIN assist  [] mod assist  [] max assist [] tot assist  Grooming [x] Independ [x] SBA [] MIN assist  [] mod assist  [] max assist [] tot assist   Bathing upper body [] Independ [x] SBA [] MIN assist  [] mod assist  [] max assist              [] tot assist    Dressing upper body [] Independ [x] SBA [] MIN assist  [] mod assist  [] max assist              [] tot assist    Dressing lower body [] Independ [] SBA [x] MIN assist  [] mod assist  [] max assist              [] tot assist   Toileting [] Independ [] SBA [x] MIN assist  [] mod assist  [] max assist [] tot assist(incontinent of urine)    Home Management: x    Cognitive/Perception: decreased STM, decreased insight    Upper extremity motor control: WFL    Other Pt had more difficulties walking on 7/28 says she felt weaker,disappointed that she was not ready to go home  Goals of previous week:   [] 1st team   [] met   [x] partially met    [] not met             Why goals were not met weakness, decreased endurance, difficulties motorplanning some of her activities; incontinent-difficulties pulling pants up     Issues to be resolved before discharge:    Increased I with transfers and increased I with adls  Occupational Therapy Signature: Alvaro Obando, OT

## 2020-07-28 NOTE — PROGRESS NOTES
Occupational Therapy    Occupational Therapy Treatment Note  Name: Fernando Pelayo MRN: [id number] :   1945   Date:  2020   Admission Date: 7/15/2020 Room:  010/010-01   Restrictions/Precautions:  Restrictions/Precautions  Restrictions/Precautions: Fall Risk, General Precautions  Required Braces or Orthoses?: No     Communication with other providers:   Cleared for treatment by RN. Co treat with PTA. Subjective:  Patient states:  \"I'm weaker today and my  told me I wasn't ready to go home and that he couldn't take care of me\". Pain:   Location, Type, Intensity (0/10 to 10/10):  0/10    Objective:    Observation:  Pt alert and oriented. Treatment, including education:  Transfers    Sit to stand :SBA  Stand to sit :SBA  SPT:SBA      Therapeutic Exercise:  Cues were given for technique, safety, recruitment, and rationale. Cues were verbal and/or tactile. For BUE strengthening for ADL & functional mobility Indep pt performed BUE strengthening HEP c 1# hand weights x 15 reps x 4 exercises with Minimal difficulty. Therapeutic Activity Training:   Therapeutic activity training was instructed today. Cues were given for safety, sequence, UE/LE placement, awareness, and balance. Activities performed today included bed mobility training, sup-sit, sit-stand, SPT. Pt completed functional mobility with RW x 65' x 2 with mod standing rest breaks. Pt completed 6 steps in Parallel bars with SBA. Safety Measures: Gait belt used, Left in bed, Pull/Bed Alarm activated and call light left in reach          Assessment / Impression:        Patient's tolerance of treatment: Good   Adverse Reaction: None  Significant change in status and impact:  None  Barriers to improvement: Decreased strength and endurance    Plan for Next Session:    Continue with POC.     Time in:  1355  Time out:  1435  Timed treatment minutes:  40  Total treatment time:  40  Electronically signed by:    Ivonne Riggs DIAZ/L KORY 1991 7/28/2020, 2:41 PM    Previously filed values:  Patient Goals   Patient goals : To be able to walk and to get stronger  Short term goals  Time Frame for Short term goals: until discharge  Short term goal 1: Pt will be min A for UB/LB bathing using good energy conservation techniques and with min vc. Short term goal 2: Pt will be SBA for UB/LB dressing using good energy conservation techniques and AE PRN and with min vc. Short term goal 3: Pt will be supervision for all functional transfers in prep for ADL tasks.   Short term goal 4: Pt will stand and complete simulated home management task with walker for >5 min with SBA no instances of LOB, no seated rest breaks

## 2020-07-28 NOTE — PLAN OF CARE
Patient and this RN discussed ordering meals and eating a balanced diet. Patient has done well with meals during this shift. Will continue to monitor and encourage patient to make good meal choices.    Problem: Nutritional:  Goal: Maintenance of adequate nutrition will improve  Description: Maintenance of adequate nutrition will improve  Outcome: Ongoing

## 2020-07-29 LAB
GLUCOSE BLD-MCNC: 227 MG/DL (ref 70–99)
GLUCOSE BLD-MCNC: 289 MG/DL (ref 70–99)
GLUCOSE BLD-MCNC: 300 MG/DL (ref 70–99)
GLUCOSE BLD-MCNC: 344 MG/DL (ref 70–99)
TSH HIGH SENSITIVITY: 0.08 UIU/ML (ref 0.27–4.2)

## 2020-07-29 PROCEDURE — 82962 GLUCOSE BLOOD TEST: CPT

## 2020-07-29 PROCEDURE — 97530 THERAPEUTIC ACTIVITIES: CPT

## 2020-07-29 PROCEDURE — 6370000000 HC RX 637 (ALT 250 FOR IP): Performed by: INTERNAL MEDICINE

## 2020-07-29 PROCEDURE — 97110 THERAPEUTIC EXERCISES: CPT

## 2020-07-29 PROCEDURE — 97116 GAIT TRAINING THERAPY: CPT

## 2020-07-29 PROCEDURE — 36415 COLL VENOUS BLD VENIPUNCTURE: CPT

## 2020-07-29 PROCEDURE — 1200000002 HC SEMI PRIVATE SWING BED

## 2020-07-29 PROCEDURE — 97112 NEUROMUSCULAR REEDUCATION: CPT

## 2020-07-29 PROCEDURE — 84443 ASSAY THYROID STIM HORMONE: CPT

## 2020-07-29 PROCEDURE — 6360000002 HC RX W HCPCS: Performed by: INTERNAL MEDICINE

## 2020-07-29 RX ORDER — TROSPIUM CHLORIDE 20 MG/1
20 TABLET, FILM COATED ORAL NIGHTLY
Status: DISCONTINUED | OUTPATIENT
Start: 2020-07-29 | End: 2020-08-13 | Stop reason: HOSPADM

## 2020-07-29 RX ADMIN — INSULIN LISPRO 24 UNITS: 100 INJECTION, SOLUTION INTRAVENOUS; SUBCUTANEOUS at 12:06

## 2020-07-29 RX ADMIN — INSULIN LISPRO 8 UNITS: 100 INJECTION, SOLUTION INTRAVENOUS; SUBCUTANEOUS at 12:05

## 2020-07-29 RX ADMIN — Medication 1 TABLET: at 08:22

## 2020-07-29 RX ADMIN — ATENOLOL 50 MG: 50 TABLET ORAL at 19:50

## 2020-07-29 RX ADMIN — INSULIN LISPRO 8 UNITS: 100 INJECTION, SOLUTION INTRAVENOUS; SUBCUTANEOUS at 17:24

## 2020-07-29 RX ADMIN — LEVOTHYROXINE SODIUM 300 MCG: 0.15 TABLET ORAL at 05:57

## 2020-07-29 RX ADMIN — INSULIN LISPRO 3 UNITS: 100 INJECTION, SOLUTION INTRAVENOUS; SUBCUTANEOUS at 19:50

## 2020-07-29 RX ADMIN — AMLODIPINE BESYLATE 10 MG: 10 TABLET ORAL at 08:22

## 2020-07-29 RX ADMIN — ENOXAPARIN SODIUM 40 MG: 40 INJECTION, SOLUTION INTRAVENOUS; SUBCUTANEOUS at 08:21

## 2020-07-29 RX ADMIN — LISINOPRIL 20 MG: 20 TABLET ORAL at 08:21

## 2020-07-29 RX ADMIN — INSULIN LISPRO 24 UNITS: 100 INJECTION, SOLUTION INTRAVENOUS; SUBCUTANEOUS at 17:27

## 2020-07-29 RX ADMIN — TROSPIUM CHLORIDE 20 MG: 20 TABLET, FILM COATED ORAL at 19:50

## 2020-07-29 RX ADMIN — INSULIN GLARGINE 5 UNITS: 100 INJECTION, SOLUTION SUBCUTANEOUS at 08:30

## 2020-07-29 RX ADMIN — INSULIN LISPRO 24 UNITS: 100 INJECTION, SOLUTION INTRAVENOUS; SUBCUTANEOUS at 08:20

## 2020-07-29 RX ADMIN — INSULIN GLARGINE 50 UNITS: 100 INJECTION, SOLUTION SUBCUTANEOUS at 19:50

## 2020-07-29 RX ADMIN — ASPIRIN 81 MG: 81 TABLET, COATED ORAL at 08:22

## 2020-07-29 RX ADMIN — HYDRALAZINE HYDROCHLORIDE 10 MG: 10 TABLET, FILM COATED ORAL at 08:21

## 2020-07-29 RX ADMIN — ATENOLOL 50 MG: 50 TABLET ORAL at 08:21

## 2020-07-29 RX ADMIN — PANTOPRAZOLE SODIUM 40 MG: 40 TABLET, DELAYED RELEASE ORAL at 05:57

## 2020-07-29 RX ADMIN — HYDRALAZINE HYDROCHLORIDE 10 MG: 10 TABLET, FILM COATED ORAL at 19:50

## 2020-07-29 RX ADMIN — INSULIN LISPRO 4 UNITS: 100 INJECTION, SOLUTION INTRAVENOUS; SUBCUTANEOUS at 08:17

## 2020-07-29 RX ADMIN — ATORVASTATIN CALCIUM 20 MG: 20 TABLET, FILM COATED ORAL at 19:50

## 2020-07-29 RX ADMIN — DULOXETINE 60 MG: 60 CAPSULE, DELAYED RELEASE ORAL at 08:22

## 2020-07-29 ASSESSMENT — PAIN SCALES - GENERAL
PAINLEVEL_OUTOF10: 0

## 2020-07-29 NOTE — PROGRESS NOTES
Physical Therapy    Physical Therapy Treatment Note  Name: Shelby Aguilar MRN: [id number] :   1945   Date:  2020   Admission Date: 7/15/2020 Room:  [address] [place], [place] [address]   Restrictions/Precautions:  Restrictions/Precautions  Restrictions/Precautions: Fall Risk, General Precautions  Required Braces or Orthoses?: No       Communication with other providers:    Subjective:  Patient states:  Pt agreeable to working with therapy. Pain:   Location, Type, Intensity (0/10 to 10/10): Denies having any pain  Objective:    Observation:  Patient sitting up in chair just finishing her breakfast. Was able to dress herself sitting in chair with supervision. Treatment, including education/measures:  Transfers: Sit <> stand with SBA from chair, vc to push up and reach back for 4 chair   Gait: Pt ambulates with RW  74' x 2 CGA/SBA, vc'ing to take bigger steps  Stairs: ambulated up and down 6\" step x 6 reps in parallel bars with use of bilat handrails and then also practiced both hands on single handrail with CGA and vc for proper sequence. Gt/balance activities in parallel bars: marches 2x10, walking bkwds with CGA, sidestepping SBA ea x 2 laps using bilat handrails for support  Ther. Ex: Nu step WL 3 x 7 min    Pt left sitting up in chair with call light within reach. Assessment / Impression:    Pt demonstrated overall good tolerance of today's therapy session. Required intermittent rest breaks. Verbal cues provided for taking bigger steps jessica RLE.      Patient's tolerance of treatment:  good   Adverse Reaction: none  Significant change in status and impact:  Improved gait  Barriers to improvement:  none  Plan for Next Session:    Continue working on stairs, gait and standing balance  Time in:  910  Time out:  1005  Timed treatment minutes:  55'  Total treatment time:  54'    Previously filed items:  Social/Functional History  Lives With: Spouse  Type of Home: House  Home Layout: One level, Laundry in basement  Home Access: Stairs to enter with rails  Entrance Stairs - Number of Steps: 5  Entrance Stairs - Rails: Both  Bathroom Shower/Tub: Tub/Shower unit  Bathroom Toilet: Standard  Bathroom Equipment: Grab bars in shower, Shower chair  Home Equipment: 4 wheeled walker, Cane  ADL Assistance: [address]: Independent  Homemaking Responsibilities: Yes  Ambulation Assistance: Needs assistance  Transfer Assistance: Needs assistance  Active : No  Leisure & Hobbies: reading  Short term goals  Time Frame for Short term goals: 1 week  Short term goal 1: P will perform bed mobility with ilsa  Short term goal 2: P will perform transfer to LRAD with Lisa  Short term goal 3: P will ambulate 48' with RW Lisa  Short term goal 4: P will ascend/descend 5 steps with KILEY HRs and Lisa.        Electronically signed by:    Cherise Kim PTA  7/29/2020, 10:01 AM

## 2020-07-29 NOTE — PROGRESS NOTES
Occupational Therapy    Occupational Therapy Treatment Note  Name: Ara De Santiago MRN: [id number] :   1945   Date:  2020   Admission Date: 7/15/2020 Room:  [address] [place] [address]   Restrictions/Precautions:  Restrictions/Precautions  Restrictions/Precautions: Fall Risk, General Precautions  Required Braces or Orthoses?: No   Communication with other providers:  Discussed pt performance with PT   Subjective:  Patient states: \"My feet are weak. \"    Pain:   Location, Type, Intensity (0/10 to 10/10): 0/10  Objective:    Observation: Pt sitting up in recliner     Treatment, including education:  Pt was SBA sit to stand from chair x5 throughout session, with cues to reach back while sitting. Pt did 20 reps of shoulder flex/ext, diagonals, shoulder abd/add, elbow flex/ext using yellow theraband. Pt performs functional ambulation to gym using RW and CGA, requires rest break following. PT performs forward row with 4# bar x10, and shoulder flexion, shoulder [id number] with 1#dumbbell. Pt then perfomrs functional ambulation back to room with CGA and RW. Pt performs dynamic balance activity standing and reaching for 4 minutes, 2 minutes with one hand supported on RW, 2 minutes with both hands off walker and CGA. Pt left in chair with chair alarm all needs in reach  Assessment / Impression:     Patient's tolerance of treatment: good  Adverse Reaction:none noted  Significant change in status and impact:  Increased endurance  Barriers to improvement:  general weakness  Plan for Next Session:    Transfers, adls or exercise  Time in:  1538  Time NV  Timed treatment minutes:  39  Total treatment time:39  Electronically signed by:    FARRAH Silva [id number]  2020, 4:45 PM    Previously filed values:  Patient Goals   Patient goals :  To be able to walk and to get stronger  Short term goals  Time Frame for Short term goals: until discharge  Short term goal 1: Pt will be min A for UB/LB bathing using good energy

## 2020-07-29 NOTE — PLAN OF CARE
Patient continues to work with therapy and ambulates in room with staff. Will continue to monitor and encourage patient to increase mobility.   Problem: Mobility - Impaired:  Goal: Mobility will improve  Description: Mobility will improve  Outcome: Ongoing

## 2020-07-29 NOTE — PROGRESS NOTES
SWING BED WEEKLY TEAM SHEET     WEEK#     2  NUTRITION   Diet:       Carb Control, supplement if eats less than 50% of meals            TF:      no    TPN:   no  Appropriate/Adequate [X] yes [ ] no   Meal intakes: %    Weight: 195#   BMI:      31.59      Significant Change: no  Recommendations: no new recommendations

## 2020-07-29 NOTE — PROGRESS NOTES
area of acute ischemia is identified. No intracranial mass is identified. No basilar cistern or sulcal effacement is identified. ORBITS: The visualized portion of the orbits demonstrate no acute abnormality. SINUSES: The visualized paranasal sinuses and mastoid air cells demonstrate no acute abnormality. SOFT TISSUES/SKULL:  No acute abnormality of the visualized skull or soft tissues. Stable appearing CT scan of the head without acute intracranial abnormality. Chronic microvascular ischemic changes and age-appropriate atrophy. Xr Chest Portable    Result Date: 7/12/2020  EXAMINATION: ONE XRAY VIEW OF THE CHEST 7/12/2020 7:46 am COMPARISON: 12/10/2019 HISTORY: ORDERING SYSTEM PROVIDED HISTORY: chest pain TECHNOLOGIST PROVIDED HISTORY: Reason for exam:->chest pain Reason for Exam: pt. states trouble with blood sugar Acuity: Acute Type of Exam: Initial Additional signs and symptoms: pt. states trouble with blood sugar Relevant Medical/Surgical History: pt. states trouble with blood sugar FINDINGS: Monitor wires project over the thorax. Lungs are clear. No pleural effusion or pneumothorax. Normal cardiomediastinal silhouette and pulmonary vascularity. No acute osseous abnormality. No acute cardiopulmonary disease.   -    DATA:    CBC No results for input(s): WBC, HGB, HCT, PLT in the last 72 hours. BMP No results for input(s): NA, K, CL, CO2, PHOS, BUN, CREATININE in the last 72 hours. Invalid input(s): CA  LFT'S No results for input(s): AST, ALT, ALB, BILIDIR, BILITOT, ALKPHOS in the last 72 hours. COAG No results for input(s): INR in the last 72 hours. CARDIAC ENZYMES  No results for input(s): CKTOTAL, CKMB, CKMBINDEX, TROPONINI in the last 72 hours.   U/A:    Lab Results   Component Value Date    COLORU YELLOW 07/12/2020    WBCUA 58 TO 83 07/12/2020    RBCUA 20 TO 31 07/12/2020    MUCUS 3+ 07/11/2020    BACTERIA MANY 07/12/2020    CLARITYU CLOUDY 07/12/2020    SPECGRAV 1.020 07/12/2020

## 2020-07-30 LAB
GLUCOSE BLD-MCNC: 168 MG/DL (ref 70–99)
GLUCOSE BLD-MCNC: 188 MG/DL (ref 70–99)
GLUCOSE BLD-MCNC: 211 MG/DL (ref 70–99)
GLUCOSE BLD-MCNC: 229 MG/DL (ref 70–99)

## 2020-07-30 PROCEDURE — 6370000000 HC RX 637 (ALT 250 FOR IP): Performed by: INTERNAL MEDICINE

## 2020-07-30 PROCEDURE — 82962 GLUCOSE BLOOD TEST: CPT

## 2020-07-30 PROCEDURE — 97116 GAIT TRAINING THERAPY: CPT

## 2020-07-30 PROCEDURE — 1200000002 HC SEMI PRIVATE SWING BED

## 2020-07-30 PROCEDURE — 97530 THERAPEUTIC ACTIVITIES: CPT

## 2020-07-30 PROCEDURE — 97112 NEUROMUSCULAR REEDUCATION: CPT

## 2020-07-30 PROCEDURE — 97535 SELF CARE MNGMENT TRAINING: CPT

## 2020-07-30 RX ADMIN — HYDRALAZINE HYDROCHLORIDE 10 MG: 10 TABLET, FILM COATED ORAL at 20:52

## 2020-07-30 RX ADMIN — AMLODIPINE BESYLATE 10 MG: 10 TABLET ORAL at 08:57

## 2020-07-30 RX ADMIN — ATORVASTATIN CALCIUM 20 MG: 20 TABLET, FILM COATED ORAL at 20:52

## 2020-07-30 RX ADMIN — Medication 1 TABLET: at 08:57

## 2020-07-30 RX ADMIN — DULOXETINE 60 MG: 60 CAPSULE, DELAYED RELEASE ORAL at 08:58

## 2020-07-30 RX ADMIN — HYDRALAZINE HYDROCHLORIDE 10 MG: 10 TABLET, FILM COATED ORAL at 08:57

## 2020-07-30 RX ADMIN — TROSPIUM CHLORIDE 20 MG: 20 TABLET, FILM COATED ORAL at 20:52

## 2020-07-30 RX ADMIN — LEVOTHYROXINE SODIUM 300 MCG: 0.15 TABLET ORAL at 06:28

## 2020-07-30 RX ADMIN — INSULIN LISPRO 24 UNITS: 100 INJECTION, SOLUTION INTRAVENOUS; SUBCUTANEOUS at 09:01

## 2020-07-30 RX ADMIN — LISINOPRIL 20 MG: 20 TABLET ORAL at 08:58

## 2020-07-30 RX ADMIN — INSULIN LISPRO 24 UNITS: 100 INJECTION, SOLUTION INTRAVENOUS; SUBCUTANEOUS at 13:04

## 2020-07-30 RX ADMIN — ATENOLOL 50 MG: 50 TABLET ORAL at 08:58

## 2020-07-30 RX ADMIN — ATENOLOL 50 MG: 50 TABLET ORAL at 20:52

## 2020-07-30 RX ADMIN — PANTOPRAZOLE SODIUM 40 MG: 40 TABLET, DELAYED RELEASE ORAL at 06:29

## 2020-07-30 RX ADMIN — INSULIN GLARGINE 50 UNITS: 100 INJECTION, SOLUTION SUBCUTANEOUS at 20:51

## 2020-07-30 RX ADMIN — INSULIN LISPRO 2 UNITS: 100 INJECTION, SOLUTION INTRAVENOUS; SUBCUTANEOUS at 18:24

## 2020-07-30 RX ADMIN — INSULIN LISPRO 4 UNITS: 100 INJECTION, SOLUTION INTRAVENOUS; SUBCUTANEOUS at 12:59

## 2020-07-30 RX ADMIN — INSULIN LISPRO 2 UNITS: 100 INJECTION, SOLUTION INTRAVENOUS; SUBCUTANEOUS at 09:02

## 2020-07-30 RX ADMIN — INSULIN LISPRO 2 UNITS: 100 INJECTION, SOLUTION INTRAVENOUS; SUBCUTANEOUS at 20:50

## 2020-07-30 RX ADMIN — INSULIN LISPRO 24 UNITS: 100 INJECTION, SOLUTION INTRAVENOUS; SUBCUTANEOUS at 18:30

## 2020-07-30 RX ADMIN — ASPIRIN 81 MG: 81 TABLET, COATED ORAL at 08:58

## 2020-07-30 ASSESSMENT — PAIN SCALES - GENERAL
PAINLEVEL_OUTOF10: 0

## 2020-07-30 NOTE — PLAN OF CARE
Problem: Falls - Risk of:  Goal: Will remain free from falls  7/30/2020 0827 by Sohail Hawk RN  Outcome: Ongoing  7/29/2020 1932 by Charisma Luong RN  Outcome: Ongoing  7/29/2020 1930 by Charisma Luong RN  Outcome: Ongoing  7/29/2020 1829 by Giancarlo Thacker RN  Outcome: Ongoing  Goal: Absence of physical injury  7/30/2020 0827 by Sohail Hawk RN  Outcome: Ongoing  7/29/2020 1932 by Charisma Luong RN  Outcome: Ongoing  7/29/2020 1930 by Charisma Luong RN  Outcome: Ongoing  7/29/2020 1829 by Giancarlo Thacker RN  Outcome: Ongoing     Problem: Skin Integrity:  Goal: Will show no infection signs and symptoms  7/30/2020 0827 by Sohail Hawk RN  Outcome: Ongoing  7/29/2020 1932 by Charisma Luong RN  Outcome: Ongoing  7/29/2020 1930 by Charisma Luong RN  Outcome: Ongoing  7/29/2020 1829 by Giancarlo Thacker RN  Outcome: Ongoing  Goal: Absence of new skin breakdown  7/30/2020 0827 by Sohail Hawk RN  Outcome: Ongoing  7/29/2020 1932 by Charisma Luong RN  Outcome: Ongoing  7/29/2020 1930 by Charisma Luong RN  Outcome: Ongoing  7/29/2020 1829 by Giancarlo Thacker RN  Outcome: Ongoing  Goal: Risk for impaired skin integrity will decrease  7/30/2020 0827 by Sohail Hawk RN  Outcome: Ongoing  7/29/2020 1932 by Charisma Luong RN  Outcome: Ongoing  7/29/2020 1930 by Charisma Luong RN  Outcome: Ongoing  7/29/2020 1829 by Giancarlo Thacker RN  Outcome: Ongoing     Problem:  Activity:  Goal: Risk for activity intolerance will decrease  7/30/2020 0827 by Sohail Hawk RN  Outcome: Ongoing  7/29/2020 1932 by Charisma Luong RN  Outcome: Ongoing  7/29/2020 1930 by Charisma Luong RN  Outcome: Ongoing  7/29/2020 1829 by Giancarlo Thacker RN  Outcome: Ongoing     Problem: Coping:  Goal: Ability to adjust to condition or change in health will improve  7/30/2020 0827 by Sohail Hawk RN  Outcome: Ongoing  7/29/2020 1932 by Corine Barnes RN  Outcome: Ongoing  7/29/2020 1930 by Corine Barnes RN  Outcome: Ongoing  7/29/2020 1829 by Vinicio Woodward RN  Outcome: Ongoing     Problem: Fluid Volume:  Goal: Ability to maintain a balanced intake and output will improve  7/30/2020 0827 by Gracie Earl RN  Outcome: Ongoing  7/29/2020 1932 by Corine Barnes RN  Outcome: Ongoing  7/29/2020 1930 by Corine Barnes RN  Outcome: Ongoing  7/29/2020 1829 by Vinicio Woodward RN  Outcome: Ongoing     Problem: Health Behavior:  Goal: Ability to identify and utilize available resources and services will improve  7/30/2020 0827 by Gracie Earl RN  Outcome: Ongoing  7/29/2020 1932 by Corine Barnes RN  Outcome: Ongoing  7/29/2020 1930 by Corine Barnes RN  Outcome: Ongoing  7/29/2020 1829 by Vinicio Woodward RN  Outcome: Ongoing  Goal: Ability to manage health-related needs will improve  7/30/2020 0827 by Gracie Earl RN  Outcome: Ongoing  7/29/2020 1932 by Corine Barnes RN  Outcome: Ongoing  7/29/2020 1930 by Corine Barnes RN  Outcome: Ongoing  7/29/2020 1829 by Vinicio Woodward RN  Outcome: Ongoing     Problem: Metabolic:  Goal: Ability to maintain appropriate glucose levels will improve  7/30/2020 0827 by Gracie Earl RN  Outcome: Ongoing  7/29/2020 1932 by Corine Barnes RN  Outcome: Ongoing  7/29/2020 1930 by Corine Barnes RN  Outcome: Ongoing  7/29/2020 1829 by Vinicio Woodward RN  Outcome: Ongoing     Problem: Nutritional:  Goal: Maintenance of adequate nutrition will improve  7/30/2020 0827 by Gracie Earl RN  Outcome: Ongoing  7/29/2020 1932 by Corine Barnes RN  Outcome: Ongoing  7/29/2020 1930 by Corine Barnes RN  Outcome: Ongoing  7/29/2020 1829 by Vinicio Woodward RN  Outcome: Ongoing  Goal: Progress toward achieving an optimal weight will improve  7/30/2020 0827 by Gracie Earl RN  Outcome: Ongoing  7/29/2020 1932 by

## 2020-07-30 NOTE — PROGRESS NOTES
Occupational Therapy    Occupational Therapy Treatment Note  Name: Renae Donnelly MRN: [id number] :   1945   Date:  2020   Admission Date: 7/15/2020 Room:  010010-01   Restrictions/Precautions:  Restrictions/Precautions  Restrictions/Precautions: Fall Risk, General Precautions  Required Braces or Orthoses?: No x  Communication with other providers: ok to see per nurse  Subjective:  Patient states:  I am sleepy  Pain:   Location, Type, Intensity (0/10 to 10/10): 0/10  Objective:    Observation:Pt was up in her recliner  Objective Measures: Pt was seen for adls and transfers  Treatment, including education:  Pt was SBA for UB bathing. Pt was min A for LB bathing. Pt donned shirt Min A, Donned pull up and pants min A. Pt ambulated to the toilet SBS with ww. Toilet transfer SBA. She performed toilet hygiene SBA. Pt was SBA to stand from the toilet and SBA to walk with ww back to her chair. Pt was given her toothbrush, toothpaste and water. She brushed her teeth S. Pt was left in the chair with chair alarm on  Assessment / Impression:        Patient's tolerance of treatment:good  Adverse Reaction:no  Significant change in status and impact: no  Barriers to improvement: some impaired memory  Plan for Next Session:    adls or transfers  Time in:  1000  Time out:  1030  Timed treatment minutes: 30  Total treatment time:  30  Electronically signed by:    Suri Wyman,   2020, 3:30 PM    Previously filed values:  Patient Goals   Patient goals : To be able to walk and to get stronger  Short term goals  Time Frame for Short term goals: until discharge  Short term goal 1: Pt will be min A for UB/LB bathing using good energy conservation techniques and with min vc. Short term goal 2: Pt will be SBA for UB/LB dressing using good energy conservation techniques and AE PRN and with min vc. Short term goal 3: Pt will be supervision for all functional transfers in prep for ADL tasks.   Short term goal 4: Pt will stand and complete simulated home management task with walker for >5 min with SBA no instances of LOB, no seated rest breaks

## 2020-07-30 NOTE — PROGRESS NOTES
Nutrition Assessment     Type and Reason for Visit: Reassess    Nutrition Recommendations/Plan: Discontinue oral supplement due to intakes meeting her nutritional needs at this time    Nutrition Assessment:  Intakes currently meeting her nutritional needs % of meals with most %    Malnutrition Assessment:  Malnutrition Status: No malnutrition    Estimated Daily Nutrient Needs:  Energy (kcal): 1546; Weight Used for Energy Requirements:  Current     Protein (g): 71-89; Weight Used for Protein Requirements:  Current(.8-1.0)        Fluid (ml/day): 1007-3616; Weight Used for Fluid Requirements:  Current(25-30ml/kg)      Nutrition Related Findings: Well nourished female, no special diet at home, c/o lack of taste, multiple medication changes per patient over the past several months      Current Nutrition Therapies:    Dietary Nutrition Supplements: Diabetic Oral Supplement  DIET CARB CONTROL; Carb Control: 3 carb choices (45 gms)/meal    Anthropometric Measures:  · Height: 5' 6\" (167.6 cm)  · Current Body Wt: 194 lb (88 kg)   · BMI: 31.3    Nutrition Diagnosis:   · Inadequate oral intake, In context of acute illness or injury related to acute injury/trauma as evidenced by intake 26-50%, intake 51-75%      Nutrition Interventions:   Food and/or Nutrient Delivery:  Continue Current Diet, Discontinue Oral Nutrition Supplement  Nutrition Education/Counseling:  No recommendation at this time   Coordination of Nutrition Care:  Continued Inpatient Monitoring    Goals:  Oral intakes will improve to greater than 50% of meals consistently during her stay       Nutrition Monitoring and Evaluation:   Behavioral-Environmental Outcomes:   Other (Comment)   Food/Nutrient Intake Outcomes:  Food and Nutrient Intake  Physical Signs/Symptoms Outcomes:  Meal Time Behavior     Discharge Planning:    No discharge needs at this time     Electronically signed by Ghanshyam Anaya RD, LD on 7/30/20 at 11:21 AM EDT    Contact: [phone]

## 2020-07-30 NOTE — PROGRESS NOTES
Physical Therapy    Physical Therapy Treatment Note  Name: Sebas Khalil MRN: [id number] :   1945   Date:  2020   Admission Date: 7/15/2020 Room:  [address] [place] [address]   Restrictions/Precautions:  Restrictions/Precautions  Restrictions/Precautions: Fall Risk, General Precautions  Required Braces or Orthoses?: No       Communication with other providers:    Subjective:  Patient states:  Pt agreeable to working with therapy. Pain:   Location, Type, Intensity (0/10 to 10/10): Denies having any pain  Objective:    Observation:  Patient sitting up in chair at bedside. Treatment, including education/measures:  Transfers: Sit <> stand with SBA from chair, occasional vc to push up and reach back for chair. Gait: Pt ambulates with RW  2r066mb, 1x74'  SBA/ CGA for turns, vc'ing to take bigger steps  Stairs: ambulated up and down 6\" step x 8 reps in parallel bars with use of both hands on single handrail with CGA and vc for proper sequence. Pt has only 1 railing at home. Balance activities in parallel bars: performed standing ring toss R/L w/o UE support, also performed toss with reaching up and across body prior to tossing. Stood for ~ 8 min. Ther. Ex: Nu step WL 3 x 10 min    Pt left sitting up in chair with call light within reach. Assessment / Impression:    Pt demonstrated overall good tolerance of today's therapy session. Requiring fewer rest breaks during session. Gait endurance improving. Verbal cueing provided for taking bigger steps jessica RLE.     Patient's tolerance of treatment:  good   Adverse Reaction: none  Significant change in status and impact:  Improved gait  Barriers to improvement:  none  Plan for Next Session:    Continue working on stairs, gait and balance  Time in:  1035  Time out:  1120  Timed treatment minutes:  45'  Total treatment time:  39'    Previously filed items:  Social/Functional History  Lives With: Spouse  Type of Home: House  Home Layout: One level, Laundry in basement  Home

## 2020-07-31 LAB
GLUCOSE BLD-MCNC: 141 MG/DL (ref 70–99)
GLUCOSE BLD-MCNC: 143 MG/DL (ref 70–99)
GLUCOSE BLD-MCNC: 215 MG/DL (ref 70–99)
GLUCOSE BLD-MCNC: 221 MG/DL (ref 70–99)

## 2020-07-31 PROCEDURE — 97116 GAIT TRAINING THERAPY: CPT

## 2020-07-31 PROCEDURE — 82962 GLUCOSE BLOOD TEST: CPT

## 2020-07-31 PROCEDURE — 97530 THERAPEUTIC ACTIVITIES: CPT

## 2020-07-31 PROCEDURE — 1200000002 HC SEMI PRIVATE SWING BED

## 2020-07-31 PROCEDURE — 6370000000 HC RX 637 (ALT 250 FOR IP): Performed by: INTERNAL MEDICINE

## 2020-07-31 PROCEDURE — 97535 SELF CARE MNGMENT TRAINING: CPT

## 2020-07-31 RX ADMIN — ATORVASTATIN CALCIUM 20 MG: 20 TABLET, FILM COATED ORAL at 20:37

## 2020-07-31 RX ADMIN — INSULIN LISPRO 2 UNITS: 100 INJECTION, SOLUTION INTRAVENOUS; SUBCUTANEOUS at 12:38

## 2020-07-31 RX ADMIN — DULOXETINE 60 MG: 60 CAPSULE, DELAYED RELEASE ORAL at 08:16

## 2020-07-31 RX ADMIN — INSULIN LISPRO 24 UNITS: 100 INJECTION, SOLUTION INTRAVENOUS; SUBCUTANEOUS at 12:39

## 2020-07-31 RX ADMIN — INSULIN LISPRO 24 UNITS: 100 INJECTION, SOLUTION INTRAVENOUS; SUBCUTANEOUS at 08:21

## 2020-07-31 RX ADMIN — ASPIRIN 81 MG: 81 TABLET, COATED ORAL at 08:16

## 2020-07-31 RX ADMIN — LEVOTHYROXINE SODIUM 300 MCG: 0.15 TABLET ORAL at 05:49

## 2020-07-31 RX ADMIN — PANTOPRAZOLE SODIUM 40 MG: 40 TABLET, DELAYED RELEASE ORAL at 05:49

## 2020-07-31 RX ADMIN — INSULIN LISPRO 2 UNITS: 100 INJECTION, SOLUTION INTRAVENOUS; SUBCUTANEOUS at 20:36

## 2020-07-31 RX ADMIN — ATENOLOL 50 MG: 50 TABLET ORAL at 08:16

## 2020-07-31 RX ADMIN — INSULIN LISPRO 4 UNITS: 100 INJECTION, SOLUTION INTRAVENOUS; SUBCUTANEOUS at 17:49

## 2020-07-31 RX ADMIN — TROSPIUM CHLORIDE 20 MG: 20 TABLET, FILM COATED ORAL at 20:37

## 2020-07-31 RX ADMIN — AMLODIPINE BESYLATE 10 MG: 10 TABLET ORAL at 08:16

## 2020-07-31 RX ADMIN — INSULIN GLARGINE 50 UNITS: 100 INJECTION, SOLUTION SUBCUTANEOUS at 20:36

## 2020-07-31 RX ADMIN — HYDRALAZINE HYDROCHLORIDE 10 MG: 10 TABLET, FILM COATED ORAL at 20:36

## 2020-07-31 RX ADMIN — INSULIN LISPRO 24 UNITS: 100 INJECTION, SOLUTION INTRAVENOUS; SUBCUTANEOUS at 17:51

## 2020-07-31 RX ADMIN — HYDRALAZINE HYDROCHLORIDE 10 MG: 10 TABLET, FILM COATED ORAL at 08:16

## 2020-07-31 RX ADMIN — INSULIN LISPRO 2 UNITS: 100 INJECTION, SOLUTION INTRAVENOUS; SUBCUTANEOUS at 08:16

## 2020-07-31 RX ADMIN — LISINOPRIL 20 MG: 20 TABLET ORAL at 08:16

## 2020-07-31 RX ADMIN — ATENOLOL 50 MG: 50 TABLET ORAL at 20:37

## 2020-07-31 RX ADMIN — Medication 1 TABLET: at 08:16

## 2020-07-31 ASSESSMENT — PAIN SCALES - GENERAL
PAINLEVEL_OUTOF10: 0

## 2020-07-31 NOTE — PLAN OF CARE
Problem: Falls - Risk of:  Goal: Will remain free from falls  Description: Will remain free from falls  7/30/2020 2146 by Teresa Stoll RN  Outcome: Ongoing  7/30/2020 0827 by Junior Pickens RN  Outcome: Ongoing  Goal: Absence of physical injury  Description: Absence of physical injury  7/30/2020 2146 by Teresa Stoll RN  Outcome: Ongoing  7/30/2020 0827 by Junior Pickens RN  Outcome: Ongoing     Problem: Skin Integrity:  Goal: Will show no infection signs and symptoms  Description: Will show no infection signs and symptoms  7/30/2020 2146 by Teresa Stoll RN  Outcome: Ongoing  7/30/2020 0827 by Junior Pickens RN  Outcome: Ongoing  Goal: Absence of new skin breakdown  Description: Absence of new skin breakdown  7/30/2020 2146 by Teresa Stoll RN  Outcome: Ongoing  7/30/2020 0827 by Junior Pickens RN  Outcome: Ongoing  Goal: Risk for impaired skin integrity will decrease  Description: Risk for impaired skin integrity will decrease  7/30/2020 2146 by Teresa Stoll RN  Outcome: Ongoing  7/30/2020 0827 by Junior Pickens RN  Outcome: Ongoing     Problem:  Activity:  Goal: Risk for activity intolerance will decrease  Description: Risk for activity intolerance will decrease  7/30/2020 2146 by Teresa Stoll RN  Outcome: Ongoing  7/30/2020 0827 by Junior Pickens RN  Outcome: Ongoing     Problem: Coping:  Goal: Ability to adjust to condition or change in health will improve  Description: Ability to adjust to condition or change in health will improve  7/30/2020 2146 by Teresa Stoll RN  Outcome: Ongoing  7/30/2020 0827 by Junior Pickens RN  Outcome: Ongoing     Problem: Fluid Volume:  Goal: Ability to maintain a balanced intake and output will improve  Description: Ability to maintain a balanced intake and output will improve  7/30/2020 2146 by Teresa Stoll RN  Outcome: Ongoing  7/30/2020 0827 by Junior Pickens RN  Outcome: Ongoing     Problem: Health Behavior:  Goal: Ability to identify and utilize available resources and services will improve  Description: Ability to identify and utilize available resources and services will improve  7/30/2020 2146 by Edilson Wheeler RN  Outcome: Ongoing  7/30/2020 0827 by Shantanu Nelson RN  Outcome: Ongoing  Goal: Ability to manage health-related needs will improve  Description: Ability to manage health-related needs will improve  7/30/2020 2146 by Edilson Wheeler RN  Outcome: Ongoing  7/30/2020 0827 by Shantanu Nelson RN  Outcome: Ongoing     Problem: Metabolic:  Goal: Ability to maintain appropriate glucose levels will improve  Description: Ability to maintain appropriate glucose levels will improve  7/30/2020 2146 by Edilson Wheeler RN  Outcome: Ongoing  7/30/2020 0827 by Shantanu Nelson RN  Outcome: Ongoing     Problem: Nutritional:  Goal: Maintenance of adequate nutrition will improve  Description: Maintenance of adequate nutrition will improve  7/30/2020 2146 by Edilson Wheeler RN  Outcome: Ongoing  7/30/2020 0827 by Shantanu Nelson RN  Outcome: Ongoing  Goal: Progress toward achieving an optimal weight will improve  Description: Progress toward achieving an optimal weight will improve  7/30/2020 2146 by Edilson Wheeler RN  Outcome: Ongoing  7/30/2020 0827 by Shantanu Nelson RN  Outcome: Ongoing     Problem: Tissue Perfusion:  Goal: Adequacy of tissue perfusion will improve  Description: Adequacy of tissue perfusion will improve  7/30/2020 2146 by Edilson Wheeler RN  Outcome: Ongoing  7/30/2020 0827 by Shantanu Nelson RN  Outcome: Ongoing     Problem: Physical Regulation:  Goal: Complications related to the disease process, condition or treatment will be avoided or minimized  Description: Complications related to the disease process, condition or treatment will be avoided or minimized  7/30/2020 2146 by Edilson Wheeler RN  Outcome: Ongoing  7/30/2020 0827 by Shantanu Nelson RN  Outcome: Ongoing  Goal: Diagnostic test results will improve  Description: Diagnostic test results will improve  7/30/2020 2146 by Will Santana RN  Outcome: Ongoing  7/30/2020 0827 by Asaf Fulton RN  Outcome: Ongoing

## 2020-07-31 NOTE — PROGRESS NOTES
Physical Therapy    Physical Therapy Treatment Note  Name: Renae Donnelly MRN: [id number] :   1945   Date:  2020   Admission Date: 7/15/2020 Room:  [address] [place] [address]   Restrictions/Precautions:  Restrictions/Precautions  Restrictions/Precautions: Fall Risk, General Precautions  Required Braces or Orthoses?: No       Communication with other providers:  OK to see per nursing  Subjective:  Patient states:  Pt agreeable to working with therapy. Pain:   Location, Type, Intensity (0/10 to 10/10): Denies having any pain  Objective:    Observation:  Patient sitting up in chair at bedside. Treatment, including education/measures:  Transfers: Sit <> stand with SBA from chair, occasional vc to push up and reach back for chair. Gait: Pt ambulates with RW  1x105 ft'  SBA/ CGA for turns, vc'ing to take bigger steps  Stairs: ambulated up and down  5 6\" step in stair well with use of both hands on single handrail with max  A x1 and min  A x 1 and vc for proper sequence. Pt has only 1 railing at home. Pt left sitting up in chair  With OT staff member  Assessment / Impression:    Pt demonstrated overall good tolerance of today's therapy session. Requiring fewer rest breaks during session. Gait endurance improving. Verbal cueing provided for taking bigger steps jessica RLE.     Patient's tolerance of treatment:  Good- difficulty with climbing  5 steps   Adverse Reaction: none  Significant change in status and impact:  Improved gait  Barriers to improvement:  none  Plan for Next Session:    Continue working on stairs, gait and balance  Time in:  855  Time out:  915Timed treatment minutes:  20  Total treatment time:  21'    Previously filed items:  Social/Functional History  Lives With: Spouse  Type of Home: House  Home Layout: One level, Laundry in basement  Home Access: Stairs to enter with rails  Entrance Stairs - Number of Steps: 5  Entrance Stairs - Rails: Both  Bathroom Shower/Tub: Tub/Shower unit  Bathroom Toilet: Standard  Bathroom Equipment: Grab bars in shower, Shower chair  Home Equipment: 4 wheeled walker, Cane  ADL Assistance: Independent  Homemaking Assistance: Independent  Homemaking Responsibilities: Yes  Ambulation Assistance: Needs assistance  Transfer Assistance: Needs assistance  Active : No  Leisure & Hobbies: reading  Short term goals  Time Frame for Short term goals: 1 week  Short term goal 1: P will perform bed mobility with lisa  Short term goal 2: P will perform transfer to LRAD with Lisa  Short term goal 3: P will ambulate 48' with RW Lisa  Short term goal 4: P will ascend/descend 5 steps with KILEY HRs and Lisa.        Electronically signed by:    Renne Kayser, PT   7/31/2020, 9:59 AM

## 2020-08-01 LAB
GLUCOSE BLD-MCNC: 137 MG/DL (ref 70–99)
GLUCOSE BLD-MCNC: 147 MG/DL (ref 70–99)
GLUCOSE BLD-MCNC: 164 MG/DL (ref 70–99)
GLUCOSE BLD-MCNC: 220 MG/DL (ref 70–99)

## 2020-08-01 PROCEDURE — 6370000000 HC RX 637 (ALT 250 FOR IP): Performed by: INTERNAL MEDICINE

## 2020-08-01 PROCEDURE — 1200000002 HC SEMI PRIVATE SWING BED

## 2020-08-01 PROCEDURE — 82962 GLUCOSE BLOOD TEST: CPT

## 2020-08-01 PROCEDURE — 97116 GAIT TRAINING THERAPY: CPT

## 2020-08-01 RX ADMIN — ATENOLOL 50 MG: 50 TABLET ORAL at 21:25

## 2020-08-01 RX ADMIN — ATENOLOL 50 MG: 50 TABLET ORAL at 08:51

## 2020-08-01 RX ADMIN — ASPIRIN 81 MG: 81 TABLET, COATED ORAL at 08:50

## 2020-08-01 RX ADMIN — LEVOTHYROXINE SODIUM 300 MCG: 0.15 TABLET ORAL at 05:31

## 2020-08-01 RX ADMIN — INSULIN LISPRO 24 UNITS: 100 INJECTION, SOLUTION INTRAVENOUS; SUBCUTANEOUS at 13:16

## 2020-08-01 RX ADMIN — DULOXETINE 60 MG: 60 CAPSULE, DELAYED RELEASE ORAL at 08:51

## 2020-08-01 RX ADMIN — LISINOPRIL 20 MG: 20 TABLET ORAL at 08:51

## 2020-08-01 RX ADMIN — HYDRALAZINE HYDROCHLORIDE 10 MG: 10 TABLET, FILM COATED ORAL at 21:25

## 2020-08-01 RX ADMIN — AMLODIPINE BESYLATE 10 MG: 10 TABLET ORAL at 08:51

## 2020-08-01 RX ADMIN — INSULIN LISPRO 2 UNITS: 100 INJECTION, SOLUTION INTRAVENOUS; SUBCUTANEOUS at 08:56

## 2020-08-01 RX ADMIN — INSULIN LISPRO 24 UNITS: 100 INJECTION, SOLUTION INTRAVENOUS; SUBCUTANEOUS at 08:51

## 2020-08-01 RX ADMIN — INSULIN LISPRO 2 UNITS: 100 INJECTION, SOLUTION INTRAVENOUS; SUBCUTANEOUS at 13:18

## 2020-08-01 RX ADMIN — ATORVASTATIN CALCIUM 20 MG: 20 TABLET, FILM COATED ORAL at 21:26

## 2020-08-01 RX ADMIN — PANTOPRAZOLE SODIUM 40 MG: 40 TABLET, DELAYED RELEASE ORAL at 05:33

## 2020-08-01 RX ADMIN — Medication 1 TABLET: at 08:50

## 2020-08-01 RX ADMIN — TROSPIUM CHLORIDE 20 MG: 20 TABLET, FILM COATED ORAL at 21:26

## 2020-08-01 RX ADMIN — INSULIN LISPRO 24 UNITS: 100 INJECTION, SOLUTION INTRAVENOUS; SUBCUTANEOUS at 18:13

## 2020-08-01 RX ADMIN — INSULIN LISPRO 2 UNITS: 100 INJECTION, SOLUTION INTRAVENOUS; SUBCUTANEOUS at 21:26

## 2020-08-01 RX ADMIN — HYDRALAZINE HYDROCHLORIDE 10 MG: 10 TABLET, FILM COATED ORAL at 08:50

## 2020-08-01 ASSESSMENT — PAIN SCALES - GENERAL
PAINLEVEL_OUTOF10: 0

## 2020-08-01 NOTE — PLAN OF CARE
Problem: Falls - Risk of:  Goal: Will remain free from falls  Description: Will remain free from falls  Outcome: Ongoing  Goal: Absence of physical injury  Description: Absence of physical injury  Outcome: Ongoing     Problem: Skin Integrity:  Goal: Will show no infection signs and symptoms  Description: Will show no infection signs and symptoms  Outcome: Ongoing  Goal: Absence of new skin breakdown  Description: Absence of new skin breakdown  Outcome: Ongoing  Goal: Risk for impaired skin integrity will decrease  Description: Risk for impaired skin integrity will decrease  Outcome: Ongoing     Problem:  Activity:  Goal: Risk for activity intolerance will decrease  Description: Risk for activity intolerance will decrease  Outcome: Ongoing     Problem: Coping:  Goal: Ability to adjust to condition or change in health will improve  Description: Ability to adjust to condition or change in health will improve  Outcome: Ongoing     Problem: Fluid Volume:  Goal: Ability to maintain a balanced intake and output will improve  Description: Ability to maintain a balanced intake and output will improve  Outcome: Ongoing     Problem: Health Behavior:  Goal: Ability to identify and utilize available resources and services will improve  Description: Ability to identify and utilize available resources and services will improve  Outcome: Ongoing  Goal: Ability to manage health-related needs will improve  Description: Ability to manage health-related needs will improve  Outcome: Ongoing     Problem: Nutritional:  Goal: Maintenance of adequate nutrition will improve  Description: Maintenance of adequate nutrition will improve  Outcome: Ongoing  Goal: Progress toward achieving an optimal weight will improve  Description: Progress toward achieving an optimal weight will improve  Outcome: Ongoing     Problem: Physical Regulation:  Goal: Complications related to the disease process, condition or treatment will be avoided or minimized  Description: Complications related to the disease process, condition or treatment will be avoided or minimized  Outcome: Ongoing  Goal: Diagnostic test results will improve  Description: Diagnostic test results will improve  Outcome: Ongoing     Problem: Tissue Perfusion:  Goal: Adequacy of tissue perfusion will improve  Description: Adequacy of tissue perfusion will improve  Outcome: Ongoing     Problem: Mobility - Impaired:  Goal: Mobility will improve  Description: Mobility will improve  Outcome: Ongoing     Problem: Discharge Planning:  Goal: Discharged to appropriate level of care  Description: Discharged to appropriate level of care  Outcome: Ongoing

## 2020-08-01 NOTE — PROGRESS NOTES
Steps: 5  Entrance Stairs - Rails: Both  Bathroom Shower/Tub: Tub/Shower unit  Bathroom Toilet: Standard  Bathroom Equipment: Grab bars in shower, Shower chair  Home Equipment: 4 wheeled walker, Cane  ADL Assistance: Independent  Homemaking Assistance: Independent  Homemaking Responsibilities: Yes  Ambulation Assistance: Needs assistance  Transfer Assistance: Needs assistance  Active : No  Leisure & Hobbies: reading  Short term goals  Time Frame for Short term goals: 1 week  Short term goal 1: P will perform bed mobility with lisa  Short term goal 2: P will perform transfer to LRAD with Lisa  Short term goal 3: P will ambulate 48' with RW Lisa  Short term goal 4: P will ascend/descend 5 steps with KILEY HRs and Lisa.        Electronically signed by:    Chadd Lara, PT   8/1/2020, 1:38 PM

## 2020-08-02 LAB
GLUCOSE BLD-MCNC: 136 MG/DL (ref 70–99)
GLUCOSE BLD-MCNC: 159 MG/DL (ref 70–99)
GLUCOSE BLD-MCNC: 175 MG/DL (ref 70–99)
GLUCOSE BLD-MCNC: 177 MG/DL (ref 70–99)
GLUCOSE BLD-MCNC: 233 MG/DL (ref 70–99)

## 2020-08-02 PROCEDURE — 82962 GLUCOSE BLOOD TEST: CPT

## 2020-08-02 PROCEDURE — 1200000002 HC SEMI PRIVATE SWING BED

## 2020-08-02 PROCEDURE — 6370000000 HC RX 637 (ALT 250 FOR IP): Performed by: INTERNAL MEDICINE

## 2020-08-02 RX ADMIN — ATENOLOL 50 MG: 50 TABLET ORAL at 09:14

## 2020-08-02 RX ADMIN — Medication 1 TABLET: at 09:14

## 2020-08-02 RX ADMIN — DULOXETINE 60 MG: 60 CAPSULE, DELAYED RELEASE ORAL at 09:13

## 2020-08-02 RX ADMIN — INSULIN LISPRO 24 UNITS: 100 INJECTION, SOLUTION INTRAVENOUS; SUBCUTANEOUS at 09:15

## 2020-08-02 RX ADMIN — HYDRALAZINE HYDROCHLORIDE 10 MG: 10 TABLET, FILM COATED ORAL at 21:32

## 2020-08-02 RX ADMIN — INSULIN LISPRO 24 UNITS: 100 INJECTION, SOLUTION INTRAVENOUS; SUBCUTANEOUS at 17:54

## 2020-08-02 RX ADMIN — INSULIN LISPRO 1 UNITS: 100 INJECTION, SOLUTION INTRAVENOUS; SUBCUTANEOUS at 21:35

## 2020-08-02 RX ADMIN — ASPIRIN 81 MG: 81 TABLET, COATED ORAL at 09:14

## 2020-08-02 RX ADMIN — LISINOPRIL 20 MG: 20 TABLET ORAL at 09:14

## 2020-08-02 RX ADMIN — AMLODIPINE BESYLATE 10 MG: 10 TABLET ORAL at 09:14

## 2020-08-02 RX ADMIN — ATENOLOL 50 MG: 50 TABLET ORAL at 21:32

## 2020-08-02 RX ADMIN — INSULIN LISPRO 24 UNITS: 100 INJECTION, SOLUTION INTRAVENOUS; SUBCUTANEOUS at 12:29

## 2020-08-02 RX ADMIN — HYDRALAZINE HYDROCHLORIDE 10 MG: 10 TABLET, FILM COATED ORAL at 09:14

## 2020-08-02 RX ADMIN — INSULIN LISPRO 4 UNITS: 100 INJECTION, SOLUTION INTRAVENOUS; SUBCUTANEOUS at 12:26

## 2020-08-02 RX ADMIN — PANTOPRAZOLE SODIUM 40 MG: 40 TABLET, DELAYED RELEASE ORAL at 05:58

## 2020-08-02 RX ADMIN — INSULIN GLARGINE 50 UNITS: 100 INJECTION, SOLUTION SUBCUTANEOUS at 21:37

## 2020-08-02 RX ADMIN — TROSPIUM CHLORIDE 20 MG: 20 TABLET, FILM COATED ORAL at 21:31

## 2020-08-02 RX ADMIN — ATORVASTATIN CALCIUM 20 MG: 20 TABLET, FILM COATED ORAL at 21:32

## 2020-08-02 RX ADMIN — LEVOTHYROXINE SODIUM 300 MCG: 0.15 TABLET ORAL at 05:58

## 2020-08-02 RX ADMIN — INSULIN LISPRO 2 UNITS: 100 INJECTION, SOLUTION INTRAVENOUS; SUBCUTANEOUS at 09:14

## 2020-08-02 RX ADMIN — INSULIN LISPRO 2 UNITS: 100 INJECTION, SOLUTION INTRAVENOUS; SUBCUTANEOUS at 17:55

## 2020-08-02 ASSESSMENT — PAIN SCALES - GENERAL
PAINLEVEL_OUTOF10: 0

## 2020-08-03 LAB
GLUCOSE BLD-MCNC: 176 MG/DL (ref 70–99)
GLUCOSE BLD-MCNC: 181 MG/DL (ref 70–99)
GLUCOSE BLD-MCNC: 256 MG/DL (ref 70–99)
GLUCOSE BLD-MCNC: 280 MG/DL (ref 70–99)

## 2020-08-03 PROCEDURE — 6370000000 HC RX 637 (ALT 250 FOR IP): Performed by: INTERNAL MEDICINE

## 2020-08-03 PROCEDURE — 1200000002 HC SEMI PRIVATE SWING BED

## 2020-08-03 PROCEDURE — 97530 THERAPEUTIC ACTIVITIES: CPT

## 2020-08-03 PROCEDURE — 97116 GAIT TRAINING THERAPY: CPT

## 2020-08-03 PROCEDURE — 97535 SELF CARE MNGMENT TRAINING: CPT

## 2020-08-03 PROCEDURE — 82962 GLUCOSE BLOOD TEST: CPT

## 2020-08-03 RX ADMIN — INSULIN LISPRO 2 UNITS: 100 INJECTION, SOLUTION INTRAVENOUS; SUBCUTANEOUS at 12:52

## 2020-08-03 RX ADMIN — LEVOTHYROXINE SODIUM 300 MCG: 0.15 TABLET ORAL at 05:50

## 2020-08-03 RX ADMIN — ASPIRIN 81 MG: 81 TABLET, COATED ORAL at 08:37

## 2020-08-03 RX ADMIN — ATENOLOL 50 MG: 50 TABLET ORAL at 21:31

## 2020-08-03 RX ADMIN — AMLODIPINE BESYLATE 10 MG: 10 TABLET ORAL at 08:37

## 2020-08-03 RX ADMIN — DULOXETINE 60 MG: 60 CAPSULE, DELAYED RELEASE ORAL at 08:37

## 2020-08-03 RX ADMIN — INSULIN LISPRO 24 UNITS: 100 INJECTION, SOLUTION INTRAVENOUS; SUBCUTANEOUS at 17:07

## 2020-08-03 RX ADMIN — HYDRALAZINE HYDROCHLORIDE 10 MG: 10 TABLET, FILM COATED ORAL at 08:37

## 2020-08-03 RX ADMIN — LISINOPRIL 20 MG: 20 TABLET ORAL at 08:37

## 2020-08-03 RX ADMIN — INSULIN LISPRO 3 UNITS: 100 INJECTION, SOLUTION INTRAVENOUS; SUBCUTANEOUS at 21:34

## 2020-08-03 RX ADMIN — PANTOPRAZOLE SODIUM 40 MG: 40 TABLET, DELAYED RELEASE ORAL at 05:50

## 2020-08-03 RX ADMIN — INSULIN LISPRO 2 UNITS: 100 INJECTION, SOLUTION INTRAVENOUS; SUBCUTANEOUS at 08:42

## 2020-08-03 RX ADMIN — INSULIN LISPRO 24 UNITS: 100 INJECTION, SOLUTION INTRAVENOUS; SUBCUTANEOUS at 12:55

## 2020-08-03 RX ADMIN — TROSPIUM CHLORIDE 20 MG: 20 TABLET, FILM COATED ORAL at 21:31

## 2020-08-03 RX ADMIN — INSULIN GLARGINE 50 UNITS: 100 INJECTION, SOLUTION SUBCUTANEOUS at 21:35

## 2020-08-03 RX ADMIN — INSULIN LISPRO 6 UNITS: 100 INJECTION, SOLUTION INTRAVENOUS; SUBCUTANEOUS at 17:05

## 2020-08-03 RX ADMIN — ATENOLOL 50 MG: 50 TABLET ORAL at 08:37

## 2020-08-03 RX ADMIN — Medication 1 TABLET: at 08:36

## 2020-08-03 RX ADMIN — HYDRALAZINE HYDROCHLORIDE 10 MG: 10 TABLET, FILM COATED ORAL at 21:31

## 2020-08-03 RX ADMIN — ATORVASTATIN CALCIUM 20 MG: 20 TABLET, FILM COATED ORAL at 21:31

## 2020-08-03 RX ADMIN — INSULIN LISPRO 24 UNITS: 100 INJECTION, SOLUTION INTRAVENOUS; SUBCUTANEOUS at 08:38

## 2020-08-03 ASSESSMENT — PAIN SCALES - GENERAL
PAINLEVEL_OUTOF10: 0

## 2020-08-03 NOTE — PROGRESS NOTES
concerned about going home  Barriers to improvement: anxiety  Plan for Next Session:    adls or transfers or exercise  Time in: 1030  Time out:  1125  Timed treatment minutes:  55  Total treatment time: 55  Electronically signed by:    Stephan Eckert,   8/3/2020, 6:01 PM    Previously filed values:  Patient Goals   Patient goals : To be able to walk and to get stronger  Short term goals  Time Frame for Short term goals: until discharge  Short term goal 1: Pt will be min A for UB/LB bathing using good energy conservation techniques and with min vc. Short term goal 2: Pt will be SBA for UB/LB dressing using good energy conservation techniques and AE PRN and with min vc. Short term goal 3: Pt will be supervision for all functional transfers in prep for ADL tasks.   Short term goal 4: Pt will stand and complete simulated home management task with walker for >5 min with SBA no instances of LOB, no seated rest breaks

## 2020-08-03 NOTE — PROGRESS NOTES
Physical Therapy    Physical Therapy Treatment Note  Name: Staci Beard MRN: [id number] :   1945   Date:  8/3/2020   Admission Date: 7/15/2020 Room:  [address] [place] [address]   Restrictions/Precautions:  Restrictions/Precautions  Restrictions/Precautions: Fall Risk, General Precautions  Required Braces or Orthoses?: No       Communication with other providers:  Partial co-treat with OT  Subjective:  Patient states:  P agreeable to working on stairs  Pain:   Location, Type, Intensity (0/10 to 10/10):  Reports L foot pain when stepping up on step (reported several minutes after performing stairs)  Objective:    Observation:  P working with OT in gym  Treatment, including education/measures:  Transfers: sit <> stand from standard chair x 3 reps with supervision with min cues for hand placement  Gait: Ambulates with RW x 60' x 2 bouts + 80'. P cued for placing feet inside the walker. P with noted decreased R step length and decreased weight shift. Lateral stepping in // bars x 5' x 2 bouts with seated rest break 2/2 fatigue  Stair negotiation:  P attempts 7\" step in stairwell x 3 attempts. P able to place L foot on step with BUE support on L handrail p unable to step up with RLE despite assist. P attempts forward and sideways without success  P ascends/descends 4\" step x 4 reps with CGA with BUE support on L HR ascending and descending  P ascends/descends 6\" step x 6 rep with CGA to min A. P with difficulty fully stepping RLE and requires cues for foot placement  There. ex: Standing marching, x 10 reps, squats x 10 reps, seated marching x 10 reps  P left sitting up in chair with chair alarm on and call light within reach. Assessment / Impression:    P continues to be limited by RLE weakness and has significant difficulty with the stairs.  Recommend continued skilled PT to address deficits and maximize functional potential.   Patient's tolerance of treatment:  good   Adverse Reaction: none  Significant change in status and impact:  Improved gait  Barriers to improvement:  weakness  Plan for Next Session:    Continue working on stairs and strength  Time in:  1105  Time out:  1140  Timed treatment minutes:  35  Total treatment time:  35    Previously filed items:  Social/Functional History  Lives With: Spouse  Type of Home: House  Home Layout: One level, Laundry in basement  Home Access: Stairs to enter with rails  Entrance Stairs - Number of Steps: 5  Entrance Stairs - Rails: Both  Bathroom Shower/Tub: Tub/Shower unit  Bathroom Toilet: Standard  Bathroom Equipment: Grab bars in shower, Shower chair  Home Equipment: 4 wheeled walker, Cane  ADL Assistance: Independent  Homemaking Assistance: Independent  Homemaking Responsibilities: Yes  Ambulation Assistance: Needs assistance  Transfer Assistance: Needs assistance  Active : No  Leisure & Hobbies: reading  Short term goals  Time Frame for Short term goals: 1 week  Short term goal 1: P will perform bed mobility with lisa  Short term goal 2: P will perform transfer to LRAD with Lisa  Short term goal 3: P will ambulate 48' with RW Lisa  Short term goal 4: P will ascend/descend 5 steps with KILEY HRs and Lisa.        Electronically signed by:    Mecca Lanier, PT  8/3/2020, 1:11 PM

## 2020-08-03 NOTE — CARE COORDINATION
CM met with the patient to discuss discharge planning. CM notified the patient that Tuesday 8/4/2020 will be her 20th day in the swing bed program and that on day 21 Medicare's co-pay will begin. Patient stated that she is still having some difficulty with steps but is not sure that staying in the swing bed program will resolve the problem. CM again spoke with the patient regarding the options of [place] [id number] and outpatient therapy upon discharge from the swing bed program.  CM advised the patient that outpatient therapy would likely be more intense but would also require transportation a few days a week which could be a problem since her  operates a business. CM advised the patient that she could be discharged Wednesday 8/5 and not be responsible for the co-pay because the day of discharge does not count, patient voiced understanding. Patient stated that she wants to call and speak to her  about the options because she wants him to also be agreeable with her plan. CM will follow. 10:20 AM  CM notified that the patient was requesting to speak with case management. CM met with the patient who asked this CM to speak with her  regarding discharge plan. 10:28 AM  CM spoke with the patient's  Nina Tim ([phone]) per patient's request.  CM advised Floresitaalma Darynrichi that the patient's daily co-pay will begin Wednesday 8/5 if she is not discharged. CM also notified Rik Hicks of the options for continued therapy upon discharge from the swing bed program which includes outpatient therapy or HHC. Rik Hicks stated that he is concerned about the patient's ability to navigate steps. Rik Hicks stated that he owns/operates a business and the patient will be alone during the day. Rik Hicks stated that their home has steps to enter and once inside she would not encounter steps but he is still concerned.   Rik Hicks stated that he would really like to speak with the physical therapist Raghav Saavedra if possible before making a decision

## 2020-08-04 LAB
GLUCOSE BLD-MCNC: 155 MG/DL (ref 70–99)
GLUCOSE BLD-MCNC: 205 MG/DL (ref 70–99)
GLUCOSE BLD-MCNC: 231 MG/DL (ref 70–99)
GLUCOSE BLD-MCNC: 276 MG/DL (ref 70–99)

## 2020-08-04 PROCEDURE — 97530 THERAPEUTIC ACTIVITIES: CPT

## 2020-08-04 PROCEDURE — 97116 GAIT TRAINING THERAPY: CPT

## 2020-08-04 PROCEDURE — 82962 GLUCOSE BLOOD TEST: CPT

## 2020-08-04 PROCEDURE — 6370000000 HC RX 637 (ALT 250 FOR IP): Performed by: INTERNAL MEDICINE

## 2020-08-04 PROCEDURE — 97110 THERAPEUTIC EXERCISES: CPT

## 2020-08-04 PROCEDURE — 1200000002 HC SEMI PRIVATE SWING BED

## 2020-08-04 RX ADMIN — Medication 1 TABLET: at 13:01

## 2020-08-04 RX ADMIN — DULOXETINE 60 MG: 60 CAPSULE, DELAYED RELEASE ORAL at 09:14

## 2020-08-04 RX ADMIN — LEVOTHYROXINE SODIUM 300 MCG: 0.15 TABLET ORAL at 06:35

## 2020-08-04 RX ADMIN — INSULIN GLARGINE 50 UNITS: 100 INJECTION, SOLUTION SUBCUTANEOUS at 22:43

## 2020-08-04 RX ADMIN — HYDRALAZINE HYDROCHLORIDE 10 MG: 10 TABLET, FILM COATED ORAL at 22:42

## 2020-08-04 RX ADMIN — ATENOLOL 50 MG: 50 TABLET ORAL at 22:42

## 2020-08-04 RX ADMIN — PANTOPRAZOLE SODIUM 40 MG: 40 TABLET, DELAYED RELEASE ORAL at 06:35

## 2020-08-04 RX ADMIN — LISINOPRIL 20 MG: 20 TABLET ORAL at 09:14

## 2020-08-04 RX ADMIN — HYDRALAZINE HYDROCHLORIDE 10 MG: 10 TABLET, FILM COATED ORAL at 09:14

## 2020-08-04 RX ADMIN — INSULIN LISPRO 4 UNITS: 100 INJECTION, SOLUTION INTRAVENOUS; SUBCUTANEOUS at 13:01

## 2020-08-04 RX ADMIN — ATORVASTATIN CALCIUM 20 MG: 20 TABLET, FILM COATED ORAL at 22:00

## 2020-08-04 RX ADMIN — INSULIN GLARGINE 5 UNITS: 100 INJECTION, SOLUTION SUBCUTANEOUS at 09:15

## 2020-08-04 RX ADMIN — AMLODIPINE BESYLATE 10 MG: 10 TABLET ORAL at 09:14

## 2020-08-04 RX ADMIN — ASPIRIN 81 MG: 81 TABLET, COATED ORAL at 09:14

## 2020-08-04 RX ADMIN — INSULIN LISPRO 3 UNITS: 100 INJECTION, SOLUTION INTRAVENOUS; SUBCUTANEOUS at 22:43

## 2020-08-04 RX ADMIN — ATENOLOL 50 MG: 50 TABLET ORAL at 09:14

## 2020-08-04 RX ADMIN — TROSPIUM CHLORIDE 20 MG: 20 TABLET, FILM COATED ORAL at 22:42

## 2020-08-04 RX ADMIN — INSULIN LISPRO 24 UNITS: 100 INJECTION, SOLUTION INTRAVENOUS; SUBCUTANEOUS at 09:21

## 2020-08-04 RX ADMIN — INSULIN LISPRO 24 UNITS: 100 INJECTION, SOLUTION INTRAVENOUS; SUBCUTANEOUS at 13:04

## 2020-08-04 RX ADMIN — INSULIN LISPRO 4 UNITS: 100 INJECTION, SOLUTION INTRAVENOUS; SUBCUTANEOUS at 09:15

## 2020-08-04 ASSESSMENT — PAIN SCALES - GENERAL
PAINLEVEL_OUTOF10: 0

## 2020-08-04 NOTE — PLAN OF CARE
Educated on call light use to prevent falls  Problem: Falls - Risk of:  Goal: Will remain free from falls  Description: Will remain free from falls  Outcome: Ongoing     Problem: Falls - Risk of:  Goal: Absence of physical injury  Description: Absence of physical injury  Outcome: Ongoing

## 2020-08-04 NOTE — PROGRESS NOTES
Occupational Therapy    Occupational Therapy Treatment Note  Name: Judy Angel MRN: [id number] :   1945   Date:  2020   Admission Date: 7/15/2020 Room:  010010-01   Restrictions/Precautions:  Restrictions/Precautions  Restrictions/Precautions: Fall Risk, General Precautions  Required Braces or Orthoses?: No   Communication with other providers:   Cleared for treatment by RN. Subjective:  Patient states: agreeable for therapy. Pain:   Location, Type, Intensity (0/10 to 10/10):  0/10  Objective:    Observation:  Pt alert and oriented. Treatment, including education: Co treatment with PT  Transfers  Supine to sit :Sup  Scooting :Sup  Rolling :Sup  Sit to stand :SBA  Stand to sit :SBA  SPT:CGA        Therapeutic Exercise:  Cues were given for technique, safety, recruitment, and rationale. Cues were verbal and/or tactile. For BUE strengthening for ADL & functional mobility Indep pt performed BUE strengthening HEP c 3# hand weights x 10 reps x 4 exercises with Minimal difficulty. Therapeutic Activity Training:   Therapeutic activity training was instructed today. Cues were given for safety, sequence, UE/LE placement, awareness, and balance. Activities performed today included bed mobility training, sup-sit, sit-stand, SPT. Pt stood x 3-4 min at RW with CGA while completing dynamic stand task while reaching outside base of support to facilitate increased balance for ADL tasks and transfers. Pt completed 5 steps with CGA to SBA x 2 and min verbal cues. Safety Measures: Gait belt used, Left in bed, Pull/Bed Alarm activated and call light left in reach          Assessment / Impression:        Patient's tolerance of treatment: Good   Adverse Reaction: None  Significant change in status and impact:  None  Barriers to improvement:  None    Plan for Next Session:    Continue with POC.     Time in:  841  Time out:  927  Timed treatment minutes:  46  Total treatment time: 46  Electronically signed by:    Gianfranco Garcia, [address]    8/4/2020, 9:32 AM    Previously filed values:  Patient Goals   Patient goals : To be able to walk and to get stronger  Short term goals  Time Frame for Short term goals: until discharge  Short term goal 1: Pt will be min A for UB/LB bathing using good energy conservation techniques and with min vc. Short term goal 2: Pt will be SBA for UB/LB dressing using good energy conservation techniques and AE PRN and with min vc. Short term goal 3: Pt will be supervision for all functional transfers in prep for ADL tasks.   Short term goal 4: Pt will stand and complete simulated home management task with walker for >5 min with SBA no instances of LOB, no seated rest breaks

## 2020-08-04 NOTE — PROGRESS NOTES
Physical Therapy    Physical Therapy Treatment Note  Name: Cinthya Mcpherson MRN: [id number] :   1945   Date:  2020   Admission Date: 7/15/2020 Room:  [address] [place] [address]   Restrictions/Precautions:  Restrictions/Precautions  Restrictions/Precautions: Fall Risk, General Precautions  Required Braces or Orthoses?: No       Communication with other providers:  Co-treat with OT. Updated p progress with   Subjective:  Patient states:  P agreeable to working with therapy  Pain:   Location, Type, Intensity (0/10 to 10/10):  denies  Objective:    Observation:  P supine in bed  Treatment, including education/measures:  Bed mobility: supine to sit with Shy  Transfers: sit <> stand with supervision and min cues for hand placement  Gait: Ambulates x 120' with CGa to supervision, [de-identified]' supervision. P with improved stepping pattern and R step length. P ascend/descend ramp with mod cues and CGA. Stairs: P performs 5 step at 7\" with BUE on L HR and CGA. P with decreased R knee stability but able to maintain upright. P performs trial steps in parallel bars x 10 reps with 6\" step alternating between Rle and LLE lead with BUE on L side. P with decreased KILEY knee control with L knee weaker than R  There. Ex: Standing squats x 12 reps, heel raises x 10 reps, lateral step out KILEY x 10 reps. P with difficulty with lateral steps requiring rest breaks and BUE support. P benefits from CGA for stability. P left sitting up in chair with chair alarm on and call light within reach  P educated on progress. Assessment / Impression:    P with significantly improved stair negotiation. Recommend continued skilled PT to address deficits in strength, balance and gait.    Patient's tolerance of treatment:  good   Adverse Reaction: none  Significant change in status and impact:  Improved stairs  Barriers to improvement:  Weakness, decreased motor planning  Plan for Next Session:    Continue working on stairs and balance  Time in: 9036  Time out:  0927  Timed treatment minutes:  46  Total treatment time:  55    Previously filed items:  Social/Functional History  Lives With: Spouse  Type of Home: House  Home Layout: One level, Laundry in basement  Home Access: Stairs to enter with rails  Entrance Stairs - Number of Steps: 5  Entrance Stairs - Rails: Both  Bathroom Shower/Tub: Tub/Shower unit  Bathroom Toilet: Standard  Bathroom Equipment: Grab bars in shower, Shower chair  Home Equipment: 4 wheeled walker, Cane  ADL Assistance: Independent  Homemaking Assistance: Independent  Homemaking Responsibilities: Yes  Ambulation Assistance: Needs assistance  Transfer Assistance: Needs assistance  Active : No  Leisure & Hobbies: reading  Short term goals  Time Frame for Short term goals: 1 week  Short term goal 1: P will perform bed mobility with lisa  Short term goal 2: P will perform transfer to LRAD with Lisa  Short term goal 3: P will ambulate 48' with RW Lisa  Short term goal 4: P will ascend/descend 5 steps with KILEY HRs and Lisa.        Electronically signed by:    Fidelia Kaur, PT  8/4/2020, 1:16 PM

## 2020-08-05 LAB
ANION GAP SERPL CALCULATED.3IONS-SCNC: 10 MMOL/L (ref 4–16)
BACTERIA: ABNORMAL /HPF
BASOPHILS ABSOLUTE: 0 K/CU MM
BASOPHILS RELATIVE PERCENT: 0.3 % (ref 0–1)
BILIRUBIN URINE: NEGATIVE MG/DL
BLOOD, URINE: NEGATIVE
BUN BLDV-MCNC: 20 MG/DL (ref 6–23)
CALCIUM SERPL-MCNC: 9.6 MG/DL (ref 8.3–10.6)
CHLORIDE BLD-SCNC: 104 MMOL/L (ref 99–110)
CLARITY: ABNORMAL
CO2: 28 MMOL/L (ref 21–32)
COLOR: ABNORMAL
CREAT SERPL-MCNC: 0.7 MG/DL (ref 0.6–1.1)
DIFFERENTIAL TYPE: ABNORMAL
EOSINOPHILS ABSOLUTE: 0.2 K/CU MM
EOSINOPHILS RELATIVE PERCENT: 2.5 % (ref 0–3)
GFR AFRICAN AMERICAN: >60 ML/MIN/1.73M2
GFR NON-AFRICAN AMERICAN: >60 ML/MIN/1.73M2
GLUCOSE BLD-MCNC: 157 MG/DL (ref 70–99)
GLUCOSE BLD-MCNC: 221 MG/DL (ref 70–99)
GLUCOSE BLD-MCNC: 276 MG/DL (ref 70–99)
GLUCOSE BLD-MCNC: 317 MG/DL (ref 70–99)
GLUCOSE BLD-MCNC: 320 MG/DL (ref 70–99)
GLUCOSE, URINE: NEGATIVE MG/DL
HCT VFR BLD CALC: 38.8 % (ref 37–47)
HEMOGLOBIN: 12 GM/DL (ref 12.5–16)
IMMATURE NEUTROPHIL %: 0.4 % (ref 0–0.43)
KETONES, URINE: NEGATIVE MG/DL
LEUKOCYTE ESTERASE, URINE: ABNORMAL
LYMPHOCYTES ABSOLUTE: 2.6 K/CU MM
LYMPHOCYTES RELATIVE PERCENT: 28.3 % (ref 24–44)
MCH RBC QN AUTO: 29.3 PG (ref 27–31)
MCHC RBC AUTO-ENTMCNC: 30.9 % (ref 32–36)
MCV RBC AUTO: 94.6 FL (ref 78–100)
MONOCYTES ABSOLUTE: 0.6 K/CU MM
MONOCYTES RELATIVE PERCENT: 6.4 % (ref 0–4)
MUCUS: ABNORMAL HPF
NITRITE URINE, QUANTITATIVE: NEGATIVE
PDW BLD-RTO: 14.1 % (ref 11.7–14.9)
PH, URINE: 6 (ref 5–8)
PLATELET # BLD: 202 K/CU MM (ref 140–440)
PMV BLD AUTO: 10.3 FL (ref 7.5–11.1)
POTASSIUM SERPL-SCNC: 4.3 MMOL/L (ref 3.5–5.1)
PROTEIN UA: 100 MG/DL
RBC # BLD: 4.1 M/CU MM (ref 4.2–5.4)
RBC URINE: ABNORMAL /HPF (ref 0–6)
SEGMENTED NEUTROPHILS ABSOLUTE COUNT: 5.7 K/CU MM
SEGMENTED NEUTROPHILS RELATIVE PERCENT: 62.1 % (ref 36–66)
SODIUM BLD-SCNC: 142 MMOL/L (ref 135–145)
SPECIFIC GRAVITY UA: 1.02 (ref 1–1.03)
SQUAMOUS EPITHELIAL: 4 /HPF
TOTAL IMMATURE NEUTOROPHIL: 0.04 K/CU MM
TRICHOMONAS: ABNORMAL /HPF
UROBILINOGEN, URINE: 1 MG/DL (ref 0.2–1)
WBC # BLD: 9.1 K/CU MM (ref 4–10.5)
WBC CLUMP: ABNORMAL /HPF
WBC UA: 15 /HPF (ref 0–5)

## 2020-08-05 PROCEDURE — 80048 BASIC METABOLIC PNL TOTAL CA: CPT

## 2020-08-05 PROCEDURE — 6370000000 HC RX 637 (ALT 250 FOR IP): Performed by: HOSPITALIST

## 2020-08-05 PROCEDURE — 1200000002 HC SEMI PRIVATE SWING BED

## 2020-08-05 PROCEDURE — 85025 COMPLETE CBC W/AUTO DIFF WBC: CPT

## 2020-08-05 PROCEDURE — 36415 COLL VENOUS BLD VENIPUNCTURE: CPT

## 2020-08-05 PROCEDURE — 97110 THERAPEUTIC EXERCISES: CPT

## 2020-08-05 PROCEDURE — 97530 THERAPEUTIC ACTIVITIES: CPT

## 2020-08-05 PROCEDURE — 82962 GLUCOSE BLOOD TEST: CPT

## 2020-08-05 PROCEDURE — 97535 SELF CARE MNGMENT TRAINING: CPT

## 2020-08-05 PROCEDURE — 87077 CULTURE AEROBIC IDENTIFY: CPT

## 2020-08-05 PROCEDURE — 81001 URINALYSIS AUTO W/SCOPE: CPT

## 2020-08-05 PROCEDURE — 97116 GAIT TRAINING THERAPY: CPT

## 2020-08-05 PROCEDURE — 6370000000 HC RX 637 (ALT 250 FOR IP): Performed by: INTERNAL MEDICINE

## 2020-08-05 PROCEDURE — 87186 SC STD MICRODIL/AGAR DIL: CPT

## 2020-08-05 PROCEDURE — 87086 URINE CULTURE/COLONY COUNT: CPT

## 2020-08-05 RX ORDER — ALOGLIPTIN 12.5 MG/1
12.5 TABLET, FILM COATED ORAL DAILY
Status: DISCONTINUED | OUTPATIENT
Start: 2020-08-05 | End: 2020-08-13 | Stop reason: HOSPADM

## 2020-08-05 RX ADMIN — INSULIN GLARGINE 5 UNITS: 100 INJECTION, SOLUTION SUBCUTANEOUS at 09:00

## 2020-08-05 RX ADMIN — ATORVASTATIN CALCIUM 20 MG: 20 TABLET, FILM COATED ORAL at 20:52

## 2020-08-05 RX ADMIN — ALOGLIPTIN 12.5 MG: 12.5 TABLET, FILM COATED ORAL at 13:26

## 2020-08-05 RX ADMIN — INSULIN LISPRO 3 UNITS: 100 INJECTION, SOLUTION INTRAVENOUS; SUBCUTANEOUS at 20:50

## 2020-08-05 RX ADMIN — PANTOPRAZOLE SODIUM 40 MG: 40 TABLET, DELAYED RELEASE ORAL at 06:03

## 2020-08-05 RX ADMIN — HYDRALAZINE HYDROCHLORIDE 10 MG: 10 TABLET, FILM COATED ORAL at 20:52

## 2020-08-05 RX ADMIN — HYDRALAZINE HYDROCHLORIDE 10 MG: 10 TABLET, FILM COATED ORAL at 11:18

## 2020-08-05 RX ADMIN — AMLODIPINE BESYLATE 10 MG: 10 TABLET ORAL at 11:19

## 2020-08-05 RX ADMIN — INSULIN LISPRO 8 UNITS: 100 INJECTION, SOLUTION INTRAVENOUS; SUBCUTANEOUS at 09:01

## 2020-08-05 RX ADMIN — LEVOTHYROXINE SODIUM 300 MCG: 0.15 TABLET ORAL at 06:03

## 2020-08-05 RX ADMIN — ASPIRIN 81 MG: 81 TABLET, COATED ORAL at 11:19

## 2020-08-05 RX ADMIN — INSULIN LISPRO 8 UNITS: 100 INJECTION, SOLUTION INTRAVENOUS; SUBCUTANEOUS at 19:01

## 2020-08-05 RX ADMIN — ATENOLOL 50 MG: 50 TABLET ORAL at 20:52

## 2020-08-05 RX ADMIN — INSULIN LISPRO 28 UNITS: 100 INJECTION, SOLUTION INTRAVENOUS; SUBCUTANEOUS at 19:01

## 2020-08-05 RX ADMIN — ATENOLOL 50 MG: 50 TABLET ORAL at 11:19

## 2020-08-05 RX ADMIN — TROSPIUM CHLORIDE 20 MG: 20 TABLET, FILM COATED ORAL at 20:52

## 2020-08-05 RX ADMIN — LISINOPRIL 20 MG: 20 TABLET ORAL at 11:19

## 2020-08-05 RX ADMIN — INSULIN LISPRO 28 UNITS: 100 INJECTION, SOLUTION INTRAVENOUS; SUBCUTANEOUS at 13:23

## 2020-08-05 RX ADMIN — INSULIN GLARGINE 50 UNITS: 100 INJECTION, SOLUTION SUBCUTANEOUS at 20:51

## 2020-08-05 RX ADMIN — INSULIN LISPRO 28 UNITS: 100 INJECTION, SOLUTION INTRAVENOUS; SUBCUTANEOUS at 09:00

## 2020-08-05 RX ADMIN — DULOXETINE 60 MG: 60 CAPSULE, DELAYED RELEASE ORAL at 11:18

## 2020-08-05 RX ADMIN — Medication 1 TABLET: at 11:18

## 2020-08-05 RX ADMIN — INSULIN LISPRO 4 UNITS: 100 INJECTION, SOLUTION INTRAVENOUS; SUBCUTANEOUS at 13:23

## 2020-08-05 ASSESSMENT — PAIN SCALES - GENERAL
PAINLEVEL_OUTOF10: 0

## 2020-08-05 NOTE — PROGRESS NOTES
Hospitalist Progress Note      Name:  Timo Gonzalez /Age/Sex:   (69 y.o. female)   MRN & CSN:  [id number] & [id number] Admission Date/Time: 7/15/2020  1:02 PM   Location:   PCP: Rika Fowler [address] [place] [address] Day: 22    Assessment and Plan:   Timo Gonzalez is a 69 y.o.  female  who presents with <principal problem not specified>    > Weakness and debility  - PT/OT     > DM2 poorly controlled, with hyperglycemia and A1c of 12  - lantus at night, carb controlled diet  - prandial and sliding scale insulin  - adjust insulin as per BG     > E.coli UTI  -completed bactrim course     > Obesity with bmi 32.2  - diet and lifestyle modification     > Hypothyroidism  tsh 22  Started on 300 mcg thyroxine- eventually titrate down, needs outpatient follow up  - 20: patient admits she stopped taking her medication including her thryoxine because she felt poorly. This might explain her fatigue and weakness, apathy and flat affect     > urinary incontinence  - started on detrol  - check UA r/o UTI     > Memory impairment  - needs outpatient assessment with PCP       Diet DIET CARB CONTROL; Carb Control: 3 carb choices (45 gms)/meal   DVT Prophylaxis ? Ambulation   Code Status Full Code   Disposition  Home with [facility] , anticipate in 1-2 days     History of Present Illness:     Pt S&E. No chest pain, no dyspnea, no abd pain, no N/V, no F/C, reports urinary incontinence. 10-14 point ROS reviewed negative, unless as noted above    Objective: Intake/Output Summary (Last 24 hours) at 2020 1048  Last data filed at 2020 0906  Gross per 24 hour   Intake 440 ml   Output --   Net 440 ml      Vitals:   Vitals:    20 0804   BP: (!) 153/67   Pulse: 74   Resp: 16   Temp: 97.5 °F (36.4 °C)   SpO2: 97%     Physical Exam:    GEN Awake female, cooperative, no apparent distress. Obese  RESP Clear to auscultation, no wheezes, rales or rhonchi.   Symmetric chest movement Urmila Messing CARDIO/VASC S1/S2 auscultated. Regular rate. No peripheral edema. GI Abdomen is soft without significant tenderness, Bowel sounds are normoactive. MSK Spontaneous movement of all extremities  SKIN Normal coloration, warm, dry. NEURO normal speech, no lateralizing weakness. PSYCH Awake, alert, oriented x 4. Affect appropriate.     Medications:   Medications:    insulin lispro  28 Units Subcutaneous TID WC    trospium  20 mg Oral Nightly    DULoxetine  60 mg Oral Daily    lisinopril  20 mg Oral Daily    insulin lispro  0-12 Units Subcutaneous TID WC    insulin lispro  0-6 Units Subcutaneous Nightly    insulin glargine  50 Units Subcutaneous Nightly    amLODIPine  10 mg Oral Daily    aspirin  81 mg Oral Daily    atenolol  50 mg Oral BID    atorvastatin  20 mg Oral Nightly    hydrALAZINE  10 mg Oral BID    levothyroxine  300 mcg Oral Daily    pantoprazole  40 mg Oral Daily    b complex-C-E-zinc  1 tablet Oral Daily      Infusions:   PRN Meds: insulin glargine, 5 Units, Daily PRN  acetaminophen, 650 mg, Q6H PRN    Or  acetaminophen, 650 mg, Q6H PRN  ondansetron, 4 mg, Q6H PRN  polyethylene glycol, 17 g, Daily PRN  sodium chloride flush, 10 mL, PRN  glucagon (rDNA), 1 mg, PRN  glucose, 15 g, PRN          Electronically signed by Fidel Perez MD on 8/5/2020 at 10:48 AM

## 2020-08-05 NOTE — PROGRESS NOTES
Physical Therapy    Physical Therapy Treatment Note  Name: Jane [place] MRN: [id number] :   1945   Date:  2020   Admission Date: 7/15/2020 [facility]:  [facility]   Restrictions/Precautions:  Restrictions/Precautions  Restrictions/Precautions: Fall Risk, General Precautions  Required Braces or Orthoses?: No       Communication with other providers:  OT assist during therapy. Updated progress with team  Subjective:  Patient states:  \"I am so weak\"  Pain:   Location, Type, Intensity (0/10 to 10/10):  denies  Objective:    Observation:  P sitting up in chair  Treatment, including education/measures:  Functional mobility: Sit to stand with sup, stand to sit with CGA and cues. Standing balance with sup x 2 min  Gait: Ambulates x 100' with CGA to CS, returns to room x 20' with CGA and decreased speed 2/2 fatigue. Stair negotiation: P ascends/descends 5 steps with CGA and min A descent. P with decreased R knee stability on final 2 steps requiring assist to support. P requires wc back to gym as reports she is too weak to ambulate. P's blood pressure 174/6- nursing notified by OT. P reports increased fatigue compared to previous sessions. P educated on plan of care and will likely need more therapy prior to returning home safely. Discussed HEP to perform in order to improve strength. There. Ex: seated marches x 10 reps  P left sitting up in chair with chair alarm on and call light within reach   Assessment / Impression:    P with increased weakness and fatigue. P would benefit from continued skilled PT to address deficits in strength, balance and gait.    Patient's tolerance of treatment:  fair   Adverse Reaction: weakness  Significant change in status and impact:  Improved stair descent  Barriers to improvement:  weakness  Plan for Next Session:    Continue working on stairs and strength  Time in:  1141  Time out:  1220  Timed treatment minutes:  39  Total treatment time:  39    Previously filed items:  Social/Functional History  Lives With: Spouse  Type of Home: House  Home Layout: One level, Laundry in basement  Home Access: Stairs to enter with rails  Entrance Stairs - Number of Steps: 5  Entrance Stairs - Rails: Both  Bathroom Shower/Tub: Tub/Shower unit  Bathroom Toilet: Standard  Bathroom Equipment: Grab bars in shower, Shower chair  Home Equipment: 4 wheeled walker, Cane  ADL Assistance: [address] [place] [address] Avenue: Independent  Homemaking Responsibilities: Yes  Ambulation Assistance: Needs assistance  Transfer Assistance: Needs assistance  Active : No  Leisure & Hobbies: reading  Short term goals  Time Frame for Short term goals: 1 week  Short term goal 1: P will perform bed mobility with lisa  Short term goal 2: P will perform transfer to LRAD with Lisa  Short term goal 3: P will ambulate 48' with RW Lisa  Short term goal 4: P will ascend/descend 5 steps with KILEY HRs and Lisa.        Electronically signed by:    Sun Olguin, PT  8/5/2020, 1:04 PM

## 2020-08-05 NOTE — PROGRESS NOTES
Occupational Therapy    Occupational Therapy Treatment Note  Name: Garcia Malhotra MRN: [id number] :   1945   Date:  2020   Admission Date: 7/15/2020 Room:  010/010-01   Restrictions/Precautions:  Restrictions/Precautions  Restrictions/Precautions: Fall Risk, General Precautions  Required Braces or Orthoses?: No   Communication with other providers:   Cleared for treatment by RN. Subjective:  Patient states:  \"I need to pee\"  Pain:   Location, Type, Intensity (0/10 to 10/10):  0/10    Objective:    Observation:  Pt alert and oriented. Treatment, including education:  Transfers  Sit to stand :CGA  Stand to sit :CGA  SPT:CGA  Toilet:CGA    Self Care Training:   Cues were given for safety, sequence, UE/LE placement, visual cues, and balance. Activities performed today included  toileting,     Toileting  Min A to pull up brief and pants with pt having min LOB when pulling up clothes. Therapeutic Exercise:  Cues were given for technique, safety, recruitment, and rationale. Cues were verbal and/or tactile. For BUE strengthening for ADL & functional mobility Indep pt performed BUE strengthening HEP c 2# cane exercises x 10 reps x 6 exercises with Minimal difficulty. Therapeutic Activity Training:   Therapeutic activity training was instructed today. Cues were given for safety, sequence, UE/LE placement, awareness, and balance. Activities performed today included bed mobility training, sup-sit, sit-stand, SPT. Safety Measures: Gait belt used, Left in bed, Pull/Bed Alarm activated and call light left in reach          Assessment / Impression:        Patient's tolerance of treatment: Good   Adverse Reaction: None  Significant change in status and impact:  None  Barriers to improvement:  Decreased strength and endurance. Plan for Next Session:    Continue with POC.     Time in:  1600  Time out:  1630  Timed treatment minutes:  30  Total treatment time:  30  Electronically signed by:    Jojo Cedillo, [address]    8/5/2020, 4:34 PM    Previously filed values:  Patient Goals   Patient goals : To be able to walk and to get stronger  Short term goals  Time Frame for Short term goals: until discharge  Short term goal 1: Pt will be min A for UB/LB bathing using good energy conservation techniques and with min vc. Short term goal 2: Pt will be SBA for UB/LB dressing using good energy conservation techniques and AE PRN and with min vc. Short term goal 3: Pt will be supervision for all functional transfers in prep for ADL tasks.   Short term goal 4: Pt will stand and complete simulated home management task with walker for >5 min with SBA no instances of LOB, no seated rest breaks

## 2020-08-06 LAB
GLUCOSE BLD-MCNC: 103 MG/DL (ref 70–99)
GLUCOSE BLD-MCNC: 193 MG/DL (ref 70–99)
GLUCOSE BLD-MCNC: 196 MG/DL (ref 70–99)
GLUCOSE BLD-MCNC: 205 MG/DL (ref 70–99)

## 2020-08-06 PROCEDURE — 97110 THERAPEUTIC EXERCISES: CPT

## 2020-08-06 PROCEDURE — 97530 THERAPEUTIC ACTIVITIES: CPT

## 2020-08-06 PROCEDURE — 97535 SELF CARE MNGMENT TRAINING: CPT

## 2020-08-06 PROCEDURE — 6370000000 HC RX 637 (ALT 250 FOR IP): Performed by: HOSPITALIST

## 2020-08-06 PROCEDURE — 1200000002 HC SEMI PRIVATE SWING BED

## 2020-08-06 PROCEDURE — 82962 GLUCOSE BLOOD TEST: CPT

## 2020-08-06 PROCEDURE — 6370000000 HC RX 637 (ALT 250 FOR IP): Performed by: INTERNAL MEDICINE

## 2020-08-06 RX ADMIN — Medication 1 TABLET: at 09:29

## 2020-08-06 RX ADMIN — ATORVASTATIN CALCIUM 20 MG: 20 TABLET, FILM COATED ORAL at 21:15

## 2020-08-06 RX ADMIN — INSULIN LISPRO 28 UNITS: 100 INJECTION, SOLUTION INTRAVENOUS; SUBCUTANEOUS at 13:01

## 2020-08-06 RX ADMIN — INSULIN LISPRO 28 UNITS: 100 INJECTION, SOLUTION INTRAVENOUS; SUBCUTANEOUS at 17:47

## 2020-08-06 RX ADMIN — HYDRALAZINE HYDROCHLORIDE 10 MG: 10 TABLET, FILM COATED ORAL at 09:29

## 2020-08-06 RX ADMIN — ATENOLOL 50 MG: 50 TABLET ORAL at 09:29

## 2020-08-06 RX ADMIN — DULOXETINE 60 MG: 60 CAPSULE, DELAYED RELEASE ORAL at 09:29

## 2020-08-06 RX ADMIN — ASPIRIN 81 MG: 81 TABLET, COATED ORAL at 09:29

## 2020-08-06 RX ADMIN — LEVOTHYROXINE SODIUM 300 MCG: 0.15 TABLET ORAL at 06:09

## 2020-08-06 RX ADMIN — ATENOLOL 50 MG: 50 TABLET ORAL at 21:15

## 2020-08-06 RX ADMIN — TROSPIUM CHLORIDE 20 MG: 20 TABLET, FILM COATED ORAL at 21:15

## 2020-08-06 RX ADMIN — INSULIN LISPRO 2 UNITS: 100 INJECTION, SOLUTION INTRAVENOUS; SUBCUTANEOUS at 09:26

## 2020-08-06 RX ADMIN — INSULIN LISPRO 2 UNITS: 100 INJECTION, SOLUTION INTRAVENOUS; SUBCUTANEOUS at 17:45

## 2020-08-06 RX ADMIN — LISINOPRIL 20 MG: 20 TABLET ORAL at 09:29

## 2020-08-06 RX ADMIN — INSULIN LISPRO 28 UNITS: 100 INJECTION, SOLUTION INTRAVENOUS; SUBCUTANEOUS at 09:25

## 2020-08-06 RX ADMIN — INSULIN LISPRO 4 UNITS: 100 INJECTION, SOLUTION INTRAVENOUS; SUBCUTANEOUS at 13:02

## 2020-08-06 RX ADMIN — HYDRALAZINE HYDROCHLORIDE 10 MG: 10 TABLET, FILM COATED ORAL at 21:15

## 2020-08-06 RX ADMIN — PANTOPRAZOLE SODIUM 40 MG: 40 TABLET, DELAYED RELEASE ORAL at 06:09

## 2020-08-06 RX ADMIN — AMLODIPINE BESYLATE 10 MG: 10 TABLET ORAL at 09:28

## 2020-08-06 RX ADMIN — ALOGLIPTIN 12.5 MG: 12.5 TABLET, FILM COATED ORAL at 09:29

## 2020-08-06 ASSESSMENT — PAIN SCALES - GENERAL
PAINLEVEL_OUTOF10: 0

## 2020-08-06 NOTE — PROGRESS NOTES
Nutrition Assessment     Type and Reason for Visit: Reassess(Swing Bed)    Nutrition Recommendations/Plan: No new interventions at this time    Nutrition Assessment:  Meal intakes vary 1-100% of meals. Current intakes meet her nutritonal needs    Malnutrition Assessment:  Malnutrition Status: No malnutrition    Estimated Daily Nutrient Needs:  Energy (kcal): 1546; Weight Used for Energy Requirements:  Current     Protein (g): 71-89; Weight Used for Protein Requirements:  Current(.8-1.0)        Fluid (ml/day): 5655-3645; Weight Used for Fluid Requirements:  Current(25-30ml/kg)      Nutrition Related Findings: Well nourished female, no special diet at home, c/o lack of taste, multiple medication changes per patient over the past several months      Current Nutrition Therapies:    DIET CARB CONTROL; Carb Control: 3 carb choices (45 gms)/meal    Anthropometric Measures:  · Height: 5' 6\" (167.6 cm)  · Current Body Wt: 198 lb (89.8 kg)   · BMI: 32    Nutrition Diagnosis:   · Inadequate oral intake, In context of acute illness or injury related to acute injury/trauma as evidenced by intake 26-50%, intake 51-75%      Nutrition Interventions:   Food and/or Nutrient Delivery:  Continue Current Diet  Nutrition Education/Counseling:  No recommendation at this time   Coordination of Nutrition Care:  Continued Inpatient Monitoring    Goals:  Oral intakes will improve to greater than 50% of meals consistently during her stay       Nutrition Monitoring and Evaluation:   Behavioral-Environmental Outcomes:   Other (Comment)   Food/Nutrient Intake Outcomes:  Food and Nutrient Intake  Physical Signs/Symptoms Outcomes:  Meal Time Behavior     Discharge Planning:    No discharge needs at this time     Electronically signed by Gibran Lawson RD, LD on 8/6/20 at 4:26 PM EDT    Contact: [phone]

## 2020-08-06 NOTE — PLAN OF CARE
Problem: Falls - Risk of:  Goal: Will remain free from falls  Description: Will remain free from falls  8/6/2020 0106 by Isauro Parisi LPN  Outcome: Ongoing  8/5/2020 1620 by Keanu Jones RN  Outcome: Ongoing  Goal: Absence of physical injury  Description: Absence of physical injury  8/6/2020 0106 by Isauro Parisi LPN  Outcome: Ongoing  8/5/2020 1620 by Keanu Jones RN  Outcome: Ongoing     Problem: Skin Integrity:  Goal: Will show no infection signs and symptoms  Description: Will show no infection signs and symptoms  8/6/2020 0106 by Isauro Parisi LPN  Outcome: Ongoing  8/5/2020 1620 by Keanu Jones RN  Outcome: Ongoing  Goal: Absence of new skin breakdown  Description: Absence of new skin breakdown  8/6/2020 0106 by Isauro Parisi LPN  Outcome: Ongoing  8/5/2020 1620 by Keanu Jones RN  Outcome: Ongoing  Goal: Risk for impaired skin integrity will decrease  Description: Risk for impaired skin integrity will decrease  8/6/2020 0106 by Isauro Parisi LPN  Outcome: Ongoing  8/5/2020 1620 by Keanu Jones RN  Outcome: Ongoing     Problem:  Activity:  Goal: Risk for activity intolerance will decrease  Description: Risk for activity intolerance will decrease  8/6/2020 0106 by Isauro Parisi LPN  Outcome: Ongoing  8/5/2020 1620 by Keanu Jones RN  Outcome: Ongoing     Problem: Coping:  Goal: Ability to adjust to condition or change in health will improve  Description: Ability to adjust to condition or change in health will improve  8/6/2020 0106 by Isauro Parisi LPN  Outcome: Ongoing  8/5/2020 1620 by Keanu Jones RN  Outcome: Ongoing     Problem: Fluid Volume:  Goal: Ability to maintain a balanced intake and output will improve  Description: Ability to maintain a balanced intake and output will improve  8/6/2020 0106 by Isauro Parisi LPN  Outcome: Ongoing  8/5/2020 1620 by Keanu Jones RN  Outcome: Ongoing     Problem: Health Behavior:  Goal: Ability to identify and utilize available resources and services will improve  Description: Ability to identify and utilize available resources and services will improve  8/6/2020 0106 by Sharona Pimentel LPN  Outcome: Ongoing  8/5/2020 1620 by Gilford Mary, RN  Outcome: Ongoing  Goal: Ability to manage health-related needs will improve  Description: Ability to manage health-related needs will improve  8/6/2020 0106 by Sharona Pimentel LPN  Outcome: Ongoing  8/5/2020 1620 by Gilford Mary, RN  Outcome: Ongoing     Problem: Metabolic:  Goal: Ability to maintain appropriate glucose levels will improve  Description: Ability to maintain appropriate glucose levels will improve  8/6/2020 0106 by Sharona Pimentel LPN  Outcome: Ongoing  8/5/2020 1620 by Gilford Mary, RN  Outcome: Ongoing     Problem: Nutritional:  Goal: Maintenance of adequate nutrition will improve  Description: Maintenance of adequate nutrition will improve  8/6/2020 0106 by Sharona Pimentel LPN  Outcome: Ongoing  8/5/2020 1620 by Gilford Mary, RN  Outcome: Ongoing  Goal: Progress toward achieving an optimal weight will improve  Description: Progress toward achieving an optimal weight will improve  8/6/2020 0106 by Sharona Pimentel LPN  Outcome: Ongoing  8/5/2020 1620 by Gilford Mary, RN  Outcome: Ongoing     Problem: Physical Regulation:  Goal: Complications related to the disease process, condition or treatment will be avoided or minimized  Description: Complications related to the disease process, condition or treatment will be avoided or minimized  8/6/2020 0106 by Sharona Pimentel LPN  Outcome: Ongoing  8/5/2020 1620 by Gilford Mary, RN  Outcome: Ongoing  Goal: Diagnostic test results will improve  Description: Diagnostic test results will improve  8/6/2020 0106 by Sharona Pimentel LPN  Outcome: Ongoing  8/5/2020 1620 by Gilford Mary, RN  Outcome: Ongoing     Problem: Tissue Perfusion:  Goal: Adequacy of tissue perfusion will improve  Description: Adequacy of tissue perfusion will improve  8/6/2020 0106 by Maricruz Wen LPN  Outcome: Ongoing  8/5/2020 1620 by Arthur Shah RN  Outcome: Ongoing     Problem: Mobility - Impaired:  Goal: Mobility will improve  Description: Mobility will improve  8/6/2020 0106 by Maricruz Wen LPN  Outcome: Ongoing  8/5/2020 1620 by Arthur Shah RN  Outcome: Ongoing     Problem: Discharge Planning:  Goal: Discharged to appropriate level of care  Description: Discharged to appropriate level of care  8/6/2020 0106 by Maricruz Wen LPN  Outcome: Ongoing  8/5/2020 1620 by Arthur Shah RN  Outcome: Ongoing

## 2020-08-06 NOTE — PROGRESS NOTES
Occupational Therapy Treatment Note  Name: Xiao Hudson MRN: [id number] :   1945   Date:  2020   Admission Date: 7/15/2020 Room:  [address] [place] [address]   Restrictions/Precautions:  Restrictions/Precautions  Restrictions/Precautions: Fall Risk, General Precautions  Required Braces or Orthoses?: No   Communication with other providers:  Will Kemps to see per nursing  Subjective:  Patient states:  \"I have numbness/tingling in both feet\"  Pain:   Location, Type, Intensity (0/10 to 10/10): 0/10  Objective:    Observation:  Pt presents awake sitting up in chair  Objective Measures:  Pt seen for functional mobility, therapeutic exercise, ADLs  Treatment, including education:  Pt was awake sitting up in chair fully dressed. Pt performed sit to stand from chair with CGA. Pt amb in jenkins to therapy gym with CGA as Pt took slow steady steps. Pt sat in chair with CGA as she reached back to sit. After seated rest break Pt stood and trf over to table using walker SBA . Pt stood at table ~5 min to play card game using 2 hands with cards. Pt sat back in chair and complete BUE exercises with bar with 2# weight. Pt completed BUE shoulder flex, bicep curls, horizontal ab/adducation, scapular protraction/retraction 15x 2 sets and trialed forward rows however this started to bother her shoulders and she was instructed to stop. Pt stood up from chair to walker SBA. Pt amb back to room with CGA and min cues for walker safety as Pt started to walker with L foot outside edge of walker. Educated Pt on this and she reported \" I have been told that 100 times and keep forgetting\"  In room Pt trf to toilet with CGA and min VC for positioning directly in front of toilet. Pt able to pull down pants and sit down CGA. Pt complete toileting with CGA for standing balance with walker while wiping.  Pt trf to sink to wash hands and back to chair with CGA and min cues to turn fully in front of chair before stepping away from walker and reaching back.  Discussed with Pt her current status with bathing/dressing. She reported she took a shower yesterday and felt more confident this time. Reports her daughters are looking into taking out her current bathtub at home and replacing with walk in shower. Pt reported her spouses main concern is that she be able to walk up steps before going home. Pt reports she has falls frequently about 1x a month at home. Discussed with Pt what she does throughout the day at home as she states she is home alone all day while  works at business they own. Pt reports she mostly sits at home other than making simple meals or microwave meals and going to the bathroom or her office to work on computer. Pt had no questions/concerns after session today. Pt left sitting up in chair with call light and chair alarm. Assessment / Impression:        Patient's tolerance of treatment: Good  Adverse Reaction: none  Significant change in status and impact:  none  Barriers to improvement: none  Plan for Next Session:    Continue current POC    Time in:  1420  Time out:  1420  Timed treatment minutes:  60  Total treatment time:  60    Electronically signed by:    Luiz Link. Patti Avina, OTR/L [id number]  8/6/2020, 2:22 PM    Previously filed values:  Patient Goals   Patient goals : To be able to walk and to get stronger  Short term goals  Time Frame for Short term goals: until discharge  Short term goal 1: Pt will be min A for UB/LB bathing using good energy conservation techniques and with min vc. Short term goal 2: Pt will be SBA for UB/LB dressing using good energy conservation techniques and AE PRN and with min vc. Short term goal 3: Pt will be supervision for all functional transfers in prep for ADL tasks.   Short term goal 4: Pt will stand and complete simulated home management task with walker for >5 min with SBA no instances of LOB, no seated rest breaks

## 2020-08-06 NOTE — FLOWSHEET NOTE
Physical Therapy Treatment Note  Name: Ara De Santiago MRN: [id number] :   1945   Date:  2020   Admission Date: 7/15/2020 Room:  [address] [place] [address]   Restrictions/Precautions:  Restrictions/Precautions  Restrictions/Precautions: Fall Risk, General Precautions  Required Braces or Orthoses?: No       Communication with other providers:  [id number] Cris Ortega to see per nursing    Subjective:  Patient states: \"I was expecting you\"    Pain:   Pt denies pain    Objective:    Observation:  Pt awake and sitting in recliner    Treatment, including education/measures:  Pt performed sit<>stands from recliner and BLEs in sitting. Pt amb ~10' in room with RW @ CGA. Pt educated on home safety d/t several falls at home.     Assessment / Impression:       Patient's tolerance of treatment:  good   Adverse Reaction: none  Significant change in status and impact:  none  Barriers to improvement:  None    Plan for Next Session:    Cont with PT POC    Time in:  1740  Time out:  1820  Timed treatment minutes:  40  Total treatment time:  40    Previously filed items:  Social/Functional History  Lives With: Spouse  Type of Home: House  Home Layout: One level, Laundry in basement  Home Access: Stairs to enter with rails  Entrance Stairs - Number of Steps: 5  Entrance Stairs - Rails: Both  Bathroom Shower/Tub: Tub/Shower unit  Bathroom Toilet: Standard  Bathroom Equipment: Grab bars in shower, Shower chair  Home Equipment: 4 wheeled walker, Cane  ADL Assistance: [facility] [address]: Independent  Homemaking Responsibilities: Yes  Ambulation Assistance: Needs assistance  Transfer Assistance: Needs assistance  Active : No  Leisure & Hobbies: reading  Short term goals  Time Frame for Short term goals: 1 week  Short term goal 1: P will perform bed mobility with lisa  Short term goal 2: P will perform transfer to LRAD with Lisa  Short term goal 3: P will ambulate 48' with RW Lisa  Short term goal 4: P will ascend/descend 5 steps with KILEY HRs and Shy. Electronically signed by:     Alon Martin PTA  8/6/2020, 7:06 PM

## 2020-08-06 NOTE — PROGRESS NOTES
SWING BED WEEKLY TEAM SHEET     WEEK#     3  NUTRITION   Diet:       Carb Control, supplement if eats less than 50% of meals            TF:      no    TPN:   no  Appropriate/Adequate [X] yes [ ] no   Meal intakes: %    Weight: 198#   BMI:      31.99      Significant Change: no  Recommendations: no new recommendations

## 2020-08-07 LAB
CULTURE: ABNORMAL
CULTURE: ABNORMAL
GLUCOSE BLD-MCNC: 106 MG/DL (ref 70–99)
GLUCOSE BLD-MCNC: 158 MG/DL (ref 70–99)
GLUCOSE BLD-MCNC: 191 MG/DL (ref 70–99)
GLUCOSE BLD-MCNC: 90 MG/DL (ref 70–99)
Lab: ABNORMAL
SPECIMEN: ABNORMAL

## 2020-08-07 PROCEDURE — 97110 THERAPEUTIC EXERCISES: CPT

## 2020-08-07 PROCEDURE — 97116 GAIT TRAINING THERAPY: CPT

## 2020-08-07 PROCEDURE — 6370000000 HC RX 637 (ALT 250 FOR IP): Performed by: INTERNAL MEDICINE

## 2020-08-07 PROCEDURE — 97530 THERAPEUTIC ACTIVITIES: CPT

## 2020-08-07 PROCEDURE — 6370000000 HC RX 637 (ALT 250 FOR IP): Performed by: HOSPITALIST

## 2020-08-07 PROCEDURE — 82962 GLUCOSE BLOOD TEST: CPT

## 2020-08-07 PROCEDURE — 1200000002 HC SEMI PRIVATE SWING BED

## 2020-08-07 RX ORDER — CIPROFLOXACIN 250 MG/1
250 TABLET, FILM COATED ORAL
Status: DISCONTINUED | OUTPATIENT
Start: 2020-08-07 | End: 2020-08-08

## 2020-08-07 RX ADMIN — CIPROFLOXACIN HYDROCHLORIDE 250 MG: 250 TABLET, FILM COATED ORAL at 08:59

## 2020-08-07 RX ADMIN — Medication 1 TABLET: at 08:32

## 2020-08-07 RX ADMIN — ATENOLOL 50 MG: 50 TABLET ORAL at 21:03

## 2020-08-07 RX ADMIN — ASPIRIN 81 MG: 81 TABLET, COATED ORAL at 08:31

## 2020-08-07 RX ADMIN — LEVOTHYROXINE SODIUM 300 MCG: 0.15 TABLET ORAL at 05:51

## 2020-08-07 RX ADMIN — CIPROFLOXACIN HYDROCHLORIDE 250 MG: 250 TABLET, FILM COATED ORAL at 17:24

## 2020-08-07 RX ADMIN — LISINOPRIL 20 MG: 20 TABLET ORAL at 08:32

## 2020-08-07 RX ADMIN — HYDRALAZINE HYDROCHLORIDE 10 MG: 10 TABLET, FILM COATED ORAL at 08:32

## 2020-08-07 RX ADMIN — PANTOPRAZOLE SODIUM 40 MG: 40 TABLET, DELAYED RELEASE ORAL at 05:51

## 2020-08-07 RX ADMIN — INSULIN LISPRO 2 UNITS: 100 INJECTION, SOLUTION INTRAVENOUS; SUBCUTANEOUS at 12:25

## 2020-08-07 RX ADMIN — INSULIN LISPRO 28 UNITS: 100 INJECTION, SOLUTION INTRAVENOUS; SUBCUTANEOUS at 12:29

## 2020-08-07 RX ADMIN — INSULIN LISPRO 28 UNITS: 100 INJECTION, SOLUTION INTRAVENOUS; SUBCUTANEOUS at 08:40

## 2020-08-07 RX ADMIN — ATORVASTATIN CALCIUM 20 MG: 20 TABLET, FILM COATED ORAL at 21:03

## 2020-08-07 RX ADMIN — ALOGLIPTIN 12.5 MG: 12.5 TABLET, FILM COATED ORAL at 08:32

## 2020-08-07 RX ADMIN — TROSPIUM CHLORIDE 20 MG: 20 TABLET, FILM COATED ORAL at 21:02

## 2020-08-07 RX ADMIN — INSULIN LISPRO 2 UNITS: 100 INJECTION, SOLUTION INTRAVENOUS; SUBCUTANEOUS at 08:31

## 2020-08-07 RX ADMIN — INSULIN LISPRO 28 UNITS: 100 INJECTION, SOLUTION INTRAVENOUS; SUBCUTANEOUS at 17:25

## 2020-08-07 RX ADMIN — HYDRALAZINE HYDROCHLORIDE 10 MG: 10 TABLET, FILM COATED ORAL at 21:03

## 2020-08-07 RX ADMIN — ATENOLOL 50 MG: 50 TABLET ORAL at 08:31

## 2020-08-07 RX ADMIN — AMLODIPINE BESYLATE 10 MG: 10 TABLET ORAL at 08:32

## 2020-08-07 RX ADMIN — DULOXETINE 60 MG: 60 CAPSULE, DELAYED RELEASE ORAL at 08:31

## 2020-08-07 ASSESSMENT — PAIN SCALES - GENERAL
PAINLEVEL_OUTOF10: 0

## 2020-08-07 ASSESSMENT — PAIN DESCRIPTION - FREQUENCY: FREQUENCY: INTERMITTENT

## 2020-08-07 NOTE — PROGRESS NOTES
Occupational Therapy    Occupational Therapy Treatment Note  Name: Blanca Negron MRN: [id number] :   1945   Date:  2020   Admission Date: 7/15/2020 Room:  010/010-01   Restrictions/Precautions:  Restrictions/Precautions  Restrictions/Precautions: Fall Risk, General Precautions  Required Braces or Orthoses?: No     Communication with other providers:   Cleared for treatment by  RN. Subjective:  Patient states:  \"I wish I could do better at the stairs\". Pain:   Location, Type, Intensity (0/10 to 10/10): 0/10    Objective:    Observation:  Pt alert and oriented. Treatment, including education:  Transfers    Sit to stand :Sup  Stand to sit :Sup        Therapeutic Exercise:  Cues were given for technique, safety, recruitment, and rationale. Cues were verbal and/or tactile. For BUE strengthening for ADL & functional mobility Indep pt performed BUE strengthening HEP c 3# hand weights x 20 reps x 6 exercises with Minimal difficulty. Therapeutic Activity Training:   Therapeutic activity training was instructed today. Cues were given for safety, sequence, UE/LE placement, awareness, and balance. Activities performed today included bed mobility training, sup-sit, sit-stand, SPT. Safety Measures: Gait belt used, Left in bed, Pull/Bed Alarm activated and call light left in reach          Assessment / Impression:        Patient's tolerance of treatment: Good   Adverse Reaction: None  Significant change in status and impact:  None  Barriers to improvement:  None    Plan for Next Session:    Continue with POC. Time in:  0930 1125  Time out:  0940 1154  Timed treatment minutes:  39  Total treatment time:  39  Electronically signed by:    Rolando Yarbrough, [address]    2020, 12:41 PM    Previously filed values:  Patient Goals   Patient goals :  To be able to walk and to get stronger  Short term goals  Time Frame for Short term goals: until discharge  Short term goal 1: Pt will be min A for UB/LB bathing using good energy conservation techniques and with min vc. Short term goal 2: Pt will be SBA for UB/LB dressing using good energy conservation techniques and AE PRN and with min vc. Short term goal 3: Pt will be supervision for all functional transfers in prep for ADL tasks.   Short term goal 4: Pt will stand and complete simulated home management task with walker for >5 min with SBA no instances of LOB, no seated rest breaks

## 2020-08-07 NOTE — FLOWSHEET NOTE
Physical Therapy Treatment Note  Name: Luis Porter MRN: [id number] :   1945   Date:  2020   Admission Date: 7/15/2020 Room:  [address] [place] [address]   Restrictions/Precautions:  Restrictions/Precautions  Restrictions/Precautions: Fall Risk, General Precautions  Required Braces or Orthoses?: No       Communication with other providers:  [id number] Cris Ortega to see per nursing    Subjective:  Patient states: ready for PT  Pain:   Pt denies pain    Objective:    Observation:  Pt awake and sitting in recliner    Treatment, including education/measures:  Pt performed sit<>stands from recliner and BLEs in sitting. Pt amb   ~50' with RW @ CGA to stairway- patient ascended/descended 5 steps with mod A. Patient then  Ambulated 50 ft back to her room.   TE- supine AP, SLR, heel slides x15 reps ea R/L - seated  R/L LAQ x20 reps standing hip ABD x15 repsR/L  Assessment / Impression:       Patient's tolerance of treatment:  good   Adverse Reaction: none  Significant change in status and impact: stair climbing skills have improved  Barriers to improvement:  None    Plan for Next Session:    Cont with PT POC    Time in:  830/845  Time out:  925/945  Timed treatment minutes:  35  Total treatment time:  35    Previously filed items:  Social/Functional History  Lives With: Spouse  Type of Home: House  Home Layout: One level, Laundry in basement  Home Access: Stairs to enter with rails  Entrance Stairs - Number of Steps: 5  Entrance Stairs - Rails: Both  Bathroom Shower/Tub: Tub/Shower unit  Bathroom Toilet: Standard  Bathroom Equipment: Grab bars in shower, Shower chair  Home Equipment: 4 wheeled walker, Cane  ADL Assistance: Independent  Homemaking Assistance: Independent  Homemaking Responsibilities: Yes  Ambulation Assistance: Needs assistance  Transfer Assistance: Needs assistance  Active : No  Leisure & Hobbies: reading  Short term goals  Time Frame for Short term goals: 1 week  Short term goal 1: P will perform bed mobility with lisa  Short term goal 2: P will perform transfer to LRAD with Lisa  Short term goal 3: P will ambulate 48' with RW Lisa  Short term goal 4: P will ascend/descend 5 steps with KILEY HRs and Lisa.        Electronically signed by:    Zach Joe, PT  8/7/2020, 11:01 AM

## 2020-08-08 LAB
GLUCOSE BLD-MCNC: 136 MG/DL (ref 70–99)
GLUCOSE BLD-MCNC: 176 MG/DL (ref 70–99)
GLUCOSE BLD-MCNC: 270 MG/DL (ref 70–99)
GLUCOSE BLD-MCNC: 315 MG/DL (ref 70–99)

## 2020-08-08 PROCEDURE — 97110 THERAPEUTIC EXERCISES: CPT

## 2020-08-08 PROCEDURE — 6370000000 HC RX 637 (ALT 250 FOR IP): Performed by: HOSPITALIST

## 2020-08-08 PROCEDURE — 97530 THERAPEUTIC ACTIVITIES: CPT

## 2020-08-08 PROCEDURE — 6370000000 HC RX 637 (ALT 250 FOR IP): Performed by: INTERNAL MEDICINE

## 2020-08-08 PROCEDURE — 1200000002 HC SEMI PRIVATE SWING BED

## 2020-08-08 PROCEDURE — 82962 GLUCOSE BLOOD TEST: CPT

## 2020-08-08 RX ORDER — SULFAMETHOXAZOLE AND TRIMETHOPRIM 800; 160 MG/1; MG/1
1 TABLET ORAL EVERY 12 HOURS SCHEDULED
Status: DISCONTINUED | OUTPATIENT
Start: 2020-08-08 | End: 2020-08-13 | Stop reason: HOSPADM

## 2020-08-08 RX ADMIN — AMLODIPINE BESYLATE 10 MG: 10 TABLET ORAL at 09:00

## 2020-08-08 RX ADMIN — INSULIN LISPRO 6 UNITS: 100 INJECTION, SOLUTION INTRAVENOUS; SUBCUTANEOUS at 13:03

## 2020-08-08 RX ADMIN — ATENOLOL 50 MG: 50 TABLET ORAL at 20:29

## 2020-08-08 RX ADMIN — ALOGLIPTIN 12.5 MG: 12.5 TABLET, FILM COATED ORAL at 09:00

## 2020-08-08 RX ADMIN — LEVOTHYROXINE SODIUM 300 MCG: 0.15 TABLET ORAL at 06:13

## 2020-08-08 RX ADMIN — SULFAMETHOXAZOLE AND TRIMETHOPRIM 1 TABLET: 800; 160 TABLET ORAL at 20:28

## 2020-08-08 RX ADMIN — ATORVASTATIN CALCIUM 20 MG: 20 TABLET, FILM COATED ORAL at 20:29

## 2020-08-08 RX ADMIN — ATENOLOL 50 MG: 50 TABLET ORAL at 09:00

## 2020-08-08 RX ADMIN — PANTOPRAZOLE SODIUM 40 MG: 40 TABLET, DELAYED RELEASE ORAL at 06:13

## 2020-08-08 RX ADMIN — HYDRALAZINE HYDROCHLORIDE 10 MG: 10 TABLET, FILM COATED ORAL at 09:00

## 2020-08-08 RX ADMIN — ASPIRIN 81 MG: 81 TABLET, COATED ORAL at 09:00

## 2020-08-08 RX ADMIN — DULOXETINE 60 MG: 60 CAPSULE, DELAYED RELEASE ORAL at 09:00

## 2020-08-08 RX ADMIN — CIPROFLOXACIN HYDROCHLORIDE 250 MG: 250 TABLET, FILM COATED ORAL at 06:55

## 2020-08-08 RX ADMIN — INSULIN LISPRO 8 UNITS: 100 INJECTION, SOLUTION INTRAVENOUS; SUBCUTANEOUS at 08:58

## 2020-08-08 RX ADMIN — TROSPIUM CHLORIDE 20 MG: 20 TABLET, FILM COATED ORAL at 20:28

## 2020-08-08 RX ADMIN — SULFAMETHOXAZOLE AND TRIMETHOPRIM 1 TABLET: 800; 160 TABLET ORAL at 09:03

## 2020-08-08 RX ADMIN — HYDRALAZINE HYDROCHLORIDE 10 MG: 10 TABLET, FILM COATED ORAL at 20:29

## 2020-08-08 RX ADMIN — INSULIN LISPRO 28 UNITS: 100 INJECTION, SOLUTION INTRAVENOUS; SUBCUTANEOUS at 08:57

## 2020-08-08 RX ADMIN — LISINOPRIL 20 MG: 20 TABLET ORAL at 09:00

## 2020-08-08 RX ADMIN — INSULIN GLARGINE 50 UNITS: 100 INJECTION, SOLUTION SUBCUTANEOUS at 20:30

## 2020-08-08 RX ADMIN — INSULIN LISPRO 28 UNITS: 100 INJECTION, SOLUTION INTRAVENOUS; SUBCUTANEOUS at 12:55

## 2020-08-08 RX ADMIN — Medication 1 TABLET: at 09:00

## 2020-08-08 RX ADMIN — INSULIN LISPRO 28 UNITS: 100 INJECTION, SOLUTION INTRAVENOUS; SUBCUTANEOUS at 17:56

## 2020-08-08 ASSESSMENT — PAIN SCALES - GENERAL
PAINLEVEL_OUTOF10: 0

## 2020-08-08 NOTE — FLOWSHEET NOTE
left in chair [ ] call light within reach  [ ] bed alarm on  [ ] personal alarm on [ ] Min Rainey  [ ] other staff present:   Discharge recommendations  Anticipated discharge date:  TBD  Destination: []home alone   []home alone with assist prn  []Continuous supervision  []SNF  [] Assisted living   Continued therapy: []HHC PT  []OUTPATIENT  PT   [] No Further PT       Therapeutic activities/exercises completed this date:     Modality/intervention used:   [ X] Therapeutic Exercise    [ ] Modalities:   [ X] Therapeutic Activity    [ ] Ultrasound   [ ] Khloe Ramyaler   [ ] Gait Training     [ ] Cervical Traction  [ ] Lumbar Traction   [ ] Neuromuscular Re-education   [ ] Cold/hotpack  [ ] Iontophoresis   [ ] Instruction in HEP     [ ] Joe Vienna   [ ] Manual Therapy   [ ] Aquatic Therapy     Patient/Caregiver Education and Training:    Exercise/Equipment/Modalities this date   MRE knee flex, ext 2x10   MRE ankle PF/DF 2x10   Balance with no UE support 2x30 sec w sba   Balance with UE reaching 2x30 sec with Min    HIp abd, hip ext x10   Marching 2x10     Treatment Plan for Next Session: progress standing, balance, walking, strengthening    Assessment / Impression                                                          Treatment/Activity Tolerance:   [x] Tolerated Treatment well:     [] Patient limited by fatigue/pain:       [] Patient limited by medical complications:    [] Adverse Reaction to Tx:   [] Significant change in status    Plan:   [ Merlyn Martini per plan of care [ ] Fe Rivero current plan   [ ] Plan of care initiated [ ] Hold pending MD visit [ ] Discharged    Billing:  Billed units: 35      Signed: Arian Duffy 8/8/2020, 12:34 PM

## 2020-08-09 LAB
GLUCOSE BLD-MCNC: 160 MG/DL (ref 70–99)
GLUCOSE BLD-MCNC: 172 MG/DL (ref 70–99)
GLUCOSE BLD-MCNC: 299 MG/DL (ref 70–99)
GLUCOSE BLD-MCNC: 343 MG/DL (ref 70–99)

## 2020-08-09 PROCEDURE — 6370000000 HC RX 637 (ALT 250 FOR IP): Performed by: HOSPITALIST

## 2020-08-09 PROCEDURE — 82962 GLUCOSE BLOOD TEST: CPT

## 2020-08-09 PROCEDURE — 1200000002 HC SEMI PRIVATE SWING BED

## 2020-08-09 PROCEDURE — 6370000000 HC RX 637 (ALT 250 FOR IP): Performed by: INTERNAL MEDICINE

## 2020-08-09 RX ADMIN — LEVOTHYROXINE SODIUM 300 MCG: 0.15 TABLET ORAL at 06:26

## 2020-08-09 RX ADMIN — HYDRALAZINE HYDROCHLORIDE 10 MG: 10 TABLET, FILM COATED ORAL at 09:14

## 2020-08-09 RX ADMIN — SULFAMETHOXAZOLE AND TRIMETHOPRIM 1 TABLET: 800; 160 TABLET ORAL at 21:58

## 2020-08-09 RX ADMIN — INSULIN LISPRO 2 UNITS: 100 INJECTION, SOLUTION INTRAVENOUS; SUBCUTANEOUS at 17:47

## 2020-08-09 RX ADMIN — INSULIN LISPRO 28 UNITS: 100 INJECTION, SOLUTION INTRAVENOUS; SUBCUTANEOUS at 17:46

## 2020-08-09 RX ADMIN — INSULIN LISPRO 1 UNITS: 100 INJECTION, SOLUTION INTRAVENOUS; SUBCUTANEOUS at 21:57

## 2020-08-09 RX ADMIN — AMLODIPINE BESYLATE 10 MG: 10 TABLET ORAL at 09:14

## 2020-08-09 RX ADMIN — PANTOPRAZOLE SODIUM 40 MG: 40 TABLET, DELAYED RELEASE ORAL at 06:26

## 2020-08-09 RX ADMIN — ALOGLIPTIN 12.5 MG: 12.5 TABLET, FILM COATED ORAL at 09:14

## 2020-08-09 RX ADMIN — ATORVASTATIN CALCIUM 20 MG: 20 TABLET, FILM COATED ORAL at 21:59

## 2020-08-09 RX ADMIN — HYDRALAZINE HYDROCHLORIDE 10 MG: 10 TABLET, FILM COATED ORAL at 21:59

## 2020-08-09 RX ADMIN — DULOXETINE 60 MG: 60 CAPSULE, DELAYED RELEASE ORAL at 09:14

## 2020-08-09 RX ADMIN — ATENOLOL 50 MG: 50 TABLET ORAL at 21:58

## 2020-08-09 RX ADMIN — INSULIN GLARGINE 50 UNITS: 100 INJECTION, SOLUTION SUBCUTANEOUS at 21:57

## 2020-08-09 RX ADMIN — INSULIN LISPRO 28 UNITS: 100 INJECTION, SOLUTION INTRAVENOUS; SUBCUTANEOUS at 09:13

## 2020-08-09 RX ADMIN — INSULIN LISPRO 8 UNITS: 100 INJECTION, SOLUTION INTRAVENOUS; SUBCUTANEOUS at 12:35

## 2020-08-09 RX ADMIN — ASPIRIN 81 MG: 81 TABLET, COATED ORAL at 09:14

## 2020-08-09 RX ADMIN — LISINOPRIL 20 MG: 20 TABLET ORAL at 09:14

## 2020-08-09 RX ADMIN — Medication 1 TABLET: at 09:14

## 2020-08-09 RX ADMIN — INSULIN LISPRO 28 UNITS: 100 INJECTION, SOLUTION INTRAVENOUS; SUBCUTANEOUS at 12:36

## 2020-08-09 RX ADMIN — TROSPIUM CHLORIDE 20 MG: 20 TABLET, FILM COATED ORAL at 21:58

## 2020-08-09 RX ADMIN — SULFAMETHOXAZOLE AND TRIMETHOPRIM 1 TABLET: 800; 160 TABLET ORAL at 09:14

## 2020-08-09 RX ADMIN — ATENOLOL 50 MG: 50 TABLET ORAL at 09:14

## 2020-08-09 RX ADMIN — INSULIN LISPRO 6 UNITS: 100 INJECTION, SOLUTION INTRAVENOUS; SUBCUTANEOUS at 09:12

## 2020-08-09 ASSESSMENT — PAIN SCALES - GENERAL
PAINLEVEL_OUTOF10: 0

## 2020-08-10 LAB
GLUCOSE BLD-MCNC: 142 MG/DL (ref 70–99)
GLUCOSE BLD-MCNC: 206 MG/DL (ref 70–99)
GLUCOSE BLD-MCNC: 295 MG/DL (ref 70–99)
GLUCOSE BLD-MCNC: 69 MG/DL (ref 70–99)

## 2020-08-10 PROCEDURE — 1200000002 HC SEMI PRIVATE SWING BED

## 2020-08-10 PROCEDURE — 82962 GLUCOSE BLOOD TEST: CPT

## 2020-08-10 PROCEDURE — 97530 THERAPEUTIC ACTIVITIES: CPT

## 2020-08-10 PROCEDURE — 6370000000 HC RX 637 (ALT 250 FOR IP): Performed by: INTERNAL MEDICINE

## 2020-08-10 PROCEDURE — 6370000000 HC RX 637 (ALT 250 FOR IP): Performed by: HOSPITALIST

## 2020-08-10 PROCEDURE — 97110 THERAPEUTIC EXERCISES: CPT

## 2020-08-10 PROCEDURE — 97116 GAIT TRAINING THERAPY: CPT

## 2020-08-10 RX ORDER — ALBUTEROL SULFATE 2.5 MG/3ML
2.5 SOLUTION RESPIRATORY (INHALATION) EVERY 6 HOURS PRN
Status: DISCONTINUED | OUTPATIENT
Start: 2020-08-10 | End: 2020-08-13 | Stop reason: HOSPADM

## 2020-08-10 RX ADMIN — SULFAMETHOXAZOLE AND TRIMETHOPRIM 1 TABLET: 800; 160 TABLET ORAL at 20:57

## 2020-08-10 RX ADMIN — INSULIN LISPRO 6 UNITS: 100 INJECTION, SOLUTION INTRAVENOUS; SUBCUTANEOUS at 08:19

## 2020-08-10 RX ADMIN — ATORVASTATIN CALCIUM 20 MG: 20 TABLET, FILM COATED ORAL at 20:57

## 2020-08-10 RX ADMIN — PANTOPRAZOLE SODIUM 40 MG: 40 TABLET, DELAYED RELEASE ORAL at 06:02

## 2020-08-10 RX ADMIN — ALOGLIPTIN 12.5 MG: 12.5 TABLET, FILM COATED ORAL at 08:18

## 2020-08-10 RX ADMIN — INSULIN LISPRO 28 UNITS: 100 INJECTION, SOLUTION INTRAVENOUS; SUBCUTANEOUS at 08:20

## 2020-08-10 RX ADMIN — INSULIN LISPRO 28 UNITS: 100 INJECTION, SOLUTION INTRAVENOUS; SUBCUTANEOUS at 17:24

## 2020-08-10 RX ADMIN — ATENOLOL 50 MG: 50 TABLET ORAL at 20:57

## 2020-08-10 RX ADMIN — Medication 1 TABLET: at 08:18

## 2020-08-10 RX ADMIN — LEVOTHYROXINE SODIUM 300 MCG: 0.15 TABLET ORAL at 06:02

## 2020-08-10 RX ADMIN — ASPIRIN 81 MG: 81 TABLET, COATED ORAL at 08:18

## 2020-08-10 RX ADMIN — HYDRALAZINE HYDROCHLORIDE 10 MG: 10 TABLET, FILM COATED ORAL at 08:18

## 2020-08-10 RX ADMIN — AMLODIPINE BESYLATE 10 MG: 10 TABLET ORAL at 08:19

## 2020-08-10 RX ADMIN — TROSPIUM CHLORIDE 20 MG: 20 TABLET, FILM COATED ORAL at 20:57

## 2020-08-10 RX ADMIN — INSULIN LISPRO 28 UNITS: 100 INJECTION, SOLUTION INTRAVENOUS; SUBCUTANEOUS at 13:59

## 2020-08-10 RX ADMIN — SULFAMETHOXAZOLE AND TRIMETHOPRIM 1 TABLET: 800; 160 TABLET ORAL at 08:18

## 2020-08-10 RX ADMIN — HYDRALAZINE HYDROCHLORIDE 10 MG: 10 TABLET, FILM COATED ORAL at 20:57

## 2020-08-10 RX ADMIN — INSULIN LISPRO 4 UNITS: 100 INJECTION, SOLUTION INTRAVENOUS; SUBCUTANEOUS at 13:55

## 2020-08-10 RX ADMIN — ATENOLOL 50 MG: 50 TABLET ORAL at 08:18

## 2020-08-10 RX ADMIN — DULOXETINE 60 MG: 60 CAPSULE, DELAYED RELEASE ORAL at 08:18

## 2020-08-10 RX ADMIN — LISINOPRIL 20 MG: 20 TABLET ORAL at 08:18

## 2020-08-10 RX ADMIN — INSULIN LISPRO 2 UNITS: 100 INJECTION, SOLUTION INTRAVENOUS; SUBCUTANEOUS at 17:24

## 2020-08-10 ASSESSMENT — PAIN SCALES - GENERAL
PAINLEVEL_OUTOF10: 0

## 2020-08-10 NOTE — PROGRESS NOTES
Occupational Therapy    Occupational Therapy Treatment Note  Name: Ruma Booker MRN: [id number] :   1945   Date:  8/10/2020   Admission Date: 7/15/2020 Room:  [address] [place] [address]   Restrictions/Precautions:  Restrictions/Precautions  Restrictions/Precautions: Fall Risk, General Precautions  Required Braces or Orthoses?: No     Communication with other providers:   India Burkitt to see per nursing, spoke with PT regarding Pt progress with steps    Subjective:  Patient states:  \"I'm doing better I think than last week\"  Pain:   Location, Type, Intensity (0/10 to 10/10): 0/10    Objective:    Observation:  Pt reclined in chair with eyes closed. Pt reports she was just \"thinking\"    Treatment, including education:  Pt attempted sit to stand 1x with mod A but was unable to stand up and sat back into chair. 2nd attempt with min A sit to stand. Pt amb from room down to therapy gym. Pt trf to NuSt with min VC for hand placement, walker safety to turn directly in front of seat before sitting down. Pt performed sit to stand from seat with mod A 1st trial with leaning back towards R side then sat back down. 2nd trial with min A. Amb in jenkins ~100 ft and returned to room to sit in chair with min VC for walker safety/turning and hand placement. Educated on walker safety and when she returns home and is using her 4WW instructed Pt to have family member walk around with her at first until she feels comfortable in the house with the walker. Pt verbalized understanding and had no questions at this time. Pt left reclined in chair.      Therapeutic Exercise:  Pt completed 12 min on NuStep WL 3 with 2 rest short rest breaks      Safety Measures: Gait belt used, Left reclined in chair, Chair alarm activated and call light left in reach      Assessment / Impression:        Patient's tolerance of treatment: Good   Adverse Reaction: None  Significant change in status and impact:  None  Barriers to improvement:  None    Plan for Next Session:    Continue with POC. Time in:  1435  Time out: 1510  Timed treatment minutes:  35  Total treatment time:  35  Electronically signed by:    Mary Steven. Romie Kerr, OTR/L [id number]    8/10/2020, 2:34 PM    Previously filed values:  Patient Goals   Patient goals : To be able to walk and to get stronger  Short term goals  Time Frame for Short term goals: until discharge  Short term goal 1: Pt will be min A for UB/LB bathing using good energy conservation techniques and with min vc. Short term goal 2: Pt will be SBA for UB/LB dressing using good energy conservation techniques and AE PRN and with min vc. Short term goal 3: Pt will be supervision for all functional transfers in prep for ADL tasks.   Short term goal 4: Pt will stand and complete simulated home management task with walker for >5 min with SBA no instances of LOB, no seated rest breaks

## 2020-08-10 NOTE — PROGRESS NOTES
Physical Therapy    Physical Therapy Treatment Note  Name: Jane Glenwood MRN: [id number] :   1945   Date:  8/10/2020   Admission Date: 7/15/2020 Room:  [address] [place] [address]   Restrictions/Precautions:  Restrictions/Precautions  Restrictions/Precautions: Fall Risk, General Precautions  Required Braces or Orthoses?: No       Communication with other providers:  Spoke to OT about p progress  Subjective:  Patient states:  \"I was having some wheezing this morning\"  Pain:   Location, Type, Intensity (0/10 to 10/10):  denies  Objective:    Observation:  P supine in bed  Treatment, including education/measures:  Bed mobility: supine to sit mod A for scooting, p posterior LOB  Transfers: sit <> stand toilet min A, from chair with supervision and min cues x 5 reps  Gait: Ambulates x 10' with min-mod A and decreased RLE control. P rested and ambulated 80' x 2 bouts with CGA and cues for R foot. Lateral stepping in bars x 5' x 5 lengths, forward/backward x 8' x 4 lengths, step on/off 4\" and 6\" step x 8 lengths with CGA and 1 Lob with min A. P leads ascent with RLE and uses BUE support on R HR. P SOB following activity  Balance: step taps to balance stones x 10 reps forward, double, cross tap with BUE support and CGA  P left sitting up in chair with chair alarm on and call light within reach  Assessment / Impression:    P with initial difficulty with mobility which improved with activity.  P would continue to benefit from skilled PT to address deficits and maximize functional potential.   Patient's tolerance of treatment:  weakness   Adverse Reaction: none  Significant change in status and impact:  Improved step  Barriers to improvement:  weakness  Plan for Next Session:    Continue working on stairs and balance  Time in:  1019  Time out:  1105  Timed treatment minutes:  46  Total treatment time:  55    Previously filed items:  Social/Functional History  Lives With: Spouse  Type of Home: House  Home Layout: One level, Laundry in basement  Home Access: Stairs to enter with rails  Entrance Stairs - Number of Steps: 5  Entrance Stairs - Rails: Both  Bathroom Shower/Tub: Tub/Shower unit  Bathroom Toilet: Standard  Bathroom Equipment: Grab bars in shower, Shower chair  Home Equipment: 4 wheeled walker, Cane  ADL Assistance: Independent  Homemaking Assistance: Independent  Homemaking Responsibilities: Yes  Ambulation Assistance: Needs assistance  Transfer Assistance: Needs assistance  Active : No  Leisure & Hobbies: reading  Short term goals  Time Frame for Short term goals: 1 week  Short term goal 1: P will perform bed mobility with lisa  Short term goal 2: P will perform transfer to LRAD with Lisa  Short term goal 3: P will ambulate 48' with RW Lisa  Short term goal 4: P will ascend/descend 5 steps with KILEY HRs and Lisa.        Electronically signed by:    Mouna Sher, PT  8/10/2020, 10:34 AM

## 2020-08-11 PROBLEM — N39.0 UTI (URINARY TRACT INFECTION): Status: RESOLVED | Noted: 2020-07-12 | Resolved: 2020-08-11

## 2020-08-11 LAB
GLUCOSE BLD-MCNC: 150 MG/DL (ref 70–99)
GLUCOSE BLD-MCNC: 205 MG/DL (ref 70–99)
GLUCOSE BLD-MCNC: 207 MG/DL (ref 70–99)
GLUCOSE BLD-MCNC: 248 MG/DL (ref 70–99)
GLUCOSE BLD-MCNC: 62 MG/DL (ref 70–99)

## 2020-08-11 PROCEDURE — 6370000000 HC RX 637 (ALT 250 FOR IP): Performed by: HOSPITALIST

## 2020-08-11 PROCEDURE — 97110 THERAPEUTIC EXERCISES: CPT

## 2020-08-11 PROCEDURE — 1200000002 HC SEMI PRIVATE SWING BED

## 2020-08-11 PROCEDURE — 97116 GAIT TRAINING THERAPY: CPT

## 2020-08-11 PROCEDURE — 6370000000 HC RX 637 (ALT 250 FOR IP): Performed by: INTERNAL MEDICINE

## 2020-08-11 PROCEDURE — 97530 THERAPEUTIC ACTIVITIES: CPT

## 2020-08-11 PROCEDURE — 94640 AIRWAY INHALATION TREATMENT: CPT

## 2020-08-11 PROCEDURE — 82962 GLUCOSE BLOOD TEST: CPT

## 2020-08-11 PROCEDURE — 6360000002 HC RX W HCPCS: Performed by: HOSPITALIST

## 2020-08-11 RX ADMIN — HYDRALAZINE HYDROCHLORIDE 10 MG: 10 TABLET, FILM COATED ORAL at 08:24

## 2020-08-11 RX ADMIN — INSULIN LISPRO 28 UNITS: 100 INJECTION, SOLUTION INTRAVENOUS; SUBCUTANEOUS at 13:38

## 2020-08-11 RX ADMIN — ATORVASTATIN CALCIUM 20 MG: 20 TABLET, FILM COATED ORAL at 21:02

## 2020-08-11 RX ADMIN — TROSPIUM CHLORIDE 20 MG: 20 TABLET, FILM COATED ORAL at 21:03

## 2020-08-11 RX ADMIN — INSULIN LISPRO 28 UNITS: 100 INJECTION, SOLUTION INTRAVENOUS; SUBCUTANEOUS at 08:29

## 2020-08-11 RX ADMIN — SULFAMETHOXAZOLE AND TRIMETHOPRIM 1 TABLET: 800; 160 TABLET ORAL at 21:03

## 2020-08-11 RX ADMIN — ASPIRIN 81 MG: 81 TABLET, COATED ORAL at 08:24

## 2020-08-11 RX ADMIN — INSULIN LISPRO 4 UNITS: 100 INJECTION, SOLUTION INTRAVENOUS; SUBCUTANEOUS at 13:35

## 2020-08-11 RX ADMIN — ATENOLOL 50 MG: 50 TABLET ORAL at 08:24

## 2020-08-11 RX ADMIN — ALBUTEROL SULFATE 2.5 MG: 2.5 SOLUTION RESPIRATORY (INHALATION) at 08:33

## 2020-08-11 RX ADMIN — INSULIN LISPRO 2 UNITS: 100 INJECTION, SOLUTION INTRAVENOUS; SUBCUTANEOUS at 17:55

## 2020-08-11 RX ADMIN — AMLODIPINE BESYLATE 10 MG: 10 TABLET ORAL at 08:24

## 2020-08-11 RX ADMIN — PANTOPRAZOLE SODIUM 40 MG: 40 TABLET, DELAYED RELEASE ORAL at 06:08

## 2020-08-11 RX ADMIN — ATENOLOL 50 MG: 50 TABLET ORAL at 21:03

## 2020-08-11 RX ADMIN — INSULIN LISPRO 4 UNITS: 100 INJECTION, SOLUTION INTRAVENOUS; SUBCUTANEOUS at 08:26

## 2020-08-11 RX ADMIN — HYDRALAZINE HYDROCHLORIDE 10 MG: 10 TABLET, FILM COATED ORAL at 21:02

## 2020-08-11 RX ADMIN — SULFAMETHOXAZOLE AND TRIMETHOPRIM 1 TABLET: 800; 160 TABLET ORAL at 08:24

## 2020-08-11 RX ADMIN — Medication 1 TABLET: at 08:24

## 2020-08-11 RX ADMIN — LEVOTHYROXINE SODIUM 300 MCG: 0.15 TABLET ORAL at 06:08

## 2020-08-11 RX ADMIN — DULOXETINE 60 MG: 60 CAPSULE, DELAYED RELEASE ORAL at 08:24

## 2020-08-11 RX ADMIN — ALOGLIPTIN 12.5 MG: 12.5 TABLET, FILM COATED ORAL at 08:24

## 2020-08-11 RX ADMIN — INSULIN LISPRO 28 UNITS: 100 INJECTION, SOLUTION INTRAVENOUS; SUBCUTANEOUS at 17:58

## 2020-08-11 RX ADMIN — LISINOPRIL 20 MG: 20 TABLET ORAL at 08:24

## 2020-08-11 ASSESSMENT — PAIN SCALES - GENERAL
PAINLEVEL_OUTOF10: 0

## 2020-08-11 NOTE — PROGRESS NOTES
Physical Therapy    Physical Therapy Treatment Note  Name: Victor M Taylor MRN: [id number] :   1945   Date:  2020   Admission Date: 7/15/2020 Room:  [address] [place] [address]   Restrictions/Precautions:  Restrictions/Precautions  Restrictions/Precautions: Fall Risk, General Precautions  Required Braces or Orthoses?: No       Communication with other providers:  Spoke to OT regarding p's session  Subjective:  Patient states:  \"I am doing better\"  Pain:   Location, Type, Intensity (0/10 to 10/10):  denies  Objective:    Observation:  P sitting up in chair. P reports feeling \"jittery\" this morning following her breathing treatment and reports she couldn't balance in standing. Treatment, including education/measures:  Transfers: P performs sit <> stand from chair to RW with supervision  There. Ex: seated marching, lateral step outs, ankle pumps, LAQ  Gait: P ambulates x 200' with RW with CGA and cues for foot placement  Education: discussed safety with transition home and the importance of maintaining mobility. Educated on condition information  P left sitting up in chair with chair alarm on and call light within reach. Assessment / Impression:    P with improved gait and minor imbalance.  Recommend skilled PT to address deficits and maximize functional potential.   Patient's tolerance of treatment:  good   Adverse Reaction: none  Significant change in status and impact:  Improved gait  Barriers to improvement:  Fluctuating assist for mobility  Plan for Next Session:    Work on balance and stairs  Time in:  1558  Time out:  1624  Timed treatment minutes:  26  Total treatment time:  26    Previously filed items:  Social/Functional History  Lives With: Spouse  Type of Home: House  Home Layout: One level, Laundry in basement  Home Access: Stairs to enter with rails  Entrance Stairs - Number of Steps: 5  Entrance Stairs - Rails: Both  Bathroom Shower/Tub: Tub/Shower unit  Bathroom Toilet: Standard  Bathroom Equipment: [employer] bars in shower, Shower chair  Home Equipment: 4 wheeled walker, Cane  ADL Assistance: [address]: Independent  Homemaking Responsibilities: Yes  Ambulation Assistance: Needs assistance  Transfer Assistance: Needs assistance  Active : No  Leisure & Hobbies: reading  Short term goals  Time Frame for Short term goals: 1 week  Short term goal 1: P will perform bed mobility with lisa  Short term goal 2: P will perform transfer to LRAD with Lisa  Short term goal 3: P will ambulate 48' with RW Lisa  Short term goal 4: P will ascend/descend 5 steps with KILEY HRs and Lisa.        Electronically signed by:    Blair Barth, PT  8/11/2020, 4:37 PM

## 2020-08-11 NOTE — PROGRESS NOTES
values:  Patient Goals   Patient goals : To be able to walk and to get stronger  Short term goals  Time Frame for Short term goals: until discharge  Short term goal 1: Pt will be min A for UB/LB bathing using good energy conservation techniques and with min vc. Short term goal 2: Pt will be SBA for UB/LB dressing using good energy conservation techniques and AE PRN and with min vc. Short term goal 3: Pt will be supervision for all functional transfers in prep for ADL tasks.   Short term goal 4: Pt will stand and complete simulated home management task with walker for >5 min with SBA no instances of LOB, no seated rest breaks

## 2020-08-11 NOTE — CARE COORDINATION
CM received a voicemail from the patient's daughter requesting a return call from case management regarding discharge planning. CM spoke with the patient to see if she was agreeable to this CM calling and speaking with her daughter and she stated that she was agreeable to that. Patient also stated that she had discussed going home Wednesday with her  and he was going to be very busy at their business and Thursday may be better. Patient stated that she would like to continue therapy at home and is agreeable to a referral being made to [facility] (skilled nursing, PT/OT, and bath aide). 12:48 PM  CM spoke with the patient's daughter Vanda Avina ([phone]). Vanda Avina stated that she and her sister would be available on Thursday to assist with the patient's transition back home. CM advised Vanda Avina that the patient had agreed to a referral to [facility]. Vanda Avina voiced concerns about her medications and how to stay on top of what she is supposed to be taking at home, CM advised that the patient's nurse will explain all the medications she will need to take at home during the discharge process and that the nurse from the [facility] [id number] agency can also help with medication management. CM also discussed electronic med box as another option to consider.       1:17 PM  CM faxed the [facility] [id number] order and supporting documentation to [facility] to initiate the referral.

## 2020-08-12 LAB
ANION GAP SERPL CALCULATED.3IONS-SCNC: 6 MMOL/L (ref 4–16)
BUN BLDV-MCNC: 22 MG/DL (ref 6–23)
CALCIUM SERPL-MCNC: 9.2 MG/DL (ref 8.3–10.6)
CHLORIDE BLD-SCNC: 102 MMOL/L (ref 99–110)
CO2: 30 MMOL/L (ref 21–32)
CREAT SERPL-MCNC: 1.2 MG/DL (ref 0.6–1.1)
GFR AFRICAN AMERICAN: 53 ML/MIN/1.73M2
GFR NON-AFRICAN AMERICAN: 44 ML/MIN/1.73M2
GLUCOSE BLD-MCNC: 231 MG/DL (ref 70–99)
GLUCOSE BLD-MCNC: 280 MG/DL (ref 70–99)
GLUCOSE BLD-MCNC: 306 MG/DL (ref 70–99)
GLUCOSE BLD-MCNC: 309 MG/DL (ref 70–99)
GLUCOSE BLD-MCNC: 366 MG/DL (ref 70–99)
POTASSIUM SERPL-SCNC: 4.8 MMOL/L (ref 3.5–5.1)
SODIUM BLD-SCNC: 138 MMOL/L (ref 135–145)

## 2020-08-12 PROCEDURE — 1200000002 HC SEMI PRIVATE SWING BED

## 2020-08-12 PROCEDURE — 82962 GLUCOSE BLOOD TEST: CPT

## 2020-08-12 PROCEDURE — 97110 THERAPEUTIC EXERCISES: CPT

## 2020-08-12 PROCEDURE — 6370000000 HC RX 637 (ALT 250 FOR IP): Performed by: INTERNAL MEDICINE

## 2020-08-12 PROCEDURE — 97116 GAIT TRAINING THERAPY: CPT

## 2020-08-12 PROCEDURE — 97530 THERAPEUTIC ACTIVITIES: CPT

## 2020-08-12 PROCEDURE — 36415 COLL VENOUS BLD VENIPUNCTURE: CPT

## 2020-08-12 PROCEDURE — 6370000000 HC RX 637 (ALT 250 FOR IP): Performed by: HOSPITALIST

## 2020-08-12 PROCEDURE — 80048 BASIC METABOLIC PNL TOTAL CA: CPT

## 2020-08-12 RX ORDER — LEVOTHYROXINE SODIUM 0.1 MG/1
200 TABLET ORAL DAILY
Status: DISCONTINUED | OUTPATIENT
Start: 2020-08-13 | End: 2020-08-12

## 2020-08-12 RX ORDER — LEVOTHYROXINE SODIUM 0.15 MG/1
150 TABLET ORAL DAILY
Status: DISCONTINUED | OUTPATIENT
Start: 2020-08-13 | End: 2020-08-12

## 2020-08-12 RX ADMIN — TROSPIUM CHLORIDE 20 MG: 20 TABLET, FILM COATED ORAL at 21:29

## 2020-08-12 RX ADMIN — ATENOLOL 50 MG: 50 TABLET ORAL at 09:53

## 2020-08-12 RX ADMIN — ATENOLOL 50 MG: 50 TABLET ORAL at 21:29

## 2020-08-12 RX ADMIN — ATORVASTATIN CALCIUM 20 MG: 20 TABLET, FILM COATED ORAL at 21:29

## 2020-08-12 RX ADMIN — Medication 1 TABLET: at 09:53

## 2020-08-12 RX ADMIN — AMLODIPINE BESYLATE 10 MG: 10 TABLET ORAL at 09:53

## 2020-08-12 RX ADMIN — PANTOPRAZOLE SODIUM 40 MG: 40 TABLET, DELAYED RELEASE ORAL at 06:39

## 2020-08-12 RX ADMIN — HYDRALAZINE HYDROCHLORIDE 10 MG: 10 TABLET, FILM COATED ORAL at 21:29

## 2020-08-12 RX ADMIN — SULFAMETHOXAZOLE AND TRIMETHOPRIM 1 TABLET: 800; 160 TABLET ORAL at 21:29

## 2020-08-12 RX ADMIN — INSULIN LISPRO 6 UNITS: 100 INJECTION, SOLUTION INTRAVENOUS; SUBCUTANEOUS at 19:11

## 2020-08-12 RX ADMIN — LISINOPRIL 20 MG: 20 TABLET ORAL at 09:54

## 2020-08-12 RX ADMIN — ALOGLIPTIN 12.5 MG: 12.5 TABLET, FILM COATED ORAL at 09:54

## 2020-08-12 RX ADMIN — HYDRALAZINE HYDROCHLORIDE 10 MG: 10 TABLET, FILM COATED ORAL at 10:02

## 2020-08-12 RX ADMIN — LEVOTHYROXINE SODIUM 300 MCG: 0.15 TABLET ORAL at 06:39

## 2020-08-12 RX ADMIN — INSULIN LISPRO 4 UNITS: 100 INJECTION, SOLUTION INTRAVENOUS; SUBCUTANEOUS at 21:29

## 2020-08-12 RX ADMIN — ASPIRIN 81 MG: 81 TABLET, COATED ORAL at 09:53

## 2020-08-12 RX ADMIN — DULOXETINE 60 MG: 60 CAPSULE, DELAYED RELEASE ORAL at 09:54

## 2020-08-12 RX ADMIN — SULFAMETHOXAZOLE AND TRIMETHOPRIM 1 TABLET: 800; 160 TABLET ORAL at 09:53

## 2020-08-12 RX ADMIN — INSULIN LISPRO 10 UNITS: 100 INJECTION, SOLUTION INTRAVENOUS; SUBCUTANEOUS at 12:39

## 2020-08-12 RX ADMIN — INSULIN LISPRO 4 UNITS: 100 INJECTION, SOLUTION INTRAVENOUS; SUBCUTANEOUS at 09:55

## 2020-08-12 ASSESSMENT — PAIN SCALES - WONG BAKER
WONGBAKER_NUMERICALRESPONSE: 0

## 2020-08-12 ASSESSMENT — PAIN SCALES - GENERAL
PAINLEVEL_OUTOF10: 0

## 2020-08-12 NOTE — PROGRESS NOTES
SWING BED WEEKLY TEAM SHEET                            Shelby Aguilar              8/4/2020             WEEK# 5     PHYSICAL THERAPY                            Ambulation: Distance:120'      Device: RW    Assist: CGA     Wt bearing: full                             Stairs:  Amount/size: 5 7\"      Assist:  CGA      Device:  HR on L    Pain:  denies              Transfers:   Sit to stand:  supervision  Stand to sit: supervision                         Bed Mobility:supervision                             Strength/ROM: reduced RLE strength     Balance:     Static sitting                 []? P      []? F-   []? F    []? F+    [x]? G               Dynamic Sitting           []? P     []? F-   []? F    []? F+    [x]? G                     Static standing            []? P      []? F-   []? F    [x]? F+    []? G   []? With  []? Without device Dynamic standing       []? P     []? F-   []? F    [x]? F+    []? G   []? With  []? Without device  (P= poor   F= fair   G= good)     Issues to be resolved before discharge address strength and stairs  Goals of previous week  []? 1st team   []? met   [x]?  partially met    []? not met                                          Why the goals were not met weakness, impaired motor control  Goals:   Short term goal 1: P will perform bed mobility with lisa  Short term goal 2: P will perform transfer to LRAD with Lisa  Short term goal 3: P will ambulate 48' with RW Lisa  Short term goal 4: P will ascend/descend 5 steps with KILEY HRs and Lisa.

## 2020-08-12 NOTE — PROGRESS NOTES
Hospitalist Progress Note       Kateryna Vasquez M.D.  8/12/2020 8:12 AM  Admit Date: 7/15/2020    PCP: TOÑITO Donaldson     Assessment and Plan:   1. Weakness and debility  - ptot     2. DM2 poorly controlled, with hyperglycemia and A1c of 12  Episodes of hypoglycemia in the evening  - medium dose ssi   - fixed dose 24 units TID w meals is discontinued  Because patient's bg fluctuates so widely: will calculate need based on use from sliding scale  - poct glucose achs  - prn hypoglycemia protocol  - 50 units lantus at night dc has not gotten this for last two nights dt low bg  - carb controlled diet     3. E.coli UTI  -completed abx     4. Obesity with bmi 32.2  - diet and lifestyle modification     5. Hypothyroidism  tsh 22  Started on 300 mcg thyroxine, have reduced dose to 175 mcg po daily  - 7/16/20: patient admits she stopped taking her medication including her thryoxine because she felt poorly. This might explain her fatigue and weakness, apathy and flat affect     6 urinary incontinence  - started on detrol  - bladder scan     7. Memory impairment  - needs outpatient assessment with PCP    Patient Active Problem List:     SIRS (systemic inflammatory response syndrome) (HCC)     Type 2 diabetes mellitus with foot ulcer, with long-term current use of insulin (HCC)     Ulcer of right foot with fat layer exposed ([address]     Hallux valgus (acquired), right foot     Essential hypertension     Chronic pain syndrome     Infective urethritis     Chest pain     Hypertension     Type 2 diabetes mellitus (HCC)     GERD (gastroesophageal reflux disease)     Nausea and vomiting     Thyroid disease     Weakness      Subjective:     No chief complaint on file. F/U:  Interval History: felt a little dizzy, no heart racing; at time she was dizzy was sitting, feels worried about being home alone tomorrow    Objective:        Intake/Output Summary (Last 24 hours) at 8/12/2020 3603  Last data filed at 8/11/2020

## 2020-08-12 NOTE — PROGRESS NOTES
SWING BED WEEKLY TEAM SHEET      WEEK#     4  NUTRITION   Diet:       Carb Control, supplement if eats less than 50% of meals                                      TF:      no                          TPN:   no  Appropriate/Adequate [X] yes [ ] no               Meal intakes: %     Weight: 199#                BMI:      32                                    Significant Change: no  Recommendations: no new recommendations

## 2020-08-12 NOTE — CARE COORDINATION
SWING BED WEEKLY TEAM SHEET     Judy Angel   8/12/2020 WEEK # 4    Care Management    Issues to be resolved before discharge: Increased strength, balance, and mobility      Family Education: Patient/staff to [facility]    Discharge Plan: Home with [facility]   Patient/Family Adjustment: N/A     Goals of previous week:   [] 1st team   [] met   [] partially met    [x] not met             Why goals were not met: Continued weakness and difficulty with steps     Skilled Level of Care Remains   [x] yes   [] no    Estimated length of stay: Anticipated discharge 8/13/2020  Patient/Family Concerns/input: None at this time    Patient/Family  Signature _________________  8/12/2020       Care Management Signature:  Yanelis Adamson RN

## 2020-08-12 NOTE — PROGRESS NOTES
Occupational Therapy    Occupational Therapy Treatment Note  Name: Cinthya Mcpherson MRN: [id number] :   1945   Date:  2020   Admission Date: 7/15/2020 Room:  010010-01   Restrictions/Precautions:  Restrictions/Precautions  Restrictions/Precautions: Fall Risk, General Precautions  Required Braces or Orthoses?: No  Communication with other providers:   Cleared for treatment by RN. Subjective:  Patient states: \"My daughters are coming to stay with me tomorrow\"  Pain:   Location, Type, Intensity (0/10 to 10/10):  0/10  Objective:    Observation: Pt alert and oriented. Treatment, including education:  Therapeutic Exercise:  Cues were given for technique, safety, recruitment, and rationale. Cues were verbal and/or tactile. For BUE strengthening for ADL & functional mobility Indep pt performed BUE strengthening HEP with 23 bar shoulder flexion/extension x20 reps while seated   Therapeutic Activity Training:   Therapeutic activity training was instructed today. Cues were given for safety, sequence, UE/LE placement, awareness, and balance. Activities performed today included functional mobility training, sit-stand, balance training. Pt completes dynamic balance activity reaching in various planes with RW for support. Pt demos few instances of LOB but able to correct with CGA. Pt stands to perform balance activities 5 min x 2 with one seated rest break. Pt orthostatics taken while seated (164/68) and in standing (147/67) due to increased dizziness with position change. Pt nurse aware. Transfers  Sit <> stand from chair:SBA progressing to CGA at end of session due to increased dizziness    Discussed  supervision at home initially due to pt increased dizziness with position changes and educated on fall risk.  Pt reports understanding     Safety Measures: Gait belt used, Left in chair, Pull/Bed Alarm activated and call light left in reach  Assessment / Impression:    Patient's tolerance of treatment: fair   Adverse Reaction: dizziness  Significant change in status and impact:  None  Barriers to improvement:  Dizziness, weakness    Plan for Next Session:    Continue with POC. Time in:  1547  Time out:  1612  Timed treatment minutes:  25  Total treatment time: 25  Electronically signed by:    JAMES Mccarty/DEBI [id number]    8/12/2020, 4:30 PM    Previously filed values:  Patient Goals   Patient goals : To be able to walk and to get stronger  Short term goals  Time Frame for Short term goals: until discharge  Short term goal 1: Pt will be min A for UB/LB bathing using good energy conservation techniques and with min vc. Short term goal 2: Pt will be SBA for UB/LB dressing using good energy conservation techniques and AE PRN and with min vc. Short term goal 3: Pt will be supervision for all functional transfers in prep for ADL tasks.   Short term goal 4: Pt will stand and complete simulated home management task with walker for >5 min with SBA no instances of LOB, no seated rest breaks

## 2020-08-12 NOTE — PROGRESS NOTES
Patient in shower room complaining of dizziness, Dr Daniele Modi present, taken back to room in wheelchair, vitals 163/72, pulse 80, Spo2 94% on room air, respirations 16, patient able to ambulate using walker from chair in room to bathroom without difficulty continues to complain of dizziness, assisted getting dressed, sitting in chair, call light in reach.

## 2020-08-12 NOTE — PROGRESS NOTES
Physical Therapy    Physical Therapy Treatment Note  Name: Jessica Moore MRN: [id number] :   1945   Date:  2020   Admission Date: 7/15/2020 Room:  010010-01   Restrictions/Precautions:  Restrictions/Precautions  Restrictions/Precautions: Fall Risk, General Precautions  Required Braces or Orthoses?: No       Communication with other providers:  Spoke to nursing and partial co-treat with OT. Subjective:  Patient states:  \"Sorry I was on the phone\"  Pain:   Location, Type, Intensity (0/10 to 10/10):  denies  Objective:    Observation:  P walking down hallway to therapy gym with RW. P reports she was coming to therapy as we had attempted to see her earlier but she was on the phone. PT spoke and educated p that she should not be walking independently as she is a fall risk and continues to report dizziness when standing. Nursing made aware of situation and p reports understanding. Chair alarm utilized at end of session and instructions given to use call light. Treatment, including education/measures:  Gait: 61' with supervision at start of session, 8' with CGA at end of session 2/2 fatigue. P demos decreased angi and wide AMIRA with fatigue  Stairs: ascend/descend 10 reps with HR on R ascent, step to pattern and CGA. P performs with ease until last 2 steps where she demos R knee decreased control. P able to complete flight and then sits in chair. Balance: standing balance exercise reaching within AMIRA for rings x 5 min  Functional mobility: orthostatics taken and p 164/68 in sitting and 147/67 in standing, nursing aware  P left sitting up in chair with chair alarm on and call light within reach  Assessment / Impression:    P with significant improvement on stairs with increase from 5 to 10 stairs with CGA. P limited by fatigue and dizziness.  Recommend skilled PT to address deficits and maximize functional potential.   Patient's tolerance of treatment:  fair   Adverse Reaction: dizziness  Significant change in status and impact:  Improved steps  Barriers to improvement:  Dizziness, weakness  Plan for Next Session:    Continue working on balance and strength  Time in:  1535  Time out:  1614  Timed treatment minutes:  39  Total treatment time:  39    Previously filed items:  Social/Functional History  Lives With: Spouse  Type of Home: House  Home Layout: One level, Laundry in basement  Home Access: Stairs to enter with rails  Entrance Stairs - Number of Steps: 5  Entrance Stairs - Rails: Both  Bathroom Shower/Tub: Tub/Shower unit  Bathroom Toilet: Standard  Bathroom Equipment: Grab bars in shower, Shower chair  Home Equipment: 4 wheeled walker, Cane  ADL Assistance: Independent  Homemaking Assistance: Independent  Homemaking Responsibilities: Yes  Ambulation Assistance: Needs assistance  Transfer Assistance: Needs assistance  Active : No  Leisure & Hobbies: reading  Short term goals  Time Frame for Short term goals: 1 week  Short term goal 1: P will perform bed mobility with lisa  Short term goal 2: P will perform transfer to LRAD with Lisa  Short term goal 3: P will ambulate 48' with RW Lisa  Short term goal 4: P will ascend/descend 5 steps with KILEY HRs and Lisa.        Electronically signed by:    Oleg Joy, PT  8/12/2020, 4:17 PM

## 2020-08-13 VITALS
BODY MASS INDEX: 31.45 KG/M2 | TEMPERATURE: 97.4 F | SYSTOLIC BLOOD PRESSURE: 124 MMHG | HEIGHT: 66 IN | WEIGHT: 195.7 LBS | RESPIRATION RATE: 16 BRPM | DIASTOLIC BLOOD PRESSURE: 58 MMHG | HEART RATE: 72 BPM | OXYGEN SATURATION: 95 %

## 2020-08-13 LAB
GLUCOSE BLD-MCNC: 314 MG/DL (ref 70–99)
GLUCOSE BLD-MCNC: 338 MG/DL (ref 70–99)
GLUCOSE BLD-MCNC: 397 MG/DL (ref 70–99)

## 2020-08-13 PROCEDURE — 97535 SELF CARE MNGMENT TRAINING: CPT

## 2020-08-13 PROCEDURE — 97530 THERAPEUTIC ACTIVITIES: CPT

## 2020-08-13 PROCEDURE — 6370000000 HC RX 637 (ALT 250 FOR IP): Performed by: INTERNAL MEDICINE

## 2020-08-13 PROCEDURE — 6370000000 HC RX 637 (ALT 250 FOR IP): Performed by: HOSPITALIST

## 2020-08-13 PROCEDURE — 97116 GAIT TRAINING THERAPY: CPT

## 2020-08-13 PROCEDURE — 82962 GLUCOSE BLOOD TEST: CPT

## 2020-08-13 RX ORDER — HYDROXYZINE HYDROCHLORIDE 10 MG/1
10 TABLET, FILM COATED ORAL 3 TIMES DAILY PRN
Status: DISCONTINUED | OUTPATIENT
Start: 2020-08-13 | End: 2020-08-13 | Stop reason: HOSPADM

## 2020-08-13 RX ORDER — ATENOLOL 50 MG/1
50 TABLET ORAL 2 TIMES DAILY
Qty: 30 TABLET | Refills: 3 | Status: SHIPPED | OUTPATIENT
Start: 2020-08-13

## 2020-08-13 RX ORDER — HYDRALAZINE HYDROCHLORIDE 10 MG/1
10 TABLET, FILM COATED ORAL 2 TIMES DAILY
Qty: 90 TABLET | Refills: 1 | Status: ON HOLD | OUTPATIENT
Start: 2020-08-13 | End: 2021-10-08 | Stop reason: HOSPADM

## 2020-08-13 RX ORDER — HYDROXYZINE HYDROCHLORIDE 10 MG/1
10 TABLET, FILM COATED ORAL 3 TIMES DAILY PRN
Qty: 10 TABLET | Refills: 0 | Status: SHIPPED | OUTPATIENT
Start: 2020-08-13 | End: 2020-08-23

## 2020-08-13 RX ORDER — BLOOD PRESSURE TEST KIT
1 KIT MISCELLANEOUS 2 TIMES DAILY
Qty: 1 KIT | Refills: 0 | Status: SHIPPED | OUTPATIENT
Start: 2020-08-13 | End: 2021-10-06

## 2020-08-13 RX ORDER — ALOGLIPTIN 12.5 MG/1
12.5 TABLET, FILM COATED ORAL DAILY
Qty: 60 TABLET | Refills: 0 | Status: SHIPPED | OUTPATIENT
Start: 2020-08-13 | End: 2020-09-17

## 2020-08-13 RX ORDER — LISINOPRIL 20 MG/1
20 TABLET ORAL DAILY
Qty: 30 TABLET | Refills: 3 | Status: SHIPPED | OUTPATIENT
Start: 2020-08-13 | End: 2020-08-13 | Stop reason: HOSPADM

## 2020-08-13 RX ORDER — AMLODIPINE BESYLATE 10 MG/1
10 TABLET ORAL DAILY
Qty: 30 TABLET | Refills: 3 | Status: SHIPPED | OUTPATIENT
Start: 2020-08-13

## 2020-08-13 RX ORDER — LEVOTHYROXINE SODIUM 0.1 MG/1
100 TABLET ORAL DAILY
Status: DISCONTINUED | OUTPATIENT
Start: 2020-08-14 | End: 2020-08-13 | Stop reason: HOSPADM

## 2020-08-13 RX ADMIN — PANTOPRAZOLE SODIUM 40 MG: 40 TABLET, DELAYED RELEASE ORAL at 05:57

## 2020-08-13 RX ADMIN — DULOXETINE 60 MG: 60 CAPSULE, DELAYED RELEASE ORAL at 09:49

## 2020-08-13 RX ADMIN — HYDROXYZINE HYDROCHLORIDE 10 MG: 10 TABLET ORAL at 09:57

## 2020-08-13 RX ADMIN — Medication 1 TABLET: at 09:49

## 2020-08-13 RX ADMIN — LEVOTHYROXINE SODIUM 175 MCG: 0.15 TABLET ORAL at 05:57

## 2020-08-13 RX ADMIN — INSULIN GLARGINE 5 UNITS: 100 INJECTION, SOLUTION SUBCUTANEOUS at 10:03

## 2020-08-13 RX ADMIN — INSULIN LISPRO 8 UNITS: 100 INJECTION, SOLUTION INTRAVENOUS; SUBCUTANEOUS at 08:30

## 2020-08-13 RX ADMIN — SULFAMETHOXAZOLE AND TRIMETHOPRIM 1 TABLET: 800; 160 TABLET ORAL at 09:50

## 2020-08-13 RX ADMIN — INSULIN LISPRO 10 UNITS: 100 INJECTION, SOLUTION INTRAVENOUS; SUBCUTANEOUS at 13:07

## 2020-08-13 RX ADMIN — ALOGLIPTIN 12.5 MG: 12.5 TABLET, FILM COATED ORAL at 09:50

## 2020-08-13 RX ADMIN — ASPIRIN 81 MG: 81 TABLET, COATED ORAL at 09:49

## 2020-08-13 ASSESSMENT — PAIN SCALES - WONG BAKER
WONGBAKER_NUMERICALRESPONSE: 0

## 2020-08-13 ASSESSMENT — PAIN SCALES - GENERAL
PAINLEVEL_OUTOF10: 0

## 2020-08-13 NOTE — CARE COORDINATION
NURSING: Please fax the discharge instructions to [facility] (fax #: [phone]) upon discharge.

## 2020-08-13 NOTE — PROGRESS NOTES
Physical Therapy    Physical Therapy Treatment Note  Name: Shelby Aguilar MRN: [id number] :   1945   Date:  2020   Admission Date: 7/15/2020 Room:  [facility]01   Restrictions/Precautions:  Restrictions/Precautions  Restrictions/Precautions: Fall Risk, General Precautions  Required Braces or Orthoses?: No       Communication with other providers:  Co-treat with OT  Subjective:  Patient states:  \"I'm a little dizzy\"  Pain:   Location, Type, Intensity (0/10 to 10/10):  denies  Objective:    Observation:  P sitting up in chair  Treatment, including education/measures:  Functional mobility: Sit <> stand with CGA to stand and min A for sitting. P with max cues for safety when returning to sit. P ambulates x 5' forward then backward with RW requiring min A for balance. P with tendency to lose balance posteriorly. P ambulates x 80' x 2 bouts with RW and CGA. P ascends/descends ramp with CGA with RW. P ascends/descends 6\" step x 2 reps with CGA. P educated on safety with transfers and performing activities slower to improve control. P left sitting up in chair with chair alarm on and call light within reach. Assessment / Impression:    P continues to benefit from therapy to address deficits in balance and gait.    Patient's tolerance of treatment:  good   Adverse Reaction: dizziness  Significant change in status and impact:  Improved steps  Barriers to improvement:  Intermittent dizziness  Plan for Next Session:    Gait, balance  Time in:  1100  Time out:  1131  Timed treatment minutes:  31  Total treatment time:  31    Previously filed items:  Social/Functional History  Lives With: Spouse  Type of Home: House  Home Layout: One level, Laundry in basement  Home Access: Stairs to enter with rails  Entrance Stairs - Number of Steps: 5  Entrance Stairs - Rails: Both  Bathroom Shower/Tub: Tub/Shower unit  Bathroom Toilet: Standard  Bathroom Equipment: Grab bars in shower, Shower chair  Home Equipment: 4 wheeled walker, Cane  ADL Assistance: Independent  Homemaking Assistance: Independent  Homemaking Responsibilities: Yes  Ambulation Assistance: Needs assistance  Transfer Assistance: Needs assistance  Active : No  Leisure & Hobbies: reading  Short term goals  Time Frame for Short term goals: 1 week  Short term goal 1: P will perform bed mobility with lisa  Short term goal 2: P will perform transfer to LRAD with Lisa  Short term goal 3: P will ambulate 48' with RW Lisa  Short term goal 4: P will ascend/descend 5 steps with KILEY HRs and Lisa.        Electronically signed by:    Shadia Vides, PT  8/13/2020, 11:50 AM

## 2020-08-13 NOTE — CARE COORDINATION
BLANCHE notified Citlali Oliver RN that the patient's daughter will arrive at 1PM to pick the patient up.      9:49 AM  CM faxed the physician's discharge summary to [facility]    1:44 PM  BLANCHE faxed the discharge instructions to [facility]

## 2020-08-13 NOTE — PROGRESS NOTES
Occupational Therapy    Occupational Therapy Treatment Note  Name: Melony Meyer MRN: [id number] :   1945   Date:  2020   Admission Date: 7/15/2020 Room:  [address] [place] [address]   Restrictions/Precautions:  Restrictions/Precautions  Restrictions/Precautions: Fall Risk, General Precautions  Required Braces or Orthoses?: No   Communication with other providers:    Subjective:  Patient states:  I was dizzy yesterday  Pain:   Location, Type, Intensity (0/10 to 10/10): 0/10  Objective:    Observation: Pt was supine in bed, HOB elevated  Objective Measures: Pt was seen for adls, transfers  Treatment, including education:  Pt was min A to scoot on the bed to EOB and to sit up straight. Pt was CGA sit to stand and to walk with rolling walker to the toilet. Pt was Min A for toileting, to make sure she is clean. Pt was sBA + mod vcs to stay in the walker and to take bigger steps. Pt was set up with a basin. Pt was SBA for UB bathing and bathing down to her ankles, D for feet. Pt donned her shirt SBA, pull up and pants CGA to put over feet, min A to pull over her hips. Pt was Min A to don shoes. When pt stood up and took 2 steps forward, she initially stated that she felt a little dizzy and asked to sit down. BP was 160/76  Pt ambulated 80 ft to the therapy room CGA. Pt was anxious about walking but was able to walk there . She was able to ascend/descend a step forward and sideways. She ambulated 80 ft back to her room CGA. 1 slight posterior LOB. Assessment / Impression:        Patient's tolerance of treatment: good   Adverse Reaction:anxiety  Significant change in status and impact:  no  Barriers to improvement:  anxiety  Plan for Next Session:    adls or transfers but pt may be discharged  Time in:  1030  Time out: 1130  Timed treatment minutes:  60  Total treatment time:  60  Electronically signed by:    Aditi Silvestre,   2020, 12:13 PM    Previously filed values:  Patient Goals   Patient goals :  To be able to walk and to get stronger  Short term goals  Time Frame for Short term goals: until discharge  Short term goal 1: Pt will be min A for UB/LB bathing using good energy conservation techniques and with min vc. Short term goal 2: Pt will be SBA for UB/LB dressing using good energy conservation techniques and AE PRN and with min vc. Short term goal 3: Pt will be supervision for all functional transfers in prep for ADL tasks.   Short term goal 4: Pt will stand and complete simulated home management task with walker for >5 min with SBA no instances of LOB, no seated rest breaks

## 2020-08-13 NOTE — DISCHARGE SUMMARY
Fabiana Peters [birth year] [id number]  PCP:  TOÑITO Davis    Admit date: 7/15/2020  Admitting Physician: Jose Alfredo Rice MD    Discharge date: 8/13/2020 Discharge Physician: Jose Alfredo Rice MD      Reason for admission: No chief complaint on file. Present on Admission:   Weakness       Discharge Diagnoses Include:  1. Weakness and debility  - ptot     2. DM2 poorly controlled, with hyperglycemia and A1c of 12  Episodes of hypoglycemia in the evening  - medium dose ssi   - fixed dose 24 units TID w meals is discontinued  Because patient's bg fluctuates so widely:  - 5 units lantus bid  - poct glucose achs  - prn hypoglycemia protocol  - 50 units lantus at night dc has not gotten this for last two nights dt low bg  - carb controlled diet     3. E.coli UTI  -completed abx     4. Obesity with bmi 32.2  - diet and lifestyle modification     5. Hypothyroidism  tsh 22  Started on 300 mcg thyroxine, have reduced dose to 175 mcg po daily  - 7/16/20: patient admits she stopped taking her medication including her thryoxine because she felt poorly. This might explain her fatigue and weakness, apathy and flat affect     6. Memory impairment  - needs outpatient assessment with PCP    Hospital Course[de-identified]   Patient has completed her rehab stay and will be discharged home  Has concerns about jitteriness and bg is wnl, as well as vitals; recommend follow up with PCP and will prescribe atarax for now. Dt wide fluctuation in bg and bp; parameters for bp placed on medication Rx,as well as prescribed a home bp cuff. Have instructed patient to record am + pm bg as well as mealtime and take with to MD appointment.        Pt was personally examined by me on the day of discharge with the following findings: jitteriness  Vitals:    08/13/20 0805   BP: (!) 124/58   Pulse: 72   Resp: 16   Temp: 97.4 °F (36.3 °C)   SpO2: 95%     Gen: ao nad  Heent: ncat  Lungs: ctabl  Car: nl s1s2  Abd: soft ntnd  Ext: rom wnl  Skin: levothyroxine (SYNTHROID) 300 MCG tablet  Take 300 mcg by mouth Daily              Multiple Vitamins-Minerals (TRUEPLUS DIABETIC MULTIVITAMIN) TABS  Take 1 tablet by mouth daily. ondansetron (ZOFRAN ODT) 4 MG disintegrating tablet  Take 1 tablet by mouth every 8 hours as needed for Nausea             pantoprazole (PROTONIX) 40 MG tablet  Take 40 mg by mouth daily              vitamin D (CHOLECALCIFEROL) 1000 UNIT TABS tablet  Take 1,000 Units by mouth daily                  Code Status: Full Code     Consults:   IP CONSULT TO CASE MANAGEMENT  IP CONSULT TO HOME CARE NEEDS    Diet: diabetic diet    Activity: activity as tolerated   Work:    Discharged Condition: fair    Prognosis: Fair    Disposition: home      Follow-up with   1. PCP within   5-7    Days         Discharge Physician Signed: Leisa Yin M.D. The patient was seen and examined on day of discharge and this discharge summary is in conjunction with any daily progress note from day of discharge.   Time spent on discharge in the examination, evaluation, counseling and review of medications and discharge plan: 22 minutes

## 2020-08-13 NOTE — PLAN OF CARE
Problem: Falls - Risk of:  Goal: Will remain free from falls  Description: Will remain free from falls  8/13/2020 0021 by Sly Gilliam LPN  Outcome: Ongoing  8/12/2020 1606 by Charles Paulson RN  Outcome: Ongoing  Goal: Absence of physical injury  Description: Absence of physical injury  8/13/2020 0021 by Sly Gilliam LPN  Outcome: Ongoing  8/12/2020 1606 by Charles Paulson RN  Outcome: Ongoing     Problem: Skin Integrity:  Goal: Will show no infection signs and symptoms  Description: Will show no infection signs and symptoms  8/13/2020 0021 by Sly Gilliam LPN  Outcome: Ongoing  8/12/2020 1606 by Charles Paulson RN  Outcome: Ongoing  Goal: Absence of new skin breakdown  Description: Absence of new skin breakdown  8/13/2020 0021 by Sly Gilliam LPN  Outcome: Ongoing  8/12/2020 1606 by Charles Paulson RN  Outcome: Ongoing  Goal: Risk for impaired skin integrity will decrease  Description: Risk for impaired skin integrity will decrease  8/13/2020 0021 by Sly Gilliam LPN  Outcome: Ongoing  8/12/2020 1606 by Charles Paulson RN  Outcome: Ongoing     Problem:  Activity:  Goal: Risk for activity intolerance will decrease  Description: Risk for activity intolerance will decrease  8/13/2020 0021 by Sly Gilliam LPN  Outcome: Ongoing  8/12/2020 1606 by Charles Paulson RN  Outcome: Ongoing     Problem: Coping:  Goal: Ability to adjust to condition or change in health will improve  Description: Ability to adjust to condition or change in health will improve  8/13/2020 0021 by Sly Gilliam LPN  Outcome: Ongoing  8/12/2020 1606 by Charles Paulson RN  Outcome: Ongoing     Problem: Fluid Volume:  Goal: Ability to maintain a balanced intake and output will improve  Description: Ability to maintain a balanced intake and output will improve  8/13/2020 0021 by Sly Gilliam LPN  Outcome: Ongoing  8/12/2020 1606 by Charles Paulson RN  Outcome: Ongoing     Problem: Health Behavior:  Goal: Ability to identify and utilize available resources and services will improve  Description: Ability to identify and utilize available resources and services will improve  8/13/2020 0021 by Carmelita Salazar LPN  Outcome: Ongoing  8/12/2020 1606 by Bienvenido Hammond RN  Outcome: Ongoing  Goal: Ability to manage health-related needs will improve  Description: Ability to manage health-related needs will improve  8/13/2020 0021 by Carmelita Salazar LPN  Outcome: Ongoing  8/12/2020 1606 by Bienvenido Hammond RN  Outcome: Ongoing     Problem: Metabolic:  Goal: Ability to maintain appropriate glucose levels will improve  Description: Ability to maintain appropriate glucose levels will improve  8/13/2020 0021 by Carmelita Salazar LPN  Outcome: Ongoing  8/12/2020 1606 by Bienvenido Hammond RN  Outcome: Ongoing     Problem: Nutritional:  Goal: Maintenance of adequate nutrition will improve  Description: Maintenance of adequate nutrition will improve  8/13/2020 0021 by Carmelita Salazar LPN  Outcome: Ongoing  8/12/2020 1606 by Bienvenido Hammond RN  Outcome: Ongoing  Goal: Progress toward achieving an optimal weight will improve  Description: Progress toward achieving an optimal weight will improve  8/13/2020 0021 by Carmelita Salazar LPN  Outcome: Ongoing  8/12/2020 1606 by Bienvenido Hammond RN  Outcome: Ongoing     Problem: Physical Regulation:  Goal: Complications related to the disease process, condition or treatment will be avoided or minimized  Description: Complications related to the disease process, condition or treatment will be avoided or minimized  8/13/2020 0021 by Carmelita Salazar LPN  Outcome: Ongoing  8/12/2020 1606 by Bienvenido Hammond RN  Outcome: Ongoing  Goal: Diagnostic test results will improve  Description: Diagnostic test results will improve  8/13/2020 0021 by Carmelita Salazar LPN  Outcome: Ongoing  8/12/2020 1606 by Bienvenido Hammond RN  Outcome: Ongoing     Problem: Tissue Perfusion:  Goal: Adequacy of tissue perfusion will improve  Description: Adequacy of tissue perfusion will improve  8/13/2020 0021 by Lor Garcia LPN  Outcome: Ongoing  8/12/2020 1606 by Dionicio Mann RN  Outcome: Ongoing     Problem: Mobility - Impaired:  Goal: Mobility will improve  Description: Mobility will improve  8/13/2020 0021 by Lor Garcia LPN  Outcome: Ongoing  8/12/2020 1606 by Dionicio Mann RN  Outcome: Ongoing     Problem: Discharge Planning:  Goal: Discharged to appropriate level of care  Description: Discharged to appropriate level of care  8/13/2020 0021 by Lor Garcia LPN  Outcome: Ongoing  8/12/2020 1606 by Dionicio Mann RN  Outcome: Ongoing

## 2020-08-13 NOTE — PROGRESS NOTES
Discharge instructions with prescriptions given to patient and two daughters, medications discussed at length, all questions answered, verbalized understanding, discussed home care needs with suggestions for activities, skin assessment complete with Aiden Mart RN, no change since am assessment red under breasts and groin folds, patient taken to car in wheelchair with all belongings by Carrie KING to be driven home by daughters.

## 2020-08-13 NOTE — PROGRESS NOTES
At  this nurse went into the pt's room to administer her morning medications. Pt was asleep and in no distress. This nurse explained to the pt what medications she was being offered and what they were for. After taking the routine morning medications, the pt rolled over and went back to sleep. Pt then put her call light on at 0620 to use the restroom. Pt sat up in the bed, then stated that she \"felt jittery and was very shaky\"and began questioning what medications this nurse administered to her. This nurse then explained that they were the same medications that she had taken her entire stay here, every morning. Pt was able to walk to and from the commode with no distress and no visible \"shaking or jitteriness\" noted. Once safely back in the bed, the pt then stated \"I can't go home like this, until they figure out what is wrong with me. \" This nurse then left the room to obtain the glucometer to check her sugar and returned to find her resting peacefully in her bed with her eyes closed and in no apparent distress. Pt was then woken up to obtain blood sugar. Pt did not appear to have any \"shaking or distress\" while this nurse was checking her blood sugar. No tremors were felt in her fingertips either.

## 2020-08-14 LAB
EKG ATRIAL RATE: 80 BPM
EKG DIAGNOSIS: NORMAL
EKG P AXIS: 38 DEGREES
EKG P-R INTERVAL: 210 MS
EKG Q-T INTERVAL: 422 MS
EKG QRS DURATION: 98 MS
EKG QTC CALCULATION (BAZETT): 486 MS
EKG R AXIS: 8 DEGREES
EKG T AXIS: 111 DEGREES
EKG VENTRICULAR RATE: 80 BPM

## 2020-08-21 ENCOUNTER — HOSPITAL ENCOUNTER (OUTPATIENT)
Age: 75
Setting detail: SPECIMEN
Discharge: HOME | End: 2020-08-21
Payer: MEDICARE

## 2020-08-21 LAB
BACTERIA: ABNORMAL /HPF
BILIRUBIN URINE: NEGATIVE MG/DL
BLOOD, URINE: ABNORMAL
CAST TYPE: NEGATIVE /HPF
CLARITY: ABNORMAL
COLOR: YELLOW
CRYSTAL TYPE: NEGATIVE /HPF
EPITHELIAL CELLS, UA: ABNORMAL /HPF
GLUCOSE, URINE: >1000 MG/DL
KETONES, URINE: NEGATIVE MG/DL
LEUKOCYTE ESTERASE, URINE: ABNORMAL
NITRITE URINE, QUANTITATIVE: NEGATIVE
PH, URINE: 6 (ref 5–8)
PROTEIN UA: 100 MG/DL
RBC URINE: ABNORMAL /HPF (ref 0–6)
SPECIFIC GRAVITY UA: 1.02 (ref 1–1.03)
UROBILINOGEN, URINE: 1 MG/DL (ref 0.2–1)
WBC UA: ABNORMAL /HPF (ref 0–5)

## 2020-08-21 PROCEDURE — 87186 SC STD MICRODIL/AGAR DIL: CPT

## 2020-08-21 PROCEDURE — 87077 CULTURE AEROBIC IDENTIFY: CPT

## 2020-08-21 PROCEDURE — 81001 URINALYSIS AUTO W/SCOPE: CPT

## 2020-08-21 PROCEDURE — 87086 URINE CULTURE/COLONY COUNT: CPT

## 2020-08-23 LAB
CULTURE: ABNORMAL
CULTURE: ABNORMAL
Lab: ABNORMAL
SPECIMEN: ABNORMAL

## 2020-08-24 ENCOUNTER — HOSPITAL ENCOUNTER (OUTPATIENT)
Age: 75
Discharge: HOME | End: 2020-08-24
Payer: MEDICARE

## 2020-08-24 PROCEDURE — 93005 ELECTROCARDIOGRAM TRACING: CPT | Performed by: PHYSICIAN ASSISTANT

## 2020-08-25 LAB
EKG ATRIAL RATE: 118 BPM
EKG DIAGNOSIS: NORMAL
EKG P AXIS: 23 DEGREES
EKG P-R INTERVAL: 168 MS
EKG Q-T INTERVAL: 332 MS
EKG QRS DURATION: 80 MS
EKG QTC CALCULATION (BAZETT): 465 MS
EKG R AXIS: -6 DEGREES
EKG T AXIS: 60 DEGREES
EKG VENTRICULAR RATE: 118 BPM

## 2020-08-25 PROCEDURE — 93010 ELECTROCARDIOGRAM REPORT: CPT | Performed by: INTERNAL MEDICINE

## 2020-08-27 ENCOUNTER — HOSPITAL ENCOUNTER (OUTPATIENT)
Age: 75
Setting detail: SPECIMEN
Discharge: HOME | End: 2020-08-27
Payer: MEDICARE

## 2020-08-27 LAB — TSH HIGH SENSITIVITY: 0.03 UIU/ML (ref 0.27–4.2)

## 2020-08-27 PROCEDURE — 84443 ASSAY THYROID STIM HORMONE: CPT

## 2020-09-02 ENCOUNTER — HOSPITAL ENCOUNTER (OUTPATIENT)
Age: 75
Setting detail: SPECIMEN
Discharge: HOME | End: 2020-09-02
Payer: MEDICARE

## 2020-09-02 LAB — TSH HIGH SENSITIVITY: 0.02 UIU/ML (ref 0.27–4.2)

## 2020-09-02 PROCEDURE — 84156 ASSAY OF PROTEIN URINE: CPT

## 2020-09-02 PROCEDURE — 84439 ASSAY OF FREE THYROXINE: CPT

## 2020-09-02 PROCEDURE — 82570 ASSAY OF URINE CREATININE: CPT

## 2020-09-02 PROCEDURE — 84443 ASSAY THYROID STIM HORMONE: CPT

## 2020-09-02 PROCEDURE — 82306 VITAMIN D 25 HYDROXY: CPT

## 2020-09-03 LAB
CREATININE URINE: 107 MG/DL (ref 28–217)
PROT/CREAT RATIO, UR: 2.7
T4 FREE: 2.48 NG/DL (ref 0.9–1.8)
URINE TOTAL PROTEIN: 293.7 MG/DL
VITAMIN D 25-HYDROXY: 17.12 NG/ML

## 2020-09-17 ENCOUNTER — INITIAL CONSULT (OUTPATIENT)
Dept: CARDIOLOGY CLINIC | Age: 75
End: 2020-09-17
Payer: MEDICARE

## 2020-09-17 VITALS
DIASTOLIC BLOOD PRESSURE: 72 MMHG | SYSTOLIC BLOOD PRESSURE: 136 MMHG | WEIGHT: 190 LBS | TEMPERATURE: 96.4 F | RESPIRATION RATE: 18 BRPM | HEIGHT: 63 IN | BODY MASS INDEX: 33.66 KG/M2

## 2020-09-17 PROBLEM — E05.90 HYPERTHYROIDISM: Status: ACTIVE | Noted: 2020-09-17

## 2020-09-17 PROBLEM — R00.2 PALPITATIONS: Status: ACTIVE | Noted: 2020-09-17

## 2020-09-17 PROCEDURE — 99204 OFFICE O/P NEW MOD 45 MIN: CPT | Performed by: INTERNAL MEDICINE

## 2020-09-17 PROCEDURE — 93000 ELECTROCARDIOGRAM COMPLETE: CPT | Performed by: INTERNAL MEDICINE

## 2020-09-17 NOTE — PROGRESS NOTES
Rei Abdullahi MD                                    CARDIOLOGY  NOTE          Chief Complaint:    Chief Complaint   Patient presents with    New Patient     Patient here for abnormal EKG and palpitations. She denies chest pain, shortness of breath, dizziness, edema.  Palpitations    Hyperlipidemia        HPI:     Cassandra Zhou is a 69y.o. year old female is referred from her primary care physician for palpitations and tachycardia as noted on EKG. Patient denies any prior history of coronary disease congestive heart failure or cardiac arrhythmias. Patient was recently admitted to hospital for poorly controlled diabetes mellitus and is currently undergoing rehab at home. Patient was noted to have tachycardia by PT/OT. An EKG obtained showed sinus tachycardia. Patient was sent for evaluation. Upon further review of records, recently obtained TSH shows, TSH level of 0.017 which has persistently been low since July 29, 2020 indicative of severe iatrogenic hyperthyroidism. Last week patient Synthroid dose was reduced from 300 mg to 175 mg. On further questioning patient complains of palpitations, heart sweats, fine tremors. Patient denies any diarrhea or anxiety. EKG in office shows sinus tachycardia with poor R wave progression.     Echocardiogram in 2017 shows:  LVEF 60%, mild concentric LVH, impaired relaxation diastolic pattern      Current Outpatient Medications   Medication Sig Dispense Refill    insulin lispro (HUMALOG) 100 UNIT/ML injection vial Inject 12 Units into the skin Daily with lunch 1 vial 3    amLODIPine (NORVASC) 10 MG tablet Take 1 tablet by mouth daily Hold for systolic blood pressure less than or equal to 110 30 tablet 3    atenolol (TENORMIN) 50 MG tablet Take 1 tablet by mouth 2 times daily Hold for systolic blood pressure less than or equal to 110 30 tablet 3    hydrALAZINE (APRESOLINE) 10 MG tablet Take 1 tablet by mouth 2 times daily Hold for systolic blood pressure less than or equal to 110 90 tablet 1    Blood Pressure KIT 1 each by Does not apply route 2 times daily 1 kit 0    DULoxetine (CYMBALTA) 60 MG extended release capsule Take 60 mg by mouth daily   1    pantoprazole (PROTONIX) 40 MG tablet Take 40 mg by mouth daily   3    atorvastatin (LIPITOR) 20 MG tablet Take 20 mg by mouth daily      Multiple Vitamins-Minerals (TRUEPLUS DIABETIC MULTIVITAMIN) TABS Take 1 tablet by mouth daily.  Levothyroxine Sodium 175 MCG CAPS Take 175 mcg by mouth Daily       aspirin 81 MG EC tablet Take 81 mg by mouth daily. No current facility-administered medications for this visit. Allergies:     Ritalin [methylphenidate] and Rocephin [ceftriaxone sodium-lidocaine]    Patient History:    Past Medical History:   Diagnosis Date    Arthritis     Cellulitis of right foot     Chronic kidney disease     Depression     Diabetes mellitus ([address]     Frequent falls     GERD (gastroesophageal reflux disease)     Hallux valgus (acquired), right foot 5/1/2017    Hypertension     Other disorders of kidney and ureter     1 kidney.  Thyroid disease     hypothyroid    Type 2 diabetes mellitus with foot ulcer, with long-term current use of insulin ([facility] [address] 1/18/2012    Ulcer of right foot with fat layer exposed ([facility] [address] 2/6/2013     Past Surgical History:   Procedure Laterality Date    ABDOMEN SURGERY      CYST REMOVAL      from kidney.     DILATATION, ESOPHAGUS      ENDOSCOPY, COLON, DIAGNOSTIC      FOOT SURGERY  12/30/11    had infection  and a biopsy was sent away for analysis    HERNIA REPAIR      HYSTERECTOMY      KIDNEY REMOVAL  1973     Family History   Problem Relation Age of Onset    Arthritis Mother     Cancer Mother     Diabetes Mother     High Blood Pressure Mother     Asthma Father     Heart Disease Father     Diabetes Brother      Social History     Tobacco Use    Smoking status: Never Smoker    Smokeless tobacco: Never Used   Substance Use Topics  Alcohol use: No        Review of Systems:     · Constitutional:  No Fever or Weight Loss   · Eyes: No Decreased Vision  · ENT: No Headaches, Hearing Loss or Vertigo  · Cardiovascular no chest pain or shortness of breath.  +++ Palpitations. No Edema   · Respiratory: No cough or wheezing . No Respiratory distress   · Gastrointestinal: No abdominal pain, appetite loss, blood in stools, constipation, diarrhea or heartburn  · Genitourinary: No dysuria, trouble voiding, or hematuria  · Musculoskeletal:  denies any new  joint aches , or pain   · Integumentary: No rash or pruritis  · Neurological: No TIA or stroke symptoms  · Psychiatric: No anxiety or depression  · Endocrine: No malaise, fatigue or temperature intolerance  · Hematologic/Lymphatic: No bleeding problems, blood clots or swollen lymph nodes  · Allergic/Immunologic: No nasal congestion or hives        Objective:      Physical Exam:    /72   Temp 96.4 °F (35.8 °C)   Resp 18   Ht 5' 3\" (1.6 m)   Wt 190 lb (86.2 kg)   BMI 33.66 kg/m²   Wt Readings from Last 3 Encounters:   09/17/20 190 lb (86.2 kg)   08/13/20 195 lb 11.2 oz (88.8 kg)   07/15/20 195 lb (88.5 kg)     Body mass index is 33.66 kg/m². Vitals:    09/17/20 1346   BP: 136/72   Resp: 18   Temp: 96.4 °F (35.8 °C)        General Appearance and Constitutional: Conversant, Well developed, Well nourished, No acute distress, Non-toxic appearance. HEENT:  Normocephalic, Atraumatic, Bilateral external ears normal, Oropharynx moist, No oral exudates,   Nose normal.   Neck- Normal range of motion, No tenderness, Supple  Eyes:  EOMI, Conjunctiva normal, No discharge. Respiratory:  Normal breath sounds, No respiratory distress, No wheezing, No Rales, No Ronchi. No chest tenderness. Cardiovascular: S1-S2, tachycardia, no added heart sounds, No Mumurs appreciated. No gallops, rubs.  No Pedal Edema   GI:  Bowel sounds normal, Soft, No tenderness,  :  No costovertebral angle tenderness Musculoskeletal:  No gross deformities. Back- No tenderness  Integument:  Well hydrated, no rash   Lymphatic:  No lymphadenopathy noted   Neurologic:  Alert & oriented x 3, Normal motor function, normal sensory function, no focal deficits noted   Psychiatric:  Speech and behavior appropriate       Medical decision making and Data review:    DATA:    Lab Results   Component Value Date    TROPONINT <0.010 07/12/2020     BNP:    Lab Results   Component Value Date    PROBNP 271.0 07/12/2020     PT/INR:  No results found for: HelloTel  Lab Results   Component Value Date    LABA1C 12.0 (H) 07/12/2020    LABA1C 9.9 (H) 08/08/2019     Lab Results   Component Value Date    CHOL 179 12/05/2019    TRIG 164 (H) 12/05/2019    HDL 39 (L) 12/05/2019    LDLDIRECT 122 (H) 12/05/2019     Lab Results   Component Value Date    WBC 9.1 08/05/2020    HGB 12.0 (L) 08/05/2020    HCT 38.8 08/05/2020    MCV 94.6 08/05/2020     08/05/2020     TSH: No results found for: TSH  Lab Results   Component Value Date    AST 28 07/14/2020    ALT 13 07/14/2020    BILIDIR 0.2 04/16/2019    BILITOT 0.4 07/14/2020    ALKPHOS 75 07/14/2020       All labs, medications and tests reviewed by myself including data and history from outside source , patient and available family . 1. Palpitations    2. Essential hypertension    3. Hyperthyroidism         Impression and Plan:      1. Sinus tachycardia: Most likely examination is iatrogenic hyperthyroidism from Synthyroid use. As noted above in HPI her TSH level has been critically low since July 2020. Recent reduction in Synthroid medication made per PCP within the last week. At this time will await TSH to normalize before further pursuing work-up for tachycardia. Due to prolonged hospitalization recently will evaluate patient for DVTs  (possible PE) by obtaining a venous ultrasound of lower extremities. 2. Poor R wave progression noted on EKG.   Will obtain echocardiogram rule out any structural heart disease. 3. Hypothyroidism: With iatrogenic hyperthyroidism, patient follows up with endocrinology at [facility]. Synthroid dose reduced by PCP. Continue close follow-up. 4. Hypothyroidism continue with atenolol amlodipine and hydralazine. 5. Hyperlipidemia: Cont Liptor. 6. Diet and Exercise: Thank you for consultation. We will follow-up in 4 months    Return in about 4 months (around 1/17/2021). Counseled extensively and medication compliance urged. We discussed that for the  prevention of ASCVD our  goal is aggressive risk modification. Patient is encouraged to exercise even a brisk walk for 30 minutes  at least 3 to 4 times a week   Various goals were discussed and questions answered. Continue current medications. Appropriate prescriptions are addressed and refills ordered. Questions answered and patient verbalizes understanding. Call for any problems, questions, or concerns.

## 2020-10-01 ENCOUNTER — PROCEDURE VISIT (OUTPATIENT)
Dept: CARDIOLOGY CLINIC | Age: 75
End: 2020-10-01
Payer: MEDICARE

## 2020-10-01 LAB
LV EF: 63 %
LVEF MODALITY: NORMAL

## 2020-10-01 PROCEDURE — 93970 EXTREMITY STUDY: CPT | Performed by: INTERNAL MEDICINE

## 2020-10-01 PROCEDURE — 93306 TTE W/DOPPLER COMPLETE: CPT | Performed by: INTERNAL MEDICINE

## 2020-10-06 ENCOUNTER — TELEPHONE (OUTPATIENT)
Dept: CARDIOLOGY CLINIC | Age: 75
End: 2020-10-06

## 2020-10-06 NOTE — TELEPHONE ENCOUNTER
No evidence of significant venous insufficiency noted in the bilateral lower     extremities .     No evidence of DVT or SVT in the bilateral common femoral vein, femoral     vein, popliteal vein, greater saphenous vein or small saphenous vein. ECHO:  Left ventricular systolic function is normal with an ejection fraction of   60-65%. Mild concentric left ventricular hypertrophy. No significant valvular disease noted. Normal pulmonary artery pressure. No evidence of pericardial effusion. LM for patient to contact office for message.

## 2020-11-03 PROBLEM — I10 ESSENTIAL HYPERTENSION: Status: RESOLVED | Noted: 2017-07-12 | Resolved: 2020-11-03

## 2020-12-30 ENCOUNTER — APPOINTMENT (OUTPATIENT)
Dept: GENERAL RADIOLOGY | Age: 75
DRG: 689 | End: 2020-12-30
Payer: MEDICARE

## 2020-12-30 ENCOUNTER — HOSPITAL ENCOUNTER (INPATIENT)
Age: 75
LOS: 2 days | Discharge: HOME | DRG: 689 | End: 2021-01-02
Attending: EMERGENCY MEDICINE | Admitting: INTERNAL MEDICINE
Payer: MEDICARE

## 2020-12-30 ENCOUNTER — APPOINTMENT (OUTPATIENT)
Dept: CT IMAGING | Age: 75
DRG: 689 | End: 2020-12-30
Payer: MEDICARE

## 2020-12-30 DIAGNOSIS — R41.0 DISORIENTATION: Primary | ICD-10-CM

## 2020-12-30 DIAGNOSIS — A41.9 SEPTICEMIA (HCC): ICD-10-CM

## 2020-12-30 PROBLEM — E11.9 DIABETES (HCC): Status: ACTIVE | Noted: 2020-12-30

## 2020-12-30 PROBLEM — N39.0 URINARY TRACT INFECTION: Status: ACTIVE | Noted: 2020-12-30

## 2020-12-30 LAB
ALBUMIN SERPL-MCNC: 3.7 GM/DL (ref 3.4–5)
ALP BLD-CCNC: 218 IU/L (ref 40–129)
ALT SERPL-CCNC: 21 U/L (ref 10–40)
ANION GAP SERPL CALCULATED.3IONS-SCNC: 13 MMOL/L (ref 4–16)
AST SERPL-CCNC: 20 IU/L (ref 15–37)
BACTERIA: ABNORMAL /HPF
BASOPHILS ABSOLUTE: 0.1 K/CU MM
BASOPHILS RELATIVE PERCENT: 0.5 % (ref 0–1)
BETA-HYDROXYBUTYRATE: 1.8 MG/DL (ref 0–3)
BILIRUB SERPL-MCNC: 0.3 MG/DL (ref 0–1)
BILIRUBIN URINE: ABNORMAL MG/DL
BLOOD, URINE: ABNORMAL
BUN BLDV-MCNC: 28 MG/DL (ref 6–23)
CALCIUM SERPL-MCNC: 10.3 MG/DL (ref 8.3–10.6)
CAST TYPE: ABNORMAL /HPF
CHLORIDE BLD-SCNC: 89 MMOL/L (ref 99–110)
CHP ED QC CHECK: YES
CLARITY: ABNORMAL
CO2: 28 MMOL/L (ref 21–32)
COLOR: YELLOW
CREAT SERPL-MCNC: 1.1 MG/DL (ref 0.6–1.1)
CRYSTAL TYPE: NEGATIVE /HPF
DIFFERENTIAL TYPE: ABNORMAL
EOSINOPHILS ABSOLUTE: 0.3 K/CU MM
EOSINOPHILS RELATIVE PERCENT: 2 % (ref 0–3)
EPITHELIAL CELLS, UA: NEGATIVE /HPF
GFR AFRICAN AMERICAN: 59 ML/MIN/1.73M2
GFR NON-AFRICAN AMERICAN: 49 ML/MIN/1.73M2
GLUCOSE BLD-MCNC: 111 MG/DL (ref 70–99)
GLUCOSE BLD-MCNC: 154 MG/DL (ref 70–99)
GLUCOSE BLD-MCNC: 336 MG/DL (ref 70–99)
GLUCOSE BLD-MCNC: 346 MG/DL (ref 70–99)
GLUCOSE BLD-MCNC: 57 MG/DL (ref 70–99)
GLUCOSE BLD-MCNC: 86 MG/DL
GLUCOSE BLD-MCNC: 86 MG/DL (ref 70–99)
GLUCOSE, URINE: 100 MG/DL
HCT VFR BLD CALC: 44.4 % (ref 37–47)
HEMOGLOBIN: 15.3 GM/DL (ref 12.5–16)
IMMATURE NEUTROPHIL %: 0.8 % (ref 0–0.43)
KETONES, URINE: NEGATIVE MG/DL
LACTATE: 3.3 MMOL/L (ref 0.4–2)
LACTATE: NORMAL MMOL/L (ref 0.4–2)
LEUKOCYTE ESTERASE, URINE: ABNORMAL
LYMPHOCYTES ABSOLUTE: 3.8 K/CU MM
LYMPHOCYTES RELATIVE PERCENT: 25.3 % (ref 24–44)
MCH RBC QN AUTO: 29.7 PG (ref 27–31)
MCHC RBC AUTO-ENTMCNC: 34.5 % (ref 32–36)
MCV RBC AUTO: 86.2 FL (ref 78–100)
MONOCYTES ABSOLUTE: 0.8 K/CU MM
MONOCYTES RELATIVE PERCENT: 5 % (ref 0–4)
NITRITE URINE, QUANTITATIVE: NEGATIVE
PDW BLD-RTO: 14.1 % (ref 11.7–14.9)
PH, URINE: 5.5 (ref 5–8)
PLATELET # BLD: 352 K/CU MM (ref 140–440)
PMV BLD AUTO: 11.3 FL (ref 7.5–11.1)
POTASSIUM SERPL-SCNC: 4.1 MMOL/L (ref 3.5–5.1)
PROTEIN UA: >300 MG/DL
RBC # BLD: 5.15 M/CU MM (ref 4.2–5.4)
RBC URINE: ABNORMAL /HPF (ref 0–6)
SARS-COV-2, NAAT: NOT DETECTED
SEGMENTED NEUTROPHILS ABSOLUTE COUNT: 10.1 K/CU MM
SEGMENTED NEUTROPHILS RELATIVE PERCENT: 66.4 % (ref 36–66)
SODIUM BLD-SCNC: 130 MMOL/L (ref 135–145)
SOURCE: NORMAL
SPECIFIC GRAVITY UA: 1.02 (ref 1–1.03)
TOTAL IMMATURE NEUTOROPHIL: 0.12 K/CU MM
TOTAL PROTEIN: 7.6 GM/DL (ref 6.4–8.2)
UROBILINOGEN, URINE: 0.2 MG/DL (ref 0.2–1)
WBC # BLD: 15.2 K/CU MM (ref 4–10.5)
WBC UA: ABNORMAL /HPF (ref 0–5)

## 2020-12-30 PROCEDURE — 96375 TX/PRO/DX INJ NEW DRUG ADDON: CPT

## 2020-12-30 PROCEDURE — 82010 KETONE BODYS QUAN: CPT

## 2020-12-30 PROCEDURE — 82962 GLUCOSE BLOOD TEST: CPT

## 2020-12-30 PROCEDURE — 99285 EMERGENCY DEPT VISIT HI MDM: CPT

## 2020-12-30 PROCEDURE — U0002 COVID-19 LAB TEST NON-CDC: HCPCS

## 2020-12-30 PROCEDURE — 2580000003 HC RX 258: Performed by: EMERGENCY MEDICINE

## 2020-12-30 PROCEDURE — 85025 COMPLETE CBC W/AUTO DIFF WBC: CPT

## 2020-12-30 PROCEDURE — 96372 THER/PROPH/DIAG INJ SC/IM: CPT

## 2020-12-30 PROCEDURE — 96365 THER/PROPH/DIAG IV INF INIT: CPT

## 2020-12-30 PROCEDURE — 93010 ELECTROCARDIOGRAM REPORT: CPT | Performed by: INTERNAL MEDICINE

## 2020-12-30 PROCEDURE — 71045 X-RAY EXAM CHEST 1 VIEW: CPT

## 2020-12-30 PROCEDURE — G0378 HOSPITAL OBSERVATION PER HR: HCPCS

## 2020-12-30 PROCEDURE — 87086 URINE CULTURE/COLONY COUNT: CPT

## 2020-12-30 PROCEDURE — 87186 SC STD MICRODIL/AGAR DIL: CPT

## 2020-12-30 PROCEDURE — 93005 ELECTROCARDIOGRAM TRACING: CPT | Performed by: EMERGENCY MEDICINE

## 2020-12-30 PROCEDURE — 6370000000 HC RX 637 (ALT 250 FOR IP): Performed by: EMERGENCY MEDICINE

## 2020-12-30 PROCEDURE — 81001 URINALYSIS AUTO W/SCOPE: CPT

## 2020-12-30 PROCEDURE — 83605 ASSAY OF LACTIC ACID: CPT

## 2020-12-30 PROCEDURE — 87040 BLOOD CULTURE FOR BACTERIA: CPT

## 2020-12-30 PROCEDURE — 6360000002 HC RX W HCPCS: Performed by: EMERGENCY MEDICINE

## 2020-12-30 PROCEDURE — 2580000003 HC RX 258: Performed by: NURSE PRACTITIONER

## 2020-12-30 PROCEDURE — 87077 CULTURE AEROBIC IDENTIFY: CPT

## 2020-12-30 PROCEDURE — 70450 CT HEAD/BRAIN W/O DYE: CPT

## 2020-12-30 PROCEDURE — 80053 COMPREHEN METABOLIC PANEL: CPT

## 2020-12-30 RX ORDER — SODIUM CHLORIDE 0.9 % (FLUSH) 0.9 %
10 SYRINGE (ML) INJECTION PRN
Status: DISCONTINUED | OUTPATIENT
Start: 2020-12-30 | End: 2021-01-02 | Stop reason: HOSPADM

## 2020-12-30 RX ORDER — ATORVASTATIN CALCIUM 20 MG/1
20 TABLET, FILM COATED ORAL DAILY
Status: DISCONTINUED | OUTPATIENT
Start: 2020-12-31 | End: 2021-01-02 | Stop reason: HOSPADM

## 2020-12-30 RX ORDER — SODIUM CHLORIDE 9 MG/ML
INJECTION, SOLUTION INTRAVENOUS CONTINUOUS
Status: DISCONTINUED | OUTPATIENT
Start: 2020-12-30 | End: 2021-01-02 | Stop reason: HOSPADM

## 2020-12-30 RX ORDER — ACETAMINOPHEN 650 MG/1
650 SUPPOSITORY RECTAL EVERY 6 HOURS PRN
Status: DISCONTINUED | OUTPATIENT
Start: 2020-12-30 | End: 2021-01-02 | Stop reason: HOSPADM

## 2020-12-30 RX ORDER — SODIUM CHLORIDE 0.9 % (FLUSH) 0.9 %
10 SYRINGE (ML) INJECTION EVERY 12 HOURS SCHEDULED
Status: DISCONTINUED | OUTPATIENT
Start: 2020-12-30 | End: 2021-01-02 | Stop reason: HOSPADM

## 2020-12-30 RX ORDER — 0.9 % SODIUM CHLORIDE 0.9 %
1000 INTRAVENOUS SOLUTION INTRAVENOUS ONCE
Status: COMPLETED | OUTPATIENT
Start: 2020-12-30 | End: 2020-12-30

## 2020-12-30 RX ORDER — DULOXETIN HYDROCHLORIDE 60 MG/1
60 CAPSULE, DELAYED RELEASE ORAL DAILY
Status: DISCONTINUED | OUTPATIENT
Start: 2020-12-31 | End: 2021-01-02 | Stop reason: HOSPADM

## 2020-12-30 RX ORDER — POLYETHYLENE GLYCOL 3350 17 G/17G
17 POWDER, FOR SOLUTION ORAL DAILY PRN
Status: DISCONTINUED | OUTPATIENT
Start: 2020-12-30 | End: 2021-01-02 | Stop reason: HOSPADM

## 2020-12-30 RX ORDER — SODIUM CHLORIDE 9 MG/ML
INJECTION, SOLUTION INTRAVENOUS CONTINUOUS
Status: DISCONTINUED | OUTPATIENT
Start: 2020-12-30 | End: 2020-12-30 | Stop reason: SDUPTHER

## 2020-12-30 RX ORDER — ASPIRIN 81 MG/1
81 TABLET ORAL DAILY
Status: DISCONTINUED | OUTPATIENT
Start: 2020-12-31 | End: 2021-01-02 | Stop reason: HOSPADM

## 2020-12-30 RX ORDER — AMLODIPINE BESYLATE 10 MG/1
10 TABLET ORAL DAILY
Status: DISCONTINUED | OUTPATIENT
Start: 2020-12-31 | End: 2021-01-02 | Stop reason: HOSPADM

## 2020-12-30 RX ORDER — NICOTINE POLACRILEX 4 MG
15 LOZENGE BUCCAL PRN
Status: DISCONTINUED | OUTPATIENT
Start: 2020-12-30 | End: 2021-01-02 | Stop reason: HOSPADM

## 2020-12-30 RX ORDER — PROMETHAZINE HYDROCHLORIDE 12.5 MG/1
12.5 TABLET ORAL EVERY 6 HOURS PRN
Status: DISCONTINUED | OUTPATIENT
Start: 2020-12-30 | End: 2021-01-02 | Stop reason: HOSPADM

## 2020-12-30 RX ORDER — DEXTROSE MONOHYDRATE 50 MG/ML
100 INJECTION, SOLUTION INTRAVENOUS PRN
Status: DISCONTINUED | OUTPATIENT
Start: 2020-12-30 | End: 2021-01-02 | Stop reason: HOSPADM

## 2020-12-30 RX ORDER — ATENOLOL 50 MG/1
50 TABLET ORAL 2 TIMES DAILY
Status: DISCONTINUED | OUTPATIENT
Start: 2020-12-30 | End: 2021-01-02 | Stop reason: HOSPADM

## 2020-12-30 RX ORDER — ONDANSETRON 2 MG/ML
4 INJECTION INTRAMUSCULAR; INTRAVENOUS EVERY 6 HOURS PRN
Status: DISCONTINUED | OUTPATIENT
Start: 2020-12-30 | End: 2021-01-02 | Stop reason: HOSPADM

## 2020-12-30 RX ORDER — HYDRALAZINE HYDROCHLORIDE 10 MG/1
10 TABLET, FILM COATED ORAL 2 TIMES DAILY
Status: DISCONTINUED | OUTPATIENT
Start: 2020-12-30 | End: 2021-01-02 | Stop reason: HOSPADM

## 2020-12-30 RX ORDER — PANTOPRAZOLE SODIUM 40 MG/1
40 TABLET, DELAYED RELEASE ORAL DAILY
Status: DISCONTINUED | OUTPATIENT
Start: 2020-12-31 | End: 2021-01-02 | Stop reason: HOSPADM

## 2020-12-30 RX ORDER — M-VIT,TX,IRON,MINS/CALC/FOLIC 27MG-0.4MG
1 TABLET ORAL DAILY
Status: DISCONTINUED | OUTPATIENT
Start: 2020-12-31 | End: 2021-01-02 | Stop reason: HOSPADM

## 2020-12-30 RX ORDER — ACETAMINOPHEN 325 MG/1
650 TABLET ORAL EVERY 6 HOURS PRN
Status: DISCONTINUED | OUTPATIENT
Start: 2020-12-30 | End: 2021-01-02 | Stop reason: HOSPADM

## 2020-12-30 RX ORDER — LEVOTHYROXINE SODIUM 175 UG/1
175 CAPSULE ORAL DAILY
Status: DISCONTINUED | OUTPATIENT
Start: 2020-12-31 | End: 2020-12-30 | Stop reason: SDUPTHER

## 2020-12-30 RX ORDER — DEXTROSE MONOHYDRATE 25 G/50ML
12.5 INJECTION, SOLUTION INTRAVENOUS PRN
Status: DISCONTINUED | OUTPATIENT
Start: 2020-12-30 | End: 2021-01-02 | Stop reason: HOSPADM

## 2020-12-30 RX ADMIN — SODIUM CHLORIDE: 9 INJECTION, SOLUTION INTRAVENOUS at 19:03

## 2020-12-30 RX ADMIN — DEXTROSE MONOHYDRATE 12.5 G: 25 INJECTION, SOLUTION INTRAVENOUS at 21:38

## 2020-12-30 RX ADMIN — SODIUM CHLORIDE 1000 ML: 9 INJECTION, SOLUTION INTRAVENOUS at 17:00

## 2020-12-30 RX ADMIN — INSULIN HUMAN 5 UNITS: 100 INJECTION, SOLUTION PARENTERAL at 16:57

## 2020-12-30 RX ADMIN — PIPERACILLIN AND TAZOBACTAM 3.38 G: 3; .375 INJECTION, POWDER, LYOPHILIZED, FOR SOLUTION INTRAVENOUS at 19:03

## 2020-12-30 RX ADMIN — SODIUM CHLORIDE: 9 INJECTION, SOLUTION INTRAVENOUS at 21:37

## 2020-12-30 ASSESSMENT — PAIN SCALES - GENERAL
PAINLEVEL_OUTOF10: 0
PAINLEVEL_OUTOF10: 0

## 2020-12-30 NOTE — ED NOTES
Returned from [address]. Placed back on cardiac monitor and continuous pulse oximetry and blood pressure monitoring.  IVF's infusing with no difficulty     Savage Oglesby, RN  12/30/20 [address], RN  12/30/20 2721

## 2020-12-30 NOTE — ED NOTES
The patient presents to the er today by medics. The family called due to some confusion that started yesterday at 1200. The patient is awake and alert on arrival, she follows commands and is able to move all of her extremities on command, she has no slurred speech.          Camilla Beckham RN  12/30/20 [id number]

## 2020-12-30 NOTE — ED NOTES
Lab called with a Lactic Acid result of 4.1.  Dr. Charlie Vu was notified            Najma Dawkins RN  12/30/20 [id number]

## 2020-12-30 NOTE — ED NOTES
Assumed care of this pt. Pt is in room 3. Pt placed on cardiac monitoring and continuous pulse oximetry and blood pressure monitoring. Saline lock is in place and blood work has been sent. EKG done. Neuro exam done. Pt is slow to answer questions. Pt will squeeze my hands but it is weak bilaterally. Pt will not lift up either leg, when I lift it up and let go it immediately drops to the bed. Pt knows her name, birth date and where she is. Pt knows the year and the day of the week but is unsure of the month.       Nik Bonds, SHERRIE  12/30/20 [address], RN  12/30/20 [facility]

## 2020-12-30 NOTE — ED PROVIDER NOTES
(gastroesophageal reflux disease)     H/O Doppler ultrasound 10/01/2020     No evidence of significant venous insufficiency bilaterally. No evidence of DVT or SVT in the bilaterally.  H/O echocardiogram 10/01/2020    EF 60-65%. No significant valvular disease.  Hallux valgus (acquired), right foot 5/1/2017    Hypertension     Other disorders of kidney and ureter     1 kidney.  Thyroid disease     hypothyroid    Type 2 diabetes mellitus with foot ulcer, with long-term current use of insulin ([facility] [address] 1/18/2012    Ulcer of right foot with fat layer exposed ([address] 2/6/2013     Past Surgical History:   Procedure Laterality Date    ABDOMEN SURGERY      CYST REMOVAL      from kidney.     DILATATION, ESOPHAGUS      ENDOSCOPY, COLON, DIAGNOSTIC      FOOT SURGERY  12/30/11    had infection  and a biopsy was sent away for analysis    HERNIA REPAIR      HYSTERECTOMY      KIDNEY REMOVAL  1973     Family History   Problem Relation Age of Onset    Arthritis Mother     Cancer Mother     Diabetes Mother     High Blood Pressure Mother     Asthma Father     Heart Disease Father     Diabetes Brother      Social History     Socioeconomic History    Marital status:      Spouse name: Not on file    Number of children: Not on file    Years of education: Not on file    Highest education level: Not on file   Occupational History    Not on file   Social Needs    Financial resource strain: Not on file    Food insecurity     Worry: Not on file     Inability: Not on file    Transportation needs     Medical: Not on file     Non-medical: Not on file   Tobacco Use    Smoking status: Never Smoker    Smokeless tobacco: Never Used   Substance and Sexual Activity    Alcohol use: No    Drug use: No    Sexual activity: Yes   Lifestyle    Physical activity     Days per week: Not on file     Minutes per session: Not on file    Stress: Not on file   Relationships    Social connections     Talks on phone: Not tablet 12.5 mg  12.5 mg Oral Q6H PRN Victoriano Akaeze, APRN - CNP        Or    ondansetron (ZOFRAN) injection 4 mg  4 mg Intravenous Q6H PRN Victoriano Akaeze, APRN - CNP        acetaminophen (TYLENOL) tablet 650 mg  650 mg Oral Q6H PRN Victoriano Akaeze, APRN - CNP        Or    acetaminophen (TYLENOL) suppository 650 mg  650 mg Rectal Q6H PRN Victoriano Akaeze, APRN - CNP        polyethylene glycol (GLYCOLAX) packet 17 g  17 g Oral Daily PRN Victoriano Akaeze, APRN - CNP        glucose (GLUTOSE) 40 % oral gel 15 g  15 g Oral PRN Victoriano Akaeze, APRN - CNP        dextrose 50 % IV solution  12.5 g Intravenous PRN Victoriano Akaeze, APRN - CNP   12.5 g at 12/31/20 0028    glucagon (rDNA) injection 1 mg  1 mg Intramuscular PRN Victoriano Akaeze, APRN - CNP        dextrose 5 % solution  100 mL/hr Intravenous PRN Victoriano Akaeze, APRN - CNP        insulin lispro (HUMALOG) injection vial 0-6 Units  0-6 Units Subcutaneous TID WC Victoriano Akaeze, APRN - CNP        insulin lispro (HUMALOG) injection vial 0-3 Units  0-3 Units Subcutaneous Nightly Victoriano Akaeze, APRN - CNP        0.9 % sodium chloride infusion   Intravenous Continuous Victoriano Akaeze, APRN -  mL/hr at 12/31/20 0554 Rate Verify at 12/31/20 0554    levothyroxine (SYNTHROID) tablet 175 mcg  175 mcg Oral Daily Victoriano Akaeze, APRN - CNP   175 mcg at 12/31/20 0552    piperacillin-tazobactam (ZOSYN) 3.375 g in dextrose 5 % 50 mL IVPB extended infusion (mini-bag)  3.375 g Intravenous Q8H Victoriano Akaeze, APRN - CNP   Stopped at 12/31/20 6717     Allergies   Allergen Reactions    Ritalin [Methylphenidate] Rash    Rocephin [Ceftriaxone Sodium-Lidocaine] Rash       Nursing Notes Reviewed    Physical Exam:  ED Triage Vitals [12/30/20 1550]   Enc Vitals Group      BP (!) 143/63      Pulse 71      Resp 14      Temp 98.1 °F (36.7 °C)      Temp Source Oral      SpO2 96 %      Weight 185 lb (83.9 kg)      Height 5' 3\" (1.6 m)      Head Circumference       Peak Flow       Pain performed during the hospital encounter of 12/30/20   CBC Auto Differential   Result Value Ref Range    WBC 15.2 (H) 4.0 - 10.5 K/CU MM    RBC 5.15 4.2 - 5.4 M/CU MM    Hemoglobin 15.3 12.5 - 16.0 GM/DL    Hematocrit 44.4 37 - 47 %    MCV 86.2 78 - 100 FL    MCH 29.7 27 - 31 PG    MCHC 34.5 32.0 - 36.0 %    RDW 14.1 11.7 - 14.9 %    Platelets 259 854 - 729 K/CU MM    MPV 11.3 (H) 7.5 - 11.1 FL    Differential Type AUTOMATED DIFFERENTIAL     Segs Relative 66.4 (H) 36 - 66 %    Lymphocytes % 25.3 24 - 44 %    Monocytes % 5.0 (H) 0 - 4 %    Eosinophils % 2.0 0 - 3 %    Basophils % 0.5 0 - 1 %    Segs Absolute 10.1 K/CU MM    Lymphocytes Absolute 3.8 K/CU MM    Monocytes Absolute 0.8 K/CU MM    Eosinophils Absolute 0.3 K/CU MM    Basophils Absolute 0.1 K/CU MM    Immature Neutrophil % 0.8 (H) 0 - 0.43 %    Total Immature Neutrophil 0.12 K/CU MM   Comprehensive Metabolic Panel   Result Value Ref Range    Sodium 130 (L) 135 - 145 MMOL/L    Potassium 4.1 3.5 - 5.1 MMOL/L    Chloride 89 (L) 99 - 110 mMol/L    CO2 28 21 - 32 MMOL/L    BUN 28 (H) 6 - 23 MG/DL    CREATININE 1.1 0.6 - 1.1 MG/DL    Glucose 346 (H) 70 - 99 MG/DL    Calcium 10.3 8.3 - 10.6 MG/DL    Alb 3.7 3.4 - 5.0 GM/DL    Total Protein 7.6 6.4 - 8.2 GM/DL    Total Bilirubin 0.3 0.0 - 1.0 MG/DL    ALT 21 10 - 40 U/L    AST 20 15 - 37 IU/L    Alkaline Phosphatase 218 (H) 40 - 129 IU/L    GFR Non- 49 (L) >60 mL/min/1.73m2    GFR  59 (L) >60 mL/min/1.73m2    Anion Gap 13 4 - 16   Lactic Acid, Plasma   Result Value Ref Range    Lactate  0.4 - 2.0 mMOL/L     LACTIC 4.1 CALLED AND RB TO ADRIENNE BALDERAS ED [id number] [facility]   Urinalysis with Microscopic   Result Value Ref Range    Color, UA YELLOW YELLOW    Clarity, UA CLOUDY (A) CLEAR    Glucose, Urine 100 (A) NEGATIVE MG/DL    Bilirubin Urine SMALL (A) NEGATIVE MG/DL    Ketones, Urine NEGATIVE NEGATIVE MG/DL    Specific Gravity, UA 1.025 1.001 - 1.035    Blood, Urine MODERATE (A) Basophils % 0.6 0 - 1 %    Segs Absolute 8.0 K/CU MM    Lymphocytes Absolute 3.3 K/CU MM    Monocytes Absolute 0.7 K/CU MM    Eosinophils Absolute 0.4 K/CU MM    Basophils Absolute 0.1 K/CU MM    Immature Neutrophil % 0.5 (H) 0 - 0.43 %    Total Immature Neutrophil 0.06 K/CU MM   Lactate, Sepsis   Result Value Ref Range    Lactic Acid, Sepsis 1.2 0.5 - 1.9 mMOL/L   POCT Glucose   Result Value Ref Range    POC Glucose 336 (H) 70 - 99 MG/DL   POC Blood Glucose   Result Value Ref Range    Glucose 86 mg/dL    QC OK? yes    POCT Glucose   Result Value Ref Range    POC Glucose 86 70 - 99 MG/DL   POCT Glucose   Result Value Ref Range    POC Glucose 57 (L) 70 - 99 MG/DL   POCT Glucose   Result Value Ref Range    POC Glucose 154 (H) 70 - 99 MG/DL   POCT Glucose   Result Value Ref Range    POC Glucose 111 (H) 70 - 99 MG/DL   POCT Glucose   Result Value Ref Range    POC Glucose 67 (L) 70 - 99 MG/DL   POCT Glucose   Result Value Ref Range    POC Glucose 140 (H) 70 - 99 MG/DL   POCT Glucose   Result Value Ref Range    POC Glucose 135 (H) 70 - 99 MG/DL   EKG 12 Lead   Result Value Ref Range    Ventricular Rate 72 BPM    Atrial Rate 72 BPM    P-R Interval 176 ms    QRS Duration 88 ms    Q-T Interval 408 ms    QTc Calculation (Bazett) 446 ms    P Axis -6 degrees    R Axis -6 degrees    T Axis 120 degrees    Diagnosis       Normal sinus rhythm  Possible Anterior infarct (cited on or before 06-APR-2020)  ST & T wave abnormality, consider lateral ischemia  Abnormal ECG  When compared with ECG of 24-AUG-2020 18:27,  Vent. rate has decreased BY  46 BPM  Confirmed by Evangelina Vegas 63 ([id number]) on 12/30/2020 6:19:50 PM         EKG (if obtained): (All EKG's are interpreted by myself in the absence of a cardiologist)  The Ekg interpreted by me in the absence of a cardiologist shows. normal sinus rhythm with a rate of 72  Axis is   Normal  QTc is  446  Intervals and Durations are unremarkable.       No specific ST-T wave changes appreciated. No evidence of acute ischemia. No significant change from prior EKG dated 17 September 2020      Radiographs (if obtained):  [] The following radiograph was interpreted by myself in the absence of a radiologist:  [x] Radiologist's Report reviewed at time of ED visit:  XR CHEST PORTABLE   Final Result   No acute process. CT HEAD WO CONTRAST   Final Result   1. No acute intracranial abnormality. 2. Stable moderate chronic white matter microvascular ischemic changes and   chronic lacunar infarct in the left basal ganglia. ED Course and MDM:  Patient presents to the emergency department with some evidence of aphasia that is inconsistent during exam.  On my exam the patient was oriented to month but not year. The nurse exam she was oriented to year but not month. She is able to move all 4 extremities although she chooses not to move her legs at times. It is difficult to say but I am not seeing an obvious stroke pattern at this point. I think she is septic. Zosyn has been ordered. Urine is pending at this time. I did discuss the case with the night hospitalist Rocio. He asked me to wait till the urine has been returned before he will agree to evaluate the patient for possible admission here. Patient's care will need to be in-house. She will need to be admitted and it remains to be seen whether she can stay here in [place] will or will be need to send to [facility]. Patient will be evaluated by Rocio. Urine does show infection and she is most likely septic from urine infection. She will be admitted in [place]    Final Impression:  1. Disorientation    2. Septicemia [facility])      DISPOSITION     Patient referred to: No follow-up provider specified.   Discharge medications:  Current Discharge Medication List        (Please note that portions of this note may have been completed with a voice recognition program. Efforts were made to edit the dictations but occasionally words are mis-transcribed.)    Maryan Cushing, DO, [address] [facility]  Board certified in [address] [place] [address]        Maryan Cushing, [address]  12/30/20 [address],   12/31/20 Omar [address]aphney Gibbons,   12/31/20 1140

## 2020-12-30 NOTE — ED NOTES
Pt's depends were wet. Pericare done. Pt is excoriated in ken area. Cream applied after agrawal placement.       Vita Sparrow RN  12/30/20 5977

## 2020-12-31 PROBLEM — R41.82 AMS (ALTERED MENTAL STATUS): Status: ACTIVE | Noted: 2020-12-31

## 2020-12-31 LAB
ALBUMIN SERPL-MCNC: 3.1 GM/DL (ref 3.4–5)
ALP BLD-CCNC: 167 IU/L (ref 40–129)
ALT SERPL-CCNC: 19 U/L (ref 10–40)
ANION GAP SERPL CALCULATED.3IONS-SCNC: 14 MMOL/L (ref 4–16)
AST SERPL-CCNC: 25 IU/L (ref 15–37)
BASOPHILS ABSOLUTE: 0.1 K/CU MM
BASOPHILS RELATIVE PERCENT: 0.6 % (ref 0–1)
BILIRUB SERPL-MCNC: 0.3 MG/DL (ref 0–1)
BUN BLDV-MCNC: 25 MG/DL (ref 6–23)
CALCIUM SERPL-MCNC: 9.1 MG/DL (ref 8.3–10.6)
CHLORIDE BLD-SCNC: 96 MMOL/L (ref 99–110)
CO2: 22 MMOL/L (ref 21–32)
CREAT SERPL-MCNC: 1.2 MG/DL (ref 0.6–1.1)
DIFFERENTIAL TYPE: ABNORMAL
EOSINOPHILS ABSOLUTE: 0.4 K/CU MM
EOSINOPHILS RELATIVE PERCENT: 3.1 % (ref 0–3)
GFR AFRICAN AMERICAN: 53 ML/MIN/1.73M2
GFR NON-AFRICAN AMERICAN: 44 ML/MIN/1.73M2
GLUCOSE BLD-MCNC: 135 MG/DL (ref 70–99)
GLUCOSE BLD-MCNC: 140 MG/DL (ref 70–99)
GLUCOSE BLD-MCNC: 233 MG/DL (ref 70–99)
GLUCOSE BLD-MCNC: 302 MG/DL (ref 70–99)
GLUCOSE BLD-MCNC: 310 MG/DL (ref 70–99)
GLUCOSE BLD-MCNC: 327 MG/DL (ref 70–99)
GLUCOSE BLD-MCNC: 338 MG/DL (ref 70–99)
GLUCOSE BLD-MCNC: 367 MG/DL (ref 70–99)
GLUCOSE BLD-MCNC: 401 MG/DL (ref 70–99)
GLUCOSE BLD-MCNC: 488 MG/DL (ref 70–99)
GLUCOSE BLD-MCNC: 532 MG/DL (ref 70–99)
GLUCOSE BLD-MCNC: 559 MG/DL (ref 70–99)
GLUCOSE BLD-MCNC: 586 MG/DL (ref 70–99)
GLUCOSE BLD-MCNC: 67 MG/DL (ref 70–99)
HCT VFR BLD CALC: 39.4 % (ref 37–47)
HEMOGLOBIN: 12.8 GM/DL (ref 12.5–16)
IMMATURE NEUTROPHIL %: 0.5 % (ref 0–0.43)
LACTIC ACID, SEPSIS: 1.2 MMOL/L (ref 0.5–1.9)
LYMPHOCYTES ABSOLUTE: 3.3 K/CU MM
LYMPHOCYTES RELATIVE PERCENT: 26.4 % (ref 24–44)
MCH RBC QN AUTO: 28.9 PG (ref 27–31)
MCHC RBC AUTO-ENTMCNC: 32.5 % (ref 32–36)
MCV RBC AUTO: 88.9 FL (ref 78–100)
MONOCYTES ABSOLUTE: 0.7 K/CU MM
MONOCYTES RELATIVE PERCENT: 5.8 % (ref 0–4)
PDW BLD-RTO: 14.2 % (ref 11.7–14.9)
PLATELET # BLD: 264 K/CU MM (ref 140–440)
PMV BLD AUTO: 10.4 FL (ref 7.5–11.1)
POTASSIUM SERPL-SCNC: 3.9 MMOL/L (ref 3.5–5.1)
RBC # BLD: 4.43 M/CU MM (ref 4.2–5.4)
SEGMENTED NEUTROPHILS ABSOLUTE COUNT: 8 K/CU MM
SEGMENTED NEUTROPHILS RELATIVE PERCENT: 63.6 % (ref 36–66)
SODIUM BLD-SCNC: 132 MMOL/L (ref 135–145)
TOTAL IMMATURE NEUTOROPHIL: 0.06 K/CU MM
TOTAL PROTEIN: 5.8 GM/DL (ref 6.4–8.2)
WBC # BLD: 12.5 K/CU MM (ref 4–10.5)

## 2020-12-31 PROCEDURE — 36415 COLL VENOUS BLD VENIPUNCTURE: CPT

## 2020-12-31 PROCEDURE — 96366 THER/PROPH/DIAG IV INF ADDON: CPT

## 2020-12-31 PROCEDURE — 6370000000 HC RX 637 (ALT 250 FOR IP): Performed by: NURSE PRACTITIONER

## 2020-12-31 PROCEDURE — 6370000000 HC RX 637 (ALT 250 FOR IP): Performed by: INTERNAL MEDICINE

## 2020-12-31 PROCEDURE — 1200000000 HC SEMI PRIVATE

## 2020-12-31 PROCEDURE — 2580000003 HC RX 258: Performed by: NURSE PRACTITIONER

## 2020-12-31 PROCEDURE — 6360000002 HC RX W HCPCS: Performed by: INTERNAL MEDICINE

## 2020-12-31 PROCEDURE — 80053 COMPREHEN METABOLIC PANEL: CPT

## 2020-12-31 PROCEDURE — 94761 N-INVAS EAR/PLS OXIMETRY MLT: CPT

## 2020-12-31 PROCEDURE — 96375 TX/PRO/DX INJ NEW DRUG ADDON: CPT

## 2020-12-31 PROCEDURE — 83605 ASSAY OF LACTIC ACID: CPT

## 2020-12-31 PROCEDURE — 82962 GLUCOSE BLOOD TEST: CPT

## 2020-12-31 PROCEDURE — 96376 TX/PRO/DX INJ SAME DRUG ADON: CPT

## 2020-12-31 PROCEDURE — 2580000003 HC RX 258: Performed by: INTERNAL MEDICINE

## 2020-12-31 PROCEDURE — 85025 COMPLETE CBC W/AUTO DIFF WBC: CPT

## 2020-12-31 PROCEDURE — G0378 HOSPITAL OBSERVATION PER HR: HCPCS

## 2020-12-31 PROCEDURE — 6360000002 HC RX W HCPCS: Performed by: NURSE PRACTITIONER

## 2020-12-31 PROCEDURE — 96372 THER/PROPH/DIAG INJ SC/IM: CPT

## 2020-12-31 RX ORDER — CEFDINIR 300 MG/1
300 CAPSULE ORAL EVERY 12 HOURS SCHEDULED
Status: DISCONTINUED | OUTPATIENT
Start: 2020-12-31 | End: 2020-12-31

## 2020-12-31 RX ORDER — DIPHENHYDRAMINE HCL 25 MG
25 CAPSULE ORAL EVERY 6 HOURS PRN
Status: DISCONTINUED | OUTPATIENT
Start: 2020-12-31 | End: 2021-01-02 | Stop reason: HOSPADM

## 2020-12-31 RX ORDER — SODIUM CHLORIDE 450 MG/100ML
1000 INJECTION, SOLUTION INTRAVENOUS ONCE
Status: COMPLETED | OUTPATIENT
Start: 2020-12-31 | End: 2020-12-31

## 2020-12-31 RX ORDER — DEXTROSE MONOHYDRATE 50 MG/ML
INJECTION, SOLUTION INTRAVENOUS CONTINUOUS
Status: DISCONTINUED | OUTPATIENT
Start: 2020-12-31 | End: 2020-12-31

## 2020-12-31 RX ORDER — DIPHENHYDRAMINE HCL 25 MG
25 CAPSULE ORAL 2 TIMES DAILY
Status: DISCONTINUED | OUTPATIENT
Start: 2020-12-31 | End: 2020-12-31 | Stop reason: ALTCHOICE

## 2020-12-31 RX ADMIN — INSULIN HUMAN 10 UNITS: 100 INJECTION, SOLUTION PARENTERAL at 15:12

## 2020-12-31 RX ADMIN — ENOXAPARIN SODIUM 30 MG: 30 INJECTION SUBCUTANEOUS at 08:38

## 2020-12-31 RX ADMIN — SODIUM CHLORIDE: 9 INJECTION, SOLUTION INTRAVENOUS at 08:38

## 2020-12-31 RX ADMIN — PIPERACILLIN AND TAZOBACTAM 3.38 G: 3; .375 INJECTION, POWDER, LYOPHILIZED, FOR SOLUTION INTRAVENOUS at 02:56

## 2020-12-31 RX ADMIN — Medication 81 MG: at 08:38

## 2020-12-31 RX ADMIN — SODIUM CHLORIDE, PRESERVATIVE FREE 10 ML: 5 INJECTION INTRAVENOUS at 08:38

## 2020-12-31 RX ADMIN — DEXTROSE MONOHYDRATE: 50 INJECTION, SOLUTION INTRAVENOUS at 00:44

## 2020-12-31 RX ADMIN — AMLODIPINE BESYLATE 10 MG: 10 TABLET ORAL at 08:38

## 2020-12-31 RX ADMIN — DIPHENHYDRAMINE HYDROCHLORIDE 25 MG: 25 CAPSULE ORAL at 11:25

## 2020-12-31 RX ADMIN — INSULIN HUMAN 10 UNITS: 100 INJECTION, SOLUTION PARENTERAL at 18:11

## 2020-12-31 RX ADMIN — HYDRALAZINE HYDROCHLORIDE 10 MG: 10 TABLET, FILM COATED ORAL at 19:46

## 2020-12-31 RX ADMIN — LEVOTHYROXINE SODIUM 175 MCG: 0.03 TABLET ORAL at 05:52

## 2020-12-31 RX ADMIN — SODIUM CHLORIDE: 9 INJECTION, SOLUTION INTRAVENOUS at 16:11

## 2020-12-31 RX ADMIN — INSULIN HUMAN 10 UNITS: 100 INJECTION, SOLUTION PARENTERAL at 11:53

## 2020-12-31 RX ADMIN — MULTIPLE VITAMINS W/ MINERALS TAB 1 TABLET: TAB at 11:25

## 2020-12-31 RX ADMIN — INSULIN HUMAN 10 UNITS: 100 INJECTION, SOLUTION PARENTERAL at 13:50

## 2020-12-31 RX ADMIN — PANTOPRAZOLE SODIUM 40 MG: 40 TABLET, DELAYED RELEASE ORAL at 05:52

## 2020-12-31 RX ADMIN — ATENOLOL 50 MG: 50 TABLET ORAL at 19:46

## 2020-12-31 RX ADMIN — SODIUM CHLORIDE 1000 ML: 4.5 INJECTION, SOLUTION INTRAVENOUS at 13:46

## 2020-12-31 RX ADMIN — ATORVASTATIN CALCIUM 20 MG: 20 TABLET, FILM COATED ORAL at 08:38

## 2020-12-31 RX ADMIN — ATENOLOL 50 MG: 50 TABLET ORAL at 08:38

## 2020-12-31 RX ADMIN — INSULIN LISPRO 5 UNITS: 100 INJECTION, SOLUTION INTRAVENOUS; SUBCUTANEOUS at 21:12

## 2020-12-31 RX ADMIN — DEXTROSE MONOHYDRATE 12.5 G: 25 INJECTION, SOLUTION INTRAVENOUS at 00:28

## 2020-12-31 RX ADMIN — DULOXETINE HYDROCHLORIDE 60 MG: 60 CAPSULE, DELAYED RELEASE ORAL at 08:38

## 2020-12-31 RX ADMIN — PIPERACILLIN AND TAZOBACTAM 3.38 G: 3; .375 INJECTION, POWDER, LYOPHILIZED, FOR SOLUTION INTRAVENOUS at 15:11

## 2020-12-31 RX ADMIN — INSULIN LISPRO 5 UNITS: 100 INJECTION, SOLUTION INTRAVENOUS; SUBCUTANEOUS at 08:39

## 2020-12-31 RX ADMIN — CEFDINIR 300 MG: 300 CAPSULE ORAL at 11:53

## 2020-12-31 RX ADMIN — HYDRALAZINE HYDROCHLORIDE 10 MG: 10 TABLET, FILM COATED ORAL at 08:38

## 2020-12-31 ASSESSMENT — PAIN SCALES - GENERAL
PAINLEVEL_OUTOF10: 0

## 2020-12-31 NOTE — H&P
History and Physical      Name:  Laura Ly /Age/Sex: [id number]  (72 y.o. female)   MRN & CSN:  [id number] & [id number] Admission Date/Time: 2020  3:52 PM   Location:   PCP: Leonce Blizzard, PA       Laura Ly is a 77 y.o.  female  who presents with Altered Mental Status (the patient presents to the er today with al atered mental status)      Assessment and Plan:     1. Urinary Tract Infection      Urinary frequency, urgency, hesitancy    Urinalysis Bacteria, UAMANYAbnormal      SARS-CoV-2, NAATNOT DETECTED   Leukocyte Esterase, UrineMODERATEAbnormal   Lactate 3. 3High Panic. [id number] K/CU MM   Culture, IV fluid hydration. Rocephin, GI and DVT prophylaxis    2. Altered  Mental Status        May be related to urinary tract infection or age    Confusion, forget fullness    Head CT without contrast     Impression:  1. No acute intracranial abnormality. 2. Stable moderate chronic white matter microvascular ischemic changes and   chronic lacunar infarct in the left basal ganglia. 3. Diabetes Mellitus     [id number] MG/DL      POCT glucose, Hypoglycemic protocol     Home medication, sliding scale insulin with meal coverage    4. Hyponatremia       [id number] MMOL/L       Replete Sodium    5.  Other Health Concerns     Hypertension, Arthritis, depression, Gerd , and hypothyroidism    Medications:   Medications:    Infusions:    sodium chloride 125 mL/hr at 20     PRN Meds:      Current Facility-Administered Medications:     0.9 % sodium chloride infusion, , Intravenous, Continuous, Lloyd Jeddo, DO, Last Rate: 125 mL/hr at 20, New Bag at 20    Current Outpatient Medications:     insulin lispro (HUMALOG) 100 UNIT/ML injection vial, Inject 12 Units into the skin Daily with lunch, Disp: 1 vial, Rfl: 3    amLODIPine (NORVASC) 10 MG tablet, Take 1 tablet by mouth daily Hold for systolic blood pressure less than or equal to 110, Disp: 30 tablet, Rfl: 3    atenolol (TENORMIN) 50 MG tablet, Take 1 tablet by mouth 2 times daily Hold for systolic blood pressure less than or equal to 110, Disp: 30 tablet, Rfl: 3    hydrALAZINE (APRESOLINE) 10 MG tablet, Take 1 tablet by mouth 2 times daily Hold for systolic blood pressure less than or equal to 110, Disp: 90 tablet, Rfl: 1    Blood Pressure KIT, 1 each by Does not apply route 2 times daily, Disp: 1 kit, Rfl: 0    DULoxetine (CYMBALTA) 60 MG extended release capsule, Take 60 mg by mouth daily , Disp: , Rfl: 1    pantoprazole (PROTONIX) 40 MG tablet, Take 40 mg by mouth daily , Disp: , Rfl: 3    atorvastatin (LIPITOR) 20 MG tablet, Take 20 mg by mouth daily, Disp: , Rfl:     Multiple Vitamins-Minerals (TRUEPLUS DIABETIC MULTIVITAMIN) TABS, Take 1 tablet by mouth daily. , Disp: , Rfl:     Levothyroxine Sodium 175 MCG CAPS, Take 175 mcg by mouth Daily , Disp: , Rfl:     aspirin 81 MG EC tablet, Take 81 mg by mouth daily. , Disp: , Rfl:     History of present illness     Chief Complaint: Altered Mental Status (the patient presents to the er today with al atered mental status)      Jumana Dao is a 77 y.o.  female  With  PMH that include Diabetes, depression, Thyroid disease,hypertension and CKD. She  presents with concerns with altered mental status and urinary  Tract infection. She had difficulty answering questions about her health concerns. She is alert to self. Denies chest and abdominal pain. Review of Systems     Unable to obtain due to patient altered status in ED    Objective:        Intake/Output Summary (Last 24 hours) at 12/30/2020 2006  Last data filed at 12/30/2020 1751  Gross per 24 hour   Intake --   Output 10 ml   Net -10 ml      Vitals:   Vitals:    12/30/20 1705   BP: (!) 150/74   Pulse: 70   Resp: 16   Temp:    SpO2:      Physical Exam:     General:  awake, cooperative, no  Acute  Distress, nontoxic appearance  EYES:Lids and lashes normal, pupils equal, round ,extra ocular muscles intact, sclera clear, conjunctiva normal  ENT:  Normocephalic, oral pharynx with moist mucus membranes  NECK:  Supple, symmetrical, trachea midline, no adenopathy,  LUNGS:  Clear to auscultate bilaterally, no rales ronchi or wheezing noted. CARDIOVASCULAR:  regular rate and rhythm, normal S1 and S2, peripheral pulses 2+, no pitting edema  ABDOMEN: Normal BS, Non tender, non distended, . MUSCULOSKELETAL:  ROM of all extremities grossly wnl  NEUROLOGIC: AOx 1,  Cranial nerves II-XII are grossly intact. Motor is 5 out of 5 bilaterally. Sensory is intact, no lateralizing findings. SKIN:  no bruising or bleeding, normal skin color, turgor, no redness, warmth,     Past Medical History:      Past Medical History:   Diagnosis Date    Arthritis     Cellulitis of right foot     Chronic kidney disease     Depression     Diabetes mellitus ([address]     Frequent falls     GERD (gastroesophageal reflux disease)     H/O Doppler ultrasound 10/01/2020     No evidence of significant venous insufficiency bilaterally. No evidence of DVT or SVT in the bilaterally.  H/O echocardiogram 10/01/2020    EF 60-65%. No significant valvular disease.  Hallux valgus (acquired), right foot 5/1/2017    Hypertension     Other disorders of kidney and ureter     1 kidney.  Thyroid disease     hypothyroid    Type 2 diabetes mellitus with foot ulcer, with long-term current use of insulin ([address] 1/18/2012    Ulcer of right foot with fat layer exposed ([address] 2/6/2013     PSHX:  has a past surgical history that includes Hysterectomy; Foot surgery (12/30/11); cyst removal; Kidney removal (1973); Abdomen surgery; hernia repair; Endoscopy, colon, diagnostic; and Dilatation, esophagus. Allergies:    Allergies   Allergen Reactions    Ritalin [Methylphenidate] Rash    Rocephin [Ceftriaxone Sodium-Lidocaine] Rash       FAM HX: family history includes Arthritis in her mother; Asthma in her father; Cancer in her mother; Diabetes in her brother and mother; Heart Disease in her father; High Blood Pressure in her mother.       Soc HX:   Social History     Socioeconomic History    Marital status:      Spouse name: Not on file    Number of children: Not on file    Years of education: Not on file    Highest education level: Not on file   Occupational History    Not on file   Social Needs    Financial resource strain: Not on file    Food insecurity     Worry: Not on file     Inability: Not on file    Transportation needs     Medical: Not on file     Non-medical: Not on file   Tobacco Use    Smoking status: Never Smoker    Smokeless tobacco: Never Used   Substance and Sexual Activity    Alcohol use: No    Drug use: No    Sexual activity: Not on file   Lifestyle    Physical activity     Days per week: Not on file     Minutes per session: Not on file    Stress: Not on file   Relationships    Social connections     Talks on phone: Not on file     Gets together: Not on file     Attends Buddhism service: Not on file     Active member of club or organization: Not on file     Attends meetings of clubs or organizations: Not on file     Relationship status: Not on file    Intimate partner violence     Fear of current or ex partner: Not on file     Emotionally abused: Not on file     Physically abused: Not on file     Forced sexual activity: Not on file   Other Topics Concern    Not on file   Social History Narrative    Not on file       Electronically signed by MILADIS Cortes CNP on 12/30/2020 at 8:06 PM

## 2020-12-31 NOTE — PLAN OF CARE
Problem: Falls - Risk of:  Goal: Will remain free from falls  Description: Will remain free from falls  Outcome: Ongoing  Goal: Absence of physical injury  Description: Absence of physical injury  Outcome: Ongoing     Problem: Sensory:  Goal: General experience of comfort will improve  Description: General experience of comfort will improve  Outcome: Ongoing     Problem: Urinary Elimination:  Goal: Signs and symptoms of infection will decrease  Description: Signs and symptoms of infection will decrease  Outcome: Ongoing  Goal: Ability to reestablish a normal urinary elimination pattern will improve - after catheter removal  Description: Ability to reestablish a normal urinary elimination pattern will improve  Outcome: Ongoing  Goal: Complications related to the disease process, condition or treatment will be avoided or minimized  Description: Complications related to the disease process, condition or treatment will be avoided or minimized  Outcome: Ongoing     Problem: Infection:  Goal: Will remain free from infection  Description: Will remain free from infection  Outcome: Ongoing     Problem: Safety:  Goal: Free from accidental physical injury  Description: Free from accidental physical injury  Outcome: Ongoing  Goal: Free from intentional harm  Description: Free from intentional harm  Outcome: Ongoing     Problem: Daily Care:  Goal: Daily care needs are met  Description: Daily care needs are met  Outcome: Ongoing     Problem: Pain:  Goal: Patient's pain/discomfort is manageable  Description: Patient's pain/discomfort is manageable  Outcome: Ongoing     Problem: Skin Integrity:  Goal: Skin integrity will stabilize  Description: Skin integrity will stabilize  Outcome: Ongoing     Problem: Discharge Planning:  Goal: Patients continuum of care needs are met  Description: Patients continuum of care needs are met  Outcome: Ongoing

## 2020-12-31 NOTE — PROGRESS NOTES
Hospitalist Progress Note      Name:  Lito Pope /Age/Sex: [id number]  (72 y.o. female)   MRN & CSN:  [id number] & [id number] Admission Date/Time: 2020  3:52 PM   Location:  011- PCP: Chelsea Freitas [facility] Day: 2    Assessment and Plan:   Lito Pope is a 77 y.o.  female  who presents with Urinary tract infection    1. Acute metabolic encephalopathy: Improved. Patient probably has some baseline dementia as well. However, most likely in setting of UTI pending culture at this time. Also, of note patient had low serum glucose could be attributable as well. Patient overnight seem to manipulate nursing when asking orientation asking who the president was she did mention [facility] but if they mention should be transferred out of the hospital she answered correctly say [place]. Patient knows quite a bit of the detail family home living conditions. However, when the discussion comes to her diabetes and poor control she feigns confusion then any other topic goes right back to discussing same. Neuro imaging/CT of the head no acute abnormality with stable moderate chronic white matter microvascular ischemic changes and chronic lacunar infarct noted in the left basal ganglia. Patient's alert and oriented x3. Continue to monitor    2. UTI: Positive leukoesterase, negative nitrites WBCs TNTC no epithelial cells or casts with many bacteria. Glucose 100 small bilirubin moderate blood with protein greater than 300. Patient also has one kidney she describes as well. Renal function CKD stage III. Hydrating the patient continuing Zosyn day 2 from the ED. Pending culture and sensitivity at this time. Continue to monitor. 3.  Insulin-dependent diabetes: Poor control with a hemoglobin A1c of 12.0 in 2020. In a past medical history of complicated foot ulcer secondary to same. Patient did have low overnight of 57 2120 hrs.   Received glucose infusion improved PRN    Or      ondansetron, 4 mg, Q6H PRN      acetaminophen, 650 mg, Q6H PRN    Or      acetaminophen, 650 mg, Q6H PRN      polyethylene glycol, 17 g, Daily PRN      glucose, 15 g, PRN      dextrose, 12.5 g, PRN      glucagon (rDNA), 1 mg, PRN      dextrose, 100 mL/hr, PRN          Electronically signed by Carlos Zamudio MD on 12/31/2020 at 4:43 PM

## 2020-12-31 NOTE — PROGRESS NOTES
0025-Patient's blood glucose is 67 at this time. Victoriano TREVIÑO notified and the IV fluids are changed from 0.9 [email or web address] per hour to 5%Dextrose @ 100ml per hour . Will continue to monitor.      Huma Fish  12:41 AM

## 2020-12-31 NOTE — CARE COORDINATION
.Chart reviewed. The patient is to return home with her  at discharge. The patient has a PCP and insurance and she states her  can drive to doctor's appointments without issues. The patient admits to being house bound sense COVID. She states she does not mind being stuck in the house because her  gets her up in the morning and feeds her and her son comes over daily and gets her lunch. She states her  runs the [employer] which is across from [facility]. The patient can afford and take her medications as prescribed. She states she uses a walker to get around and her family helps her get into the shower. She states that her spouse has remodeled the bathroom so that it is wheelchair and walker accessible. She states she had [facility] and was discharge from their services about 2 weeks ago. She will let staff know if she wants [facility] again. If she does not have a preference, she can be set up with [facility]. A HHC order, facesheet, AVS and H&P will need faxed. Please call [phone] to make a referral and ask for their fax number.

## 2020-12-31 NOTE — PROGRESS NOTES
This RN checked patients blood sugar and patient was 586. Patients tray was taken out of room, patient had regular pepsi, had eaten her pancakes, and drank orange juice. Upon this RN letting Dr. Ghulam Canchola know he ordered patient to be NPO, an increase in patients fluids from 100ml to 150mls an hour, and for Humulin R to be given. When this RN returned to patients room to give insulin patient was eating packs of claudia crackers despite the NPO sign outside patient door. This RN informed patient that until her blood glucose was done that she could not eat. Patient gave this RN the rest of her claudia crackers. While this RN was scanning in medications, patient's son called and asked if patient had eaten anything yet and patient stated \"well I finally ate a few bites of claudia crackers and then they came in and took it away saying I was not allowed to eat at all\". Patient then told son that she was told she couldn't eat due to blood sugar.

## 2021-01-01 LAB
ALBUMIN SERPL-MCNC: 3 GM/DL (ref 3.4–5)
ALP BLD-CCNC: 153 IU/L (ref 40–129)
ALT SERPL-CCNC: 19 U/L (ref 10–40)
ANION GAP SERPL CALCULATED.3IONS-SCNC: 10 MMOL/L (ref 4–16)
AST SERPL-CCNC: 20 IU/L (ref 15–37)
BASOPHILS ABSOLUTE: 0.1 K/CU MM
BASOPHILS RELATIVE PERCENT: 0.8 % (ref 0–1)
BILIRUB SERPL-MCNC: 0.4 MG/DL (ref 0–1)
BUN BLDV-MCNC: 14 MG/DL (ref 6–23)
CALCIUM SERPL-MCNC: 8.3 MG/DL (ref 8.3–10.6)
CHLORIDE BLD-SCNC: 103 MMOL/L (ref 99–110)
CO2: 23 MMOL/L (ref 21–32)
CREAT SERPL-MCNC: 0.8 MG/DL (ref 0.6–1.1)
DIFFERENTIAL TYPE: ABNORMAL
EOSINOPHILS ABSOLUTE: 0.3 K/CU MM
EOSINOPHILS RELATIVE PERCENT: 3.9 % (ref 0–3)
GFR AFRICAN AMERICAN: >60 ML/MIN/1.73M2
GFR NON-AFRICAN AMERICAN: >60 ML/MIN/1.73M2
GLUCOSE BLD-MCNC: 267 MG/DL (ref 70–99)
GLUCOSE BLD-MCNC: 279 MG/DL (ref 70–99)
GLUCOSE BLD-MCNC: 291 MG/DL (ref 70–99)
GLUCOSE BLD-MCNC: 294 MG/DL (ref 70–99)
GLUCOSE BLD-MCNC: 319 MG/DL (ref 70–99)
GLUCOSE BLD-MCNC: 364 MG/DL (ref 70–99)
GLUCOSE BLD-MCNC: 376 MG/DL (ref 70–99)
GLUCOSE BLD-MCNC: 383 MG/DL (ref 70–99)
GLUCOSE BLD-MCNC: 388 MG/DL (ref 70–99)
HCT VFR BLD CALC: 36.5 % (ref 37–47)
HEMOGLOBIN: 11.8 GM/DL (ref 12.5–16)
IMMATURE NEUTROPHIL %: 0.4 % (ref 0–0.43)
LYMPHOCYTES ABSOLUTE: 2.3 K/CU MM
LYMPHOCYTES RELATIVE PERCENT: 32.7 % (ref 24–44)
MCH RBC QN AUTO: 29.4 PG (ref 27–31)
MCHC RBC AUTO-ENTMCNC: 32.3 % (ref 32–36)
MCV RBC AUTO: 90.8 FL (ref 78–100)
MONOCYTES ABSOLUTE: 0.5 K/CU MM
MONOCYTES RELATIVE PERCENT: 6.3 % (ref 0–4)
PDW BLD-RTO: 14.3 % (ref 11.7–14.9)
PLATELET # BLD: 207 K/CU MM (ref 140–440)
PMV BLD AUTO: 10.9 FL (ref 7.5–11.1)
POTASSIUM SERPL-SCNC: 4.1 MMOL/L (ref 3.5–5.1)
RBC # BLD: 4.02 M/CU MM (ref 4.2–5.4)
SEGMENTED NEUTROPHILS ABSOLUTE COUNT: 4 K/CU MM
SEGMENTED NEUTROPHILS RELATIVE PERCENT: 55.9 % (ref 36–66)
SODIUM BLD-SCNC: 136 MMOL/L (ref 135–145)
TOTAL IMMATURE NEUTOROPHIL: 0.03 K/CU MM
TOTAL PROTEIN: 6 GM/DL (ref 6.4–8.2)
WBC # BLD: 7.1 K/CU MM (ref 4–10.5)

## 2021-01-01 PROCEDURE — 2580000003 HC RX 258: Performed by: INTERNAL MEDICINE

## 2021-01-01 PROCEDURE — 6360000002 HC RX W HCPCS: Performed by: NURSE PRACTITIONER

## 2021-01-01 PROCEDURE — 80053 COMPREHEN METABOLIC PANEL: CPT

## 2021-01-01 PROCEDURE — 82962 GLUCOSE BLOOD TEST: CPT

## 2021-01-01 PROCEDURE — 6370000000 HC RX 637 (ALT 250 FOR IP): Performed by: INTERNAL MEDICINE

## 2021-01-01 PROCEDURE — 1200000000 HC SEMI PRIVATE

## 2021-01-01 PROCEDURE — 85025 COMPLETE CBC W/AUTO DIFF WBC: CPT

## 2021-01-01 PROCEDURE — 6370000000 HC RX 637 (ALT 250 FOR IP): Performed by: NURSE PRACTITIONER

## 2021-01-01 RX ORDER — CIPROFLOXACIN 500 MG/1
500 TABLET, FILM COATED ORAL EVERY 12 HOURS SCHEDULED
Status: DISCONTINUED | OUTPATIENT
Start: 2021-01-01 | End: 2021-01-02 | Stop reason: HOSPADM

## 2021-01-01 RX ADMIN — ATENOLOL 50 MG: 50 TABLET ORAL at 20:12

## 2021-01-01 RX ADMIN — SODIUM CHLORIDE: 9 INJECTION, SOLUTION INTRAVENOUS at 10:20

## 2021-01-01 RX ADMIN — Medication 81 MG: at 08:59

## 2021-01-01 RX ADMIN — DULOXETINE HYDROCHLORIDE 60 MG: 60 CAPSULE, DELAYED RELEASE ORAL at 08:59

## 2021-01-01 RX ADMIN — ATORVASTATIN CALCIUM 20 MG: 20 TABLET, FILM COATED ORAL at 08:59

## 2021-01-01 RX ADMIN — ENOXAPARIN SODIUM 30 MG: 30 INJECTION SUBCUTANEOUS at 09:00

## 2021-01-01 RX ADMIN — HYDRALAZINE HYDROCHLORIDE 10 MG: 10 TABLET, FILM COATED ORAL at 20:11

## 2021-01-01 RX ADMIN — MULTIPLE VITAMINS W/ MINERALS TAB 1 TABLET: TAB at 12:34

## 2021-01-01 RX ADMIN — AMLODIPINE BESYLATE 10 MG: 10 TABLET ORAL at 08:59

## 2021-01-01 RX ADMIN — LEVOTHYROXINE SODIUM 175 MCG: 0.03 TABLET ORAL at 06:41

## 2021-01-01 RX ADMIN — INSULIN GLARGINE 15 UNITS: 100 INJECTION, SOLUTION SUBCUTANEOUS at 09:00

## 2021-01-01 RX ADMIN — ATENOLOL 50 MG: 50 TABLET ORAL at 08:59

## 2021-01-01 RX ADMIN — CIPROFLOXACIN 500 MG: 500 TABLET, FILM COATED ORAL at 20:12

## 2021-01-01 RX ADMIN — INSULIN HUMAN 10 UNITS: 100 INJECTION, SOLUTION PARENTERAL at 09:02

## 2021-01-01 RX ADMIN — HYDRALAZINE HYDROCHLORIDE 10 MG: 10 TABLET, FILM COATED ORAL at 08:59

## 2021-01-01 RX ADMIN — INSULIN HUMAN 10 UNITS: 100 INJECTION, SOLUTION PARENTERAL at 12:34

## 2021-01-01 RX ADMIN — PANTOPRAZOLE SODIUM 40 MG: 40 TABLET, DELAYED RELEASE ORAL at 06:41

## 2021-01-01 ASSESSMENT — PAIN SCALES - GENERAL
PAINLEVEL_OUTOF10: 0

## 2021-01-02 VITALS
OXYGEN SATURATION: 96 % | DIASTOLIC BLOOD PRESSURE: 69 MMHG | HEIGHT: 63 IN | TEMPERATURE: 95.7 F | SYSTOLIC BLOOD PRESSURE: 157 MMHG | HEART RATE: 70 BPM | RESPIRATION RATE: 16 BRPM | BODY MASS INDEX: 32.92 KG/M2 | WEIGHT: 185.8 LBS

## 2021-01-02 LAB
ALBUMIN SERPL-MCNC: 2.9 GM/DL (ref 3.4–5)
ALP BLD-CCNC: 151 IU/L (ref 40–129)
ALT SERPL-CCNC: 15 U/L (ref 10–40)
ANION GAP SERPL CALCULATED.3IONS-SCNC: 0 MMOL/L (ref 4–16)
AST SERPL-CCNC: 15 IU/L (ref 15–37)
BASOPHILS ABSOLUTE: 0 K/CU MM
BASOPHILS RELATIVE PERCENT: 0.4 % (ref 0–1)
BILIRUB SERPL-MCNC: 0.4 MG/DL (ref 0–1)
BUN BLDV-MCNC: 12 MG/DL (ref 6–23)
CALCIUM SERPL-MCNC: 8.8 MG/DL (ref 8.3–10.6)
CHLORIDE BLD-SCNC: 102 MMOL/L (ref 99–110)
CO2: 32 MMOL/L (ref 21–32)
CREAT SERPL-MCNC: 0.7 MG/DL (ref 0.6–1.1)
CULTURE: ABNORMAL
CULTURE: ABNORMAL
DIFFERENTIAL TYPE: ABNORMAL
EOSINOPHILS ABSOLUTE: 0.2 K/CU MM
EOSINOPHILS RELATIVE PERCENT: 2.6 % (ref 0–3)
GFR AFRICAN AMERICAN: >60 ML/MIN/1.73M2
GFR NON-AFRICAN AMERICAN: >60 ML/MIN/1.73M2
GLUCOSE BLD-MCNC: 339 MG/DL (ref 70–99)
GLUCOSE BLD-MCNC: 380 MG/DL (ref 70–99)
GLUCOSE BLD-MCNC: 398 MG/DL (ref 70–99)
GLUCOSE BLD-MCNC: 419 MG/DL (ref 70–99)
HCT VFR BLD CALC: 37.6 % (ref 37–47)
HEMOGLOBIN: 11.5 GM/DL (ref 12.5–16)
IMMATURE NEUTROPHIL %: 0.6 % (ref 0–0.43)
LYMPHOCYTES ABSOLUTE: 2 K/CU MM
LYMPHOCYTES RELATIVE PERCENT: 28.7 % (ref 24–44)
Lab: ABNORMAL
MCH RBC QN AUTO: 29.2 PG (ref 27–31)
MCHC RBC AUTO-ENTMCNC: 30.6 % (ref 32–36)
MCV RBC AUTO: 95.4 FL (ref 78–100)
MONOCYTES ABSOLUTE: 0.5 K/CU MM
MONOCYTES RELATIVE PERCENT: 6.4 % (ref 0–4)
PDW BLD-RTO: 14.2 % (ref 11.7–14.9)
PLATELET # BLD: 151 K/CU MM (ref 140–440)
PMV BLD AUTO: 10.8 FL (ref 7.5–11.1)
POTASSIUM SERPL-SCNC: 4 MMOL/L (ref 3.5–5.1)
RBC # BLD: 3.94 M/CU MM (ref 4.2–5.4)
SEGMENTED NEUTROPHILS ABSOLUTE COUNT: 4.3 K/CU MM
SEGMENTED NEUTROPHILS RELATIVE PERCENT: 61.3 % (ref 36–66)
SODIUM BLD-SCNC: 134 MMOL/L (ref 135–145)
SPECIMEN: ABNORMAL
TOTAL IMMATURE NEUTOROPHIL: 0.04 K/CU MM
TOTAL PROTEIN: 6.2 GM/DL (ref 6.4–8.2)
WBC # BLD: 7.1 K/CU MM (ref 4–10.5)

## 2021-01-02 PROCEDURE — 6370000000 HC RX 637 (ALT 250 FOR IP): Performed by: INTERNAL MEDICINE

## 2021-01-02 PROCEDURE — 80053 COMPREHEN METABOLIC PANEL: CPT

## 2021-01-02 PROCEDURE — 82962 GLUCOSE BLOOD TEST: CPT

## 2021-01-02 PROCEDURE — 85025 COMPLETE CBC W/AUTO DIFF WBC: CPT

## 2021-01-02 PROCEDURE — 6360000002 HC RX W HCPCS: Performed by: NURSE PRACTITIONER

## 2021-01-02 PROCEDURE — 2580000003 HC RX 258: Performed by: NURSE PRACTITIONER

## 2021-01-02 PROCEDURE — 94761 N-INVAS EAR/PLS OXIMETRY MLT: CPT

## 2021-01-02 PROCEDURE — 36415 COLL VENOUS BLD VENIPUNCTURE: CPT

## 2021-01-02 PROCEDURE — 6370000000 HC RX 637 (ALT 250 FOR IP): Performed by: NURSE PRACTITIONER

## 2021-01-02 RX ORDER — NITROFURANTOIN 25; 75 MG/1; MG/1
100 CAPSULE ORAL 2 TIMES DAILY
Qty: 10 CAPSULE | Refills: 0 | Status: SHIPPED | OUTPATIENT
Start: 2021-01-02 | End: 2021-01-07

## 2021-01-02 RX ADMIN — HYDRALAZINE HYDROCHLORIDE 10 MG: 10 TABLET, FILM COATED ORAL at 08:46

## 2021-01-02 RX ADMIN — ATORVASTATIN CALCIUM 20 MG: 20 TABLET, FILM COATED ORAL at 08:46

## 2021-01-02 RX ADMIN — PANTOPRAZOLE SODIUM 40 MG: 40 TABLET, DELAYED RELEASE ORAL at 06:32

## 2021-01-02 RX ADMIN — SODIUM CHLORIDE, PRESERVATIVE FREE 10 ML: 5 INJECTION INTRAVENOUS at 09:13

## 2021-01-02 RX ADMIN — CIPROFLOXACIN 500 MG: 500 TABLET, FILM COATED ORAL at 08:46

## 2021-01-02 RX ADMIN — ATENOLOL 50 MG: 50 TABLET ORAL at 08:46

## 2021-01-02 RX ADMIN — AMLODIPINE BESYLATE 10 MG: 10 TABLET ORAL at 08:46

## 2021-01-02 RX ADMIN — DULOXETINE HYDROCHLORIDE 60 MG: 60 CAPSULE, DELAYED RELEASE ORAL at 08:46

## 2021-01-02 RX ADMIN — Medication 81 MG: at 08:46

## 2021-01-02 RX ADMIN — ENOXAPARIN SODIUM 30 MG: 30 INJECTION SUBCUTANEOUS at 08:46

## 2021-01-02 RX ADMIN — LEVOTHYROXINE SODIUM 175 MCG: 0.03 TABLET ORAL at 06:32

## 2021-01-02 ASSESSMENT — PAIN SCALES - GENERAL: PAINLEVEL_OUTOF10: 0

## 2021-01-02 NOTE — DISCHARGE SUMMARY
the discharge recommendations, medications, and plan. Consults this admission:  IP CONSULT TO SOCIAL WORK    Discharge Instruction:   Follow up appointments:Primary care physician:  within 2 weeks    Diet:  diabetic diet   Activity: activity as tolerated  Disposition: Discharged to:   [x]Home, []C, []SNF, []Acute Rehab, []Hospice   Condition on discharge: Stable    Discharge Medications:      Taviamakayla Griffith   [place] Medication Instructions TMM:[id number]    Printed on:01/02/21 1022   Medication Information                      amLODIPine (NORVASC) 10 MG tablet  Take 1 tablet by mouth daily Hold for systolic blood pressure less than or equal to 110             aspirin 81 MG EC tablet  Take 81 mg by mouth daily. atenolol (TENORMIN) 50 MG tablet  Take 1 tablet by mouth 2 times daily Hold for systolic blood pressure less than or equal to 110             atorvastatin (LIPITOR) 20 MG tablet  Take 20 mg by mouth daily             Blood Pressure KIT  1 each by Does not apply route 2 times daily             DULoxetine (CYMBALTA) 60 MG extended release capsule  Take 60 mg by mouth daily              hydrALAZINE (APRESOLINE) 10 MG tablet  Take 1 tablet by mouth 2 times daily Hold for systolic blood pressure less than or equal to 110             insulin glargine (LANTUS;BASAGLAR) 100 UNIT/ML injection pen  Inject 15 Units into the skin nightly             insulin lispro (HUMALOG) 100 UNIT/ML injection vial  Inject 12 Units into the skin Daily with lunch             Levothyroxine Sodium 175 MCG CAPS  Take 175 mcg by mouth Daily              Multiple Vitamins-Minerals (TRUEPLUS DIABETIC MULTIVITAMIN) TABS  Take 1 tablet by mouth daily.              nitrofurantoin, macrocrystal-monohydrate, (MACROBID) 100 MG capsule  Take 1 capsule by mouth 2 times daily for 5 days             pantoprazole (PROTONIX) 40 MG tablet  Take 40 mg by mouth daily                  Objective Findings at Discharge:   BP (!) 157/69 Pulse 70   Temp 95.7 °F (35.4 °C)   Resp 16   Ht 5' 3\" (1.6 m)   Wt 185 lb 12.8 oz (84.3 kg)   SpO2 96%   BMI 32.91 kg/m²            PHYSICAL EXAM   GEN Awake female, sitting upright in bed in no apparent distress. Appears given age. EYES Pupils are equally round. No scleral erythema, discharge, or conjunctivitis. HENT Mucous membranes are moist.   NECK No apparent thyromegaly or masses. RESP Clear to auscultation, no wheezes, rales or rhonchi. Symmetric chest movement while on room air. CARDIO/VASC S1/S2 auscultated. Regular rate without appreciable murmurs, rubs, or gallops. Peripheral pulses equal bilaterally and palpable. No peripheral edema. GI Abdomen is soft without significant tenderness, masses, or guarding. Bowel sounds are normoactive. Rectal exam deferred.  Wilkins catheter is not present. HEME/LYMPH No petechiae or ecchymoses. MSK No gross joint deformities. Spontaneous movement of all extremities  SKIN Normal coloration, warm, dry. NEURO Cranial nerves appear grossly intact, normal speech, no lateralizing weakness. PSYCH Awake, alert, oriented x 4. Affect appropriate. BMP/CBC  Recent Labs     12/31/20  0600 01/01/21  0650 01/02/21  0600   * 136 134*   K 3.9 4.1 4.0   CL 96* 103 102   CO2 22 23 32   BUN 25* 14 12   CREATININE 1.2* 0.8 0.7   WBC 12.5* 7.1 7.1   HCT 39.4 36.5* 37.6    207 151       IMAGING:  Ct Head Wo Contrast    Result Date: 12/30/2020  EXAMINATION: CT OF THE HEAD WITHOUT CONTRAST  12/30/2020 4:25 pm TECHNIQUE: CT of the head was performed without the administration of intravenous contrast. Dose modulation, iterative reconstruction, and/or weight based adjustment of the mA/kV was utilized to reduce the radiation dose to as low as reasonably achievable. COMPARISON: 07/11/2020 HISTORY: ORDERING SYSTEM PROVIDED HISTORY: altered mental status TECHNOLOGIST PROVIDED HISTORY: Has a \"code stroke\" or \"stroke alert\" been called? ->No Reason for exam:->altered mental status FINDINGS: BRAIN/VENTRICLES: There is no acute intracranial hemorrhage, mass effect or midline shift. No abnormal extra-axial fluid collection. The gray-white differentiation is maintained without evidence of an acute infarct. There is no evidence of hydrocephalus. Stable moderate chronic white matter microvascular ischemic changes are noted. There is a chronic lacunar infarct in the left basal ganglia. ORBITS: The visualized portion of the orbits demonstrate no acute abnormality. SINUSES: The visualized paranasal sinuses and mastoid air cells demonstrate no acute abnormality. SOFT TISSUES/SKULL:  No acute abnormality of the visualized skull or soft tissues. 1. No acute intracranial abnormality. 2. Stable moderate chronic white matter microvascular ischemic changes and chronic lacunar infarct in the left basal ganglia. Xr Chest Portable    Result Date: 12/30/2020  EXAMINATION: ONE XRAY VIEW OF THE CHEST 12/30/2020 6:18 pm COMPARISON: Chest radiograph 07/12/2020. HISTORY: ORDERING SYSTEM PROVIDED HISTORY: chest pain TECHNOLOGIST PROVIDED HISTORY: Reason for exam:->chest pain Reason for Exam: chest pain Acuity: Acute Type of Exam: Initial Additional signs and symptoms: ams, chest discomfort FINDINGS: The lungs are without acute focal process. There is no effusion or pneumothorax. The cardiomediastinal silhouette is stable. The osseous structures are stable. No acute process.      Discharge Time of 32minutes    Electronically signed by Kyler Douglass MD on 1/2/2021 at 10:22 AM

## 2021-01-05 LAB
CULTURE: NORMAL
CULTURE: NORMAL
EKG ATRIAL RATE: 72 BPM
EKG DIAGNOSIS: NORMAL
EKG P AXIS: -6 DEGREES
EKG P-R INTERVAL: 176 MS
EKG Q-T INTERVAL: 408 MS
EKG QRS DURATION: 88 MS
EKG QTC CALCULATION (BAZETT): 446 MS
EKG R AXIS: -6 DEGREES
EKG T AXIS: 120 DEGREES
EKG VENTRICULAR RATE: 72 BPM
Lab: NORMAL
Lab: NORMAL
SPECIMEN: NORMAL
SPECIMEN: NORMAL

## 2021-01-08 NOTE — PROGRESS NOTES
Hospitalist Progress Note      Name:  Benjamin Beckett /Age/Sex:   (72 y.o. female)   MRN & CSN:  [id number] & [id number] Admission Date/Time: 2020  3:52 PM   Location:   PCP: TOÑITO Salcedo         Hospital Day: 4    Assessment and Plan:   Benjamin Beckett is a 77 y.o.  female  who presents with Urinary tract infection    1. AMS/DAVID: Resolved, baseline dementia prominent     2. UTI: Cont zosyn day3 pending c/s    3. IDDM: poor control. A1c 12.0 . Labile during stay. Possbly, /c IV fluid abx    4. LAURA/CKD: Improved    5. Lactic Acidosis: Resolved    6. HTN: Controlled    7. GERD: Cont PPI, UOB discused    8. Hypothyroid: Continue synthroid    Diet No diet orders on file   DVT Prophylaxis [x] Lovenox, []  Heparin, [] SCDs, []No VTE prophylaxis, patient ambulating   GI Prophylaxis [x] PPI, [] H2 Blocker, [] No GI prophylaxis, patient is receiving diet/Tube Feeds   Code Status Prior   Disposition Patient requires continued admission due to cont med management   MDM [] Low, [x] Moderate,[]  High  Patient's risk as above due to as abv     History of Present Illness:     Pt S&E. No acute events overnight. Denies HA, blurry vision or dizziness. No CPn/palpitations or SOB. Denies Abdominal Pn. Dernies F/C? N/V/D. No mm/joint Pn.    10-14 point ROS reviewed negative, unless as noted above    Objective:   No intake or output data in the 24 hours ending 21 1104   Vitals:   Vitals:    21 0821   BP:    Pulse:    Resp:    Temp:    SpO2: 96%     Physical Exam:    GEN Awake obese whtie female, sitting upright in bed in no apparent distress. Appears given age. EYES Pupils are equally round. No scleral erythema, discharge, or conjunctivitis. HENT Mucous membranes are moist.   NECK No apparent thyromegaly or masses. RESP Clear to auscultation, no wheezes, rales or rhonchi. Symmetric chest movement while on room air. CARDIO/VASC S1/S2 auscultated.  Regular rate without appreciable murmurs, rubs, or gallops. Peripheral pulses equal bilaterally and palpable. No peripheral edema. GI Abdomen protuberant is soft without significant tenderness, masses, or guarding. Bowel sounds are normoactive. Rectal exam deferred.  Wilkins catheter is not present. HEME/LYMPH No petechiae or ecchymoses. MSK No gross joint deformities. Spontaneous movement of all extremities  SKIN Normal coloration, warm, dry. NEURO Cranial nerves appear grossly intact, normal speech, no lateralizing weakness. PSYCH Awake, alert, oriented x 3. Affect flat.     Medications:   Medications:    Infusions:   PRN Meds:       Electronically signed by Casi Rinaldi MD on 1/8/2021 at 11:04 AM

## 2021-01-29 PROBLEM — N39.0 URINARY TRACT INFECTION: Status: RESOLVED | Noted: 2020-12-30 | Resolved: 2021-01-29

## 2021-03-04 ENCOUNTER — HOSPITAL ENCOUNTER (OUTPATIENT)
Age: 76
Discharge: HOME | End: 2021-03-04
Payer: MEDICARE

## 2021-03-04 ENCOUNTER — HOSPITAL ENCOUNTER (OUTPATIENT)
Dept: GENERAL RADIOLOGY | Age: 76
Discharge: HOME | End: 2021-03-04
Payer: MEDICARE

## 2021-03-04 ENCOUNTER — HOSPITAL ENCOUNTER (OUTPATIENT)
Dept: WOUND CARE | Age: 76
Discharge: HOME | End: 2021-03-04
Payer: MEDICARE

## 2021-03-04 VITALS
RESPIRATION RATE: 18 BRPM | DIASTOLIC BLOOD PRESSURE: 70 MMHG | TEMPERATURE: 97.8 F | HEART RATE: 71 BPM | SYSTOLIC BLOOD PRESSURE: 161 MMHG

## 2021-03-04 DIAGNOSIS — E11.621 DIABETIC ULCER OF TOE OF LEFT FOOT ASSOCIATED WITH TYPE 2 DIABETES MELLITUS, WITH FAT LAYER EXPOSED (HCC): ICD-10-CM

## 2021-03-04 DIAGNOSIS — L97.522 DIABETIC ULCER OF TOE OF LEFT FOOT ASSOCIATED WITH TYPE 2 DIABETES MELLITUS, WITH FAT LAYER EXPOSED (HCC): ICD-10-CM

## 2021-03-04 PROCEDURE — 87077 CULTURE AEROBIC IDENTIFY: CPT

## 2021-03-04 PROCEDURE — 73660 X-RAY EXAM OF TOE(S): CPT

## 2021-03-04 PROCEDURE — 87186 SC STD MICRODIL/AGAR DIL: CPT

## 2021-03-04 PROCEDURE — 99213 OFFICE O/P EST LOW 20 MIN: CPT

## 2021-03-04 PROCEDURE — 87070 CULTURE OTHR SPECIMN AEROBIC: CPT

## 2021-03-04 PROCEDURE — 87075 CULTR BACTERIA EXCEPT BLOOD: CPT

## 2021-03-04 PROCEDURE — 11042 DBRDMT SUBQ TIS 1ST 20SQCM/<: CPT

## 2021-03-04 RX ORDER — CLOBETASOL PROPIONATE 0.5 MG/G
OINTMENT TOPICAL ONCE
Status: CANCELLED | OUTPATIENT
Start: 2021-03-04 | End: 2021-03-04

## 2021-03-04 RX ORDER — GENTAMICIN SULFATE 1 MG/G
OINTMENT TOPICAL ONCE
Status: CANCELLED | OUTPATIENT
Start: 2021-03-04 | End: 2021-03-04

## 2021-03-04 RX ORDER — BACITRACIN, NEOMYCIN, POLYMYXIN B 400; 3.5; 5 [USP'U]/G; MG/G; [USP'U]/G
OINTMENT TOPICAL ONCE
Status: CANCELLED | OUTPATIENT
Start: 2021-03-04 | End: 2021-03-04

## 2021-03-04 RX ORDER — BACITRACIN ZINC AND POLYMYXIN B SULFATE 500; 1000 [USP'U]/G; [USP'U]/G
OINTMENT TOPICAL ONCE
Status: CANCELLED | OUTPATIENT
Start: 2021-03-04 | End: 2021-03-04

## 2021-03-04 RX ORDER — BETAMETHASONE DIPROPIONATE 0.05 %
OINTMENT (GRAM) TOPICAL ONCE
Status: CANCELLED | OUTPATIENT
Start: 2021-03-04 | End: 2021-03-04

## 2021-03-04 RX ORDER — GINSENG 100 MG
CAPSULE ORAL ONCE
Status: CANCELLED | OUTPATIENT
Start: 2021-03-04 | End: 2021-03-04

## 2021-03-04 RX ORDER — SULFAMETHOXAZOLE AND TRIMETHOPRIM 800; 160 MG/1; MG/1
1 TABLET ORAL 2 TIMES DAILY
Qty: 20 TABLET | Refills: 0 | Status: SHIPPED | OUTPATIENT
Start: 2021-03-04 | End: 2021-03-14

## 2021-03-04 NOTE — H&P
[facility] Initial Visit      Yin Hooper  AGE: 77 y.o. GENDER: female  : 1945  EPISODE DATE:  3/4/2021   Referred by: self    Subjective:     CHIEF COMPLAINT left foot ulcer     HISTORY of PRESENT ILLNESS      Yin Hooper is a 77 y.o. female who presents to the [address] [place] [address] for an initial visit for evaluation and treatment of acute diabetic  ulcer(s) of  Left second toe. The condition is of mild severity. The ulcer has been present for at least 1 week, but it sounds like she has had trouble with the nail longer than that. .  The underlying cause is thought to be diabetes and rubbing and probably infection. .  The patients care to date has included none. The patient has significant underlying medical conditions as below. She does have a history of prior wounds, but no significant delay in healing. She is a non-smoker. She has no feeling in the foot. She has hammer toe deformity on the feet, but has not sought podiatry evaluation . I do discuss making sure that there is nothing touching the wound and keeping all pressure off. I also discuss my concern for skin infection and take a culture today, but will start on Bactrim DS as she is high risk for complications. Given the unclear etiology of this wound, it is also necessary to get an x-ray to r/o significant osteo. Wound Pain Timing/Severity: none  Quality of pain: N/A  Severity of pain:  0 / 10   Modifying Factors: diabetes and chronic pressure  Associated Signs/Symptoms: none        PAST MEDICAL HISTORY        Diagnosis Date    Arthritis     Cellulitis of right foot     Chronic kidney disease     Depression     Diabetes mellitus (HCC)     Frequent falls     GERD (gastroesophageal reflux disease)     H/O Doppler ultrasound 10/01/2020     No evidence of significant venous insufficiency bilaterally. No evidence of DVT or SVT in the bilaterally.  H/O echocardiogram 10/01/2020    EF 60-65%.  No significant valvular disease.  Hallux valgus (acquired), right foot 5/1/2017    Hypertension     Other disorders of kidney and ureter     1 kidney.  Thyroid disease     hypothyroid    Type 2 diabetes mellitus with foot ulcer, with long-term current use of insulin ([address] 1/18/2012    Ulcer of right foot with fat layer exposed ([address] 2/6/2013    WD-Diabetic ulcer of toe of left foot associated with type 2 diabetes mellitus, with fat layer exposed ([address] 3/4/2021       PAST SURGICAL HISTORY    Past Surgical History:   Procedure Laterality Date    ABDOMEN SURGERY      CYST REMOVAL      from kidney.     DILATATION, ESOPHAGUS      ENDOSCOPY, COLON, DIAGNOSTIC      FOOT SURGERY  12/30/11    had infection  and a biopsy was sent away for analysis    HERNIA REPAIR      HYSTERECTOMY      KIDNEY REMOVAL  1973       FAMILY HISTORY    Family History   Problem Relation Age of Onset    Arthritis Mother     Cancer Mother     Diabetes Mother     High Blood Pressure Mother     Asthma Father     Heart Disease Father     Diabetes Brother        SOCIAL HISTORY    Social History     Tobacco Use    Smoking status: Never Smoker    Smokeless tobacco: Never Used   Substance Use Topics    Alcohol use: No    Drug use: No       ALLERGIES    Allergies   Allergen Reactions    Ritalin [Methylphenidate] Rash    Rocephin [Ceftriaxone Sodium-Lidocaine] Rash       MEDICATIONS    Current Outpatient Medications on File Prior to Encounter   Medication Sig Dispense Refill    Insulin Regular Human (HUMULIN R U-500, CONCENTRATED, SC) Inject 130 Units into the skin 70  Units before dinner      insulin glargine (LANTUS;BASAGLAR) 100 UNIT/ML injection pen Inject 15 Units into the skin nightly 5 pen 3    amLODIPine (NORVASC) 10 MG tablet Take 1 tablet by mouth daily Hold for systolic blood pressure less than or equal to 110 30 tablet 3    atenolol (TENORMIN) 50 MG tablet Take 1 tablet by mouth 2 times daily Hold for systolic blood pressure less than or equal to 110 30 tablet 3    hydrALAZINE (APRESOLINE) 10 MG tablet Take 1 tablet by mouth 2 times daily Hold for systolic blood pressure less than or equal to 110 90 tablet 1    Blood Pressure KIT 1 each by Does not apply route 2 times daily 1 kit 0    DULoxetine (CYMBALTA) 60 MG extended release capsule Take 60 mg by mouth daily   1    pantoprazole (PROTONIX) 40 MG tablet Take 40 mg by mouth daily   3    atorvastatin (LIPITOR) 20 MG tablet Take 20 mg by mouth daily      Multiple Vitamins-Minerals (TRUEPLUS DIABETIC MULTIVITAMIN) TABS Take 1 tablet by mouth daily.  Levothyroxine Sodium 175 MCG CAPS Take 175 mcg by mouth Daily       aspirin 81 MG EC tablet Take 81 mg by mouth daily. No current facility-administered medications on file prior to encounter. PROBLEM LIST    Patient Active Problem List   Diagnosis    SIRS (systemic inflammatory response syndrome) (HCC)    Type 2 diabetes mellitus with foot ulcer, with long-term current use of insulin (HCC)    Ulcer of right foot with fat layer exposed ([address]    Hallux valgus (acquired), right foot    Chronic pain syndrome    Infective urethritis    Chest pain    Hypertension    Type 2 diabetes mellitus (HCC)    GERD (gastroesophageal reflux disease)    Nausea and vomiting    Thyroid disease    Weakness    Hyperthyroidism    Palpitations    Diabetes ([address]    AMS (altered mental status)    WD-Diabetic ulcer of toe of left foot associated with type 2 diabetes mellitus, with fat layer exposed ([address]       REVIEW OF SYSTEMS    Pertinent items are noted in HPI.       Objective:      BP (!) 161/70   Pulse 71   Temp 97.8 °F (36.6 °C) (Temporal)   Resp 18     PHYSICAL EXAM  GEN: Awake, oriented, no distress, looks a little older than stated age, obese  HEENT: normocephalic head, symmetrical face, EOM's intact, hearing normal to spoken word  NEck: obese, but no carotid bruits  CV: S1, S2, RRR, I don't hear any murmr  RESP: CTA bilaterally   ABD: Obese, but non-tender  EXT: slightly edematous  Dermatologic exam: Visual inspection of the periwound reveals the skin to be normal in turgor and texture  Wound exam: see wound description below in procedure note  Pulses are palpable over DP bilaterally, good cap refill. MESERET's today are 0.8 bilaterally    Assessment:       Rodney France  appears to have a non-healing wound of the . The etiology of the wound is felt to be diabetic. There are multiple complicating factors including diabetes, probably infection today, and also chronic deformity of hammer toe which will cause added pressure on the are. .  A comprehensive wound management program would be helpful to heal this wound. Assessments completed include fall risk and nutritional, functional,and psychological status. If she is not significantly better next week, will order MESERET. She needs x-ray to r/o osteo. At this time appropriate care would include: periodic debridement and wound care as below. Problem List Items Addressed This Visit     WD-Diabetic ulcer of toe of left foot associated with type 2 diabetes mellitus, with fat layer exposed ([address]    Relevant Medications    Insulin Regular Human (HUMULIN R U-500, CONCENTRATED, SC)    Other Relevant Orders    XR TOE LEFT (MIN 2 VIEWS)    Culture, Wound            Procedure Note    Indications:  Based on my examination of this patient's wound(s) today, sharp excision into necrotic subcutaneous tissue is required to promote healing and evaluate the extent of previous healing. Performed by: Juan A Abreu MD    Consent obtained: Yes    Time out taken:  Yes    Pain Control:   none- needed. Debridement:Excisional Debridement    Using curette the wound(s) was/were sharply debrided down through and including the removal of epidermis, dermis and subcutaneous tissue.         Devitalized Tissue Debrided:  fibrin, biofilm, slough and exudate    Pre Debridement Measurements:  Are located in the Wound Documentation Flow Sheet    All active wounds listed below with today's date are evaluated  Wound(s)    debrided this date include # : 1     Post  Debridement Measurements:  Wound 03/04/21 Wound #1 Left 2nd Toe (Active)   Wound Image   03/04/21 0839   Wound Cleansed Cleansed with saline 03/04/21 0839   Wound Length (cm) 0.3 cm 03/04/21 0839   Wound Width (cm) 0.4 cm 03/04/21 0839   Wound Depth (cm) 0.1 cm 03/04/21 0839   Wound Surface Area (cm^2) 0.12 cm^2 03/04/21 0839   Wound Volume (cm^3) 0.01 cm^3 03/04/21 0839   Post-Procedure Length (cm) 0.3 cm 03/04/21 0912   Post-Procedure Width (cm) 0.4 cm 03/04/21 0912   Post-Procedure Depth (cm) 0.1 cm 03/04/21 0912   Post-Procedure Surface Area (cm^2) 0.12 cm^2 03/04/21 0912   Post-Procedure Volume (cm^3) 0.01 cm^3 03/04/21 0912   Distance Tunneling (cm) 0 cm 03/04/21 0839   Tunneling Position ___ O'Clock 0 03/04/21 0839   Undermining Starts ___ O'Clock 0 03/04/21 0839   Undermining Ends___ O'Clock 0 03/04/21 0839   Undermining Maxium Distance (cm) 0 03/04/21 0839   Wound Assessment Eschar dry 03/04/21 0839   Drainage Amount None 03/04/21 0839   Odor None 03/04/21 0839   Ava-wound Assessment Dry/flaky 03/04/21 0839   Margins Defined edges 03/04/21 0839   Wound Thickness Description not for Pressure Injury Partial thickness 03/04/21 0839   Number of days: 0       Percent of Wound(s) Debrided: 100%    Total  Area  Debrided:  0.12 sq cm     Bleeding:  Minimal    Hemostasis Achieved:  by pressure    Procedural Pain:  0  / 10     Post Procedural Pain:  0 / 10     Response to treatment:  Well tolerated by patient. Plan:     Discharge Instructions       PHYSICIAN ORDERS AND DISCHARGE INSTRUCTIONS    NOTE: Upon discharge from the [address], you will receive a patient experience survey. We would be grateful if you would take the time to fill this survey out.     Wound care order history:     MESERET's   Right    0.88   Left    Date 3/4/21   Vascular studies:   Date Imaging:   XRay ordered 3/4/2021    Cultures:   Date    Grafts:  Date    Antibiotics: Bactrim DS twice daily ordered on 3/4/2021              Earlier Wound care treatments:     Primary care physician:     Continuing wound care orders and information:              Residence:               Continue home health care with:     Wound Medications:    Wound cleansing:      Do not scrub or use excessive force. Wash hands with soap and water before and after dressing changes. Prior to applying a clean dressing, cleanse wound with normal saline,          wound cleanser, or mild soap and water. Ask the physician or nurse before getting the wound(s) wet in a shower   Daily Wound management:    Keep weight off wounds and reposition every 2 hours. Avoid standing for long periods of time. Apply wraps/stockings in AM and remove at bedtime. If swelling is present, elevate legs to the level of the heart or above for 30                              minutes 4-5 times a day and/or when sitting. When taking antibiotics take entire prescription as ordered by physician    do not stop taking until medicine is all gone. Orders for this week:  3/4/2021 MESERET needed with wrap up       Left 2nd toe--- wash with soap and water, pat dry. Roll 4x4 under toe to splint. Cover wound bed  with hydrofera blue dampened with saline, cut to size. Cover with 4x4 and wrap with conform and tape. Change every other day. RX:  Consult with:   [phone]    Follow up with Dr Rich Adam   In 1 weeks in the wound care center  Call [phone] for any questions or concerns.   Date__________   Time____________      Signature____________________________________________________        Treatment Note      Written Patient Dismissal Instructions Given          Electronically signed by Koby Garcia MD on 3/4/2021 at 10:46 AM

## 2021-03-09 LAB
CULTURE: ABNORMAL
CULTURE: ABNORMAL
Lab: ABNORMAL
SPECIMEN: ABNORMAL

## 2021-03-11 ENCOUNTER — HOSPITAL ENCOUNTER (OUTPATIENT)
Dept: WOUND CARE | Age: 76
Discharge: HOME | End: 2021-03-11
Payer: MEDICARE

## 2021-03-11 VITALS
RESPIRATION RATE: 16 BRPM | HEART RATE: 69 BPM | DIASTOLIC BLOOD PRESSURE: 54 MMHG | TEMPERATURE: 97.7 F | SYSTOLIC BLOOD PRESSURE: 153 MMHG

## 2021-03-11 DIAGNOSIS — L97.522 DIABETIC ULCER OF TOE OF LEFT FOOT ASSOCIATED WITH TYPE 2 DIABETES MELLITUS, WITH FAT LAYER EXPOSED (HCC): Primary | ICD-10-CM

## 2021-03-11 DIAGNOSIS — E11.621 DIABETIC ULCER OF TOE OF LEFT FOOT ASSOCIATED WITH TYPE 2 DIABETES MELLITUS, WITH FAT LAYER EXPOSED (HCC): Primary | ICD-10-CM

## 2021-03-11 PROCEDURE — 99213 OFFICE O/P EST LOW 20 MIN: CPT

## 2021-03-11 RX ORDER — CLOBETASOL PROPIONATE 0.5 MG/G
OINTMENT TOPICAL ONCE
Status: CANCELLED | OUTPATIENT
Start: 2021-03-11 | End: 2021-03-11

## 2021-03-11 RX ORDER — GINSENG 100 MG
CAPSULE ORAL ONCE
Status: CANCELLED | OUTPATIENT
Start: 2021-03-11 | End: 2021-03-11

## 2021-03-11 RX ORDER — BACITRACIN ZINC AND POLYMYXIN B SULFATE 500; 1000 [USP'U]/G; [USP'U]/G
OINTMENT TOPICAL ONCE
Status: CANCELLED | OUTPATIENT
Start: 2021-03-11 | End: 2021-03-11

## 2021-03-11 RX ORDER — BETAMETHASONE DIPROPIONATE 0.05 %
OINTMENT (GRAM) TOPICAL ONCE
Status: CANCELLED | OUTPATIENT
Start: 2021-03-11 | End: 2021-03-11

## 2021-03-11 RX ORDER — GENTAMICIN SULFATE 1 MG/G
OINTMENT TOPICAL ONCE
Status: CANCELLED | OUTPATIENT
Start: 2021-03-11 | End: 2021-03-11

## 2021-03-11 RX ORDER — BACITRACIN, NEOMYCIN, POLYMYXIN B 400; 3.5; 5 [USP'U]/G; MG/G; [USP'U]/G
OINTMENT TOPICAL ONCE
Status: CANCELLED | OUTPATIENT
Start: 2021-03-11 | End: 2021-03-11

## 2021-03-11 NOTE — PROGRESS NOTES
[facility] Progress Note With Procedure    Lito Pope  AGE: 77 y.o. GENDER: female  : 1945  EPISODE DATE:  3/11/2021     Subjective:     Chief Complaint   Patient presents with    Wound Check     LLE          HISTORY of PRESENT ILLNESS      Lito Pope is a 77 y.o. female who presents today for wound evaluation of Chronic traumatic ulcer(s) of the left foot/toe. The ulcer is of mild severity. The underlying cause of the wound is trauma from infected nail coming off. She is in for f/u- she has avoided the toe touching anything. She has no open wounds today. I discuss getting podiatry consult to look at the hammer toe. Wound Pain Timing/Severity: none  Quality of pain: N/A  Severity of pain:  0 / 10   Modifying Factors: chronic pressure  Associated Signs/Symptoms: none        PAST MEDICAL HISTORY        Diagnosis Date    Arthritis     Cellulitis of right foot     Chronic kidney disease     Depression     Diabetes mellitus (HCC)     Frequent falls     GERD (gastroesophageal reflux disease)     H/O Doppler ultrasound 10/01/2020     No evidence of significant venous insufficiency bilaterally. No evidence of DVT or SVT in the bilaterally.  H/O echocardiogram 10/01/2020    EF 60-65%. No significant valvular disease.  Hallux valgus (acquired), right foot 2017    Hypertension     Other disorders of kidney and ureter     1 kidney.  Thyroid disease     hypothyroid    Type 2 diabetes mellitus with foot ulcer, with long-term current use of insulin ([address] 2012    Ulcer of right foot with fat layer exposed ([address] 2013    WD-Diabetic ulcer of toe of left foot associated with type 2 diabetes mellitus, with fat layer exposed ([address] 3/4/2021       PAST SURGICAL HISTORY    Past Surgical History:   Procedure Laterality Date    ABDOMEN SURGERY      CYST REMOVAL      from kidney.     DILATATION, ESOPHAGUS      ENDOSCOPY, COLON, DIAGNOSTIC      FOOT SURGERY 12/30/11    had infection  and a biopsy was sent away for analysis    HERNIA REPAIR      HYSTERECTOMY      KIDNEY REMOVAL  1973       FAMILY HISTORY    Family History   Problem Relation Age of Onset    Arthritis Mother     Cancer Mother     Diabetes Mother     High Blood Pressure Mother     Asthma Father     Heart Disease Father     Diabetes Brother        SOCIAL HISTORY    Social History     Tobacco Use    Smoking status: Never Smoker    Smokeless tobacco: Never Used   Substance Use Topics    Alcohol use: No    Drug use: No       ALLERGIES    Allergies   Allergen Reactions    Ritalin [Methylphenidate] Rash    Rocephin [Ceftriaxone Sodium-Lidocaine] Rash       MEDICATIONS    Current Outpatient Medications on File Prior to Encounter   Medication Sig Dispense Refill    Insulin Regular Human (HUMULIN R U-500, CONCENTRATED, SC) Inject 130 Units into the skin 70  Units before dinner      sulfamethoxazole-trimethoprim (BACTRIM DS;SEPTRA DS) 800-160 MG per tablet Take 1 tablet by mouth 2 times daily for 10 days 20 tablet 0    insulin glargine (LANTUS;BASAGLAR) 100 UNIT/ML injection pen Inject 15 Units into the skin nightly 5 pen 3    amLODIPine (NORVASC) 10 MG tablet Take 1 tablet by mouth daily Hold for systolic blood pressure less than or equal to 110 30 tablet 3    atenolol (TENORMIN) 50 MG tablet Take 1 tablet by mouth 2 times daily Hold for systolic blood pressure less than or equal to 110 30 tablet 3    hydrALAZINE (APRESOLINE) 10 MG tablet Take 1 tablet by mouth 2 times daily Hold for systolic blood pressure less than or equal to 110 90 tablet 1    Blood Pressure KIT 1 each by Does not apply route 2 times daily 1 kit 0    DULoxetine (CYMBALTA) 60 MG extended release capsule Take 60 mg by mouth daily   1    pantoprazole (PROTONIX) 40 MG tablet Take 40 mg by mouth daily   3    atorvastatin (LIPITOR) 20 MG tablet Take 20 mg by mouth daily      Multiple Vitamins-Minerals (TRUEPLUS DIABETIC MULTIVITAMIN) TABS Take 1 tablet by mouth daily.  Levothyroxine Sodium 175 MCG CAPS Take 175 mcg by mouth Daily       aspirin 81 MG EC tablet Take 81 mg by mouth daily. No current facility-administered medications on file prior to encounter. REVIEW OF SYSTEMS    Pertinent items are noted in HPI. Constitutional: Negative for systemic symptoms including fever, chills and malaise. Objective:      BP (!) 153/54   Pulse 69   Temp 97.7 °F (36.5 °C) (Temporal)   Resp 16     PHYSICAL EXAM    General: The patient is in no acute distress. Mental status:  Patient is appropriate, is  oriented to place and plan of care. Dermatologic exam: Visual inspection of the periwound reveals the skin to be normal in turgor and texture  Wound exam: see wound description below in procedure note      Assessment:     Problem List Items Addressed This Visit     WD-Diabetic ulcer of toe of left foot associated with type 2 diabetes mellitus, with fat layer exposed ([address] - Primary    Relevant Orders    Supply: Wound Cleanser    Supply: Wound Dressings          Status of wound progress and description from last visit:   Healed toe wound today. Plan:       Discharge Instructions         PHYSICIAN ORDERS AND DISCHARGE INSTRUCTIONS     NOTE: Upon discharge from the [address], you will receive a patient experience survey.  We would be grateful if you would take the time to fill this survey out.     Wound care order history:                 MESERET's   Right      Left    0.88    Date 3/4/21              Vascular studies:   Date               Imaging:   XRay ordered 3/4/2021               Cultures:   Date               Grafts:  Date               Antibiotics: Bactrim DS twice daily ordered on 3/4/2021              Earlier Wound care treatments:                           Primary care physician:      Continuing wound care orders and information:              Residence:               Continue home health care with: Wound Medications:              Wound cleansing:                           Do not scrub or use excessive force. Wash hands with soap and water before and after dressing changes. Prior to applying a clean dressing, cleanse wound with normal saline,                                wound cleanser, or mild soap and water. Ask the physician or nurse before getting the wound(s) wet in a shower              Daily Wound management:                          Keep weight off wounds and reposition every 2 hours. Avoid standing for long periods of time. Apply wraps/stockings in AM and remove at bedtime. If swelling is present, elevate legs to the level of the heart or above for 30                              minutes 4-5 times a day and/or when sitting. When taking antibiotics take entire prescription as ordered by physician                             do not stop taking until medicine is all gone.                                                       Orders for this week:  3/11/2021                 Left 2nd toe--- wash with soap and water, pat dry. Lotion to area daily.      RX:  Consult with:   [phone]     Discharge today   Call  for any questions or concerns.   Date__________   Time____________        Signature____________________________________________________             Treatment Note      Written Patient Dismissal Instructions Given            Electronically signed by Deven Nj MD on 3/11/2021 at 10:04 AM

## 2021-05-09 ENCOUNTER — APPOINTMENT (OUTPATIENT)
Dept: GENERAL RADIOLOGY | Age: 76
DRG: 690 | End: 2021-05-09
Payer: MEDICARE

## 2021-05-09 ENCOUNTER — HOSPITAL ENCOUNTER (INPATIENT)
Age: 76
LOS: 4 days | Discharge: ANOTHER HOSPITAL | DRG: 690 | End: 2021-05-13
Attending: EMERGENCY MEDICINE | Admitting: INTERNAL MEDICINE
Payer: MEDICARE

## 2021-05-09 ENCOUNTER — APPOINTMENT (OUTPATIENT)
Dept: CT IMAGING | Age: 76
DRG: 690 | End: 2021-05-09
Payer: MEDICARE

## 2021-05-09 DIAGNOSIS — N39.0 URINARY TRACT INFECTION WITHOUT HEMATURIA, SITE UNSPECIFIED: Primary | ICD-10-CM

## 2021-05-09 DIAGNOSIS — R53.1 GENERALIZED WEAKNESS: ICD-10-CM

## 2021-05-09 DIAGNOSIS — R41.82 ALTERED MENTAL STATUS, UNSPECIFIED ALTERED MENTAL STATUS TYPE: ICD-10-CM

## 2021-05-09 LAB
ALBUMIN SERPL-MCNC: 3.5 GM/DL (ref 3.4–5)
ALP BLD-CCNC: 117 IU/L (ref 40–129)
ALT SERPL-CCNC: 26 U/L (ref 10–40)
ANION GAP SERPL CALCULATED.3IONS-SCNC: 10 MMOL/L (ref 4–16)
AST SERPL-CCNC: 26 IU/L (ref 15–37)
BACTERIA: ABNORMAL /HPF
BASE EXCESS MIXED: 2.8 (ref 0–2.3)
BASE EXCESS: ABNORMAL (ref 0–2.4)
BASOPHILS ABSOLUTE: 0.1 K/CU MM
BASOPHILS RELATIVE PERCENT: 0.4 % (ref 0–1)
BILIRUB SERPL-MCNC: 0.5 MG/DL (ref 0–1)
BILIRUBIN URINE: NEGATIVE MG/DL
BLOOD, URINE: NEGATIVE
BUN BLDV-MCNC: 23 MG/DL (ref 6–23)
CALCIUM SERPL-MCNC: 10.2 MG/DL (ref 8.3–10.6)
CAST TYPE: NEGATIVE /HPF
CHLORIDE BLD-SCNC: 92 MMOL/L (ref 99–110)
CLARITY: ABNORMAL
CO2 CONTENT: 29 MMOL/L (ref 19–24)
CO2: 26 MMOL/L (ref 21–32)
COLOR: YELLOW
CREAT SERPL-MCNC: 1 MG/DL (ref 0.6–1.1)
CRYSTAL TYPE: NEGATIVE /HPF
DIFFERENTIAL TYPE: ABNORMAL
EKG ATRIAL RATE: 73 BPM
EKG DIAGNOSIS: NORMAL
EKG P AXIS: 34 DEGREES
EKG P-R INTERVAL: 206 MS
EKG Q-T INTERVAL: 424 MS
EKG QRS DURATION: 86 MS
EKG QTC CALCULATION (BAZETT): 467 MS
EKG R AXIS: 1 DEGREES
EKG T AXIS: 67 DEGREES
EKG VENTRICULAR RATE: 73 BPM
EOSINOPHILS ABSOLUTE: 0.2 K/CU MM
EOSINOPHILS RELATIVE PERCENT: 1.8 % (ref 0–3)
EPITHELIAL CELLS, UA: ABNORMAL /HPF
GFR AFRICAN AMERICAN: >60 ML/MIN/1.73M2
GFR NON-AFRICAN AMERICAN: 54 ML/MIN/1.73M2
GLUCOSE BLD-MCNC: 176 MG/DL (ref 70–99)
GLUCOSE BLD-MCNC: 181 MG/DL (ref 70–99)
GLUCOSE BLD-MCNC: 41 MG/DL (ref 70–99)
GLUCOSE BLD-MCNC: 68 MG/DL (ref 70–99)
GLUCOSE BLD-MCNC: 92 MG/DL (ref 70–99)
GLUCOSE, URINE: NEGATIVE MG/DL
HCO3 VENOUS: 27.7 MMOL/L (ref 19–25)
HCT VFR BLD CALC: 37.7 % (ref 37–47)
HEMOGLOBIN: 11.9 GM/DL (ref 12.5–16)
IMMATURE NEUTROPHIL %: 1 % (ref 0–0.43)
KETONES, URINE: NEGATIVE MG/DL
LEUKOCYTE ESTERASE, URINE: ABNORMAL
LYMPHOCYTES ABSOLUTE: 2.9 K/CU MM
LYMPHOCYTES RELATIVE PERCENT: 23.8 % (ref 24–44)
MCH RBC QN AUTO: 29.5 PG (ref 27–31)
MCHC RBC AUTO-ENTMCNC: 31.6 % (ref 32–36)
MCV RBC AUTO: 93.3 FL (ref 78–100)
MONOCYTES ABSOLUTE: 0.8 K/CU MM
MONOCYTES RELATIVE PERCENT: 6.1 % (ref 0–4)
NITRITE URINE, QUANTITATIVE: NEGATIVE
O2 SAT, VEN: 81.6 % (ref 50–70)
PCO2, VEN: 42.9 MMHG (ref 38–52)
PDW BLD-RTO: 14.2 % (ref 11.7–14.9)
PH VENOUS: 7.42 (ref 7.32–7.42)
PH, URINE: 5 (ref 5–8)
PLATELET # BLD: 251 K/CU MM (ref 140–440)
PMV BLD AUTO: 10 FL (ref 7.5–11.1)
PO2, VEN: 45.4 MMHG (ref 28–48)
POTASSIUM SERPL-SCNC: 3.9 MMOL/L (ref 3.5–5.1)
PROTEIN UA: 300 MG/DL
RBC # BLD: 4.04 M/CU MM (ref 4.2–5.4)
RBC URINE: NEGATIVE /HPF (ref 0–6)
SARS-COV-2, NAAT: NOT DETECTED
SEGMENTED NEUTROPHILS ABSOLUTE COUNT: 8.3 K/CU MM
SEGMENTED NEUTROPHILS RELATIVE PERCENT: 66.9 % (ref 36–66)
SODIUM BLD-SCNC: 128 MMOL/L (ref 135–145)
SOURCE, BLOOD GAS: ABNORMAL
SOURCE: NORMAL
SPECIFIC GRAVITY UA: 1.02 (ref 1–1.03)
TOTAL IMMATURE NEUTOROPHIL: 0.12 K/CU MM
TOTAL PROTEIN: 7.4 GM/DL (ref 6.4–8.2)
TROPONIN T: <0.01 NG/ML
UROBILINOGEN, URINE: 0.2 MG/DL (ref 0.2–1)
WBC # BLD: 12.4 K/CU MM (ref 4–10.5)
WBC UA: ABNORMAL /HPF (ref 0–5)

## 2021-05-09 PROCEDURE — 93005 ELECTROCARDIOGRAM TRACING: CPT | Performed by: EMERGENCY MEDICINE

## 2021-05-09 PROCEDURE — 2140000000 HC CCU INTERMEDIATE R&B

## 2021-05-09 PROCEDURE — 96365 THER/PROPH/DIAG IV INF INIT: CPT

## 2021-05-09 PROCEDURE — 2580000003 HC RX 258: Performed by: NURSE PRACTITIONER

## 2021-05-09 PROCEDURE — 1200000000 HC SEMI PRIVATE

## 2021-05-09 PROCEDURE — 87086 URINE CULTURE/COLONY COUNT: CPT

## 2021-05-09 PROCEDURE — 6370000000 HC RX 637 (ALT 250 FOR IP): Performed by: NURSE PRACTITIONER

## 2021-05-09 PROCEDURE — 84484 ASSAY OF TROPONIN QUANT: CPT

## 2021-05-09 PROCEDURE — 6360000002 HC RX W HCPCS: Performed by: EMERGENCY MEDICINE

## 2021-05-09 PROCEDURE — 87635 SARS-COV-2 COVID-19 AMP PRB: CPT

## 2021-05-09 PROCEDURE — 93010 ELECTROCARDIOGRAM REPORT: CPT | Performed by: INTERNAL MEDICINE

## 2021-05-09 PROCEDURE — 99284 EMERGENCY DEPT VISIT MOD MDM: CPT

## 2021-05-09 PROCEDURE — 2580000003 HC RX 258: Performed by: EMERGENCY MEDICINE

## 2021-05-09 PROCEDURE — 80053 COMPREHEN METABOLIC PANEL: CPT

## 2021-05-09 PROCEDURE — 81001 URINALYSIS AUTO W/SCOPE: CPT

## 2021-05-09 PROCEDURE — 74176 CT ABD & PELVIS W/O CONTRAST: CPT

## 2021-05-09 PROCEDURE — 82962 GLUCOSE BLOOD TEST: CPT

## 2021-05-09 PROCEDURE — 70450 CT HEAD/BRAIN W/O DYE: CPT

## 2021-05-09 PROCEDURE — 71045 X-RAY EXAM CHEST 1 VIEW: CPT

## 2021-05-09 PROCEDURE — 85025 COMPLETE CBC W/AUTO DIFF WBC: CPT

## 2021-05-09 RX ORDER — AMLODIPINE BESYLATE 10 MG/1
10 TABLET ORAL DAILY
Status: DISCONTINUED | OUTPATIENT
Start: 2021-05-10 | End: 2021-05-13 | Stop reason: HOSPADM

## 2021-05-09 RX ORDER — LEVOTHYROXINE SODIUM 175 UG/1
175 CAPSULE ORAL DAILY
Status: DISCONTINUED | OUTPATIENT
Start: 2021-05-10 | End: 2021-05-09

## 2021-05-09 RX ORDER — DEXTROSE MONOHYDRATE 50 MG/ML
100 INJECTION, SOLUTION INTRAVENOUS PRN
Status: DISCONTINUED | OUTPATIENT
Start: 2021-05-09 | End: 2021-05-13 | Stop reason: HOSPADM

## 2021-05-09 RX ORDER — NICOTINE POLACRILEX 4 MG
15 LOZENGE BUCCAL PRN
Status: DISCONTINUED | OUTPATIENT
Start: 2021-05-09 | End: 2021-05-13 | Stop reason: HOSPADM

## 2021-05-09 RX ORDER — ASPIRIN 81 MG/1
81 TABLET ORAL DAILY
Status: DISCONTINUED | OUTPATIENT
Start: 2021-05-10 | End: 2021-05-13 | Stop reason: HOSPADM

## 2021-05-09 RX ORDER — ATENOLOL 50 MG/1
50 TABLET ORAL 2 TIMES DAILY
Status: DISCONTINUED | OUTPATIENT
Start: 2021-05-09 | End: 2021-05-13 | Stop reason: HOSPADM

## 2021-05-09 RX ORDER — M-VIT,TX,IRON,MINS/CALC/FOLIC 27MG-0.4MG
1 TABLET ORAL DAILY
Status: DISCONTINUED | OUTPATIENT
Start: 2021-05-10 | End: 2021-05-13 | Stop reason: HOSPADM

## 2021-05-09 RX ORDER — ACETAMINOPHEN 650 MG/1
650 SUPPOSITORY RECTAL EVERY 6 HOURS PRN
Status: DISCONTINUED | OUTPATIENT
Start: 2021-05-09 | End: 2021-05-13 | Stop reason: HOSPADM

## 2021-05-09 RX ORDER — ONDANSETRON 2 MG/ML
4 INJECTION INTRAMUSCULAR; INTRAVENOUS EVERY 6 HOURS PRN
Status: DISCONTINUED | OUTPATIENT
Start: 2021-05-09 | End: 2021-05-13 | Stop reason: HOSPADM

## 2021-05-09 RX ORDER — PANTOPRAZOLE SODIUM 40 MG/1
40 TABLET, DELAYED RELEASE ORAL DAILY
Status: DISCONTINUED | OUTPATIENT
Start: 2021-05-10 | End: 2021-05-13 | Stop reason: HOSPADM

## 2021-05-09 RX ORDER — ATORVASTATIN CALCIUM 20 MG/1
20 TABLET, FILM COATED ORAL DAILY
Status: DISCONTINUED | OUTPATIENT
Start: 2021-05-10 | End: 2021-05-13 | Stop reason: HOSPADM

## 2021-05-09 RX ORDER — SODIUM CHLORIDE 9 MG/ML
25 INJECTION, SOLUTION INTRAVENOUS PRN
Status: DISCONTINUED | OUTPATIENT
Start: 2021-05-09 | End: 2021-05-13 | Stop reason: HOSPADM

## 2021-05-09 RX ORDER — POLYETHYLENE GLYCOL 3350 17 G/17G
17 POWDER, FOR SOLUTION ORAL DAILY PRN
Status: DISCONTINUED | OUTPATIENT
Start: 2021-05-09 | End: 2021-05-13 | Stop reason: HOSPADM

## 2021-05-09 RX ORDER — SODIUM CHLORIDE 0.9 % (FLUSH) 0.9 %
5-40 SYRINGE (ML) INJECTION PRN
Status: DISCONTINUED | OUTPATIENT
Start: 2021-05-09 | End: 2021-05-13 | Stop reason: HOSPADM

## 2021-05-09 RX ORDER — DEXTROSE MONOHYDRATE 25 G/50ML
12.5 INJECTION, SOLUTION INTRAVENOUS PRN
Status: DISCONTINUED | OUTPATIENT
Start: 2021-05-09 | End: 2021-05-13 | Stop reason: HOSPADM

## 2021-05-09 RX ORDER — DULOXETIN HYDROCHLORIDE 60 MG/1
60 CAPSULE, DELAYED RELEASE ORAL DAILY
Status: DISCONTINUED | OUTPATIENT
Start: 2021-05-10 | End: 2021-05-13 | Stop reason: HOSPADM

## 2021-05-09 RX ORDER — HYDRALAZINE HYDROCHLORIDE 10 MG/1
10 TABLET, FILM COATED ORAL 2 TIMES DAILY
Status: DISCONTINUED | OUTPATIENT
Start: 2021-05-09 | End: 2021-05-13 | Stop reason: HOSPADM

## 2021-05-09 RX ORDER — PROMETHAZINE HYDROCHLORIDE 12.5 MG/1
12.5 TABLET ORAL EVERY 6 HOURS PRN
Status: DISCONTINUED | OUTPATIENT
Start: 2021-05-09 | End: 2021-05-13 | Stop reason: HOSPADM

## 2021-05-09 RX ORDER — SODIUM CHLORIDE 0.9 % (FLUSH) 0.9 %
5-40 SYRINGE (ML) INJECTION EVERY 12 HOURS SCHEDULED
Status: DISCONTINUED | OUTPATIENT
Start: 2021-05-09 | End: 2021-05-13 | Stop reason: HOSPADM

## 2021-05-09 RX ORDER — ACETAMINOPHEN 325 MG/1
650 TABLET ORAL EVERY 6 HOURS PRN
Status: DISCONTINUED | OUTPATIENT
Start: 2021-05-09 | End: 2021-05-13 | Stop reason: HOSPADM

## 2021-05-09 RX ADMIN — HYDRALAZINE HYDROCHLORIDE 10 MG: 10 TABLET, FILM COATED ORAL at 21:56

## 2021-05-09 RX ADMIN — DEXTROSE MONOHYDRATE 12.5 G: 25 INJECTION, SOLUTION INTRAVENOUS at 23:54

## 2021-05-09 RX ADMIN — Medication 15 G: at 21:50

## 2021-05-09 RX ADMIN — CEFTRIAXONE SODIUM 1000 MG: 1 INJECTION, POWDER, FOR SOLUTION INTRAMUSCULAR; INTRAVENOUS at 18:30

## 2021-05-09 ASSESSMENT — PAIN DESCRIPTION - ORIENTATION: ORIENTATION: RIGHT

## 2021-05-09 ASSESSMENT — PAIN DESCRIPTION - LOCATION: LOCATION: FLANK

## 2021-05-09 NOTE — H&P
History and Physical      Name:  Sam Frazier /Age/Sex:   (72 y.o. female)   MRN & CSN:  [id number] & [id number] Admission Date/Time: 2021  3:27 PM   Location:   PCP: TOÑITO Pérez       Sam Frazier is a 77 y.o.  female  who presents with Flank Pain (right side x 2 days pt thinks)      Assessment and Plan:     1. Urinary Tract Infection      Right flank and upper abdominal pain    Confusion   Urinalysis Leukocyte Esterase, Urine2+   Bacteria, UAMANY/HPF   Head CT W/O contrast Impression:  1. No acute intracranial abnormality. 2. Stable diffuse parenchymal volume loss with moderate chronic white matter   microvascular ischemic changes and chronic lacunar infarcts in the left basal   ganglia. Culture, Rocephin, Fluid, fall precaution, GI and DVT prophylaxis,     2. Diabetes Mellitus      POCT glucose    Hypoglycemic protocol   Home medication. Sliding scale insulin with meal coverage,    3. Hypothyroidism     Synthroid.     4. Other Health Concerns     Hypertension, hyperlipidemia, Gerd, Depression, Hyponatremia    Medications:   Medications:    cefTRIAXone (ROCEPHIN) IV  1,000 mg Intravenous Once    insulin lispro  0-6 Units Subcutaneous TID WC    insulin lispro  0-3 Units Subcutaneous Nightly      Infusions:    dextrose       PRN Meds: glucose, 15 g, PRN  dextrose, 12.5 g, PRN  glucagon (rDNA), 1 mg, PRN  dextrose, 100 mL/hr, PRN        Current Facility-Administered Medications:     cefTRIAXone (ROCEPHIN) 1000 mg IVPB in 50 mL D5W minibag, 1,000 mg, Intravenous, Once, Servando Horn MD    glucose (GLUTOSE) 40 % oral gel 15 g, 15 g, Oral, PRN, Victoriano Akaeze, APRN - CNP    dextrose 50 % IV solution, 12.5 g, Intravenous, PRN, Victoriano Akaeze, APRN - CNP    glucagon (rDNA) injection 1 mg, 1 mg, Intramuscular, PRN, Victoriano Akaeze, APRN - CNP    dextrose 5 % solution, 100 mL/hr, Intravenous, PRN, Victoriano Akaeze, APRN - CNP    insulin lispro (HUMALOG) injection vial 0-6 Units, 0-6 Units, Subcutaneous, TID WC, Victoriano Akaeze, APRN - CNP    insulin lispro (HUMALOG) injection vial 0-3 Units, 0-3 Units, Subcutaneous, Nightly, Victoriano Jonah, APRN - CNP    Current Outpatient Medications:     Insulin Regular Human (HUMULIN R U-500, CONCENTRATED, SC), Inject 130 Units into the skin 70 Units before dinner, Disp: , Rfl:     insulin glargine (LANTUS;BASAGLAR) 100 UNIT/ML injection pen, Inject 15 Units into the skin nightly, Disp: 5 pen, Rfl: 3    amLODIPine (NORVASC) 10 MG tablet, Take 1 tablet by mouth daily Hold for systolic blood pressure less than or equal to 110, Disp: 30 tablet, Rfl: 3    atenolol (TENORMIN) 50 MG tablet, Take 1 tablet by mouth 2 times daily Hold for systolic blood pressure less than or equal to 110, Disp: 30 tablet, Rfl: 3    hydrALAZINE (APRESOLINE) 10 MG tablet, Take 1 tablet by mouth 2 times daily Hold for systolic blood pressure less than or equal to 110, Disp: 90 tablet, Rfl: 1    Blood Pressure KIT, 1 each by Does not apply route 2 times daily, Disp: 1 kit, Rfl: 0    DULoxetine (CYMBALTA) 60 MG extended release capsule, Take 60 mg by mouth daily , Disp: , Rfl: 1    pantoprazole (PROTONIX) 40 MG tablet, Take 40 mg by mouth daily , Disp: , Rfl: 3    atorvastatin (LIPITOR) 20 MG tablet, Take 20 mg by mouth daily, Disp: , Rfl:     Multiple Vitamins-Minerals (TRUEPLUS DIABETIC MULTIVITAMIN) TABS, Take 1 tablet by mouth daily. , Disp: , Rfl:     Levothyroxine Sodium 175 MCG CAPS, Take 175 mcg by mouth Daily , Disp: , Rfl:     aspirin 81 MG EC tablet, Take 81 mg by mouth daily. , Disp: , Rfl:     History of present illness     Chief Complaint: Flank Pain (right side x 2 days pt thinks)      Darrian Rivera is a 77 y.o.  female  With PMH of Arthritis, Thyroid disease, Gerd and diabetes. She  presents with  Complaints of confusion, right flank and right abdominal pain.  According to patient and , this started 2days ago   and getting worse. The pain was rated a 7 out of 10. She has also had associated mobility concerns and reduced oral intake. She denied headache and chest yunier. Review of Systems     GENERAL:  Denies fever, chills, night sweats, or changes in weight. EYES:  Denies recent visual changes. ENT:  Denies ear pain, hearing loss or tinnitus  RESP:  Denies any cough, dyspnea,   CV:  Denies any chest pain with exertion or at rest, palpitations, syncope, or edema. GI:  Denies any dysphagia, nausea, vomiting, abdominal pain, heartburn, changes in bowel habit,  MUSCULOSKELETAL:  Denies any joint swelling, joint pain, or loss of range of motion. NEURO:  Denies any headaches, tremors, dizziness, vertigo, memory loss, confusion, weakness, numbness or tingling. PSYCH:  Denies any sleeping problems, history of abuse,   HEME/LYMPHATIC/IMMUNO:  Denies , bruising, bleeding abnormalities   ENDO:  Denies any heat or cold intolerance,       Objective:   No intake or output data in the 24 hours ending 05/09/21 1930   Vitals:   Vitals:    05/09/21 1531   BP: (!) 160/65   Pulse: 72   Resp: 18   Temp: 98.1 °F (36.7 °C)   SpO2: 96%     Physical Exam:     General:  awake, alert, cooperative, no  Acute t distress  EYES:Lids and lashes normal, pupils equal, round ,extra ocular muscles intact, sclera clear, conjunctiva normal  ENT:  Normocephalic, oral pharynx with moist mucus membranes  NECK:  Supple, symmetrical, trachea midline, no adenopathy,  LUNGS:  Clear to auscultate bilaterally, no rales ronchi or wheezing noted.   CARDIOVASCULAR:  regular rate and rhythm, normal S1 and S2,no murmur noted, peripheral pulses 2+, no pitting edema  ABDOMEN:  Upper quadrant abdominal tenderness palpation without peritoneal signs, otherwise abdomen is soft, nondistended and nonrigid, No tenderness or peritoneal signs elsewhere, No masses, normal bowel sounds   MUSCULOSKELETAL:  ROM of all extremities grossly wnl  NEUROLOGIC: AOx 3,  Cranial nerves II-XII are grossly intact. Motor is 5 out of 5 bilaterally. Sensory is intact, no lateralizing findings. SKIN:  no bruising or bleeding, normal skin color, turgor, no redness,       Past Medical History:      Past Medical History:   Diagnosis Date    Arthritis     Cellulitis of right foot     Chronic kidney disease     Depression     Diabetes mellitus ([address]     Frequent falls     GERD (gastroesophageal reflux disease)     H/O Doppler ultrasound 10/01/2020     No evidence of significant venous insufficiency bilaterally. No evidence of DVT or SVT in the bilaterally.  H/O echocardiogram 10/01/2020    EF 60-65%. No significant valvular disease.  Hallux valgus (acquired), right foot 5/1/2017    Hypertension     Other disorders of kidney and ureter     1 kidney.  Thyroid disease     hypothyroid    Type 2 diabetes mellitus with foot ulcer, with long-term current use of insulin ([address] 1/18/2012    Ulcer of right foot with fat layer exposed ([address] 2/6/2013    WD-Diabetic ulcer of toe of left foot associated with type 2 diabetes mellitus, with fat layer exposed ([address] 3/4/2021     PSHX:  has a past surgical history that includes Hysterectomy; Foot surgery (12/30/11); cyst removal; Kidney removal (1973); Abdomen surgery; hernia repair; Endoscopy, colon, diagnostic; and Dilatation, esophagus. Allergies: Allergies   Allergen Reactions    Ritalin [Methylphenidate] Rash    Rocephin [Ceftriaxone Sodium-Lidocaine] Rash       FAM HX: family history includes Arthritis in her mother; Asthma in her father; Cancer in her mother; Diabetes in her brother and mother; Heart Disease in her father; High Blood Pressure in her mother.     Soc HX:   Social History     Socioeconomic History    Marital status:      Spouse name: None    Number of children: None    Years of education: None    Highest education level: None   Occupational History    None   Social Needs    Financial resource strain: None    Food insecurity Worry: None     Inability: None    Transportation needs     Medical: None     Non-medical: None   Tobacco Use    Smoking status: Never Smoker    Smokeless tobacco: Never Used   Substance and Sexual Activity    Alcohol use: No    Drug use: No    Sexual activity: Yes   Lifestyle    Physical activity     Days per week: None     Minutes per session: None    Stress: None   Relationships    Social connections     Talks on phone: None     Gets together: None     Attends [employer] service: None     Active member of club or organization: None     Attends meetings of clubs or organizations: None     Relationship status: None    Intimate partner violence     Fear of current or ex partner: None     Emotionally abused: None     Physically abused: None     Forced sexual activity: None   Other Topics Concern    None   Social History Narrative    None       Electronically signed by MILADIS Medel CNP on 5/9/2021 at 7:30 PM

## 2021-05-09 NOTE — ED PROVIDER NOTES
EMERGENCY DEPARTMENT ENCOUNTER    Patient: Valente Lind  MRN: [id number]  : 1945  Date of Evaluation: 2021  ED Provider:  [address]  Chief Complaint   Patient presents with    Flank Pain     right side x 2 days pt thinks       HPI  Valente Lind is a 77 y.o. female who presents with her  and daughter for evaluation for altered mental status. They report the patient has been confused today and slow to eat and slow with responses to them. She also has been complaining of some right flank and right upper abdominal pain since yesterday. Patient does affirm this. This is mild to moderate in severity has been present since yesterday. No known modifying factors. Has had mild wheezing and nonproductive cough over this time. Denies chest pain. Denies fever. Denies any other associated symptoms or complaints or concerns.     REVIEW OF SYSTEMS    I have reviewed the nursing notes    Constitutional: negative for fever, chills, weight change, change in appetite  Neurological: negative for HA, lightheadedness, numbness, weakness  Ophthalmic: negative for vision change  ENT: negative for congestion, runny nose, sore throat  Cardiovascular: negative for chest pain, palpitations  Respiratory: negative for sputum  GI: negative for nausea, vomiting, diarrhea, constipation, hematemesis, hematochezia, melena   : negative for dysuria, hematuria, vaginal bleeding or discharge  Musculoskeletal: negative for myalgias, decreased ROM, joint swelling  Dermatological: negative for rash, pruritis  Endocrine: negative for temperature intolerance, diaphoresis  Hematological: negative for anemia, bleeding      PAST MEDICAL HISTORY  Past Medical History:   Diagnosis Date    Arthritis     Cellulitis of right foot     Chronic kidney disease     Depression     Diabetes mellitus ([facility] [address]     Frequent falls     GERD (gastroesophageal reflux disease)     H/O Doppler ultrasound 10/01/2020 No evidence of significant venous insufficiency bilaterally. No evidence of DVT or SVT in the bilaterally.  H/O echocardiogram 10/01/2020    EF 60-65%. No significant valvular disease.  Hallux valgus (acquired), right foot 5/1/2017    Hypertension     Other disorders of kidney and ureter     1 kidney.  Thyroid disease     hypothyroid    Type 2 diabetes mellitus with foot ulcer, with long-term current use of insulin ([address] 1/18/2012    Ulcer of right foot with fat layer exposed ([address] 2/6/2013    WD-Diabetic ulcer of toe of left foot associated with type 2 diabetes mellitus, with fat layer exposed ([address] 3/4/2021       CURRENT MEDICATIONS  [unfilled]    ALLERGIES  Allergies   Allergen Reactions    Ritalin [Methylphenidate] Rash    Rocephin [Ceftriaxone Sodium-Lidocaine] Rash       SURGICAL HISTORY  Past Surgical History:   Procedure Laterality Date    ABDOMEN SURGERY      CYST REMOVAL      from kidney.     DILATATION, ESOPHAGUS      ENDOSCOPY, COLON, DIAGNOSTIC      FOOT SURGERY  12/30/11    had infection  and a biopsy was sent away for analysis    HERNIA REPAIR      HYSTERECTOMY      KIDNEY REMOVAL  1973       FAMILY HISTORY  Family History   Problem Relation Age of Onset    Arthritis Mother     Cancer Mother     Diabetes Mother     High Blood Pressure Mother     Asthma Father     Heart Disease Father     Diabetes Brother        SOCIAL HISTORY  Social History     Socioeconomic History    Marital status:      Spouse name: None    Number of children: None    Years of education: None    Highest education level: None   Occupational History    None   Social Needs    Financial resource strain: None    Food insecurity     Worry: None     Inability: None    Transportation needs     Medical: None     Non-medical: None   Tobacco Use    Smoking status: Never Smoker    Smokeless tobacco: Never Used   Substance and Sexual Activity    Alcohol use: No    Drug use: No    Sexual activity: Yes   Lifestyle    Physical activity     Days per week: None     Minutes per session: None    Stress: None   Relationships    Social connections     Talks on phone: None     Gets together: None     Attends Anabaptism service: None     Active member of club or organization: None     Attends meetings of clubs or organizations: None     Relationship status: None    Intimate partner violence     Fear of current or ex partner: None     Emotionally abused: None     Physically abused: None     Forced sexual activity: None   Other Topics Concern    None   Social History Narrative    None           **Past medical, family and social histories reviewed and verified by me**      PHYSICAL EXAM  VITAL SIGNS:   ED Triage Vitals [05/09/21 1531]   Enc Vitals Group      BP (!) 160/65      Pulse 72      Resp 18      Temp 98.1 °F (36.7 °C)      Temp Source Oral      SpO2 96 %      Weight 185 lb (83.9 kg)      Height 5' 3\" (1.6 m)      Head Circumference       Peak Flow       Pain Score       Pain Loc       Pain Edu? Excl. in [address]? Vitals during ED course were reviewed and are as charted. Constitutional: Minimal distress, Non-toxic appearance    Eyes: Conjunctiva normal, No discharge,, PERRL, EOMI    HENT: Normocephalic, Atraumatic, oropharynx moist    Neck: Supple, no stridor, no grossly visible or palpable masses    Cardiovascular: Regular rate and rhythm, No murmurs, No rubs, No gallops    Pulmonary/Chest:  Normal breath sounds, No respiratory distress or accessory muscle use, No wheezing, crackles or rhonchi. Abdomen: Upper quadrant abdominal tenderness palpation without peritoneal signs, otherwise abdomen is soft, nondistended and nonrigid, No tenderness or peritoneal signs elsewhere, No masses, normal bowel sounds    Back:  No midline point tenderness, No paraspinous muscle tenderness.   No CVA tenderness    Extremities:  No gross deformities, no edema, no tenderness    Neurologic: Alert and oriented to person, place and time.   Cranial nerves II through XII grossly intact as tested, normal motor function, Normal sensory function, No focal deficits    Skin:  Warm, Dry, No erythema, No rash, No cyanosis, No mottling    Lymphatic:  No inguinal lymphadenopathy          EKG Interpretation    Interpreted by emergency department physician    Rhythm: normal sinus   Rate: normal  Axis: normal  Ectopy: none  Conduction: normal  ST Segments: normal  T Waves: normal  Q Waves: none    Clinical Impression: Normal sinus rhythm        RADIOLOGY/PROCEDURES/LABS/MEDICATIONS ADMINISTERED:    I have reviewed and interpreted all of the currently available lab results from this visit (if applicable):  Results for orders placed or performed during the hospital encounter of 05/09/21   COVID-19, Rapid    Specimen: Nasopharyngeal   Result Value Ref Range    Source THROAT     SARS-CoV-2, NAAT NOT DETECTED NOT DETECTED   CBC Auto Differential   Result Value Ref Range    WBC 12.4 (H) 4.0 - 10.5 K/CU MM    RBC 4.04 (L) 4.2 - 5.4 M/CU MM    Hemoglobin 11.9 (L) 12.5 - 16.0 GM/DL    Hematocrit 37.7 37 - 47 %    MCV 93.3 78 - 100 FL    MCH 29.5 27 - 31 PG    MCHC 31.6 (L) 32.0 - 36.0 %    RDW 14.2 11.7 - 14.9 %    Platelets 206 111 - 526 K/CU MM    MPV 10.0 7.5 - 11.1 FL    Differential Type AUTOMATED DIFFERENTIAL     Segs Relative 66.9 (H) 36 - 66 %    Lymphocytes % 23.8 (L) 24 - 44 %    Monocytes % 6.1 (H) 0 - 4 %    Eosinophils % 1.8 0 - 3 %    Basophils % 0.4 0 - 1 %    Segs Absolute 8.3 K/CU MM    Lymphocytes Absolute 2.9 K/CU MM    Monocytes Absolute 0.8 K/CU MM    Eosinophils Absolute 0.2 K/CU MM    Basophils Absolute 0.1 K/CU MM    Immature Neutrophil % 1.0 (H) 0 - 0.43 %    Total Immature Neutrophil 0.12 K/CU MM   Comprehensive Metabolic Panel w/ Reflex to MG   Result Value Ref Range    Sodium 128 (L) 135 - 145 MMOL/L    Potassium 3.9 3.5 - 5.1 MMOL/L    Chloride 92 (L) 99 - 110 mMol/L    CO2 26 21 - 32 MMOL/L    BUN 23 6 - 23 MG/DL    CREATININE 1.0 0.6 - 1.1 MG/DL    Glucose 176 (H) 70 - 99 MG/DL    Calcium 10.2 8.3 - 10.6 MG/DL    Albumin 3.5 3.4 - 5.0 GM/DL    Total Protein 7.4 6.4 - 8.2 GM/DL    Total Bilirubin 0.5 0.0 - 1.0 MG/DL    ALT 26 10 - 40 U/L    AST 26 15 - 37 IU/L    Alkaline Phosphatase 117 40 - 129 IU/L    GFR Non- 54 (L) >60 mL/min/1.73m2    GFR African American >60 >60 mL/min/1.73m2    Anion Gap 10 4 - 16   Urinalysis, reflex to microscopic   Result Value Ref Range    Color, UA YELLOW YELLOW    Clarity, UA CLOUDY CLEAR    Glucose, Urine NEGATIVE NEGATIVE MG/DL    Bilirubin Urine NEGATIVE NEGATIVE MG/DL    Ketones, Urine NEGATIVE NEGATIVE MG/DL    Specific Gravity, UA 1.025 1.001 - 1.035    Blood, Urine NEGATIVE NEGATIVE    pH, Urine 5.0 5.0 - 8.0    Protein,  NEGATIVE MG/DL    Urobilinogen, Urine 0.2 0.2 - 1.0 MG/DL    Nitrite Urine, Quantitative NEGATIVE NEGATIVE    Leukocyte Esterase, Urine 2+ NEGATIVE    RBC, UA NEGATIVE 0 - 6 /HPF    WBC, UA 24 TO 31 0 - 5 /HPF    Epithelial Cells, UA 1 TO 2 /HPF    Cast Type NEGATIVE (A) NO CAST FORMS SEEN /HPF    Bacteria, UA MANY NEGATIVE /HPF    Crystal Type NEGATIVE NEGATIVE /HPF   Troponin   Result Value Ref Range    Troponin T <0.010 <0.01 NG/ML   POCT Glucose   Result Value Ref Range    POC Glucose 181 (H) 70 - 99 MG/DL   POCT Venous   Result Value Ref Range    pH, Jose 7.42 7.32 - 7.42    pCO2, Jose 42.9 38 - 52 mmHG    pO2, Jose 45.4 28 - 48 mmHG    Base Exc, Mixed 2.8 (H) 0 - 2.3    Base Excess HIDE 0 - 2.4    HCO3, Venous 27.7 (H) 19 - 25 MMOL/L    O2 Sat, Jose 81.6 (H) 50 - 70 %    CO2 Content 29.0 (H) 19 - 24 MMOL/L    Source: Venous    EKG 12 Lead   Result Value Ref Range    Ventricular Rate 73 BPM    Atrial Rate 73 BPM    P-R Interval 206 ms    QRS Duration 86 ms    Q-T Interval 424 ms    QTc Calculation (Bazett) 467 ms    P Axis 34 degrees    R Axis 1 degrees    T Axis 67 degrees    Diagnosis       Normal sinus rhythm  Possible Anterior infarct (cited on or before 06-APR-2020)  Abnormal ECG  When compared with ECG of 30-DEC-2020 16:23,  Serial changes of Anterior infarct present  Confirmed by SWETHA Gray ([id number]) on 5/9/2021 5:09:29 PM            ABNORMAL LABS:  Labs Reviewed   CBC WITH AUTO DIFFERENTIAL - Abnormal; Notable for the following components:       Result Value    WBC 12.4 (*)     RBC 4.04 (*)     Hemoglobin 11.9 (*)     MCHC 31.6 (*)     Segs Relative 66.9 (*)     Lymphocytes % 23.8 (*)     Monocytes % 6.1 (*)     Immature Neutrophil % 1.0 (*)     All other components within normal limits   COMPREHENSIVE METABOLIC PANEL W/ REFLEX TO MG FOR LOW K - Abnormal; Notable for the following components:    Sodium 128 (*)     Chloride 92 (*)     Glucose 176 (*)     GFR Non- 54 (*)     All other components within normal limits   URINALYSIS - Abnormal; Notable for the following components:    Cast Type NEGATIVE (*)     All other components within normal limits   POCT GLUCOSE - Abnormal; Notable for the following components:    POC Glucose 181 (*)     All other components within normal limits   POCT VENOUS - Abnormal; Notable for the following components:    Base Exc, Mixed 2.8 (*)     HCO3, Venous 27.7 (*)     O2 Sat, Jose 81.6 (*)     CO2 Content 29.0 (*)     All other components within normal limits   COVID-19, RAPID   CULTURE, URINE   TROPONIN   BLOOD GAS, VENOUS         IMAGING STUDIES ORDERED:  CT HEAD WO CONTRAST  CT ABDOMEN PELVIS WO CONTRAST  XR CHEST PORTABLE  STRAIGHT CATH  IP CONSULT TO HOSPITALIST    I have personally viewed the imaging studies. The radiologist interpretation is:   CT ABDOMEN PELVIS WO CONTRAST Additional Contrast? None   Final Result   1. No acute intra-abdominal process identified. 2. Moderate volume of stool within the colon. 3. The appendix is not identified. No secondary signs of appendicitis   evident.          XR CHEST PORTABLE   Final Result   Underinflated lungs with faint bilateral pulmonary

## 2021-05-10 LAB
ANION GAP SERPL CALCULATED.3IONS-SCNC: 8 MMOL/L (ref 4–16)
BUN BLDV-MCNC: 22 MG/DL (ref 6–23)
CALCIUM SERPL-MCNC: 9.4 MG/DL (ref 8.3–10.6)
CHLORIDE BLD-SCNC: 93 MMOL/L (ref 99–110)
CO2: 28 MMOL/L (ref 21–32)
CREAT SERPL-MCNC: 1 MG/DL (ref 0.6–1.1)
GFR AFRICAN AMERICAN: >60 ML/MIN/1.73M2
GFR NON-AFRICAN AMERICAN: 54 ML/MIN/1.73M2
GLUCOSE BLD-MCNC: 108 MG/DL (ref 70–99)
GLUCOSE BLD-MCNC: 113 MG/DL (ref 70–99)
GLUCOSE BLD-MCNC: 170 MG/DL (ref 70–99)
GLUCOSE BLD-MCNC: 237 MG/DL (ref 70–99)
GLUCOSE BLD-MCNC: 355 MG/DL (ref 70–99)
GLUCOSE BLD-MCNC: 398 MG/DL (ref 70–99)
GLUCOSE BLD-MCNC: 406 MG/DL (ref 70–99)
GLUCOSE BLD-MCNC: 424 MG/DL (ref 70–99)
GLUCOSE BLD-MCNC: 477 MG/DL (ref 70–99)
HCT VFR BLD CALC: 34.8 % (ref 37–47)
HEMOGLOBIN: 10.9 GM/DL (ref 12.5–16)
MCH RBC QN AUTO: 29.6 PG (ref 27–31)
MCHC RBC AUTO-ENTMCNC: 31.3 % (ref 32–36)
MCV RBC AUTO: 94.6 FL (ref 78–100)
PDW BLD-RTO: 14.1 % (ref 11.7–14.9)
PLATELET # BLD: 232 K/CU MM (ref 140–440)
PMV BLD AUTO: 10.3 FL (ref 7.5–11.1)
POTASSIUM SERPL-SCNC: 4.1 MMOL/L (ref 3.5–5.1)
RBC # BLD: 3.68 M/CU MM (ref 4.2–5.4)
SODIUM BLD-SCNC: 129 MMOL/L (ref 135–145)
WBC # BLD: 11.5 K/CU MM (ref 4–10.5)

## 2021-05-10 PROCEDURE — 2140000000 HC CCU INTERMEDIATE R&B

## 2021-05-10 PROCEDURE — 87086 URINE CULTURE/COLONY COUNT: CPT

## 2021-05-10 PROCEDURE — 82962 GLUCOSE BLOOD TEST: CPT

## 2021-05-10 PROCEDURE — 97535 SELF CARE MNGMENT TRAINING: CPT

## 2021-05-10 PROCEDURE — 97116 GAIT TRAINING THERAPY: CPT

## 2021-05-10 PROCEDURE — 97166 OT EVAL MOD COMPLEX 45 MIN: CPT

## 2021-05-10 PROCEDURE — 97530 THERAPEUTIC ACTIVITIES: CPT

## 2021-05-10 PROCEDURE — 80048 BASIC METABOLIC PNL TOTAL CA: CPT

## 2021-05-10 PROCEDURE — 2580000003 HC RX 258: Performed by: NURSE PRACTITIONER

## 2021-05-10 PROCEDURE — 6370000000 HC RX 637 (ALT 250 FOR IP): Performed by: NURSE PRACTITIONER

## 2021-05-10 PROCEDURE — 85027 COMPLETE CBC AUTOMATED: CPT

## 2021-05-10 PROCEDURE — 6360000002 HC RX W HCPCS: Performed by: NURSE PRACTITIONER

## 2021-05-10 PROCEDURE — 94761 N-INVAS EAR/PLS OXIMETRY MLT: CPT

## 2021-05-10 PROCEDURE — 6370000000 HC RX 637 (ALT 250 FOR IP): Performed by: INTERNAL MEDICINE

## 2021-05-10 PROCEDURE — 97162 PT EVAL MOD COMPLEX 30 MIN: CPT

## 2021-05-10 PROCEDURE — 1200000000 HC SEMI PRIVATE

## 2021-05-10 RX ORDER — CIPROFLOXACIN 500 MG/1
500 TABLET, FILM COATED ORAL
Status: DISCONTINUED | OUTPATIENT
Start: 2021-05-10 | End: 2021-05-11

## 2021-05-10 RX ADMIN — SODIUM CHLORIDE, PRESERVATIVE FREE 10 ML: 5 INJECTION INTRAVENOUS at 20:54

## 2021-05-10 RX ADMIN — CIPROFLOXACIN 500 MG: 500 TABLET, FILM COATED ORAL at 10:44

## 2021-05-10 RX ADMIN — ATORVASTATIN CALCIUM 20 MG: 20 TABLET, FILM COATED ORAL at 08:52

## 2021-05-10 RX ADMIN — PANTOPRAZOLE SODIUM 40 MG: 40 TABLET, DELAYED RELEASE ORAL at 06:05

## 2021-05-10 RX ADMIN — HYDRALAZINE HYDROCHLORIDE 10 MG: 10 TABLET, FILM COATED ORAL at 08:51

## 2021-05-10 RX ADMIN — DULOXETINE 60 MG: 60 CAPSULE, DELAYED RELEASE ORAL at 08:52

## 2021-05-10 RX ADMIN — ATENOLOL 50 MG: 50 TABLET ORAL at 08:51

## 2021-05-10 RX ADMIN — HYDRALAZINE HYDROCHLORIDE 10 MG: 10 TABLET, FILM COATED ORAL at 20:43

## 2021-05-10 RX ADMIN — Medication 1 TABLET: at 08:51

## 2021-05-10 RX ADMIN — LEVOTHYROXINE SODIUM 175 MCG: 0.15 TABLET ORAL at 06:05

## 2021-05-10 RX ADMIN — CIPROFLOXACIN 500 MG: 500 TABLET, FILM COATED ORAL at 17:13

## 2021-05-10 RX ADMIN — ASPIRIN 81 MG: 81 TABLET, FILM COATED ORAL at 08:51

## 2021-05-10 RX ADMIN — ATENOLOL 50 MG: 50 TABLET ORAL at 20:42

## 2021-05-10 RX ADMIN — AMLODIPINE BESYLATE 10 MG: 10 TABLET ORAL at 08:52

## 2021-05-10 RX ADMIN — SODIUM CHLORIDE, PRESERVATIVE FREE 10 ML: 5 INJECTION INTRAVENOUS at 08:52

## 2021-05-10 RX ADMIN — ENOXAPARIN SODIUM 40 MG: 40 INJECTION SUBCUTANEOUS at 08:52

## 2021-05-10 ASSESSMENT — PAIN DESCRIPTION - LOCATION: LOCATION: FLANK

## 2021-05-10 ASSESSMENT — PAIN DESCRIPTION - PAIN TYPE: TYPE: ACUTE PAIN

## 2021-05-10 ASSESSMENT — PAIN SCALES - GENERAL
PAINLEVEL_OUTOF10: 0
PAINLEVEL_OUTOF10: 0

## 2021-05-10 NOTE — PROGRESS NOTES
Occupational Therapy   Occupational Therapy Initial Assessment  Date: 5/10/2021   Patient Name: Corky Galaviz  MRN: [id number]     : 1945    Date of Service: 5/10/2021    Discharge Recommendations:  [address] with OT  OT Equipment Recommendations  Equipment Needed: No    Assessment   Performance deficits / Impairments: Decreased functional mobility ; Decreased ADL status; Decreased strength;Decreased endurance;Decreased balance  Assessment: Pt is a 78 yo female who presents with the following deficits. It is recommended she recieve occupational therapy to increase strength, endurance and maximize ability to perfrom functional mobilty and participate in ADLs. Treatment Diagnosis: Weakness  Prognosis: Fair  Decision Making: Medium Complexity  OT Education: OT Role;Transfer Training;Precautions  REQUIRES OT FOLLOW UP: Yes  Activity Tolerance  Activity Tolerance: Patient limited by pain; Patient limited by fatigue  Safety Devices  Safety Devices in place: Yes  Type of devices: All fall risk precautions in place;Gait belt;Left in chair;Bed alarm in place; Patient at risk for falls; Left in bed;Call light within reach  Restraints  Initially in place: No           Patient Diagnosis(es): The primary encounter diagnosis was Urinary tract infection without hematuria, site unspecified. Diagnoses of Generalized weakness and Altered mental status, unspecified altered mental status type were also pertinent to this visit.      has a past medical history of Arthritis, Cellulitis of right foot, Chronic kidney disease, Depression, Diabetes mellitus ([address] Frequent falls, GERD (gastroesophageal reflux disease), H/O Doppler ultrasound, H/O echocardiogram, Hallux valgus (acquired), right foot, Hypertension, Other disorders of kidney and ureter, Thyroid disease, Type 2 diabetes mellitus with foot ulcer, with long-term current use of insulin ([address] Ulcer of right foot with fat layer exposed ([address] and WD-Diabetic ulcer of toe of left foot associated with type 2 diabetes mellitus, with fat layer exposed ([address] has a past surgical history that includes Hysterectomy; Foot surgery (12/30/11); cyst removal; Kidney removal (1973); Abdomen surgery; hernia repair; Endoscopy, colon, diagnostic; and Dilatation, esophagus. Treatment Diagnosis: Weakness      Restrictions  Restrictions/Precautions  Restrictions/Precautions: Fall Risk  Position Activity Restriction  Other position/activity restrictions: Heart monitor    Subjective   General  Patient assessed for rehabilitation services?: Yes  Family / Caregiver Present: No  Subjective  Subjective: Pt reports she fell at home a few days ago and she doesn't remember what happend  General Comment  Comments: Pt presents awake supine in bed  Patient Currently in Pain: Yes  Pain Assessment  Pain Assessment: 0-10  Pain Level: 9  Pain Type: Acute pain  Pain Location: Flank  Pain Orientation: Right  Vital Signs  Patient Currently in Pain: Yes  Social/Functional History  Social/Functional History  Lives With: Spouse  Type of Home: House  Home Layout: One level  Home Access: Stairs to enter with rails  Entrance Stairs - Number of Steps: 4-5 from garage w/rails 1 side,  and 4 steps to porch 2/rails on 1 side and deep step on top. Bathroom Shower/Tub: Walk-in shower  Bathroom Toilet: Handicap height  Bathroom Equipment: Shower chair, Grab bars in shower, Grab bars around toilet(Pt reports her chair slides around)  Home Equipment: Rolling walker, [employer]  Receives Help From: Family  ADL Assistance: Needs assistance  Bath: Moderate assistance  Dressing:  Moderate assistance  Grooming: Minimal assistance  Feeding: Modified independent   Toileting: Needs assistance(Pts family takes her to the bathroom throughout the day)  Homemaking Assistance: Needs assistance  Homemaking Responsibilities: No(Pt reports her kids help during the week, her son comes every day and fixes her Cognitive Status: Exceptions  Arousal/Alertness: Appropriate responses to stimuli  Following Commands: Follows one step commands with increased time; Follows one step commands with repetition  Attention Span: Attends with cues to redirect  Memory: Decreased recall of recent events  Safety Judgement: Good awareness of safety precautions  Problem Solving: Assistance required to generate solutions  Insights: Decreased awareness of deficits  Initiation: Requires cues for all  Sequencing: Requires cues for all        Sensation  Overall Sensation Status: Impaired(Pt reports she has numbness in B feet for the past few years)        LUE AROM (degrees)  LUE AROM : WFL  RUE AROM (degrees)  RUE AROM : WFL  LUE Strength  Gross LUE Strength: Exceptions to WFL(4-/5)  RUE Strength  Gross RUE Strength: Exceptions to WFL(4-/5)                   Plan   Plan  Times per week: 3+  Current Treatment Recommendations: Strengthening, Balance Training, Functional Mobility Training, Endurance Training, Patient/Caregiver Education & Training, Equipment Evaluation, Education, & procurement, Self-Care / ADL, Safety Education & Training    G-Code     OutComes Score                                                  AM-PAC Score    AM-PAC 6 click short form for inpatient daily activity:   How much help from another person does the patient currently need. .. Unable  Dep A Lot  Max A A Lot   Mod A A Little  Min A A Little   CGA  SBA None   Mod I  Indep  Sup   1. Putting on and taking off regular lower body clothing? [x] 1    [] 2   [] 2   [] 3   [] 3   [] 4      2. Bathing (including washing, rinsing, drying)? [] 1   [x] 2   [] 2 [] 3 [] 3 [] 4   3. Toileting, which includes using toilet, bedpan, or urinal? [x] 1    [] 2   [] 2   [] 3   [] 3   [] 4     4. Putting on and taking off regular upper body clothing? [] 1   [] 2   [x] 2   [] 3   [] 3    [] 4      5. Taking care of personal grooming such as brushing teeth?  [] 1   [] 2    [x] 2 [] 3    [] 3   [] 4      6. Eating meals? [] 1   [] 2   [] 2   [] 3   [] 3   [x] 4      Raw Score:  12     [24=0% impaired(CH), 23=1-19%(CI), 20-22=20-39%(CJ), 15-19=40-59%(CK), 10-14=60-79%(CL), 7-9=80-99%(CM), 6=100%(CN)]              Goals  Short term goals  Time Frame for Short term goals: Until DC or goals met  Short term goal 1: Pt will complete trfs CGA (toilet, chair, shower)  Short term goal 2: Pt will participate in UE ADLs with min A  Short term goal 3: Pt will participate in LE ADLs with mod A  Short term goal 4: Pt will complete toileting min A  Short term goal 5: Pt will complete BUE ther ex 10+ min to increase strength/endurance to participate in functional mobility/trfs/ADLs       Therapy Time   Individual Concurrent Group Co-treatment   Time In       0954   Time Out       1033   Minutes       39   Timed Code Treatment Minutes: 25 Minutes       Helen Lam, OTR/L [id number]

## 2021-05-10 NOTE — CARE COORDINATION
This CM is familiar with this patient from previous hospital admissions and swing bed program admissions. CM met with the patient for discharge planning. Patient lives in a 1 story home with her  (laundry room in the basement), has insurance with Rx coverage & PCP, requires assistance with ADL's, and does not drive (family provides transportation as needed). Patient stated that she uses a walker at home and does not require home oxygen. Patient has received [facility] in the past but was not receiving services prior to admission. Patient reports that she also receives help from her children as needed as her  manages his business and spends a lot of time at work. Patient plans to return home upon discharge and is unable to identify any needs at this time. CM will follow. 11:10 AM  CM met with the patient regarding follow-up discussion regarding PT/OT recommendations for SNF placement. Patient stated that her  will be in this evening and they will discuss at that time. CM asked the patient if she would like this CM to contact her  today and she stated that she would rather speak to him this evening when he visits. CM will follow.

## 2021-05-10 NOTE — PLAN OF CARE
Problem: Skin Integrity:  Goal: Will show no infection signs and symptoms  Outcome: Ongoing  Goal: Absence of new skin breakdown  Outcome: Ongoing     Problem: Falls - Risk of:  Goal: Will remain free from falls  Outcome: Ongoing  Goal: Absence of physical injury  Outcome: Ongoing

## 2021-05-10 NOTE — PROGRESS NOTES
Physical Therapy    Facility/Department: [facility] UNIT  Initial Assessment    NAME: Sam Frazier  :   MRN: [id number]    Date of Service: 5/10/2021    Discharge Recommendations:  Continue to assess pending progress, Subacute/Skilled Nursing Facility, ECF with PT   PT Equipment Recommendations  Equipment Needed: No(Will continue to assess)    Assessment   Body structures, Functions, Activity limitations: Decreased functional mobility ; Decreased sensation; Increased pain;Decreased ADL status; Decreased balance;Decreased ROM; Decreased strength;Decreased high-level IADLs;Decreased safe awareness;Decreased cognition;Decreased coordination;Decreased endurance  Assessment: Patient is a 77year old female who presents with dx of UTI and significant LE weakness, decreased balance, and decreased endurance. Patient will benefit from continued skilled therapy to address these impairments. Prognosis: Fair  Decision Making: Medium Complexity  History: see below  Exam: see below  Clinical Presentation: evolving  PT Education: General Safety;PT Role;Transfer Training;Gait Training;Functional Mobility Training  Barriers to Learning: cognition  REQUIRES PT FOLLOW UP: Yes  Activity Tolerance  Activity Tolerance: Patient limited by fatigue;Patient limited by pain       Patient Diagnosis(es): The primary encounter diagnosis was Urinary tract infection without hematuria, site unspecified. Diagnoses of Generalized weakness and Altered mental status, unspecified altered mental status type were also pertinent to this visit.      has a past medical history of Arthritis, Cellulitis of right foot, Chronic kidney disease, Depression, Diabetes mellitus ([address] Frequent falls, GERD (gastroesophageal reflux disease), H/O Doppler ultrasound, H/O echocardiogram, Hallux valgus (acquired), right foot, Hypertension, Other disorders of kidney and ureter, Thyroid disease, Type 2 diabetes mellitus with foot ulcer, with long-term current use of insulin ([facility] [address] Ulcer of right foot with fat layer exposed ([facility] [address] and WD-Diabetic ulcer of toe of left foot associated with type 2 diabetes mellitus, with fat layer exposed ([facility] [address] has a past surgical history that includes Hysterectomy; Foot surgery (12/30/11); cyst removal; Kidney removal (1973); Abdomen surgery; hernia repair; Endoscopy, colon, diagnostic; and Dilatation, esophagus. Restrictions  Restrictions/Precautions  Restrictions/Precautions: Fall Risk  Position Activity Restriction  Other position/activity restrictions: Heart monitor  Vision/Hearing  Vision: Impaired  Vision Exceptions: Wears glasses at all times  Hearing: Exceptions to [facility]  Hearing Exceptions: Hard of hearing/hearing concerns     Subjective  General  Chart Reviewed: Yes  Patient assessed for rehabilitation services?: Yes  Family / Caregiver Present: No  Follows Commands: Impaired(Often required repeated, simple verbal commands to complete motor activities. Did not make eye contact with therapist)  Subjective  Subjective: Patient states \"I don't think about me getting weaker, but I know that i am.\"  Pain Screening  Patient Currently in Pain: Yes  Pain Assessment  Pain Assessment: 0-10  Pain Level: 9  Pain Location: Flank  Pain Orientation: Right  Vital Signs  Patient Currently in Pain: Yes       Orientation  Orientation  Orientation Level: Oriented to place;Oriented to time;Oriented to situation;Oriented to person  Social/Functional History  Social/Functional History  Lives With: Spouse  Type of Home: House  Home Layout: One level  Home Access: Stairs to enter with rails  Entrance Stairs - Number of Steps: 4-5 from garage w/rails 1 side,  and 4 steps to porch 2/rails on 1 side and deep step on top.   Bathroom Shower/Tub: Walk-in shower  Bathroom Toilet: Handicap height  Bathroom Equipment: Shower chair, Grab bars in shower, Grab bars around toilet(Pt reports her chair slides around)  Home Equipment: Rolling walker, [address] Help From: Family  ADL Assistance: Needs assistance  Bath: Moderate assistance  Dressing: Moderate assistance  Grooming: Minimal assistance  Feeding: Modified independent   Toileting: Needs assistance(Pts family takes her to the bathroom throughout the day)  Homemaking Assistance: Needs assistance  Homemaking Responsibilities: No(Pt reports her kids help during the week, her son comes every day and fixes her lunch)  Ambulation Assistance: Needs assistance(amb with a RW)  Transfer Assistance: Needs assistance  Active : No  Leisure & Hobbies: Pt reports she \"does nothing\". She sits in her recliner. Additional Comments: Pt reports her son normally comes over to help her take a shower. She reports she has trouble getting out of the shower. Pt also has 2 daughters the come and help 1 day/week to do laundry, cleaning. Pt's spouse gets groceries. Cognition   Cognition  Overall Cognitive Status: Exceptions  Arousal/Alertness: Appropriate responses to stimuli  Following Commands: Follows one step commands with increased time; Follows one step commands with repetition  Attention Span: Attends with cues to redirect  Memory: Decreased recall of recent events  Safety Judgement: Good awareness of safety precautions  Problem Solving: Assistance required to generate solutions  Insights: Decreased awareness of deficits  Initiation: Requires cues for all  Sequencing: Requires cues for all    Objective     Observation/Palpation  Posture: Fair  Observation: Pt awake supine in bed upon arrival    AROM RLE (degrees)  RLE AROM: WFL  AROM LLE (degrees)  LLE AROM : WFL  Strength RLE  Comment: observed functionally to be grossly 3/5 throughout  Strength LLE  Comment: observed functionally to be grossly 3/5 throughout     Sensation  Overall Sensation Status: Impaired(Pt reports she has numbness in B feet for the past few years)  Bed mobility  Rolling to Left: Moderate assistance  Supine to Sit: 2 Person assistance; Moderate assistance  Sit to Supine: 2 Person assistance;Maximum assistance  Scooting: Contact guard assistance  Transfers  Sit to Stand: Minimal Assistance  Stand to sit: Minimal Assistance  Comment: assist required for balance, incr time to complete and VC's required for hand placement  Ambulation  Ambulation?: Yes  Ambulation 1  Surface: level tile  Device: Rolling Walker  Assistance: Minimal assistance  Quality of Gait: side stepping to the left, difficulty lifting RLE, incr time to complete, short step length  Distance: 3-4 steps  Stairs/Curb  Stairs?: No     Balance  Posture: Fair  Sitting - Static: Fair  Sitting - Dynamic: Fair;-  Standing - Static: Fair;-  Standing - Dynamic: Fair;-  Comments: CGA for sitting balance, min A for standing balance at [facility]  Times per week: 3+ Mon-Sat  Current Treatment Recommendations: Strengthening, IADL Training, Neuromuscular Re-education, Home Exercise Program, ROM, Safety Education & Training, Balance Training, Endurance Training, Patient/Caregiver Education & Training, Functional Mobility Training, Wheelchair Mobility Training, Equipment Evaluation, Education, & procurement, Transfer Training, Gait Training, Cognitive Reorientation, ADL/Self-care Training, Stair training, Positioning, Pain Management  Safety Devices  Type of devices:  All fall risk precautions in place, Bed alarm in place, Call light within reach, Gait belt, Patient at risk for falls, Left in bed    AM-PAC Score      Basic Mobility Six Clicks Form [facility] AM-PAC Score Conversion Table   How much difficulty does the patient currently have Unable   (pt is unable to do activity) A Lot   (activity is a struggle, requires great effort/time) A Little   (pt can manage, but takes more effort/time than should) None   (pt has no difficulty) Raw Score Standardized Score CMS -100% Score CMS Modifier        6 23.55 100% CN   Turning over in bed (including adjusting bedclothes, sheets, and blankets)? []1 [x]2  []3  []4  7 26.42 92.36% CM        8 28.58 86.62% CM   Sitting down on and standing up from a chair with arms (e.g. wheelchair, bedside commode, etc.)? []1 []2 [x]3   []4   9 30.55 81.38% CM        10 32.29 76.75% CL   Moving from lying on back to sitting on the side of the bed? []1 [x]2  []3   []4   11 33.86 72.57% CL        12 35.33 68.66% CL   How much help from another person does the patient currently need Total   (Total/Dependent Assist) A Lot   (Max/Mod Assist) A Little   (Min/CGA/Supervision) None   (No human assistance) 13 36.74 64.91% CL        14 38.1 61.29% CL   Moving to and from a bed to a chair (including a wheelchair)? []1  [x]2   []3  []4   15 39.45 57.70% CK        16 40.78 54.16% CK   To walk in a hospital room? []1 [x]2   []3    []4  17 42.13 50.57% CK        18 43.63 46.58% CK   Climbing 3-5 steps with a railing?  [x]1  []2   []3    []4  19 45.44 41.77% CK        20 47.67 35.83% CJ   Raw Score 12  21 50.25 28.97% CJ   Standardized Score  35.33 22 53.28 20.91% CJ   CMS 0-100% Score  68.66% 23 56.93 11.20% CI   CMS Modifier CL 24 61.14 0.00% CH     CH = 0% impaired  CI = 1-20% impaired  CJ = 20-40% impaired  CK = 40-60% impaired  CL = 60-80% impaired  CM = % impaired  CN = 100% impaired    Goals  Short term goals  Time Frame for Short term goals: 1 week or until discharge  Short term goal 1: Pt will complete bed mobility with min A  Short term goal 2: Pt will complete sit to stand transfers from bed, commode and chair with SBA  Short term goal 3: Pt will complete stand pivot transfer bed <> commode/chair with CGA  Short term goal 4: Pt will statically or dynamically stand x5 min at rolling walker and SBA       Therapy Time   Individual Concurrent Group Co-treatment   Time In       0954   Time Out       1033   Minutes       39   Timed Code Treatment Minutes: HAYLEY Oconnor DPT 89792

## 2021-05-10 NOTE — PROGRESS NOTES
Hospitalist Progress Note       Bettye Fischer M.D.  5/10/2021 6:48 AM  Admit Date: 5/9/2021    PCP: TOÑITO Mccullough     Assessment and Plan:   1. Urinary Tract Infection with SIRS, had AMS  Ct head negative  Ct abd no acute process    - urine culture not found from ER  - patient does not feel comfortable with the use of rocephin, have changed it to PO levaquin    2. Hyperglycemia, uncontrolled Diabetes Mellitus      POCT glucose    Hypoglycemic protocol   Home medication. Sliding scale insulin with meal coverage,  -  is bringing in home formulation of insulin  - one time 20 Units for bg>400 and will correct with home dosing     3. Hyponatremia  Check for lab instrument calibration error  Recommend gatorade     Synthroid.     4. Obesity bmi 32.77  - decrease consumption of refined carbs, increase pre-biotic, time meals to utilize energy stores  - ambulate at least 15 minutes a day.     5. Weakness and immobility compounded by uti  - we discussed diet, exercise and sleep: patient feels that she is getting enough help at home but someone is not at home to care for her round the clock, she doesn't mentally feel ready for placement at SNF but acknowledges her limitations and hte limits to what others are able to do for her  - ptot ordered    Patient Active Problem List:     SIRS (systemic inflammatory response syndrome) ([address]     Type 2 diabetes mellitus with foot ulcer, with long-term current use of insulin (HCC)     Ulcer of right foot with fat layer exposed ([address]     Hallux valgus (acquired), right foot     Chronic pain syndrome     Infective urethritis     Chest pain     Hypertension     Type 2 diabetes mellitus (HCC)     GERD (gastroesophageal reflux disease)     Nausea and vomiting     Thyroid disease     Weakness     Hyperthyroidism     Palpitations     Diabetes ([address]     AMS (altered mental status)     WD-Diabetic ulcer of toe of left foot associated with type 2 diabetes mellitus, with fat layer exposed [facility])     Urinary tract infection      Subjective:     Chief Complaint   Patient presents with    Flank Pain     right side x 2 days pt thinks       F/U:  Interval History: weakness is improved from yesterday. States she was able to move aroudn somewhat in the last couple of days but overall feels like she is getting more and more weak; has not had outpatient follow up for this or physiatrist/neurologist evaluation. Objective:   No intake or output data in the 24 hours ending 05/10/21 0648   Vitals:   Vitals:    05/09/21 2145   BP: (!) 146/67   Pulse: 70   Resp: 16   Temp: 97.5 °F (36.4 °C)   SpO2: 93%     Physical Exam:  Gen:  awake, alert, cooperative, no apparent distress, EYES:  Lids and lashes normal, pupils equal, round and reactive to light, extra ocular muscles intact, sclera clear, conjunctiva normal  ENT:  Normocephalic, oral pharynx with moist mucus membranes, tonsils without erythema or exudates,  NECK:  Supple, symmetrical, trachea midline, no adenopathy,  LUNGS:  Clear to auscultate bilaterally, no rales ronchi or wheezing noted. CARDIOVASCULAR:  regular rate and rhythm, normal S1 and S2, no S3 or S4, and no murmur noted  ABDOMEN: Normal BS, Non tender, non distended, no HSM noted. MUSCULOSKELETAL:  ROM of all extremities grossly wnl  NEUROLOGIC: AOx 3,  Cranial nerves II-XII are grossly intact. Motor is 5 out of 5 bilaterally. Sensory is intact  SKIN:  no bruising or bleeding, normal skin color, texture, turgor and no redness, warmth, or swelling    Ct Abdomen Pelvis Wo Contrast Additional Contrast? None    Result Date: 5/9/2021  EXAMINATION: CT OF THE ABDOMEN AND PELVIS WITHOUT CONTRAST 5/9/2021 4:11 pm TECHNIQUE: CT of the abdomen and pelvis was performed without the administration of intravenous contrast. Multiplanar reformatted images are provided for review.  Dose modulation, iterative reconstruction, and/or weight based adjustment of the mA/kV was utilized to reduce the radiation dose to as low as reasonably achievable. COMPARISON: CT abdomen pelvis August 23, 2019 HISTORY: ORDERING SYSTEM PROVIDED HISTORY: RUQ and right flank pain TECHNOLOGIST PROVIDED HISTORY: Reason for exam:->RUQ and right flank pain Additional Contrast?->None Decision Support Exception - unselect if not a suspected or confirmed emergency medical condition->Emergency Medical Condition (MA) Reason for Exam: RUQ and right flank pain Acuity: Acute Type of Exam: Initial FINDINGS: Lower Chest: Dependent atelectasis at the lung bases. Calcification of both the mitral and aortic valves. Severe calcified atherosclerotic plaque of the partially imaged descending thoracic aorta. Organs: Evaluation of the solid organs is limited due to lack of intravenous contrast.  The nonenhanced liver demonstrates no acute findings. The gallbladder appears grossly unremarkable. The spleen demonstrates no acute abnormality. Atrophy of the pancreas. The bilateral adrenal glands appear unremarkable. The right kidney is absent. The left kidney appears unremarkable. No hydronephrosis. GI/Bowel: No bowel obstruction identified. A moderate volume of stool is identified throughout the colon. Small bowel is normal in caliber. Small hiatal hernia present. The appendix is not identified. No secondary signs of appendicitis evident. Pelvis: The bladder partially collapsed. A few locules of gas are present within the bladder. Correlate for recent instrumentation. In the absence of recent instrumentation, infection should be excluded. Peritoneum/Retroperitoneum: The abdominal aorta is normal in caliber with moderate atherosclerotic plaque present. No retroperitoneal adenopathy. No free fluid or free air identified within the abdomen and pelvis. Bones/Soft Tissues: No acute osseous or soft tissue abnormality identified. Multilevel degenerative change of the lumbar spine. Mild osteoarthritic changes of the hips.      1. No acute intra-abdominal process identified. 2. Moderate volume of stool within the colon. 3. The appendix is not identified. No secondary signs of appendicitis evident. Ct Head Wo Contrast    Result Date: 5/9/2021  EXAMINATION: CT OF THE HEAD WITHOUT CONTRAST  5/9/2021 4:11 pm TECHNIQUE: CT of the head was performed without the administration of intravenous contrast. Dose modulation, iterative reconstruction, and/or weight based adjustment of the mA/kV was utilized to reduce the radiation dose to as low as reasonably achievable. COMPARISON: 12/30/2020 HISTORY: ORDERING SYSTEM PROVIDED HISTORY: altered mental status TECHNOLOGIST PROVIDED HISTORY: Reason for exam:->altered mental status Has a \"code stroke\" or \"stroke alert\" been called? ->No Decision Support Exception - unselect if not a suspected or confirmed emergency medical condition->Emergency Medical Condition (MA) Reason for Exam: ams Acuity: Acute Type of Exam: Initial FINDINGS: BRAIN/VENTRICLES: There is no acute intracranial hemorrhage, mass effect or midline shift. No abnormal extra-axial fluid collection. The gray-white differentiation is maintained without evidence of an acute infarct. There is no evidence of hydrocephalus. Stable diffuse parenchymal volume loss with moderate chronic white matter microvascular ischemic changes and chronic lacunar infarcts in the left basal ganglia. ORBITS: The visualized portion of the orbits demonstrate no acute abnormality. SINUSES: The visualized paranasal sinuses and mastoid air cells demonstrate no acute abnormality. SOFT TISSUES/SKULL:  No acute abnormality of the visualized skull or soft tissues. 1. No acute intracranial abnormality. 2. Stable diffuse parenchymal volume loss with moderate chronic white matter microvascular ischemic changes and chronic lacunar infarcts in the left basal ganglia.      Xr Chest Portable    Result Date: 5/9/2021  EXAMINATION: ONE XRAY VIEW OF THE CHEST 5/9/2021 4:10 pm COMPARISON: 12/30/2020 HISTORY: ORDERING SYSTEM PROVIDED HISTORY: cough, SOB, wheezing TECHNOLOGIST PROVIDED HISTORY: Reason for exam:->cough, SOB, wheezing Reason for Exam:  cough, wheezing Acuity: Acute Type of Exam: Initial FINDINGS: Cardiomediastinal silhouette is enlarged. The lungs are underinflated. No large pleural effusion or pneumothorax. There are faint bilateral pulmonary opacities. No acute osseous abnormality. Underinflated lungs with faint bilateral pulmonary opacities, which could be seen with edema or pneumonia. Cardiomegaly. -    DATA:    CBC   Recent Labs     05/09/21  1612 05/10/21  0540   WBC 12.4* 11.5*   HGB 11.9* 10.9*   HCT 37.7 34.8*    232      BMP   Recent Labs     05/09/21  1612 05/10/21  0540   * 129*   K 3.9 4.1   CL 92* 93*   CO2 26 28   BUN 23 22   CREATININE 1.0 1.0     LFT'S   Recent Labs     05/09/21  1612   AST 26   ALT 26   BILITOT 0.5   ALKPHOS 117     COAG No results for input(s): INR in the last 72 hours. CARDIAC ENZYMES  No results for input(s): CKTOTAL, CKMB, CKMBINDEX, TROPONINI in the last 72 hours.   U/A:    Lab Results   Component Value Date    COLORU YELLOW 05/09/2021    WBCUA 24 TO 31 05/09/2021    RBCUA NEGATIVE 05/09/2021    MUCUS OCCASIONAL 08/05/2020    BACTERIA MANY 05/09/2021    CLARITYU CLOUDY 05/09/2021    SPECGRAV 1.025 05/09/2021    LEUKOCYTESUR 2+ 05/09/2021    BLOODU NEGATIVE 05/09/2021         Abbey Roth MD  [facility] Hospitalist

## 2021-05-11 LAB
ANION GAP SERPL CALCULATED.3IONS-SCNC: 10 MMOL/L (ref 4–16)
BUN BLDV-MCNC: 23 MG/DL (ref 6–23)
CALCIUM SERPL-MCNC: 9.3 MG/DL (ref 8.3–10.6)
CHLORIDE BLD-SCNC: 101 MMOL/L (ref 99–110)
CO2: 28 MMOL/L (ref 21–32)
CREAT SERPL-MCNC: 1.1 MG/DL (ref 0.6–1.1)
GFR AFRICAN AMERICAN: 59 ML/MIN/1.73M2
GFR NON-AFRICAN AMERICAN: 49 ML/MIN/1.73M2
GLUCOSE BLD-MCNC: 124 MG/DL (ref 70–99)
GLUCOSE BLD-MCNC: 131 MG/DL (ref 70–99)
GLUCOSE BLD-MCNC: 142 MG/DL (ref 70–99)
GLUCOSE BLD-MCNC: 154 MG/DL (ref 70–99)
GLUCOSE BLD-MCNC: 169 MG/DL (ref 70–99)
GLUCOSE BLD-MCNC: 179 MG/DL (ref 70–99)
GLUCOSE BLD-MCNC: 232 MG/DL (ref 70–99)
HCT VFR BLD CALC: 33.4 % (ref 37–47)
HEMOGLOBIN: 10.6 GM/DL (ref 12.5–16)
MCH RBC QN AUTO: 29.6 PG (ref 27–31)
MCHC RBC AUTO-ENTMCNC: 31.7 % (ref 32–36)
MCV RBC AUTO: 93.3 FL (ref 78–100)
PDW BLD-RTO: 14.1 % (ref 11.7–14.9)
PLATELET # BLD: 220 K/CU MM (ref 140–440)
PMV BLD AUTO: 10.2 FL (ref 7.5–11.1)
POTASSIUM SERPL-SCNC: 3.9 MMOL/L (ref 3.5–5.1)
RBC # BLD: 3.58 M/CU MM (ref 4.2–5.4)
SODIUM BLD-SCNC: 139 MMOL/L (ref 135–145)
WBC # BLD: 8.5 K/CU MM (ref 4–10.5)

## 2021-05-11 PROCEDURE — 85027 COMPLETE CBC AUTOMATED: CPT

## 2021-05-11 PROCEDURE — 2580000003 HC RX 258: Performed by: NURSE PRACTITIONER

## 2021-05-11 PROCEDURE — 97110 THERAPEUTIC EXERCISES: CPT

## 2021-05-11 PROCEDURE — 97530 THERAPEUTIC ACTIVITIES: CPT

## 2021-05-11 PROCEDURE — 94761 N-INVAS EAR/PLS OXIMETRY MLT: CPT

## 2021-05-11 PROCEDURE — 6360000002 HC RX W HCPCS: Performed by: NURSE PRACTITIONER

## 2021-05-11 PROCEDURE — 82962 GLUCOSE BLOOD TEST: CPT

## 2021-05-11 PROCEDURE — 6370000000 HC RX 637 (ALT 250 FOR IP): Performed by: NURSE PRACTITIONER

## 2021-05-11 PROCEDURE — 36415 COLL VENOUS BLD VENIPUNCTURE: CPT

## 2021-05-11 PROCEDURE — 6370000000 HC RX 637 (ALT 250 FOR IP): Performed by: INTERNAL MEDICINE

## 2021-05-11 PROCEDURE — 1200000000 HC SEMI PRIVATE

## 2021-05-11 PROCEDURE — 80048 BASIC METABOLIC PNL TOTAL CA: CPT

## 2021-05-11 RX ORDER — NITROFURANTOIN 25; 75 MG/1; MG/1
100 CAPSULE ORAL EVERY 12 HOURS SCHEDULED
Status: DISCONTINUED | OUTPATIENT
Start: 2021-05-11 | End: 2021-05-12

## 2021-05-11 RX ADMIN — SODIUM CHLORIDE, PRESERVATIVE FREE 10 ML: 5 INJECTION INTRAVENOUS at 09:59

## 2021-05-11 RX ADMIN — SODIUM CHLORIDE, PRESERVATIVE FREE 10 ML: 5 INJECTION INTRAVENOUS at 20:16

## 2021-05-11 RX ADMIN — DULOXETINE 60 MG: 60 CAPSULE, DELAYED RELEASE ORAL at 09:55

## 2021-05-11 RX ADMIN — NITROFURANTOIN MONOHYDRATE/MACROCRYSTALLINE 100 MG: 25; 75 CAPSULE ORAL at 20:15

## 2021-05-11 RX ADMIN — ATENOLOL 50 MG: 50 TABLET ORAL at 09:55

## 2021-05-11 RX ADMIN — PANTOPRAZOLE SODIUM 40 MG: 40 TABLET, DELAYED RELEASE ORAL at 06:37

## 2021-05-11 RX ADMIN — ATENOLOL 50 MG: 50 TABLET ORAL at 20:15

## 2021-05-11 RX ADMIN — Medication 1 TABLET: at 09:55

## 2021-05-11 RX ADMIN — ASPIRIN 81 MG: 81 TABLET, FILM COATED ORAL at 09:55

## 2021-05-11 RX ADMIN — CIPROFLOXACIN 500 MG: 500 TABLET, FILM COATED ORAL at 06:37

## 2021-05-11 RX ADMIN — HYDRALAZINE HYDROCHLORIDE 10 MG: 10 TABLET, FILM COATED ORAL at 09:56

## 2021-05-11 RX ADMIN — AMLODIPINE BESYLATE 10 MG: 10 TABLET ORAL at 09:56

## 2021-05-11 RX ADMIN — LEVOTHYROXINE SODIUM 175 MCG: 0.15 TABLET ORAL at 06:37

## 2021-05-11 RX ADMIN — ATORVASTATIN CALCIUM 20 MG: 20 TABLET, FILM COATED ORAL at 09:56

## 2021-05-11 RX ADMIN — ENOXAPARIN SODIUM 40 MG: 40 INJECTION SUBCUTANEOUS at 09:56

## 2021-05-11 RX ADMIN — HYDRALAZINE HYDROCHLORIDE 10 MG: 10 TABLET, FILM COATED ORAL at 20:16

## 2021-05-11 ASSESSMENT — PAIN SCALES - GENERAL
PAINLEVEL_OUTOF10: 0
PAINLEVEL_OUTOF10: 0

## 2021-05-11 NOTE — PROGRESS NOTES
Humulin R U-500 115 units rescheduled from 0430 to 0600. Patient POCT , patient stated she takes her 0430 insulin then eats breakfast by 0600. Patient updated that breakfast usually isn't served until 0800. Patient agreed to reschedule 0430 to 0600. POCT BS will be retaken.

## 2021-05-11 NOTE — CARE COORDINATION
CM met with the patient and her daughter Megan Suresh and son Martha Bennett for discharge planning. CM advised family of PT/OT's recommendation of SNF placement for short-term skilled therapy. Patient's son and daughter feel that placement would be beneficial in order to get the patient stronger prior to returning home and continuing with therapay at home through a [place] [id number] agency. Patient seems agreeable but asked that her children also speak to her , children agreed to do so. Patient and children in agreement with placement at [facility]. 2:15 PM  CM made referral to [place] at [facility]. CM will follow.

## 2021-05-11 NOTE — PROGRESS NOTES
and stand step transfer from [facility] to bed. Pt unable to independently advance RLE, requiring max A from PT to complete transfer. Home Exercise Program/Education provided to patient: supine LE therex to improve LE mobility and strength.  Pt required tactile cues to elicit proper movement during quad sets    Manual Treatments: none     Communication with other providers: co-tx with DIAZ for safety    Adverse reactions to treatment:  none    Treatment/Activity Tolerance:   [X] Patient limited by fatigue [ ] Patient limited by pain [ ] Patient limited by other medical complications [ ] Patient tolerated therapy well  [ ] Other:     Post Tx Pain Ratin /10     Safety Precautions:   Silvan[place].Alias ] left in bed  [ ] left in chair [ X] call light within reach  Yao ] bed alarm on  [ ] personal alarm on  [ ] other staff present:    Plan:   Yao ] Continue per plan of care [ ] Ricardo Mccray current plan   [ ] Plan of care initiated [ ] Hold pending MD visit [ ] Discharge     Plan for Next Session: continue LE strengthening     Time In: 3:29 pm  Time Out: 3:56 pm  Total Treatment Minutes: 27  Billed Units: 1 TA, 1 TE    Signed: Shante Gleason, PT   2021, 3:58 PM

## 2021-05-11 NOTE — PROGRESS NOTES
A  Short term goal 5: Pt will complete BUE ther ex 10+ min to increase strength/endurance to participate in functional mobility/trfs/ADLs

## 2021-05-11 NOTE — PROGRESS NOTES
Hospitalist Progress Note      Name:  oRsalio Ambrocio /Age/Sex: 567  (72 y.o. female)   MRN & CSN:  [id number] & [id number] Admission Date/Time: 2021  3:27 PM   Location:  015 PCP: TOÑITO Villafana         Hospital Day: 3    Assessment and Plan:   Rosalio Ambrocio is a 77 y.o.  female  who presents with Urinary tract infection    1. Complicated UTI: Patient had altered mental status intake. Probably some baseline dementia as well. Patient did not remember me from late December admission. Pending cultures. Medical record review on recent admission E. coli noted. Sensitive to Macrobid patient was discharged on same. Ciprofloxacin initiated on day 2 of hospital stay. However, E. coli was resistant to same. Currently on Macrobid for same. Repeat UA. Altered mental status resolved patient back to baseline. 2.  Insulin-dependent diabetes: Previously uncontrolled with blood glucose greater than 400. Since adjustment of insulin schedule patient serum glucose markedly improved 142 this a.m. on 124 at lunch. Reviewed home regimen of patient's Humulin R U-500 received 115 units at 4:30 AM 60 units for lunch at 11:30 AM and 60 units at dinner at 7:30 PM sliding scale provided between 151 and 200 give 5 units, 201 to 250 give 10 units, 251 to 300 give 15 units, greater than 300 give 20 units. Continue to monitor    3. Hyponatremia: Yesterday 129 serum glucose 170. Today 139 corrected continue to monitor    4. Obesity: BMI 32.7. Patient counseled during hospital stay    5. Weakness and immobility: Exacerbated by UTI. However, patient really not ambulatory at home. Family having difficult time keeping patient at home with care to to the 's age as well.   Case management working for placement discussing with family various naps    Diet DIET CARB CONTROL;   DVT Prophylaxis [] Lovenox, []  Heparin, [] SCDs, []No VTE prophylaxis, patient ambulating   GI Prophylaxis [] deformities. Spontaneous movement of all extremities  SKIN Normal coloration, warm, dry. NEURO Cranial nerves appear grossly intact, normal speech, no lateralizing weakness. PSYCH Awake, alert, oriented x 3. Affect appropriate.     Medications:   Medications:    nitrofurantoin (macrocrystal-monohydrate)  100 mg Oral 2 times per day    insulin regular human  60 Units Subcutaneous Q24H    insulin regular human  60 Units Subcutaneous Q24H    insulin regular human  115 Units Subcutaneous Q24H    amLODIPine  10 mg Oral Daily    aspirin  81 mg Oral Daily    atenolol  50 mg Oral BID    atorvastatin  20 mg Oral Daily    DULoxetine  60 mg Oral Daily    hydrALAZINE  10 mg Oral BID    therapeutic multivitamin-minerals  1 tablet Oral Daily    pantoprazole  40 mg Oral Daily    sodium chloride flush  5-40 mL Intravenous 2 times per day    enoxaparin  40 mg Subcutaneous Daily    levothyroxine  175 mcg Oral Daily      Infusions:    sodium chloride      dextrose       PRN Meds: insulin regular human, 5-20 Units, TID PRN  sodium chloride flush, 5-40 mL, PRN  sodium chloride, 25 mL, PRN  promethazine, 12.5 mg, Q6H PRN    Or  ondansetron, 4 mg, Q6H PRN  acetaminophen, 650 mg, Q6H PRN    Or  acetaminophen, 650 mg, Q6H PRN  polyethylene glycol, 17 g, Daily PRN  glucose, 15 g, PRN  dextrose, 12.5 g, PRN  glucagon (rDNA), 1 mg, PRN  dextrose, 100 mL/hr, PRN          Electronically signed by Janki Cardona MD on 5/11/2021 at 5:14 PM

## 2021-05-12 ENCOUNTER — APPOINTMENT (OUTPATIENT)
Dept: GENERAL RADIOLOGY | Age: 76
DRG: 690 | End: 2021-05-12
Payer: MEDICARE

## 2021-05-12 LAB
ANION GAP SERPL CALCULATED.3IONS-SCNC: 13 MMOL/L (ref 4–16)
BACTERIA: ABNORMAL /HPF
BILIRUBIN URINE: NEGATIVE MG/DL
BLOOD, URINE: ABNORMAL
BUN BLDV-MCNC: 25 MG/DL (ref 6–23)
CALCIUM SERPL-MCNC: 9.5 MG/DL (ref 8.3–10.6)
CAST TYPE: ABNORMAL /HPF
CHLORIDE BLD-SCNC: 99 MMOL/L (ref 99–110)
CLARITY: CLEAR
CO2: 25 MMOL/L (ref 21–32)
COLOR: YELLOW
CREAT SERPL-MCNC: 1.1 MG/DL (ref 0.6–1.1)
CRYSTAL TYPE: NEGATIVE /HPF
CULTURE: NORMAL
EPITHELIAL CELLS, UA: 5 /HPF
GFR AFRICAN AMERICAN: 59 ML/MIN/1.73M2
GFR NON-AFRICAN AMERICAN: 49 ML/MIN/1.73M2
GLUCOSE BLD-MCNC: 103 MG/DL (ref 70–99)
GLUCOSE BLD-MCNC: 122 MG/DL (ref 70–99)
GLUCOSE BLD-MCNC: 184 MG/DL (ref 70–99)
GLUCOSE BLD-MCNC: 224 MG/DL (ref 70–99)
GLUCOSE BLD-MCNC: 257 MG/DL (ref 70–99)
GLUCOSE BLD-MCNC: 286 MG/DL (ref 70–99)
GLUCOSE, URINE: 250 MG/DL
HCT VFR BLD CALC: 36.3 % (ref 37–47)
HEMOGLOBIN: 11.3 GM/DL (ref 12.5–16)
KETONES, URINE: NEGATIVE MG/DL
LEUKOCYTE ESTERASE, URINE: NEGATIVE
Lab: NORMAL
MCH RBC QN AUTO: 29.2 PG (ref 27–31)
MCHC RBC AUTO-ENTMCNC: 31.1 % (ref 32–36)
MCV RBC AUTO: 93.8 FL (ref 78–100)
NITRITE URINE, QUANTITATIVE: NEGATIVE
PDW BLD-RTO: 14.2 % (ref 11.7–14.9)
PH, URINE: 6.5 (ref 5–8)
PLATELET # BLD: 273 K/CU MM (ref 140–440)
PMV BLD AUTO: 10.4 FL (ref 7.5–11.1)
POTASSIUM SERPL-SCNC: 4.4 MMOL/L (ref 3.5–5.1)
PROTEIN UA: 300 MG/DL
RBC # BLD: 3.87 M/CU MM (ref 4.2–5.4)
RBC URINE: 12 /HPF (ref 0–6)
SARS-COV-2, NAAT: NOT DETECTED
SODIUM BLD-SCNC: 137 MMOL/L (ref 135–145)
SPECIFIC GRAVITY UA: 1.01 (ref 1–1.03)
SPECIMEN: NORMAL
UROBILINOGEN, URINE: 0.2 MG/DL (ref 0.2–1)
WBC # BLD: 14.6 K/CU MM (ref 4–10.5)
WBC UA: 3 /HPF (ref 0–5)

## 2021-05-12 PROCEDURE — 1200000000 HC SEMI PRIVATE

## 2021-05-12 PROCEDURE — 82962 GLUCOSE BLOOD TEST: CPT

## 2021-05-12 PROCEDURE — 36415 COLL VENOUS BLD VENIPUNCTURE: CPT

## 2021-05-12 PROCEDURE — 80048 BASIC METABOLIC PNL TOTAL CA: CPT

## 2021-05-12 PROCEDURE — 94761 N-INVAS EAR/PLS OXIMETRY MLT: CPT

## 2021-05-12 PROCEDURE — 94664 DEMO&/EVAL PT USE INHALER: CPT

## 2021-05-12 PROCEDURE — 94640 AIRWAY INHALATION TREATMENT: CPT

## 2021-05-12 PROCEDURE — 99222 1ST HOSP IP/OBS MODERATE 55: CPT | Performed by: PSYCHIATRY & NEUROLOGY

## 2021-05-12 PROCEDURE — 94760 N-INVAS EAR/PLS OXIMETRY 1: CPT

## 2021-05-12 PROCEDURE — 6360000002 HC RX W HCPCS: Performed by: NURSE PRACTITIONER

## 2021-05-12 PROCEDURE — 2580000003 HC RX 258: Performed by: NURSE PRACTITIONER

## 2021-05-12 PROCEDURE — 6370000000 HC RX 637 (ALT 250 FOR IP): Performed by: INTERNAL MEDICINE

## 2021-05-12 PROCEDURE — 81001 URINALYSIS AUTO W/SCOPE: CPT

## 2021-05-12 PROCEDURE — 85027 COMPLETE CBC AUTOMATED: CPT

## 2021-05-12 PROCEDURE — 71045 X-RAY EXAM CHEST 1 VIEW: CPT

## 2021-05-12 PROCEDURE — 6370000000 HC RX 637 (ALT 250 FOR IP): Performed by: NURSE PRACTITIONER

## 2021-05-12 PROCEDURE — 87635 SARS-COV-2 COVID-19 AMP PRB: CPT

## 2021-05-12 RX ORDER — IPRATROPIUM BROMIDE AND ALBUTEROL SULFATE 2.5; .5 MG/3ML; MG/3ML
1 SOLUTION RESPIRATORY (INHALATION)
Status: DISCONTINUED | OUTPATIENT
Start: 2021-05-12 | End: 2021-05-13 | Stop reason: HOSPADM

## 2021-05-12 RX ADMIN — IPRATROPIUM BROMIDE AND ALBUTEROL SULFATE 1 AMPULE: .5; 3 SOLUTION RESPIRATORY (INHALATION) at 11:30

## 2021-05-12 RX ADMIN — ACETAMINOPHEN 650 MG: 650 SUPPOSITORY RECTAL at 18:14

## 2021-05-12 RX ADMIN — SODIUM CHLORIDE, PRESERVATIVE FREE 10 ML: 5 INJECTION INTRAVENOUS at 18:07

## 2021-05-12 RX ADMIN — AMLODIPINE BESYLATE 10 MG: 10 TABLET ORAL at 09:17

## 2021-05-12 RX ADMIN — NITROFURANTOIN MONOHYDRATE/MACROCRYSTALLINE 100 MG: 25; 75 CAPSULE ORAL at 09:17

## 2021-05-12 RX ADMIN — ATORVASTATIN CALCIUM 20 MG: 20 TABLET, FILM COATED ORAL at 09:17

## 2021-05-12 RX ADMIN — HYDRALAZINE HYDROCHLORIDE 10 MG: 10 TABLET, FILM COATED ORAL at 21:12

## 2021-05-12 RX ADMIN — HYDRALAZINE HYDROCHLORIDE 10 MG: 10 TABLET, FILM COATED ORAL at 09:17

## 2021-05-12 RX ADMIN — ENOXAPARIN SODIUM 40 MG: 40 INJECTION SUBCUTANEOUS at 09:17

## 2021-05-12 RX ADMIN — Medication 1 TABLET: at 09:16

## 2021-05-12 RX ADMIN — DULOXETINE 60 MG: 60 CAPSULE, DELAYED RELEASE ORAL at 09:16

## 2021-05-12 RX ADMIN — LEVOTHYROXINE SODIUM 175 MCG: 0.15 TABLET ORAL at 06:04

## 2021-05-12 RX ADMIN — ATENOLOL 50 MG: 50 TABLET ORAL at 09:16

## 2021-05-12 RX ADMIN — IPRATROPIUM BROMIDE AND ALBUTEROL SULFATE 1 AMPULE: .5; 3 SOLUTION RESPIRATORY (INHALATION) at 19:55

## 2021-05-12 RX ADMIN — ATENOLOL 50 MG: 50 TABLET ORAL at 21:12

## 2021-05-12 RX ADMIN — PANTOPRAZOLE SODIUM 40 MG: 40 TABLET, DELAYED RELEASE ORAL at 06:04

## 2021-05-12 RX ADMIN — SODIUM CHLORIDE, PRESERVATIVE FREE 10 ML: 5 INJECTION INTRAVENOUS at 21:12

## 2021-05-12 ASSESSMENT — PAIN SCALES - GENERAL
PAINLEVEL_OUTOF10: 0

## 2021-05-12 NOTE — PROGRESS NOTES
Discussed with Dr Fantasma Joya patients flat affect and unresponsiveness to questions, Dr Fantasma Joya at bedside evaluating patient, will order chest xray, covid and urine tests.

## 2021-05-12 NOTE — CARE COORDINATION
NURSING: The patient has been accepted to [facility] and will be discharged today. [facility] can accept the patient after 3 PM today. Please complete the JOSEFINA form, call report to [facility], arranged transportation with [facility] staff, and notify the patient's family.

## 2021-05-12 NOTE — CONSULTS
Collection Time: 05/11/21 11:35 AM   Result Value Ref Range    POC Glucose 124 (H) 70 - 99 MG/DL   POCT Glucose    Collection Time: 05/11/21  5:37 PM   Result Value Ref Range    POC Glucose 179 (H) 70 - 99 MG/DL   POCT Glucose    Collection Time: 05/11/21  8:14 PM   Result Value Ref Range    POC Glucose 232 (H) 70 - 99 MG/DL   Basic Metabolic Panel w/ Reflex to MG    Collection Time: 05/12/21  6:00 AM   Result Value Ref Range    Sodium 137 135 - 145 MMOL/L    Potassium 4.4 3.5 - 5.1 MMOL/L    Chloride 99 99 - 110 mMol/L    CO2 25 21 - 32 MMOL/L    Anion Gap 13 4 - 16    BUN 25 (H) 6 - 23 MG/DL    CREATININE 1.1 0.6 - 1.1 MG/DL    Glucose 257 (H) 70 - 99 MG/DL    Calcium 9.5 8.3 - 10.6 MG/DL    GFR Non- 49 (L) >60 mL/min/1.73m2    GFR  59 (L) >60 mL/min/1.73m2   CBC    Collection Time: 05/12/21  6:00 AM   Result Value Ref Range    WBC 14.6 (H) 4.0 - 10.5 K/CU MM    RBC 3.87 (L) 4.2 - 5.4 M/CU MM    Hemoglobin 11.3 (L) 12.5 - 16.0 GM/DL    Hematocrit 36.3 (L) 37 - 47 %    MCV 93.8 78 - 100 FL    MCH 29.2 27 - 31 PG    MCHC 31.1 (L) 32.0 - 36.0 %    RDW 14.2 11.7 - 14.9 %    Platelets 276 075 - 004 K/CU MM    MPV 10.4 7.5 - 11.1 FL   POCT Glucose    Collection Time: 05/12/21  6:03 AM   Result Value Ref Range    POC Glucose 286 (H) 70 - 99 MG/DL   POCT Glucose    Collection Time: 05/12/21  7:55 AM   Result Value Ref Range    POC Glucose 224 (H) 70 - 99 MG/DL   POCT Glucose    Collection Time: 05/12/21 12:28 PM   Result Value Ref Range    POC Glucose 103 (H) 70 - 99 MG/DL   Urinalysis with microscopic    Collection Time: 05/12/21  1:13 PM   Result Value Ref Range    Color, UA YELLOW YELLOW    Clarity, UA CLEAR CLEAR    Glucose, Urine 250 (A) NEGATIVE MG/DL    Bilirubin Urine NEGATIVE NEGATIVE MG/DL    Ketones, Urine NEGATIVE NEGATIVE MG/DL    Specific Gravity, UA 1.015 1.001 - 1.035    Blood, Urine TRACE (A) NEGATIVE    pH, Urine 6.5 5.0 - 8.0    Protein,  (HH) NEGATIVE MG/DL retardation or abnormal movements noted  Speech: Clearly articulated; normal rate, volume, tone & amount. Language: intact understanding and production  Mood: okay  Affect: euthymic, full range, non-labile, congruent with mood and content of speech  Thought Production: Spontaneous. Thought Form: Coherent, linear, logical & goal-directed. No tangentiality or circumstantiality. No flight of ideas or loosening of associations. Thought Content/Perceptions: No BRANDYN, no AVH, no delusion  Insight: fair  Judgment fair  Memory: Immediate, recent, and remote appear intact, though not formally tested. Attention: maintained throughout interview  Fund of knowledge: Average  Gait/Balance: WNL/WNL           Impression:   MDD severe recurrent    Problem List:   Urinary tract infection    Plan:  1. Reviewed treatment plan with patient including medication risks, benefits, side effects. Obtained informed consent for treatment. Pt does not require inpt admission. 2. Psychiatric management:medication initiation and titration, recommend outpt mental health follow up, safe and theraputic environment. 3. Status of problem/condition: ?Improving  4. Medical co-morbidities: [facility], appreciate assistance  5. Legal Status: voluntary  6. The treatment team reviewed with the patient the diagnosis and treatment recommendations to include the risks, benefits, and side effects of chosen medications. 7. The patient verbalized understanding and agreed with the treatment regimen as outlined above. 8. Medical records, Labs, Diagnotic tests reviewed  9. Interval History. 10. Review current labs  11. Continue current medications  12. Supportive Therapy Provided  13. Pt had an opportunity to ask questions and address concerns  14. Pt encouraged to continue outpt  Therapy. 15. Pt was in agreement with treatment plan. 16.  The risks benefits and side effects of medications were discussed with the patient, including alternatives and no treatment.           Electronically signed by Micaela Bonds DO on 5/12/2021 at 3:46 PM

## 2021-05-12 NOTE — PLAN OF CARE
Problem: Skin Integrity:  Goal: Will show no infection signs and symptoms  Description: Will show no infection signs and symptoms  5/12/2021 0042 by Zahraa Peña RN  Outcome: Ongoing  5/11/2021 1056 by Jaylon Cee RN  Outcome: Ongoing  Goal: Absence of new skin breakdown  Description: Absence of new skin breakdown  5/12/2021 0042 by Zahraa Peña RN  Outcome: Ongoing  5/11/2021 1056 by Jaylon Cee RN  Outcome: Ongoing     Problem: Falls - Risk of:  Goal: Will remain free from falls  Description: Will remain free from falls  5/12/2021 0042 by Zahraa Peña RN  Outcome: Ongoing  5/11/2021 1056 by Jaylon Cee RN  Outcome: Ongoing  Goal: Absence of physical injury  Description: Absence of physical injury  5/12/2021 0042 by Zahraa Peña RN  Outcome: Ongoing  5/11/2021 1056 by Jyalon Cee RN  Outcome: Ongoing     Problem: Pain:  Goal: Pain level will decrease  Description: Pain level will decrease  5/12/2021 0042 by Zahraa Peña RN  Outcome: Ongoing  5/11/2021 1056 by Jaylon Cee RN  Outcome: Ongoing  Goal: Control of acute pain  Description: Control of acute pain  5/12/2021 0042 by Zahraa Peña RN  Outcome: Ongoing  5/11/2021 1056 by Jaylon Cee RN  Outcome: Ongoing  Goal: Control of chronic pain  Description: Control of chronic pain  5/12/2021 0042 by Zahraa Peña RN  Outcome: Ongoing  5/11/2021 1056 by Jaylon Cee RN  Outcome: Ongoing

## 2021-05-12 NOTE — DISCHARGE INSTR - COC
Continuity of Care Form    Patient Name: Vladislav Wilkerson   :  [birth year]  MRN:  [id number]    Admit date:  2021  Discharge date:  ***    Code Status Order: Full Code   Advance Directives:   Advance Care Flowsheet Documentation     Date/Time Healthcare Directive Type of Healthcare Directive Copy in [address] Agent's Name Healthcare Agent's Phone Number    21 2103  No, patient does not have an advance directive for healthcare treatment -- -- -- -- --          Admitting Physician:  Lucy Molina MD  PCP: TOÑITO Hayes    Discharging Nurse: [facility] Unit/Room#: 015/015-01  Discharging Unit Phone Number: ***    Emergency Contact:   Extended Emergency Contact Information  Primary Emergency Contact: [facility]  Address: [address] [facility], [address] [place] [address] Phone: [phone]  Relation: Spouse  Secondary Emergency Contact: Ivonedayan Owens  Work Phone: [phone]  Relation: Child    Past Surgical History:  Past Surgical History:   Procedure Laterality Date    ABDOMEN SURGERY      CYST REMOVAL      from kidney.     DILATATION, ESOPHAGUS      ENDOSCOPY, COLON, DIAGNOSTIC      FOOT SURGERY  11    had infection  and a biopsy was sent away for analysis    HERNIA REPAIR      HYSTERECTOMY      KIDNEY REMOVAL         Immunization History:   Immunization History   Administered Date(s) Administered    Influenza Virus Vaccine 10/19/2013       Active Problems:  Patient Active Problem List   Diagnosis Code    [facility]ic inflammatory response syndrome) [facility] R65.10    Type 2 diabetes mellitus with foot ulcer, with long-term current use of insulin ([facility] E11.621, L97.509, Z79.4    Ulcer of right foot with fat layer exposed ([facility] L97.512    Hallux valgus (acquired), right foot M20.11    Chronic pain syndrome G89.4    Infective urethritis N34.2    Chest pain R07.9    Hypertension I10  Type 2 diabetes mellitus (HCC) E11.9    GERD (gastroesophageal reflux disease) K21.9    Nausea and vomiting R11.2    Thyroid disease E07.9    Weakness R53.1    Hyperthyroidism E05.90    Palpitations R00.2    Diabetes (HCC) E11.9    AMS (altered mental status) R41.82    WD-Diabetic ulcer of toe of left foot associated with type 2 diabetes mellitus, with fat layer exposed ([facility] T14.373, L97.522    Urinary tract infection N39.0       Isolation/Infection:   Isolation          No Isolation        Patient Infection Status     Infection Onset Added Last Indicated Last Indicated By Review Planned Expiration Resolved Resolved By    None active    Resolved    COVID-19 Rule Out 05/09/21 05/09/21 05/09/21 COVID-19, Rapid (Ordered)   05/09/21 Rule-Out Test Resulted    COVID-19 Rule Out 12/30/20 12/30/20 12/30/20 COVID-19 (Ordered)   12/30/20 Rule-Out Test Resulted          Nurse Assessment:  Last Vital Signs: BP (!) 153/68   Pulse 89   Temp 97.8 °F (36.6 °C) (Temporal)   Resp 18   Ht 5' 3\" (1.6 m)   Wt 185 lb (83.9 kg)   SpO2 92%   BMI 32.77 kg/m²     Last documented pain score (0-10 scale): Pain Level: 0  Last Weight:   Wt Readings from Last 1 Encounters:   05/09/21 185 lb (83.9 kg)     Mental Status:  {IP PT MENTAL STATUS:81064}    IV Access:  { JOSEFINA IV ACCESS:840437818}    Nursing Mobility/ADLs:  Walking   {CHP DME KHDL:046057183}  Transfer  {CHP DME QMXL:064824943}  Bathing  {CHP DME HKNT:816085377}  Dressing  {CHP DME XYXS:440221073}  Toileting  {CHP DME EVVF:377388983}  Feeding  {CHP DME QRER:545408081}  Med Admin  {CHP DME SRGT:302269832}  Med Delivery   { JOSEFINA MED Delivery:956398243}    Wound Care Documentation and Therapy:        Elimination:  Continence:   · Bowel: {YES / GC:27479}  · Bladder: {YES / PK:33750}  Urinary Catheter: {Urinary Catheter:353346654}   Colostomy/Ileostomy/Ileal Conduit: {YES / KARLY:50092}       Date of Last BM: ***    Intake/Output Summary (Last 24 hours) at 5/12/2021 0845  Last data filed at 2021 0615  Gross per 24 hour   Intake --   Output 1200 ml   Net -1200 ml     I/O last 3 completed shifts:  In: -   Out: 1200 [Urine:1200]    Safety Concerns:     [address] Safety Concerns:945914182}    Impairments/Disabilities:      [address] Impairments/Disabilities:776679911}    Nutrition Therapy:  Current Nutrition Therapy:   [address] [facility] Diet List:651226438}    Routes of Feeding: {CHP DME Other Feedings:843630347}  Liquids: {Slp liquid thickness:62783}  Daily Fluid Restriction: {CHP DME Yes amt example:444617549}  Last Modified Barium Swallow with Video (Video Swallowing Test): {Done Not Done UKZS:004962144}    Treatments at the Time of Hospital Discharge:   Respiratory Treatments: ***  Oxygen Therapy:  {Therapy; copd oxygen:75754}  Ventilator:    {MH CC Vent GGAZ:165070184}    Rehab Therapies: {THERAPEUTIC INTERVENTION:4082252475}  Weight Bearing Status/Restrictions: [facility] Weight Bearin}  Other Medical Equipment (for information only, NOT a DME order):  {EQUIPMENT:692198575}  Other Treatments: ***    Patient's personal belongings (please select all that are sent with patient):  {[facility] DME Belongings:964465974}    RN SIGNATURE:  {Esignature:478518838}    CASE MANAGEMENT/SOCIAL WORK SECTION    Inpatient Status Date: 2021    Readmission Risk Assessment Score:  Readmission Risk              Risk of Unplanned Readmission:        21           Discharging to Facility/ Agency   · Name: [facility]   · Address: [place] [address]   · Phone: [phone]  · Fax: [phone]    Dialysis Facility (if applicable)   · Name:  · Address:  · Dialysis Schedule:  · Phone:  · Fax:    / signature: Electronically signed by Mehdi Fajardo RN on 21 at 8:46 AM EDT    PHYSICIAN SECTION    Prognosis: {Prognosis:1179217891}    Condition at Discharge: [address] Patient Condition:508205292}    Rehab Potential (if transferring to Rehab): {Prognosis:7226132061}    Recommended Labs or Other Treatments After Discharge: ***    Physician Certification: I certify the above information and transfer of Darrian Rivera  is necessary for the continuing treatment of the diagnosis listed and that she requires {Admit to Appropriate Level of Care:69511} for {GREATER/LESS:203141555} 30 days.      Update Admission H&P: {CHP DME Changes in SPTQN:957112689}    PHYSICIAN SIGNATURE:  {Esignature:689659134}

## 2021-05-12 NOTE — CARE COORDINATION
CM received call from [place] at [facility] stating that they can accept the patient today if deemed medically stable for discharge, patient will need rapid COVID test prior to admission at [facility]. 8:44 AM  NANCY completed online. 1:35 PM  CM received notification from [place] at [facility] who stated that after further review of the patient's medical information they do not feel that she has a skillable diagnosis that would qualify her for admission. The patient's urine culture did not reveal bacterial growth 18-36 hours. At this time, [facility] is not able to accept the patient. Patient could go to [facility] and have her skilled therapy billed to Medicare part B but the room and board fees would need to be paid privately. 1:45 PM  BALNCHE notified Dr. Ruben Barnes's inability to admit due to lack of skillable diagnsis      2:34 PM  BLANCHE attempted to call the patient's daughter Michelle Lawson ([phone]) but there was no answer, voicemail left requesting return call.

## 2021-05-12 NOTE — PROGRESS NOTES
Hospitalist Progress Note      Name:  Filemon Singh /Age/Sex:   (72 y.o. female)   MRN & CSN:  [id number] & [id number] Admission Date/Time: 2021  3:27 PM   Location:   PCP: TOÑITO Pickett         Hospital Day: 4    Assessment and Plan:   Filemon Singh is a 77 y.o.  female  who presents with Urinary tract infection    1. Complicated UTI: E. coli noted. Provided Macrobid day 2. Urine cultures negative for bacterial growth. Patient does have altered mental status. However, seems to be baseline. With waxing and waning. Patient afebrile repeat UA did not demonstrate leukoesterase or nitrites performed this AM.  We will continue Macrobid for total 3-day course. Plan was for the patient to go to [facility] for continued treatment and PT/OT. However, unable to accept due to urine cultures with no growth. 2.  Insulin-dependent diabetes: Poorly controlled even on home regimen noted and nursing aware of same. Glucose this a.m. 257. Will need tighter glucose control going forward. Currently getting 60 units preprandial of  Humulin. Also with sliding scale high-dose. Recheck POC glucose lunchtime 103 controlled. Continue to monitor    3. Weakness and debility: Patient predominately sedentary at home. However exacerbated by UTI. Patient work with PT/OT continued. Anticipate patient going to swing bed for continued strengthening and endurance going forward. Family discussing placement but [facility] no longer an option. 4.  Obesity: BMI of 32.7. Previously counseled. 5.  Dementia/behavioral disturbance: Most likely vascular dementia. Previous CT of the head on 2021 for concerns of stroke demonstrated no acute intracranial abnormality. Stable diffuse parenchymal volume loss with moderate chronic white matter microvascular ischemic changes and chronic lacunar infarcts in the left basal ganglia. This would provide insight to patient's behaviors. Sometimes answering questions appropriately and in normal length of time. Sometimes will stare blankly out and not answer you needing redirection but will still answer questions appropriately. Psychiatry consulted for same    6. Leukocytosis: 14.6 today. However, no active infection. Patient afebrile. Yesterday 8.5 WBC from 12.4 on intake. Possibly inflammatory. Chest x-ray negative for acute process, UA negative for leukoesterase or nitrates. Currently on Macrobid. Continue to monitor. Possibly reactionary/inflammatory      Diet DIET CARB CONTROL;   DVT Prophylaxis [] Lovenox, []  Heparin, [] SCDs, []No VTE prophylaxis, patient ambulating   GI Prophylaxis [] PPI, [] H2 Blocker, [] No GI prophylaxis, patient is receiving diet/Tube Feeds   Code Status Full Code   Disposition Patient requires continued admission due to continue medical management   MDM [] Low, [x] Moderate,[]  High  Patient's risk as above due to as above     History of Present Illness:     Pt S&E. No acute vents overnight. Patient resting company. Denies any headache blurred vision dizziness. Denies any chest pain palpitation was breath. Denies any fever chills nausea vomiting or diarrhea. However, patient is delayed at times and answering questions, however, answers are appropriate. Denies any pain with urination. 10-14 point ROS reviewed negative, unless as noted above    Objective: Intake/Output Summary (Last 24 hours) at 5/12/2021 1601  Last data filed at 5/12/2021 1346  Gross per 24 hour   Intake 360 ml   Output 1750 ml   Net -1390 ml      Vitals:   Vitals:    05/12/21 1130   BP:    Pulse:    Resp:    Temp:    SpO2: 92%     Physical Exam:      GEN Awake female, sitting upright in bed in no apparent distress. Appears given age. EYES Pupils are equally round. No scleral erythema, discharge, or conjunctivitis. HENT Mucous membranes are moist.   NECK No apparent thyromegaly or masses.   RESP Clear to auscultation but diminished, faint expiratory wheezes, without rales or rhonchi. Symmetric chest movement while on room air. CARDIO/VASC S1/S2 auscultated. Regular rate without appreciable murmurs, rubs, or gallops. Peripheral pulses equal bilaterally and palpable. No peripheral edema. GI Abdomen is soft without significant tenderness, masses, or guarding. Bowel sounds are normoactive. Rectal exam deferred.  Wilkins catheter is not present. HEME/LYMPH No petechiae or ecchymoses. MSK No gross joint deformities. Spontaneous movement of all extremities  SKIN Normal coloration, warm, dry. NEURO Cranial nerves appear grossly intact, normal speech, no lateralizing weakness. PSYCH Awake, alert, oriented x 2. Affect flat. UA (1.015, trace blood, 300 protein many bacteria glucose 250. Not clean-catch    Chest radiograph this a.m.:  FINDINGS:   The lungs are without acute focal process.  There is no effusion or   pneumothorax. The cardiomediastinal silhouette is stable.  The osseous   structures are stable.           Impression   No acute process.       Stable cardiomegaly             Medications:   Medications:    ipratropium-albuterol  1 ampule Inhalation Q4H WA    insulin regular human  60 Units Subcutaneous Q24H    insulin regular human  60 Units Subcutaneous Q24H    insulin regular human  115 Units Subcutaneous Q24H    amLODIPine  10 mg Oral Daily    aspirin  81 mg Oral Daily    atenolol  50 mg Oral BID    atorvastatin  20 mg Oral Daily    DULoxetine  60 mg Oral Daily    hydrALAZINE  10 mg Oral BID    therapeutic multivitamin-minerals  1 tablet Oral Daily    pantoprazole  40 mg Oral Daily    sodium chloride flush  5-40 mL Intravenous 2 times per day    enoxaparin  40 mg Subcutaneous Daily    levothyroxine  175 mcg Oral Daily      Infusions:    sodium chloride      dextrose       PRN Meds: insulin regular human, 5-20 Units, TID PRN  sodium chloride flush, 5-40 mL, PRN  sodium chloride, 25 mL,

## 2021-05-13 ENCOUNTER — APPOINTMENT (OUTPATIENT)
Dept: MRI IMAGING | Age: 76
DRG: 690 | End: 2021-05-13
Payer: MEDICARE

## 2021-05-13 VITALS
TEMPERATURE: 100 F | BODY MASS INDEX: 32.78 KG/M2 | HEART RATE: 80 BPM | WEIGHT: 185 LBS | DIASTOLIC BLOOD PRESSURE: 72 MMHG | HEIGHT: 63 IN | SYSTOLIC BLOOD PRESSURE: 168 MMHG | OXYGEN SATURATION: 90 % | RESPIRATION RATE: 16 BRPM

## 2021-05-13 PROBLEM — R41.82 ALTERED MENTAL STATUS: Status: ACTIVE | Noted: 2021-05-13

## 2021-05-13 PROBLEM — N39.0 URINARY TRACT INFECTION: Status: RESOLVED | Noted: 2021-05-09 | Resolved: 2021-05-13

## 2021-05-13 LAB
ANION GAP SERPL CALCULATED.3IONS-SCNC: 10 MMOL/L (ref 4–16)
BUN BLDV-MCNC: 22 MG/DL (ref 6–23)
CALCIUM SERPL-MCNC: 9.4 MG/DL (ref 8.3–10.6)
CHLORIDE BLD-SCNC: 101 MMOL/L (ref 99–110)
CO2: 28 MMOL/L (ref 21–32)
CREAT SERPL-MCNC: 1.1 MG/DL (ref 0.6–1.1)
GFR AFRICAN AMERICAN: 59 ML/MIN/1.73M2
GFR NON-AFRICAN AMERICAN: 49 ML/MIN/1.73M2
GLUCOSE BLD-MCNC: 108 MG/DL (ref 70–99)
GLUCOSE BLD-MCNC: 278 MG/DL (ref 70–99)
GLUCOSE BLD-MCNC: 345 MG/DL (ref 70–99)
GLUCOSE BLD-MCNC: 383 MG/DL (ref 70–99)
GLUCOSE BLD-MCNC: 58 MG/DL (ref 70–99)
HCT VFR BLD CALC: 36.2 % (ref 37–47)
HEMOGLOBIN: 11 GM/DL (ref 12.5–16)
MCH RBC QN AUTO: 29.5 PG (ref 27–31)
MCHC RBC AUTO-ENTMCNC: 30.4 % (ref 32–36)
MCV RBC AUTO: 97.1 FL (ref 78–100)
PDW BLD-RTO: 14.6 % (ref 11.7–14.9)
PLATELET # BLD: 250 K/CU MM (ref 140–440)
PMV BLD AUTO: 9.9 FL (ref 7.5–11.1)
POTASSIUM SERPL-SCNC: 4.2 MMOL/L (ref 3.5–5.1)
RBC # BLD: 3.73 M/CU MM (ref 4.2–5.4)
SODIUM BLD-SCNC: 139 MMOL/L (ref 135–145)
WBC # BLD: 11.8 K/CU MM (ref 4–10.5)

## 2021-05-13 PROCEDURE — 6370000000 HC RX 637 (ALT 250 FOR IP): Performed by: INTERNAL MEDICINE

## 2021-05-13 PROCEDURE — 2580000003 HC RX 258: Performed by: INTERNAL MEDICINE

## 2021-05-13 PROCEDURE — 36415 COLL VENOUS BLD VENIPUNCTURE: CPT

## 2021-05-13 PROCEDURE — 85027 COMPLETE CBC AUTOMATED: CPT

## 2021-05-13 PROCEDURE — 94761 N-INVAS EAR/PLS OXIMETRY MLT: CPT

## 2021-05-13 PROCEDURE — 6360000002 HC RX W HCPCS: Performed by: INTERNAL MEDICINE

## 2021-05-13 PROCEDURE — 80048 BASIC METABOLIC PNL TOTAL CA: CPT

## 2021-05-13 PROCEDURE — 82962 GLUCOSE BLOOD TEST: CPT

## 2021-05-13 PROCEDURE — 2580000003 HC RX 258: Performed by: NURSE PRACTITIONER

## 2021-05-13 PROCEDURE — 70551 MRI BRAIN STEM W/O DYE: CPT

## 2021-05-13 PROCEDURE — 94640 AIRWAY INHALATION TREATMENT: CPT

## 2021-05-13 RX ORDER — DIPHENHYDRAMINE HYDROCHLORIDE 50 MG/ML
25 INJECTION INTRAMUSCULAR; INTRAVENOUS EVERY 8 HOURS
Status: DISCONTINUED | OUTPATIENT
Start: 2021-05-13 | End: 2021-05-13 | Stop reason: HOSPADM

## 2021-05-13 RX ADMIN — IPRATROPIUM BROMIDE AND ALBUTEROL SULFATE 1 AMPULE: .5; 3 SOLUTION RESPIRATORY (INHALATION) at 11:55

## 2021-05-13 RX ADMIN — DIPHENHYDRAMINE HYDROCHLORIDE 25 MG: 50 INJECTION, SOLUTION INTRAMUSCULAR; INTRAVENOUS at 10:15

## 2021-05-13 RX ADMIN — IPRATROPIUM BROMIDE AND ALBUTEROL SULFATE 1 AMPULE: .5; 3 SOLUTION RESPIRATORY (INHALATION) at 07:30

## 2021-05-13 RX ADMIN — PIPERACILLIN AND TAZOBACTAM 3375 MG: 3; .375 INJECTION, POWDER, LYOPHILIZED, FOR SOLUTION INTRAVENOUS at 10:46

## 2021-05-13 RX ADMIN — SODIUM CHLORIDE, PRESERVATIVE FREE 10 ML: 5 INJECTION INTRAVENOUS at 10:48

## 2021-05-13 RX ADMIN — LEVETIRACETAM 500 MG: 100 INJECTION, SOLUTION INTRAVENOUS at 13:39

## 2021-05-13 NOTE — PROGRESS NOTES
As per previous day, pt's 0430 fsbs and subsequent insulin coverage is moved to 0600. This is because at home she will eat within an hour to an hour and a half after taking her insulin at 0430. Breakfast does not come until 0800 here.

## 2021-05-13 NOTE — PROGRESS NOTES
Spoke with [facility], faxed over face sheet to [phone]. Spoke w/ Natalie @ [address] [place] [address] pt will be going to [facility] to the [address] [place] [address] [address] Dr. Heather Onofre room [phone]. Nurse from [address] [place] [address] will be calling for report ([phone]). Spoke w/ Kavitha Stovall (daughter) and advised pt has been accepted to [address] [place] [address], Dr. Lea Vang room [phone]. I will call back with transport time.

## 2021-05-13 NOTE — DISCHARGE SUMMARY
Discharge Summary    Name:  Analia Holcomb /Age/Sex:   [de-identified]77 y.o. female)   MRN & CSN:  [id number] & [id number] Admission Date/Time: 2021  3:27 PM   Attending:  Uriel Bryan MD Discharging Physician: Vahe Finn MD     HPI:   Chief complaint: Altered mental status  HPI: 52-year-old obese elderly white female past medical history significant for diabetes, dementia, thyroid disease arthritis presents with confusion right flank pain and abdominal pain. According to patient and  started 2 days ago getting worse. Pain was 7 out of 10 radiating. Also associated with mobility concerns and reduced oral intake. Denies any headache or chest pain. Please see H&P for full details    Hospital Course:     1. Altered mental status: Initially thought to be UTI, however urine cultures with no growth. Yesterday early a.m. patient had some somnolence but with redirect easily answer to questions appropriately. However, throughout the day patient became more somnolent and only responding to loud verbal stimuli but would answer questions. Patient had poor oral intake. Approximately 0300 hrs. patient developed fever overnight 101.3. Patient previously only on [address]. Infectious work-up due to leukocytosis of 14.2 was unremarkable for pneumonia or UA blood cultures pending at this time. MRI of the brain performed this a.m. due to scheduling demonstrate single 1 cm focus of increased signal on diffusion-weighted imaging within the right frontal lobe without clear restricted diffusion suggestive of late subacute or chronic area of ischemia. There is focal and confluent abnormal increased T2/flair signal intensity within the periventricular and deep white matter of both hemispheres there is diffuse cerebral volume loss and moderate chronic small vessel ischemic changes. Most of this a.m. patient somnolent.   But when discussing treatment patient did wake up make eye contact and nodded to understanding plan. Differential diagnosis possibly sequela of of a subacute stroke versus meningitis, unable to perform LP at this site, versus partial seizure activity versus MS but not inclusive. Patient currently on vancomycin and Zosyn and Keppra 500 mg twice daily IV. Patient excepted to [facility] neuro critical care under Dr. Abdifatah Parks neurology. Patient stable vital signs normal.    2.  Weakness and debility: Patient sedentary at home but possibly secondary to ischemic changes as described above. Initially thought patient would be appropriate for PT/OT in swing bed. However, over the last 36 hours patient is mental status markedly changed. Continue to monitor    3. Insulin-dependent diabetes: Patient managed by endocrinologist at [facility]. Currently on Humulin RU-500. Patient's blood sugars were 92 on intake gradually climbing over initial hospital day 1-4 06. Home scheduled insulin regimen implemented with improved POC glucose less than 200. Only noted serum glucose elevation with lab draws in the a.m. at this point. This a.m. 383 currently 108. 4.  Leukocytosis: Improved today 11.8 from 14.2 yesterday. Patient with fever overnight 101.3 use antipyretic/Tylenol suppository improved however, most recent 100 this a.m.    5.  Dementia: Most likely vascular. Patient baseline was similar. However, markedly worsened over the last 36 hours as described above. More than dementia component at this point based off of MRI of the brain. The patient was able to expressed appropriate understanding of and agreement with the discharge recommendations, medications, and plan to transfer to [facility] neuro critical care. Daughter Helga Glaser was in the room today discussed all the plans and recommendations with daughter who communicated with family and decided to transfer to [facility].     Consults this admission:  IP CONSULT TO HOSPITALIST  IP CONSULT TO SOCIAL WORK  IP CONSULT TO PHARMACY  IP CONSULT TO PSYCHIATRY  PHARMACY TO DOSE VANCOMYCIN    Discharge Instruction:   Follow up appointments: Primary care physician:  within 2 weeks    Diet:  diabetic diet   Activity: activity as tolerated  Disposition: Discharged to:   []Home, []HHC, []SNF, []Acute Rehab, []Hospice X-transfer to [facility] neuro critical care  Condition on discharge: Stable    Discharge Medications:      Sanatfredi Bon   Home Medication Instructions [id number]    Printed on:05/13/21 1459   Medication Information                      amLODIPine (NORVASC) 10 MG tablet  Take 1 tablet by mouth daily Hold for systolic blood pressure less than or equal to 110             aspirin 81 MG EC tablet  Take 81 mg by mouth daily. atenolol (TENORMIN) 50 MG tablet  Take 1 tablet by mouth 2 times daily Hold for systolic blood pressure less than or equal to 110             atorvastatin (LIPITOR) 20 MG tablet  Take 20 mg by mouth daily             Blood Pressure KIT  1 each by Does not apply route 2 times daily             DULoxetine (CYMBALTA) 60 MG extended release capsule  Take 60 mg by mouth daily              hydrALAZINE (APRESOLINE) 10 MG tablet  Take 1 tablet by mouth 2 times daily Hold for systolic blood pressure less than or equal to 110             insulin glargine (LANTUS;BASAGLAR) 100 UNIT/ML injection pen  Inject 15 Units into the skin nightly             Insulin Regular Human (HUMULIN R U-500, CONCENTRATED, SC)  Inject 130 Units into the skin 70  Units before dinner             Levothyroxine Sodium 175 MCG CAPS  Take 175 mcg by mouth Daily              Multiple Vitamins-Minerals (TRUEPLUS DIABETIC MULTIVITAMIN) TABS  Take 1 tablet by mouth daily.              pantoprazole (PROTONIX) 40 MG tablet  Take 40 mg by mouth daily                  Objective Findings at Discharge:   BP (!) 168/72   Pulse 80   Temp 100 °F (37.8 °C) (Oral)   Resp 16   Ht 5' 3\" (1.6 m)   Wt 185 lb (83.9 kg)   SpO2 90%   BMI 32.77 kg/m²            PHYSICAL EXAM   GEN Awake but somnolent obese elderly white female, sitting upright in bed in no apparent distress. Appears given age. EYES Pupils are equally round. No scleral erythema, discharge, or conjunctivitis. HENT Mucous membranes are moist.   NECK No apparent thyromegaly or masses. RESP Clear to auscultation but diminished, no wheezes, rales or rhonchi. Symmetric chest movement while on room air. CARDIO/VASC S1/S2 auscultated. Regular rate without appreciable murmurs, rubs, or gallops. Peripheral pulses equal bilaterally and palpable. No peripheral edema. GI Abdomen is protuberant soft without significant tenderness, masses, or guarding. Bowel sounds are normoactive. Rectal exam deferred.  Wilkins catheter is not present. HEME/LYMPH No petechiae or ecchymoses. MSK No gross joint deformities. Spontaneous movement of upper extremities but slow and deliberate  SKIN Normal coloration, warm, dry. NEURO Cranial nerves appear grossly intact, interrupted speech  PSYCH Awake to verbal stimuli, somnolent. Affect flat.       BMP/CBC  Recent Labs     05/11/21  0500 05/12/21  0600 05/13/21  0550    137 139   K 3.9 4.4 4.2    99 101   CO2 28 25 28   BUN 23 25* 22   CREATININE 1.1 1.1 1.1   WBC 8.5 14.6* 11.8*   HCT 33.4* 36.3* 36.2*    273 250       IMAGING: MRI of the brain without contrast 5/13:  FINDINGS:   INTRACRANIAL STRUCTURES/VENTRICLES: There is a 1 cm focus of increased signal   on diffusion-weighted imaging without clear restricted diffusion.  No   restricted diffusion is identified. David Raja is no acute intracranial   hemorrhage.  There is no mass effect or midline shift.  There is focal and   confluent abnormal increased T2/FLAIR signal intensity within the   periventricular and deep white matter of both hemispheres.  There is diffuse   cerebral volume loss.       There is no sellar or suprasellar mass present. David Raja is no ventriculomegaly   or abnormal extra-axial fluid collection present.  The proximal portions of the [place] demonstrate normal flow voids.       ORBITS: Limited evaluation of the orbits is unremarkable.       SINUSES: The paranasal sinuses and mastoid air cells are clear.       BONES/SOFT TISSUES: Bone marrow signal intensity is normal.           Impression   1. Single 1 cm focus of increased signal on diffusion-weighted imaging within   the right frontal lobe without clear restricted diffusion.  This is   suggestion of a late subacute or chronic area of ischemia. 2. No acute infarct or acute intracranial hemorrhage. 3. Diffuse cerebral volume loss and moderate chronic small vessel ischemic   changes.         CT of the head without contrast on intake 5/9:  FINDINGS:   BRAIN/VENTRICLES: There is no acute intracranial hemorrhage, mass effect or   midline shift.  No abnormal extra-axial fluid collection.  The gray-white   differentiation is maintained without evidence of an acute infarct.  There is   no evidence of hydrocephalus. Stable diffuse parenchymal volume loss with   moderate chronic white matter microvascular ischemic changes and chronic   lacunar infarcts in the left basal ganglia.       ORBITS: The visualized portion of the orbits demonstrate no acute abnormality.       SINUSES: The visualized paranasal sinuses and mastoid air cells demonstrate   no acute abnormality.       SOFT TISSUES/SKULL:  No acute abnormality of the visualized skull or soft   tissues.           Impression   1. No acute intracranial abnormality.    2. Stable diffuse parenchymal volume loss with moderate chronic white matter   microvascular ischemic changes and chronic lacunar infarcts in the left basal   ganglia.         CT of the abdomen and pelvis without contrast on intake:  FINDINGS:   Lower Chest: Dependent atelectasis at the lung bases.  Calcification of both   the mitral and aortic valves.  Severe calcified atherosclerotic plaque of the   partially imaged descending thoracic aorta.       Organs: Evaluation of the solid organs is limited due to lack of intravenous   contrast.  The nonenhanced liver demonstrates no acute findings.  The   gallbladder appears grossly unremarkable.  The spleen demonstrates no acute   abnormality.  Atrophy of the pancreas.  The bilateral adrenal glands appear   unremarkable.  The right kidney is absent.  The left kidney appears   unremarkable.  No hydronephrosis.       GI/Bowel: No bowel obstruction identified.  A moderate volume of stool is   identified throughout the colon.  Small bowel is normal in caliber.  Small   hiatal hernia present.  The appendix is not identified.  No secondary signs   of appendicitis evident.       Pelvis: The bladder partially collapsed.  A few locules of gas are present   within the bladder.  Correlate for recent instrumentation.  In the absence of   recent instrumentation, infection should be excluded.       Peritoneum/Retroperitoneum: The abdominal aorta is normal in caliber with   moderate atherosclerotic plaque present.  No retroperitoneal adenopathy.  No   free fluid or free air identified within the abdomen and pelvis.       Bones/Soft Tissues: No acute osseous or soft tissue abnormality identified. Multilevel degenerative change of the lumbar spine.  Mild osteoarthritic   changes of the hips.           Impression   1. No acute intra-abdominal process identified. 2. Moderate volume of stool within the colon. 3. The appendix is not identified.  No secondary signs of appendicitis   evident.         X-ray of the chest on 5/12:  FINDINGS:   The lungs are without acute focal process.  There is no effusion or   pneumothorax. The cardiomediastinal silhouette is stable.  The osseous   structures are stable.           Impression   No acute process.       Stable cardiomegaly         X-ray on intake 5/9:  FINDINGS:   Cardiomediastinal silhouette is enlarged.  The lungs are underinflated.  No   large pleural effusion or pneumothorax.  There are faint bilateral pulmonary   opacities.  No acute osseous abnormality.           Impression   Underinflated lungs with faint bilateral pulmonary opacities, which could be   seen with edema or pneumonia.  Cardiomegaly.         ECG on intake demonstrated normal sinus rhythm with a rate of 73 normal NH interval normal QRS duration normal QTC no acute ST wave abnormalities normal axis    Discharge Time of 45 minutes    Electronically signed by Mook Aranda MD on 5/13/2021 at 2:22 PM

## 2021-05-13 NOTE — PROGRESS NOTES
Received call from access center. [facility] to pick pt up at 1645. Daughter Miranda Woodward called and notified. Also Dolores José, receiving nurse at [facility] notified of transport time.

## 2021-05-13 NOTE — PROGRESS NOTES
[facility] called and notified this nurse that pt would not be picked up until around 1800 due to an emergency conflict. Family at bedside aware, Nya Alfaro called and also nurse Brian at [facility] Notified.

## 2021-05-19 ENCOUNTER — OUTSIDE SERVICES (OUTPATIENT)
Dept: INTERNAL MEDICINE CLINIC | Age: 76
End: 2021-05-19
Payer: MEDICARE

## 2021-05-19 DIAGNOSIS — R53.1 GENERALIZED WEAKNESS: ICD-10-CM

## 2021-05-19 DIAGNOSIS — E11.69 DIABETES MELLITUS TYPE 2 IN OBESE (HCC): ICD-10-CM

## 2021-05-19 DIAGNOSIS — E66.9 DIABETES MELLITUS TYPE 2 IN OBESE (HCC): ICD-10-CM

## 2021-05-19 DIAGNOSIS — I10 ESSENTIAL HYPERTENSION: ICD-10-CM

## 2021-05-19 DIAGNOSIS — R41.82 ALTERED MENTAL STATUS, UNSPECIFIED ALTERED MENTAL STATUS TYPE: Primary | ICD-10-CM

## 2021-05-19 DIAGNOSIS — E03.9 ACQUIRED HYPOTHYROIDISM: ICD-10-CM

## 2021-05-19 DIAGNOSIS — K21.9 GASTROESOPHAGEAL REFLUX DISEASE WITHOUT ESOPHAGITIS: ICD-10-CM

## 2021-05-19 PROBLEM — R11.2 NAUSEA AND VOMITING: Status: RESOLVED | Noted: 2019-01-06 | Resolved: 2021-05-19

## 2021-05-19 PROBLEM — N34.2 INFECTIVE URETHRITIS: Status: RESOLVED | Noted: 2017-08-07 | Resolved: 2021-05-19

## 2021-05-19 PROCEDURE — 99306 1ST NF CARE HIGH MDM 50: CPT | Performed by: INTERNAL MEDICINE

## 2021-05-25 ENCOUNTER — OUTSIDE SERVICES (OUTPATIENT)
Dept: INTERNAL MEDICINE CLINIC | Age: 76
End: 2021-05-25
Payer: MEDICARE

## 2021-05-25 DIAGNOSIS — I10 ESSENTIAL HYPERTENSION: ICD-10-CM

## 2021-05-25 DIAGNOSIS — E66.9 DIABETES MELLITUS TYPE 2 IN OBESE (HCC): Primary | ICD-10-CM

## 2021-05-25 DIAGNOSIS — E11.69 DIABETES MELLITUS TYPE 2 IN OBESE (HCC): Primary | ICD-10-CM

## 2021-05-25 DIAGNOSIS — E03.9 ACQUIRED HYPOTHYROIDISM: ICD-10-CM

## 2021-05-25 DIAGNOSIS — E88.89 KETOSIS (HCC): ICD-10-CM

## 2021-05-25 DIAGNOSIS — R53.1 GENERALIZED WEAKNESS: ICD-10-CM

## 2021-05-25 PROCEDURE — 99308 SBSQ NF CARE LOW MDM 20: CPT | Performed by: INTERNAL MEDICINE

## 2021-05-26 NOTE — PROGRESS NOTES
Progress note  ___________________________    Valente Vasquez's  is   SEEN ON 2021  ___________________________    S:    Patient states she is feeling better. Blood sugars are improving. She is feeling stronger and is able to get up and go to the bathroom with the help of walker. She is complaining of redness in the groin area. Valente Davis is a 77 y.o. female with a past history of DMII, GERD, , HTN, hypothyroid, dementia, arthritis presenting with altered mental status. Work up was negative. Because of the weakness and difficulty ambulating patient was referred to nursing home for rehab. Pt was using high dose insulin for DM . Dose was adjusted and her BS are better. Patient denies any chest pain. No shortness of breath. No cough or sputum production. No abdominal pain. No nausea or vomiting diarrhea. No fever or chills     Review of system normal except as mentioned in HPI    ALLERGIES:  Allergies   Allergen Reactions    Ritalin [Methylphenidate] Rash    Rocephin [Ceftriaxone Sodium-Lidocaine] Rash        PAST MEDICAL HISTORY:    has a past medical history of Arthritis, Cellulitis of right foot, Chronic kidney disease, Depression, Diabetes mellitus ([address] Frequent falls, GERD (gastroesophageal reflux disease), H/O Doppler ultrasound, H/O echocardiogram, Hallux valgus (acquired), right foot, Hypertension, Other disorders of kidney and ureter, SIRS (systemic inflammatory response syndrome) ([address] Thyroid disease, Type 2 diabetes mellitus with foot ulcer, with long-term current use of insulin ([address] Ulcer of right foot with fat layer exposed ([address] and WD-Diabetic ulcer of toe of left foot associated with type 2 diabetes mellitus, with fat layer exposed ([address] PAST SURGICAL HISTORY:   has a past surgical history that includes Hysterectomy;  Foot surgery (12); cyst removal; Kidney removal (1973); Abdomen surgery; hernia repair; Endoscopy, colon, diagnostic; and Dilatation, esophagus. SOCIAL HISTORY:   reports that she has never smoked. She has never used smokeless tobacco. She reports that she does not drink alcohol and does not use drugs. Vital signs: As in [facility]  GENERAL:  Alert, oriented, pleasant, in no apparent distress. Obese   HEENT:  Conjunctiva pink, no scleral icterus. ENT clear. NECK:  Supple. No jugular venous distention noted. No masses felt,  CARDIOVASCULAR:  Normal S1 and S2    PULMONARY:  No respiratory distress. No wheezes or rales. ABDOMEN:  Soft and non-tender,no masses  or organomegaly. EXTREMITIES:  No cyanosis, clubbing, trace pedal edema   SKIN: Skin is warm and dry. NEUROLOGICAL:  Cranial nerves II through XII are grossly intact. Patient is able to move extremities. Assessment:  1. Altered mental status resolved  2. Diabetes mellitus on high-dose of insulin  3. Hypertension  4. Hyperlipidemia  5. Hypothyroidism  6. GERD  7. Depression  8. History of urinary retention resolved           Plan:  Continue PT and OT  Medication were reviewed. On  Lantus 45 units twice a day and Humalog premeal + correction based on SSI  Continue current treatment. Discussed with patient in detail  Monitor blood sugars. Glucagon was also ordered  Nystatin powder for rash in the groin.

## 2021-06-20 ENCOUNTER — APPOINTMENT (OUTPATIENT)
Dept: CT IMAGING | Age: 76
End: 2021-06-20
Payer: MEDICARE

## 2021-06-20 ENCOUNTER — APPOINTMENT (OUTPATIENT)
Dept: GENERAL RADIOLOGY | Age: 76
End: 2021-06-20
Payer: MEDICARE

## 2021-06-20 ENCOUNTER — HOSPITAL ENCOUNTER (EMERGENCY)
Age: 76
Discharge: HOME | End: 2021-06-20
Attending: EMERGENCY MEDICINE
Payer: MEDICARE

## 2021-06-20 VITALS
RESPIRATION RATE: 16 BRPM | TEMPERATURE: 97.9 F | SYSTOLIC BLOOD PRESSURE: 171 MMHG | HEIGHT: 63 IN | HEART RATE: 72 BPM | BODY MASS INDEX: 37.21 KG/M2 | WEIGHT: 210 LBS | DIASTOLIC BLOOD PRESSURE: 73 MMHG | OXYGEN SATURATION: 96 %

## 2021-06-20 DIAGNOSIS — M54.42 ACUTE LEFT-SIDED LOW BACK PAIN WITH LEFT-SIDED SCIATICA: Primary | ICD-10-CM

## 2021-06-20 LAB
ALBUMIN SERPL-MCNC: 3.4 GM/DL (ref 3.4–5)
ALP BLD-CCNC: 94 IU/L (ref 40–129)
ALT SERPL-CCNC: 16 U/L (ref 10–40)
ANION GAP SERPL CALCULATED.3IONS-SCNC: 13 MMOL/L (ref 4–16)
AST SERPL-CCNC: 25 IU/L (ref 15–37)
BACTERIA: ABNORMAL /HPF
BASOPHILS ABSOLUTE: 0 K/CU MM
BASOPHILS RELATIVE PERCENT: 0.5 % (ref 0–1)
BILIRUB SERPL-MCNC: 0.4 MG/DL (ref 0–1)
BILIRUBIN URINE: NEGATIVE MG/DL
BLOOD, URINE: ABNORMAL
BUN BLDV-MCNC: 18 MG/DL (ref 6–23)
CALCIUM SERPL-MCNC: 9.4 MG/DL (ref 8.3–10.6)
CAST TYPE: NEGATIVE /HPF
CHLORIDE BLD-SCNC: 102 MMOL/L (ref 99–110)
CLARITY: ABNORMAL
CO2: 25 MMOL/L (ref 21–32)
COLOR: YELLOW
CREAT SERPL-MCNC: 1.1 MG/DL (ref 0.6–1.1)
CRYSTAL TYPE: NEGATIVE /HPF
DIFFERENTIAL TYPE: ABNORMAL
EOSINOPHILS ABSOLUTE: 0.4 K/CU MM
EOSINOPHILS RELATIVE PERCENT: 4 % (ref 0–3)
EPITHELIAL CELLS, UA: ABNORMAL /HPF
GFR AFRICAN AMERICAN: 59 ML/MIN/1.73M2
GFR NON-AFRICAN AMERICAN: 49 ML/MIN/1.73M2
GLUCOSE BLD-MCNC: 129 MG/DL (ref 70–99)
GLUCOSE BLD-MCNC: 306 MG/DL (ref 70–99)
GLUCOSE, URINE: NEGATIVE MG/DL
HCT VFR BLD CALC: 34 % (ref 37–47)
HEMOGLOBIN: 10.3 GM/DL (ref 12.5–16)
IMMATURE NEUTROPHIL %: 0.7 % (ref 0–0.43)
KETONES, URINE: NEGATIVE MG/DL
LEUKOCYTE ESTERASE, URINE: ABNORMAL
LYMPHOCYTES ABSOLUTE: 1.9 K/CU MM
LYMPHOCYTES RELATIVE PERCENT: 21.5 % (ref 24–44)
MCH RBC QN AUTO: 28.5 PG (ref 27–31)
MCHC RBC AUTO-ENTMCNC: 30.3 % (ref 32–36)
MCV RBC AUTO: 94.2 FL (ref 78–100)
MONOCYTES ABSOLUTE: 0.4 K/CU MM
MONOCYTES RELATIVE PERCENT: 5 % (ref 0–4)
NITRITE URINE, QUANTITATIVE: NEGATIVE
PDW BLD-RTO: 14.2 % (ref 11.7–14.9)
PH, URINE: 6.5 (ref 5–8)
PLATELET # BLD: 185 K/CU MM (ref 140–440)
PMV BLD AUTO: 9.7 FL (ref 7.5–11.1)
POTASSIUM SERPL-SCNC: 4.7 MMOL/L (ref 3.5–5.1)
PROTEIN UA: ABNORMAL MG/DL
RBC # BLD: 3.61 M/CU MM (ref 4.2–5.4)
RBC URINE: ABNORMAL /HPF (ref 0–6)
SEGMENTED NEUTROPHILS ABSOLUTE COUNT: 6 K/CU MM
SEGMENTED NEUTROPHILS RELATIVE PERCENT: 68.3 % (ref 36–66)
SODIUM BLD-SCNC: 140 MMOL/L (ref 135–145)
SPECIFIC GRAVITY UA: 1.02 (ref 1–1.03)
TOTAL IMMATURE NEUTOROPHIL: 0.06 K/CU MM
TOTAL PROTEIN: 6.8 GM/DL (ref 6.4–8.2)
UROBILINOGEN, URINE: 0.2 MG/DL (ref 0.2–1)
WBC # BLD: 8.8 K/CU MM (ref 4–10.5)
WBC UA: ABNORMAL /HPF (ref 0–5)

## 2021-06-20 PROCEDURE — 72131 CT LUMBAR SPINE W/O DYE: CPT

## 2021-06-20 PROCEDURE — 85025 COMPLETE CBC W/AUTO DIFF WBC: CPT

## 2021-06-20 PROCEDURE — 94640 AIRWAY INHALATION TREATMENT: CPT

## 2021-06-20 PROCEDURE — 99285 EMERGENCY DEPT VISIT HI MDM: CPT

## 2021-06-20 PROCEDURE — 96374 THER/PROPH/DIAG INJ IV PUSH: CPT

## 2021-06-20 PROCEDURE — 81001 URINALYSIS AUTO W/SCOPE: CPT

## 2021-06-20 PROCEDURE — 6370000000 HC RX 637 (ALT 250 FOR IP): Performed by: EMERGENCY MEDICINE

## 2021-06-20 PROCEDURE — 96372 THER/PROPH/DIAG INJ SC/IM: CPT

## 2021-06-20 PROCEDURE — 71045 X-RAY EXAM CHEST 1 VIEW: CPT

## 2021-06-20 PROCEDURE — 6360000002 HC RX W HCPCS: Performed by: EMERGENCY MEDICINE

## 2021-06-20 PROCEDURE — 82962 GLUCOSE BLOOD TEST: CPT

## 2021-06-20 PROCEDURE — 80053 COMPREHEN METABOLIC PANEL: CPT

## 2021-06-20 RX ORDER — ORPHENADRINE CITRATE 30 MG/ML
60 INJECTION INTRAMUSCULAR; INTRAVENOUS ONCE
Status: COMPLETED | OUTPATIENT
Start: 2021-06-20 | End: 2021-06-20

## 2021-06-20 RX ORDER — KETOROLAC TROMETHAMINE 15 MG/ML
15 INJECTION, SOLUTION INTRAMUSCULAR; INTRAVENOUS ONCE
Status: COMPLETED | OUTPATIENT
Start: 2021-06-20 | End: 2021-06-20

## 2021-06-20 RX ORDER — HYDROCODONE BITARTRATE AND ACETAMINOPHEN 5; 325 MG/1; MG/1
0.5 TABLET ORAL EVERY 6 HOURS PRN
Qty: 5 TABLET | Refills: 0 | Status: ON HOLD | OUTPATIENT
Start: 2021-06-20 | End: 2021-06-26

## 2021-06-20 RX ORDER — NAPROXEN 500 MG/1
500 TABLET ORAL 2 TIMES DAILY
Qty: 20 TABLET | Refills: 0 | Status: ON HOLD | OUTPATIENT
Start: 2021-06-20 | End: 2021-10-08 | Stop reason: HOSPADM

## 2021-06-20 RX ORDER — HYDROCODONE BITARTRATE AND ACETAMINOPHEN 5; 325 MG/1; MG/1
0.5 TABLET ORAL ONCE
Status: COMPLETED | OUTPATIENT
Start: 2021-06-20 | End: 2021-06-20

## 2021-06-20 RX ORDER — METHOCARBAMOL 500 MG/1
500 TABLET, FILM COATED ORAL 3 TIMES DAILY
Qty: 30 TABLET | Refills: 0 | Status: SHIPPED | OUTPATIENT
Start: 2021-06-20 | End: 2021-06-30

## 2021-06-20 RX ORDER — MORPHINE SULFATE 4 MG/ML
4 INJECTION, SOLUTION INTRAMUSCULAR; INTRAVENOUS EVERY 30 MIN PRN
Status: DISCONTINUED | OUTPATIENT
Start: 2021-06-20 | End: 2021-06-20 | Stop reason: HOSPADM

## 2021-06-20 RX ORDER — ONDANSETRON 2 MG/ML
4 INJECTION INTRAMUSCULAR; INTRAVENOUS EVERY 30 MIN PRN
Status: DISCONTINUED | OUTPATIENT
Start: 2021-06-20 | End: 2021-06-20 | Stop reason: HOSPADM

## 2021-06-20 RX ORDER — IPRATROPIUM BROMIDE AND ALBUTEROL SULFATE 2.5; .5 MG/3ML; MG/3ML
1 SOLUTION RESPIRATORY (INHALATION) ONCE
Status: COMPLETED | OUTPATIENT
Start: 2021-06-20 | End: 2021-06-20

## 2021-06-20 RX ADMIN — KETOROLAC TROMETHAMINE 15 MG: 15 INJECTION, SOLUTION INTRAMUSCULAR; INTRAVENOUS at 13:28

## 2021-06-20 RX ADMIN — IPRATROPIUM BROMIDE AND ALBUTEROL SULFATE 1 AMPULE: .5; 3 SOLUTION RESPIRATORY (INHALATION) at 12:06

## 2021-06-20 RX ADMIN — ORPHENADRINE CITRATE 60 MG: 30 INJECTION INTRAMUSCULAR; INTRAVENOUS at 09:52

## 2021-06-20 RX ADMIN — HYDROCODONE BITARTRATE AND ACETAMINOPHEN 0.5 TABLET: 5; 325 TABLET ORAL at 13:27

## 2021-06-20 ASSESSMENT — PAIN SCALES - GENERAL
PAINLEVEL_OUTOF10: 7
PAINLEVEL_OUTOF10: 3
PAINLEVEL_OUTOF10: 3

## 2021-06-20 ASSESSMENT — PAIN DESCRIPTION - LOCATION: LOCATION: BACK

## 2021-06-20 ASSESSMENT — PAIN DESCRIPTION - PAIN TYPE: TYPE: ACUTE PAIN

## 2021-06-20 NOTE — ED PROVIDER NOTES
MARIVEL.    Past History     Past Medical History:   Diagnosis Date    Arthritis     Cellulitis of right foot     Chronic kidney disease     Depression     Diabetes mellitus ([address]     Frequent falls     GERD (gastroesophageal reflux disease)     H/O Doppler ultrasound 10/01/2020     No evidence of significant venous insufficiency bilaterally. No evidence of DVT or SVT in the bilaterally.  H/O echocardiogram 10/01/2020    EF 60-65%. No significant valvular disease.  Hallux valgus (acquired), right foot 5/1/2017    Hypertension     Other disorders of kidney and ureter     1 kidney.  SIRS (systemic inflammatory response syndrome) ([address] 1/18/2012    Thyroid disease     hypothyroid    Type 2 diabetes mellitus with foot ulcer, with long-term current use of insulin ([address] 1/18/2012    Ulcer of right foot with fat layer exposed ([address] 2/6/2013    WD-Diabetic ulcer of toe of left foot associated with type 2 diabetes mellitus, with fat layer exposed ([address] 3/4/2021     Past Surgical History:   Procedure Laterality Date    ABDOMEN SURGERY      CYST REMOVAL      from kidney.     DILATATION, ESOPHAGUS      ENDOSCOPY, COLON, DIAGNOSTIC      FOOT SURGERY  12/30/11    had infection  and a biopsy was sent away for analysis    HERNIA REPAIR      HYSTERECTOMY      KIDNEY REMOVAL  1973     Social History     Socioeconomic History    Marital status:      Spouse name: None    Number of children: None    Years of education: None    Highest education level: None   Occupational History    None   Tobacco Use    Smoking status: Never Smoker    Smokeless tobacco: Never Used   Vaping Use    Vaping Use: Never used   Substance and Sexual Activity    Alcohol use: No    Drug use: No    Sexual activity: Yes   Other Topics Concern    None   Social History Narrative    None     Social Determinants of Health     Financial Resource Strain:     Difficulty of Paying Living Expenses:    Food Insecurity:     Height Weight   (!) 160/64 97.9 °F (36.6 °C) Oral 72 16 97 % 5' 3\" (1.6 m) 210 lb (95.3 kg)     GENERAL APPEARANCE: Awake and alert. Cooperative. Nontoxic in appearance  HEAD: Normocephalic. EYES: Sclera anicteric. ENT: Tolerates saliva. NECK: Supple. LUNGS: Respirations unlabored. Clear to auscultation bilaterally without wheezes rales or rhonchi. Breath sounds are distant however  BACK: There is not thoracic or lumbar midline tenderness to palpation or step-offs. Paraspinal tenderness to palpation ist present in the left psoas and paraspinal region radiating into the left buttocks region. No overlying rashes. LE strength is 5/5. LE light touch is intact. LE DTR's are 2+ in the patellas and achilles. Straight leg test is negative on the RIGHT, positive on the LEFT. SKIN: Warm and dry.     Diagnostics   Labs:  Results for orders placed or performed during the hospital encounter of 06/20/21   Urinalysis with Microscopic   Result Value Ref Range    Color, UA YELLOW YELLOW    Clarity, UA SL HAZY CLEAR    Glucose, Urine NEGATIVE NEGATIVE MG/DL    Bilirubin Urine NEGATIVE NEGATIVE MG/DL    Ketones, Urine NEGATIVE NEGATIVE MG/DL    Specific Gravity, UA 1.020 1.001 - 1.035    Blood, Urine 1+ NEGATIVE    pH, Urine 6.5 5.0 - 8.0    Protein, UA 3+ NEGATIVE MG/DL    Urobilinogen, Urine 0.2 0.2 - 1.0 MG/DL    Nitrite Urine, Quantitative NEGATIVE NEGATIVE    Leukocyte Esterase, Urine 1+ NEGATIVE    RBC, UA 2 TO 3 0 - 6 /HPF    WBC, UA 5 TO 9 0 - 5 /HPF    Epithelial Cells, UA 1 TO 3 /HPF    Cast Type NEGATIVE (A) NO CAST FORMS SEEN /HPF    Bacteria, UA MANY NEGATIVE /HPF    Crystal Type NEGATIVE NEGATIVE /HPF   CBC Auto Differential   Result Value Ref Range    WBC 8.8 4.0 - 10.5 K/CU MM    RBC 3.61 (L) 4.2 - 5.4 M/CU MM    Hemoglobin 10.3 (L) 12.5 - 16.0 GM/DL    Hematocrit 34.0 (L) 37 - 47 %    MCV 94.2 78 - 100 FL    MCH 28.5 27 - 31 PG    MCHC 30.3 (L) 32.0 - 36.0 %    RDW 14.2 11.7 - 14.9 %    Platelets 847 494 - 952 K/CU MM    MPV 9.7 7.5 - 11.1 FL    Differential Type AUTOMATED DIFFERENTIAL     Segs Relative 68.3 (H) 36 - 66 %    Lymphocytes % 21.5 (L) 24 - 44 %    Monocytes % 5.0 (H) 0 - 4 %    Eosinophils % 4.0 (H) 0 - 3 %    Basophils % 0.5 0 - 1 %    Segs Absolute 6.0 K/CU MM    Lymphocytes Absolute 1.9 K/CU MM    Monocytes Absolute 0.4 K/CU MM    Eosinophils Absolute 0.4 K/CU MM    Basophils Absolute 0.0 K/CU MM    Immature Neutrophil % 0.7 (H) 0 - 0.43 %    Total Immature Neutrophil 0.06 K/CU MM   Comprehensive Metabolic Panel   Result Value Ref Range    Sodium 140 135 - 145 MMOL/L    Potassium 4.7 3.5 - 5.1 MMOL/L    Chloride 102 99 - 110 mMol/L    CO2 25 21 - 32 MMOL/L    BUN 18 6 - 23 MG/DL    CREATININE 1.1 0.6 - 1.1 MG/DL    Glucose 306 (H) 70 - 99 MG/DL    Calcium 9.4 8.3 - 10.6 MG/DL    Albumin 3.4 3.4 - 5.0 GM/DL    Total Protein 6.8 6.4 - 8.2 GM/DL    Total Bilirubin 0.4 0.0 - 1.0 MG/DL    ALT 16 10 - 40 U/L    AST 25 15 - 37 IU/L    Alkaline Phosphatase 94 40 - 129 IU/L    GFR Non- 49 (L) >60 mL/min/1.73m2    GFR  59 (L) >60 mL/min/1.73m2    Anion Gap 13 4 - 16   POCT Glucose   Result Value Ref Range    POC Glucose 129 (H) 70 - 99 MG/DL     Radiographs:  CT LUMBAR SPINE WO CONTRAST    Result Date: 6/20/2021  EXAMINATION: CT OF THE LUMBAR SPINE WITHOUT CONTRAST  6/20/2021 TECHNIQUE: CT of the lumbar spine was performed without the administration of intravenous contrast. Multiplanar reformatted images are provided for review. Dose modulation, iterative reconstruction, and/or weight based adjustment of the mA/kV was utilized to reduce the radiation dose to as low as reasonably achievable. COMPARISON: None.  HISTORY: ORDERING SYSTEM PROVIDED HISTORY: left radicular pain with sciatica TECHNOLOGIST PROVIDED HISTORY: Reason for exam:->left radicular pain with sciatica Reason for Exam: left radicular pain with sciatica; back pain Acuity: Acute Type of Exam: Initial FINDINGS: BONES/ALIGNMENT: There is normal alignment of the spine. The vertebral body heights are maintained. No osseous destructive lesion is seen. DEGENERATIVE CHANGES: L1-L2: Mild disc degenerative changes with vacuum disc phenomenon. No significant canal or foraminal narrowing. L2-L3: There is a broad-based disc bulge causing ventral thecal sac effacement. Mild right-sided neural foraminal narrowing. L3-L4: There is a broad-based disc bulge causing ventral thecal sac effacement. Along with bilateral facet arthritis there is mild bilateral right greater than left neural foraminal narrowing. L4-L5: Mild broad-based disc bulge without significant canal narrowing. Bilateral facet arthritis is noted. No significant foraminal narrowing. L5-S1: Moderate disc degenerative changes with broad-based disc bulge. No significant canal narrowing. Hypertrophic bilateral facet arthritis causes mild bilateral neural foraminal narrowing. SOFT TISSUES/RETROPERITONEUM: No evidence of paraspinal mass or hematoma. Limited images of the retroperitoneum demonstrate atherosclerotic calcifications of the aorta. Absent right kidney. Status post hysterectomy. Multilevel disc and facet degenerative changes throughout the lumbar spine without severe spinal canal stenosis. Mild multilevel canal narrowing with mild bilateral neural foraminal narrowing as described above. No evidence of focal malalignment. XR CHEST PORTABLE    Result Date: 6/20/2021  EXAMINATION: ONE XRAY VIEW OF THE CHEST 6/20/2021 9:40 am COMPARISON: 05/12/2021 HISTORY: ORDERING SYSTEM PROVIDED HISTORY: asthma, sob TECHNOLOGIST PROVIDED HISTORY: Reason for exam:->asthma, sob Reason for Exam: asthma, sob Acuity: Acute Type of Exam: Initial FINDINGS: Cardiac silhouette is normal.  There is moderate thoracic aortic calcification. Pulmonary vessels are normal in caliber. There is chronic, slight elevation of the right hemidiaphragm.   There is chronic bronchial wall thickening. No consolidation, pleural effusion or pneumothorax is identified. No acute cardiopulmonary disease. ED Course and MDM   In brief, Shannon Sherwood is a 77 y.o. female who presented to the emergency department with complaints of pain in her left low back with radiation into her left leg. Here in the ER we did a CAT scan which did show some disks that are protruding and suggested degenerative changes throughout her lumbar spine. There is no stenosis. Her urine is clear and her blood work is negative. Patient was initially ordered on morphine and Norflex. She accepted the Norflex but refused the morphine. She was then given half a tablet of Norco with 15 mg of Toradol. Her pain is doing better at this time. She would like to try medications at home. She will be continued on Flexeril as well as half a tablet of Norco to be used as needed and some Naprosyn. She will be discharged stable condition and is instructed to follow-up with her primary caregiver within the next week. She is instructed return for any problems or concerns.     ED Medication Orders (From admission, onward)    Start Ordered     Status Ordering Provider    06/20/21 1315 06/20/21 1314  ketorolac (TORADOL) injection 15 mg  ONCE      Last MAR action: Given - by Isidor Route on 06/20/21 at [facility]     06/20/21 1315 06/20/21 1314  HYDROcodone-acetaminophen (NORCO) 5-325 MG per tablet 0.5 tablet  ONCE      Last MAR action: Given - by Isidor Route on 06/20/21 at [facility]     06/20/21 1200 06/20/21 1157  ipratropium-albuterol (DUONEB) nebulizer solution 1 ampule  ONCE     Question:  Initiate RT Bronchodilator Protocol  Answer:  Yes    Last MAR action: Given - by Maryanne Prather on 06/20/21 at [facility]    06/20/21 0945 06/20/21 0937  orphenadrine (NORFLEX) injection 60 mg  ONCE      Last MAR action: Given - by Isidor Route on 06/20/21 at [address] [facility]    06/20/21 4363 06/20/21 0937  ondansetron (ZOFRAN) injection 4 mg  EVERY 30 MIN PRN      Acknowledged Shanita Douglasrolly    06/20/21 1984 06/20/21 0937  morphine sulfate (PF) injection 4 mg  EVERY 30 MIN PRN      Acknowledged VISHAL BUCK          I estimate there is LOW risk for (including but not limited to) RAPIDLY EXPANDING OR RUPTURED AAA, AORTIC DISSECTION, CAUDA EQUINA SYNDROME, or EPIDURAL MASS LESION thus I consider the discharge disposition reasonable. Eddie Andrew (or their surrogate) and I have discussed the diagnosis and risks, and we agree with discharging home with close follow-up. We also discussed returning to the Emergency Department immediately if new or worsening symptoms occur. We have discussed the symptoms which are most concerning that necessitate immediate return. Final Impression      1.  Acute left-sided low back pain with left-sided sciatica        DISPOSITION       Please note that portions of this note may have been completed with a voice recognition program. Efforts were made to edit the dictations but occasionally words are mis-transcribed.)    Christine Garnett, [address] [place] [address]  06/20/21 [id number]

## 2021-06-22 ENCOUNTER — HOSPITAL ENCOUNTER (EMERGENCY)
Age: 76
Discharge: HOME | End: 2021-06-22
Attending: EMERGENCY MEDICINE
Payer: MEDICARE

## 2021-06-22 VITALS
WEIGHT: 210 LBS | HEIGHT: 63 IN | SYSTOLIC BLOOD PRESSURE: 144 MMHG | BODY MASS INDEX: 37.21 KG/M2 | TEMPERATURE: 97.1 F | DIASTOLIC BLOOD PRESSURE: 53 MMHG | OXYGEN SATURATION: 94 % | RESPIRATION RATE: 11 BRPM | HEART RATE: 67 BPM

## 2021-06-22 DIAGNOSIS — E16.2 HYPOGLYCEMIA: Primary | ICD-10-CM

## 2021-06-22 LAB
CHP ED QC CHECK: YES
GLUCOSE BLD-MCNC: 109 MG/DL
GLUCOSE BLD-MCNC: 109 MG/DL (ref 70–99)

## 2021-06-22 PROCEDURE — 82962 GLUCOSE BLOOD TEST: CPT

## 2021-06-22 PROCEDURE — 99284 EMERGENCY DEPT VISIT MOD MDM: CPT

## 2021-06-22 RX ORDER — NYSTATIN 10B UNIT
POWDER (EA) MISCELLANEOUS
Status: ON HOLD | COMMUNITY
End: 2022-05-20

## 2021-06-22 ASSESSMENT — ENCOUNTER SYMPTOMS
RESPIRATORY NEGATIVE: 1
GASTROINTESTINAL NEGATIVE: 1

## 2021-06-22 NOTE — ED NOTES
Discharge instructions reviewed and pt acknowledges understanding. Wheelchair at discharge.      Rosangela Eisenberg RN  06/22/21 [id number]
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
## 2021-06-23 ENCOUNTER — HOSPITAL ENCOUNTER (INPATIENT)
Age: 76
LOS: 3 days | Discharge: NURSING, REHAB OR LONG-TERM CARE | DRG: 638 | End: 2021-06-26
Attending: EMERGENCY MEDICINE | Admitting: INTERNAL MEDICINE
Payer: MEDICARE

## 2021-06-23 DIAGNOSIS — M79.605 LEFT LEG PAIN: ICD-10-CM

## 2021-06-23 DIAGNOSIS — N39.0 URINARY TRACT INFECTION WITH HEMATURIA, SITE UNSPECIFIED: ICD-10-CM

## 2021-06-23 DIAGNOSIS — B02.9 HERPES ZOSTER WITHOUT COMPLICATION: Primary | ICD-10-CM

## 2021-06-23 DIAGNOSIS — Z86.39 HISTORY OF HYPOGLYCEMIA: ICD-10-CM

## 2021-06-23 DIAGNOSIS — R26.2 AMBULATORY DYSFUNCTION: ICD-10-CM

## 2021-06-23 DIAGNOSIS — M54.42 ACUTE LEFT-SIDED LOW BACK PAIN WITH LEFT-SIDED SCIATICA: ICD-10-CM

## 2021-06-23 DIAGNOSIS — R31.9 URINARY TRACT INFECTION WITH HEMATURIA, SITE UNSPECIFIED: ICD-10-CM

## 2021-06-23 LAB
ALBUMIN SERPL-MCNC: 3.5 GM/DL (ref 3.4–5)
ALP BLD-CCNC: 101 IU/L (ref 40–129)
ALT SERPL-CCNC: 14 U/L (ref 10–40)
ANION GAP SERPL CALCULATED.3IONS-SCNC: 15 MMOL/L (ref 4–16)
AST SERPL-CCNC: 24 IU/L (ref 15–37)
BACTERIA: ABNORMAL /HPF
BASOPHILS ABSOLUTE: 0.1 K/CU MM
BASOPHILS RELATIVE PERCENT: 0.6 % (ref 0–1)
BILIRUB SERPL-MCNC: 0.5 MG/DL (ref 0–1)
BILIRUBIN URINE: NEGATIVE MG/DL
BLOOD, URINE: ABNORMAL
BUN BLDV-MCNC: 20 MG/DL (ref 6–23)
CALCIUM SERPL-MCNC: 9.6 MG/DL (ref 8.3–10.6)
CAST TYPE: ABNORMAL /HPF
CHLORIDE BLD-SCNC: 100 MMOL/L (ref 99–110)
CLARITY: ABNORMAL
CO2: 23 MMOL/L (ref 21–32)
COLOR: YELLOW
CREAT SERPL-MCNC: 1.1 MG/DL (ref 0.6–1.1)
CRYSTAL TYPE: ABNORMAL /HPF
DIFFERENTIAL TYPE: ABNORMAL
EOSINOPHILS ABSOLUTE: 0.3 K/CU MM
EOSINOPHILS RELATIVE PERCENT: 3.8 % (ref 0–3)
EPITHELIAL CELLS, UA: ABNORMAL /HPF
GFR AFRICAN AMERICAN: 59 ML/MIN/1.73M2
GFR NON-AFRICAN AMERICAN: 49 ML/MIN/1.73M2
GLUCOSE BLD-MCNC: 185 MG/DL (ref 70–99)
GLUCOSE BLD-MCNC: 219 MG/DL (ref 70–99)
GLUCOSE BLD-MCNC: 234 MG/DL (ref 70–99)
GLUCOSE, URINE: 250 MG/DL
HCT VFR BLD CALC: 34.8 % (ref 37–47)
HEMOGLOBIN: 10.6 GM/DL (ref 12.5–16)
IMMATURE NEUTROPHIL %: 0.6 % (ref 0–0.43)
KETONES, URINE: NEGATIVE MG/DL
LEUKOCYTE ESTERASE, URINE: NEGATIVE
LIPASE: 14 IU/L (ref 13–60)
LYMPHOCYTES ABSOLUTE: 1.4 K/CU MM
LYMPHOCYTES RELATIVE PERCENT: 16.7 % (ref 24–44)
MCH RBC QN AUTO: 28.3 PG (ref 27–31)
MCHC RBC AUTO-ENTMCNC: 30.5 % (ref 32–36)
MCV RBC AUTO: 92.8 FL (ref 78–100)
MONOCYTES ABSOLUTE: 0.6 K/CU MM
MONOCYTES RELATIVE PERCENT: 6.5 % (ref 0–4)
MUCUS: NEGATIVE HPF
NITRITE URINE, QUANTITATIVE: POSITIVE
PDW BLD-RTO: 14.6 % (ref 11.7–14.9)
PH, URINE: 6.5 (ref 5–8)
PLATELET # BLD: 216 K/CU MM (ref 140–440)
PMV BLD AUTO: 10 FL (ref 7.5–11.1)
POTASSIUM SERPL-SCNC: 5.3 MMOL/L (ref 3.5–5.1)
PROTEIN UA: >2000 MG/DL
RBC # BLD: 3.75 M/CU MM (ref 4.2–5.4)
RBC URINE: ABNORMAL /HPF (ref 0–6)
SEGMENTED NEUTROPHILS ABSOLUTE COUNT: 6.1 K/CU MM
SEGMENTED NEUTROPHILS RELATIVE PERCENT: 71.8 % (ref 36–66)
SODIUM BLD-SCNC: 138 MMOL/L (ref 135–145)
SPECIFIC GRAVITY UA: 1.02 (ref 1–1.03)
TOTAL IMMATURE NEUTOROPHIL: 0.05 K/CU MM
TOTAL PROTEIN: 7.2 GM/DL (ref 6.4–8.2)
UROBILINOGEN, URINE: 0.2 MG/DL (ref 0.2–1)
VOLUME, (UVOL): 12 ML (ref 10–12)
WBC # BLD: 8.5 K/CU MM (ref 4–10.5)
WBC UA: ABNORMAL /HPF (ref 0–5)

## 2021-06-23 PROCEDURE — 6370000000 HC RX 637 (ALT 250 FOR IP): Performed by: NURSE PRACTITIONER

## 2021-06-23 PROCEDURE — 81001 URINALYSIS AUTO W/SCOPE: CPT

## 2021-06-23 PROCEDURE — 99285 EMERGENCY DEPT VISIT HI MDM: CPT

## 2021-06-23 PROCEDURE — 85025 COMPLETE CBC W/AUTO DIFF WBC: CPT

## 2021-06-23 PROCEDURE — 82962 GLUCOSE BLOOD TEST: CPT

## 2021-06-23 PROCEDURE — 80053 COMPREHEN METABOLIC PANEL: CPT

## 2021-06-23 PROCEDURE — 87077 CULTURE AEROBIC IDENTIFY: CPT

## 2021-06-23 PROCEDURE — 2580000003 HC RX 258: Performed by: NURSE PRACTITIONER

## 2021-06-23 PROCEDURE — 87186 SC STD MICRODIL/AGAR DIL: CPT

## 2021-06-23 PROCEDURE — 6360000002 HC RX W HCPCS: Performed by: EMERGENCY MEDICINE

## 2021-06-23 PROCEDURE — 83690 ASSAY OF LIPASE: CPT

## 2021-06-23 PROCEDURE — 2580000003 HC RX 258: Performed by: EMERGENCY MEDICINE

## 2021-06-23 PROCEDURE — 87086 URINE CULTURE/COLONY COUNT: CPT

## 2021-06-23 PROCEDURE — 1200000000 HC SEMI PRIVATE

## 2021-06-23 PROCEDURE — 6370000000 HC RX 637 (ALT 250 FOR IP): Performed by: EMERGENCY MEDICINE

## 2021-06-23 RX ORDER — POLYETHYLENE GLYCOL 3350 17 G/17G
17 POWDER, FOR SOLUTION ORAL DAILY PRN
Status: DISCONTINUED | OUTPATIENT
Start: 2021-06-23 | End: 2021-06-26 | Stop reason: HOSPADM

## 2021-06-23 RX ORDER — CIPROFLOXACIN 2 MG/ML
400 INJECTION, SOLUTION INTRAVENOUS EVERY 12 HOURS
Status: DISCONTINUED | OUTPATIENT
Start: 2021-06-24 | End: 2021-06-24

## 2021-06-23 RX ORDER — CAPSAICIN 0.025 %
CREAM (GRAM) TOPICAL 2 TIMES DAILY
Status: DISCONTINUED | OUTPATIENT
Start: 2021-06-24 | End: 2021-06-24

## 2021-06-23 RX ORDER — SODIUM CHLORIDE 9 MG/ML
INJECTION, SOLUTION INTRAVENOUS CONTINUOUS
Status: DISCONTINUED | OUTPATIENT
Start: 2021-06-23 | End: 2021-06-26 | Stop reason: ALTCHOICE

## 2021-06-23 RX ORDER — NICOTINE POLACRILEX 4 MG
15 LOZENGE BUCCAL PRN
Status: DISCONTINUED | OUTPATIENT
Start: 2021-06-23 | End: 2021-06-26 | Stop reason: HOSPADM

## 2021-06-23 RX ORDER — ONDANSETRON 4 MG/1
4 TABLET, ORALLY DISINTEGRATING ORAL EVERY 8 HOURS PRN
Status: DISCONTINUED | OUTPATIENT
Start: 2021-06-23 | End: 2021-06-26 | Stop reason: HOSPADM

## 2021-06-23 RX ORDER — MORPHINE SULFATE 2 MG/ML
2 INJECTION, SOLUTION INTRAMUSCULAR; INTRAVENOUS EVERY 6 HOURS PRN
Status: DISCONTINUED | OUTPATIENT
Start: 2021-06-23 | End: 2021-06-24

## 2021-06-23 RX ORDER — DULOXETIN HYDROCHLORIDE 60 MG/1
60 CAPSULE, DELAYED RELEASE ORAL DAILY
Status: DISCONTINUED | OUTPATIENT
Start: 2021-06-24 | End: 2021-06-26 | Stop reason: HOSPADM

## 2021-06-23 RX ORDER — SODIUM CHLORIDE 0.9 % (FLUSH) 0.9 %
5-40 SYRINGE (ML) INJECTION EVERY 12 HOURS SCHEDULED
Status: DISCONTINUED | OUTPATIENT
Start: 2021-06-23 | End: 2021-06-26 | Stop reason: HOSPADM

## 2021-06-23 RX ORDER — ASPIRIN 81 MG/1
81 TABLET ORAL DAILY
Status: DISCONTINUED | OUTPATIENT
Start: 2021-06-24 | End: 2021-06-26 | Stop reason: HOSPADM

## 2021-06-23 RX ORDER — GABAPENTIN 600 MG/1
300 TABLET ORAL 3 TIMES DAILY
Status: DISCONTINUED | OUTPATIENT
Start: 2021-06-23 | End: 2021-06-24

## 2021-06-23 RX ORDER — ATENOLOL 50 MG/1
50 TABLET ORAL 2 TIMES DAILY
Status: DISCONTINUED | OUTPATIENT
Start: 2021-06-23 | End: 2021-06-26 | Stop reason: HOSPADM

## 2021-06-23 RX ORDER — LEVOTHYROXINE SODIUM 175 UG/1
175 CAPSULE ORAL DAILY
Status: DISCONTINUED | OUTPATIENT
Start: 2021-06-24 | End: 2021-06-23 | Stop reason: CLARIF

## 2021-06-23 RX ORDER — ACYCLOVIR 200 MG/1
400 CAPSULE ORAL ONCE
Status: COMPLETED | OUTPATIENT
Start: 2021-06-23 | End: 2021-06-23

## 2021-06-23 RX ORDER — AMLODIPINE BESYLATE 10 MG/1
10 TABLET ORAL DAILY
Status: DISCONTINUED | OUTPATIENT
Start: 2021-06-24 | End: 2021-06-26 | Stop reason: HOSPADM

## 2021-06-23 RX ORDER — HYDROCODONE BITARTRATE AND ACETAMINOPHEN 5; 325 MG/1; MG/1
1 TABLET ORAL EVERY 6 HOURS PRN
Status: DISCONTINUED | OUTPATIENT
Start: 2021-06-23 | End: 2021-06-23

## 2021-06-23 RX ORDER — CIPROFLOXACIN 2 MG/ML
400 INJECTION, SOLUTION INTRAVENOUS ONCE
Status: COMPLETED | OUTPATIENT
Start: 2021-06-23 | End: 2021-06-23

## 2021-06-23 RX ORDER — HYDROCODONE BITARTRATE AND ACETAMINOPHEN 5; 325 MG/1; MG/1
0.5 TABLET ORAL EVERY 6 HOURS PRN
Status: DISCONTINUED | OUTPATIENT
Start: 2021-06-23 | End: 2021-06-26 | Stop reason: HOSPADM

## 2021-06-23 RX ORDER — ACETAMINOPHEN 650 MG/1
650 SUPPOSITORY RECTAL EVERY 6 HOURS PRN
Status: DISCONTINUED | OUTPATIENT
Start: 2021-06-23 | End: 2021-06-26 | Stop reason: HOSPADM

## 2021-06-23 RX ORDER — DEXTROSE MONOHYDRATE 50 MG/ML
100 INJECTION, SOLUTION INTRAVENOUS PRN
Status: DISCONTINUED | OUTPATIENT
Start: 2021-06-23 | End: 2021-06-26 | Stop reason: HOSPADM

## 2021-06-23 RX ORDER — ATORVASTATIN CALCIUM 20 MG/1
20 TABLET, FILM COATED ORAL DAILY
Status: DISCONTINUED | OUTPATIENT
Start: 2021-06-24 | End: 2021-06-26 | Stop reason: HOSPADM

## 2021-06-23 RX ORDER — DEXTROSE MONOHYDRATE 25 G/50ML
12.5 INJECTION, SOLUTION INTRAVENOUS PRN
Status: DISCONTINUED | OUTPATIENT
Start: 2021-06-23 | End: 2021-06-26 | Stop reason: HOSPADM

## 2021-06-23 RX ORDER — INSULIN HUMAN 500 [IU]/ML
INJECTION, SOLUTION SUBCUTANEOUS
Status: ON HOLD | COMMUNITY
Start: 2021-05-08 | End: 2021-06-26 | Stop reason: SDUPTHER

## 2021-06-23 RX ORDER — SODIUM CHLORIDE 0.9 % (FLUSH) 0.9 %
5-40 SYRINGE (ML) INJECTION PRN
Status: DISCONTINUED | OUTPATIENT
Start: 2021-06-23 | End: 2021-06-26 | Stop reason: HOSPADM

## 2021-06-23 RX ORDER — SODIUM CHLORIDE 9 MG/ML
25 INJECTION, SOLUTION INTRAVENOUS PRN
Status: DISCONTINUED | OUTPATIENT
Start: 2021-06-23 | End: 2021-06-26 | Stop reason: HOSPADM

## 2021-06-23 RX ORDER — ONDANSETRON 2 MG/ML
4 INJECTION INTRAMUSCULAR; INTRAVENOUS EVERY 6 HOURS PRN
Status: DISCONTINUED | OUTPATIENT
Start: 2021-06-23 | End: 2021-06-26 | Stop reason: HOSPADM

## 2021-06-23 RX ORDER — HYDRALAZINE HYDROCHLORIDE 10 MG/1
10 TABLET, FILM COATED ORAL 2 TIMES DAILY
Status: DISCONTINUED | OUTPATIENT
Start: 2021-06-23 | End: 2021-06-24

## 2021-06-23 RX ORDER — PANTOPRAZOLE SODIUM 40 MG/1
40 TABLET, DELAYED RELEASE ORAL DAILY
Status: DISCONTINUED | OUTPATIENT
Start: 2021-06-24 | End: 2021-06-26 | Stop reason: HOSPADM

## 2021-06-23 RX ORDER — LACTOBACILLUS RHAMNOSUS GG 10B CELL
1 CAPSULE ORAL
Status: DISCONTINUED | OUTPATIENT
Start: 2021-06-24 | End: 2021-06-26 | Stop reason: HOSPADM

## 2021-06-23 RX ORDER — ACETAMINOPHEN 325 MG/1
650 TABLET ORAL EVERY 6 HOURS PRN
Status: DISCONTINUED | OUTPATIENT
Start: 2021-06-23 | End: 2021-06-26 | Stop reason: HOSPADM

## 2021-06-23 RX ADMIN — SODIUM CHLORIDE, PRESERVATIVE FREE 10 ML: 5 INJECTION INTRAVENOUS at 22:50

## 2021-06-23 RX ADMIN — INSULIN LISPRO 1 UNITS: 100 INJECTION, SOLUTION INTRAVENOUS; SUBCUTANEOUS at 23:40

## 2021-06-23 RX ADMIN — SODIUM CHLORIDE: 9 INJECTION, SOLUTION INTRAVENOUS at 22:43

## 2021-06-23 RX ADMIN — ACYCLOVIR 400 MG: 200 CAPSULE ORAL at 18:46

## 2021-06-23 RX ADMIN — HYDRALAZINE HYDROCHLORIDE 10 MG: 10 TABLET, FILM COATED ORAL at 23:39

## 2021-06-23 RX ADMIN — ATENOLOL 50 MG: 50 TABLET ORAL at 23:39

## 2021-06-23 RX ADMIN — CIPROFLOXACIN 400 MG: 2 INJECTION, SOLUTION INTRAVENOUS at 22:43

## 2021-06-23 RX ADMIN — HYDROCODONE BITARTRATE AND ACETAMINOPHEN 1 TABLET: 5; 325 TABLET ORAL at 18:33

## 2021-06-23 RX ADMIN — GABAPENTIN 300 MG: 600 TABLET, FILM COATED ORAL at 18:49

## 2021-06-23 ASSESSMENT — PAIN DESCRIPTION - ORIENTATION
ORIENTATION: LEFT
ORIENTATION: RIGHT

## 2021-06-23 ASSESSMENT — PAIN SCALES - GENERAL
PAINLEVEL_OUTOF10: 0
PAINLEVEL_OUTOF10: 9
PAINLEVEL_OUTOF10: 9

## 2021-06-23 ASSESSMENT — PAIN DESCRIPTION - FREQUENCY
FREQUENCY: CONTINUOUS
FREQUENCY: CONTINUOUS

## 2021-06-23 ASSESSMENT — PAIN DESCRIPTION - PAIN TYPE: TYPE: ACUTE PAIN

## 2021-06-23 ASSESSMENT — ENCOUNTER SYMPTOMS
GASTROINTESTINAL NEGATIVE: 1
RESPIRATORY NEGATIVE: 1

## 2021-06-23 ASSESSMENT — PAIN DESCRIPTION - DESCRIPTORS
DESCRIPTORS: SHARP
DESCRIPTORS: SHARP

## 2021-06-23 ASSESSMENT — PAIN DESCRIPTION - LOCATION
LOCATION: ABDOMEN
LOCATION: ABDOMEN;HIP

## 2021-06-23 NOTE — ED PROVIDER NOTES
CHIEF COMPLAINT  Chief Complaint   Patient presents with    Leg Pain     States was here yesterday for her blood sugar being low. States had abd pain yesterday, worse today. States feels bloated and having more sharp pains. States pain is mostly on the left and into her hip.  Hyperglycemia       HPI  Earlean Tana is a 77 y.o. female with history of diabetes, CKD, hypertension who presents with report of hypoglycemia overnight with her blood sugar going down to 37. Today she is having severe left leg pain with skin change. Pain is rated as severe, aching, burning. No medications taken prior to arrival, last pain medications at 2 PM.  Denies fevers, has similar, burning pain at the abdomen. Was seen and evaluated last night for abdominal pain. REVIEW OF SYSTEMS  Review of Systems   History obtained from chart review, the patient and family at bedside  General ROS: positive for  - fatigue  Ophthalmic ROS: negative for - decreased vision or double vision  ENT ROS: negative for - headaches  Hematological and Lymphatic ROS: negative for - bleeding problems  Endocrine ROS: negative for - unexpected weight changes  Respiratory ROS: no cough, shortness of breath, or wheezing  Cardiovascular ROS: no chest pain or dyspnea on exertion  Gastrointestinal ROS: positive for -abdominal pain  Genito-Urinary ROS: no dysuria, trouble voiding, or hematuria  Musculoskeletal ROS: Positive for leg pain  Neurological ROS: no TIA or stroke symptoms      PAST MEDICAL HISTORY  Past Medical History:   Diagnosis Date    Arthritis     Cellulitis of right foot     Chronic kidney disease     Depression     Diabetes mellitus ([facility] [address]     Frequent falls     GERD (gastroesophageal reflux disease)     H/O Doppler ultrasound 10/01/2020     No evidence of significant venous insufficiency bilaterally. No evidence of DVT or SVT in the bilaterally.  H/O echocardiogram 10/01/2020    EF 60-65%. No significant valvular disease.     Hallux valgus (acquired), right foot 5/1/2017    Hypertension     Other disorders of kidney and ureter     1 kidney.  SIRS (systemic inflammatory response syndrome) ([facility] [address] 1/18/2012    Thyroid disease     hypothyroid    Type 2 diabetes mellitus with foot ulcer, with long-term current use of insulin ([address] 1/18/2012    Ulcer of right foot with fat layer exposed ([address] 2/6/2013    WD-Diabetic ulcer of toe of left foot associated with type 2 diabetes mellitus, with fat layer exposed ([address] 3/4/2021       FAMILY HISTORY  Family History   Problem Relation Age of Onset    Arthritis Mother     Cancer Mother     Diabetes Mother     High Blood Pressure Mother     Asthma Father     Heart Disease Father     Diabetes Brother        SOCIAL HISTORY  Social History     Socioeconomic History    Marital status:      Spouse name: None    Number of children: None    Years of education: None    Highest education level: None   Occupational History    None   Tobacco Use    Smoking status: Never Smoker    Smokeless tobacco: Never Used   Vaping Use    Vaping Use: Never used   Substance and Sexual Activity    Alcohol use: No    Drug use: No    Sexual activity: Yes   Other Topics Concern    None   Social History Narrative    None     Social Determinants of Health     Financial Resource Strain:     Difficulty of Paying Living Expenses:    Food Insecurity:     Worried About Running Out of Food in the Last Year:     Ran Out of Food in the Last Year:    Transportation Needs:     Lack of Transportation (Medical):      Lack of Transportation (Non-Medical):    Physical Activity:     Days of Exercise per Week:     Minutes of Exercise per Session:    Stress:     Feeling of Stress :    Social Connections:     Frequency of Communication with Friends and Family:     Frequency of Social Gatherings with Friends and Family:     Attends Bahai Services:     Active Member of Clubs or Organizations:     Attends Club or Organization Meetings:     Marital Status:    Intimate Partner Violence:     Fear of Current or Ex-Partner:     Emotionally Abused:     Physically Abused:     Sexually Abused:        SURGICAL HISTORY  Past Surgical History:   Procedure Laterality Date    ABDOMEN SURGERY      CYST REMOVAL      from kidney.  DILATATION, ESOPHAGUS      ENDOSCOPY, COLON, DIAGNOSTIC      FOOT SURGERY  12/30/11    had infection  and a biopsy was sent away for analysis    HERNIA REPAIR      HYSTERECTOMY      KIDNEY REMOVAL  1973       CURRENT MEDICATIONS  No current facility-administered medications on file prior to encounter. Current Outpatient Medications on File Prior to Encounter   Medication Sig Dispense Refill    nystatin (MYCOSTATIN) POWD powder Apply topically Apply to bilateral groin topically every shift for skin care      HYDROcodone-acetaminophen (NORCO) 5-325 MG per tablet Take 0.5 tablets by mouth every 6 hours as needed for Pain for up to 3 days.  5 tablet 0    naproxen (NAPROSYN) 500 MG tablet Take 1 tablet by mouth 2 times daily 20 tablet 0    methocarbamol (ROBAXIN) 500 MG tablet Take 1 tablet by mouth 3 times daily for 10 days 30 tablet 0    amLODIPine (NORVASC) 10 MG tablet Take 1 tablet by mouth daily Hold for systolic blood pressure less than or equal to 110 30 tablet 3    atenolol (TENORMIN) 50 MG tablet Take 1 tablet by mouth 2 times daily Hold for systolic blood pressure less than or equal to 110 30 tablet 3    hydrALAZINE (APRESOLINE) 10 MG tablet Take 1 tablet by mouth 2 times daily Hold for systolic blood pressure less than or equal to 110 90 tablet 1    Blood Pressure KIT 1 each by Does not apply route 2 times daily 1 kit 0    DULoxetine (CYMBALTA) 60 MG extended release capsule Take 60 mg by mouth daily   1    pantoprazole (PROTONIX) 40 MG tablet Take 40 mg by mouth daily   3    atorvastatin (LIPITOR) 20 MG tablet Take 20 mg by mouth daily       Multiple Vitamins-Minerals (TRUEPLUS DIABETIC MULTIVITAMIN) TABS Take 1 tablet by mouth daily.  Levothyroxine Sodium 175 MCG CAPS Take 175 mcg by mouth Daily       aspirin 81 MG EC tablet Take 81 mg by mouth daily. ALLERGIES  Allergies   Allergen Reactions    Ritalin [Methylphenidate] Rash    Rocephin [Ceftriaxone Sodium-Lidocaine] Rash       PHYSICAL EXAM  VITAL SIGNS: BP (!) 182/71   Pulse 78   Temp 98.7 °F (37.1 °C) (Oral)   Resp 18   Ht 5' 3\" (1.6 m)   Wt 210 lb (95.3 kg)   SpO2 96%   BMI 37.20 kg/m²   Constitutional: Chronically unwell, pale, resting in bed, family at bedside  HENT: Normocephalic, Atraumatic, Bilateral external ears normal, mask in place  Eyes: PERRL, EOMI, Conjunctiva normal, No discharge. Neck: Normal range of motion, Supple, No stridor. Cardiovascular: Normal heart rate, Normal rhythm, No murmurs, No rubs, No gallops. Thorax & Lungs: Normal breath sounds, No respiratory distress, No wheezing, No chest tenderness. Abdomen: Bowel sounds normal, Soft, No tenderness, no guarding, no rebound, No masses, No pulsatile masses. Skin: Warm, Dry, shingles overlying the left lateral and anterior thigh with TTP  Extremities: Intact distal pulses, No edema, No tenderness, No cyanosis, No clubbing. Musculoskeletal: Good gross range of motion in all major joints. No major deformities noted. Neurologic: Alert & oriented x 3, Normal gross motor function, Normal gross sensory function, No focal deficits noted.    Psychiatric: Affect flat      RADIOLOGY/PROCEDURES/LABS    Labs Reviewed   POCT GLUCOSE - Abnormal; Notable for the following components:       Result Value    POC Glucose 185 (*)     All other components within normal limits   CBC WITH AUTO DIFFERENTIAL   COMPREHENSIVE METABOLIC PANEL W/ REFLEX TO MG FOR LOW K   LIPASE   URINALYSIS         Medications   gabapentin (NEURONTIN) tablet 300 mg (300 mg Oral Given 6/23/21 1849)   HYDROcodone-acetaminophen (NORCO) 5-325 MG per tablet 1 tablet (1 tablet Oral Given 6/23/21 1833)   acyclovir (ZOVIRAX) capsule 400 mg (400 mg Oral Given 6/23/21 1846)       COURSE & MEDICAL DECISION MAKING  Pertinent Labs & Imaging studies reviewed. (See chart for details)    41-year-old female presents with shingles overlying the left leg with abdominal pain just superior to this region with similar pain which may be shingles that have not fully disseminated into this area. The shingles appear to be well localized to the dermatome of the left leg at the lateral, anterior and medial thigh. She will be started on acyclovir, pain was treated with gabapentin and Norco in the department. Based on her hypoglycemia overnight, I did a repeat labs. She will be signed out to oncoming physician to facilitate disposition. She will likely require treatment for her shingles as they do appear painful here. There is no superimposed cellulitis, the shingles do not appear to be having disseminated encephalitis, pneumonia at this time. Patient signed out at this time. FINAL IMPRESSION  Problem List Items Addressed This Visit     None      Visit Diagnoses     Herpes zoster without complication    -  Primary    Left leg pain        History of hypoglycemia          1.    2.   3.    Patient gave me permission to discuss medical history, care, and plan with those present in the room.   Electronically signed by: Oscar Villegas MD, 6/23/2021  MD Oscar Copeland MD  06/23/21 [id number]

## 2021-06-24 PROBLEM — E16.2 HYPOGLYCEMIA: Status: ACTIVE | Noted: 2021-06-24

## 2021-06-24 PROBLEM — B02.9 HERPES ZOSTER: Status: ACTIVE | Noted: 2021-06-24

## 2021-06-24 LAB
AMMONIA: 12 UMOL/L (ref 11–51)
ANION GAP SERPL CALCULATED.3IONS-SCNC: 4 MMOL/L (ref 4–16)
BASOPHILS ABSOLUTE: 0 K/CU MM
BASOPHILS RELATIVE PERCENT: 0.5 % (ref 0–1)
BUN BLDV-MCNC: 23 MG/DL (ref 6–23)
CALCIUM SERPL-MCNC: 8.9 MG/DL (ref 8.3–10.6)
CHLORIDE BLD-SCNC: 101 MMOL/L (ref 99–110)
CO2: 29 MMOL/L (ref 21–32)
CREAT SERPL-MCNC: 1.1 MG/DL (ref 0.6–1.1)
DIFFERENTIAL TYPE: ABNORMAL
EOSINOPHILS ABSOLUTE: 0.3 K/CU MM
EOSINOPHILS RELATIVE PERCENT: 3.4 % (ref 0–3)
ESTIMATED AVERAGE GLUCOSE: 171 MG/DL
GFR AFRICAN AMERICAN: 59 ML/MIN/1.73M2
GFR NON-AFRICAN AMERICAN: 49 ML/MIN/1.73M2
GLUCOSE BLD-MCNC: 274 MG/DL (ref 70–99)
GLUCOSE BLD-MCNC: 290 MG/DL (ref 70–99)
GLUCOSE BLD-MCNC: 321 MG/DL (ref 70–99)
GLUCOSE BLD-MCNC: 347 MG/DL (ref 70–99)
GLUCOSE BLD-MCNC: 353 MG/DL (ref 70–99)
GLUCOSE BLD-MCNC: 381 MG/DL (ref 70–99)
HBA1C MFR BLD: 7.6 % (ref 4.2–6.3)
HCT VFR BLD CALC: 32.5 % (ref 37–47)
HEMOGLOBIN: 10 GM/DL (ref 12.5–16)
IMMATURE NEUTROPHIL %: 0.5 % (ref 0–0.43)
LYMPHOCYTES ABSOLUTE: 1.4 K/CU MM
LYMPHOCYTES RELATIVE PERCENT: 19.5 % (ref 24–44)
MCH RBC QN AUTO: 28.2 PG (ref 27–31)
MCHC RBC AUTO-ENTMCNC: 30.8 % (ref 32–36)
MCV RBC AUTO: 91.8 FL (ref 78–100)
MONOCYTES ABSOLUTE: 0.5 K/CU MM
MONOCYTES RELATIVE PERCENT: 6.4 % (ref 0–4)
PDW BLD-RTO: 14.8 % (ref 11.7–14.9)
PLATELET # BLD: 177 K/CU MM (ref 140–440)
PMV BLD AUTO: 9.9 FL (ref 7.5–11.1)
POTASSIUM SERPL-SCNC: 4.7 MMOL/L (ref 3.5–5.1)
RBC # BLD: 3.54 M/CU MM (ref 4.2–5.4)
SEGMENTED NEUTROPHILS ABSOLUTE COUNT: 5.1 K/CU MM
SEGMENTED NEUTROPHILS RELATIVE PERCENT: 69.7 % (ref 36–66)
SODIUM BLD-SCNC: 134 MMOL/L (ref 135–145)
T4 FREE: 1.59 NG/DL (ref 0.9–1.8)
TOTAL IMMATURE NEUTOROPHIL: 0.04 K/CU MM
TSH HIGH SENSITIVITY: 7.84 UIU/ML (ref 0.27–4.2)
WBC # BLD: 7.4 K/CU MM (ref 4–10.5)

## 2021-06-24 PROCEDURE — 83036 HEMOGLOBIN GLYCOSYLATED A1C: CPT

## 2021-06-24 PROCEDURE — 82962 GLUCOSE BLOOD TEST: CPT

## 2021-06-24 PROCEDURE — 82140 ASSAY OF AMMONIA: CPT

## 2021-06-24 PROCEDURE — 1200000000 HC SEMI PRIVATE

## 2021-06-24 PROCEDURE — 6360000002 HC RX W HCPCS: Performed by: NURSE PRACTITIONER

## 2021-06-24 PROCEDURE — 6370000000 HC RX 637 (ALT 250 FOR IP): Performed by: HOSPITALIST

## 2021-06-24 PROCEDURE — 84443 ASSAY THYROID STIM HORMONE: CPT

## 2021-06-24 PROCEDURE — 2580000003 HC RX 258: Performed by: HOSPITALIST

## 2021-06-24 PROCEDURE — 2580000003 HC RX 258: Performed by: EMERGENCY MEDICINE

## 2021-06-24 PROCEDURE — 6370000000 HC RX 637 (ALT 250 FOR IP): Performed by: NURSE PRACTITIONER

## 2021-06-24 PROCEDURE — 2580000003 HC RX 258: Performed by: NURSE PRACTITIONER

## 2021-06-24 PROCEDURE — 85025 COMPLETE CBC W/AUTO DIFF WBC: CPT

## 2021-06-24 PROCEDURE — 84439 ASSAY OF FREE THYROXINE: CPT

## 2021-06-24 PROCEDURE — 80048 BASIC METABOLIC PNL TOTAL CA: CPT

## 2021-06-24 PROCEDURE — 6360000002 HC RX W HCPCS: Performed by: HOSPITALIST

## 2021-06-24 PROCEDURE — 36415 COLL VENOUS BLD VENIPUNCTURE: CPT

## 2021-06-24 PROCEDURE — 6370000000 HC RX 637 (ALT 250 FOR IP): Performed by: EMERGENCY MEDICINE

## 2021-06-24 RX ORDER — LEVOFLOXACIN 5 MG/ML
500 INJECTION, SOLUTION INTRAVENOUS EVERY 24 HOURS
Status: DISCONTINUED | OUTPATIENT
Start: 2021-06-24 | End: 2021-06-25

## 2021-06-24 RX ORDER — HYDRALAZINE HYDROCHLORIDE 25 MG/1
25 TABLET, FILM COATED ORAL 2 TIMES DAILY
Status: DISCONTINUED | OUTPATIENT
Start: 2021-06-24 | End: 2021-06-25

## 2021-06-24 RX ORDER — VALACYCLOVIR HYDROCHLORIDE 500 MG/1
1000 TABLET, FILM COATED ORAL 3 TIMES DAILY
Status: DISPENSED | OUTPATIENT
Start: 2021-06-24 | End: 2021-06-26

## 2021-06-24 RX ADMIN — INSULIN LISPRO 2 UNITS: 100 INJECTION, SOLUTION INTRAVENOUS; SUBCUTANEOUS at 20:32

## 2021-06-24 RX ADMIN — SODIUM CHLORIDE: 9 INJECTION, SOLUTION INTRAVENOUS at 20:35

## 2021-06-24 RX ADMIN — SODIUM CHLORIDE: 9 INJECTION, SOLUTION INTRAVENOUS at 06:26

## 2021-06-24 RX ADMIN — INSULIN GLARGINE 15 UNITS: 100 INJECTION, SOLUTION SUBCUTANEOUS at 20:31

## 2021-06-24 RX ADMIN — AMLODIPINE BESYLATE 10 MG: 10 TABLET ORAL at 09:21

## 2021-06-24 RX ADMIN — HYDRALAZINE HYDROCHLORIDE 10 MG: 10 TABLET, FILM COATED ORAL at 09:21

## 2021-06-24 RX ADMIN — SODIUM CHLORIDE, PRESERVATIVE FREE 10 ML: 5 INJECTION INTRAVENOUS at 20:32

## 2021-06-24 RX ADMIN — Medication 1 CAPSULE: at 09:13

## 2021-06-24 RX ADMIN — ASPIRIN 81 MG: 81 TABLET, FILM COATED ORAL at 09:13

## 2021-06-24 RX ADMIN — ATENOLOL 50 MG: 50 TABLET ORAL at 09:21

## 2021-06-24 RX ADMIN — DULOXETINE 60 MG: 60 CAPSULE, DELAYED RELEASE ORAL at 09:13

## 2021-06-24 RX ADMIN — HYDRALAZINE HYDROCHLORIDE 25 MG: 25 TABLET, FILM COATED ORAL at 20:31

## 2021-06-24 RX ADMIN — PANTOPRAZOLE SODIUM 40 MG: 40 TABLET, DELAYED RELEASE ORAL at 05:54

## 2021-06-24 RX ADMIN — LEVOTHYROXINE SODIUM 175 MCG: 0.15 TABLET ORAL at 05:53

## 2021-06-24 RX ADMIN — VALACYCLOVIR HYDROCHLORIDE 1000 MG: 500 TABLET, FILM COATED ORAL at 17:29

## 2021-06-24 RX ADMIN — GABAPENTIN 300 MG: 600 TABLET, FILM COATED ORAL at 05:54

## 2021-06-24 RX ADMIN — LEVOFLOXACIN 500 MG: 500 INJECTION, SOLUTION INTRAVENOUS at 11:12

## 2021-06-24 RX ADMIN — ATENOLOL 50 MG: 50 TABLET ORAL at 20:30

## 2021-06-24 RX ADMIN — VALACYCLOVIR HYDROCHLORIDE 1000 MG: 500 TABLET, FILM COATED ORAL at 20:31

## 2021-06-24 RX ADMIN — ENOXAPARIN SODIUM 40 MG: 40 INJECTION SUBCUTANEOUS at 09:21

## 2021-06-24 RX ADMIN — ATORVASTATIN CALCIUM 20 MG: 20 TABLET, FILM COATED ORAL at 09:14

## 2021-06-24 ASSESSMENT — PAIN SCALES - GENERAL
PAINLEVEL_OUTOF10: 0

## 2021-06-24 NOTE — ED PROVIDER NOTES
prhand  ADDENDUM:    Care of the patient was assumed  from Dr. Padma Cuellar. I have reviewed the notes, assessments, and/or procedures performed, I [facility] I reviewed the medical record and evaluated the patient. ED COURSE/MDM:  Laboratory and imaging data were reviewed and care plan was arranged with the patient(see separate lab/imaging reports). RADIOLOGY:  Already resulted studies have been reviewed.   No orders to display       Labs Reviewed   CBC WITH AUTO DIFFERENTIAL - Abnormal; Notable for the following components:       Result Value    RBC 3.75 (*)     Hemoglobin 10.6 (*)     Hematocrit 34.8 (*)     MCHC 30.5 (*)     Segs Relative 71.8 (*)     Lymphocytes % 16.7 (*)     Monocytes % 6.5 (*)     Eosinophils % 3.8 (*)     Immature Neutrophil % 0.6 (*)     All other components within normal limits   COMPREHENSIVE METABOLIC PANEL W/ REFLEX TO MG FOR LOW K - Abnormal; Notable for the following components:    Potassium 5.3 (*)     Glucose 219 (*)     GFR Non- 49 (*)     GFR  59 (*)     All other components within normal limits   URINALYSIS - Abnormal; Notable for the following components:    Clarity, UA HAZY (*)     Glucose, Urine 250 (*)     Blood, Urine 3+ (*)     Nitrite Urine, Quantitative POSITIVE (*)     Bacteria, UA MANY (*)     All other components within normal limits   POCT GLUCOSE - Abnormal; Notable for the following components:    POC Glucose 185 (*)     All other components within normal limits   LIPASE       Medications   gabapentin (NEURONTIN) tablet 300 mg (300 mg Oral Given 6/23/21 1849)   HYDROcodone-acetaminophen (NORCO) 5-325 MG per tablet 1 tablet (1 tablet Oral Given 6/23/21 1833)   0.9 % sodium chloride infusion (has no administration in time range)   ciprofloxacin (CIPRO) IVPB 400 mg (has no administration in time range)   acyclovir (ZOVIRAX) capsule 400 mg (400 mg Oral Given 6/23/21 1846)       Vitals: 06/23/21 1750   BP: (!) 182/71   Pulse: 78   Resp: 18   Temp: 98.7 °F (37.1 °C)   TempSrc: Oral   SpO2: 96%   Weight: 210 lb (95.3 kg)   Height: 5' 3\" (1.6 m)       Labs Reviewed   CBC WITH AUTO DIFFERENTIAL - Abnormal; Notable for the following components:       Result Value    RBC 3.75 (*)     Hemoglobin 10.6 (*)     Hematocrit 34.8 (*)     MCHC 30.5 (*)     Segs Relative 71.8 (*)     Lymphocytes % 16.7 (*)     Monocytes % 6.5 (*)     Eosinophils % 3.8 (*)     Immature Neutrophil % 0.6 (*)     All other components within normal limits   COMPREHENSIVE METABOLIC PANEL W/ REFLEX TO MG FOR LOW K - Abnormal; Notable for the following components:    Potassium 5.3 (*)     Glucose 219 (*)     GFR Non- 49 (*)     GFR  59 (*)     All other components within normal limits   URINALYSIS - Abnormal; Notable for the following components:    Clarity, UA HAZY (*)     Glucose, Urine 250 (*)     Blood, Urine 3+ (*)     Nitrite Urine, Quantitative POSITIVE (*)     Bacteria, UA MANY (*)     All other components within normal limits   POCT GLUCOSE - Abnormal; Notable for the following components:    POC Glucose 185 (*)     All other components within normal limits   LIPASE     The patient urine has high amount of epithelial cells and she is very swollen in her groin which may have contributed to this specimen collection. I did give her a dose of cipro. She had a very small amount of urine from the cath so I ordered fluids. I am concerned that this patient is having difficulty walking due to her pain from her shingles (see previous doctor note) combined with significant confusion over her insulin use that she is a risk for bad outcome if discharged. Nursing yesterday went over how to do her insulin with her and her  yesterday but it was still mismanaged and she had episode of hypoglycemia (34BS) by EMS. She was treated in the field and refused transport.  She needs social service, PT/OT and

## 2021-06-24 NOTE — ED NOTES
Per patients endocrinologist the patients current insulin dosing is as follows:       Humulin R U 500    Breakfast 115 units Lunch 60 units Dinner 60    150 units or less No additional units of insulin   151-200 Add 5 units   201-250 Add 10 units   251-300 Add 15 units   Over 300 add 20 units

## 2021-06-24 NOTE — H&P
HISTORY AND PHYSICAL             Name:  Vladislav Benson /Age/Sex: [id number]  (72 y.o. female)   MRN & CSN:  [id number] & [id number] Admission Date/Time: 2021  5:56 PM   Location:   PCP: TOÑITO Fowler         Chief Complaint     Chief Complaint   Patient presents with    Leg Pain     States was here yesterday for her blood sugar being low. States had abd pain yesterday, worse today. States feels bloated and having more sharp pains. States pain is mostly on the left and into her hip.  Hyperglycemia          History Obtained From   patient    History of Present Illness (4 elements)   Vladislav Benson is a 77 y. o. who presents to the hospital PMHx: IDDM, CKD, HTN, obesity, frequent falls, GERD, hypothyroid   with reports of hypoglycemia overnight x2 nights with her blood sugar going down to 37 around 2:00 this morning. She denies fever. Denies chills. Denies nausea. Denies vomiting. Positive for increased urinary frequency without dysuria. She has concurrent left upper medial and lateral thigh pain secondary to acute herpes zoster. She was diagnosed with this  when presenting to the emergency department with pain. The leg pain actually started 2 days ago and the rash erupted yesterday. Patient was given prescriptions for hydrocodone and gabapentin. During the emergency department visit patient's blood sugar was within a normal range> 120. ED work-up: Blood glucose 219. Urinalysis positive for nitrites and 4+ bacteria. She is mildly anemic H&H 10.6 and 34. Her metabolic panel was without electrolyte derangement. Admission was requested given the recurrent hypoglycemia and the uncontrolled pain secondary to the left lower extremity herpes zoster. There is also concern that the patient's insulin dosing regimen is not very clear. There is a possibility that she has been given too much insulin accidentally.     On my exam the patient is awake and alert and  HYSTERECTOMY      KIDNEY REMOVAL  1972        Medications Prior to Admission     Prior to Admission medications    Medication Sig Start Date End Date Taking? Authorizing Provider   HUMULIN R U-500 KWIKPEN 500 UNIT/ML SOPN concentrated injection pen Breakfast 115 units Lunch 60 units Dinner 60    150 units or less No additional units of insulin   151-200 Add 5 units   201-250 Add 10 units   251-300 Add 15 units   Over 300 add 20 units 5/8/21   Historical Provider, MD   nystatin (MYCOSTATIN) POWD powder Apply topically Apply to bilateral groin topically every shift for skin care    Historical Provider, MD   naproxen (NAPROSYN) 500 MG tablet Take 1 tablet by mouth 2 times daily 6/20/21   Janki Patterson DO   methocarbamol (ROBAXIN) 500 MG tablet Take 1 tablet by mouth 3 times daily for 10 days 6/20/21 6/30/21  Janki Patterson DO   amLODIPine (NORVASC) 10 MG tablet Take 1 tablet by mouth daily Hold for systolic blood pressure less than or equal to 110 8/13/20   Dasha Marie MD   atenolol (TENORMIN) 50 MG tablet Take 1 tablet by mouth 2 times daily Hold for systolic blood pressure less than or equal to 110 8/13/20   Dasha Marie MD   hydrALAZINE (APRESOLINE) 10 MG tablet Take 1 tablet by mouth 2 times daily Hold for systolic blood pressure less than or equal to 110 8/13/20   Dasha Marie MD   Blood Pressure KIT 1 each by Does not apply route 2 times daily 8/13/20   Dasha Marie MD   DULoxetine (CYMBALTA) 60 MG extended release capsule Take 60 mg by mouth daily  7/24/19   Historical Provider, MD   pantoprazole (PROTONIX) 40 MG tablet Take 40 mg by mouth daily  4/4/19   Historical Provider, MD   atorvastatin (LIPITOR) 20 MG tablet Take 20 mg by mouth daily     Historical Provider, MD   Multiple Vitamins-Minerals (TRUEPLUS DIABETIC MULTIVITAMIN) TABS Take 1 tablet by mouth daily.     Historical Provider, MD   Levothyroxine Sodium 175 MCG CAPS Take 175 mcg by mouth Daily     Historical Provider, MD   aspirin 81 MG EC tablet Take 81 mg by mouth daily. Historical Provider, MD        Allergies   Ritalin [methylphenidate] and Rocephin [ceftriaxone sodium-lidocaine]    Social History     Social History     Tobacco History     Smoking Status  Never Smoker    Smokeless Tobacco Use  Never Used          Alcohol History     Alcohol Use Status  No          Drug Use     Drug Use Status  No          Sexual Activity     Sexually Active  Yes                Family History     Family History   Problem Relation Age of Onset    Arthritis Mother     Cancer Mother     Diabetes Mother     High Blood Pressure Mother     Asthma Father     Heart Disease Father     Diabetes Brother        Review of Systems (need 8 systems reviewed)   Review of Systems   Constitutional: Positive for activity change. HENT: Negative. Respiratory: Negative. Cardiovascular: Negative. Gastrointestinal: Negative. Genitourinary: Positive for frequency. Negative for decreased urine volume, difficulty urinating, dysuria and enuresis. Musculoskeletal: Positive for myalgias. Skin: Positive for rash. Neurological: Negative. Ten point ROS reviewed negative, unless as noted above  Physical Exam (need 10 systems)   BP (!) 182/74   Pulse 82   Temp 96.6 °F (35.9 °C) (Oral)   Resp 18   Ht 5' 3\" (1.6 m)   Wt 210 lb (95.3 kg)   SpO2 91%   BMI 37.20 kg/m²   Physical Exam  Vitals and nursing note reviewed. Constitutional:       General: She is not in acute distress. Appearance: She is obese. She is not ill-appearing, toxic-appearing or diaphoretic. HENT:      Head: Normocephalic. Mouth/Throat:      Mouth: Mucous membranes are moist.      Pharynx: Oropharynx is clear. Cardiovascular:      Rate and Rhythm: Normal rate and regular rhythm. Pulses: Normal pulses. Heart sounds: Normal heart sounds. Pulmonary:      Effort: Pulmonary effort is normal.      Breath sounds: Normal breath sounds.    Musculoskeletal: Cervical back: Normal range of motion and neck supple. Skin:     General: Skin is warm and dry. Capillary Refill: Capillary refill takes less than 2 seconds. Findings: Erythema and rash present. Rash is macular, papular and vesicular. Comments: Anatomical chart indicating area of rash. Rash follows a dermatome. No evidence of induration or cellulitis. No evidence of pustules. No evidence of abscess   Neurological:      General: No focal deficit present. Mental Status: She is alert and oriented to person, place, and time. Psychiatric:         Mood and Affect: Mood normal.         Behavior: Behavior normal.         Thought Content:  Thought content normal.            Labs      Recent Results (from the past 24 hour(s))   POCT Glucose    Collection Time: 06/23/21  6:05 PM   Result Value Ref Range    POC Glucose 185 (H) 70 - 99 MG/DL   CBC Auto Differential    Collection Time: 06/23/21  6:15 PM   Result Value Ref Range    WBC 8.5 4.0 - 10.5 K/CU MM    RBC 3.75 (L) 4.2 - 5.4 M/CU MM    Hemoglobin 10.6 (L) 12.5 - 16.0 GM/DL    Hematocrit 34.8 (L) 37 - 47 %    MCV 92.8 78 - 100 FL    MCH 28.3 27 - 31 PG    MCHC 30.5 (L) 32.0 - 36.0 %    RDW 14.6 11.7 - 14.9 %    Platelets 818 212 - 150 K/CU MM    MPV 10.0 7.5 - 11.1 FL    Differential Type AUTOMATED DIFFERENTIAL     Segs Relative 71.8 (H) 36 - 66 %    Lymphocytes % 16.7 (L) 24 - 44 %    Monocytes % 6.5 (H) 0 - 4 %    Eosinophils % 3.8 (H) 0 - 3 %    Basophils % 0.6 0 - 1 %    Segs Absolute 6.1 K/CU MM    Lymphocytes Absolute 1.4 K/CU MM    Monocytes Absolute 0.6 K/CU MM    Eosinophils Absolute 0.3 K/CU MM    Basophils Absolute 0.1 K/CU MM    Immature Neutrophil % 0.6 (H) 0 - 0.43 %    Total Immature Neutrophil 0.05 K/CU MM   Comprehensive Metabolic Panel w/ Reflex to MG    Collection Time: 06/23/21  6:15 PM   Result Value Ref Range    Sodium 138 135 - 145 MMOL/L    Potassium 5.3 (H) 3.5 - 5.1 MMOL/L    Chloride 100 99 - 110 mMol/L    CO2 23 21 - 32 MMOL/L    BUN 20 6 - 23 MG/DL    CREATININE 1.1 0.6 - 1.1 MG/DL    Glucose 219 (H) 70 - 99 MG/DL    Calcium 9.6 8.3 - 10.6 MG/DL    Albumin 3.5 3.4 - 5.0 GM/DL    Total Protein 7.2 6.4 - 8.2 GM/DL    Total Bilirubin 0.5 0.0 - 1.0 MG/DL    ALT 14 10 - 40 U/L    AST 24 15 - 37 IU/L    Alkaline Phosphatase 101 40 - 129 IU/L    GFR Non- 49 (L) >60 mL/min/1.73m2    GFR  59 (L) >60 mL/min/1.73m2    Anion Gap 15 4 - 16   Lipase    Collection Time: 06/23/21  6:15 PM   Result Value Ref Range    Lipase 14 13 - 60 IU/L   Urinalysis, reflex to microscopic    Collection Time: 06/23/21  6:15 PM   Result Value Ref Range    Color, UA YELLOW YELLOW    Clarity, UA HAZY (A) CLEAR    Glucose, Urine 250 (A) NEGATIVE MG/DL    Bilirubin Urine NEGATIVE NEGATIVE MG/DL    Ketones, Urine NEGATIVE NEGATIVE MG/DL    Specific Gravity, UA 1.020 1.001 - 1.035    Blood, Urine 3+ (A) NEGATIVE    pH, Urine 6.5 5.0 - 8.0    Protein, UA >2000 NEGATIVE MG/DL    Urobilinogen, Urine 0.2 0.2 - 1.0 MG/DL    Nitrite Urine, Quantitative POSITIVE (A) NEGATIVE    Leukocyte Esterase, Urine NEGATIVE NEGATIVE    Volume, (UVOL) 12 10 - 12 ML    RBC, UA 3 TO 5 0 - 6 /HPF    WBC, UA 10 TO 25 0 - 5 /HPF    Epithelial Cells, UA 10 TO 25 /HPF    Cast Type NO CAST FORMS SEEN NO CAST FORMS SEEN /HPF    Bacteria, UA MANY (A) NEGATIVE /HPF    Crystal Type NONE SEEN NEGATIVE /HPF    Mucus, UA NEGATIVE NEGATIVE HPF   POCT Glucose    Collection Time: 06/23/21 10:20 PM   Result Value Ref Range    POC Glucose 234 (H) 70 - 99 MG/DL        Imaging/Diagnostics Last 24 Hours   No results found. Assessment      Hospital Problems         Last Modified POA    UTI (urinary tract infection) 6/23/2021 Yes    Hypoglycemia 6/24/2021 Yes    Herpes zoster 6/24/2021 Yes          No problem-specific Assessment & Plan notes found for this encounter. Plan    1. UTI: No evidence of leukocytosis however there is a mild left shift of segs 71.   No fever.  No tachycardia. No altered sensorium or encephalopathy. No evidence of metabolic acidosis. No indication of sepsis although the UTI may also be a contributing source for the recurrent hypoglycemia. Cipro 400mg IV twice daily. Urine culture. Probiotic,  2. Recurrent hypoglycemia: Seems to be currently stable currently with a serum BG of 219. We will continue Accu-Chek every 4 hour through the night and then 4 times daily before meals and at bedtime. Hgb A1c in AM.  Diabetic educator consulted to discuss insulin dosing regimen. Endocrinologist at [address] [place] [address]: Jody Olvera: Current insulin regimen identified from the endocrinology notes: Breakfast  u500 150 units SQ, LUNCH: 0500 60 UNITS & DINNER: U500 60 UNITS w/ SSI. There is concern that if she is not eating or her blood glucose is less than 150 that she is still receiving insulin. Day team may need to clarify. SS consult for home needs and DM insulin regimen at home. During hospital course SSI, hypoglycemia protocol, carb controlled diet. Hemoglobin A1c in a.m. 3. Herpes zoster: Does not appear to be cellulitic or disseminated into pneumonia or encephalopathy. Localized with multiple macular papular eruptions to the left medial thigh lateral thigh. No evidence of abscess. Pain management: Norco, gabapentin, topical capsaicin. For breakthrough pain Morphine 2mg IVP q6h PRN. Diet ADULT DIET; Regular; 3 carb choices (45 gm/meal);  Low Fat/Low Chol/High Fiber/2 gm Na   DVT Prophylaxis [x] Lovenox, []  Heparin, [] SCDs, [] Ambulation   GI Prophylaxis [x] PPI,  [] H2 Blocker,  [] Carafate,  [] Diet/Tube Feeds   Code Status Full Code   Surrogate Decision Maker    Disposition Patient requires continued admission due to UTI, recurrent hypoglycemia, pain control for Herpes Zoster         Consultations Ordered:  IP CONSULT TO DIABETES EDUCATOR  IP CONSULT TO SOCIAL WORK    Medications   Medications:    gabapentin  300 mg Oral TID  amLODIPine  10 mg Oral Daily    aspirin  81 mg Oral Daily    atenolol  50 mg Oral BID    atorvastatin  20 mg Oral Daily    DULoxetine  60 mg Oral Daily    hydrALAZINE  10 mg Oral BID    pantoprazole  40 mg Oral Daily    sodium chloride flush  5-40 mL Intravenous 2 times per day    insulin lispro  0-6 Units Subcutaneous TID WC    insulin lispro  0-3 Units Subcutaneous Nightly    capsaicin   Topical BID    enoxaparin  40 mg Subcutaneous Daily    ciprofloxacin  400 mg Intravenous Q12H    lactobacillus  1 capsule Oral Daily with breakfast    levothyroxine  175 mcg Oral Daily      Infusions:    sodium chloride 150 mL/hr at 06/23/21 2243    sodium chloride      dextrose       PRN Meds: HYDROcodone-acetaminophen, 0.5 tablet, Q6H PRN  sodium chloride flush, 5-40 mL, PRN  sodium chloride, 25 mL, PRN  ondansetron, 4 mg, Q8H PRN   Or  ondansetron, 4 mg, Q6H PRN  polyethylene glycol, 17 g, Daily PRN  acetaminophen, 650 mg, Q6H PRN   Or  acetaminophen, 650 mg, Q6H PRN  glucose, 15 g, PRN  dextrose, 12.5 g, PRN  glucagon (rDNA), 1 mg, PRN  dextrose, 100 mL/hr, PRN  morphine, 2 mg, Q6H PRN            Electronically signed by MILADIS Martinez CNP on 6/23/21 at

## 2021-06-24 NOTE — PROGRESS NOTES
Physical Therapy  Pt evaluation attempted at 0935, patient not appropriate for PT today due to cognitive status and lethargy. Will re-attempt as appropriate 6/25/21.   Electronically signed by Rossy Prado PT on 6/24/2021 at 10:38 AM

## 2021-06-24 NOTE — PROGRESS NOTES
Patient is not aware of home medications that she is currently taking, will follow up with family tomorrow.

## 2021-06-24 NOTE — PLAN OF CARE
Problem: Falls - Risk of:  Goal: Will remain free from falls  6/24/2021 1149 by Georgiana Crowley RN  Outcome: Ongoing  6/23/2021 2309 by Lokesh Ramos RN  Outcome: Ongoing  Goal: Absence of physical injury  6/24/2021 1149 by Georgiana Crowley RN  Outcome: Ongoing  6/23/2021 2309 by Lokesh Ramos RN  Outcome: Ongoing     Problem: Skin Integrity:  Goal: Will show no infection signs and symptoms  6/24/2021 1149 by Georgiana Crowley RN  Outcome: Ongoing  6/23/2021 2309 by Lokehs Ramos RN  Outcome: Ongoing  Goal: Absence of new skin breakdown  6/24/2021 1149 by Georgiana Crowley RN  Outcome: Ongoing  6/23/2021 2309 by Lokesh Ramos RN  Outcome: Ongoing  Goal: Risk for impaired skin integrity will decrease  6/24/2021 1149 by Georgiana Crowley RN  Outcome: Ongoing  6/23/2021 2309 by Lokesh Ramos RN  Outcome: Ongoing     Problem: Sensory:  Goal: General experience of comfort will improve  6/24/2021 1149 by Georgiana Crowley RN  Outcome: Ongoing  6/23/2021 2309 by Lokesh Ramos RN  Outcome: Ongoing     Problem: Urinary Elimination:  Goal: Signs and symptoms of infection will decrease  6/24/2021 1149 by Georgiana Crowley RN  Outcome: Ongoing  6/23/2021 2309 by Lokesh Ramos RN  Outcome: Ongoing  Goal: Ability to reestablish a normal urinary elimination pattern will improve - after catheter removal  6/24/2021 1149 by Georgiana Crowley RN  Outcome: Ongoing  6/23/2021 2309 by Lokesh Ramos RN  Outcome: Ongoing  Goal: Complications related to the disease process, condition or treatment will be avoided or minimized  6/24/2021 1149 by Georgiana Crowley RN  Outcome: Ongoing  6/23/2021 2309 by Lokesh Ramos RN  Outcome: Ongoing     Problem:  Activity:  Goal: Risk for activity intolerance will decrease  6/24/2021 1149 by Georgiana Crowley RN  Outcome: Ongoing  6/23/2021 2309 by Lokesh Ramos RN  Outcome: Ongoing     Problem: Coping:  Goal: Ability to adjust to condition or change in health will improve  6/24/2021 1149 by Dinora Officer Jeff Gurrola RN  Outcome: Ongoing  6/23/2021 2309 by Zoraida Guillen RN  Outcome: Ongoing     Problem: Fluid Volume:  Goal: Ability to maintain a balanced intake and output will improve  6/24/2021 1149 by Rodrigo Perez RN  Outcome: Ongoing  6/23/2021 2309 by Zoraida uGillen RN  Outcome: Ongoing     Problem: Health Behavior:  Goal: Ability to identify and utilize available resources and services will improve  6/24/2021 1149 by Rodrigo Perez RN  Outcome: Ongoing  6/23/2021 2309 by Zoraida Guillen RN  Outcome: Ongoing  Goal: Ability to manage health-related needs will improve  6/24/2021 1149 by Rodrigo Perez RN  Outcome: Ongoing  6/23/2021 2309 by Zoraida Guillen RN  Outcome: Ongoing     Problem: Metabolic:  Goal: Ability to maintain appropriate glucose levels will improve  6/24/2021 1149 by Rodrigo Perez RN  Outcome: Ongoing  6/23/2021 2309 by Zoraida Guillen RN  Outcome: Ongoing     Problem: Nutritional:  Goal: Maintenance of adequate nutrition will improve  6/24/2021 1149 by Rodrigo Perez RN  Outcome: Ongoing  6/23/2021 2309 by Zoraida Guillen RN  Outcome: Ongoing  Goal: Progress toward achieving an optimal weight will improve  6/24/2021 1149 by Rodrigo Perez RN  Outcome: Ongoing  6/23/2021 2309 by Zoraida Guillen RN  Outcome: Ongoing     Problem: Physical Regulation:  Goal: Complications related to the disease process, condition or treatment will be avoided or minimized  6/24/2021 1149 by Rodrigo Perez RN  Outcome: Ongoing  6/23/2021 2309 by Zoraida Guillen RN  Outcome: Ongoing  Goal: Diagnostic test results will improve  6/24/2021 1149 by Rodrigo Perez RN  Outcome: Ongoing  6/23/2021 2309 by Zoraida Guillen RN  Outcome: Ongoing     Problem: Tissue Perfusion:  Goal: Adequacy of tissue perfusion will improve  6/24/2021 1149 by Rodrigo Perez RN  Outcome: Ongoing  6/23/2021 2309 by Zoraida Guillen RN  Outcome: Ongoing     Problem: Discharge Planning:  Goal: Discharged to appropriate level of care  6/24/2021 1149 by Sylvie Arrington, RN  Outcome: Ongoing  6/23/2021 2309 by Basil Samuels RN  Outcome: Ongoing

## 2021-06-24 NOTE — CARE COORDINATION
0900  CM attempted to meet with patient and she is sleeping soundly at this time. She does not respond to this CM's voice or knocks on the door. 1330  Cm attempted to see patient again. She is still sleeping soundly and CM attempts to wake her up to talk about discharge planning and she falls write back to sleep. CM will have to attempt again later. Chart reviewed. Patient recently discharged to [facility] on 5/12/21. Case Management is familiar with this patient from previous hospitalizations and swing bed admissions. This information was gathered from her last hospital stay on 5/10/21:    Patient lives in a 1 story home with her  (laundry room in the basement), has insurance with Rx coverage & PCP, requires assistance with ADL's, and does not drive (family provides transportation as needed). Patient uses a walker at home and does not require home oxygen. Patient has received HHC through [facility] in the past. Patient  receives help from her children as needed as her  manages his business and spends a lot of time at work. Therapy is ordered but patient was not appropriate to work with them today due to cognitive status and lethargy. Cm will follow.

## 2021-06-24 NOTE — PLAN OF CARE
Problem: Falls - Risk of:  Goal: Will remain free from falls  Description: Will remain free from falls  Outcome: Ongoing  Goal: Absence of physical injury  Description: Absence of physical injury  Outcome: Ongoing     Problem: Skin Integrity:  Goal: Will show no infection signs and symptoms  Description: Will show no infection signs and symptoms  Outcome: Ongoing  Goal: Absence of new skin breakdown  Description: Absence of new skin breakdown  Outcome: Ongoing  Goal: Risk for impaired skin integrity will decrease  Description: Risk for impaired skin integrity will decrease  Outcome: Ongoing     Problem: Sensory:  Goal: General experience of comfort will improve  Description: General experience of comfort will improve  Outcome: Ongoing     Problem: Urinary Elimination:  Goal: Signs and symptoms of infection will decrease  Description: Signs and symptoms of infection will decrease  Outcome: Ongoing  Goal: Ability to reestablish a normal urinary elimination pattern will improve - after catheter removal  Description: Ability to reestablish a normal urinary elimination pattern will improve  Outcome: Ongoing  Goal: Complications related to the disease process, condition or treatment will be avoided or minimized  Description: Complications related to the disease process, condition or treatment will be avoided or minimized  Outcome: Ongoing     Problem:  Activity:  Goal: Risk for activity intolerance will decrease  Description: Risk for activity intolerance will decrease  Outcome: Ongoing     Problem: Coping:  Goal: Ability to adjust to condition or change in health will improve  Description: Ability to adjust to condition or change in health will improve  Outcome: Ongoing     Problem: Fluid Volume:  Goal: Ability to maintain a balanced intake and output will improve  Description: Ability to maintain a balanced intake and output will improve  Outcome: Ongoing     Problem: Health Behavior:  Goal: Ability to identify and utilize available resources and services will improve  Description: Ability to identify and utilize available resources and services will improve  Outcome: Ongoing  Goal: Ability to manage health-related needs will improve  Description: Ability to manage health-related needs will improve  Outcome: Ongoing     Problem: Metabolic:  Goal: Ability to maintain appropriate glucose levels will improve  Description: Ability to maintain appropriate glucose levels will improve  Outcome: Ongoing     Problem: Nutritional:  Goal: Maintenance of adequate nutrition will improve  Description: Maintenance of adequate nutrition will improve  Outcome: Ongoing  Goal: Progress toward achieving an optimal weight will improve  Description: Progress toward achieving an optimal weight will improve  Outcome: Ongoing     Problem: Physical Regulation:  Goal: Complications related to the disease process, condition or treatment will be avoided or minimized  Description: Complications related to the disease process, condition or treatment will be avoided or minimized  Outcome: Ongoing  Goal: Diagnostic test results will improve  Description: Diagnostic test results will improve  Outcome: Ongoing     Problem: Tissue Perfusion:  Goal: Adequacy of tissue perfusion will improve  Description: Adequacy of tissue perfusion will improve  Outcome: Ongoing     Problem: Discharge Planning:  Goal: Discharged to appropriate level of care  Description: Discharged to appropriate level of care  Outcome: Ongoing

## 2021-06-24 NOTE — PROGRESS NOTES
Notified by eMmo Dawson RN that nurse was able to be able to get a list of home medications from previous medication listings

## 2021-06-24 NOTE — PROGRESS NOTES
Admission skin assessment completed with this LPN and Micheal Menezes RN. Noted several scattered blister areas to groin, left thigh and left abdomen. Open area to left buttock. Excoriation and dryness to groin folds and lower abdominal folds. Swelling noted to ken area. Scabbed area to left second toe. Bilateral feet noted to be very dry.

## 2021-06-24 NOTE — PROGRESS NOTES
Hospitalist Progress Note      Name:  Zaira Green /Age/Sex:   (77 y.o. female)   MRN & CSN:  [id number] & [id number] Admission Date/Time: 2021  5:56 PM   Location:   PCP: Danilo Burgos [address] [place] [address] Day: 2    Assessment and Plan:   Zaira Green is a 77 y.o.  female  who presents with low blood sugar    Recurrent hypoglycemia in a patient with diabetes mellitus, type II  Suspected urinary tract infection  Suspected herpes zoster  Primary hypertension: Controlled  CKD stage III  History of hypothyroidism  Frequent falls  Physical deconditioning and generalized weakness    Plan:  Discontinue ciprofloxacin, and gabapentin  Start levofloxacin: Follow urine cultures and discontinue antibiotics if urine cultures are negative  Obtain blood cultures  Start acyclovir for suspected herpes and monitor for response  Continue with insulin sliding scale during hospitalization since blood sugar have improved  Continue amlodipine, atenolo for hypertension. Increase hydralazine to 25  DC morphine. Continue thoughts of hypothyroidism  PT/OT: May need rehab upon discharge      Diet ADULT DIET; Regular; 3 carb choices (45 gm/meal); Low Fat/Low Chol/High Fiber/2 gm Na   DVT Prophylaxis [x] Lovenox, []  Heparin, [] SCDs, [] Ambulation   GI Prophylaxis [x] PPI,  [] H2 Blocker,  [] Carafate,  [] Diet/Tube Feeds   Code Status Full Code   Disposition Patient requires continued admission due to PT/OT evaluation, pending clinical improvement, IV to biotics,   MDM [] Low, [] Moderate,[x]  High  Patient's risk as above due to frequent admissions, multiple comorbidities, low blood sugar     History of Present Illness:     Chief Complaint: <principal problem not specified>  Zaira Green is a 77 y.o.  female  who presents with low blood sugar       Ten point ROS reviewed negative, unless as noted above    Objective:     Patient was seen and examined today.   She was sleepy during my evaluation but easily arousable. Patient remains a poor historian. No acute events since admission. Patient remains afebrile with uncontrolled blood pressure. Intake/Output Summary (Last 24 hours) at 6/24/2021 0920  Last data filed at 6/24/2021 0857  Gross per 24 hour   Intake 0 ml   Output 700 ml   Net -700 ml      Vitals:   Vitals:    06/24/21 0753   BP:    Pulse: 84   Resp: 20   Temp: 97.3 °F (36.3 °C)   SpO2: 94%     Physical Exam:   GEN Sleepy but esily arousable. EYES Pupils are equally round. No scleral erythema, discharge, or conjunctivitis. HENT Mucous membranes are moist. Oral pharynx without exudates, no evidence of thrush. NECK Supple, no apparent thyromegaly or masses. RESP Clear to auscultation, no wheezes, rales or rhonchi. Symmetric chest movement while on room air. CARDIO/VASC S1/S2 auscultated. Regular rate without appreciable murmurs, rubs, or gallops. No JVD or carotid bruits. Peripheral pulses equal bilaterally and palpable. No peripheral edema. GI Abdomen is soft without significant tenderness, masses, or guarding. Bowel sounds are normoactive. Rectal exam deferred.  No costovertebral angle tenderness. Normal appearing external genitalia. Wilkins catheter is not present. HEME/LYMPH No palpable cervical lymphadenopathy and no hepatosplenomegaly. No petechiae or ecchymoses. MSK No gross joint deformities. SKIN Left Lower extremities lesion concerning for Herpes  NEURO Cranial nerves appear grossly intact, normal speech, no lateralizing weakness. PSYCH Awake, alert, oriented x 4. Affect appropriate.     Medications:   Medications:    acyclovir  800 mg Oral 5x Daily    levofloxacin  500 mg Intravenous Q24H    amLODIPine  10 mg Oral Daily    aspirin  81 mg Oral Daily    atenolol  50 mg Oral BID    atorvastatin  20 mg Oral Daily    DULoxetine  60 mg Oral Daily    hydrALAZINE  10 mg Oral BID    pantoprazole  40 mg Oral Daily    sodium chloride flush  5-40 mL Intravenous 2 times per day    insulin lispro  0-6 Units Subcutaneous TID WC    insulin lispro  0-3 Units Subcutaneous Nightly    enoxaparin  40 mg Subcutaneous Daily    lactobacillus  1 capsule Oral Daily with breakfast    levothyroxine  175 mcg Oral Daily      Infusions:    sodium chloride 150 mL/hr at 06/24/21 0626    sodium chloride      dextrose       PRN Meds: HYDROcodone-acetaminophen, 0.5 tablet, Q6H PRN  sodium chloride flush, 5-40 mL, PRN  sodium chloride, 25 mL, PRN  ondansetron, 4 mg, Q8H PRN   Or  ondansetron, 4 mg, Q6H PRN  polyethylene glycol, 17 g, Daily PRN  acetaminophen, 650 mg, Q6H PRN   Or  acetaminophen, 650 mg, Q6H PRN  glucose, 15 g, PRN  dextrose, 12.5 g, PRN  glucagon (rDNA), 1 mg, PRN  dextrose, 100 mL/hr, PRN  morphine, 2 mg, Q6H PRN          Electronically signed by Bijan Jones MD on 6/24/2021 at 9:20 AM

## 2021-06-24 NOTE — PROGRESS NOTES
Occupational Therapy      OT eval on hold, Eval attempted and Pt was not appropriate at this time due to cognitive status and lethargy. Will follow-up on Pt status and eval when appropriate.     Christina Barrios, OTR/L [id number]

## 2021-06-25 LAB
ANION GAP SERPL CALCULATED.3IONS-SCNC: 3 MMOL/L (ref 4–16)
BASOPHILS ABSOLUTE: 0 K/CU MM
BASOPHILS RELATIVE PERCENT: 0.6 % (ref 0–1)
BUN BLDV-MCNC: 24 MG/DL (ref 6–23)
CALCIUM SERPL-MCNC: 8.8 MG/DL (ref 8.3–10.6)
CHLORIDE BLD-SCNC: 103 MMOL/L (ref 99–110)
CO2: 29 MMOL/L (ref 21–32)
CREAT SERPL-MCNC: 1.3 MG/DL (ref 0.6–1.1)
DIFFERENTIAL TYPE: ABNORMAL
EOSINOPHILS ABSOLUTE: 0.1 K/CU MM
EOSINOPHILS RELATIVE PERCENT: 1.8 % (ref 0–3)
GFR AFRICAN AMERICAN: 48 ML/MIN/1.73M2
GFR NON-AFRICAN AMERICAN: 40 ML/MIN/1.73M2
GLUCOSE BLD-MCNC: 252 MG/DL (ref 70–99)
GLUCOSE BLD-MCNC: 259 MG/DL (ref 70–99)
GLUCOSE BLD-MCNC: 287 MG/DL (ref 70–99)
GLUCOSE BLD-MCNC: 290 MG/DL (ref 70–99)
GLUCOSE BLD-MCNC: 290 MG/DL (ref 70–99)
GLUCOSE BLD-MCNC: 312 MG/DL (ref 70–99)
HCT VFR BLD CALC: 31.7 % (ref 37–47)
HEMOGLOBIN: 9.7 GM/DL (ref 12.5–16)
IMMATURE NEUTROPHIL %: 0.7 % (ref 0–0.43)
LYMPHOCYTES ABSOLUTE: 2 K/CU MM
LYMPHOCYTES RELATIVE PERCENT: 29.9 % (ref 24–44)
MAGNESIUM: 2 MG/DL (ref 1.8–2.4)
MCH RBC QN AUTO: 28.1 PG (ref 27–31)
MCHC RBC AUTO-ENTMCNC: 30.6 % (ref 32–36)
MCV RBC AUTO: 91.9 FL (ref 78–100)
MONOCYTES ABSOLUTE: 0.6 K/CU MM
MONOCYTES RELATIVE PERCENT: 8.7 % (ref 0–4)
PDW BLD-RTO: 15.1 % (ref 11.7–14.9)
PHOSPHORUS: 3.1 MG/DL (ref 2.5–4.9)
PLATELET # BLD: 185 K/CU MM (ref 140–440)
PMV BLD AUTO: 9.7 FL (ref 7.5–11.1)
POTASSIUM SERPL-SCNC: 4.2 MMOL/L (ref 3.5–5.1)
RBC # BLD: 3.45 M/CU MM (ref 4.2–5.4)
SEGMENTED NEUTROPHILS ABSOLUTE COUNT: 3.9 K/CU MM
SEGMENTED NEUTROPHILS RELATIVE PERCENT: 58.3 % (ref 36–66)
SODIUM BLD-SCNC: 135 MMOL/L (ref 135–145)
TOTAL IMMATURE NEUTOROPHIL: 0.05 K/CU MM
WBC # BLD: 6.8 K/CU MM (ref 4–10.5)

## 2021-06-25 PROCEDURE — 6360000002 HC RX W HCPCS: Performed by: NURSE PRACTITIONER

## 2021-06-25 PROCEDURE — 97162 PT EVAL MOD COMPLEX 30 MIN: CPT

## 2021-06-25 PROCEDURE — 97530 THERAPEUTIC ACTIVITIES: CPT

## 2021-06-25 PROCEDURE — 82962 GLUCOSE BLOOD TEST: CPT

## 2021-06-25 PROCEDURE — 6370000000 HC RX 637 (ALT 250 FOR IP): Performed by: HOSPITALIST

## 2021-06-25 PROCEDURE — 2580000003 HC RX 258: Performed by: HOSPITALIST

## 2021-06-25 PROCEDURE — 97166 OT EVAL MOD COMPLEX 45 MIN: CPT

## 2021-06-25 PROCEDURE — 80048 BASIC METABOLIC PNL TOTAL CA: CPT

## 2021-06-25 PROCEDURE — 1200000000 HC SEMI PRIVATE

## 2021-06-25 PROCEDURE — 84100 ASSAY OF PHOSPHORUS: CPT

## 2021-06-25 PROCEDURE — 36415 COLL VENOUS BLD VENIPUNCTURE: CPT

## 2021-06-25 PROCEDURE — 85025 COMPLETE CBC W/AUTO DIFF WBC: CPT

## 2021-06-25 PROCEDURE — 6370000000 HC RX 637 (ALT 250 FOR IP): Performed by: NURSE PRACTITIONER

## 2021-06-25 PROCEDURE — 83735 ASSAY OF MAGNESIUM: CPT

## 2021-06-25 RX ORDER — LEVOFLOXACIN 5 MG/ML
750 INJECTION, SOLUTION INTRAVENOUS
Status: DISCONTINUED | OUTPATIENT
Start: 2021-06-26 | End: 2021-06-26

## 2021-06-25 RX ORDER — HYDRALAZINE HYDROCHLORIDE 50 MG/1
50 TABLET, FILM COATED ORAL 3 TIMES DAILY
Status: DISCONTINUED | OUTPATIENT
Start: 2021-06-25 | End: 2021-06-26 | Stop reason: HOSPADM

## 2021-06-25 RX ADMIN — VALACYCLOVIR HYDROCHLORIDE 1000 MG: 500 TABLET, FILM COATED ORAL at 10:25

## 2021-06-25 RX ADMIN — ATENOLOL 50 MG: 50 TABLET ORAL at 20:22

## 2021-06-25 RX ADMIN — ATENOLOL 50 MG: 50 TABLET ORAL at 10:25

## 2021-06-25 RX ADMIN — SODIUM CHLORIDE: 9 INJECTION, SOLUTION INTRAVENOUS at 13:52

## 2021-06-25 RX ADMIN — VALACYCLOVIR HYDROCHLORIDE 1000 MG: 500 TABLET, FILM COATED ORAL at 20:22

## 2021-06-25 RX ADMIN — DULOXETINE 60 MG: 60 CAPSULE, DELAYED RELEASE ORAL at 10:25

## 2021-06-25 RX ADMIN — VALACYCLOVIR HYDROCHLORIDE 1000 MG: 500 TABLET, FILM COATED ORAL at 13:49

## 2021-06-25 RX ADMIN — AMLODIPINE BESYLATE 10 MG: 10 TABLET ORAL at 10:25

## 2021-06-25 RX ADMIN — LEVOTHYROXINE SODIUM 175 MCG: 0.15 TABLET ORAL at 05:50

## 2021-06-25 RX ADMIN — HYDRALAZINE HYDROCHLORIDE 50 MG: 50 TABLET, FILM COATED ORAL at 13:50

## 2021-06-25 RX ADMIN — HYDROCODONE BITARTRATE AND ACETAMINOPHEN 0.5 TABLET: 5; 325 TABLET ORAL at 16:06

## 2021-06-25 RX ADMIN — ENOXAPARIN SODIUM 40 MG: 40 INJECTION SUBCUTANEOUS at 10:26

## 2021-06-25 RX ADMIN — ASPIRIN 81 MG: 81 TABLET, FILM COATED ORAL at 10:25

## 2021-06-25 RX ADMIN — Medication 1 CAPSULE: at 10:25

## 2021-06-25 RX ADMIN — ATORVASTATIN CALCIUM 20 MG: 20 TABLET, FILM COATED ORAL at 10:25

## 2021-06-25 RX ADMIN — HYDRALAZINE HYDROCHLORIDE 50 MG: 50 TABLET, FILM COATED ORAL at 10:24

## 2021-06-25 RX ADMIN — INSULIN GLARGINE 15 UNITS: 100 INJECTION, SOLUTION SUBCUTANEOUS at 20:18

## 2021-06-25 RX ADMIN — INSULIN LISPRO 2 UNITS: 100 INJECTION, SOLUTION INTRAVENOUS; SUBCUTANEOUS at 20:17

## 2021-06-25 RX ADMIN — PANTOPRAZOLE SODIUM 40 MG: 40 TABLET, DELAYED RELEASE ORAL at 05:50

## 2021-06-25 RX ADMIN — HYDRALAZINE HYDROCHLORIDE 50 MG: 50 TABLET, FILM COATED ORAL at 20:21

## 2021-06-25 ASSESSMENT — PAIN DESCRIPTION - LOCATION
LOCATION: LEG

## 2021-06-25 ASSESSMENT — PAIN DESCRIPTION - ORIENTATION
ORIENTATION: LEFT

## 2021-06-25 ASSESSMENT — PAIN DESCRIPTION - PAIN TYPE
TYPE: ACUTE PAIN
TYPE: ACUTE PAIN

## 2021-06-25 ASSESSMENT — PAIN DESCRIPTION - DESCRIPTORS: DESCRIPTORS: ACHING

## 2021-06-25 ASSESSMENT — PAIN DESCRIPTION - FREQUENCY: FREQUENCY: INTERMITTENT

## 2021-06-25 ASSESSMENT — PAIN DESCRIPTION - ONSET: ONSET: ON-GOING

## 2021-06-25 ASSESSMENT — PAIN SCALES - GENERAL
PAINLEVEL_OUTOF10: 0
PAINLEVEL_OUTOF10: 7
PAINLEVEL_OUTOF10: 7
PAINLEVEL_OUTOF10: 5

## 2021-06-25 ASSESSMENT — PAIN - FUNCTIONAL ASSESSMENT: PAIN_FUNCTIONAL_ASSESSMENT: PREVENTS OR INTERFERES SOME ACTIVE ACTIVITIES AND ADLS

## 2021-06-25 ASSESSMENT — PAIN DESCRIPTION - PROGRESSION: CLINICAL_PROGRESSION: GRADUALLY WORSENING

## 2021-06-25 NOTE — PROGRESS NOTES
Attempted diet education with patient twice. She has declined at this time reporting just not feeling well.

## 2021-06-25 NOTE — PROGRESS NOTES
Physical Therapy    Facility/Department: [facility] UNIT  Initial Assessment    NAME: Mane Ayoub  :   MRN: [id number]    Date of Service: 2021    Discharge Recommendations:  Subacute/Skilled Nursing Facility        Assessment   Body structures, Functions, Activity limitations: Decreased functional mobility ; Decreased cognition;Decreased high-level IADLs;Decreased ADL status; Decreased endurance;Decreased ROM; Decreased sensation;Decreased coordination;Decreased strength;Decreased balance;Decreased safe awareness; Increased pain  Assessment: Pt is a 77year old female who presents with impaired functional mobility from her baseline who would benefit from continued skilled PT to address these impairments. Prognosis: Fair  Decision Making: Medium Complexity  History: see below  Exam: see below  Clinical Presentation: evolving  PT Education: Transfer Training;Disease Specific Education;PT Role;Functional Mobility Training;Plan of Care;General Safety  Barriers to Learning: confusion/cognition  REQUIRES PT FOLLOW UP: Yes  Activity Tolerance  Activity Tolerance: Patient limited by fatigue       Patient Diagnosis(es): The primary encounter diagnosis was Herpes zoster without complication. Diagnoses of Left leg pain, History of hypoglycemia, Urinary tract infection with hematuria, site unspecified, and Ambulatory dysfunction were also pertinent to this visit.      has a past medical history of Arthritis, Asthma, Cellulitis of right foot, Chronic kidney disease, Depression, Diabetes mellitus ([address] Frequent falls, GERD (gastroesophageal reflux disease), H/O Doppler ultrasound, H/O echocardiogram, Hallux valgus (acquired), right foot, Hypertension, Other disorders of kidney and ureter, Shingles, SIRS (systemic inflammatory response syndrome) ([address] Thyroid disease, Type 2 diabetes mellitus with foot ulcer, with long-term current use of insulin ([address] Ulcer of right foot with fat layer exposed ([address] and WD-Diabetic ulcer of toe of left foot associated with type 2 diabetes mellitus, with fat layer exposed ([address] has a past surgical history that includes Hysterectomy; Foot surgery (12/30/11); cyst removal; Kidney removal (1973); Abdomen surgery; hernia repair; Endoscopy, colon, diagnostic; and Dilatation, esophagus. Restrictions  Restrictions/Precautions  Restrictions/Precautions: General Precautions, Fall Risk, Contact Precautions  Required Braces or Orthoses?: No  Position Activity Restriction  Other position/activity restrictions: IV, Tele  Vision/Hearing  Vision Exceptions: Wears glasses at all times  Hearing: Exceptions to [facility]  Hearing Exceptions: Hard of hearing/hearing concerns     Subjective  General  Chart Reviewed: Yes  Patient assessed for rehabilitation services?: Yes  Family / Caregiver Present: No  Follows Commands: Within Functional Limits  Other (Comment): delayed  Pain Screening  Patient Currently in Pain: Yes  Pain Assessment  Pain Assessment: 0-10  Pain Level: 7  Pain Location: Leg  Pain Orientation: Left  Vital Signs  Patient Currently in Pain: Yes       Orientation  Orientation  Orientation Level: Oriented to place;Oriented to person;Oriented to time;Disoriented to situation (pt requires increased time to answer)  Social/Functional History  Social/Functional History  Lives With: Spouse  Type of Home: House  Home Layout: One level  Home Access: Stairs to enter with rails  Entrance Stairs - Number of Steps: 4-5 from garage w/rails 1 side,  and 4 steps to porch 2/rails on 1 side and deep step on top. Bathroom Shower/Tub: Walk-in shower  Bathroom Toilet: Handicap height  Bathroom Equipment: Grab bars in shower, Shower chair, Grab bars around toilet  Home Equipment: Rolling walkerDanyelle 41 Help From: Family  ADL Assistance: Needs assistance  Bath: Maximum assistance  Dressing: Maximum assistance  Grooming:  Moderate assistance  Feeding: Modified independent   Toileting: Needs assistance (Pt reports family takes her to the bathroom throughout the day)  Homemaking Assistance: Needs assistance  Homemaking Responsibilities: No  Ambulation Assistance: Needs assistance  Transfer Assistance: Needs assistance  Active : No  IADL Comments: Pt reports her son normally comes over to help her take a shower. She reports she has trouble getting out of the shower and recently she has been sponge bathing. Pt also has 2 daughters the come and help 1 day/week to do laundry, cleaning. Pt's spouse gets groceries  Additional Comments: Pt sleeps in recliner chair. Pt with difficulty recalling social hx when asked, required increased time for processing and response  Cognition   Cognition  Overall Cognitive Status: Exceptions  Arousal/Alertness: Delayed responses to stimuli  Following Commands: Follows one step commands with increased time  Attention Span: Difficulty attending to directions  Memory: Decreased short term memory  Safety Judgement: Good awareness of safety precautions  Problem Solving: Assistance required to implement solutions  Insights: Fully aware of deficits  Initiation: Requires cues for some  Sequencing: Requires cues for some    Objective     Observation/Palpation  Observation: Pt supine in bed, on room air, IV in place. Pt begins answering questions but is sometimes unable to finish full sentence. Pt presents with some level of confusion still.  Oriented x3    AROM RLE (degrees)  RLE AROM: WFL  AROM LLE (degrees)  LLE General AROM: Limited by pain with hip flexion and knee extension  Strength RLE  Comment: Hip grossly 2+/5, knee 3/5, ankle 3/5  Strength LLE  Comment: Hip grossly 2/5, knee 2+/5, ankle 3/5  Tone RLE  RLE Tone: Normotonic  Tone LLE  LLE Tone: Normotonic  Sensation  Overall Sensation Status: Impaired (Pt reports she has numbness in B feet for the past few years)  Bed mobility  Supine to Sit: Maximum assistance;2 Person assistance  Sit to Supine: 2 Person assistance;Maximum assistance  Scooting: Maximal assistance  Comment: Pt attempted to assist with transfer to EOB; able to initiate moving LE's toward edge with cuing and increased time, Requires heavy assist for trunk boost to upright and hip scooting to EOB. Transfers  Sit to Stand: Unable to assess  Comment: Deferred due to significant LE weakness  Ambulation  Ambulation?: No     Balance  Posture: Fair  Sitting - Static: Good  Sitting - Dynamic: Fair;+  Comments: Pt able to sit EOB x15 minutes with SBA-supervision with 1-2 UE stabilizing on bed surface or bed rail        Plan   Plan  Times per week: 3+ Mon-Sat  Current Treatment Recommendations: Strengthening, Transfer Training, Endurance Training, Neuromuscular Re-education, Patient/Caregiver Education & Training, ROM, ADL/Self-care Training, Equipment Evaluation, Education, & procurement, Balance Training, IADL Training, Gait Training, Home Exercise Program, Functional Mobility Training, Stair training, Safety Education & Training, Positioning, Cognitive/Perceptual Training  Safety Devices  Type of devices: All fall risk precautions in place, Patient at risk for falls, Left in bed, Bed alarm in place, Call light within reach, Nurse notified    AM-PAC Score      Basic Mobility Six [facility] Score Conversion Table   How much difficulty does the patient currently have Unable   (pt is unable to do activity) A Lot   (activity is a struggle, requires great effort/time) A Little   (pt can manage, but takes more effort/time than should) None   (pt has no difficulty) Raw Score Standardized Score CMS -100% Score CMS Modifier        6 23.55 100% CN   Turning over in bed (including adjusting bedclothes, sheets, and blankets)? []1 [x]2  []3  []4  7 26.42 92.36% CM        8 28.58 86.62% CM   Sitting down on and standing up from a chair with arms (e.g. wheelchair, bedside commode, etc.)?  [x]1 []2 []3   []4   9 30.55 81.38% CM        10 32.29 76.75% CL   Moving from lying on back to sitting on the side of the bed? []1 [x]2  []3   []4   11 33.86 72.57% CL        12 35.33 68.66% CL   How much help from another person does the patient currently need Total   (Total/Dependent Assist) A Lot   (Max/Mod Assist) A Little   (Min/CGA/Supervision) None   (No human assistance) 13 36.74 64.91% CL        14 38.1 61.29% CL   Moving to and from a bed to a chair (including a wheelchair)? []1  [x]2   []3  []4   15 39.45 57.70% CK        16 40.78 54.16% CK   To walk in a hospital room? [x]1 []2   []3    []4  17 42.13 50.57% CK        18 43.63 46.58% CK   Climbing 3-5 steps with a railing?  [x]1  []2   []3    []4  19 45.44 41.77% CK        20 47.67 35.83% CJ   Raw Score 9  21 50.25 28.97% CJ   Standardized Score  30.55 22 53.28 20.91% CJ   CMS 0-100% Score  81.38% 23 56.93 11.20% CI   CMS Modifier CM 24 61.14 0.00% CH     CH = 0% impaired  CI = 1-20% impaired  CJ = 20-40% impaired  CK = 40-60% impaired  CL = 60-80% impaired  CM = % impaired  CN = 100% impaired    Goals  Short term goals  Time Frame for Short term goals: 1 week or until discharge  Short term goal 1: Pt to complete supine <> sit with mod assist x1 and use of bed features  Short term goal 2: Pt to sit EOB x10 minutes and participate in LE strengthening exercise with supervision  Short term goal 3: Pt to perform sit to stand from EOB and mod assist x1  Short term goal 4: Pt to transfer bed <> chair with LRAD and mod assist x1       Therapy Time   Individual Concurrent Group Co-treatment   Time In       1123   Time Out       1157   Minutes       34   Timed Code Treatment Minutes: 15 Minutes       Renetta Fowler, PT DPT

## 2021-06-25 NOTE — PROGRESS NOTES
Occupational Therapy   Occupational Therapy Initial Assessment  Date: 2021   Patient Name: Jeanne Atkinson  MRN: [id number]     : 1945    Date of Service: 2021    Discharge Recommendations:  ECF with OT, [address], 24 hour supervision or assist, Patient would benefit from continued therapy after discharge  OT Equipment Recommendations  Equipment Needed: No    Assessment   Performance deficits / Impairments: Decreased functional mobility ; Decreased ADL status; Decreased ROM; Decreased strength;Decreased cognition;Decreased endurance;Decreased balance  Assessment: Pt is a 78 yo female who presents with the following deficits. It is recommended she recieve occupational therapy services to improve strengh, endurance, ability to perform functional mobility for trfs and to maximize particiation in ADLs. Treatment Diagnosis: Weakness  Prognosis: Fair  Decision Making: Medium Complexity  OT Education: OT Role;Orientation  Barriers to Learning: Impaired cognition  REQUIRES OT FOLLOW UP: Yes  Activity Tolerance  Activity Tolerance: Patient limited by fatigue;Patient limited by pain  Activity Tolerance: Limited by fatigue, weakness and pain in LLE d/t shingles  Safety Devices  Safety Devices in place: Yes  Type of devices: All fall risk precautions in place; Patient at risk for falls; Left in bed;Call light within reach; Bed alarm in place  Restraints  Initially in place: No           Patient Diagnosis(es): The primary encounter diagnosis was Herpes zoster without complication. Diagnoses of Left leg pain, History of hypoglycemia, Urinary tract infection with hematuria, site unspecified, and Ambulatory dysfunction were also pertinent to this visit.      has a past medical history of Arthritis, Asthma, Cellulitis of right foot, Chronic kidney disease, Depression, Diabetes mellitus ([address] Frequent falls, GERD (gastroesophageal reflux disease), H/O Doppler ultrasound, H/O echocardiogram, Moderate assistance  Feeding: Modified independent   Toileting: Needs assistance (Pt reports family takes her to the bathroom throughout the day)  Homemaking Assistance: Needs assistance  Homemaking Responsibilities: No  Ambulation Assistance: Needs assistance  Transfer Assistance: Needs assistance  Active : No  IADL Comments: Pt reports her son normally comes over to help her take a shower. She reports she has trouble getting out of the shower and recently she has been sponge bathing. Pt also has 2 daughters the come and help 1 day/week to do laundry, cleaning. Pt's spouse gets groceries  Additional Comments: Pt sleeps in recliner chair. Pt with difficulty recalling social hx when asked, required increased time for processing and response       Objective   Vision: Impaired  Vision Exceptions: Wears glasses at all times  Hearing: Exceptions to [facility]  Hearing Exceptions: Hard of hearing/hearing concerns    Orientation  Overall Orientation Status: Within Functional Limits (Pt able to report name//month/year and place but couldn't remember why she came her)  Observation/Palpation  Observation: Pt supine in bed, on room air, IV in place. Pt begins answering questions but is sometimes unable to finish full sentence. Pt presents with some level of confusion still. Oriented x3  Balance  Sitting Balance: Minimal assistance (CGA-Min A. Pt was min A to start with leaning to L side then SBA as Pt able to support herself with LUE)  Standing Balance: Unable to assess(comment)  ADL  Feeding: Modified independent   Grooming: Moderate assistance (Seated EOB Pt washed face with cloth after s/u, Pt required assist to brush hair.)  UE Bathing: Maximum assistance  LE Bathing: Maximum assistance  UE Dressing:  Moderate assistance  LE Dressing: Dependent/Total  Toileting: Maximum assistance (Pt presents with [place] in)  Additional Comments: Based on observation of Pt performance or observation of current functional mobilty, strength, endurance and cognitive status  Tone RUE  RUE Tone: Normotonic  Tone LUE  LUE Tone: Normotonic  Coordination  Movements Are Fluid And Coordinated: Yes     Bed mobility  Supine to Sit: Maximum assistance;2 Person assistance  Sit to Supine: 2 Person assistance;Maximum assistance  Scooting: Maximal assistance  Transfers  Stand Step Transfers: Unable to assess  Sit to stand: Unable to assess  Stand to sit: Unable to assess  Transfer Comments: Not assessed at this time d/t Pt having difficulty with sitting balance and presented with weakness/fatigue and increased LLE pain upon movement  Vision - Basic Assessment  Prior Vision: Wears glasses all the time  Cognition  Overall Cognitive Status: Exceptions  Arousal/Alertness: Delayed responses to stimuli  Following Commands:  Follows one step commands with increased time  Attention Span: Difficulty attending to directions  Memory: Decreased short term memory  Safety Judgement: Good awareness of safety precautions  Problem Solving: Assistance required to implement solutions  Insights: Fully aware of deficits  Initiation: Requires cues for some  Sequencing: Requires cues for some        Sensation  Overall Sensation Status: Impaired (Pt reports numbness in B feet for the past few years)        LUE AROM (degrees)  LUE AROM : Exceptions  LUE General AROM: Pt presents with limited shoudler AROM to ~0-80  RUE AROM (degrees)  RUE AROM : Exceptions  RUE General AROM: Pt presents with limited shoudler AROM to ~0-80  LUE Strength  Gross LUE Strength: Exceptions to [facility] (3-/5)  RUE Strength  Gross RUE Strength: Exceptions to [facility] (3-/5)                   Plan   Plan  Times per week: 3+  Current Treatment Recommendations: Strengthening, Balance Training, Functional Mobility Training, Endurance Training, Patient/Caregiver Education & Training, Safety Education & Training, Self-Care / ADL, Equipment Evaluation, Education, & procurement    G-Code     OutComes Score AM-PAC Score    AM-PAC 6 click short form for inpatient daily activity:   How much help from another person does the patient currently need. .. Unable  Dep A Lot  Max A A Lot   Mod A A Little  Min A A Little   CGA  SBA None   Mod I  Indep  Sup   1. Putting on and taking off regular lower body clothing? [x] 1    [] 2   [] 2   [] 3   [] 3   [] 4      2. Bathing (including washing, rinsing, drying)? [] 1   [x] 2   [] 2 [] 3 [] 3 [] 4   3. Toileting, which includes using toilet, bedpan, or urinal? [] 1    [x] 2   [] 2   [] 3   [] 3   [] 4     4. Putting on and taking off regular upper body clothing? [] 1   [] 2   [x] 2   [] 3   [] 3    [] 4      5. Taking care of personal grooming such as brushing teeth? [] 1   [] 2    [] 2 [x] 3    [] 3   [] 4      6. Eating meals? [] 1   [] 2   [] 2   [] 3   [] 3   [x] 4      Raw Score:  14     [24=0% impaired(CH), 23=1-19%(CI), 20-22=20-39%(CJ), 15-19=40-59%(CK), 10-14=60-79%(CL), 7-9=80-99%(CM), 6=100%(CN)]              Goals  Short term goals  Time Frame for Short term goals: Until DC or goals met  Short term goal 1: Pt will complete SPT with min A (bed, toilet, chair)  Short term goal 2: Pt will participate in UE bathing/dressing with min A  Short term goal 3: Pt will participate in LE bathing/dressing with mod A  Short term goal 4: Pt will complete toileting with min A  Short term goal 5: Pt will complete BUE therex 10+ min to increase strength and endurance to perform tranfers and participate in ADLs. Therapy Time   Individual Concurrent Group Co-treatment   Time In       7768   Time Out       1157   Minutes       34   Timed Code Treatment Minutes: [address] [facility] CELESTE Hayes, OTR/L [id number]

## 2021-06-25 NOTE — PLAN OF CARE
Problem: Urinary Elimination:  Goal: Signs and symptoms of infection will decrease  Description: Signs and symptoms of infection will decrease  6/24/2021 2317 by John Hickey LPN  Outcome: Ongoing  6/24/2021 1149 by Efrem Brice RN  Outcome: Ongoing     Problem: Falls - Risk of:  Goal: Will remain free from falls  Description: Will remain free from falls  6/24/2021 2317 by John Hickey LPN  Outcome: Ongoing  6/24/2021 1149 by Efrem Brice RN  Outcome: Ongoing  Goal: Absence of physical injury  Description: Absence of physical injury  6/24/2021 2317 by John Hickey LPN  Outcome: Ongoing  6/24/2021 1149 by Efrem Brice RN  Outcome: Ongoing     Problem: Skin Integrity:  Goal: Will show no infection signs and symptoms  Description: Will show no infection signs and symptoms  6/24/2021 2317 by John Hickey LPN  Outcome: Ongoing  6/24/2021 1149 by Efrem Brice RN  Outcome: Ongoing  Goal: Absence of new skin breakdown  Description: Absence of new skin breakdown  6/24/2021 2317 by John Hickey LPN  Outcome: Ongoing  6/24/2021 1149 by Efrem Brice RN  Outcome: Ongoing  Goal: Risk for impaired skin integrity will decrease  Description: Risk for impaired skin integrity will decrease  6/24/2021 2317 by John Hickey LPN  Outcome: Ongoing  6/24/2021 1149 by Efrem Brice RN  Outcome: Ongoing     Problem: Sensory:  Goal: General experience of comfort will improve  Description: General experience of comfort will improve  6/24/2021 2317 by John Hickey LPN  Outcome: Ongoing  6/24/2021 1149 by Efrem Brice RN  Outcome: Ongoing     Problem: Urinary Elimination:  Goal: Signs and symptoms of infection will decrease  Description: Signs and symptoms of infection will decrease  6/24/2021 2317 by John Hickey LPN  Outcome: Ongoing  6/24/2021 1149 by Efrem Brice RN  Outcome: Ongoing  Goal: Ability to reestablish a normal urinary elimination pattern will improve - after catheter removal  Description: Ability to reestablish a normal urinary elimination pattern will improve  6/24/2021 2317 by Almaz Wadsworth LPN  Outcome: Ongoing  6/24/2021 1149 by Lakshmi Wallis RN  Outcome: Ongoing  Goal: Complications related to the disease process, condition or treatment will be avoided or minimized  Description: Complications related to the disease process, condition or treatment will be avoided or minimized  6/24/2021 2317 by Almaz Wadsworth LPN  Outcome: Ongoing  6/24/2021 1149 by Lakshmi Wallis RN  Outcome: Ongoing     Problem:  Activity:  Goal: Risk for activity intolerance will decrease  Description: Risk for activity intolerance will decrease  6/24/2021 2317 by Almaz Wadsworth LPN  Outcome: Ongoing  6/24/2021 1149 by Lakshmi Wallis RN  Outcome: Ongoing     Problem: Coping:  Goal: Ability to adjust to condition or change in health will improve  Description: Ability to adjust to condition or change in health will improve  6/24/2021 2317 by Almaz Wadsworth LPN  Outcome: Ongoing  6/24/2021 1149 by Lakshmi Wallis RN  Outcome: Ongoing     Problem: Fluid Volume:  Goal: Ability to maintain a balanced intake and output will improve  Description: Ability to maintain a balanced intake and output will improve  6/24/2021 2317 by Almaz Wadsworth LPN  Outcome: Ongoing  6/24/2021 1149 by Lakshmi Wallis RN  Outcome: Ongoing     Problem: Health Behavior:  Goal: Ability to identify and utilize available resources and services will improve  Description: Ability to identify and utilize available resources and services will improve  6/24/2021 2317 by Almaz Wadsworth LPN  Outcome: Ongoing  6/24/2021 1149 by Lakshmi Wallis RN  Outcome: Ongoing  Goal: Ability to manage health-related needs will improve  Description: Ability to manage health-related needs will improve  6/24/2021 2317 by Almaz Wadsworth LPN  Outcome: Ongoing  6/24/2021 1149 by Lakshmi Wallis RN  Outcome: Ongoing     Problem: Metabolic:  Goal: Ability to maintain appropriate glucose levels will improve  Description: Ability to maintain appropriate glucose levels will improve  6/24/2021 2317 by Ramy Bobo LPN  Outcome: Ongoing  6/24/2021 1149 by Marivel Moura RN  Outcome: Ongoing     Problem: Nutritional:  Goal: Maintenance of adequate nutrition will improve  Description: Maintenance of adequate nutrition will improve  6/24/2021 2317 by Ramy Bobo LPN  Outcome: Ongoing  6/24/2021 1149 by Marivel Moura RN  Outcome: Ongoing  Goal: Progress toward achieving an optimal weight will improve  Description: Progress toward achieving an optimal weight will improve  6/24/2021 2317 by Ramy Bobo LPN  Outcome: Ongoing  6/24/2021 1149 by Marivel Moura RN  Outcome: Ongoing     Problem: Physical Regulation:  Goal: Complications related to the disease process, condition or treatment will be avoided or minimized  Description: Complications related to the disease process, condition or treatment will be avoided or minimized  6/24/2021 2317 by Ramy Bobo LPN  Outcome: Ongoing  6/24/2021 1149 by Marivel Moura RN  Outcome: Ongoing  Goal: Diagnostic test results will improve  Description: Diagnostic test results will improve  6/24/2021 2317 by Ramy Bobo LPN  Outcome: Ongoing  6/24/2021 1149 by Marivel Moura RN  Outcome: Ongoing     Problem: Tissue Perfusion:  Goal: Adequacy of tissue perfusion will improve  Description: Adequacy of tissue perfusion will improve  6/24/2021 2317 by Ramy Bobo LPN  Outcome: Ongoing  6/24/2021 1149 by Marivel Moura RN  Outcome: Ongoing     Problem: Discharge Planning:  Goal: Discharged to appropriate level of care  Description: Discharged to appropriate level of care  6/24/2021 2317 by Ramy Bobo LPN  Outcome: Ongoing  6/24/2021 1149 by Marivel Moura RN  Outcome: Ongoing

## 2021-06-25 NOTE — PLAN OF CARE
Problem: Falls - Risk of:  Goal: Will remain free from falls  6/25/2021 0813 by Lissa Torres RN  Outcome: Ongoing  6/24/2021 2317 by Lin Aldridge, LPN  Outcome: Ongoing  Goal: Absence of physical injury  6/25/2021 0813 by Lissa Torres RN  Outcome: Ongoing  6/24/2021 2317 by Lin Aldridge, LPN  Outcome: Ongoing     Problem: Skin Integrity:  Goal: Will show no infection signs and symptoms  6/25/2021 0813 by Lissa Torres RN  Outcome: Ongoing  6/24/2021 2317 by Lin Aldridge, LPN  Outcome: Ongoing  Goal: Absence of new skin breakdown  6/25/2021 0813 by Lissa Torres RN  Outcome: Ongoing  6/24/2021 2317 by Lin Aldridge, LPN  Outcome: Ongoing  Goal: Risk for impaired skin integrity will decrease  6/25/2021 0813 by Lissa Torres RN  Outcome: Ongoing  6/24/2021 2317 by Lin Aldridge, LPN  Outcome: Ongoing     Problem: Sensory:  Goal: General experience of comfort will improve  6/25/2021 0813 by Lissa Torres RN  Outcome: Ongoing  6/24/2021 2317 by Lin Aldridge, LPN  Outcome: Ongoing     Problem: Urinary Elimination:  Goal: Signs and symptoms of infection will decrease  6/25/2021 0813 by Lissa Torres RN  Outcome: Ongoing  6/24/2021 2317 by Lin Aldridge, LPN  Outcome: Ongoing  Goal: Ability to reestablish a normal urinary elimination pattern will improve - after catheter removal  6/25/2021 0813 by Lissa Torres RN  Outcome: Ongoing  6/24/2021 2317 by Lin Aldridge, LPN  Outcome: Ongoing  Goal: Complications related to the disease process, condition or treatment will be avoided or minimized  6/25/2021 0813 by Lissa Torres RN  Outcome: Ongoing  6/24/2021 2317 by Lin Aldridge, LPN  Outcome: Ongoing     Problem:  Activity:  Goal: Risk for activity intolerance will decrease  6/25/2021 0813 by Lissa Torres RN  Outcome: Ongoing  6/24/2021 2317 by Lin Aldridge, LPN  Outcome: Ongoing     Problem: Coping:  Goal: Ability to adjust to condition or change in health will improve  6/25/2021 0813 by Lissa Torres, RN  Outcome: Ongoing  6/24/2021 2317 by Lin Aldridge LPN  Outcome: Ongoing     Problem: Fluid Volume:  Goal: Ability to maintain a balanced intake and output will improve  6/25/2021 0813 by Lissa Torres RN  Outcome: Ongoing  6/24/2021 2317 by Lin Aldridge LPN  Outcome: Ongoing     Problem: Health Behavior:  Goal: Ability to identify and utilize available resources and services will improve  6/25/2021 0813 by Lissa Torres RN  Outcome: Ongoing  6/24/2021 2317 by Lin Aldridge LPN  Outcome: Ongoing  Goal: Ability to manage health-related needs will improve  6/25/2021 0813 by Lissa Torres RN  Outcome: Ongoing  6/24/2021 2317 by Lin Aldridge LPN  Outcome: Ongoing     Problem: Metabolic:  Goal: Ability to maintain appropriate glucose levels will improve  6/25/2021 0813 by Lissa Torres RN  Outcome: Ongoing  6/24/2021 2317 by Lin Aldridge LPN  Outcome: Ongoing     Problem: Nutritional:  Goal: Maintenance of adequate nutrition will improve  6/25/2021 0813 by Lissa Torres RN  Outcome: Ongoing  6/24/2021 2317 by Lin Aldridge LPN  Outcome: Ongoing  Goal: Progress toward achieving an optimal weight will improve  6/25/2021 0813 by Lissa Torres RN  Outcome: Ongoing  6/24/2021 2317 by Lin Aldridge LPN  Outcome: Ongoing     Problem: Physical Regulation:  Goal: Complications related to the disease process, condition or treatment will be avoided or minimized  6/25/2021 0813 by Lissa Torres RN  Outcome: Ongoing  6/24/2021 2317 by Lin Aldridge LPN  Outcome: Ongoing  Goal: Diagnostic test results will improve  6/25/2021 0813 by Lissa Torres RN  Outcome: Ongoing  6/24/2021 2317 by Lin Aldridge LPN  Outcome: Ongoing     Problem: Tissue Perfusion:  Goal: Adequacy of tissue perfusion will improve  6/25/2021 0813 by Lissa Torres RN  Outcome: Ongoing  6/24/2021 2317 by Lin Aldridge LPN  Outcome: Ongoing     Problem: Discharge Planning:  Goal: Discharged to appropriate level of care  6/25/2021 0813 by Jean Chávez

## 2021-06-25 NOTE — CARE COORDINATION
CM spoke with Jaymie with [facility] who verified they do have skilled beds available. The patient's insurance would not require pre-certification. PT/OT evaluations are pending at this time. CM will follow. 1:05 PM  This CM is familiar with this patient from previous hospital admissions. CM met with the patient for discharge planning. Noy Clayton lives in a 1 story home with her  (laundry room in the basement), has insurance with Rx coverage & PCP, requires assistance with ADL's, and does not drive (family provides transportation as needed). Patient stated that she uses a walker at home and does not require home oxygen. Patient stated that she was not receiving [facility] services prior to admission. Patient reports that she receives help from her children as needed as her  manages his business and spends a lot of time at work. CM advised the patient that PT/OT have recommended placement in a SNF in order to receive skilled therapy, patient replied \"I know\" and then became tearful. Patient was just recently released from [facility] a couple weeks ago. Patient stated that she is not opposed to returning to [place] but she is not sure what her  Courtney Garcia will want. CM will follow. 1:47 PM  CM attempted to call the patient's  Arabella Rocha but there was no answer. A voicemail was left requesting a return call and informing him that the patient may be discharged as early as Saturday and that PT/OT have recommended SNF placement. CM will follow. 1:50 PM  CM spoke with Jaymie at [facility] to update her that PT/OT recommended SNF placement but the patient wants to speak with her  before agreeing to placement. Jayden Carranza stated she will have their team review the patient and if they agree to accept they could admit the patient Saturday if deemed medically stable and the patient is agreeable, insurance pre-certification is not needed.   Jayden Carranza stated that she is working this weekend and will have the admissions phone ([phone]). 2:13 PM  PASRR completed online. 2:22 PM  CM spoke with the patient to notify her that she may possibly be medically stable for discharge tomorrow. CM notified the patient that a voicemail message was left for her  Morgan Hutchinson updating him as well. Patient stated that she will need to discuss placement at [place] with Morgan Hutchinson but she does not feel she is ready to return home yet.

## 2021-06-25 NOTE — PROGRESS NOTES
Hospitalist Progress Note      Name:  Jeanie Gan /Age/Sex: 762  (72 y.o. female)   MRN & CSN:  [id number] & [id number] Admission Date/Time: 2021  5:56 PM   Location:   PCP: Angie Castañeda [address] [place] [address] Day: 3    Assessment and Plan:   Jeanie Gan is a 77 y.o.  female  who presents with low blood sugar    1. Recurrent hypoglycemia in a patient with diabetes mellitus, type II: Improved  2. Suspected urinary tract infection: Urine cultures pending  3. Suspected herpes zoster  4. Primary hypertension: unontrolled  5. CKD stage III  6. History of hypothyroidism  7. Frequent falls  8. Physical deconditioning and generalized weakness     Plan:  Patient mentation has significantly improved and now she is awake and alert x3  Suspect polypharmacy playing a role: Continue to hold gabapentin, morphine  Decrease levofloxacin to every 48 hours: Urine cultures pending  Obtain blood cultures pending  Continue Valtrex for suspected herpes and monitor for response  New Lantus and insulin sliding scale  Continue amlodipine, atenolo for hypertension. Increase hydralazine to 50 3 times daily  Continue levothyroxine for hypothyroidism. Patient needs rePete TSH, free T4 in 4 to 6 weeks to adjust dose. PT/OT: May need rehab upon discharge      Diet ADULT DIET; Regular; 3 carb choices (45 gm/meal); Low Fat/Low Chol/High Fiber/2 gm Na   DVT Prophylaxis [x] Lovenox, []  Heparin, [] SCDs, [] Ambulation   GI Prophylaxis [x] PPI,  [] H2 Blocker,  [] Carafate,  [] Diet/Tube Feeds   Code Status Full Code   Disposition Patient requires continued admission due to urine cultures pending, IV antibiotics   MDM [] Low, [] Moderate,[x]  High  Patient's risk as above due to multiple comorbidities,     History of Present Illness:     Chief Complaint: <principal problem not specified>  Jeanie Gan is a 77 y.o.  female  who presents with low blood sugar.         Ten point ROS reviewed negative, unless as noted above    Objective:     Patient was seen and examined this morning. She is awake and alert and engaged in conversation. States she had a rough night. Patient has slept all day yesterday and therefore so was awake most of the night and confused. This morning she is awake and alert x3. Improving clinically. Intake/Output Summary (Last 24 hours) at 6/25/2021 0927  Last data filed at 6/25/2021 0550  Gross per 24 hour   Intake 1088 ml   Output 600 ml   Net 488 ml      Vitals:   Vitals:    06/25/21 0727   BP: (!) 193/76   Pulse: 75   Resp: 17   Temp: 98.3 °F (36.8 °C)   SpO2: 93%     Physical Exam:   GEN Awake female, sitting upright in bed in no apparent distress. Appears given age. EYES Pupils are equally round. No scleral erythema, discharge, or conjunctivitis. HENT Mucous membranes are moist. Oral pharynx without exudates, no evidence of thrush. NECK Supple, no apparent thyromegaly or masses. RESP Clear to auscultation, no wheezes, rales or rhonchi. Symmetric chest movement while on room air. CARDIO/VASC S1/S2 auscultated. Regular rate without appreciable murmurs, rubs, or gallops. No JVD or carotid bruits. Peripheral pulses equal bilaterally and palpable. No peripheral edema. GI Abdomen is soft without significant tenderness, masses, or guarding. Bowel sounds are normoactive. Rectal exam deferred.  No costovertebral angle tenderness. Normal appearing external genitalia. Wilkins catheter is not present. HEME/LYMPH No palpable cervical lymphadenopathy and no hepatosplenomegaly. No petechiae or ecchymoses. MSK No gross joint deformities. SKIN Normal coloration, warm, dry. NEURO Cranial nerves appear grossly intact, normal speech, no lateralizing weakness. PSYCH Awake, alert, oriented x 4. Affect appropriate.     Medications:   Medications:    hydrALAZINE  50 mg Oral TID    valACYclovir  1,000 mg Oral TID    levofloxacin  500 mg Intravenous Q24H    insulin glargine  15 Units Subcutaneous Nightly    amLODIPine  10 mg Oral Daily    aspirin  81 mg Oral Daily    atenolol  50 mg Oral BID    atorvastatin  20 mg Oral Daily    DULoxetine  60 mg Oral Daily    pantoprazole  40 mg Oral Daily    sodium chloride flush  5-40 mL Intravenous 2 times per day    insulin lispro  0-6 Units Subcutaneous TID WC    insulin lispro  0-3 Units Subcutaneous Nightly    enoxaparin  40 mg Subcutaneous Daily    lactobacillus  1 capsule Oral Daily with breakfast    levothyroxine  175 mcg Oral Daily      Infusions:    sodium chloride 50 mL/hr at 06/24/21 2035    sodium chloride      dextrose       PRN Meds: HYDROcodone-acetaminophen, 0.5 tablet, Q6H PRN  sodium chloride flush, 5-40 mL, PRN  sodium chloride, 25 mL, PRN  ondansetron, 4 mg, Q8H PRN   Or  ondansetron, 4 mg, Q6H PRN  polyethylene glycol, 17 g, Daily PRN  acetaminophen, 650 mg, Q6H PRN   Or  acetaminophen, 650 mg, Q6H PRN  glucose, 15 g, PRN  dextrose, 12.5 g, PRN  glucagon (rDNA), 1 mg, PRN  dextrose, 100 mL/hr, PRN          Electronically signed by Lyn Gore MD on 6/25/2021 at 9:27 AM

## 2021-06-26 VITALS
SYSTOLIC BLOOD PRESSURE: 138 MMHG | HEART RATE: 72 BPM | TEMPERATURE: 97 F | HEIGHT: 63 IN | BODY MASS INDEX: 37.21 KG/M2 | WEIGHT: 210 LBS | RESPIRATION RATE: 16 BRPM | OXYGEN SATURATION: 93 % | DIASTOLIC BLOOD PRESSURE: 67 MMHG

## 2021-06-26 LAB
CULTURE: ABNORMAL
CULTURE: ABNORMAL
GLUCOSE BLD-MCNC: 251 MG/DL (ref 70–99)
GLUCOSE BLD-MCNC: 276 MG/DL (ref 70–99)
Lab: ABNORMAL
SPECIMEN: ABNORMAL

## 2021-06-26 PROCEDURE — 2580000003 HC RX 258: Performed by: NURSE PRACTITIONER

## 2021-06-26 PROCEDURE — 94761 N-INVAS EAR/PLS OXIMETRY MLT: CPT

## 2021-06-26 PROCEDURE — 6370000000 HC RX 637 (ALT 250 FOR IP): Performed by: NURSE PRACTITIONER

## 2021-06-26 PROCEDURE — 82962 GLUCOSE BLOOD TEST: CPT

## 2021-06-26 PROCEDURE — 6360000002 HC RX W HCPCS: Performed by: NURSE PRACTITIONER

## 2021-06-26 PROCEDURE — 6370000000 HC RX 637 (ALT 250 FOR IP): Performed by: HOSPITALIST

## 2021-06-26 RX ORDER — INSULIN HUMAN 500 [IU]/ML
INJECTION, SOLUTION SUBCUTANEOUS
Qty: 1 PEN | Refills: 0 | Status: ON HOLD
Start: 2021-06-26 | End: 2022-05-20

## 2021-06-26 RX ORDER — SULFAMETHOXAZOLE AND TRIMETHOPRIM 800; 160 MG/1; MG/1
1 TABLET ORAL EVERY 12 HOURS SCHEDULED
Status: DISCONTINUED | OUTPATIENT
Start: 2021-06-26 | End: 2021-06-26 | Stop reason: HOSPADM

## 2021-06-26 RX ORDER — HYDROCODONE BITARTRATE AND ACETAMINOPHEN 5; 325 MG/1; MG/1
1 TABLET ORAL EVERY 8 HOURS PRN
Qty: 3 TABLET | Refills: 0 | Status: SHIPPED | OUTPATIENT
Start: 2021-06-26 | End: 2021-06-27

## 2021-06-26 RX ORDER — SULFAMETHOXAZOLE AND TRIMETHOPRIM 800; 160 MG/1; MG/1
1 TABLET ORAL EVERY 12 HOURS SCHEDULED
Qty: 8 TABLET | Refills: 0
Start: 2021-06-26 | End: 2021-06-30

## 2021-06-26 RX ORDER — VALACYCLOVIR HYDROCHLORIDE 1 G/1
1000 TABLET, FILM COATED ORAL 3 TIMES DAILY
Qty: 15 TABLET | Refills: 0
Start: 2021-06-26 | End: 2021-07-01

## 2021-06-26 RX ADMIN — ATORVASTATIN CALCIUM 20 MG: 20 TABLET, FILM COATED ORAL at 09:05

## 2021-06-26 RX ADMIN — ATENOLOL 50 MG: 50 TABLET ORAL at 09:05

## 2021-06-26 RX ADMIN — HYDRALAZINE HYDROCHLORIDE 50 MG: 50 TABLET, FILM COATED ORAL at 09:05

## 2021-06-26 RX ADMIN — ASPIRIN 81 MG: 81 TABLET, FILM COATED ORAL at 09:05

## 2021-06-26 RX ADMIN — PANTOPRAZOLE SODIUM 40 MG: 40 TABLET, DELAYED RELEASE ORAL at 06:47

## 2021-06-26 RX ADMIN — SULFAMETHOXAZOLE AND TRIMETHOPRIM 1 TABLET: 800; 160 TABLET ORAL at 09:05

## 2021-06-26 RX ADMIN — HYDRALAZINE HYDROCHLORIDE 50 MG: 50 TABLET, FILM COATED ORAL at 15:29

## 2021-06-26 RX ADMIN — LEVOTHYROXINE SODIUM 175 MCG: 0.15 TABLET ORAL at 06:47

## 2021-06-26 RX ADMIN — Medication 1 CAPSULE: at 09:14

## 2021-06-26 RX ADMIN — SODIUM CHLORIDE, PRESERVATIVE FREE 10 ML: 5 INJECTION INTRAVENOUS at 09:06

## 2021-06-26 RX ADMIN — ENOXAPARIN SODIUM 40 MG: 40 INJECTION SUBCUTANEOUS at 09:05

## 2021-06-26 RX ADMIN — AMLODIPINE BESYLATE 10 MG: 10 TABLET ORAL at 09:05

## 2021-06-26 RX ADMIN — DULOXETINE 60 MG: 60 CAPSULE, DELAYED RELEASE ORAL at 09:05

## 2021-06-26 RX ADMIN — VALACYCLOVIR HYDROCHLORIDE 1000 MG: 500 TABLET, FILM COATED ORAL at 09:05

## 2021-06-26 ASSESSMENT — PAIN SCALES - GENERAL
PAINLEVEL_OUTOF10: 0
PAINLEVEL_OUTOF10: 5

## 2021-06-26 ASSESSMENT — PAIN DESCRIPTION - FREQUENCY: FREQUENCY: INTERMITTENT

## 2021-06-26 ASSESSMENT — PAIN DESCRIPTION - ONSET: ONSET: ON-GOING

## 2021-06-26 ASSESSMENT — PAIN DESCRIPTION - ORIENTATION: ORIENTATION: LEFT

## 2021-06-26 ASSESSMENT — PAIN DESCRIPTION - DESCRIPTORS: DESCRIPTORS: ACHING

## 2021-06-26 ASSESSMENT — PAIN - FUNCTIONAL ASSESSMENT: PAIN_FUNCTIONAL_ASSESSMENT: PREVENTS OR INTERFERES SOME ACTIVE ACTIVITIES AND ADLS

## 2021-06-26 ASSESSMENT — PAIN DESCRIPTION - PAIN TYPE: TYPE: ACUTE PAIN

## 2021-06-26 ASSESSMENT — PAIN DESCRIPTION - PROGRESSION: CLINICAL_PROGRESSION: NOT CHANGED

## 2021-06-26 ASSESSMENT — PAIN DESCRIPTION - LOCATION: LOCATION: LEG

## 2021-06-26 NOTE — DISCHARGE INSTR - COC
Continuity of Care Form    Patient Name: Maura Stearns   :    MRN:  [id number]    Admit date:  2021  Discharge date:  21    Code Status Order: Full Code   Advance Directives:   Advance Care Flowsheet Documentation       Date/Time Healthcare Directive Type of Healthcare Directive Copy in [address] Agent's Name Healthcare Agent's Phone Number    21 [phone]  No, patient does not have an advance directive for healthcare treatment -- -- -- -- --            Admitting Physician:  Josefa Falk MD  PCP: TOÑITO Whitney     Discharging Nurse: Lorenzo Lagos, [address] [place] [address] Unit/Room#: 003/003-01  Discharging Unit Phone Number: [phone]    Emergency Contact:   Extended Emergency Contact Information  Primary Emergency Contact: Lisa Majanojaden POOLE  Address: [address] [address] [address] [place] [address], [address] [place] [address] Phone: [phone]  Relation: Spouse  Secondary Emergency Contact: Faustina Steiner  Work Phone: [phone]  Relation: Child    Past Surgical History:  Past Surgical History:   Procedure Laterality Date    ABDOMEN SURGERY      CYST REMOVAL      from kidney.     DILATATION, ESOPHAGUS      ENDOSCOPY, COLON, DIAGNOSTIC      FOOT SURGERY  11    had infection  and a biopsy was sent away for analysis    HERNIA REPAIR      HYSTERECTOMY      KIDNEY REMOVAL         Immunization History:   Immunization History   Administered Date(s) Administered    Influenza Virus Vaccine 10/19/2013       Active Problems:  Patient Active Problem List   Diagnosis Code    Type 2 diabetes mellitus with foot ulcer, with long-term current use of insulin ([address] E11.621, L97.509, Z79.4    Ulcer of right foot with fat layer exposed ([address] L97.512    Hallux valgus (acquired), right foot M20.11    Chronic pain syndrome G89.4    Hypertension I10    Diabetes mellitus type 2 in obese ([address] E11.69, E66.9    GERD (gastroesophageal reflux disease) K21.9    Thyroid disease E07.9    Generalized weakness R53.1    Hyperthyroidism E05.90    Palpitations R00.2    Diabetes ([address] E11.9    AMS (altered mental status) R41.82    WD-Diabetic ulcer of toe of left foot associated with type 2 diabetes mellitus, with fat layer exposed ([address] E11.621, L97.522    Altered mental status R41.82    Acquired hypothyroidism E03.9    UTI (urinary tract infection) N39.0    Hypoglycemia E16.2    Herpes zoster B02.9       Isolation/Infection:   Isolation            No Isolation          Patient Infection Status       Infection Onset Added Last Indicated Last Indicated By Review Planned Expiration Resolved Resolved By    None active    Resolved    COVID-19 Rule Out 05/09/21 05/09/21 05/09/21 COVID-19, Rapid (Ordered)   05/09/21 Rule-Out Test Resulted    COVID-19 Rule Out 12/30/20 12/30/20 12/30/20 COVID-19 (Ordered)   12/30/20 Rule-Out Test Resulted            Nurse Assessment:  Last Vital Signs: /67   Pulse 72   Temp 97 °F (36.1 °C) (Infrared)   Resp 16   Ht 5' 3\" (1.6 m)   Wt 210 lb (95.3 kg)   SpO2 93%   BMI 37.20 kg/m²     Last documented pain score (0-10 scale): Pain Level: 5  Last Weight:   Wt Readings from Last 1 Encounters:   06/23/21 210 lb (95.3 kg)     Mental Status:  oriented and alert    IV Access:  - None    Nursing Mobility/ADLs:  Walking   Dependent  Transfer  Dependent  Bathing  Dependent  Dressing  Assisted  Toileting  Dependent  Feeding  Assisted  Med Admin  Independent  Med Delivery   whole    Wound Care Documentation and Therapy:        Elimination:  Continence:   · Bowel: {YES / RU:89229}  · Bladder: {YES / UL:16869}  Urinary Catheter: None   Colostomy/Ileostomy/Ileal Conduit: {YES / YN:19467}       Date of Last BM: ***    Intake/Output Summary (Last 24 hours) at 6/26/2021 1546  Last data filed at 6/26/2021 1325  Gross per 24 hour   Intake 1836 ml   Output 400 ml   Net 1436 ml     I/O last 3 completed shifts: In: 4942 [P.O.:600; I.V.:1236]  Out: 400 [Urine:400]    Safety Concerns: At Risk for Falls    Impairments/Disabilities:      Vision    Nutrition Therapy:  Current Nutrition Therapy:   - Oral Diet:  General and Carb Control 3 carbs/meal (1500kcals/day)    Routes of Feeding: Oral  Liquids: Thin Liquids  Daily Fluid Restriction: no  Last Modified Barium Swallow with Video (Video Swallowing Test): not done    Treatments at the Time of Hospital Discharge:   Respiratory Treatments: ***  Oxygen Therapy:  is not on home oxygen therapy. Ventilator:    - No ventilator support    Rehab Therapies: {THERAPEUTIC INTERVENTION:[id number]}  Weight Bearing Status/Restrictions: No weight bearing restirctions  Other Medical Equipment (for information only, NOT a DME order):  {EQUIPMENT:[id number]}  Other Treatments: ***    Patient's personal belongings (please select all that are sent with patient):  Serene Rick RN SIGNATURE:  Electronically signed by Rajendra Gooden RN on 6/26/21 at 3:54 PM EDT    CASE MANAGEMENT/SOCIAL WORK SECTION    Inpatient Status Date: ***    Readmission Risk Assessment Score:  Readmission Risk              Risk of Unplanned Readmission:  26           Discharging to Facility/ Agency   · Name:   · Address:  · Phone:  · Fax:    Dialysis Facility (if applicable)   · Name:  · Address:  · Dialysis Schedule:  · Phone:  · Fax:    / signature: {Esignature:[id number]:::0}    PHYSICIAN SECTION    Prognosis: Good    Condition at Discharge: Stable    Rehab Potential (if transferring to Rehab): Good    Recommended Labs or Other Treatments After Discharge:     Physician Certification: I certify the above information and transfer of Georgie Love  is necessary for the continuing treatment of the diagnosis listed and that she requires [facility] for less 30 days.      Update Admission H&P: No change in H&P    PHYSICIAN SIGNATURE:  Electronically signed by Sophia Rosen Vianey Mojica MD on 6/26/21 at 3:46 PM EDT

## 2021-06-26 NOTE — PROGRESS NOTES
Patient taken to [facility] by Ted Ndiaye. Belongings sent with patient as well as discharge paperwork.

## 2021-06-26 NOTE — DISCHARGE SUMMARY
Medication Instructions QQ    Printed on:21 1503   Medication Information                      amLODIPine (NORVASC) 10 MG tablet  Take 1 tablet by mouth daily Hold for systolic blood pressure less than or equal to 110             aspirin 81 MG EC tablet  Take 81 mg by mouth daily. atenolol (TENORMIN) 50 MG tablet  Take 1 tablet by mouth 2 times daily Hold for systolic blood pressure less than or equal to 110             atorvastatin (LIPITOR) 20 MG tablet  Take 20 mg by mouth daily              Blood Pressure KIT  1 each by Does not apply route 2 times daily             DULoxetine (CYMBALTA) 60 MG extended release capsule  Take 60 mg by mouth daily              HUMULIN R U-500 KWIKPEN 500 UNIT/ML SOPN concentrated injection pen  Breakfast 60 units Lunch 30 units Dinner 30  150 units or less No additional units of insulin   151-200 Add 5 units   201-250 Add 10 units   251-300 Add 15 units   Over 300 add 20 units             hydrALAZINE (APRESOLINE) 10 MG tablet  Take 1 tablet by mouth 2 times daily Hold for systolic blood pressure less than or equal to 110             HYDROcodone-acetaminophen (NORCO) 5-325 MG per tablet  Take 1 tablet by mouth every 8 hours as needed for Pain for up to 1 day. Levothyroxine Sodium 175 MCG CAPS  Take 175 mcg by mouth Daily              methocarbamol (ROBAXIN) 500 MG tablet  Take 1 tablet by mouth 3 times daily for 10 days             Multiple Vitamins-Minerals (TRUEPLUS DIABETIC MULTIVITAMIN) TABS  Take 1 tablet by mouth daily.              naproxen (NAPROSYN) 500 MG tablet  Take 1 tablet by mouth 2 times daily             nystatin (MYCOSTATIN) POWD powder  Apply to bilateral groin topically every shift for skin care              pantoprazole (PROTONIX) 40 MG tablet  Take 40 mg by mouth daily              sulfamethoxazole-trimethoprim (BACTRIM DS;SEPTRA DS) 800-160 MG per tablet  Take 1 tablet by mouth every 12 hours for 4 days valACYclovir (VALTREX) 1 g tablet  Take 1 tablet by mouth 3 times daily for 5 days                 Objective Findings at Discharge:   BP (!) 190/77   Pulse 72   Temp 97 °F (36.1 °C) (Infrared)   Resp 16   Ht 5' 3\" (1.6 m)   Wt 210 lb (95.3 kg)   SpO2 93%   BMI 37.20 kg/m²            PHYSICAL EXAM   GEN Awake female, sitting upright in bed in no apparent distress. Appears given age. EYES Pupils are equally round. No scleral erythema, discharge, or conjunctivitis. HENT Mucous membranes are moist. Oral pharynx without exudates, no evidence of thrush. NECK Supple, no apparent thyromegaly or masses. RESP Clear to auscultation, no wheezes, rales or rhonchi. Symmetric chest movement while on room air. CARDIO/VASC S1/S2 auscultated. Regular rate without appreciable murmurs, rubs, or gallops. No JVD or carotid bruits. Peripheral pulses equal bilaterally and palpable. No peripheral edema. GI Abdomen is soft without significant tenderness, masses, or guarding. Bowel sounds are normoactive. Rectal exam deferred.  No costovertebral angle tenderness. Normal appearing external genitalia. Wilkins catheter is not present. HEME/LYMPH No palpable cervical lymphadenopathy and no hepatosplenomegaly. No petechiae or ecchymoses. MSK No gross joint deformities. SKIN Normal coloration, warm, dry. NEURO Cranial nerves appear grossly intact, normal speech, no lateralizing weakness. PSYCH Awake, alert, oriented x 4. Affect appropriate.     BMP/CBC  Recent Labs     06/23/21  1815 06/24/21  0600 06/25/21  0545    134* 135   K 5.3* 4.7 4.2    101 103   CO2 23 29 29   BUN 20 23 24*   CREATININE 1.1 1.1 1.3*   WBC 8.5 7.4 6.8   HCT 34.8* 32.5* 31.7*    177 185       IMAGING:      Discharge Time of 35 minutes    Electronically signed by Zoila Bradford MD on 6/26/2021 at 3:01 PM

## 2021-06-26 NOTE — PROGRESS NOTES
Spoke to Ekta at [facility] and she stated that the patient is all set to be admitted today on their end. Dr. Bernhard Oppenheim notified to complete the med rec and JOSEFINA for discharge.

## 2021-06-26 NOTE — PROGRESS NOTES
Spoke with Juni Winkler in the [address] [place] [address] and gave her report on the patient. All questions answered and narcotic prescription is in the packet for discharge.

## 2021-06-29 ENCOUNTER — OUTSIDE SERVICES (OUTPATIENT)
Dept: INTERNAL MEDICINE CLINIC | Age: 76
End: 2021-06-29
Payer: MEDICARE

## 2021-06-29 DIAGNOSIS — I10 ESSENTIAL HYPERTENSION: ICD-10-CM

## 2021-06-29 DIAGNOSIS — E11.69 DIABETES MELLITUS TYPE 2 IN OBESE (HCC): ICD-10-CM

## 2021-06-29 DIAGNOSIS — E16.2 HYPOGLYCEMIA: Primary | ICD-10-CM

## 2021-06-29 DIAGNOSIS — B02.9 HERPES ZOSTER WITHOUT COMPLICATION: ICD-10-CM

## 2021-06-29 DIAGNOSIS — E66.9 DIABETES MELLITUS TYPE 2 IN OBESE (HCC): ICD-10-CM

## 2021-06-29 PROCEDURE — 99305 1ST NF CARE MODERATE MDM 35: CPT | Performed by: INTERNAL MEDICINE

## 2021-06-30 NOTE — PROGRESS NOTES
Readmission- progress note  ___________________________    Yuly Vasquez's  is 2353  SEEN ON 2021 at [facility]  ___________________________    S:    Yuly Oates is a 77 y.o. female with a past history of DMII, GERD, , HTN, hypothyroid, dementia, arthritis presented to the emergency room with the hypoglycemia around 41 and also left hip rash consistent with herpes zoster  Patient was in the nursing home for rehab and was discharged recently  Patient has uncontrolled diabetes mellitus for which she sees endocrinologist in the past and was also taking high-dose insulin Humulin R 500 units/mL about 150 units with breakfast and 60 units at lunch and dinner plus correction dose  Pt was using high dose insulin for DM which was causing hypoglycemia. . Dose was adjusted and her BS are better. Patient was discharged on Lantus and Humalog but patient did not take Lantus and she went back to her old regimen at home. She again developed hypoglycemia and meanwhile developed rash on the left thigh ulcer therefore she came to the emergency patient denies any chest pain. No shortness of breath. No cough or sputum production. No abdominal pain. No nausea or vomiting diarrhea.   No fever or chills     Review of system normal except as mentioned in HPI    ALLERGIES:  Allergies   Allergen Reactions    Ritalin [Methylphenidate] Rash    Rocephin [Ceftriaxone Sodium-Lidocaine] Rash        PAST MEDICAL HISTORY:    has a past medical history of Arthritis, Asthma, Cellulitis of right foot, Chronic kidney disease, Depression, Diabetes mellitus ([address] Frequent falls, GERD (gastroesophageal reflux disease), H/O Doppler ultrasound, H/O echocardiogram, Hallux valgus (acquired), right foot, Hypertension, Other disorders of kidney and ureter, Shingles, SIRS (systemic inflammatory response syndrome) ([address] Thyroid disease, Type 2 diabetes mellitus with foot ulcer, with long-term current use of insulin ([address] Ulcer of right foot with fat layer exposed ([address] and WD-Diabetic ulcer of toe of left foot associated with type 2 diabetes mellitus, with fat layer exposed ([address] PAST SURGICAL HISTORY:   has a past surgical history that includes Hysterectomy; Foot surgery (12/30/11); cyst removal; Kidney removal (1973); Abdomen surgery; hernia repair; Endoscopy, colon, diagnostic; and Dilatation, esophagus. SOCIAL HISTORY:   reports that she has never smoked. She has never used smokeless tobacco. She reports that she does not drink alcohol and does not use drugs. Vital signs: /68. Temp 96.3. Pulse 70. RR 16.  O2 sat 95%. Blood sugar fluctuates  GENERAL:  Alert, oriented, pleasant, in no apparent distress. Obese   HEENT:  Conjunctiva pink, no scleral icterus. ENT clear. NECK:  Supple. No jugular venous distention noted. No masses felt,  CARDIOVASCULAR:  Normal S1 and S2    PULMONARY:  No respiratory distress. No wheezes or rales. ABDOMEN:  Soft and non-tender,no masses  or organomegaly. EXTREMITIES:  No cyanosis, clubbing, trace pedal edema   SKIN: Skin is warm and dry. Patient has rash with vesicles on the left thigh anteriorly and posteriorly  NEUROLOGICAL:  Cranial nerves II through XII are grossly intact. Patient is able to move extremities. Assessment:  1. Recurrent hypoglycemia  2. Diabetes mellitus on high-dose of insulin  3. Herpes zoster left thigh on acyclovir  4. Hyperlipidemia: On atorvastatin 20 mg daily  5. Hypothyroidism on levothyroxine  6. GERD: On Protonix  7. Depression on Cymbalta 60 mg daily  8. Hypertension on Norvasc 10 mg daily, atenolol, hydralazine  9.   UTI on Bactrim         Plan:   Patient has pain in the left thigh and also complaining of generalized weakness  Monitor blood sugars closely  PT and OT to see patient  Continue PT and OT  For diabetes patient is on Humulin R U5 160 units with breakfast and 30 units with lunch and dinner plus sliding scale  Continue current treatment.   Discussed with patient in detail

## 2021-07-14 ENCOUNTER — HOSPITAL ENCOUNTER (OUTPATIENT)
Age: 76
Setting detail: SPECIMEN
Discharge: HOME | End: 2021-07-14
Payer: MEDICARE

## 2021-07-14 LAB
ALBUMIN SERPL-MCNC: 3.2 GM/DL (ref 3.4–5)
ALP BLD-CCNC: 118 IU/L (ref 40–129)
ALT SERPL-CCNC: 8 U/L (ref 10–40)
ANION GAP SERPL CALCULATED.3IONS-SCNC: 7 MMOL/L (ref 4–16)
AST SERPL-CCNC: 15 IU/L (ref 15–37)
BILIRUB SERPL-MCNC: 0.2 MG/DL (ref 0–1)
BUN BLDV-MCNC: 19 MG/DL (ref 6–23)
CALCIUM SERPL-MCNC: 9.6 MG/DL (ref 8.3–10.6)
CHLORIDE BLD-SCNC: 104 MMOL/L (ref 99–110)
CO2: 29 MMOL/L (ref 21–32)
CREAT SERPL-MCNC: 1.1 MG/DL (ref 0.6–1.1)
GFR AFRICAN AMERICAN: 59 ML/MIN/1.73M2
GFR NON-AFRICAN AMERICAN: 49 ML/MIN/1.73M2
GLUCOSE FASTING: 162 MG/DL (ref 70–99)
HCT VFR BLD CALC: 35.1 % (ref 37–47)
HEMOGLOBIN: 10.7 GM/DL (ref 12.5–16)
MCH RBC QN AUTO: 28.4 PG (ref 27–31)
MCHC RBC AUTO-ENTMCNC: 30.5 % (ref 32–36)
MCV RBC AUTO: 93.1 FL (ref 78–100)
PDW BLD-RTO: 15.7 % (ref 11.7–14.9)
PLATELET # BLD: 253 K/CU MM (ref 140–440)
PMV BLD AUTO: 9.7 FL (ref 7.5–11.1)
POTASSIUM SERPL-SCNC: 4.2 MMOL/L (ref 3.5–5.1)
RBC # BLD: 3.77 M/CU MM (ref 4.2–5.4)
SODIUM BLD-SCNC: 140 MMOL/L (ref 135–145)
TOTAL PROTEIN: 7.1 GM/DL (ref 6.4–8.2)
WBC # BLD: 10.8 K/CU MM (ref 4–10.5)

## 2021-07-14 PROCEDURE — 36415 COLL VENOUS BLD VENIPUNCTURE: CPT

## 2021-07-14 PROCEDURE — 85027 COMPLETE CBC AUTOMATED: CPT

## 2021-07-14 PROCEDURE — 80053 COMPREHEN METABOLIC PANEL: CPT

## 2021-07-19 ENCOUNTER — OUTSIDE SERVICES (OUTPATIENT)
Dept: INTERNAL MEDICINE CLINIC | Age: 76
End: 2021-07-19

## 2021-07-23 PROBLEM — N39.0 UTI (URINARY TRACT INFECTION): Status: RESOLVED | Noted: 2021-06-23 | Resolved: 2021-07-23

## 2021-07-28 ENCOUNTER — OUTSIDE SERVICES (OUTPATIENT)
Dept: INTERNAL MEDICINE CLINIC | Age: 76
End: 2021-07-28

## 2021-07-29 ENCOUNTER — HOSPITAL ENCOUNTER (OUTPATIENT)
Age: 76
Setting detail: SPECIMEN
Discharge: HOME | End: 2021-07-29
Payer: MEDICARE

## 2021-07-30 ENCOUNTER — HOSPITAL ENCOUNTER (OUTPATIENT)
Dept: CT IMAGING | Age: 76
Discharge: HOME | End: 2021-07-30
Payer: MEDICARE

## 2021-07-30 ENCOUNTER — HOSPITAL ENCOUNTER (OUTPATIENT)
Age: 76
Setting detail: SPECIMEN
Discharge: HOME | End: 2021-07-30
Payer: MEDICARE

## 2021-07-30 DIAGNOSIS — R41.82 ALTERED MENTAL STATUS, UNSPECIFIED ALTERED MENTAL STATUS TYPE: ICD-10-CM

## 2021-07-30 DIAGNOSIS — R41.841 COGNITIVE COMMUNICATION DEFICIT: ICD-10-CM

## 2021-07-30 LAB
ALBUMIN SERPL-MCNC: 2.9 GM/DL (ref 3.4–5)
ALP BLD-CCNC: 121 IU/L (ref 40–129)
ALT SERPL-CCNC: 7 U/L (ref 10–40)
ANION GAP SERPL CALCULATED.3IONS-SCNC: 10 MMOL/L (ref 4–16)
AST SERPL-CCNC: 18 IU/L (ref 15–37)
BILIRUB SERPL-MCNC: 0.2 MG/DL (ref 0–1)
BUN BLDV-MCNC: 27 MG/DL (ref 6–23)
CALCIUM SERPL-MCNC: 9.5 MG/DL (ref 8.3–10.6)
CHLORIDE BLD-SCNC: 107 MMOL/L (ref 99–110)
CO2: 23 MMOL/L (ref 21–32)
CREAT SERPL-MCNC: 1.1 MG/DL (ref 0.6–1.1)
GFR AFRICAN AMERICAN: 59 ML/MIN/1.73M2
GFR NON-AFRICAN AMERICAN: 49 ML/MIN/1.73M2
GLUCOSE BLD-MCNC: 108 MG/DL (ref 70–99)
HCT VFR BLD CALC: 39.7 % (ref 37–47)
HEMOGLOBIN: 12.1 GM/DL (ref 12.5–16)
MCH RBC QN AUTO: 28.8 PG (ref 27–31)
MCHC RBC AUTO-ENTMCNC: 30.5 % (ref 32–36)
MCV RBC AUTO: 94.5 FL (ref 78–100)
PDW BLD-RTO: 16.2 % (ref 11.7–14.9)
PLATELET # BLD: 239 K/CU MM (ref 140–440)
PMV BLD AUTO: 11.1 FL (ref 7.5–11.1)
POTASSIUM SERPL-SCNC: 4.7 MMOL/L (ref 3.5–5.1)
RBC # BLD: 4.2 M/CU MM (ref 4.2–5.4)
SODIUM BLD-SCNC: 140 MMOL/L (ref 135–145)
TOTAL PROTEIN: 6.7 GM/DL (ref 6.4–8.2)
WBC # BLD: 11.5 K/CU MM (ref 4–10.5)

## 2021-07-30 PROCEDURE — 36415 COLL VENOUS BLD VENIPUNCTURE: CPT

## 2021-07-30 PROCEDURE — 70450 CT HEAD/BRAIN W/O DYE: CPT

## 2021-07-30 PROCEDURE — 85027 COMPLETE CBC AUTOMATED: CPT

## 2021-07-30 PROCEDURE — 80053 COMPREHEN METABOLIC PANEL: CPT

## 2021-08-17 ENCOUNTER — HOSPITAL ENCOUNTER (OUTPATIENT)
Age: 76
Discharge: HOME | End: 2021-08-17
Payer: MEDICARE

## 2021-08-17 ENCOUNTER — HOSPITAL ENCOUNTER (OUTPATIENT)
Dept: GENERAL RADIOLOGY | Age: 76
Discharge: HOME | End: 2021-08-17
Payer: MEDICARE

## 2021-08-17 DIAGNOSIS — R06.89 ACUTE RESPIRATORY INSUFFICIENCY: ICD-10-CM

## 2021-08-17 PROCEDURE — 71046 X-RAY EXAM CHEST 2 VIEWS: CPT

## 2021-08-30 ENCOUNTER — OUTSIDE SERVICES (OUTPATIENT)
Dept: INTERNAL MEDICINE CLINIC | Age: 76
End: 2021-08-30

## 2021-09-24 ENCOUNTER — HOSPITAL ENCOUNTER (OUTPATIENT)
Age: 76
Setting detail: SPECIMEN
Discharge: HOME | End: 2021-09-24
Payer: MEDICARE

## 2021-09-24 LAB
BACTERIA: ABNORMAL /HPF
BILIRUBIN URINE: NEGATIVE MG/DL
BLOOD, URINE: ABNORMAL
CAST TYPE: NEGATIVE /HPF
CLARITY: ABNORMAL
COLOR: ABNORMAL
CRYSTAL TYPE: NEGATIVE /HPF
EPITHELIAL CELLS, UA: ABNORMAL /HPF
GLUCOSE, URINE: ABNORMAL MG/DL
KETONES, URINE: NEGATIVE MG/DL
LEUKOCYTE ESTERASE, URINE: ABNORMAL
NITRITE URINE, QUANTITATIVE: NEGATIVE
PH, URINE: 7 (ref 5–8)
PROTEIN UA: ABNORMAL MG/DL
RBC URINE: ABNORMAL /HPF (ref 0–6)
SPECIFIC GRAVITY UA: 1.02 (ref 1–1.03)
UROBILINOGEN, URINE: 0.2 MG/DL (ref 0.2–1)
WBC UA: ABNORMAL /HPF (ref 0–5)

## 2021-09-24 PROCEDURE — 81001 URINALYSIS AUTO W/SCOPE: CPT

## 2021-09-24 PROCEDURE — 87086 URINE CULTURE/COLONY COUNT: CPT

## 2021-09-29 ENCOUNTER — OUTSIDE SERVICES (OUTPATIENT)
Dept: INTERNAL MEDICINE CLINIC | Age: 76
End: 2021-09-29

## 2021-09-30 ENCOUNTER — HOSPITAL ENCOUNTER (OUTPATIENT)
Age: 76
Setting detail: SPECIMEN
Discharge: HOME | End: 2021-09-30
Payer: MEDICARE

## 2021-09-30 PROCEDURE — 87086 URINE CULTURE/COLONY COUNT: CPT

## 2021-09-30 PROCEDURE — 87077 CULTURE AEROBIC IDENTIFY: CPT

## 2021-09-30 PROCEDURE — 87186 SC STD MICRODIL/AGAR DIL: CPT

## 2021-10-04 LAB
CULTURE: ABNORMAL
CULTURE: ABNORMAL
Lab: ABNORMAL
SPECIMEN: ABNORMAL

## 2021-10-06 ENCOUNTER — APPOINTMENT (OUTPATIENT)
Dept: GENERAL RADIOLOGY | Age: 76
DRG: 291 | End: 2021-10-06
Payer: MEDICARE

## 2021-10-06 ENCOUNTER — HOSPITAL ENCOUNTER (INPATIENT)
Age: 76
LOS: 2 days | Discharge: NURSING, REHAB OR LONG-TERM CARE | DRG: 291 | End: 2021-10-08
Attending: EMERGENCY MEDICINE | Admitting: FAMILY MEDICINE
Payer: MEDICARE

## 2021-10-06 DIAGNOSIS — R09.02 HYPOXIA: Primary | ICD-10-CM

## 2021-10-06 DIAGNOSIS — R06.2 WHEEZING: ICD-10-CM

## 2021-10-06 DIAGNOSIS — J81.0 ACUTE PULMONARY EDEMA (HCC): ICD-10-CM

## 2021-10-06 DIAGNOSIS — N30.00 ACUTE CYSTITIS WITHOUT HEMATURIA: ICD-10-CM

## 2021-10-06 DIAGNOSIS — R79.89 ELEVATED BRAIN NATRIURETIC PEPTIDE (BNP) LEVEL: ICD-10-CM

## 2021-10-06 DIAGNOSIS — Z87.09 HISTORY OF ASTHMA: ICD-10-CM

## 2021-10-06 PROBLEM — I50.1 PULMONARY EDEMA CARDIAC CAUSE (HCC): Status: ACTIVE | Noted: 2021-10-06

## 2021-10-06 LAB
ALBUMIN SERPL-MCNC: 2.5 GM/DL (ref 3.4–5)
ALP BLD-CCNC: 132 IU/L (ref 40–129)
ALT SERPL-CCNC: 9 U/L (ref 10–40)
ANION GAP SERPL CALCULATED.3IONS-SCNC: 6 MMOL/L (ref 4–16)
AST SERPL-CCNC: 26 IU/L (ref 15–37)
BASE EXCESS MIXED: 1.2 (ref 0–2.3)
BASE EXCESS: ABNORMAL (ref 0–2.4)
BASOPHILS ABSOLUTE: 0.1 K/CU MM
BASOPHILS RELATIVE PERCENT: 0.5 % (ref 0–1)
BILIRUB SERPL-MCNC: 0.2 MG/DL (ref 0–1)
BUN BLDV-MCNC: 24 MG/DL (ref 6–23)
CALCIUM SERPL-MCNC: 8.7 MG/DL (ref 8.3–10.6)
CHLORIDE BLD-SCNC: 104 MMOL/L (ref 99–110)
CO2 CONTENT: 25.4 MMOL/L (ref 19–24)
CO2: 29 MMOL/L (ref 21–32)
CREAT SERPL-MCNC: 1.3 MG/DL (ref 0.6–1.1)
DIFFERENTIAL TYPE: ABNORMAL
EKG ATRIAL RATE: 73 BPM
EKG DIAGNOSIS: NORMAL
EKG P AXIS: -3 DEGREES
EKG P-R INTERVAL: 176 MS
EKG Q-T INTERVAL: 420 MS
EKG QRS DURATION: 90 MS
EKG QTC CALCULATION (BAZETT): 462 MS
EKG R AXIS: 14 DEGREES
EKG T AXIS: 68 DEGREES
EKG VENTRICULAR RATE: 73 BPM
EOSINOPHILS ABSOLUTE: 1.1 K/CU MM
EOSINOPHILS RELATIVE PERCENT: 10.1 % (ref 0–3)
GFR AFRICAN AMERICAN: 48 ML/MIN/1.73M2
GFR NON-AFRICAN AMERICAN: 40 ML/MIN/1.73M2
GLUCOSE BLD-MCNC: 108 MG/DL (ref 70–99)
GLUCOSE BLD-MCNC: 169 MG/DL (ref 70–99)
GLUCOSE BLD-MCNC: 296 MG/DL (ref 70–99)
HCO3 VENOUS: 24.1 MMOL/L (ref 19–25)
HCT VFR BLD CALC: 30.9 % (ref 37–47)
HEMOGLOBIN: 9.1 GM/DL (ref 12.5–16)
IMMATURE NEUTROPHIL %: 0.9 % (ref 0–0.43)
LACTATE: 1.1 MMOL/L (ref 0.4–2)
LYMPHOCYTES ABSOLUTE: 2.5 K/CU MM
LYMPHOCYTES RELATIVE PERCENT: 23.3 % (ref 24–44)
MCH RBC QN AUTO: 26.3 PG (ref 27–31)
MCHC RBC AUTO-ENTMCNC: 29.4 % (ref 32–36)
MCV RBC AUTO: 89.3 FL (ref 78–100)
MONOCYTES ABSOLUTE: 0.7 K/CU MM
MONOCYTES RELATIVE PERCENT: 6.4 % (ref 0–4)
O2 SAT, VEN: 96.7 % (ref 50–70)
PCO2, VEN: 41.9 MMHG (ref 38–52)
PDW BLD-RTO: 15.7 % (ref 11.7–14.9)
PH VENOUS: 7.37 (ref 7.32–7.42)
PLATELET # BLD: 265 K/CU MM (ref 140–440)
PMV BLD AUTO: 9.3 FL (ref 7.5–11.1)
PO2, VEN: 90.2 MMHG (ref 28–48)
POTASSIUM SERPL-SCNC: 4.3 MMOL/L (ref 3.5–5.1)
PRO-BNP: 3545 PG/ML
RBC # BLD: 3.46 M/CU MM (ref 4.2–5.4)
SARS-COV-2, NAAT: NOT DETECTED
SEGMENTED NEUTROPHILS ABSOLUTE COUNT: 6.3 K/CU MM
SEGMENTED NEUTROPHILS RELATIVE PERCENT: 58.8 % (ref 36–66)
SODIUM BLD-SCNC: 139 MMOL/L (ref 135–145)
SOURCE, BLOOD GAS: ABNORMAL
SOURCE: NORMAL
TOTAL IMMATURE NEUTOROPHIL: 0.1 K/CU MM
TOTAL PROTEIN: 5.7 GM/DL (ref 6.4–8.2)
TROPONIN T: <0.01 NG/ML
WBC # BLD: 10.6 K/CU MM (ref 4–10.5)

## 2021-10-06 PROCEDURE — 2580000003 HC RX 258: Performed by: FAMILY MEDICINE

## 2021-10-06 PROCEDURE — 6370000000 HC RX 637 (ALT 250 FOR IP): Performed by: FAMILY MEDICINE

## 2021-10-06 PROCEDURE — 94640 AIRWAY INHALATION TREATMENT: CPT

## 2021-10-06 PROCEDURE — 6370000000 HC RX 637 (ALT 250 FOR IP): Performed by: EMERGENCY MEDICINE

## 2021-10-06 PROCEDURE — 2140000000 HC CCU INTERMEDIATE R&B

## 2021-10-06 PROCEDURE — 6360000002 HC RX W HCPCS: Performed by: EMERGENCY MEDICINE

## 2021-10-06 PROCEDURE — 94761 N-INVAS EAR/PLS OXIMETRY MLT: CPT

## 2021-10-06 PROCEDURE — 82800 BLOOD PH: CPT

## 2021-10-06 PROCEDURE — 80053 COMPREHEN METABOLIC PANEL: CPT

## 2021-10-06 PROCEDURE — 93005 ELECTROCARDIOGRAM TRACING: CPT | Performed by: EMERGENCY MEDICINE

## 2021-10-06 PROCEDURE — 6360000002 HC RX W HCPCS: Performed by: FAMILY MEDICINE

## 2021-10-06 PROCEDURE — 82962 GLUCOSE BLOOD TEST: CPT

## 2021-10-06 PROCEDURE — 71045 X-RAY EXAM CHEST 1 VIEW: CPT

## 2021-10-06 PROCEDURE — 83605 ASSAY OF LACTIC ACID: CPT

## 2021-10-06 PROCEDURE — 83880 ASSAY OF NATRIURETIC PEPTIDE: CPT

## 2021-10-06 PROCEDURE — 87635 SARS-COV-2 COVID-19 AMP PRB: CPT

## 2021-10-06 PROCEDURE — 84484 ASSAY OF TROPONIN QUANT: CPT

## 2021-10-06 PROCEDURE — 96374 THER/PROPH/DIAG INJ IV PUSH: CPT

## 2021-10-06 PROCEDURE — 96375 TX/PRO/DX INJ NEW DRUG ADDON: CPT

## 2021-10-06 PROCEDURE — 2700000000 HC OXYGEN THERAPY PER DAY

## 2021-10-06 PROCEDURE — 87040 BLOOD CULTURE FOR BACTERIA: CPT

## 2021-10-06 PROCEDURE — 93010 ELECTROCARDIOGRAM REPORT: CPT | Performed by: INTERNAL MEDICINE

## 2021-10-06 PROCEDURE — 99285 EMERGENCY DEPT VISIT HI MDM: CPT

## 2021-10-06 PROCEDURE — 6370000000 HC RX 637 (ALT 250 FOR IP)

## 2021-10-06 PROCEDURE — 85025 COMPLETE CBC W/AUTO DIFF WBC: CPT

## 2021-10-06 RX ORDER — METHOCARBAMOL 500 MG/1
500 TABLET, FILM COATED ORAL 3 TIMES DAILY
COMMUNITY

## 2021-10-06 RX ORDER — FUROSEMIDE 10 MG/ML
40 INJECTION INTRAMUSCULAR; INTRAVENOUS DAILY
Status: DISCONTINUED | OUTPATIENT
Start: 2021-10-07 | End: 2021-10-08

## 2021-10-06 RX ORDER — NAPROXEN 500 MG/1
500 TABLET ORAL 2 TIMES DAILY
Status: DISCONTINUED | OUTPATIENT
Start: 2021-10-06 | End: 2021-10-08 | Stop reason: HOSPADM

## 2021-10-06 RX ORDER — SODIUM CHLORIDE 0.9 % (FLUSH) 0.9 %
5-40 SYRINGE (ML) INJECTION EVERY 12 HOURS SCHEDULED
Status: DISCONTINUED | OUTPATIENT
Start: 2021-10-06 | End: 2021-10-08 | Stop reason: HOSPADM

## 2021-10-06 RX ORDER — NICOTINE POLACRILEX 4 MG
15 LOZENGE BUCCAL PRN
Status: DISCONTINUED | OUTPATIENT
Start: 2021-10-06 | End: 2021-10-08 | Stop reason: HOSPADM

## 2021-10-06 RX ORDER — DEXTROSE MONOHYDRATE 25 G/50ML
12.5 INJECTION, SOLUTION INTRAVENOUS PRN
Status: DISCONTINUED | OUTPATIENT
Start: 2021-10-06 | End: 2021-10-08 | Stop reason: HOSPADM

## 2021-10-06 RX ORDER — SODIUM CHLORIDE 9 MG/ML
25 INJECTION, SOLUTION INTRAVENOUS PRN
Status: DISCONTINUED | OUTPATIENT
Start: 2021-10-06 | End: 2021-10-08 | Stop reason: HOSPADM

## 2021-10-06 RX ORDER — ALBUTEROL SULFATE 90 UG/1
2 AEROSOL, METERED RESPIRATORY (INHALATION) EVERY 4 HOURS PRN
Status: DISCONTINUED | OUTPATIENT
Start: 2021-10-06 | End: 2021-10-08 | Stop reason: HOSPADM

## 2021-10-06 RX ORDER — LEVOTHYROXINE SODIUM 0.05 MG/1
175 TABLET ORAL DAILY
Status: DISCONTINUED | OUTPATIENT
Start: 2021-10-07 | End: 2021-10-08 | Stop reason: HOSPADM

## 2021-10-06 RX ORDER — IPRATROPIUM BROMIDE AND ALBUTEROL SULFATE 2.5; .5 MG/3ML; MG/3ML
1 SOLUTION RESPIRATORY (INHALATION) ONCE
Status: COMPLETED | OUTPATIENT
Start: 2021-10-06 | End: 2021-10-06

## 2021-10-06 RX ORDER — SODIUM CHLORIDE 0.9 % (FLUSH) 0.9 %
5-40 SYRINGE (ML) INJECTION PRN
Status: DISCONTINUED | OUTPATIENT
Start: 2021-10-06 | End: 2021-10-08 | Stop reason: HOSPADM

## 2021-10-06 RX ORDER — PANTOPRAZOLE SODIUM 40 MG/1
40 TABLET, DELAYED RELEASE ORAL DAILY
Status: DISCONTINUED | OUTPATIENT
Start: 2021-10-07 | End: 2021-10-08 | Stop reason: HOSPADM

## 2021-10-06 RX ORDER — ALBUTEROL SULFATE
2 POWDER (GRAM) MISCELLANEOUS EVERY 6 HOURS PRN
Status: ON HOLD | COMMUNITY
End: 2022-05-20

## 2021-10-06 RX ORDER — M-VIT,TX,IRON,MINS/CALC/FOLIC 27MG-0.4MG
1 TABLET ORAL DAILY
Status: DISCONTINUED | OUTPATIENT
Start: 2021-10-07 | End: 2021-10-08 | Stop reason: HOSPADM

## 2021-10-06 RX ORDER — HYDROXYZINE HYDROCHLORIDE 10 MG/1
10 TABLET, FILM COATED ORAL 3 TIMES DAILY PRN
COMMUNITY

## 2021-10-06 RX ORDER — DEXTROSE MONOHYDRATE 50 MG/ML
100 INJECTION, SOLUTION INTRAVENOUS PRN
Status: DISCONTINUED | OUTPATIENT
Start: 2021-10-06 | End: 2021-10-08 | Stop reason: HOSPADM

## 2021-10-06 RX ORDER — METHOCARBAMOL 500 MG/1
500 TABLET, FILM COATED ORAL 3 TIMES DAILY
Status: DISCONTINUED | OUTPATIENT
Start: 2021-10-06 | End: 2021-10-08 | Stop reason: HOSPADM

## 2021-10-06 RX ORDER — FUROSEMIDE 10 MG/ML
20 INJECTION INTRAMUSCULAR; INTRAVENOUS ONCE
Status: COMPLETED | OUTPATIENT
Start: 2021-10-06 | End: 2021-10-06

## 2021-10-06 RX ORDER — DEXAMETHASONE SODIUM PHOSPHATE 10 MG/ML
10 INJECTION, SOLUTION INTRAMUSCULAR; INTRAVENOUS ONCE
Status: COMPLETED | OUTPATIENT
Start: 2021-10-06 | End: 2021-10-06

## 2021-10-06 RX ORDER — DULOXETIN HYDROCHLORIDE 60 MG/1
60 CAPSULE, DELAYED RELEASE ORAL DAILY
Status: DISCONTINUED | OUTPATIENT
Start: 2021-10-07 | End: 2021-10-08 | Stop reason: HOSPADM

## 2021-10-06 RX ORDER — ACETAMINOPHEN 325 MG/1
650 TABLET ORAL EVERY 6 HOURS PRN
Status: DISCONTINUED | OUTPATIENT
Start: 2021-10-06 | End: 2021-10-08 | Stop reason: HOSPADM

## 2021-10-06 RX ORDER — ATENOLOL 50 MG/1
50 TABLET ORAL 2 TIMES DAILY
Status: DISCONTINUED | OUTPATIENT
Start: 2021-10-06 | End: 2021-10-08 | Stop reason: HOSPADM

## 2021-10-06 RX ORDER — HYDROXYZINE HYDROCHLORIDE 10 MG/1
10 TABLET, FILM COATED ORAL 3 TIMES DAILY PRN
Status: DISCONTINUED | OUTPATIENT
Start: 2021-10-06 | End: 2021-10-08 | Stop reason: HOSPADM

## 2021-10-06 RX ORDER — ATORVASTATIN CALCIUM 20 MG/1
20 TABLET, FILM COATED ORAL DAILY
Status: DISCONTINUED | OUTPATIENT
Start: 2021-10-07 | End: 2021-10-08 | Stop reason: HOSPADM

## 2021-10-06 RX ORDER — ALBUTEROL SULFATE 2.5 MG/3ML
2.5 SOLUTION RESPIRATORY (INHALATION) EVERY 6 HOURS PRN
Status: DISCONTINUED | OUTPATIENT
Start: 2021-10-06 | End: 2021-10-08 | Stop reason: HOSPADM

## 2021-10-06 RX ORDER — POLYETHYLENE GLYCOL 3350 17 G/17G
17 POWDER, FOR SOLUTION ORAL DAILY PRN
Status: DISCONTINUED | OUTPATIENT
Start: 2021-10-06 | End: 2021-10-08 | Stop reason: HOSPADM

## 2021-10-06 RX ORDER — NITROFURANTOIN 25; 75 MG/1; MG/1
100 CAPSULE ORAL 2 TIMES DAILY
Status: ON HOLD | COMMUNITY
Start: 2021-10-05 | End: 2021-10-08 | Stop reason: HOSPADM

## 2021-10-06 RX ORDER — AMLODIPINE BESYLATE 10 MG/1
10 TABLET ORAL DAILY
Status: DISCONTINUED | OUTPATIENT
Start: 2021-10-07 | End: 2021-10-08 | Stop reason: HOSPADM

## 2021-10-06 RX ORDER — SULFAMETHOXAZOLE AND TRIMETHOPRIM 800; 160 MG/1; MG/1
1 TABLET ORAL EVERY 12 HOURS SCHEDULED
Status: DISCONTINUED | OUTPATIENT
Start: 2021-10-06 | End: 2021-10-08 | Stop reason: HOSPADM

## 2021-10-06 RX ORDER — ONDANSETRON 4 MG/1
4 TABLET, ORALLY DISINTEGRATING ORAL EVERY 8 HOURS PRN
Status: DISCONTINUED | OUTPATIENT
Start: 2021-10-06 | End: 2021-10-08 | Stop reason: HOSPADM

## 2021-10-06 RX ORDER — ONDANSETRON 2 MG/ML
4 INJECTION INTRAMUSCULAR; INTRAVENOUS EVERY 6 HOURS PRN
Status: DISCONTINUED | OUTPATIENT
Start: 2021-10-06 | End: 2021-10-08 | Stop reason: HOSPADM

## 2021-10-06 RX ORDER — ALBUTEROL SULFATE 90 UG/1
AEROSOL, METERED RESPIRATORY (INHALATION)
Status: COMPLETED
Start: 2021-10-06 | End: 2021-10-06

## 2021-10-06 RX ORDER — HYDRALAZINE HYDROCHLORIDE 10 MG/1
10 TABLET, FILM COATED ORAL 2 TIMES DAILY
Status: DISCONTINUED | OUTPATIENT
Start: 2021-10-06 | End: 2021-10-08

## 2021-10-06 RX ORDER — ASPIRIN 81 MG/1
81 TABLET ORAL DAILY
Status: DISCONTINUED | OUTPATIENT
Start: 2021-10-07 | End: 2021-10-08 | Stop reason: HOSPADM

## 2021-10-06 RX ORDER — ACETAMINOPHEN 500 MG
1000 TABLET ORAL EVERY 6 HOURS PRN
COMMUNITY

## 2021-10-06 RX ORDER — ACETAMINOPHEN 650 MG/1
650 SUPPOSITORY RECTAL EVERY 6 HOURS PRN
Status: DISCONTINUED | OUTPATIENT
Start: 2021-10-06 | End: 2021-10-08 | Stop reason: HOSPADM

## 2021-10-06 RX ADMIN — HYDRALAZINE HYDROCHLORIDE 10 MG: 10 TABLET, FILM COATED ORAL at 21:20

## 2021-10-06 RX ADMIN — DEXAMETHASONE SODIUM PHOSPHATE 10 MG: 10 INJECTION, SOLUTION INTRAMUSCULAR; INTRAVENOUS at 15:00

## 2021-10-06 RX ADMIN — NAPROXEN 500 MG: 500 TABLET ORAL at 21:20

## 2021-10-06 RX ADMIN — ATENOLOL 50 MG: 50 TABLET ORAL at 21:20

## 2021-10-06 RX ADMIN — FUROSEMIDE 20 MG: 10 INJECTION, SOLUTION INTRAMUSCULAR; INTRAVENOUS at 20:00

## 2021-10-06 RX ADMIN — SODIUM CHLORIDE, PRESERVATIVE FREE 10 ML: 5 INJECTION INTRAVENOUS at 20:00

## 2021-10-06 RX ADMIN — MUPIROCIN: 20 OINTMENT TOPICAL at 21:20

## 2021-10-06 RX ADMIN — SODIUM CHLORIDE, PRESERVATIVE FREE 10 ML: 5 INJECTION INTRAVENOUS at 21:20

## 2021-10-06 RX ADMIN — INSULIN LISPRO 3 UNITS: 100 INJECTION, SOLUTION INTRAVENOUS; SUBCUTANEOUS at 21:21

## 2021-10-06 RX ADMIN — ENOXAPARIN SODIUM 40 MG: 40 INJECTION SUBCUTANEOUS at 21:27

## 2021-10-06 RX ADMIN — FUROSEMIDE 20 MG: 20 INJECTION, SOLUTION INTRAMUSCULAR; INTRAVENOUS at 16:40

## 2021-10-06 RX ADMIN — SULFAMETHOXAZOLE AND TRIMETHOPRIM 1 TABLET: 800; 160 TABLET ORAL at 21:20

## 2021-10-06 RX ADMIN — ALBUTEROL SULFATE: 108 INHALANT RESPIRATORY (INHALATION) at 15:10

## 2021-10-06 RX ADMIN — METHOCARBAMOL 500 MG: 500 TABLET ORAL at 21:20

## 2021-10-06 RX ADMIN — IPRATROPIUM BROMIDE AND ALBUTEROL SULFATE 1 AMPULE: .5; 3 SOLUTION RESPIRATORY (INHALATION) at 17:30

## 2021-10-06 ASSESSMENT — PAIN SCALES - GENERAL
PAINLEVEL_OUTOF10: 4
PAINLEVEL_OUTOF10: 0

## 2021-10-06 NOTE — RT PROTOCOL NOTE
RT Nebulizer Bronchodilator Protocol Note    There is a bronchodilator order in the chart from a provider indicating to follow the RT Bronchodilator Protocol and there is an Initiate RT Bronchodilator Protocol order as well (see protocol at bottom of note). CXR Findings:  No results found. The findings from the last RT Protocol Assessment were as follows:  Smoking: None or smoker <15 pack years  Respiratory Pattern: Mild dyspnea at rest, irregular pattern, or RR 21-25 bpm  Breath Sounds: Intermittent or unilateral wheezes  Cough: Strong, spontaneous, non-productive  Indication for Bronchodilator Therapy: Wheezing associated with pulm disorder, Unable to speak in sentences  Bronchodilator Assessment Score: 8    Aerosolized bronchodilator medication orders have been revised according to the RT Nebulizer Bronchodilator Protocol below. Respiratory Therapist to perform RT Therapy Protocol Assessment initially then follow the protocol. Repeat RT Therapy Protocol Assessment PRN for score 0-3 or on second treatment, BID, and PRN for scores above 3. No Indications - adjust the frequency to every 6 hours PRN wheezing or bronchospasm, if no treatments needed after 48 hours then discontinue using Per Protocol order mode. If indication present, adjust the RT bronchodilator orders based on the Bronchodilator Assessment Score as indicated below. If a patient is on this medication at home then do not decrease Frequency below that used at home. 0-3 - enter or revise RT bronchodilator order(s) to equivalent RT Bronchodilator order with Frequency of every 4 hours PRN for wheezing or increased work of breathing using Per Protocol order mode. 4-6 - enter or revise RT Bronchodilator order(s) to two equivalent RT bronchodilator orders with one order with BID Frequency and one order with Frequency of every 4 hours PRN wheezing or increased work of breathing using Per Protocol order mode.          7-10 - enter or revise RT Bronchodilator order(s) to two equivalent RT bronchodilator orders with one order with TID Frequency and one order with Frequency of every 4 hours PRN wheezing or increased work of breathing using Per Protocol order mode. 11-13 - enter or revise RT Bronchodilator order(s) to one equivalent RT bronchodilator order with QID Frequency and an Albuterol order with Frequency of every 4 hours PRN wheezing or increased work of breathing using Per Protocol order mode. Greater than 13 - enter or revise RT Bronchodilator order(s) to one equivalent RT bronchodilator order with every 4 hours Frequency and an Albuterol order with Frequency of every 2 hours PRN wheezing or increased work of breathing using Per Protocol order mode. RT to enter RT Home Evaluation for COPD & MDI Assessment order using Per Protocol order mode.     Electronically signed by Stewart Thomas RCP on 10/6/2021 at 2:55 PM

## 2021-10-06 NOTE — H&P
History and Physical      Name:  Jeff Baker /Age/Sex:   (77 y.o. female)   MRN & CSN:  [id number] & [id number] Admission Date/Time: 10/6/2021  2:11 PM   Location:   PCP: Rosemary Hatch, [address] Day: 1    Assessment and Plan:   Jeff Baker is a 77 y.o.  female  who presents with <principal problem not specified>    1. ACUTE PULMONARY EDEMA  -in 78 YO female with H/O HTN but no previous reported cardiac history who was noted to be wheezing, was SOB and noted to be hypoxic, significantly elevated BNP and CXR C/W pulm edema. Admit inpatient, IV lasix, echo, cardiology consult    2. ACUTE RESPIRATORY FAILURE WITH HYPOXIA  -sec to above. IV lasix, supplemental oxygen, wean as tolerated    3. TYPE 2 DIABETES  -Appropriately controlled, will continue current medication regimen, FBGs with SSI coverage as well    4. UTI  -from culture obtained  growing E Coli. Was started on macrobid but not appropriate due to CKD, CrCl less than 60. Will DC and start bactrim DS 1 PO BID    5. CKD stage 3a  -as noted, stable will monitor renal function and avoid nephrotoxic agents    6. ASTHMA  -suspect mild intermittent. Currently she is wheezing but suspect it is from pulmonary edema. Will add albuterol nebs as needed    7. ESSENTIAL HYPERTENSION  -stable will continue home medications    8. ACQUIRED HYPOTHYROIDISM  -will continue levothyroxine. Last TSH on record was  and was mildly high, will add TSH    9. HYPERCHOLESTEROLEMIA  -continue statin    CODE STATUS: Full code  VTE prophylaxis: lovenox   is surrogate decision maker      Diet ADULT DIET;  Regular; 4 carb choices (60 gm/meal)   DVT Prophylaxis [x] Lovenox, []  Heparin, [] SCDs, [] Ambulation   GI Prophylaxis [] PPI,  [] H2 Blocker,  [] Carafate,  [] Diet/Tube Feeds   Code Status Full Code   Disposition Patient requires continued admission due to pulm edema   MDM [] Low, [] Moderate,[]  High  Patient's risk as above due to pulm edema     History of Present Illness:     Chief Complaint: <principal problem not specified>  Sandeep Canseco is a 77 y.o.  female  who presents from [facility] here at [facility]. She is currently here for rehab services, today noted to be wheezing with some SOB. Upon evaluation she was found to be hypoxic and sent to ED for evaluation. Upon evaluation her pulse ox in Ed was 82% on room air. Also of note pro BNP significantly elevated at 3545, CXR findings C/W pulmonary edema and she was wheezing. The patient is currently awake and alert and her  is present at bedside at the time of my evaluation. He states she may have been wheezing . Upon questioning the patient she does feel like she has been more swollen lately and her abdominal area has felt bloated. She denies chest pain, N/V/D, some cough no chills or sweats. She was recently diagnosed with UTI and was started on macrobid. 14 point ROS reviewed negative, unless as noted above    Objective:   No intake or output data in the 24 hours ending 10/06/21 1827   Vitals:   Vitals:    10/06/21 1802   BP: (!) 149/72   Pulse: 72   Resp: 14   Temp:    SpO2: 96%     Physical Exam:   GEN Awake female, sitting upright in bed in no apparent distress. Appears given age. Obese  EYES Pupils are equally round. No scleral erythema, discharge, or conjunctivitis. HENT Mucous membranes are moist. Oral pharynx without exudates, no evidence of thrush. NECK Supple, no apparent thyromegaly or masses. No JVD or bruits  RESP Bilateral scattered wheezes with bilateral rales approx 1/2 up lung fields. Symmetric chest movement while on room air. CARDIO/VASC S1/S2 auscultated. Regular rate without appreciable murmurs, rubs, or gallops. No JVD or carotid bruits. Peripheral pulses equal bilaterally and palpable. No peripheral edema. GI Abdomen is obese, soft without significant tenderness, masses, or guarding. Bowel sounds are normoactive.  Rectal exam deferred. HEME/LYMPH No palpable cervical lymphadenopathy and no hepatosplenomegaly. No petechiae or ecchymoses. MSK No gross joint deformities. Trace edema of LEs bilaterally  SKIN Normal coloration, warm, dry. NEURO Cranial nerves appear grossly intact, normal speech, no lateralizing weakness. PSYCH Awake, alert, oriented x 4. Affect appropriate. EKG- SR 73 no acute changes noted    ONE XRAY VIEW OF THE CHEST       10/6/2021 2:29 pm       COMPARISON:   8/17/2021       HISTORY:   ORDERING SYSTEM PROVIDED HISTORY: NH states sats were in 62s   TECHNOLOGIST PROVIDED HISTORY:   Reason for exam:->NH states sats were in 62s   Reason for Exam: NH states sats were in 60s   Acuity: Unknown   Type of Exam: Unknown   Additional signs and symptoms: NH states sats were in 60s       FINDINGS:   Cardiomediastinal silhouette is mildly enlarged.       Diffuse, increased interstitial and airspace opacities are present   bilaterally.  Findings may represent evolving pulmonary edema or atypical   pneumonia.       No pleural effusion or pneumothorax.       No subdiaphragmatic free air is present.           Impression   Diffuse, bilateral, increased interstitial and alveolar infiltrates.  The   findings could represent evolving pulmonary edema or evolving atypical   pneumonia. Past Medical History:      Past Medical History:   Diagnosis Date    Arthritis     Asthma     Cellulitis of right foot     Chronic kidney disease     Depression     Diabetes mellitus ([facility]     Frequent falls     GERD (gastroesophageal reflux disease)     H/O Doppler ultrasound 10/01/2020     No evidence of significant venous insufficiency bilaterally. No evidence of DVT or SVT in the bilaterally.  H/O echocardiogram 10/01/2020    EF 60-65%. No significant valvular disease.  Hallux valgus (acquired), right foot 5/1/2017    Hypertension     Other disorders of kidney and ureter     1 kidney.     Shingles 06/2021    SIRS (systemic inflammatory response syndrome) ([facility] 1/18/2012    Thyroid disease     hypothyroid    Type 2 diabetes mellitus with foot ulcer, with long-term current use of insulin ([facility] 1/18/2012    Ulcer of right foot with fat layer exposed ([facility] 2/6/2013    WD-Diabetic ulcer of toe of left foot associated with type 2 diabetes mellitus, with fat layer exposed ([facility] 3/4/2021     PSHX:  has a past surgical history that includes Hysterectomy; Foot surgery (12/30/11); cyst removal; Kidney removal (1973); Abdomen surgery; hernia repair; Endoscopy, colon, diagnostic; and Dilatation, esophagus. Allergies: Allergies   Allergen Reactions    Ritalin [Methylphenidate] Rash    Rocephin [Ceftriaxone Sodium-Lidocaine] Rash       FAM HX: family history includes Arthritis in her mother; Asthma in her father; Cancer in her mother; Diabetes in her brother and mother; Heart Disease in her father; High Blood Pressure in her mother. Soc HX:   Social History     Socioeconomic History    Marital status:      Spouse name: None    Number of children: None    Years of education: None    Highest education level: None   Occupational History    None   Tobacco Use    Smoking status: Never Smoker    Smokeless tobacco: Never Used   Vaping Use    Vaping Use: Never used   Substance and Sexual Activity    Alcohol use: No    Drug use: No    Sexual activity: Yes   Other Topics Concern    None   Social History Narrative    None     Social Determinants of Health     Financial Resource Strain:     Difficulty of Paying Living Expenses:    Food Insecurity:     Worried About Running Out of Food in the Last Year:     Ran Out of Food in the Last Year:    Transportation Needs:     Lack of Transportation (Medical):      Lack of Transportation (Non-Medical):    Physical Activity:     Days of Exercise per Week:     Minutes of Exercise per Session:    Stress:     Feeling of Stress :    Social Connections:     Frequency of Communication with Friends and Family:     Frequency of Social Gatherings with Friends and Family:     Attends Muslim Services:     Active Member of Clubs or Organizations:     Attends Club or Organization Meetings:     Marital Status:    Intimate Partner Violence:     Fear of Current or Ex-Partner:     Emotionally Abused:     Physically Abused:     Sexually Abused:        Medications:   Medications:    insulin lispro  0-12 Units SubCUTAneous TID WC    insulin lispro  0-6 Units SubCUTAneous Nightly    [START ON 10/7/2021] insulin regular  40 Units SubCUTAneous QAM AC    [START ON 10/7/2021] insulin regular  30 Units SubCUTAneous Daily before lunch    insulin regular  30 Units SubCUTAneous Dinner    mupirocin   Topical BID    sulfamethoxazole-trimethoprim  1 tablet Oral 2 times per day    furosemide  20 mg IntraVENous Once    [START ON 10/7/2021] furosemide  40 mg IntraVENous Daily    methocarbamol  500 mg Oral TID    sodium chloride flush  5-40 mL IntraVENous 2 times per day    enoxaparin  40 mg SubCUTAneous Daily      Infusions:    dextrose      sodium chloride       PRN Meds: albuterol sulfate HFA, 2 puff, Q4H PRN  glucose, 15 g, PRN  dextrose, 12.5 g, PRN  glucagon (rDNA), 1 mg, PRN  dextrose, 100 mL/hr, PRN  hydrOXYzine, 10 mg, TID PRN  albuterol, 2.5 mg, Q6H PRN  sodium chloride flush, 5-40 mL, PRN  sodium chloride, 25 mL, PRN  ondansetron, 4 mg, Q8H PRN   Or  ondansetron, 4 mg, Q6H PRN  polyethylene glycol, 17 g, Daily PRN  acetaminophen, 650 mg, Q6H PRN   Or  acetaminophen, 650 mg, Q6H PRN          Electronically signed by Hu Zelaya MD on 10/6/2021 at 6:27 PM

## 2021-10-06 NOTE — PROGRESS NOTES
Pt arrived to room 7 at [address]. Pt in bed with bed alarm on,  at bedside, call light in reach. Skin assessment completed with [facility] LPN, cluster of open wounds to coccyx (mepilex placed), redness to L second toe. Pt turned to Right side with pillow support.

## 2021-10-06 NOTE — ED NOTES
Bed: 05  Expected date:   Expected time:   Means of arrival:   Comments:  [facility] patient      Manny Park MD  10/06/21 [id number]

## 2021-10-06 NOTE — ED PROVIDER NOTES
Triage Chief Complaint:   Shortness of Breath (Pt arrives per cart from [facility], staff report entering pt room and pt wheezing, gave pt inhaler but when checking oxygen level was 60's. Pt placed on 2 LNC, pt present with audible wheezes heard )      [place]:  Tracee Davis is a 77 y.o. female that presents to the emergency department with shortness of breath from [facility]. Staff stated that she had some wheezing. They gave her her inhaler and checked her oxygen and stated it was in the 60s. They did place her on 2 L of nasal cannula which she does not usually need at facility. . They stated she was diagnosed with a UTI last week and started on antibiotics for this. Does appear her urine culture had >100k e coli and was sensitive to macrobid, which she is currently taking. States she is a little bit confused because of her UTI and that she has gotten like this in the past.  Does have a history of asthma. Patient denies any chest pain. States she did feel mildly short of breath today and yesterday. Denies any pain. Past Medical History:   Diagnosis Date    Arthritis     Asthma     Cellulitis of right foot     Chronic kidney disease     Depression     Diabetes mellitus (HCC)     Frequent falls     GERD (gastroesophageal reflux disease)     H/O Doppler ultrasound 10/01/2020     No evidence of significant venous insufficiency bilaterally. No evidence of DVT or SVT in the bilaterally.  H/O echocardiogram 10/01/2020    EF 60-65%. No significant valvular disease.  Hallux valgus (acquired), right foot 5/1/2017    Hypertension     Other disorders of kidney and ureter     1 kidney.     Shingles 06/2021    SIRS (systemic inflammatory response syndrome) ([address] 1/18/2012    Thyroid disease     hypothyroid    Type 2 diabetes mellitus with foot ulcer, with long-term current use of insulin ([address] 1/18/2012    Ulcer of right foot with fat layer exposed ([address] 2/6/2013    WD-Diabetic ulcer of toe of left foot associated with type 2 diabetes mellitus, with fat layer exposed ([facility] 3/4/2021     Past Surgical History:   Procedure Laterality Date    ABDOMEN SURGERY      CYST REMOVAL      from kidney.  DILATATION, ESOPHAGUS      ENDOSCOPY, COLON, DIAGNOSTIC      FOOT SURGERY  12/30/11    had infection  and a biopsy was sent away for analysis    HERNIA REPAIR      HYSTERECTOMY      KIDNEY REMOVAL  1973     Family History   Problem Relation Age of Onset    Arthritis Mother     Cancer Mother     Diabetes Mother     High Blood Pressure Mother     Asthma Father     Heart Disease Father     Diabetes Brother      Social History     Socioeconomic History    Marital status:      Spouse name: Not on file    Number of children: Not on file    Years of education: Not on file    Highest education level: Not on file   Occupational History    Not on file   Tobacco Use    Smoking status: Never Smoker    Smokeless tobacco: Never Used   Vaping Use    Vaping Use: Never used   Substance and Sexual Activity    Alcohol use: No    Drug use: No    Sexual activity: Yes   Other Topics Concern    Not on file   Social History Narrative    Not on file     Social Determinants of Health     Financial Resource Strain:     Difficulty of Paying Living Expenses:    Food Insecurity:     Worried About Running Out of Food in the Last Year:     [address] in the Last Year:    Transportation Needs:     Lack of Transportation (Medical):      Lack of Transportation (Non-Medical):    Physical Activity:     Days of Exercise per Week:     Minutes of Exercise per Session:    Stress:     Feeling of Stress :    Social Connections:     Frequency of Communication with Friends and Family:     Frequency of Social Gatherings with Friends and Family:     Attends Hindu Services:     Active Member of Clubs or Organizations:     Attends Club or Organization Meetings:     Marital Status:    Intimate Partner Violence:     Fear of Current or Ex-Partner:     Emotionally Abused:     Physically Abused:     Sexually Abused:      Current Facility-Administered Medications   Medication Dose Route Frequency Provider Last Rate Last Admin    albuterol sulfate  (90 Base) MCG/ACT inhaler 2 puff  2 puff Inhalation Q4H PRN Omar Shaikh MD        ipratropium-albuterol (DUONEB) nebulizer solution 1 ampule  1 ampule Inhalation Once Omar Shaikh MD         Current Outpatient Medications   Medication Sig Dispense Refill    HUMULIN R U-500 KWIKPEN 500 UNIT/ML SOPN concentrated injection pen Breakfast 60 units Lunch 30 units Dinner 30  150 units or less No additional units of insulin   151-200 Add 5 units   201-250 Add 10 units   251-300 Add 15 units   Over 300 add 20 units 1 pen 0    nystatin (MYCOSTATIN) POWD powder Apply to bilateral groin topically every shift for skin care       naproxen (NAPROSYN) 500 MG tablet Take 1 tablet by mouth 2 times daily 20 tablet 0    amLODIPine (NORVASC) 10 MG tablet Take 1 tablet by mouth daily Hold for systolic blood pressure less than or equal to 110 30 tablet 3    atenolol (TENORMIN) 50 MG tablet Take 1 tablet by mouth 2 times daily Hold for systolic blood pressure less than or equal to 110 30 tablet 3    hydrALAZINE (APRESOLINE) 10 MG tablet Take 1 tablet by mouth 2 times daily Hold for systolic blood pressure less than or equal to 110 90 tablet 1    Blood Pressure KIT 1 each by Does not apply route 2 times daily 1 kit 0    DULoxetine (CYMBALTA) 60 MG extended release capsule Take 60 mg by mouth daily   1    pantoprazole (PROTONIX) 40 MG tablet Take 40 mg by mouth daily   3    atorvastatin (LIPITOR) 20 MG tablet Take 20 mg by mouth daily       Multiple Vitamins-Minerals (TRUEPLUS DIABETIC MULTIVITAMIN) TABS Take 1 tablet by mouth daily.  Levothyroxine Sodium 175 MCG CAPS Take 175 mcg by mouth Daily       aspirin 81 MG EC tablet Take 81 mg by mouth daily. Allergies   Allergen Reactions    Ritalin [Methylphenidate] Rash    Rocephin [Ceftriaxone Sodium-Lidocaine] Rash     Nursing Notes Reviewed    ROS:  At least 10 systems reviewed and otherwise negative except as in the [address] Physical Exam:  ED Triage Vitals [10/06/21 1412]   Enc Vitals Group      /86      Pulse 72      Resp 21      Temp 98.3 °F (36.8 °C)      Temp Source Oral      SpO2 99 %      Weight 210 lb (95.3 kg)      Height 5' 3\" (1.6 m)      Head Circumference       Peak Flow       Pain Score       Pain Loc       Pain Edu? Excl. in [address]? My pulse oximetry interpretation is which is within the normal range    GENERAL APPEARANCE: Awake and alert. Cooperative. No acute distress. HEAD: Normocephalic. Atraumatic. EYES: EOM's grossly intact. Sclera anicteric. ENT: Mucous membranes are moist. Tolerates saliva. No trismus. NECK: Supple. No meningismus. Trachea midline. HEART: RRR. Radial pulses 2+. LUNGS: Respirations unlabored. Expiratory wheezing. ABDOMEN: Soft. Non-tender. No guarding or rebound. Normal bowel sounds  EXTREMITIES: No acute deformities. No pitting edema  SKIN: Warm and dry. NEUROLOGICAL: No gross facial drooping. Moves all 4 extremities spontaneously. PSYCHIATRIC: Normal mood.     I have reviewed and interpreted all of the currently available lab results from this visit (if applicable):  Results for orders placed or performed during the hospital encounter of 10/06/21   COVID-19, Rapid    Specimen: Nasopharyngeal   Result Value Ref Range    Source THROAT     SARS-CoV-2, NAAT NOT DETECTED NOT DETECTED   CBC auto diff   Result Value Ref Range    WBC 10.6 (H) 4.0 - 10.5 K/CU MM    RBC 3.46 (L) 4.2 - 5.4 M/CU MM    Hemoglobin 9.1 (L) 12.5 - 16.0 GM/DL    Hematocrit 30.9 (L) 37 - 47 %    MCV 89.3 78 - 100 FL    MCH 26.3 (L) 27 - 31 PG    MCHC 29.4 (L) 32.0 - 36.0 %    RDW 15.7 (H) 11.7 - 14.9 %    Platelets 333 398 - 977 K/CU MM    MPV 9.3 7.5 - 11.1 FL    Differential Type AUTOMATED DIFFERENTIAL     Segs Relative 58.8 36 - 66 %    Lymphocytes % 23.3 (L) 24 - 44 %    Monocytes % 6.4 (H) 0 - 4 %    Eosinophils % 10.1 (H) 0 - 3 %    Basophils % 0.5 0 - 1 %    Segs Absolute 6.3 K/CU MM    Lymphocytes Absolute 2.5 K/CU MM    Monocytes Absolute 0.7 K/CU MM    Eosinophils Absolute 1.1 K/CU MM    Basophils Absolute 0.1 K/CU MM    Immature Neutrophil % 0.9 (H) 0 - 0.43 %    Total Immature Neutrophil 0.10 K/CU MM   CMP   Result Value Ref Range    Sodium 139 135 - 145 MMOL/L    Potassium 4.3 3.5 - 5.1 MMOL/L    Chloride 104 99 - 110 mMol/L    CO2 29 21 - 32 MMOL/L    BUN 24 (H) 6 - 23 MG/DL    CREATININE 1.3 (H) 0.6 - 1.1 MG/DL    Glucose 169 (H) 70 - 99 MG/DL    Calcium 8.7 8.3 - 10.6 MG/DL    Albumin 2.5 (L) 3.4 - 5.0 GM/DL    Total Protein 5.7 (L) 6.4 - 8.2 GM/DL    Total Bilirubin 0.2 0.0 - 1.0 MG/DL    ALT 9 (L) 10 - 40 U/L    AST 26 15 - 37 IU/L    Alkaline Phosphatase 132 (H) 40 - 129 IU/L    GFR Non- 40 (L) >60 mL/min/1.73m2    GFR  48 (L) >60 mL/min/1.73m2    Anion Gap 6 4 - 16   Brain Natriuretic Peptide   Result Value Ref Range    Pro-BNP 3,545 (H) <300 PG/ML   Lactate, Plasma   Result Value Ref Range    Lactate 1.1 0.4 - 2.0 mMOL/L   Troponin   Result Value Ref Range    Troponin T <0.010 <0.01 NG/ML   POCT Venous   Result Value Ref Range    pH, Jose 7.37 7.32 - 7.42    pCO2, Jose 41.9 38 - 52 mmHG    pO2, Jose 90.2 (H) 28 - 48 mmHG    Base Exc, Mixed 1.2 0 - 2.3    Base Excess MINUS 0 - 2.4    HCO3, Venous 24.1 19 - 25 MMOL/L    O2 Sat, Jose 96.7 (H) 50 - 70 %    CO2 Content 25.4 (H) 19 - 24 MMOL/L    Source: Venous    EKG 12 Lead   Result Value Ref Range    Ventricular Rate 73 BPM    Atrial Rate 73 BPM    P-R Interval 176 ms    QRS Duration 90 ms    Q-T Interval 420 ms    QTc Calculation (Bazett) 462 ms    P Axis -3 degrees    R Axis 14 degrees    T Axis 68 degrees    Diagnosis       Normal sinus rhythm  Normal ECG  When compared with ECG of 09-MAY-2021 16:04,  No significant change was found  Confirmed by [facility] Armando DAVISON ([id number]) on 10/6/2021 4:55:17 PM          Radiographs:  [] Radiologist's Wet Read Report Reviewed:      XR CHEST PORTABLE (Final result)  Result time 10/06/21 15:30:00  Final result by Mayola Fleischer, MD (10/06/21 15:30:00)                Impression:    Diffuse, bilateral, increased interstitial and alveolar infiltrates.  The   findings could represent evolving pulmonary edema or evolving atypical   pneumonia. Narrative:    EXAMINATION:   ONE XRAY VIEW OF THE CHEST     10/6/2021 2:29 pm     COMPARISON:   8/17/2021     HISTORY:   ORDERING SYSTEM PROVIDED HISTORY: NH states sats were in 60s   TECHNOLOGIST PROVIDED HISTORY:   Reason for exam:->NH states sats were in 62s   Reason for Exam: NH states sats were in 60s   Acuity: Unknown   Type of Exam: Unknown   Additional signs and symptoms: NH states sats were in 60s     FINDINGS:   Cardiomediastinal silhouette is mildly enlarged. Diffuse, increased interstitial and airspace opacities are present   bilaterally.  Findings may represent evolving pulmonary edema or atypical   pneumonia. No pleural effusion or pneumothorax. No subdiaphragmatic free air is present. [] Discussed with Radiologist:     [] The following radiograph was interpreted by myself in the absence of a radiologist:     EKG: (All EKG's are interpreted by myself in the absence of a cardiologist)  The Ekg interpreted by me shows  normal sinus rhythm with a rate of 73  Axis is   Normal  QTc is  normal  Intervals and Durations are unremarkable. ST Segments: no acute change  No significant change from prior EKG dated 5-9-2021          MDM:  Patient placed on the monitor  Sats did drop down to 84% on room air. Placed immediately back onto 2 liters of nasal cannula. Decadron IV given. Albuterol bronchodilator treatment protocol ordered.  EKG shows normal sinus rhythm without any acute changes. . Venous pH is 7.37. PCO2 is 41. Normal troponin. CBC shows a white count of 10.6. Hemoglobin of 9.1. CMP creatinine 1.3. Glucose 169. Normal anion gap of 6. Lactic acid normal at 1.1. BNP elevated at 3545. Chest x-ray shows diffuse bilateral increased interstitial and alveolar infiltrates that could represent edema versus evolving atypical pneumonia. With patient's increased BNP and chest x-ray findings I do favor this to be edema. Given 20 g IV lasix. She has not had any productive cough. No fevers or chills. covid negative. Reevaluated patient and does have wheezing again. Will order a DuoNeb treatment as she is Covid negative and previously had an albuterol inhaler treatment. This could however be cardiac wheezing from fluid. Discussed results and need to admit patient to the hospital for hypoxia and requiring oxygen. She does voiced understanding and agree to plan. Currently on 2 L of oxygen satting 95%. Normotensive. Heart rate in the 70s. . Patient did appear to have a normal EF on an echo done last year. CRITICAL CARE NOTE:  There was a high probability of clinically significant life-threatening deterioration of the patient's condition requiring my urgent intervention due to hypoxia, wheezing, CHF. Oxygen, albuterol inhaler and DuoNeb, steroids, IV Lasix, reeval, admit was performed to address this. Total critical care time is at least  40 minutes. This includes vital sign monitoring, pulse oximetry monitoring, telemetry monitoring, clinical response to the IV medications, reviewing the nursing notes, consultation time, dictation/documentation time, and interpretation of the lab work. This time excludes time spent performing procedures and separately billable procedures and family discussion time. Clinical Impression:  1. Hypoxia    2. Acute pulmonary edema (HCC)    3. Elevated brain natriuretic peptide (BNP) level    4. Wheezing    5.  Acute cystitis without hematuria    6. History of asthma        Disposition Vitals:  [unfilled], [unfilled], [unfilled], [unfilled]    Disposition referral (if applicable):  No follow-up provider specified.     Disposition medications (if applicable):  New Prescriptions    No medications on file         (Please note that portions of this note may have been completed with a voice recognition program. Efforts were made to edit the dictations but occasionally words are mis-transcribed.)    MD Beny Dobbs MD  10/06/21 Lachelle [id number], MD  10/06/21 Lachelle Mcfarlane MD  10/06/21 [id number]

## 2021-10-07 LAB
ANION GAP SERPL CALCULATED.3IONS-SCNC: 18 MMOL/L (ref 4–16)
BASE EXCESS MIXED: 1.1 (ref 0–2.3)
BASE EXCESS: ABNORMAL (ref 0–2.4)
BUN BLDV-MCNC: 30 MG/DL (ref 6–23)
CALCIUM SERPL-MCNC: 8.6 MG/DL (ref 8.3–10.6)
CHLORIDE BLD-SCNC: 102 MMOL/L (ref 99–110)
CO2 CONTENT: 24.9 MMOL/L (ref 19–24)
CO2: 17 MMOL/L (ref 21–32)
CREAT SERPL-MCNC: 1.4 MG/DL (ref 0.6–1.1)
GFR AFRICAN AMERICAN: 44 ML/MIN/1.73M2
GFR NON-AFRICAN AMERICAN: 37 ML/MIN/1.73M2
GLUCOSE BLD-MCNC: 273 MG/DL (ref 70–99)
GLUCOSE BLD-MCNC: 310 MG/DL (ref 70–99)
GLUCOSE BLD-MCNC: 315 MG/DL (ref 70–99)
GLUCOSE BLD-MCNC: 319 MG/DL (ref 70–99)
GLUCOSE BLD-MCNC: 408 MG/DL (ref 70–99)
GLUCOSE BLD-MCNC: 449 MG/DL (ref 70–99)
HCO3 ARTERIAL: 23.7 MMOL/L (ref 18–23)
HCT VFR BLD CALC: 29.6 % (ref 37–47)
HEMOGLOBIN: 8.5 GM/DL (ref 12.5–16)
LV EF: 58 %
LVEF MODALITY: NORMAL
MCH RBC QN AUTO: 26.3 PG (ref 27–31)
MCHC RBC AUTO-ENTMCNC: 28.7 % (ref 32–36)
MCV RBC AUTO: 91.6 FL (ref 78–100)
O2 SATURATION: 93.7 % (ref 96–97)
PCO2 ARTERIAL: 38.9 MMHG (ref 32–45)
PDW BLD-RTO: 15.2 % (ref 11.7–14.9)
PH BLOOD: 7.39 (ref 7.34–7.45)
PLATELET # BLD: 214 K/CU MM (ref 140–440)
PMV BLD AUTO: 9.7 FL (ref 7.5–11.1)
PO2 ARTERIAL: 69.9 MMHG (ref 75–100)
POTASSIUM SERPL-SCNC: 5.2 MMOL/L (ref 3.5–5.1)
RBC # BLD: 3.23 M/CU MM (ref 4.2–5.4)
SODIUM BLD-SCNC: 137 MMOL/L (ref 135–145)
SOURCE, BLOOD GAS: ABNORMAL
TSH HIGH SENSITIVITY: 5.3 UIU/ML (ref 0.27–4.2)
WBC # BLD: 4.7 K/CU MM (ref 4–10.5)

## 2021-10-07 PROCEDURE — 36600 WITHDRAWAL OF ARTERIAL BLOOD: CPT

## 2021-10-07 PROCEDURE — 82962 GLUCOSE BLOOD TEST: CPT

## 2021-10-07 PROCEDURE — 94761 N-INVAS EAR/PLS OXIMETRY MLT: CPT

## 2021-10-07 PROCEDURE — 80048 BASIC METABOLIC PNL TOTAL CA: CPT

## 2021-10-07 PROCEDURE — 2580000003 HC RX 258: Performed by: FAMILY MEDICINE

## 2021-10-07 PROCEDURE — 84443 ASSAY THYROID STIM HORMONE: CPT

## 2021-10-07 PROCEDURE — 2140000000 HC CCU INTERMEDIATE R&B

## 2021-10-07 PROCEDURE — 85027 COMPLETE CBC AUTOMATED: CPT

## 2021-10-07 PROCEDURE — 2700000000 HC OXYGEN THERAPY PER DAY

## 2021-10-07 PROCEDURE — 82803 BLOOD GASES ANY COMBINATION: CPT

## 2021-10-07 PROCEDURE — 6360000002 HC RX W HCPCS: Performed by: FAMILY MEDICINE

## 2021-10-07 PROCEDURE — 36415 COLL VENOUS BLD VENIPUNCTURE: CPT

## 2021-10-07 PROCEDURE — 6370000000 HC RX 637 (ALT 250 FOR IP): Performed by: FAMILY MEDICINE

## 2021-10-07 PROCEDURE — 93306 TTE W/DOPPLER COMPLETE: CPT

## 2021-10-07 RX ADMIN — METHOCARBAMOL 500 MG: 500 TABLET ORAL at 20:27

## 2021-10-07 RX ADMIN — MUPIROCIN: 20 OINTMENT TOPICAL at 20:00

## 2021-10-07 RX ADMIN — ASPIRIN 81 MG: 81 TABLET, COATED ORAL at 09:15

## 2021-10-07 RX ADMIN — MUPIROCIN: 20 OINTMENT TOPICAL at 09:16

## 2021-10-07 RX ADMIN — LEVOTHYROXINE SODIUM 175 MCG: 0.05 TABLET ORAL at 06:03

## 2021-10-07 RX ADMIN — SULFAMETHOXAZOLE AND TRIMETHOPRIM 1 TABLET: 800; 160 TABLET ORAL at 09:14

## 2021-10-07 RX ADMIN — DULOXETINE 60 MG: 60 CAPSULE, DELAYED RELEASE ORAL at 09:15

## 2021-10-07 RX ADMIN — METHOCARBAMOL 500 MG: 500 TABLET ORAL at 09:14

## 2021-10-07 RX ADMIN — HYDRALAZINE HYDROCHLORIDE 10 MG: 10 TABLET, FILM COATED ORAL at 20:23

## 2021-10-07 RX ADMIN — INSULIN HUMAN 40 UNITS: 100 INJECTION, SOLUTION PARENTERAL at 09:08

## 2021-10-07 RX ADMIN — NAPROXEN 500 MG: 500 TABLET ORAL at 09:15

## 2021-10-07 RX ADMIN — ATENOLOL 50 MG: 50 TABLET ORAL at 20:24

## 2021-10-07 RX ADMIN — NAPROXEN 500 MG: 500 TABLET ORAL at 20:38

## 2021-10-07 RX ADMIN — ATENOLOL 50 MG: 50 TABLET ORAL at 09:14

## 2021-10-07 RX ADMIN — HYDRALAZINE HYDROCHLORIDE 10 MG: 10 TABLET, FILM COATED ORAL at 09:15

## 2021-10-07 RX ADMIN — AMLODIPINE BESYLATE 10 MG: 10 TABLET ORAL at 09:15

## 2021-10-07 RX ADMIN — METHOCARBAMOL 500 MG: 500 TABLET ORAL at 14:25

## 2021-10-07 RX ADMIN — SODIUM CHLORIDE, PRESERVATIVE FREE 10 ML: 5 INJECTION INTRAVENOUS at 09:16

## 2021-10-07 RX ADMIN — INSULIN HUMAN 30 UNITS: 100 INJECTION, SOLUTION PARENTERAL at 11:30

## 2021-10-07 RX ADMIN — FUROSEMIDE 40 MG: 10 INJECTION, SOLUTION INTRAMUSCULAR; INTRAVENOUS at 09:16

## 2021-10-07 RX ADMIN — INSULIN LISPRO 4 UNITS: 100 INJECTION, SOLUTION INTRAVENOUS; SUBCUTANEOUS at 20:21

## 2021-10-07 RX ADMIN — SODIUM CHLORIDE, PRESERVATIVE FREE 10 ML: 5 INJECTION INTRAVENOUS at 20:29

## 2021-10-07 RX ADMIN — PANTOPRAZOLE SODIUM 40 MG: 40 TABLET, DELAYED RELEASE ORAL at 09:15

## 2021-10-07 RX ADMIN — ATORVASTATIN CALCIUM 20 MG: 20 TABLET, FILM COATED ORAL at 09:14

## 2021-10-07 RX ADMIN — INSULIN HUMAN 30 UNITS: 100 INJECTION, SOLUTION PARENTERAL at 17:51

## 2021-10-07 RX ADMIN — SULFAMETHOXAZOLE AND TRIMETHOPRIM 1 TABLET: 800; 160 TABLET ORAL at 20:28

## 2021-10-07 RX ADMIN — MULTIPLE VITAMINS W/ MINERALS TAB 1 TABLET: TAB at 09:14

## 2021-10-07 RX ADMIN — ENOXAPARIN SODIUM 40 MG: 40 INJECTION SUBCUTANEOUS at 09:16

## 2021-10-07 ASSESSMENT — PAIN SCALES - GENERAL
PAINLEVEL_OUTOF10: 0

## 2021-10-07 NOTE — PROGRESS NOTES
Patient's daughter, Jacquelyn Colvin called and requested information on patient diagnosis, presentation, and discharge. Verbal permission given from patient for this RN to update daughter, Jacquelyn Colvin. This RN updated daughter who stated that plan is for patient to return to [place] until family can set up more help at home.

## 2021-10-07 NOTE — PLAN OF CARE
Problem: Falls - Risk of:  Goal: Will remain free from falls  Description: Will remain free from falls  10/7/2021 1857 by Andrea Rodriguez RN  Outcome: Ongoing  10/7/2021 1856 by Andrea Rodriguez RN  Outcome: Met This Shift  Goal: Absence of physical injury  Description: Absence of physical injury  10/7/2021 1857 by Andrea Rodriguez RN  Outcome: Ongoing  10/7/2021 1856 by Andrea Rodriguez RN  Outcome: Met This Shift     Problem: Skin Integrity:  Goal: Will show no infection signs and symptoms  Description: Will show no infection signs and symptoms  10/7/2021 1857 by Andrea Rodriguez RN  Outcome: Ongoing  10/7/2021 1856 by Andrea Rodriguez RN  Outcome: Ongoing  Goal: Absence of new skin breakdown  Description: Absence of new skin breakdown  10/7/2021 1857 by Andrea Rodriguez RN  Outcome: Ongoing  10/7/2021 1856 by Andrea Rodriguez RN  Outcome: Ongoing  Goal: Risk for impaired skin integrity will decrease  Description: Risk for impaired skin integrity will decrease  10/7/2021 1857 by Andrea Rodriguez RN  Outcome: Ongoing  10/7/2021 1856 by Andrea Rodriguez RN  Outcome: Ongoing     Problem:  Activity:  Goal: Risk for activity intolerance will decrease  Description: Risk for activity intolerance will decrease  10/7/2021 1857 by Andrea Rodriguez RN  Outcome: Ongoing  10/7/2021 1856 by Andrea Rodriguez RN  Outcome: Ongoing     Problem: Coping:  Goal: Ability to adjust to condition or change in health will improve  Description: Ability to adjust to condition or change in health will improve  10/7/2021 1857 by Andrea Rodriguez RN  Outcome: Ongoing  10/7/2021 1856 by Andrea Rodriguez RN  Outcome: Ongoing  Goal: Ability to remain calm will improve  Description: Ability to remain calm will improve  10/7/2021 1857 by Andrea Rodriguez RN  Outcome: Ongoing  10/7/2021 1856 by Andrea Rodriguez RN  Outcome: Ongoing     Problem: Fluid Volume:  Goal: Ability to maintain a balanced intake and output will improve  Description: Ability to maintain a balanced intake and output will improve  10/7/2021 1857 by Cherelle Fields RN  Outcome: Ongoing  10/7/2021 1856 by Cherelle Fields RN  Outcome: Met This Shift     Problem: Health Behavior:  Goal: Ability to identify and utilize available resources and services will improve  Description: Ability to identify and utilize available resources and services will improve  10/7/2021 1857 by Cherelle Fields RN  Outcome: Ongoing  10/7/2021 1856 by Cherelle Fields RN  Outcome: Ongoing  Goal: Ability to manage health-related needs will improve  Description: Ability to manage health-related needs will improve  10/7/2021 1857 by Cherelle Fields RN  Outcome: Ongoing  10/7/2021 1856 by Cherelle Fields RN  Outcome: Ongoing     Problem: Metabolic:  Goal: Ability to maintain appropriate glucose levels will improve  Description: Ability to maintain appropriate glucose levels will improve  10/7/2021 1857 by Cherelle Fields RN  Outcome: Ongoing  10/7/2021 1856 by Cherelle Fields RN  Outcome: Ongoing     Problem: Nutritional:  Goal: Maintenance of adequate nutrition will improve  Description: Maintenance of adequate nutrition will improve  10/7/2021 1857 by Cherelle Fields RN  Outcome: Ongoing  10/7/2021 1856 by Cherelle Fields RN  Outcome: Ongoing  Goal: Progress toward achieving an optimal weight will improve  Description: Progress toward achieving an optimal weight will improve  10/7/2021 1857 by Cherelle Fields RN  Outcome: Ongoing  10/7/2021 1856 by Cherelle Fields RN  Outcome: Ongoing     Problem: Physical Regulation:  Goal: Complications related to the disease process, condition or treatment will be avoided or minimized  Description: Complications related to the disease process, condition or treatment will be avoided or minimized  10/7/2021 1857 by Cherelle Fields RN  Outcome: Ongoing  10/7/2021 1856 by Cherelle Fields RN  Outcome: Ongoing  Goal: Diagnostic test results will improve  Description: Diagnostic test results will improve  10/7/2021 1857 by Cherelle Fields RN  Outcome: participate in self-care as condition permits will improve  Description: Ability to participate in self-care as condition permits will improve  10/7/2021 1857 by Ted Francis, RN  Outcome: Ongoing  10/7/2021 1856 by Ted Francis, RN  Outcome: Ongoing

## 2021-10-07 NOTE — PROGRESS NOTES
Hospitalist Progress Note      Name:  Kelly Porras /Age/Sex: 788  (77 y.o. female)   MRN & CSN:  [id number] & [id number] Admission Date/Time: 10/6/2021  2:11 PM   Location:   PCP: Toby Lawrence [address] [place] [address] Day: 2    Assessment and Plan:   Kelly Porras is a 77 y.o.  female  who presents with <principal problem not specified>    1. ACUTE PULMONARY EDEMA  -Cardiac cause probably CHF. Clinically improved. There has been urine output but patient incontinent. Weight recorded 210-193 pounds overnight. Will continue IV diuresis, echo and cardiology consult pending     2. ACUTE RESPIRATORY FAILURE WITH HYPOXIA  -sec to above. Still on supplemental oxygen, will wean as tolerated     3. TYPE 2 DIABETES  -Generally controlled, spike in FBG this AM may have been due to dose of steroid in ED. Will continue current regimen and continue to monitor    4. HYPERKALEMIA  -unsure of cause, mild. Will continue lasix and recheck in AM     5. UTI  -continue bactrim     6. CKD stage 3a  -Stable will monitor     7. ASTHMA  -stable no wheezing today     8. ESSENTIAL HYPERTENSION  -stable will continue home medications     9. ACQUIRED HYPOTHYROIDISM  -will continue levothyroxine. Still awaiting TSH ordered      10. HYPERCHOLESTEROLEMIA  -continue medication    Diet ADULT DIET; Regular; 4 carb choices (60 gm/meal)   DVT Prophylaxis [x] Lovenox, []  Heparin, [] SCDs, [] Ambulation   GI Prophylaxis [] PPI,  [] H2 Blocker,  [] Carafate,  [] Diet/Tube Feeds   Code Status Full Code   Disposition Patient requires continued admission due to pulm edema   MDM [] Low, [] Moderate,[]  High  Patient's risk as above due to pulm edema     History of Present Illness:     Chief Complaint: <principal problem not specified>  Kelly Porras is a 77 y.o.  female  who presents with pulm edema    No issues overnight.  Patient currently resting comfortably, she is awake and alert, she is feeling improved and denies SOB, she is still on supplemental oxygen. Has had urine output but has been incontinent. Daily weight shows 210 to 193 pounds since admission. No specific complaints       14 point ROS reviewed negative, unless as noted above    Objective: Intake/Output Summary (Last 24 hours) at 10/7/2021 0932  Last data filed at 10/7/2021 0916  Gross per 24 hour   Intake 10 ml   Output 200 ml   Net -190 ml      Vitals:   Vitals:    10/07/21 0805   BP: (!) 152/66   Pulse:    Resp: 16   Temp: 97.9 °F (36.6 °C)   SpO2: 96%     Physical Exam:   GEN Awake female, sitting upright in bed in no apparent distress. Appears given age. EYES Pupils are equally round. No scleral erythema, discharge, or conjunctivitis. HENT Mucous membranes are moist. Oral pharynx without exudates, no evidence of thrush. NECK Supple, no apparent thyromegaly or masses. RESP Bibasilar rales, no wheezes or rhonchi. Symmetric chest movement while on room air. CARDIO/VASC S1/S2 auscultated. Regular rate without appreciable murmurs, rubs, or gallops. No JVD or carotid bruits. Peripheral pulses equal bilaterally and palpable. No peripheral edema. GI Abdomen is soft without significant tenderness, masses, or guarding. Bowel sounds are normoactive. Rectal exam deferred.  No costovertebral angle tenderness. Normal appearing external genitalia. Wilkins catheter is not present. HEME/LYMPH No palpable cervical lymphadenopathy and no hepatosplenomegaly. No petechiae or ecchymoses. MSK No gross joint deformities. Mild edema lower LE, 2+ lower RLE  SKIN Normal coloration, warm, dry. NEURO Cranial nerves appear grossly intact, normal speech, no lateralizing weakness. PSYCH Awake, alert. Affect appropriate.     Medications:   Medications:    amLODIPine  10 mg Oral Daily    aspirin  81 mg Oral Daily    atenolol  50 mg Oral BID    atorvastatin  20 mg Oral Daily    DULoxetine  60 mg Oral Daily    hydrALAZINE  10 mg Oral BID    levothyroxine  175 mcg Oral Daily    therapeutic multivitamin-minerals  1 tablet Oral Daily    naproxen  500 mg Oral BID    pantoprazole  40 mg Oral Daily    insulin lispro  0-12 Units SubCUTAneous TID WC    insulin lispro  0-6 Units SubCUTAneous Nightly    insulin regular  40 Units SubCUTAneous QAM AC    insulin regular  30 Units SubCUTAneous Daily before lunch    insulin regular  30 Units SubCUTAneous Dinner    mupirocin   Topical BID    sulfamethoxazole-trimethoprim  1 tablet Oral 2 times per day    furosemide  40 mg IntraVENous Daily    methocarbamol  500 mg Oral TID    sodium chloride flush  5-40 mL IntraVENous 2 times per day    enoxaparin  40 mg SubCUTAneous Daily      Infusions:    dextrose      sodium chloride       PRN Meds: albuterol sulfate HFA, 2 puff, Q4H PRN  glucose, 15 g, PRN  dextrose, 12.5 g, PRN  glucagon (rDNA), 1 mg, PRN  dextrose, 100 mL/hr, PRN  hydrOXYzine, 10 mg, TID PRN  albuterol, 2.5 mg, Q6H PRN  sodium chloride flush, 5-40 mL, PRN  sodium chloride, 25 mL, PRN  ondansetron, 4 mg, Q8H PRN   Or  ondansetron, 4 mg, Q6H PRN  polyethylene glycol, 17 g, Daily PRN  acetaminophen, 650 mg, Q6H PRN   Or  acetaminophen, 650 mg, Q6H PRN

## 2021-10-07 NOTE — PLAN OF CARE
Problem: Falls - Risk of:  Goal: Will remain free from falls  Description: Will remain free from falls  Outcome: Ongoing  Goal: Absence of physical injury  Description: Absence of physical injury  Outcome: Ongoing     Problem: Skin Integrity:  Goal: Will show no infection signs and symptoms  Description: Will show no infection signs and symptoms  Outcome: Ongoing  Goal: Absence of new skin breakdown  Description: Absence of new skin breakdown  Outcome: Ongoing  Goal: Risk for impaired skin integrity will decrease  Description: Risk for impaired skin integrity will decrease  Outcome: Ongoing     Problem:  Activity:  Goal: Risk for activity intolerance will decrease  Description: Risk for activity intolerance will decrease  Outcome: Ongoing     Problem: Coping:  Goal: Ability to adjust to condition or change in health will improve  Description: Ability to adjust to condition or change in health will improve  Outcome: Ongoing  Goal: Ability to remain calm will improve  Description: Ability to remain calm will improve  Outcome: Ongoing     Problem: Fluid Volume:  Goal: Ability to maintain a balanced intake and output will improve  Description: Ability to maintain a balanced intake and output will improve  Outcome: Ongoing     Problem: Health Behavior:  Goal: Ability to identify and utilize available resources and services will improve  Description: Ability to identify and utilize available resources and services will improve  Outcome: Ongoing  Goal: Ability to manage health-related needs will improve  Description: Ability to manage health-related needs will improve  Outcome: Ongoing     Problem: Metabolic:  Goal: Ability to maintain appropriate glucose levels will improve  Description: Ability to maintain appropriate glucose levels will improve  Outcome: Ongoing     Problem: Nutritional:  Goal: Maintenance of adequate nutrition will improve  Description: Maintenance of adequate nutrition will improve  Outcome: Ongoing  Goal: Progress toward achieving an optimal weight will improve  Description: Progress toward achieving an optimal weight will improve  Outcome: Ongoing     Problem: Physical Regulation:  Goal: Complications related to the disease process, condition or treatment will be avoided or minimized  Description: Complications related to the disease process, condition or treatment will be avoided or minimized  Outcome: Ongoing  Goal: Diagnostic test results will improve  Description: Diagnostic test results will improve  Outcome: Ongoing     Problem: Tissue Perfusion:  Goal: Adequacy of tissue perfusion will improve  Description: Adequacy of tissue perfusion will improve  Outcome: Ongoing     Problem: OXYGENATION/RESPIRATORY FUNCTION  Goal: Patient will maintain patent airway  Outcome: Ongoing  Goal: Patient will achieve/maintain normal respiratory rate/effort  Description: Respiratory rate and effort will be within normal limits for the patient  Outcome: Ongoing     Problem: HEMODYNAMIC STATUS  Goal: Patient has stable vital signs and fluid balance  Outcome: Ongoing     Problem: FLUID AND ELECTROLYTE IMBALANCE  Goal: Fluid and electrolyte balance are achieved/maintained  Outcome: Ongoing     Problem: ACTIVITY INTOLERANCE/IMPAIRED MOBILITY  Goal: Mobility/activity is maintained at optimum level for patient  Outcome: Ongoing     Problem: Discharge Planning:  Goal: Discharged to appropriate level of care  Description: Discharged to appropriate level of care  Outcome: Ongoing     Problem: Safety:  Goal: Ability to remain free from injury will improve  Description: Ability to remain free from injury will improve  Outcome: Ongoing     Problem: Self-Care:  Goal: Ability to participate in self-care as condition permits will improve  Description: Ability to participate in self-care as condition permits will improve  Outcome: Ongoing

## 2021-10-07 NOTE — PROGRESS NOTES
Comprehensive Nutrition Assessment    Type and Reason for Visit:  Initial, Positive Nutrition Screen    Nutrition Recommendations/Plan: Start diabetic oral supplement twice daily for skin healing, recommend MVI with minerals, vitamin C and Zinc    Nutrition Assessment:  Pt known to this RD from previous admits and ECF. Intakes recently declined due to infection. Per notes altered skin integrity. No chewing or swallowing issues. Will add diabetic oral supplement for skin healing    Malnutrition Assessment:  Malnutrition Status:  Mild malnutrition    Context:  Acute Illness     Findings of the 6 clinical characteristics of malnutrition:  Energy Intake:  Mild decrease in energy intake (Comment)  Weight Loss:  1 - 7.5% over 3 months     Body Fat Loss:  No significant body fat loss Orbital   Muscle Mass Loss:  No significant muscle mass loss Temples (temporalis)  Fluid Accumulation:  Unable to assess     Strength:  Not Performed    Estimated Daily Nutrient Needs:  Energy (kcal):  4767-3970; Weight Used for Energy Requirements:  Ideal     Protein (g):  63-73; Weight Used for Protein Requirements:  Ideal (1.2-1.4)        Fluid (ml/day):  1577-6554; Method Used for Fluid Requirements:  1 ml/kcal      Nutrition Related Findings:  Labs reviewed with K 5.2H, BUN/CR 30/1.4H, Glu 319, A1c from June 7.6, TSH high      Wounds:  Multiple, Open Wounds (open area to her coccyx and reddened L second toe)       Current Nutrition Therapies:    ADULT DIET;  Regular; 4 carb choices (60 gm/meal)  Adult Oral Nutrition Supplement; Diabetic Oral Supplement    Anthropometric Measures:  · Height: 5' 3\" (160 cm)  · Current Body Weight: 193 lb (87.5 kg)   · Admission Body Weight: 193 lb (87.5 kg)    · Usual Body Weight: 210 lb (95.3 kg) (previous admits and ECF, most recent weight 198#)     · Ideal Body Weight: 115 lbs; % Ideal Body Weight 167.8 %   · BMI: 34.2  · Adjusted Body Weight:  ; No Adjustment   · BMI Categories: Obese Class 1 (BMI 30.0-34. 9)       Nutrition Diagnosis:   · In context of acute illness or injury related to acute injury/trauma as evidenced by wounds, BMI, lab values      Nutrition Interventions:   Food and/or Nutrient Delivery:  Start Oral Nutrition Supplement, Continue Current Diet  Nutrition Education/Counseling:  Education not indicated   Coordination of Nutrition Care:  Continue to monitor while inpatient    Goals:  Oral intakes of meals and supplements will be at least 51-75% during her stay       Nutrition Monitoring and Evaluation:   Behavioral-Environmental Outcomes:   Other (Comment)   Food/Nutrient Intake Outcomes:  Food and Nutrient Intake, Supplement Intake  Physical Signs/Symptoms Outcomes:  Biochemical Data, Meal Time Behavior, Skin, Weight     Discharge Planning:    Continue Oral Nutrition Supplement, Continue current diet     Electronically signed by Shantanu Llamas RD, LD on 10/7/21 at 10:22 AM EDT    Contact: [phone]

## 2021-10-07 NOTE — CARE COORDINATION
CM met with the patient for discharge planning. Patient stated that she is from [facility] where she has been receiving therapy because she is unable to safely return home with her  who operates their family business. Patient stated that she was not requiring oxygen while at [facility] and ambulates with a walker. Patient stated that she is not able to return home at this time because she is still not strong enough to be alone during the day. Patient stated that her  would get her set up in the morning and get her breakfast before he left for work. Her son (who also works at the family business) would then stop at lunch time to get her lunch set up for her and would then stop again in the afternoon to check on her prior to her 's return in the evening. Patient plans to return to [facility] upon discharge. CM will follow. 10:36 AM  BLANCHE spoke with Raul Mercado at [facility] who verified that the patient is able to return anytime once deemed medically stable and must have a negative COVID test prior to discharge.

## 2021-10-08 VITALS
DIASTOLIC BLOOD PRESSURE: 60 MMHG | WEIGHT: 192.8 LBS | RESPIRATION RATE: 14 BRPM | HEIGHT: 63 IN | TEMPERATURE: 98.3 F | SYSTOLIC BLOOD PRESSURE: 133 MMHG | BODY MASS INDEX: 34.16 KG/M2 | HEART RATE: 67 BPM | OXYGEN SATURATION: 96 %

## 2021-10-08 PROBLEM — Q60.0 SOLITARY KIDNEY, CONGENITAL: Status: ACTIVE | Noted: 2021-10-08

## 2021-10-08 PROBLEM — E78.5 DYSLIPIDEMIA: Status: ACTIVE | Noted: 2021-10-08

## 2021-10-08 PROBLEM — I50.33 ACUTE ON CHRONIC HEART FAILURE WITH PRESERVED EJECTION FRACTION (HCC): Status: ACTIVE | Noted: 2021-10-08

## 2021-10-08 LAB
ANION GAP SERPL CALCULATED.3IONS-SCNC: 7 MMOL/L (ref 4–16)
BASOPHILS ABSOLUTE: 0 K/CU MM
BASOPHILS RELATIVE PERCENT: 0.3 % (ref 0–1)
BUN BLDV-MCNC: 38 MG/DL (ref 6–23)
CALCIUM SERPL-MCNC: 8.8 MG/DL (ref 8.3–10.6)
CHLORIDE BLD-SCNC: 101 MMOL/L (ref 99–110)
CO2: 26 MMOL/L (ref 21–32)
CREAT SERPL-MCNC: 1.6 MG/DL (ref 0.6–1.1)
DIFFERENTIAL TYPE: ABNORMAL
EOSINOPHILS ABSOLUTE: 0.3 K/CU MM
EOSINOPHILS RELATIVE PERCENT: 2.3 % (ref 0–3)
GFR AFRICAN AMERICAN: 38 ML/MIN/1.73M2
GFR NON-AFRICAN AMERICAN: 31 ML/MIN/1.73M2
GLUCOSE BLD-MCNC: 175 MG/DL (ref 70–99)
GLUCOSE BLD-MCNC: 252 MG/DL (ref 70–99)
GLUCOSE BLD-MCNC: 255 MG/DL (ref 70–99)
GLUCOSE BLD-MCNC: 270 MG/DL (ref 70–99)
HCT VFR BLD CALC: 30.1 % (ref 37–47)
HEMOGLOBIN: 8.7 GM/DL (ref 12.5–16)
IMMATURE NEUTROPHIL %: 1.3 % (ref 0–0.43)
LYMPHOCYTES ABSOLUTE: 2.4 K/CU MM
LYMPHOCYTES RELATIVE PERCENT: 19.7 % (ref 24–44)
MCH RBC QN AUTO: 26 PG (ref 27–31)
MCHC RBC AUTO-ENTMCNC: 28.9 % (ref 32–36)
MCV RBC AUTO: 90.1 FL (ref 78–100)
MONOCYTES ABSOLUTE: 0.9 K/CU MM
MONOCYTES RELATIVE PERCENT: 7.3 % (ref 0–4)
PDW BLD-RTO: 15.3 % (ref 11.7–14.9)
PLATELET # BLD: 296 K/CU MM (ref 140–440)
PMV BLD AUTO: 9.7 FL (ref 7.5–11.1)
POTASSIUM SERPL-SCNC: 4.6 MMOL/L (ref 3.5–5.1)
RBC # BLD: 3.34 M/CU MM (ref 4.2–5.4)
SARS-COV-2, NAAT: NOT DETECTED
SEGMENTED NEUTROPHILS ABSOLUTE COUNT: 8.3 K/CU MM
SEGMENTED NEUTROPHILS RELATIVE PERCENT: 69.1 % (ref 36–66)
SODIUM BLD-SCNC: 134 MMOL/L (ref 135–145)
SOURCE: NORMAL
TOTAL IMMATURE NEUTOROPHIL: 0.16 K/CU MM
WBC # BLD: 12 K/CU MM (ref 4–10.5)

## 2021-10-08 PROCEDURE — 6360000002 HC RX W HCPCS: Performed by: FAMILY MEDICINE

## 2021-10-08 PROCEDURE — 2580000003 HC RX 258: Performed by: FAMILY MEDICINE

## 2021-10-08 PROCEDURE — 80048 BASIC METABOLIC PNL TOTAL CA: CPT

## 2021-10-08 PROCEDURE — 6370000000 HC RX 637 (ALT 250 FOR IP): Performed by: FAMILY MEDICINE

## 2021-10-08 PROCEDURE — 99221 1ST HOSP IP/OBS SF/LOW 40: CPT | Performed by: NURSE PRACTITIONER

## 2021-10-08 PROCEDURE — 2700000000 HC OXYGEN THERAPY PER DAY

## 2021-10-08 PROCEDURE — 87635 SARS-COV-2 COVID-19 AMP PRB: CPT

## 2021-10-08 PROCEDURE — 36415 COLL VENOUS BLD VENIPUNCTURE: CPT

## 2021-10-08 PROCEDURE — 82962 GLUCOSE BLOOD TEST: CPT

## 2021-10-08 PROCEDURE — 85025 COMPLETE CBC W/AUTO DIFF WBC: CPT

## 2021-10-08 PROCEDURE — 94761 N-INVAS EAR/PLS OXIMETRY MLT: CPT

## 2021-10-08 RX ORDER — HYDRALAZINE HYDROCHLORIDE 25 MG/1
25 TABLET, FILM COATED ORAL 2 TIMES DAILY
Status: DISCONTINUED | OUTPATIENT
Start: 2021-10-08 | End: 2021-10-08 | Stop reason: HOSPADM

## 2021-10-08 RX ORDER — FUROSEMIDE 20 MG/1
20 TABLET ORAL DAILY
Qty: 60 TABLET | Refills: 0
Start: 2021-10-09

## 2021-10-08 RX ORDER — HYDRALAZINE HYDROCHLORIDE 25 MG/1
25 TABLET, FILM COATED ORAL 2 TIMES DAILY
Qty: 90 TABLET | Refills: 0
Start: 2021-10-08

## 2021-10-08 RX ORDER — FUROSEMIDE 20 MG/1
20 TABLET ORAL DAILY
Status: DISCONTINUED | OUTPATIENT
Start: 2021-10-09 | End: 2021-10-08 | Stop reason: HOSPADM

## 2021-10-08 RX ORDER — SULFAMETHOXAZOLE AND TRIMETHOPRIM 800; 160 MG/1; MG/1
1 TABLET ORAL EVERY 12 HOURS SCHEDULED
Qty: 6 TABLET | Refills: 0
Start: 2021-10-08 | End: 2021-10-11

## 2021-10-08 RX ADMIN — SODIUM CHLORIDE, PRESERVATIVE FREE 10 ML: 5 INJECTION INTRAVENOUS at 09:30

## 2021-10-08 RX ADMIN — MUPIROCIN: 20 OINTMENT TOPICAL at 09:59

## 2021-10-08 RX ADMIN — ASPIRIN 81 MG: 81 TABLET, COATED ORAL at 09:52

## 2021-10-08 RX ADMIN — LEVOTHYROXINE SODIUM 175 MCG: 0.05 TABLET ORAL at 06:02

## 2021-10-08 RX ADMIN — ATENOLOL 50 MG: 50 TABLET ORAL at 09:52

## 2021-10-08 RX ADMIN — DULOXETINE 60 MG: 60 CAPSULE, DELAYED RELEASE ORAL at 09:51

## 2021-10-08 RX ADMIN — METHOCARBAMOL 500 MG: 500 TABLET ORAL at 14:12

## 2021-10-08 RX ADMIN — ENOXAPARIN SODIUM 40 MG: 40 INJECTION SUBCUTANEOUS at 09:59

## 2021-10-08 RX ADMIN — HYDRALAZINE HYDROCHLORIDE 10 MG: 10 TABLET, FILM COATED ORAL at 09:52

## 2021-10-08 RX ADMIN — MULTIPLE VITAMINS W/ MINERALS TAB 1 TABLET: TAB at 09:53

## 2021-10-08 RX ADMIN — ATORVASTATIN CALCIUM 20 MG: 20 TABLET, FILM COATED ORAL at 09:53

## 2021-10-08 RX ADMIN — AMLODIPINE BESYLATE 10 MG: 10 TABLET ORAL at 09:53

## 2021-10-08 RX ADMIN — FUROSEMIDE 40 MG: 10 INJECTION, SOLUTION INTRAMUSCULAR; INTRAVENOUS at 09:30

## 2021-10-08 RX ADMIN — INSULIN HUMAN 40 UNITS: 100 INJECTION, SOLUTION PARENTERAL at 10:00

## 2021-10-08 RX ADMIN — SULFAMETHOXAZOLE AND TRIMETHOPRIM 1 TABLET: 800; 160 TABLET ORAL at 09:53

## 2021-10-08 RX ADMIN — METHOCARBAMOL 500 MG: 500 TABLET ORAL at 09:53

## 2021-10-08 RX ADMIN — INSULIN HUMAN 30 UNITS: 100 INJECTION, SOLUTION PARENTERAL at 12:25

## 2021-10-08 RX ADMIN — PANTOPRAZOLE SODIUM 40 MG: 40 TABLET, DELAYED RELEASE ORAL at 09:52

## 2021-10-08 RX ADMIN — NAPROXEN 500 MG: 500 TABLET ORAL at 09:52

## 2021-10-08 ASSESSMENT — PAIN SCALES - GENERAL
PAINLEVEL_OUTOF10: 0

## 2021-10-08 NOTE — PLAN OF CARE
improve  Outcome: Ongoing  Goal: Progress toward achieving an optimal weight will improve  Description: Progress toward achieving an optimal weight will improve  Outcome: Ongoing     Problem: Physical Regulation:  Goal: Complications related to the disease process, condition or treatment will be avoided or minimized  Description: Complications related to the disease process, condition or treatment will be avoided or minimized  Outcome: Ongoing  Goal: Diagnostic test results will improve  Description: Diagnostic test results will improve  Outcome: Ongoing     Problem: Tissue Perfusion:  Goal: Adequacy of tissue perfusion will improve  Description: Adequacy of tissue perfusion will improve  Outcome: Ongoing     Problem: OXYGENATION/RESPIRATORY FUNCTION  Goal: Patient will maintain patent airway  Outcome: Ongoing  Goal: Patient will achieve/maintain normal respiratory rate/effort  Description: Respiratory rate and effort will be within normal limits for the patient  Outcome: Ongoing     Problem: HEMODYNAMIC STATUS  Goal: Patient has stable vital signs and fluid balance  Outcome: Ongoing     Problem: FLUID AND ELECTROLYTE IMBALANCE  Goal: Fluid and electrolyte balance are achieved/maintained  Outcome: Ongoing     Problem: ACTIVITY INTOLERANCE/IMPAIRED MOBILITY  Goal: Mobility/activity is maintained at optimum level for patient  Outcome: Ongoing     Problem: Discharge Planning:  Goal: Discharged to appropriate level of care  Description: Discharged to appropriate level of care  Outcome: Ongoing     Problem: Safety:  Goal: Ability to remain free from injury will improve  Description: Ability to remain free from injury will improve  Outcome: Ongoing     Problem: Self-Care:  Goal: Ability to participate in self-care as condition permits will improve  Description: Ability to participate in self-care as condition permits will improve  Outcome: Ongoing

## 2021-10-08 NOTE — DISCHARGE INSTR - COC
Continuity of Care Form    Patient Name: Lacy El   :  [birth year]  MRN:  [id number]    Admit date:  10/6/2021  Discharge date:  10/8/2021    Code Status Order: Full Code   Advance Directives:      Admitting Physician:  Liliya Shepherd MD  PCP: Slade Cabral    Discharging Nurse: Milton Guallpa [id number] Unit/Room#: 007/007-01  Discharging Unit Phone Number: [phone]    Emergency Contact:   Extended Emergency Contact Information  Primary Emergency Contact: Donaldo Vega  Address: [address] [address] [address] [place] [address], [address] [place] [address] Phone: [phone]  Mobile Phone: [phone]  Relation: Spouse  Secondary Emergency Contact: [address] [facility] Phone: [phone]  Mobile Phone: [phone]  Relation: Child    Past Surgical History:  Past Surgical History:   Procedure Laterality Date    ABDOMEN SURGERY      CYST REMOVAL      from kidney.     DILATATION, ESOPHAGUS      ENDOSCOPY, COLON, DIAGNOSTIC      FOOT SURGERY  11    had infection  and a biopsy was sent away for analysis    HERNIA REPAIR      HYSTERECTOMY      KIDNEY REMOVAL         Immunization History:   Immunization History   Administered Date(s) Administered    COVID-19, J&J, PF, 0.5 mL 2021    Influenza A ([id number]) Vaccine PF IM 2010    Influenza Virus Vaccine 10/25/2006, 2009, 2012, 10/19/2013, 2019    Influenza, MDCK Quadv, IM, PF (Flucelvax 2 yrs and older) 2018    Influenza, Quadv, IM, PF (6 mo and older Fluzone, Flulaval, Fluarix, and 3 yrs and older Afluria) 2019    Pneumococcal Conjugate 13-valent (Ghaasth10) 2018    Pneumococcal Polysaccharide (Jnvvfeybs19) 2012       Active Problems:  Patient Active Problem List   Diagnosis Code    Type 2 diabetes mellitus with foot ulcer, with long-term current use of insulin [facility] E11.621, L97.509, Z79.4    Ulcer of right foot with fat layer exposed [facility]) L97.512    Hallux valgus (acquired), right foot M20.11    Chronic pain syndrome G89.4    Hypertension I10    Diabetes mellitus type 2 in obese ([facility] E11.69, E66.9    GERD (gastroesophageal reflux disease) K21.9    Thyroid disease E07.9    Generalized weakness R53.1    Hyperthyroidism E05.90    Palpitations R00.2    AMS (altered mental status) R41.82    WD-Diabetic ulcer of toe of left foot associated with type 2 diabetes mellitus, with fat layer exposed ([facility] E11.621, L97.522    Altered mental status R41.82    Acquired hypothyroidism E03.9    Hypoglycemia E16.2    Herpes zoster B02.9    Pulmonary edema cardiac cause (HCC) I50.1    Solitary kidney, congenital Q60.0    Dyslipidemia E78.5    Acute on chronic heart failure with preserved ejection fraction (HCC) I50.33       Isolation/Infection:   Isolation            No Isolation          Patient Infection Status       Infection Onset Added Last Indicated Last Indicated By Review Planned Expiration Resolved Resolved By    None active    Resolved    COVID-19 Rule Out 10/06/21 10/06/21 10/06/21 COVID-19, Rapid (Ordered)   10/06/21 Rule-Out Test Resulted    COVID-19 Rule Out 05/09/21 05/09/21 05/09/21 COVID-19, Rapid (Ordered)   05/09/21 Rule-Out Test Resulted    COVID-19 Rule Out 12/30/20 12/30/20 12/30/20 COVID-19 (Ordered)   12/30/20 Rule-Out Test Resulted            Nurse Assessment:  Last Vital Signs: /60   Pulse 67   Temp 98.3 °F (36.8 °C) (Oral)   Resp 14   Ht 5' 3\" (1.6 m)   Wt 192 lb 12.8 oz (87.5 kg)   SpO2 91%   BMI 34.15 kg/m²     Last documented pain score (0-10 scale): Pain Level: 0  Last Weight:   Wt Readings from Last 1 Encounters:   10/08/21 192 lb 12.8 oz (87.5 kg)     Mental Status:  alert and short term memory loss.  Confused at times    IV Access:  - None    Nursing Mobility/ADLs:  Walking   Dependent  Transfer  Dependent  Bathing  Dependent  Dressing  Dependent  Toileting  Dependent  Feeding  Independent  Med Admin  Assisted  Med Delivery   whole    Wound Care Documentation and Therapy:  Wound 10/06/21 (Active)   Number of days: 2       Wound 10/06/21 Coccyx Right 3 open areas (Active)   Wound Etiology Pressure Stage  1 10/07/21 1443   Dressing Status Clean;Dry; Intact 10/07/21 2000   Wound Cleansed Soap and water 10/07/21 1443   Dressing Change Due 10/10/21 10/07/21 2000   Drainage Amount None 10/07/21 2000   Odor None 10/07/21 1443   Ava-wound Assessment Blanchable erythema 10/07/21 1443   Number of days: 1        Elimination:  Continence:   · Bowel: No  · Bladder: No  Urinary Catheter: None   Colostomy/Ileostomy/Ileal Conduit: No       Date of Last BM: Unknown    Intake/Output Summary (Last 24 hours) at 10/8/2021 1628  Last data filed at 10/8/2021 0400  Gross per 24 hour   Intake --   Output 150 ml   Net -150 ml     I/O last 3 completed shifts:  In: -   Out: 150 [Urine:150]    Safety Concerns: At Risk for Falls    Impairments/Disabilities:      Vision    Nutrition Therapy:  Current Nutrition Therapy:   - Oral Diet:  Carb Control 4 carbs/meal (1800kcals/day)    Routes of Feeding: Oral  Liquids: Thin Liquids  Daily Fluid Restriction: no  Last Modified Barium Swallow with Video (Video Swallowing Test): not done    Treatments at the Time of Hospital Discharge:   Respiratory Treatments: N/A  Oxygen Therapy:  is on oxygen at 1 L/min per nasal cannula.   Ventilator:    - No ventilator support    Rehab Therapies: Physical Therapy and Occupational Therapy  Weight Bearing Status/Restrictions: No weight bearing restirctions  Other Medical Equipment (for information only, NOT a DME order):  wheelchair  Other Treatments: N/A    Patient's personal belongings (please select all that are sent with patient):  Glasses, Dentures upper and lower    RN SIGNATURE:  Electronically signed by Bbo Parks RN on 10/8/21 at [address] PM EDT    CASE MANAGEMENT/SOCIAL WORK SECTION    Inpatient Status Date: ***    Readmission Risk Assessment Score:  Readmission Risk              Risk of Unplanned Readmission:  22           Discharging to Facility/ Agency   · Name:   · Address:  · Phone:  · Fax:    Dialysis Facility (if applicable)   · Name:  · Address:  · Dialysis Schedule:  · Phone:  · Fax:    / signature: {Esignature:[id number]:::0}    PHYSICIAN SECTION    Prognosis: Good    Condition at Discharge: Stable    Rehab Potential (if transferring to Rehab): Good    Recommended Labs or Other Treatments After Discharge: bmp in 3-5 days    Physician Certification: I certify the above information and transfer of Belinda Perez  is necessary for the continuing treatment of the diagnosis listed and that she requires [place] for less 30 days.      Update Admission H&P: No change in H&P    PHYSICIAN SIGNATURE:  Electronically signed by Rylee Bueno MD on 10/8/21 at 4:28 PM EDT

## 2021-10-08 NOTE — PLAN OF CARE
Problem: Falls - Risk of:  Goal: Will remain free from falls  Description: Will remain free from falls  10/7/2021 2251 by Lisa Lamb RN  Outcome: Ongoing  10/7/2021 1857 by Rosalie Alvarado RN  Outcome: Ongoing  10/7/2021 1856 by Rosalie Alvarado RN  Outcome: Met This Shift  Goal: Absence of physical injury  Description: Absence of physical injury  10/7/2021 2251 by Lisa Lamb RN  Outcome: Ongoing  10/7/2021 1857 by Rosalie Alvarado RN  Outcome: Ongoing  10/7/2021 1856 by Rosalie Alvarado RN  Outcome: Met This Shift     Problem: Skin Integrity:  Goal: Will show no infection signs and symptoms  Description: Will show no infection signs and symptoms  10/7/2021 2251 by Lisa Lamb RN  Outcome: Ongoing  10/7/2021 1857 by Rosalie Alvarado RN  Outcome: Ongoing  10/7/2021 1856 by Rosalie Alvarado RN  Outcome: Ongoing  Goal: Absence of new skin breakdown  Description: Absence of new skin breakdown  10/7/2021 2251 by Lisa Lamb RN  Outcome: Ongoing  10/7/2021 1857 by Rosalie Alvarado RN  Outcome: Ongoing  10/7/2021 1856 by Rosalie Alvarado RN  Outcome: Ongoing  Goal: Risk for impaired skin integrity will decrease  Description: Risk for impaired skin integrity will decrease  10/7/2021 2251 by Lisa Lamb RN  Outcome: Ongoing  10/7/2021 1857 by Rosalie Alvarado RN  Outcome: Ongoing  10/7/2021 1856 by Rosalie Alvarado RN  Outcome: Ongoing     Problem:  Activity:  Goal: Risk for activity intolerance will decrease  Description: Risk for activity intolerance will decrease  10/7/2021 2251 by Lisa Lamb RN  Outcome: Ongoing  10/7/2021 1857 by Rosalie Alvarado RN  Outcome: Ongoing  10/7/2021 1856 by Rosalie Alvarado RN  Outcome: Ongoing     Problem: Coping:  Goal: Ability to adjust to condition or change in health will improve  Description: Ability to adjust to condition or change in health will improve  10/7/2021 2251 by Lisa Lamb RN  Outcome: Ongoing  10/7/2021 1857 by Rosalie Alvarado RN  Outcome: Ongoing  10/7/2021 1856 by Jeremie Mitchell Donaldo Maria RN  Outcome: Ongoing  Goal: Ability to remain calm will improve  Description: Ability to remain calm will improve  10/7/2021 2251 by Sonido Whipple RN  Outcome: Ongoing  10/7/2021 1857 by Roseanne Escoto RN  Outcome: Ongoing  10/7/2021 1856 by Roseanne Escoto RN  Outcome: Ongoing     Problem: Fluid Volume:  Goal: Ability to maintain a balanced intake and output will improve  Description: Ability to maintain a balanced intake and output will improve  10/7/2021 2251 by Sonido Whipple RN  Outcome: Ongoing  10/7/2021 1857 by Roseanne Escoto RN  Outcome: Ongoing  10/7/2021 1856 by Roseanne Escoto RN  Outcome: Met This Shift     Problem: Health Behavior:  Goal: Ability to identify and utilize available resources and services will improve  Description: Ability to identify and utilize available resources and services will improve  10/7/2021 2251 by Sonido Whipple RN  Outcome: Ongoing  10/7/2021 1857 by Roseanne Escoto RN  Outcome: Ongoing  10/7/2021 1856 by Roseanne Escoto RN  Outcome: Ongoing  Goal: Ability to manage health-related needs will improve  Description: Ability to manage health-related needs will improve  10/7/2021 2251 by Sonido Whipple RN  Outcome: Ongoing  10/7/2021 1857 by Roseanne Escoto RN  Outcome: Ongoing  10/7/2021 1856 by Roseanne Escoto RN  Outcome: Ongoing     Problem: Metabolic:  Goal: Ability to maintain appropriate glucose levels will improve  Description: Ability to maintain appropriate glucose levels will improve  10/7/2021 2251 by Sonido Whipple RN  Outcome: Ongoing  10/7/2021 1857 by Roseanne Escoto RN  Outcome: Ongoing  10/7/2021 1856 by Roseanne Escoto RN  Outcome: Ongoing     Problem: Nutritional:  Goal: Maintenance of adequate nutrition will improve  Description: Maintenance of adequate nutrition will improve  10/7/2021 2251 by Sonido Whipple RN  Outcome: Ongoing  10/7/2021 1857 by Roseanne Escoto RN  Outcome: Ongoing  10/7/2021 1856 by Roseanne Escoto RN  Outcome: Ongoing  Goal: Progress toward achieving an optimal weight will improve  Description: Progress toward achieving an optimal weight will improve  10/7/2021 2251 by Isidro Guerin RN  Outcome: Ongoing  10/7/2021 1857 by Sam Cuellar RN  Outcome: Ongoing  10/7/2021 1856 by Sam Cuellar RN  Outcome: Ongoing     Problem: Physical Regulation:  Goal: Complications related to the disease process, condition or treatment will be avoided or minimized  Description: Complications related to the disease process, condition or treatment will be avoided or minimized  10/7/2021 2251 by Isidro Guerin RN  Outcome: Ongoing  10/7/2021 1857 by Sam Cuellar RN  Outcome: Ongoing  10/7/2021 1856 by Sam Cuellar RN  Outcome: Ongoing  Goal: Diagnostic test results will improve  Description: Diagnostic test results will improve  10/7/2021 2251 by Isidro Guerin RN  Outcome: Ongoing  10/7/2021 1857 by Sam Cuellar RN  Outcome: Ongoing  10/7/2021 1856 by Sam Cuellar RN  Outcome: Ongoing     Problem: Tissue Perfusion:  Goal: Adequacy of tissue perfusion will improve  Description: Adequacy of tissue perfusion will improve  10/7/2021 2251 by Isidro Guerin RN  Outcome: Ongoing  10/7/2021 1857 by Sam Cuellar RN  Outcome: Ongoing  10/7/2021 1856 by Sam Cuellar RN  Outcome: Ongoing     Problem: OXYGENATION/RESPIRATORY FUNCTION  Goal: Patient will maintain patent airway  10/7/2021 2251 by Isidro Guerin RN  Outcome: Ongoing  10/7/2021 1857 by Sam Cuellar RN  Outcome: Ongoing  10/7/2021 1856 by Sam Cuellar RN  Outcome: Ongoing  Goal: Patient will achieve/maintain normal respiratory rate/effort  Description: Respiratory rate and effort will be within normal limits for the patient  10/7/2021 2251 by Isidro Guerin RN  Outcome: Ongoing  10/7/2021 1857 by Sam Cuellar RN  Outcome: Ongoing  10/7/2021 1856 by Sam Cuellar RN  Outcome: Ongoing     Problem: HEMODYNAMIC STATUS  Goal: Patient has stable vital signs and fluid balance  10/7/2021 2251 by Isidro Guerin, RN  Outcome: Ongoing  10/7/2021 1857 by Alyssa Ricks RN  Outcome: Ongoing  10/7/2021 1856 by Alyssa Ricks RN  Outcome: Met This Shift     Problem: FLUID AND ELECTROLYTE IMBALANCE  Goal: Fluid and electrolyte balance are achieved/maintained  10/7/2021 2251 by Cl Ramirez RN  Outcome: Ongoing  10/7/2021 1857 by lAyssa Ricks RN  Outcome: Ongoing  10/7/2021 1856 by Alyssa Ricks RN  Outcome: Met This Shift     Problem: ACTIVITY INTOLERANCE/IMPAIRED MOBILITY  Goal: Mobility/activity is maintained at optimum level for patient  10/7/2021 2251 by Cl Ramirez RN  Outcome: Ongoing  10/7/2021 1857 by Alyssa Ricks RN  Outcome: Ongoing  10/7/2021 1856 by Alyssa Ricks RN  Outcome: Ongoing     Problem: Discharge Planning:  Goal: Discharged to appropriate level of care  Description: Discharged to appropriate level of care  10/7/2021 2251 by Cl Ramirez RN  Outcome: Ongoing  10/7/2021 1857 by Alyssa Ricks RN  Outcome: Ongoing  10/7/2021 1856 by Alyssa Ricks RN  Outcome: Ongoing     Problem: Safety:  Goal: Ability to remain free from injury will improve  Description: Ability to remain free from injury will improve  10/7/2021 2251 by Cl Ramirez RN  Outcome: Ongoing  10/7/2021 1857 by Alyssa Ricks RN  Outcome: Ongoing  10/7/2021 1856 by Alyssa Ricks RN  Outcome: Ongoing     Problem: Self-Care:  Goal: Ability to participate in self-care as condition permits will improve  Description: Ability to participate in self-care as condition permits will improve  10/7/2021 2251 by Cl Ramirez RN  Outcome: Ongoing  10/7/2021 1857 by Alyssa Ricks RN  Outcome: Ongoing  10/7/2021 1856 by Alyssa Ricks RN  Outcome: Ongoing

## 2021-10-08 NOTE — DISCHARGE SUMMARY
Discharge Summary    Name:  Belinda Perez /Age/Sex: 46[birth year]  (77 y.o. female)   MRN & CSN:  [id number] & [id number] Admission Date/Time: 10/6/2021  2:11 PM   Attending:  Rylee Bueno MD Discharging Physician: Rylee Bueno MD     Hospital Course:   Belinda Perez is a 77 y.o.  female  who presents with <principal problem not specified>       1. ACUTE HEART FAILURE WITH PRESERVED EF  -improved . Seen by cardiology. No further workup at this time. Transition to PO lasix 20mg daily. DC back to [place]. Follow up Dr Cindi Day 10/14/21     2. ACUTE RESPIRATORY FAILURE WITH HYPOXIA  -resolved     3. TYPE 2 DIABETES  -resume previous regimen     4. HYPERKALEMIA  -resolved     5. UTI  -continue bactrim 3 more days     6. CKD stage 3a  -Stable monitor     7. ASTHMA  -stable no wheezing      8. ESSENTIAL HYPERTENSION  -stable hydralazine has been increased by cardiology re check at [facility]     9. ACQUIRED HYPOTHYROIDISM  -TSH borderline low, will continue current dose for now, defer to regular managing provider     10. HYPERCHOLESTEROLEMIA  -continue medication      The patient expressed appropriate understanding of and agreement with the discharge recommendations, medications, and plan.      I Called  to update on condition and DC plan, left message on voicemail it identified as his voicemail    Consults this admission:  [address]    Discharge Instruction:   Follow up appointments: Dr Cindi aDy 10/14/21  Primary care physician:  Upon return to SNF    Diet:  diabetic diet  Low sodium  Activity: activity as tolerated  Disposition: Discharged to:   []Home, [][facility], [x]SNF, []Acute Rehab, []Hospice   Condition on discharge: Stable    Discharge Medications:      [facility] Medication Instructions GED:[id number]    Printed on:10/08/21 2212   Medication Information                      acetaminophen (TYLENOL) 500 MG tablet  Take 1,000 mg by mouth every 6 Discharge:   /60   Pulse 67   Temp 98.3 °F (36.8 °C) (Oral)   Resp 14   Ht 5' 3\" (1.6 m)   Wt 192 lb 12.8 oz (87.5 kg)   SpO2 91%   BMI 34.15 kg/m²            PHYSICAL EXAM   GEN Awake female, sitting upright in bed in no apparent distress. Appears given age. EYES Pupils are equally round. No scleral erythema, discharge, or conjunctivitis. HENT Mucous membranes are moist. Oral pharynx without exudates, no evidence of thrush. NECK Supple, no apparent thyromegaly or masses. RESP Bibasilar rales, no wheezes or rhonchi. Symmetric chest movement while on room air. CARDIO/VASC S1/S2 auscultated. Regular rate without appreciable murmurs, rubs, or gallops. No JVD or carotid bruits. Peripheral pulses equal bilaterally and palpable. No peripheral edema. GI Abdomen is soft without significant tenderness, masses, or guarding. Bowel sounds are normoactive. Rectal exam deferred. HEME/LYMPH No palpable cervical lymphadenopathy and no hepatosplenomegaly. No petechiae or ecchymoses. MSK No gross joint deformities. SKIN Normal coloration, warm, dry. NEURO Cranial nerves appear grossly intact, normal speech, no lateralizing weakness. PSYCH Awake, alert, oriented x 4. Affect appropriate.     BMP/CBC  Recent Labs     10/06/21  1445 10/07/21  0520 10/08/21  0558    137 134*   K 4.3 5.2* 4.6    102 101   CO2 29 17* 26   BUN 24* 30* 38*   CREATININE 1.3* 1.4* 1.6*   WBC 10.6* 4.7 12.0*   HCT 30.9* 29.6* 30.1*    214 296       IMAGING:      Discharge Time of 35 minutes    Electronically signed by Fred Yin MD on 10/8/2021 at 4:29 PM

## 2021-10-08 NOTE — PROGRESS NOTES
Pt has been turned and repositioned every 2 hrs. Pt has been assisting in turning herself. Pt has also dislodged the purewick most times resulting in incontinent care being given and brief being changed. Pt has been more alert and talkative.

## 2021-10-08 NOTE — PROGRESS NOTES
Skin assessment complete with mepilex intact to wound clusters to buttock. Attends changed after incon of urine. Placed on cart for [facility] to transfer.

## 2021-10-08 NOTE — CONSULTS
CARDIOLOGY CONSULT NOTE     Reason for consultation:  Pulmonary edema    Referring physician:  Edward Quinteros MD     Primary care physician: TOÑITO Little      Thank you for the consult. Chief Complaints :  Chief Complaint   Patient presents with    Shortness of Breath     Pt arrives per cart from [facility], staff report entering pt room and pt wheezing, gave pt inhaler but when checking oxygen level was 60's. Pt placed on 2 LNC, pt present with audible wheezes heard         History of present illness:[place] is a 77 y. o.year old who presents with shortness of breath and wheezing from [facility]. She does not know why she is here and she does not understand what is going on. Her she states that she is okay. She does not remember being short of breath. Per ER records patient was having shortness of breath and wheezing and she was having swelling and it has been worse over the last two weeks. Past medical history:    has a past medical history of Anemia, Arthritis, Assistance needed for personal hygiene, Asthma, Cellulitis of right foot, Chronic kidney disease, Cognitive communication deficit, Depression, Diabetes mellitus ([address] Frequent falls, GERD (gastroesophageal reflux disease), H/O Doppler ultrasound, H/O echocardiogram, Hallux valgus (acquired), right foot, Hyperlipemia, Hypertension, Muscle weakness, Other disorders of electrolyte and fluid balance, not elsewhere classified, Other disorders of kidney and ureter, Shingles, SIRS (systemic inflammatory response syndrome) ([address] Thyroid disease, Type 2 diabetes mellitus with foot ulcer, with long-term current use of insulin ([address] Ulcer of right foot with fat layer exposed ([address] WD-Diabetic ulcer of toe of left foot associated with type 2 diabetes mellitus, with fat layer exposed ([address] and Weakness. Past surgical history:   has a past surgical history that includes Hysterectomy;  Foot surgery (12/30/11); cyst removal; Kidney removal (1973); Abdomen surgery; hernia repair; Endoscopy, colon, diagnostic; and Dilatation, esophagus. Social History:   reports that she has never smoked. She has never used smokeless tobacco. She reports that she does not drink alcohol and does not use drugs.   Family history:   no family history of CAD, STROKE of DM at early age    Allergies   Allergen Reactions    Ritalin [Methylphenidate] Rash    Rocephin [Ceftriaxone Sodium-Lidocaine] Rash       albuterol sulfate  (90 Base) MCG/ACT inhaler 2 puff, Q4H PRN  amLODIPine (NORVASC) tablet 10 mg, Daily  aspirin EC tablet 81 mg, Daily  atenolol (TENORMIN) tablet 50 mg, BID  atorvastatin (LIPITOR) tablet 20 mg, Daily  DULoxetine (CYMBALTA) extended release capsule 60 mg, Daily  hydrALAZINE (APRESOLINE) tablet 10 mg, BID  levothyroxine (SYNTHROID) tablet 175 mcg, Daily  therapeutic multivitamin-minerals 1 tablet, Daily  naproxen (NAPROSYN) tablet 500 mg, BID  pantoprazole (PROTONIX) tablet 40 mg, Daily  glucose (GLUTOSE) 40 % oral gel 15 g, PRN  dextrose 50 % IV solution, PRN  glucagon (rDNA) injection 1 mg, PRN  dextrose 5 % solution, PRN  insulin lispro (HUMALOG) injection vial 0-12 Units, TID WC  insulin lispro (HUMALOG) injection vial 0-6 Units, Nightly  insulin regular (HUMULIN R;NOVOLIN R) injection 40 Units, QAM AC  insulin regular (HUMULIN R;NOVOLIN R) injection 30 Units, Daily before lunch  insulin regular (HUMULIN R;NOVOLIN R) injection 30 Units, Dinner  hydrOXYzine (ATARAX) tablet 10 mg, TID PRN  mupirocin (BACTROBAN) 2 % ointment, BID  albuterol (PROVENTIL) nebulizer solution 2.5 mg, Q6H PRN  sulfamethoxazole-trimethoprim (BACTRIM DS;SEPTRA DS) 800-160 MG per tablet 1 tablet, 2 times per day  furosemide (LASIX) injection 40 mg, Daily  methocarbamol (ROBAXIN) tablet 500 mg, TID  sodium chloride flush 0.9 % injection 5-40 mL, 2 times per day  sodium chloride flush 0.9 % injection 5-40 mL, PRN  0.9 % sodium chloride infusion, PRN  enoxaparin (LOVENOX) injection 40 mg, Daily  ondansetron (ZOFRAN-ODT) disintegrating tablet 4 mg, Q8H PRN   Or  ondansetron (ZOFRAN) injection 4 mg, Q6H PRN  polyethylene glycol (GLYCOLAX) packet 17 g, Daily PRN  acetaminophen (TYLENOL) tablet 650 mg, Q6H PRN   Or  acetaminophen (TYLENOL) suppository 650 mg, Q6H PRN      Current Facility-Administered Medications   Medication Dose Route Frequency Provider Last Rate Last Admin    albuterol sulfate  (90 Base) MCG/ACT inhaler 2 puff  2 puff Inhalation Q4H PRN Kat Ortega MD        amLODIPine (NORVASC) tablet 10 mg  10 mg Oral Daily Darrick Bran MD   10 mg at 10/08/21 0953    aspirin EC tablet 81 mg  81 mg Oral Daily Darrick Bran MD   81 mg at 10/08/21 4448    atenolol (TENORMIN) tablet 50 mg  50 mg Oral BID Darrick Bran MD   50 mg at 10/08/21 1917    atorvastatin (LIPITOR) tablet 20 mg  20 mg Oral Daily Darrick Bran MD   20 mg at 10/08/21 0953    DULoxetine (CYMBALTA) extended release capsule 60 mg  60 mg Oral Daily Darrick Bran MD   60 mg at 10/08/21 0951    hydrALAZINE (APRESOLINE) tablet 10 mg  10 mg Oral BID Darrick Bran MD   10 mg at 10/08/21 8494    levothyroxine (SYNTHROID) tablet 175 mcg  175 mcg Oral Daily Darrick Bran MD   175 mcg at 10/08/21 0602    therapeutic multivitamin-minerals 1 tablet  1 tablet Oral Daily Darrick Bran MD   1 tablet at 10/08/21 0953    naproxen (NAPROSYN) tablet 500 mg  500 mg Oral BID Darrick Bran MD   500 mg at 10/08/21 0429    pantoprazole (PROTONIX) tablet 40 mg  40 mg Oral Daily Darrick Bran MD   40 mg at 10/08/21 0952    glucose (GLUTOSE) 40 % oral gel 15 g  15 g Oral PRN Darrick Bran MD        dextrose 50 % IV solution  12.5 g IntraVENous PRN Darrick Bran MD        glucagon (rDNA) injection 1 mg  1 mg IntraMUSCular PRN Darrick Bran MD        dextrose 5 % solution  100 mL/hr IntraVENous PRN Darrick Bran MD       [address] [place] [address] insulin lispro (HUMALOG) injection vial 0-12 Units  0-12 Units SubCUTAneous TID WC Marie Burciaga MD   6 Units at 10/08/21 1225    insulin lispro (HUMALOG) injection vial 0-6 Units  0-6 Units SubCUTAneous Nightly Marei Burciaga MD   4 Units at 10/07/21 2021    insulin regular (HUMULIN R;NOVOLIN R) injection 40 Units  40 Units SubCUTAneous QAM AC Marie Burciaga MD   40 Units at 10/08/21 1000    insulin regular (HUMULIN R;NOVOLIN R) injection 30 Units  30 Units SubCUTAneous Daily before lunch Marie Burciaga MD   30 Units at 10/08/21 1225    insulin regular (HUMULIN R;NOVOLIN R) injection 30 Units  30 Units SubCUTAneous Rayray Izaguirre MD   30 Units at 10/07/21 1751    hydrOXYzine (ATARAX) tablet 10 mg  10 mg Oral TID PRN Marie Burciaga MD        mupirocin (BACTROBAN) 2 % ointment   Topical BID Marie Burciaga MD   Given at 10/08/21 0959    albuterol (PROVENTIL) nebulizer solution 2.5 mg  2.5 mg Nebulization Q6H PRN Marie Burciaga MD        sulfamethoxazole-trimethoprim (BACTRIM DS;SEPTRA DS) 800-160 MG per tablet 1 tablet  1 tablet Oral 2 times per day Marie Burciaga MD   1 tablet at 10/08/21 0953    furosemide (LASIX) injection 40 mg  40 mg IntraVENous Daily Marie Burciaga MD   40 mg at 10/08/21 0930    methocarbamol (ROBAXIN) tablet 500 mg  500 mg Oral TID Marie Burciaga MD   500 mg at 10/08/21 1412    sodium chloride flush 0.9 % injection 5-40 mL  5-40 mL IntraVENous 2 times per day Marie Burciaga MD   10 mL at 10/08/21 0930    sodium chloride flush 0.9 % injection 5-40 mL  5-40 mL IntraVENous PRN Marie Burciaga MD        0.9 % sodium chloride infusion  25 mL IntraVENous PRN Marie Burciaga MD        enoxaparin (LOVENOX) injection 40 mg  40 mg SubCUTAneous Daily Marie Burciaga MD   40 mg at 10/08/21 0959    ondansetron (ZOFRAN-ODT) disintegrating tablet 4 mg  4 mg Oral Q8H PRN Marie Burciaga MD        Or   [facility] ondansetron (ZOFRAN) injection 4 mg  4 mg IntraVENous Q6H PRN Kennedy Mcgee MD        polyethylene glycol [facility]) packet 17 g  17 g Oral Daily PRN Kennedy Mcgee MD        acetaminophen (TYLENOL) tablet 650 mg  650 mg Oral Q6H PRN Kennedy Mcgee MD        Or    acetaminophen (TYLENOL) suppository 650 mg  650 mg Rectal Q6H PRN Kennedy Mcgee MD         Review of Systems   Unable to perform ROS: Dementia        Physical Examination:    Vitals:    10/08/21 0600 10/08/21 0727 10/08/21 0745 10/08/21 1030   BP:   (!) 169/67 133/60   Pulse:  65  Comment: Apical 65 67   Resp:   16 14   Temp:   97.2 °F (36.2 °C) 98.3 °F (36.8 °C)   TempSrc:   Oral Oral   SpO2:   96% 91%   Weight: 192 lb 12.8 oz (87.5 kg)      Height:           General Appearance:  No distress, conversant  Constitutional:  Well developed, Well nourished, No acute distress, Non-toxic appearance. Neck- trachea is midline    Eyes:  PERRL, Conjunctiva normal, No discharge. Respiratory:  Diminished breath sounds, No respiratory distress, No wheezing, No chest tenderness, no use of accessory muscles.  On 1 L NC  Cardiovascular: (PMI):  RRR, S1 and S2 audible, no murmur appreciated, JVD not noted,  apex non displaced, no lifts, no thrills, edema to lower legs Present  Musculoskeletal:  +1 pitting edema, no tenderness  Neurologic:  Alert & oriented x 3, CN 2-12 grossly normal      Medical decision making and Data review:    Lab Review   Recent Labs     10/08/21  0558   WBC 12.0*   HGB 8.7*   HCT 30.1*         Recent Labs     10/08/21  0558   *   K 4.6      CO2 26   BUN 38*   CREATININE 1.6*     Recent Labs     10/06/21  1445   AST 26   ALT 9*   BILITOT 0.2   ALKPHOS 132*     Recent Labs     10/06/21  1450   TROPONINT <0.010       Recent Labs     10/06/21  1445   PROBNP 3,545*     Lab Results   Component Value Date    INR 1.02 07/12/2017    PROTIME 11.6 07/12/2017       EKG:10/6/2021  Normal sinus rhythm   Normal ECG (Interpreting  physician) on 10/07/2021 at 03:30 PM  ------------------------------------------------------------------   Findings   Left Ventricle  Left ventricular systolic function is normal.  Ejection fraction is visually estimated at 55-60%. No regional wall motion abnormalites. Left ventricle size is normal.  Grade I diastolic dysfunction. Left Atrium  Mildly dilated left atrium. Right Atrium  Essentially normal right atrium. Right Ventricle  Essentially normal right ventricle. Aortic Valve  Trace aortic regurgitation noted. Mitral Valve  Mitral annular calcification is present. Mild mitral regurgitation. Tricuspid Valve  Trace tricuspid regurgitation; normal RVSP. Pulmonic Valve  The pulmonic valve was not well visualized. Pericardial Effusion  No evidence of any pericardial effusion. Pleural Effusion  No evidence of pleural effusion. Miscellaneous  Abdominal aorta was not clearly visualized.   M-Mode/2D Measurements & Calculations   LV Diastolic Dimension:  LV Systolic Dimension:  LA Dimension: 4.1 cmAO Root  4.8 cm                   3.3 cm                  Dimension: 2.7 cmLA Area:  LV FS:31.3 %             LV Volume Diastolic:    64.8 cm2  LV PW Diastolic: 1.4 cm  708 ml  LV PW Systolic: 1.6 cm   LV Volume Systolic:  Septum Diastolic: 1.3 cm 91.0 ml  Septum Systolic: 1.7 cm  LV EDV/LV EDV Index:    RV Diastolic Dimension: 3.3  CO: 6.28 l/min           108 ml/57 m2LV ESV/LV   cm  CI: 3.31 l/m*m2          ESV Index: 44.6 ml/23                           m2                      LA/Aorta: 9.94  LV Area Diastolic: 56.0  EF Calculated (A4C):  cm2                      58.7 %                  LA volume/Index: 62.9 ml  LV Area Systolic: 60.7   EF Calculated (2D):     /33m2  cm2                      59.2 %                            LV Length: 7.46 cm                            LVOT: 2 cm  Doppler Measurements & Calculations   MV Peak E-Wave: 106     AV Peak Velocity: 124 cm/s LVOT Peak Velocity: 99.2  cm/s                    AV Peak Gradient: 6.15     cm/s  MV Peak A-Wave: 121     mmHg                       LVOT Mean Velocity: 74.2  cm/s                    AV Mean Velocity: 90 cm/s  cm/s  MV E/A Ratio: 0.88      AV Mean Gradient: 4 mmHg   LVOT Peak Gradient: 4  MV Peak Gradient: 4.49  AV VTI: 36 cm              mmHgLVOT Mean Gradient: 2  mmHg                    AV Area (Continuity):2.39  mmHg                          cm2                        Estimated RVSP: 25 mmHg  MV Deceleration Time:                              Estimated RAP:3 mmHg  173 msec                LVOT VTI: 27.4 cm  MV P1/2t: 50 msec  MVA by PHT:4.4 cm2      Estimated PASP: 30.04 mmHg TR Velocity:260 cm/s                                                     TR Gradient:27.04 mmHg  MV E' Septal Velocity:  6.67 cm/s  MV E' Lateral Velocity:  9.19 cm/s  MV E/E' septal: 15.89  MV E/E' lateral: 11.53      XR CHEST PORTABLE    Result Date: 10/6/2021  EXAMINATION: ONE XRAY VIEW OF THE CHEST 10/6/2021 2:29 pm COMPARISON: 8/17/2021 HISTORY: ORDERING SYSTEM PROVIDED HISTORY: [facility] sats were in 60s TECHNOLOGIST PROVIDED HISTORY: Reason for exam:->[facility] sats were in 62s Reason for Exam: [facility] sats were in 60s Acuity: Unknown Type of Exam: Unknown Additional signs and symptoms: [facility] sats were in 60s FINDINGS: Cardiomediastinal silhouette is mildly enlarged. Diffuse, increased interstitial and airspace opacities are present bilaterally. Findings may represent evolving pulmonary edema or atypical pneumonia. No pleural effusion or pneumothorax. No subdiaphragmatic free air is present. Diffuse, bilateral, increased interstitial and alveolar infiltrates. The findings could represent evolving pulmonary edema or evolving atypical pneumonia. All labs, medications and tests reviewed by myself including data  from outside source , patient and available family .   Continue all other medications of all above medical condition listed as is. Impression:  Active Problems:    Pulmonary edema cardiac cause (HCC)  Resolved Problems:    * No resolved hospital problems. *      Assessment: 77 y. o.year old with PMH of  has a past medical history of Anemia, Arthritis, Assistance needed for personal hygiene, Asthma, Cellulitis of right foot, Chronic kidney disease, Cognitive communication deficit, Depression, Diabetes mellitus ([address] Frequent falls, GERD (gastroesophageal reflux disease), H/O Doppler ultrasound, H/O echocardiogram, Hallux valgus (acquired), right foot, Hyperlipemia, Hypertension, Muscle weakness, Other disorders of electrolyte and fluid balance, not elsewhere classified, Other disorders of kidney and ureter, Shingles, SIRS (systemic inflammatory response syndrome) ([address] Thyroid disease, Type 2 diabetes mellitus with foot ulcer, with long-term current use of insulin ([address] Ulcer of right foot with fat layer exposed ([address] WD-Diabetic ulcer of toe of left foot associated with type 2 diabetes mellitus, with fat layer exposed ([address] and Weakness. Plan and Recommendations:    1. Pulmonary edema: chest x ray reports states evolving pulmonary edema or evolving atypical pneumonia. BNP 3545.   2. CHF: Acute Diastolic decompensated heart failure. Appears to be volume overloaded. Agree with diuresis. Can continue IV Lasix 40 mg BID. Depending on response we can titrate diuretics accordingly. Please continue [facility] reccommended medical thearpy including atenolol. Strict Is and Os and Daily weights. 3. Attempted to discuss sodium restriction and fluid restirctions with patient but she kept repeating that she just does not understand. 4. HTN: has been elevated. Increase hydralazine to 25 mg BID. Continue atenolol and norvasc. 5. 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 Will need to be cautious with diuretic therapy and try to avoid nephrotoxins. 6. Dyslipidemia: continue statins   7.  DVT prophylaxis if not contraindicated while in the hospital.    8. If being discharged over the weekend she will need to go home on diuretic. 9. Follow up appointment scheduled on 10/14/2021 @ 1500 with Dr. Cheri Cochran.         Thank you  much for consult and giving us the opportunity in contributing in the care of this patient. Please feel free to call me for any questions.        Elizabet Hartman, APRN - CNP, 10/8/2021 2:56 PM

## 2021-10-08 NOTE — DISCHARGE INSTR - DIET
Good nutrition is important when healing from an illness, injury, or surgery. Follow any nutrition recommendations given to you during your hospital stay. If you were given an oral nutrition supplement while in the hospital, continue to take this supplement at home. You can take it with meals, in-between meals, and/or before bedtime. These supplements can be purchased at most local grocery stores, pharmacies, and chain [employer]-stores. If you have any questions about your diet or nutrition, call the hospital and ask for the dietitian.       Diabetic, low sodium diet

## 2021-10-08 NOTE — PROGRESS NOTES
Hospitalist Progress Note      Name:  Viviane Cisneros /Age/Sex: [id number]  (77 y.o. female)   MRN & CSN:  [id number] & [id number] Admission Date/Time: 10/6/2021  2:11 PM   Location:   PCP: Lisa Soni [address] [place] [address] Day: 3    Assessment and Plan:   Viviane Cisneros is a 77 y.o.  female  who presents with <principal problem not specified>    1. ACUTE PULMONARY EDEMA  -improved she is comfortable. Echo unremarkable, LVEF 55-60%. Likely HFpEF. Will continue current medication and await cardiology eval with recommendations. She does not have a history of CHF.     2. ACUTE RESPIRATORY FAILURE WITH HYPOXIA  -improving with diuresis, will continue to wean as tolerated     3. TYPE 2 DIABETES  -FBGs have been up and down, will continue current regimen and monitor     4. HYPERKALEMIA  -resolved     5. UTI  -continue bactrim     6. CKD stage 3a  -Stable will monitor     7. ASTHMA  -stable no wheezing today     8. ESSENTIAL HYPERTENSION  -stable will continue home medications     9. ACQUIRED HYPOTHYROIDISM  -TSH borderline low, will continue current dose for now     10. HYPERCHOLESTEROLEMIA  -continue medication    Diet ADULT DIET; Regular; 4 carb choices (60 gm/meal)  Adult Oral Nutrition Supplement; Diabetic Oral Supplement   DVT Prophylaxis [x] Lovenox, []  Heparin, [] SCDs, [] Ambulation   GI Prophylaxis [] PPI,  [] H2 Blocker,  [] Carafate,  [] Diet/Tube Feeds   Code Status Full Code   Disposition Patient requires continued admission due to pulm edema   MDM [] Low, [] Moderate,[]  High  Patient's risk as above due to pulm edema     History of Present Illness:     Chief Complaint: <principal problem not specified>  Viviane Cisneros is a 77 y.o.  female  who presents with pulmonary edema    No issues overnight. She is currently awake and alert sitting up in bed talkative and very responsive, oxygen has been weaned down, she is feeling much better.  Appetite good no specific complaints SubCUTAneous QAM AC    insulin regular  30 Units SubCUTAneous Daily before lunch    insulin regular  30 Units SubCUTAneous Dinner    mupirocin   Topical BID    sulfamethoxazole-trimethoprim  1 tablet Oral 2 times per day    furosemide  40 mg IntraVENous Daily    methocarbamol  500 mg Oral TID    sodium chloride flush  5-40 mL IntraVENous 2 times per day    enoxaparin  40 mg SubCUTAneous Daily      Infusions:    dextrose      sodium chloride       PRN Meds: albuterol sulfate HFA, 2 puff, Q4H PRN  glucose, 15 g, PRN  dextrose, 12.5 g, PRN  glucagon (rDNA), 1 mg, PRN  dextrose, 100 mL/hr, PRN  hydrOXYzine, 10 mg, TID PRN  albuterol, 2.5 mg, Q6H PRN  sodium chloride flush, 5-40 mL, PRN  sodium chloride, 25 mL, PRN  ondansetron, 4 mg, Q8H PRN   Or  ondansetron, 4 mg, Q6H PRN  polyethylene glycol, 17 g, Daily PRN  acetaminophen, 650 mg, Q6H PRN   Or  acetaminophen, 650 mg, Q6H PRN

## 2021-10-11 ENCOUNTER — OUTSIDE SERVICES (OUTPATIENT)
Dept: INTERNAL MEDICINE CLINIC | Age: 76
End: 2021-10-11

## 2021-10-11 LAB
CULTURE: NORMAL
CULTURE: NORMAL
Lab: NORMAL
Lab: NORMAL
SPECIMEN: NORMAL
SPECIMEN: NORMAL

## 2021-10-29 ENCOUNTER — OUTSIDE SERVICES (OUTPATIENT)
Dept: INTERNAL MEDICINE CLINIC | Age: 76
End: 2021-10-29

## 2021-10-30 ENCOUNTER — HOSPITAL ENCOUNTER (OUTPATIENT)
Age: 76
Discharge: HOME | End: 2021-10-30
Payer: MEDICARE

## 2021-10-30 ENCOUNTER — HOSPITAL ENCOUNTER (OUTPATIENT)
Age: 76
Setting detail: SPECIMEN
Discharge: HOME | End: 2021-10-30
Payer: MEDICARE

## 2021-10-30 LAB
ALBUMIN SERPL-MCNC: 3.1 GM/DL (ref 3.4–5)
ALP BLD-CCNC: 83 IU/L (ref 40–129)
ALT SERPL-CCNC: 14 U/L (ref 10–40)
ANION GAP SERPL CALCULATED.3IONS-SCNC: 7 MMOL/L (ref 4–16)
AST SERPL-CCNC: 26 IU/L (ref 15–37)
BACTERIA: ABNORMAL /HPF
BASOPHILS ABSOLUTE: 0 K/CU MM
BASOPHILS RELATIVE PERCENT: 0.4 % (ref 0–1)
BILIRUB SERPL-MCNC: 0.3 MG/DL (ref 0–1)
BILIRUBIN URINE: NEGATIVE MG/DL
BLOOD, URINE: ABNORMAL
BUN BLDV-MCNC: 19 MG/DL (ref 6–23)
CALCIUM SERPL-MCNC: 8.9 MG/DL (ref 8.3–10.6)
CAST TYPE: NEGATIVE /HPF
CHLORIDE BLD-SCNC: 101 MMOL/L (ref 99–110)
CLARITY: ABNORMAL
CO2: 32 MMOL/L (ref 21–32)
COLOR: YELLOW
CREAT SERPL-MCNC: 1.2 MG/DL (ref 0.6–1.1)
CRYSTAL TYPE: NEGATIVE /HPF
DIFFERENTIAL TYPE: ABNORMAL
EOSINOPHILS ABSOLUTE: 0.3 K/CU MM
EOSINOPHILS RELATIVE PERCENT: 6.2 % (ref 0–3)
EPITHELIAL CELLS, UA: ABNORMAL /HPF
GFR AFRICAN AMERICAN: 53 ML/MIN/1.73M2
GFR NON-AFRICAN AMERICAN: 44 ML/MIN/1.73M2
GLUCOSE BLD-MCNC: 185 MG/DL (ref 70–99)
GLUCOSE, URINE: NEGATIVE MG/DL
HCT VFR BLD CALC: 34.5 % (ref 37–47)
HEMOGLOBIN: 10.3 GM/DL (ref 12.5–16)
IMMATURE NEUTROPHIL %: 0.7 % (ref 0–0.43)
KETONES, URINE: ABNORMAL MG/DL
LEUKOCYTE ESTERASE, URINE: ABNORMAL
LYMPHOCYTES ABSOLUTE: 1.6 K/CU MM
LYMPHOCYTES RELATIVE PERCENT: 29.3 % (ref 24–44)
MCH RBC QN AUTO: 26.3 PG (ref 27–31)
MCHC RBC AUTO-ENTMCNC: 29.9 % (ref 32–36)
MCV RBC AUTO: 88 FL (ref 78–100)
MONOCYTES ABSOLUTE: 0.7 K/CU MM
MONOCYTES RELATIVE PERCENT: 11.9 % (ref 0–4)
NITRITE URINE, QUANTITATIVE: NEGATIVE
PDW BLD-RTO: 16.3 % (ref 11.7–14.9)
PH, URINE: 7.5 (ref 5–8)
PLATELET # BLD: 229 K/CU MM (ref 140–440)
PMV BLD AUTO: 10.1 FL (ref 7.5–11.1)
POTASSIUM SERPL-SCNC: 4.1 MMOL/L (ref 3.5–5.1)
PROTEIN UA: ABNORMAL MG/DL
RBC # BLD: 3.92 M/CU MM (ref 4.2–5.4)
RBC URINE: ABNORMAL /HPF (ref 0–6)
SEGMENTED NEUTROPHILS ABSOLUTE COUNT: 2.8 K/CU MM
SEGMENTED NEUTROPHILS RELATIVE PERCENT: 51.5 % (ref 36–66)
SODIUM BLD-SCNC: 140 MMOL/L (ref 135–145)
SPECIFIC GRAVITY UA: 1.02 (ref 1–1.03)
TOTAL IMMATURE NEUTOROPHIL: 0.04 K/CU MM
TOTAL PROTEIN: 6.1 GM/DL (ref 6.4–8.2)
UROBILINOGEN, URINE: 0.2 MG/DL (ref 0.2–1)
WBC # BLD: 5.5 K/CU MM (ref 4–10.5)
WBC UA: ABNORMAL /HPF (ref 0–5)

## 2021-10-30 PROCEDURE — 80053 COMPREHEN METABOLIC PANEL: CPT

## 2021-10-30 PROCEDURE — 87086 URINE CULTURE/COLONY COUNT: CPT

## 2021-10-30 PROCEDURE — 81001 URINALYSIS AUTO W/SCOPE: CPT

## 2021-10-30 PROCEDURE — 85025 COMPLETE CBC W/AUTO DIFF WBC: CPT

## 2021-10-30 PROCEDURE — 87186 SC STD MICRODIL/AGAR DIL: CPT

## 2021-10-30 PROCEDURE — 36415 COLL VENOUS BLD VENIPUNCTURE: CPT

## 2021-10-30 PROCEDURE — 87077 CULTURE AEROBIC IDENTIFY: CPT

## 2021-10-31 ENCOUNTER — HOSPITAL ENCOUNTER (OUTPATIENT)
Age: 76
Discharge: HOME | End: 2021-10-31
Payer: COMMERCIAL

## 2021-10-31 ENCOUNTER — HOSPITAL ENCOUNTER (OUTPATIENT)
Dept: GENERAL RADIOLOGY | Age: 76
Discharge: HOME | End: 2021-10-31
Payer: COMMERCIAL

## 2021-10-31 DIAGNOSIS — R06.02 SOB (SHORTNESS OF BREATH): ICD-10-CM

## 2021-10-31 PROCEDURE — 71046 X-RAY EXAM CHEST 2 VIEWS: CPT

## 2021-11-01 ENCOUNTER — OUTSIDE SERVICES (OUTPATIENT)
Dept: INTERNAL MEDICINE CLINIC | Age: 76
End: 2021-11-01

## 2021-11-01 DIAGNOSIS — N30.00 ACUTE CYSTITIS WITHOUT HEMATURIA: ICD-10-CM

## 2021-11-01 DIAGNOSIS — E11.621 TYPE 2 DIABETES MELLITUS WITH FOOT ULCER, WITH LONG-TERM CURRENT USE OF INSULIN (HCC): Primary | ICD-10-CM

## 2021-11-01 DIAGNOSIS — L97.509 TYPE 2 DIABETES MELLITUS WITH FOOT ULCER, WITH LONG-TERM CURRENT USE OF INSULIN (HCC): Primary | ICD-10-CM

## 2021-11-01 DIAGNOSIS — E03.9 ACQUIRED HYPOTHYROIDISM: ICD-10-CM

## 2021-11-01 DIAGNOSIS — I50.33 ACUTE ON CHRONIC HEART FAILURE WITH PRESERVED EJECTION FRACTION (HCC): ICD-10-CM

## 2021-11-01 DIAGNOSIS — Z79.4 TYPE 2 DIABETES MELLITUS WITH FOOT ULCER, WITH LONG-TERM CURRENT USE OF INSULIN (HCC): Primary | ICD-10-CM

## 2021-11-02 LAB
CULTURE: ABNORMAL
CULTURE: ABNORMAL
Lab: ABNORMAL
SPECIMEN: ABNORMAL

## 2021-11-02 NOTE — PROGRESS NOTES
Readmission- progress note  ___________________________    Michelle Vasquez's  is 489  SEEN ON 2021 at [facility] center  ___________________________    S:  Patient seen again today as patient developed shortness of breath requiring oxygen during the weekend. Michelle Ma is a 77 y.o. female with a past history of DM type II, GERD, , HTN, hypothyroid, dementia, arthritis , herpes zoster. She has diabetes mellitus and blood sugars are running high but she is taking Humulin R 40 units in the morning and 30 units at lunch and 30 units at night along with sliding scale. Patient developed shortness of breath again on the weekend. Chest x-ray was ordered. Patient had oxygen restarted. Chest x-ray showed some edema or residual pneumonia. Lasix dose was increased for 3 days. Started on antibiotic  Pt states she is feeling better. . she was hypoxic and is on O2 now. Patient denies any chest pain. Do not no cough or sputum production. No abdominal pain. No nausea or vomiting diarrhea.   No fever or chills     Review of system normal except as mentioned in HPI    ALLERGIES:  Allergies   Allergen Reactions    Ritalin [Methylphenidate] Rash    Rocephin [Ceftriaxone Sodium-Lidocaine] Rash        PAST MEDICAL HISTORY:    has a past medical history of Anemia, Arthritis, Assistance needed for personal hygiene, Asthma, Cellulitis of right foot, Chronic kidney disease, Cognitive communication deficit, Depression, Diabetes mellitus ([address] Frequent falls, GERD (gastroesophageal reflux disease), H/O Doppler ultrasound, H/O echocardiogram, Hallux valgus (acquired), right foot, Hyperlipemia, Hypertension, Muscle weakness, Other disorders of electrolyte and fluid balance, not elsewhere classified, Other disorders of kidney and ureter, Shingles, SIRS (systemic inflammatory response syndrome) ([address] Thyroid disease, Type 2 diabetes mellitus with foot ulcer, with long-term current use of insulin ([address] Ulcer of right foot with fat layer exposed ([address] WD-Diabetic ulcer of toe of left foot associated with type 2 diabetes mellitus, with fat layer exposed ([address] and Weakness. PAST SURGICAL HISTORY:   has a past surgical history that includes Hysterectomy; Foot surgery (12/30/11); cyst removal; Kidney removal (1973); Abdomen surgery; hernia repair; Endoscopy, colon, diagnostic; and Dilatation, esophagus. SOCIAL HISTORY:   reports that she has never smoked. She has never used smokeless tobacco. She reports that she does not drink alcohol and does not use drugs. Vital signs: As in [facility] patient is saline. GENERAL:  Alert, oriented, pleasant, in no apparent distress. Obese : On O2  HEENT:  Conjunctiva pink, no scleral icterus. ENT clear. NECK:  Supple. No jugular venous distention noted. No masses felt,  CARDIOVASCULAR:  Normal S1 and S2    PULMONARY:  No respiratory distress. No wheezes or rales. ABDOMEN:  Soft and non-tender,no masses  or organomegaly. EXTREMITIES:  No cyanosis, clubbing, trace pedal edema   SKIN: Skin is warm and dry. Patient has blisters and some ruptured blisters with yellowish drainage on the anterior thigh  NEUROLOGICAL:  Cranial nerves II through XII are grossly intact. Patient is able to move extremities. Assessment:    CHF : preserved EF : On Lasix    Hypoxia on o2    Diabetes mellitus on high-dose of insulin    Herpes zoster left thigh : scar left     Hyperlipidemia: On atorvastatin 20 mg daily    Hypothyroidism on levothyroxine    GERD: On Protonix    Depression on Cymbalta 60 mg daily    Hypertension on Norvasc 10 mg daily, atenolol, hydralazine    UTI :     Asthma         Plan:   Has mild pulmonary edema on the chest x-ray. Will give Lasix extra dose for 3 days and then back to 20 mg daily. There is still some residual pneumonia. Give Levaquin  Patient also has UTI. Sensitivity not back Levaquin will help that. Patient is on oxygen 2 L. Monitor O2 saturation will taper off later  Medications reviewed  Patient is on Tylenol, albuterol inhaler 2 puffs every 6 hours, Humulin R 40 units in the morning 30 units in the afternoon 30 units in the evening on low-dose sliding scale  Repeat chest x-ray in 10 days  Patient states she is comfortable.

## 2021-11-04 ENCOUNTER — HOSPITAL ENCOUNTER (EMERGENCY)
Age: 76
Discharge: HOME | End: 2021-11-04
Attending: STUDENT IN AN ORGANIZED HEALTH CARE EDUCATION/TRAINING PROGRAM
Payer: MEDICARE

## 2021-11-04 ENCOUNTER — APPOINTMENT (OUTPATIENT)
Dept: GENERAL RADIOLOGY | Age: 76
End: 2021-11-04
Payer: MEDICARE

## 2021-11-04 VITALS
BODY MASS INDEX: 35.44 KG/M2 | WEIGHT: 200 LBS | DIASTOLIC BLOOD PRESSURE: 41 MMHG | HEART RATE: 69 BPM | TEMPERATURE: 98.1 F | HEIGHT: 63 IN | RESPIRATION RATE: 13 BRPM | SYSTOLIC BLOOD PRESSURE: 133 MMHG | OXYGEN SATURATION: 99 %

## 2021-11-04 DIAGNOSIS — U07.1 COVID-19: ICD-10-CM

## 2021-11-04 DIAGNOSIS — R53.83 FATIGUE, UNSPECIFIED TYPE: Primary | ICD-10-CM

## 2021-11-04 LAB
ALBUMIN SERPL-MCNC: 3.4 GM/DL (ref 3.4–5)
ALP BLD-CCNC: 88 IU/L (ref 40–129)
ALT SERPL-CCNC: 11 U/L (ref 10–40)
ANION GAP SERPL CALCULATED.3IONS-SCNC: 9 MMOL/L (ref 4–16)
AST SERPL-CCNC: 17 IU/L (ref 15–37)
BACTERIA: ABNORMAL /HPF
BASOPHILS ABSOLUTE: 0 K/CU MM
BASOPHILS RELATIVE PERCENT: 0.3 % (ref 0–1)
BILIRUB SERPL-MCNC: 0.2 MG/DL (ref 0–1)
BILIRUBIN URINE: NEGATIVE MG/DL
BLOOD, URINE: NEGATIVE
BUN BLDV-MCNC: 23 MG/DL (ref 6–23)
CALCIUM SERPL-MCNC: 9.1 MG/DL (ref 8.3–10.6)
CAST TYPE: ABNORMAL /HPF
CHLORIDE BLD-SCNC: 98 MMOL/L (ref 99–110)
CLARITY: CLEAR
CO2: 28 MMOL/L (ref 21–32)
COLOR: YELLOW
CREAT SERPL-MCNC: 1.1 MG/DL (ref 0.6–1.1)
CRYSTAL TYPE: NEGATIVE /HPF
DIFFERENTIAL TYPE: ABNORMAL
EKG ATRIAL RATE: 69 BPM
EKG DIAGNOSIS: NORMAL
EKG P AXIS: 2 DEGREES
EKG P-R INTERVAL: 190 MS
EKG Q-T INTERVAL: 462 MS
EKG QRS DURATION: 76 MS
EKG QTC CALCULATION (BAZETT): 495 MS
EKG R AXIS: 2 DEGREES
EKG T AXIS: 74 DEGREES
EKG VENTRICULAR RATE: 69 BPM
EOSINOPHILS ABSOLUTE: 0.4 K/CU MM
EOSINOPHILS RELATIVE PERCENT: 6.1 % (ref 0–3)
EPITHELIAL CELLS, UA: 1 /HPF
GFR AFRICAN AMERICAN: 59 ML/MIN/1.73M2
GFR NON-AFRICAN AMERICAN: 48 ML/MIN/1.73M2
GLUCOSE BLD-MCNC: 213 MG/DL (ref 70–99)
GLUCOSE BLD-MCNC: 226 MG/DL (ref 70–99)
GLUCOSE, URINE: ABNORMAL MG/DL
HCT VFR BLD CALC: 35.7 % (ref 37–47)
HEMOGLOBIN: 10.8 GM/DL (ref 12.5–16)
IMMATURE NEUTROPHIL %: 0.5 % (ref 0–0.43)
KETONES, URINE: NEGATIVE MG/DL
LEUKOCYTE ESTERASE, URINE: NEGATIVE
LYMPHOCYTES ABSOLUTE: 1.8 K/CU MM
LYMPHOCYTES RELATIVE PERCENT: 26.8 % (ref 24–44)
MCH RBC QN AUTO: 26.5 PG (ref 27–31)
MCHC RBC AUTO-ENTMCNC: 30.3 % (ref 32–36)
MCV RBC AUTO: 87.5 FL (ref 78–100)
MONOCYTES ABSOLUTE: 0.3 K/CU MM
MONOCYTES RELATIVE PERCENT: 4.6 % (ref 0–4)
NITRITE URINE, QUANTITATIVE: POSITIVE
PDW BLD-RTO: 15.8 % (ref 11.7–14.9)
PH, URINE: 6.5 (ref 5–8)
PLATELET # BLD: 201 K/CU MM (ref 140–440)
PMV BLD AUTO: 10 FL (ref 7.5–11.1)
POTASSIUM SERPL-SCNC: 4 MMOL/L (ref 3.5–5.1)
PROTEIN UA: ABNORMAL MG/DL
RBC # BLD: 4.08 M/CU MM (ref 4.2–5.4)
RBC URINE: 1 /HPF (ref 0–6)
SEGMENTED NEUTROPHILS ABSOLUTE COUNT: 4 K/CU MM
SEGMENTED NEUTROPHILS RELATIVE PERCENT: 61.7 % (ref 36–66)
SODIUM BLD-SCNC: 135 MMOL/L (ref 135–145)
SPECIFIC GRAVITY UA: 1.02 (ref 1–1.03)
TOTAL IMMATURE NEUTOROPHIL: 0.03 K/CU MM
TOTAL PROTEIN: 7.1 GM/DL (ref 6.4–8.2)
UROBILINOGEN, URINE: 0.2 MG/DL (ref 0.2–1)
WBC # BLD: 6.5 K/CU MM (ref 4–10.5)
WBC UA: 3 /HPF (ref 0–5)

## 2021-11-04 PROCEDURE — 85025 COMPLETE CBC W/AUTO DIFF WBC: CPT

## 2021-11-04 PROCEDURE — 96360 HYDRATION IV INFUSION INIT: CPT

## 2021-11-04 PROCEDURE — 99284 EMERGENCY DEPT VISIT MOD MDM: CPT

## 2021-11-04 PROCEDURE — 2580000003 HC RX 258: Performed by: STUDENT IN AN ORGANIZED HEALTH CARE EDUCATION/TRAINING PROGRAM

## 2021-11-04 PROCEDURE — 93005 ELECTROCARDIOGRAM TRACING: CPT | Performed by: STUDENT IN AN ORGANIZED HEALTH CARE EDUCATION/TRAINING PROGRAM

## 2021-11-04 PROCEDURE — 93010 ELECTROCARDIOGRAM REPORT: CPT | Performed by: INTERNAL MEDICINE

## 2021-11-04 PROCEDURE — 80053 COMPREHEN METABOLIC PANEL: CPT

## 2021-11-04 PROCEDURE — 82962 GLUCOSE BLOOD TEST: CPT

## 2021-11-04 PROCEDURE — 81001 URINALYSIS AUTO W/SCOPE: CPT

## 2021-11-04 PROCEDURE — 71045 X-RAY EXAM CHEST 1 VIEW: CPT

## 2021-11-04 RX ORDER — SODIUM CHLORIDE, SODIUM LACTATE, POTASSIUM CHLORIDE, CALCIUM CHLORIDE 600; 310; 30; 20 MG/100ML; MG/100ML; MG/100ML; MG/100ML
500 INJECTION, SOLUTION INTRAVENOUS ONCE
Status: COMPLETED | OUTPATIENT
Start: 2021-11-04 | End: 2021-11-04

## 2021-11-04 RX ADMIN — SODIUM CHLORIDE, POTASSIUM CHLORIDE, SODIUM LACTATE AND CALCIUM CHLORIDE 500 ML: 600; 310; 30; 20 INJECTION, SOLUTION INTRAVENOUS at 12:05

## 2021-11-04 NOTE — ED TRIAGE NOTES
Arrived to room 2 for triage. Tolerated without difficulty. Bed in lowest position. Call light given. Gowned for exam. Monitor applied.

## 2021-11-04 NOTE — ED NOTES
Keren at [facility] asked to leave IV in place. Request communicated to ED physician. Order obtained.       Michael Moses, RN  11/04/21 8506

## 2021-11-04 NOTE — ED PROVIDER NOTES
[address] COMPLAINT    Chief Complaint   Patient presents with    Positive For Covid-19     Tested positive for COVID this week. Using O2 @ 2 Liters this morning. Patient c/o feeling more weak and nauseated today. No distress noted. No further complaiant vocied. Naren Porras is a 77 y.o. female with history significant for type 2 diabetes, heart failure with preserved ejection fraction, hypertension, hyperlipidemia, chronic pain, chronic weakness who presents with weakness and fatigue. Patient was sent from nursing facility [facility]), and I received a call from nursing staff at the facility today stated that patient was recently diagnosed with COVID-19 last week, but the patient has been more lethargic and have decreased p.o. patient still on her 2 L of oxygen at baseline satting 94% in no respiratory distress but they thought she was having and puffing. Upon arrival patient does not appear to be lethargic at all patient was alert and oriented stated that she did have decreased oral intake that worries her because everything makes her nauseous but denies any vomiting any diarrhea, patient does have shortness of breath since the diagnosis of Covid but that has not been getting any worse, patient is currently breathing comfortably and denies any chest pain or any fever. Nursing staff at the facility also stated that patient has been taking antibiotics       REVIEW OF SYSTEMS    Constitutional: Fatigue weakness general, no fever  HENT: Denies sore throat or rhinorrhea. Eyes: Denies vision changes. Respiratory: Denies shortness of breath or cough. Cardiovascular: Denies chest pain, leg swelling or palpitations. Gastrointestinal: Denies abdominal pain, diarrhea, + nausea no vomiting  Genitourinary: Denies dysuria or hematuria. Skin: Denies rashes or wounds. MSK: Denies joint pain.   Neurologic: Denies headache, lightheadedness, numbness, or weakness. Hematologic/lymphatic: Denies unexpected weight loss, night sweats  Endocrine: No polyuria, polydipsia, or polyphagia      Pertinent positives and negatives are delineated in HPI and ROS above, all other systems are reviewed and are negative    PAST MEDICAL HISTORY    Past Medical History:   Diagnosis Date    Anemia     Arthritis     Assistance needed for personal hygiene     Asthma     Cellulitis of right foot     Chronic kidney disease     Cognitive communication deficit     Depression     Diabetes mellitus ([address]     Frequent falls     GERD (gastroesophageal reflux disease)     H/O Doppler ultrasound 10/01/2020     No evidence of significant venous insufficiency bilaterally. No evidence of DVT or SVT in the bilaterally. H/O echocardiogram 10/01/2020    EF 60-65%. No significant valvular disease. Hallux valgus (acquired), right foot 05/01/2017    Hyperlipemia     Hypertension     Muscle weakness     Other disorders of electrolyte and fluid balance, not elsewhere classified     Other disorders of kidney and ureter     1 kidney. Shingles 06/2021    SIRS (systemic inflammatory response syndrome) ([address] 01/18/2012    Thyroid disease     hypothyroid    Type 2 diabetes mellitus with foot ulcer, with long-term current use of insulin ([address] 01/18/2012    Ulcer of right foot with fat layer exposed ([address] 02/06/2013    WD-Diabetic ulcer of toe of left foot associated with type 2 diabetes mellitus, with fat layer exposed ([address] 03/04/2021    Weakness      Medical history reviewed and no pertinent past medical history other than the ones mentioned above    SURGICAL HISTORY    Past Surgical History:   Procedure Laterality Date    ABDOMEN SURGERY      CYST REMOVAL      from kidney.     DILATATION, ESOPHAGUS      ENDOSCOPY, COLON, DIAGNOSTIC      FOOT SURGERY  12/30/11    had infection  and a biopsy was sent away for analysis    [facility]     Surgical history reviewed and no pertinent surgical history other than the ones mentioned above    CURRENT MEDICATIONS    Current Outpatient Rx   Medication Sig Dispense Refill    hydrALAZINE (APRESOLINE) 25 MG tablet Take 1 tablet by mouth 2 times daily 90 tablet 0    furosemide (LASIX) 20 MG tablet Take 1 tablet by mouth daily 60 tablet 0    methocarbamol (ROBAXIN) 500 MG tablet Take 500 mg by mouth 3 times daily For shingles      mupirocin (BACTROBAN) 2 % ointment Apply topically 2 times daily Apply topically 3 times daily. acetaminophen (TYLENOL) 500 MG tablet Take 1,000 mg by mouth every 6 hours as needed for Pain      hydrOXYzine (ATARAX) 10 MG tablet Take 10 mg by mouth 3 times daily as needed for Itching      Albuterol Sulfate POWD Take 2 puffs by mouth every 6 hours as needed (for cough or wheezing) Inhale orally      HUMULIN R U-500 KWIKPEN 500 UNIT/ML SOPN concentrated injection pen Breakfast 60 units Lunch 30 units Dinner 30  150 units or less No additional units of insulin   151-200 Add 5 units   201-250 Add 10 units   251-300 Add 15 units   Over 300 add 20 units 1 pen 0    nystatin (MYCOSTATIN) POWD powder Apply to bilateral groin topically every shift for skin care       amLODIPine (NORVASC) 10 MG tablet Take 1 tablet by mouth daily Hold for systolic blood pressure less than or equal to 110 30 tablet 3    atenolol (TENORMIN) 50 MG tablet Take 1 tablet by mouth 2 times daily Hold for systolic blood pressure less than or equal to 110 30 tablet 3    DULoxetine (CYMBALTA) 60 MG extended release capsule Take 60 mg by mouth daily   1    pantoprazole (PROTONIX) 40 MG tablet Take 40 mg by mouth daily   3    atorvastatin (LIPITOR) 20 MG tablet Take 20 mg by mouth daily       Multiple Vitamins-Minerals (TRUEPLUS DIABETIC MULTIVITAMIN) TABS Take 1 tablet by mouth daily. Levothyroxine Sodium 175 MCG CAPS Take 175 mcg by mouth Daily       aspirin 81 MG EC tablet Take 81 mg by mouth daily.          Medication is reviewed    ALLERGIES    Allergies   Allergen Reactions    Ritalin [Methylphenidate] Rash    Rocephin [Ceftriaxone Sodium-Lidocaine] Rash     Allergy is reviewed    FAMILY HISTORY    Family History   Problem Relation Age of Onset    Arthritis Mother     Cancer Mother     Diabetes Mother     High Blood Pressure Mother     Asthma Father     Heart Disease Father     Diabetes Brother      Family history reviewed and no pertinent family history other than the ones mentioned above    SOCIAL HISTORY    Social History     Socioeconomic History    Marital status:      Spouse name: Not on file    Number of children: Not on file    Years of education: Not on file    Highest education level: Not on file   Occupational History    Not on file   Tobacco Use    Smoking status: Never Smoker    Smokeless tobacco: Never Used   Vaping Use    Vaping Use: Never used   Substance and Sexual Activity    Alcohol use: No    Drug use: No    Sexual activity: Yes   Other Topics Concern    Not on file   Social History Narrative    Not on file     Social Determinants of Health     Financial Resource Strain:     Difficulty of Paying Living Expenses:    Food Insecurity:     Worried About Running Out of Food in the Last Year:     Ran Out of Food in the Last Year:    Transportation Needs:     Lack of Transportation (Medical):     Lack of Transportation (Non-Medical):    Physical Activity:     Days of Exercise per Week:     Minutes of Exercise per Session:    Stress:     Feeling of Stress :    Social Connections:     Frequency of Communication with Friends and Family:     Frequency of Social Gatherings with Friends and Family:     Attends Buddhist Services:      Active Member of Clubs or Organizations:     Attends Club or Organization Meetings:     Marital Status:    Intimate Partner Violence:     Fear of Current or Ex-Partner:     Emotionally Abused:     Physically Abused:     Sexually Abused:      Live with nursing facility  Alcohol and recreational drug use: Denies  Social history reviewed and no pertinent social history other than the ones mentioned above    PHYSICAL EXAM    Vital Signs:BP (!) 133/41   Pulse 69   Temp 98.1 °F (36.7 °C) (Oral)   Resp 13   Ht 5' 3\" (1.6 m)   Wt 200 lb (90.7 kg)   SpO2 99%   BMI 35.43 kg/m²   I have reviewed the triage vital signs. Constitutional: Chronically ill-appearing but in no acute distress  Eyes: PERRL, no conjunctival injection  HENT: NCAT, Neck supple without meningismus, mucous membrane dry  CV: RRR, Warm, no edema  RESP: Normal RR, no increased respiratory efforts  GI: soft, non-distended, nontender  MSK: No gross deformities  Skin: Warm, dry. No rashes  Neuro: Alert, CNs II-XII grossly intact. Moving all 4 extremities  Psych: Appropriate mood and affect. Labs:   Labs Reviewed   CBC WITH AUTO DIFFERENTIAL   COMPREHENSIVE METABOLIC PANEL W/ REFLEX TO MG FOR LOW K   URINE RT REFLEX TO CULTURE   POCT GLUCOSE       Radiology:  XR CHEST PORTABLE    (Results Pending)       I directly reviewed the images and radiology interpretation    EKG interpreted by myself: See ED course    ED COURSE  Assessment & Medical Decision Making:  Tracee Davis is a 77 y.o. female who presents with generalized weakness and fatigue, patient does not appear to be lethargic at all, given patient recent diagnosis of COVID-19, there is always concern for possible increase consolidation with possible bacterial superinfection however patient is in no respiratory distress currently satting 99% on her baseline 2 L of oxygen, will obtain chest x-ray and laboratory evaluations, patient does appear to be slightly dry, while there is certainly a slightly increased risk of volume overload patient is not in any increased oxygen requirement right now I think to be beneficial for her to receive some IV fluids and she has not been doing much oral intakes at the facility.   If laboratory evaluations overall unremarkable, I still think patient can be managed as outpatient. ED Course as of Nov 04 1500   Thu Nov 04, 2021   1015 EKG demonstrated normal sinus rhythm with no significant ST-T wave changes, interval did show a QTC of 495. [YQ]   1322 Urinalysis Reflex to Culture(!):    Color, UA YELLOW   Clarity, UA CLEAR   Glucose, Urine 1+(!)   Bilirubin, Urine NEGATIVE   Ketones, Urine NEGATIVE   Specific Gravity, UA 1.020   Blood, Urine NEGATIVE   pH, Urine 6.5   Protein, UA 3+(!)   Urobilinogen, Urine 0.2   Nitrite Urine, Quantitative POSITIVE(!)   Leukocyte Esterase, Urine NEGATIVE   RBC, UA 1   WBC, UA 3   Epithelial Cells, UA 1   Cast Type NO CAST FORMS SEEN   Bacteria, UA RARE(!)   Crystal Type NEGATIVE [YQ]      ED Course User Index  [YQ] Brian Marquez MD        DDx includes but not limited to: Electrolyte disturbance, hypoglycemia, dehydration, UTI, PNA, ACS    Workup includes but not limited to: UA, EKG, Labs, CXR    Treatment includes but not limited to: IVF    Critical care time: NA    Impression:   Fatigue  COVID  Nausea    Disposition: DC    This note dictated using Dragon medical voice recognition software. Attempts at proofreading were made, but errors may occasionally still occur.            Brian Marquez MD  11/04/21 1015       Brian Marquez MD  11/04/21 1500

## 2021-11-11 ENCOUNTER — HOSPITAL ENCOUNTER (OUTPATIENT)
Dept: GENERAL RADIOLOGY | Age: 76
Discharge: HOME | End: 2021-11-11
Payer: COMMERCIAL

## 2021-11-11 ENCOUNTER — HOSPITAL ENCOUNTER (OUTPATIENT)
Age: 76
Discharge: HOME | End: 2021-11-11
Payer: COMMERCIAL

## 2021-11-11 DIAGNOSIS — R06.2 WHEEZING: ICD-10-CM

## 2021-11-11 PROCEDURE — 71045 X-RAY EXAM CHEST 1 VIEW: CPT

## 2021-11-29 ENCOUNTER — OUTSIDE SERVICES (OUTPATIENT)
Dept: INTERNAL MEDICINE CLINIC | Age: 76
End: 2021-11-29

## 2021-11-30 NOTE — PROGRESS NOTES
Progress note  ___________________________    Jeff Rosenthaleint's  is   SEEN ON 2021 at [facility] center  ___________________________    S:      Jeff Rose is a 77 y.o. female with a past history of DM type II, GERD, , HTN, hypothyroid, dementia, arthritis , herpes zoster. Patient tested positive for COVID infection earlier this month. She was lethargic and hypoxic therefore sent to the emergency room work-up in the ER was essentially normal except positive for COVID. She was not lethargic and her O2 saturation was good. Patient was sent back to nursing home  She has diabetes mellitus and blood sugars are running high but she is taking Humulin R 40 units in the morning and 30 units at lunch and 30 units at night along with sliding scale. Pt states she is feeling better. .  Does not walk. Patient denies any chest pain. No shortness of breath. No cough or sputum production. No abdominal pain. No nausea or vomiting diarrhea.   No fever or chills     Review of system normal except as mentioned in HPI    ALLERGIES:  Allergies   Allergen Reactions    Ritalin [Methylphenidate] Rash    Rocephin [Ceftriaxone Sodium-Lidocaine] Rash        PAST MEDICAL HISTORY:    has a past medical history of Anemia, Arthritis, Assistance needed for personal hygiene, Asthma, Cellulitis of right foot, Chronic kidney disease, Cognitive communication deficit, Depression, Diabetes mellitus ([address] Frequent falls, GERD (gastroesophageal reflux disease), H/O Doppler ultrasound, H/O echocardiogram, Hallux valgus (acquired), right foot, Hyperlipemia, Hypertension, Muscle weakness, Other disorders of electrolyte and fluid balance, not elsewhere classified, Other disorders of kidney and ureter, Shingles, SIRS (systemic inflammatory response syndrome) ([address] Thyroid disease, Type 2 diabetes mellitus with foot ulcer, with long-term current use of insulin ([facility] [address] Ulcer of right foot with fat layer exposed ([address] WD-Diabetic ulcer of toe of left foot associated with type 2 diabetes mellitus, with fat layer exposed ([address] and Weakness. PAST SURGICAL HISTORY:   has a past surgical history that includes Hysterectomy; Foot surgery (12/30/11); cyst removal; Kidney removal (1973); Abdomen surgery; hernia repair; Endoscopy, colon, diagnostic; and Dilatation, esophagus. SOCIAL HISTORY:   reports that she has never smoked. She has never used smokeless tobacco. She reports that she does not drink alcohol and does not use drugs. Vital signs: As in [facility]   GENERAL: - Alert, oriented, pleasant, in no apparent distress. Obese : On O2  HEENT: - Conjunctiva pink, no scleral icterus. ENT clear. NECK: - Supple. No jugular venous distention noted. No masses felt,  CARDIOVASCULAR: - Normal S1 and S2    PULMONARY: - No respiratory distress. No wheezes or rales. ABDOMEN: - Soft and non-tender,no masses  or organomegaly. EXTREMITIES: - No cyanosis, clubbing, trace pedal edema   SKIN: Skin is warm and dry. Patient has blisters and some ruptured blisters with yellowish drainage on the anterior thigh  NEUROLOGICAL: - Cranial nerves II through XII are grossly intact. Patient is able to move extremities. Assessment:   Recent COVID infection improved    CHF : preserved EF : improved. On Lasix    Hypoxia on o2    Diabetes mellitus on high-dose of insulin    Herpes zoster left thigh : scar left     Hyperlipidemia: On atorvastatin 20 mg daily    Hypothyroidism on levothyroxine    GERD: On Protonix    Depression on Cymbalta 60 mg daily    Hypertension on Norvasc 10 mg daily, atenolol, hydralazine    UTI :     Asthma         Plan:   Recent COVID infection has improved.   Patient is stable  Does not ambulate  Continue current medications

## 2021-12-01 ENCOUNTER — HOSPITAL ENCOUNTER (OUTPATIENT)
Age: 76
Setting detail: SPECIMEN
Discharge: HOME | End: 2021-12-01

## 2021-12-02 ENCOUNTER — HOSPITAL ENCOUNTER (OUTPATIENT)
Age: 76
Setting detail: SPECIMEN
Discharge: HOME | End: 2021-12-02
Payer: MEDICARE

## 2021-12-02 LAB
ESTIMATED AVERAGE GLUCOSE: 177 MG/DL
HBA1C MFR BLD: 7.8 % (ref 4.2–6.3)

## 2021-12-02 PROCEDURE — 83036 HEMOGLOBIN GLYCOSYLATED A1C: CPT

## 2021-12-02 PROCEDURE — 36415 COLL VENOUS BLD VENIPUNCTURE: CPT

## 2021-12-29 ENCOUNTER — OUTSIDE SERVICES (OUTPATIENT)
Dept: INTERNAL MEDICINE CLINIC | Age: 76
End: 2021-12-29

## 2022-01-06 ENCOUNTER — HOSPITAL ENCOUNTER (OUTPATIENT)
Age: 77
Setting detail: SPECIMEN
Discharge: HOME | End: 2022-01-06
Payer: MEDICARE

## 2022-01-06 LAB
ALBUMIN SERPL-MCNC: 3.1 GM/DL (ref 3.4–5)
ALP BLD-CCNC: 97 IU/L (ref 40–129)
ALT SERPL-CCNC: 5 U/L (ref 10–40)
ANION GAP SERPL CALCULATED.3IONS-SCNC: 12 MMOL/L (ref 4–16)
AST SERPL-CCNC: 19 IU/L (ref 15–37)
BILIRUB SERPL-MCNC: 0.3 MG/DL (ref 0–1)
BUN BLDV-MCNC: 25 MG/DL (ref 6–23)
CALCIUM SERPL-MCNC: 9.4 MG/DL (ref 8.3–10.6)
CHLORIDE BLD-SCNC: 99 MMOL/L (ref 99–110)
CO2: 26 MMOL/L (ref 21–32)
CREAT SERPL-MCNC: 1.1 MG/DL (ref 0.6–1.1)
GFR AFRICAN AMERICAN: 59 ML/MIN/1.73M2
GFR NON-AFRICAN AMERICAN: 48 ML/MIN/1.73M2
GLUCOSE FASTING: 173 MG/DL (ref 70–99)
HCT VFR BLD CALC: 36.6 % (ref 37–47)
HEMOGLOBIN: 11.4 GM/DL (ref 12.5–16)
MCH RBC QN AUTO: 27.5 PG (ref 27–31)
MCHC RBC AUTO-ENTMCNC: 31.1 % (ref 32–36)
MCV RBC AUTO: 88.4 FL (ref 78–100)
PDW BLD-RTO: 16.3 % (ref 11.7–14.9)
PLATELET # BLD: 269 K/CU MM (ref 140–440)
PMV BLD AUTO: 9.7 FL (ref 7.5–11.1)
POTASSIUM SERPL-SCNC: 4.4 MMOL/L (ref 3.5–5.1)
RBC # BLD: 4.14 M/CU MM (ref 4.2–5.4)
SODIUM BLD-SCNC: 137 MMOL/L (ref 135–145)
TOTAL PROTEIN: 7.2 GM/DL (ref 6.4–8.2)
WBC # BLD: 11.4 K/CU MM (ref 4–10.5)

## 2022-01-06 PROCEDURE — 36415 COLL VENOUS BLD VENIPUNCTURE: CPT

## 2022-01-06 PROCEDURE — 85027 COMPLETE CBC AUTOMATED: CPT

## 2022-01-06 PROCEDURE — 80053 COMPREHEN METABOLIC PANEL: CPT

## 2022-01-31 ENCOUNTER — OUTSIDE SERVICES (OUTPATIENT)
Dept: INTERNAL MEDICINE CLINIC | Age: 77
End: 2022-01-31

## 2022-02-10 ENCOUNTER — OUTSIDE SERVICES (OUTPATIENT)
Dept: INTERNAL MEDICINE CLINIC | Age: 77
End: 2022-02-10

## 2022-03-07 ENCOUNTER — HOSPITAL ENCOUNTER (OUTPATIENT)
Age: 77
Setting detail: SPECIMEN
Discharge: HOME | End: 2022-03-07
Payer: MEDICARE

## 2022-03-07 LAB
ESTIMATED AVERAGE GLUCOSE: 177 MG/DL
HBA1C MFR BLD: 7.8 % (ref 4.2–6.3)

## 2022-03-07 PROCEDURE — 83036 HEMOGLOBIN GLYCOSYLATED A1C: CPT

## 2022-03-07 PROCEDURE — 36415 COLL VENOUS BLD VENIPUNCTURE: CPT

## 2022-03-08 ENCOUNTER — HOSPITAL ENCOUNTER (OUTPATIENT)
Age: 77
Setting detail: SPECIMEN
Discharge: HOME | End: 2022-03-08
Payer: MEDICARE

## 2022-03-08 PROCEDURE — 83036 HEMOGLOBIN GLYCOSYLATED A1C: CPT

## 2022-03-28 ENCOUNTER — HOSPITAL ENCOUNTER (EMERGENCY)
Age: 77
Discharge: NURSING, REHAB OR LONG-TERM CARE | End: 2022-03-28
Attending: EMERGENCY MEDICINE
Payer: MEDICARE

## 2022-03-28 VITALS
HEIGHT: 63 IN | DIASTOLIC BLOOD PRESSURE: 54 MMHG | SYSTOLIC BLOOD PRESSURE: 130 MMHG | RESPIRATION RATE: 20 BRPM | HEART RATE: 63 BPM | BODY MASS INDEX: 38.98 KG/M2 | WEIGHT: 220 LBS | OXYGEN SATURATION: 98 % | TEMPERATURE: 98.4 F

## 2022-03-28 DIAGNOSIS — E16.2 HYPOGLYCEMIA: Primary | ICD-10-CM

## 2022-03-28 LAB
ANION GAP SERPL CALCULATED.3IONS-SCNC: 13 MMOL/L (ref 4–16)
BUN BLDV-MCNC: 30 MG/DL (ref 6–23)
CALCIUM SERPL-MCNC: 9.5 MG/DL (ref 8.3–10.6)
CHLORIDE BLD-SCNC: 100 MMOL/L (ref 99–110)
CO2: 24 MMOL/L (ref 21–32)
CREAT SERPL-MCNC: 1.3 MG/DL (ref 0.6–1.1)
GFR AFRICAN AMERICAN: 48 ML/MIN/1.73M2
GFR NON-AFRICAN AMERICAN: 40 ML/MIN/1.73M2
GLUCOSE BLD-MCNC: 179 MG/DL (ref 70–99)
GLUCOSE BLD-MCNC: 72 MG/DL (ref 70–99)
GLUCOSE BLD-MCNC: 82 MG/DL (ref 70–99)
POTASSIUM SERPL-SCNC: 4.1 MMOL/L (ref 3.5–5.1)
SODIUM BLD-SCNC: 137 MMOL/L (ref 135–145)

## 2022-03-28 PROCEDURE — 80048 BASIC METABOLIC PNL TOTAL CA: CPT

## 2022-03-28 PROCEDURE — 99284 EMERGENCY DEPT VISIT MOD MDM: CPT

## 2022-03-28 PROCEDURE — 82962 GLUCOSE BLOOD TEST: CPT

## 2022-03-28 ASSESSMENT — ENCOUNTER SYMPTOMS
GASTROINTESTINAL NEGATIVE: 1
RESPIRATORY NEGATIVE: 1

## 2022-03-28 ASSESSMENT — VISUAL ACUITY: OU: 1

## 2022-03-28 NOTE — ED PROVIDER NOTES
Triage Chief Complaint:   Hypoglycemia (Nursing home report a glucose of 40 ) and Conjunctivitis (right eye)    [place]:  Janiya Hernandez is a 77 y.o. female that presents to the ED by 911. The patient is a 80-[facility]-old [facility] after they felt the nurses were not responding. Patient with the patient is diabetic type II insulin requiring she had a low glucose the family was upset for some reason I am not yet talked to them and 9 1 was called patient is currently somnolent she arouses on tactile stimuli she knows her name she knows she is in the hospital Accu-Chek by the nurse is 79. Patient is unsure if she ate after breakfast today. She is in no pain she has no complaints no fevers or chills no chest pain cough no shortness of breath. Past Medical History:   Diagnosis Date    Anemia     Arthritis     Assistance needed for personal hygiene     Asthma     Cellulitis of right foot     Chronic kidney disease     Cognitive communication deficit     Depression     Diabetes mellitus ([address]     Frequent falls     GERD (gastroesophageal reflux disease)     H/O Doppler ultrasound 10/01/2020     No evidence of significant venous insufficiency bilaterally. No evidence of DVT or SVT in the bilaterally.  H/O echocardiogram 10/01/2020    EF 60-65%. No significant valvular disease.  Hallux valgus (acquired), right foot 05/01/2017    Hyperlipemia     Hypertension     Muscle weakness     Other disorders of electrolyte and fluid balance, not elsewhere classified     Other disorders of kidney and ureter     1 kidney.     Shingles 06/2021    SIRS (systemic inflammatory response syndrome) ([address] 01/18/2012    Thyroid disease     hypothyroid    Type 2 diabetes mellitus with foot ulcer, with long-term current use of insulin ([address] 01/18/2012    Ulcer of right foot with fat layer exposed ([address] 02/06/2013    WD-Diabetic ulcer of toe of left foot associated with type 2 Organizations: Not on file    Attends Club or Organization Meetings: Not on file    Marital Status: Not on file   Intimate Partner Violence:     Fear of Current or Ex-Partner: Not on file    Emotionally Abused: Not on file    Physically Abused: Not on file    Sexually Abused: Not on file   Housing Stability:     Unable to Pay for Housing in the Last Year: Not on file    Number of [place] in the Last Year: Not on file    Unstable Housing in the Last Year: Not on file     No current facility-administered medications for this encounter. Current Outpatient Medications   Medication Sig Dispense Refill    hydrALAZINE (APRESOLINE) 25 MG tablet Take 1 tablet by mouth 2 times daily 90 tablet 0    furosemide (LASIX) 20 MG tablet Take 1 tablet by mouth daily 60 tablet 0    methocarbamol (ROBAXIN) 500 MG tablet Take 500 mg by mouth 3 times daily For shingles      mupirocin (BACTROBAN) 2 % ointment Apply topically 2 times daily Apply topically 3 times daily.       acetaminophen (TYLENOL) 500 MG tablet Take 1,000 mg by mouth every 6 hours as needed for Pain      hydrOXYzine (ATARAX) 10 MG tablet Take 10 mg by mouth 3 times daily as needed for Itching      Albuterol Sulfate POWD Take 2 puffs by mouth every 6 hours as needed (for cough or wheezing) Inhale orally      HUMULIN R U-500 KWIKPEN 500 UNIT/ML SOPN concentrated injection pen Breakfast 60 units Lunch 30 units Dinner 30  150 units or less No additional units of insulin   151-200 Add 5 units   201-250 Add 10 units   251-300 Add 15 units   Over 300 add 20 units 1 pen 0    nystatin (MYCOSTATIN) POWD powder Apply to bilateral groin topically every shift for skin care       amLODIPine (NORVASC) 10 MG tablet Take 1 tablet by mouth daily Hold for systolic blood pressure less than or equal to 110 30 tablet 3    atenolol (TENORMIN) 50 MG tablet Take 1 tablet by mouth 2 times daily Hold for systolic blood pressure less than or equal to 110 30 tablet 3    DULoxetine (CYMBALTA) 60 MG extended release capsule Take 60 mg by mouth daily   1    pantoprazole (PROTONIX) 40 MG tablet Take 40 mg by mouth daily   3    atorvastatin (LIPITOR) 20 MG tablet Take 20 mg by mouth daily       Multiple Vitamins-Minerals (TRUEPLUS DIABETIC MULTIVITAMIN) TABS Take 1 tablet by mouth daily.  Levothyroxine Sodium 175 MCG CAPS Take 175 mcg by mouth Daily       aspirin 81 MG EC tablet Take 81 mg by mouth daily. Allergies   Allergen Reactions    Ritalin [Methylphenidate] Rash    Rocephin [Ceftriaxone Sodium-Lidocaine] Rash         ROS:    Review of Systems   Eyes:        Complains of some eye discharge no pain no visual impairment   Respiratory: Negative. Cardiovascular: Negative. Gastrointestinal: Negative. Genitourinary: Negative. Neurological: Negative. All other systems reviewed and are negative. Nursing Notes Reviewed    Physical Exam:      ED Triage Vitals [03/28/22 1732]   Enc Vitals Group      BP (!) 167/63      Pulse 63      Resp 20      Temp 98.4 °F (36.9 °C)      Temp Source Oral      SpO2 98 %      Weight 220 lb (99.8 kg)      Height 5' 3\" (1.6 m)      Head Circumference       Peak Flow       Pain Score       Pain Loc       Pain Edu? Excl. in [address]? Physical Exam  Vitals and nursing note reviewed. Exam conducted with a chaperone present. Constitutional:       Appearance: She is well-developed. HENT:      Head: Normocephalic and atraumatic. Right Ear: Ear canal and external ear normal.      Left Ear: Ear canal and external ear normal.      Nose: Nose normal.      Mouth/Throat:      Mouth: Mucous membranes are moist.   Eyes:      General: Lids are normal. Lids are everted, no foreign bodies appreciated. Vision grossly intact. No scleral icterus. Right eye: No discharge. Left eye: No discharge. Extraocular Movements:      Right eye: Normal extraocular motion and no nystagmus.       Left eye: Normal extraocular motion and no nystagmus. Conjunctiva/sclera: Conjunctivae normal.      Left eye: Left conjunctiva is not injected. Pupils: Pupils are equal, round, and reactive to light. Neck:      Thyroid: No thyromegaly. Vascular: No JVD. Trachea: No tracheal deviation. Cardiovascular:      Rate and Rhythm: Normal rate and regular rhythm. Heart sounds: Normal heart sounds. No murmur heard. No friction rub. No gallop. Pulmonary:      Effort: Pulmonary effort is normal. No respiratory distress. Breath sounds: Normal breath sounds. No stridor. No wheezing or rales. Chest:      Chest wall: No tenderness. Abdominal:      General: Bowel sounds are normal. There is no distension. Palpations: Abdomen is soft. There is no mass. Tenderness: There is no abdominal tenderness. There is no guarding or rebound. Hernia: No hernia is present. Musculoskeletal:         General: No tenderness or deformity. Normal range of motion. Cervical back: Normal range of motion and neck supple. Lymphadenopathy:      Cervical: No cervical adenopathy. Skin:     General: Skin is warm and dry. Coloration: Skin is not pale. Findings: No erythema or rash. Neurological:      General: No focal deficit present. Mental Status: She is alert and oriented to person, place, and time. Cranial Nerves: No cranial nerve deficit. Sensory: No sensory deficit. Deep Tendon Reflexes: Reflexes are normal and symmetric. Reflexes normal.   Psychiatric:         Mood and Affect: Mood normal.         Speech: Speech normal.         Behavior: Behavior normal.         Thought Content:  Thought content normal.         Judgment: Judgment normal.         I have reviewed and interpreted all of the currently available lab results from this visit (ifapplicable):  Results for orders placed or performed during the hospital encounter of 15/56/55   Basic Metabolic Panel w/ Reflex to MG Result Value Ref Range    Sodium 137 135 - 145 MMOL/L    Potassium 4.1 3.5 - 5.1 MMOL/L    Chloride 100 99 - 110 mMol/L    CO2 24 21 - 32 MMOL/L    Anion Gap 13 4 - 16    BUN 30 (H) 6 - 23 MG/DL    CREATININE 1.3 (H) 0.6 - 1.1 MG/DL    Glucose 82 70 - 99 MG/DL    Calcium 9.5 8.3 - 10.6 MG/DL    GFR Non-African American 40 (L) >60 mL/min/1.73m2    GFR  48 (L) >60 mL/min/1.73m2   POCT Glucose   Result Value Ref Range    POC Glucose 72 70 - 99 MG/DL      Radiographs (if obtained):  [] The following radiograph wasinterpreted by myself in the absence of a radiologist:   [] Radiologist's Report Reviewed:  No orders to display         EKG (if obtained): (All EKG's are interpreted by myself in the absence of a cardiologist)    Chart review shows recent radiographs:  No results found. MDM:      Patient presents to the ED with an episode of hypoglycemia. The patient is unaware what time she received insulin she is asymptomatic currently I do not see any findings of conjunctivitis. Renal function is normal she is not noted to be on any oral hypoglycemics such as sulfonylurea that would have a potential prolonged duration of action. She is medically stable and can be discharged back to the [facility]    Please note that portions of this note may have been completed with a voice recognition/dictation program. Efforts were made to edit the dictations but occasionally words are mis-transcribed.      All pertinent Lab data and radiographic results reviewed with patient at bedside. The patient and/or the family were informed of the results of any tests/labs/imaging, the treatment plan, and time was allotted to answer questions. See chart for details of medications given during the ED stay.     The likelihood of other entities in the differential is insufficient to justify any further testing for them. This was explained to the patient.  The patient was advised that persistent or worsening symptoms would require further evaluation.                Clinical Impression:  1. Hypoglycemia      Disposition referral (if applicable):  Maria De Jesus Elizondo, [address] [place] [facility] [address] [facility]  [phone]    Schedule an appointment as soon as possible for a visit   If symptoms worsen    Disposition medications (if applicable):  New Prescriptions    No medications on file           Landry Pablo DO, FACEP      Comment: Please note this report has been produced using speech recognition software and maycontain errors related to that system including errors in grammar, punctuation, and spelling, as well as words and phrases that may be inappropriate. If there are any questions or concerns please feel free to contact thedictating provider for clarification.         Arin Samuels DO  03/28/22 9209

## 2022-03-28 NOTE — ED NOTES
PT. Had 80% of dinner diet , family at bedside .  Report called to [address] [place] [address] Avenue, RN  03/28/22 7714

## 2022-04-04 ENCOUNTER — HOSPITAL ENCOUNTER (OUTPATIENT)
Dept: CT IMAGING | Age: 77
Discharge: HOME | End: 2022-04-04
Payer: MEDICARE

## 2022-04-04 DIAGNOSIS — H02.401 PTOSIS OF RIGHT EYELID: ICD-10-CM

## 2022-04-04 PROCEDURE — 70450 CT HEAD/BRAIN W/O DYE: CPT

## 2022-04-25 ENCOUNTER — HOSPITAL ENCOUNTER (EMERGENCY)
Age: 77
Discharge: HOME | End: 2022-04-25
Attending: EMERGENCY MEDICINE
Payer: MEDICARE

## 2022-04-25 ENCOUNTER — APPOINTMENT (OUTPATIENT)
Dept: CT IMAGING | Age: 77
End: 2022-04-25
Payer: MEDICARE

## 2022-04-25 ENCOUNTER — APPOINTMENT (OUTPATIENT)
Dept: GENERAL RADIOLOGY | Age: 77
End: 2022-04-25
Payer: MEDICARE

## 2022-04-25 VITALS
HEART RATE: 65 BPM | TEMPERATURE: 98.5 F | SYSTOLIC BLOOD PRESSURE: 163 MMHG | OXYGEN SATURATION: 99 % | DIASTOLIC BLOOD PRESSURE: 64 MMHG | BODY MASS INDEX: 43.19 KG/M2 | HEIGHT: 60 IN | RESPIRATION RATE: 11 BRPM | WEIGHT: 220 LBS

## 2022-04-25 DIAGNOSIS — H01.001 BLEPHARITIS OF RIGHT UPPER EYELID, UNSPECIFIED TYPE: ICD-10-CM

## 2022-04-25 DIAGNOSIS — N39.0 URINARY TRACT INFECTION WITH HEMATURIA, SITE UNSPECIFIED: Primary | ICD-10-CM

## 2022-04-25 DIAGNOSIS — E86.0 DEHYDRATION: ICD-10-CM

## 2022-04-25 DIAGNOSIS — G93.40 ACUTE ENCEPHALOPATHY: ICD-10-CM

## 2022-04-25 DIAGNOSIS — R31.9 URINARY TRACT INFECTION WITH HEMATURIA, SITE UNSPECIFIED: Primary | ICD-10-CM

## 2022-04-25 LAB
ALBUMIN SERPL-MCNC: 3.4 GM/DL (ref 3.4–5)
ALP BLD-CCNC: 93 IU/L (ref 40–129)
ALT SERPL-CCNC: 11 U/L (ref 10–40)
ANION GAP SERPL CALCULATED.3IONS-SCNC: 10 MMOL/L (ref 4–16)
AST SERPL-CCNC: 17 IU/L (ref 15–37)
BACTERIA: ABNORMAL /HPF
BASOPHILS ABSOLUTE: 0 K/CU MM
BASOPHILS RELATIVE PERCENT: 0.4 % (ref 0–1)
BILIRUB SERPL-MCNC: 0.2 MG/DL (ref 0–1)
BILIRUBIN URINE: NEGATIVE MG/DL
BLOOD, URINE: ABNORMAL
BUN BLDV-MCNC: 24 MG/DL (ref 6–23)
CALCIUM SERPL-MCNC: 9.3 MG/DL (ref 8.3–10.6)
CAST TYPE: 0 /HPF
CHLORIDE BLD-SCNC: 99 MMOL/L (ref 99–110)
CLARITY: ABNORMAL
CO2: 24 MMOL/L (ref 21–32)
COLOR: YELLOW
CREAT SERPL-MCNC: 1.4 MG/DL (ref 0.6–1.1)
CRYSTAL TYPE: 0 /HPF
DIFFERENTIAL TYPE: ABNORMAL
EOSINOPHILS ABSOLUTE: 0.3 K/CU MM
EOSINOPHILS RELATIVE PERCENT: 3.6 % (ref 0–3)
EPITHELIAL CELLS, UA: ABNORMAL /HPF
GFR AFRICAN AMERICAN: 44 ML/MIN/1.73M2
GFR NON-AFRICAN AMERICAN: 37 ML/MIN/1.73M2
GLUCOSE BLD-MCNC: 326 MG/DL (ref 70–99)
GLUCOSE, URINE: NEGATIVE MG/DL
HCT VFR BLD CALC: 36 % (ref 37–47)
HEMOGLOBIN: 11.6 GM/DL (ref 12.5–16)
IMMATURE NEUTROPHIL %: 0.5 % (ref 0–0.43)
KETONES, URINE: NEGATIVE MG/DL
LEUKOCYTE ESTERASE, URINE: ABNORMAL
LYMPHOCYTES ABSOLUTE: 2.4 K/CU MM
LYMPHOCYTES RELATIVE PERCENT: 25.5 % (ref 24–44)
MCH RBC QN AUTO: 28.8 PG (ref 27–31)
MCHC RBC AUTO-ENTMCNC: 32.2 % (ref 32–36)
MCV RBC AUTO: 89.3 FL (ref 78–100)
MONOCYTES ABSOLUTE: 0.5 K/CU MM
MONOCYTES RELATIVE PERCENT: 5.3 % (ref 0–4)
NITRITE URINE, QUANTITATIVE: POSITIVE
PDW BLD-RTO: 14.5 % (ref 11.7–14.9)
PH, URINE: 5 (ref 5–8)
PLATELET # BLD: 220 K/CU MM (ref 140–440)
PMV BLD AUTO: 9.9 FL (ref 7.5–11.1)
POTASSIUM SERPL-SCNC: 3.8 MMOL/L (ref 3.5–5.1)
PROTEIN UA: >=300 MG/DL
RBC # BLD: 4.03 M/CU MM (ref 4.2–5.4)
RBC URINE: 0 /HPF (ref 0–6)
SEGMENTED NEUTROPHILS ABSOLUTE COUNT: 6.1 K/CU MM
SEGMENTED NEUTROPHILS RELATIVE PERCENT: 64.7 % (ref 36–66)
SODIUM BLD-SCNC: 133 MMOL/L (ref 135–145)
SPECIFIC GRAVITY UA: 1.02 (ref 1–1.03)
TOTAL IMMATURE NEUTOROPHIL: 0.05 K/CU MM
TOTAL PROTEIN: 7.2 GM/DL (ref 6.4–8.2)
TROPONIN T: 0.01 NG/ML
UROBILINOGEN, URINE: 0.2 MG/DL (ref 0.2–1)
WBC # BLD: 9.4 K/CU MM (ref 4–10.5)
WBC UA: 10 /HPF (ref 0–5)

## 2022-04-25 PROCEDURE — 87186 SC STD MICRODIL/AGAR DIL: CPT

## 2022-04-25 PROCEDURE — 93005 ELECTROCARDIOGRAM TRACING: CPT | Performed by: EMERGENCY MEDICINE

## 2022-04-25 PROCEDURE — 96365 THER/PROPH/DIAG IV INF INIT: CPT

## 2022-04-25 PROCEDURE — P9612 CATHETERIZE FOR URINE SPEC: HCPCS

## 2022-04-25 PROCEDURE — 87086 URINE CULTURE/COLONY COUNT: CPT

## 2022-04-25 PROCEDURE — 99285 EMERGENCY DEPT VISIT HI MDM: CPT

## 2022-04-25 PROCEDURE — 81001 URINALYSIS AUTO W/SCOPE: CPT

## 2022-04-25 PROCEDURE — 85025 COMPLETE CBC W/AUTO DIFF WBC: CPT

## 2022-04-25 PROCEDURE — 84484 ASSAY OF TROPONIN QUANT: CPT

## 2022-04-25 PROCEDURE — 87077 CULTURE AEROBIC IDENTIFY: CPT

## 2022-04-25 PROCEDURE — 80053 COMPREHEN METABOLIC PANEL: CPT

## 2022-04-25 PROCEDURE — 70450 CT HEAD/BRAIN W/O DYE: CPT

## 2022-04-25 PROCEDURE — 2580000003 HC RX 258: Performed by: EMERGENCY MEDICINE

## 2022-04-25 PROCEDURE — 71045 X-RAY EXAM CHEST 1 VIEW: CPT

## 2022-04-25 PROCEDURE — 6360000002 HC RX W HCPCS: Performed by: EMERGENCY MEDICINE

## 2022-04-25 RX ORDER — CIPROFLOXACIN 2 MG/ML
400 INJECTION, SOLUTION INTRAVENOUS ONCE
Status: COMPLETED | OUTPATIENT
Start: 2022-04-25 | End: 2022-04-25

## 2022-04-25 RX ORDER — CIPROFLOXACIN 500 MG/1
500 TABLET, FILM COATED ORAL 2 TIMES DAILY
Qty: 20 TABLET | Refills: 0 | Status: SHIPPED | OUTPATIENT
Start: 2022-04-25 | End: 2022-04-28 | Stop reason: ALTCHOICE

## 2022-04-25 RX ORDER — SULFACETAMIDE SODIUM 100 MG/ML
2 SOLUTION/ DROPS OPHTHALMIC
Qty: 5 ML | Refills: 0 | Status: SHIPPED | OUTPATIENT
Start: 2022-04-25 | End: 2022-05-01

## 2022-04-25 RX ORDER — SODIUM CHLORIDE 9 MG/ML
INJECTION, SOLUTION INTRAVENOUS CONTINUOUS
Status: DISCONTINUED | OUTPATIENT
Start: 2022-04-25 | End: 2022-04-26 | Stop reason: HOSPADM

## 2022-04-25 RX ADMIN — SODIUM CHLORIDE: 9 INJECTION, SOLUTION INTRAVENOUS at 20:50

## 2022-04-25 RX ADMIN — CIPROFLOXACIN 400 MG: 2 INJECTION, SOLUTION INTRAVENOUS at 20:52

## 2022-04-25 ASSESSMENT — ENCOUNTER SYMPTOMS
RESPIRATORY NEGATIVE: 1
EYES NEGATIVE: 1
GASTROINTESTINAL NEGATIVE: 1

## 2022-04-25 ASSESSMENT — PAIN - FUNCTIONAL ASSESSMENT: PAIN_FUNCTIONAL_ASSESSMENT: NONE - DENIES PAIN

## 2022-04-25 NOTE — ED PROVIDER NOTES
The history is provided by the spouse. Altered Mental Status  The patient has been exhibiting more what is described by the family as lethargy. She is interactive and will recognize family members and she does move at the nursing home with a wheelchair that she will scoot along. However, over the past few days the patient is becoming less responsive to family members and she is not as interactive. The family is also noted that she has been sleeping more. She has not had any complete unresponsive episodes or even partial responsiveness she has just become more quiet and withdrawn than what she normally is at the nursing home. One of the nurses at the nursing home had read the CT scan from when she had a previous visit that stated that she had enlarged ventricles, however these ventricles have been enlarged on every CT scan that she has had and are not a new finding. The overall interpretation of the CT scan as well as the overall interpretation of other imaging has been negative and nonacute. The patient is also been having difficulty with her eyes she has been treated for a conjunctivitis with some eyedrops she still continues to have some flaking and redness of the eyelid as well as seeming to want to itch the area. However there has been no report of any eye discharge or redness within the eye. The patient does have a well-established history of urinary tract infections causing altered mental status in the past.  There has been no report of any syncopal episodes vomiting diarrhea or difficulty breathing. The patient is also not had any documented fevers    Review of Systems   Constitutional: Negative. HENT: Negative. Eyes: Negative. Respiratory: Negative. Cardiovascular: Negative. Gastrointestinal: Negative. Genitourinary: Negative. Musculoskeletal: Negative. Skin: Negative. Neurological: Negative. All other systems reviewed and are negative.       Family History   Problem Relation Age of Onset    Arthritis Mother     Cancer Mother     Diabetes Mother     High Blood Pressure Mother     Asthma Father     Heart Disease Father     Diabetes Brother      Social History     Socioeconomic History    Marital status:      Spouse name: Not on file    Number of children: Not on file    Years of education: Not on file    Highest education level: Not on file   Occupational History    Not on file   Tobacco Use    Smoking status: Never Smoker    Smokeless tobacco: Never Used   Vaping Use    Vaping Use: Never used   Substance and Sexual Activity    Alcohol use: No    Drug use: No    Sexual activity: Yes   Other Topics Concern    Not on file   Social History Narrative    Not on file     Social Determinants of Health     Financial Resource Strain:     Difficulty of Paying Living Expenses: Not on file   Food Insecurity:     Worried About Running Out of Food in the Last Year: Not on file    Juliane of Food in the Last Year: Not on file   Transportation Needs:     Lack of Transportation (Medical): Not on file    Lack of Transportation (Non-Medical):  Not on file   Physical Activity:     Days of Exercise per Week: Not on file    Minutes of Exercise per Session: Not on file   Stress:     Feeling of Stress : Not on file   Social Connections:     Frequency of Communication with Friends and Family: Not on file    Frequency of Social Gatherings with Friends and Family: Not on file    Attends [employer] Services: Not on file    Active Member of Clubs or Organizations: Not on file    Attends Club or Organization Meetings: Not on file    Marital Status: Not on file   Intimate Partner Violence:     Fear of Current or Ex-Partner: Not on file    Emotionally Abused: Not on file    Physically Abused: Not on file    Sexually Abused: Not on file   Housing Stability:     Unable to Pay for Housing in the Last Year: Not on file    Number of [place] in the Last Year: Not on file    Unstable Housing in the Last Year: Not on file     Past Surgical History:   Procedure Laterality Date    ABDOMEN SURGERY      CYST REMOVAL      from kidney.  DILATATION, ESOPHAGUS      ENDOSCOPY, COLON, DIAGNOSTIC      FOOT SURGERY  12/30/11    had infection  and a biopsy was sent away for analysis    HERNIA REPAIR      HYSTERECTOMY      KIDNEY REMOVAL  1973     Past Medical History:   Diagnosis Date    Anemia     Arthritis     Assistance needed for personal hygiene     Asthma     Cellulitis of right foot     Chronic kidney disease     Cognitive communication deficit     Depression     Diabetes mellitus ([address]     Frequent falls     GERD (gastroesophageal reflux disease)     H/O Doppler ultrasound 10/01/2020     No evidence of significant venous insufficiency bilaterally. No evidence of DVT or SVT in the bilaterally.  H/O echocardiogram 10/01/2020    EF 60-65%. No significant valvular disease.  Hallux valgus (acquired), right foot 05/01/2017    Hyperlipemia     Hypertension     Muscle weakness     Other disorders of electrolyte and fluid balance, not elsewhere classified     Other disorders of kidney and ureter     1 kidney.  Shingles 06/2021    SIRS (systemic inflammatory response syndrome) ([address] 01/18/2012    Thyroid disease     hypothyroid    Type 2 diabetes mellitus with foot ulcer, with long-term current use of insulin ([address] 01/18/2012    Ulcer of right foot with fat layer exposed ([address] 02/06/2013    WD-Diabetic ulcer of toe of left foot associated with type 2 diabetes mellitus, with fat layer exposed ([address] 03/04/2021    Weakness      Allergies   Allergen Reactions    Ritalin [Methylphenidate] Rash    Rocephin [Ceftriaxone Sodium-Lidocaine] Rash     Prior to Admission medications    Medication Sig Start Date End Date Taking?  Authorizing Provider   ciprofloxacin (CIPRO) 500 MG tablet Take 1 tablet by mouth 2 times daily for 10 days 4/25/22 5/5/22 Yes Harshad Starkey Nila Shingles, DO   sulfacetamide (BLEPH-10) 10 % ophthalmic solution Place 2 drops into the right eye every 3 hours for 6 days 4/25/22 5/1/22 Yes Génesis Callahan DO   hydrALAZINE (APRESOLINE) 25 MG tablet Take 1 tablet by mouth 2 times daily 10/8/21   Kyra King MD   furosemide (LASIX) 20 MG tablet Take 1 tablet by mouth daily 10/9/21   Kyra King MD   methocarbamol (ROBAXIN) 500 MG tablet Take 500 mg by mouth 3 times daily For shingles    Historical Provider, MD   mupirocin (BACTROBAN) 2 % ointment Apply topically 2 times daily Apply topically 3 times daily.     Historical Provider, MD   acetaminophen (TYLENOL) 500 MG tablet Take 1,000 mg by mouth every 6 hours as needed for Pain    Historical Provider, MD   hydrOXYzine (ATARAX) 10 MG tablet Take 10 mg by mouth 3 times daily as needed for Itching    Historical Provider, MD   Albuterol Sulfate POWD Take 2 puffs by mouth every 6 hours as needed (for cough or wheezing) Inhale orally    Historical Provider, MD   HUMULIN R U-500 KWIKPEN 500 UNIT/ML SOPN concentrated injection pen Breakfast 60 units Lunch 30 units Dinner 30  150 units or less No additional units of insulin   151-200 Add 5 units   201-250 Add 10 units   251-300 Add 15 units   Over 300 add 20 units 6/26/21   Bhumi Livingston MD   nystatin (MYCOSTATIN) POWD powder Apply to bilateral groin topically every shift for skin care     Historical Provider, MD   amLODIPine (NORVASC) 10 MG tablet Take 1 tablet by mouth daily Hold for systolic blood pressure less than or equal to 110 8/13/20   Sammie Andrade MD   atenolol (TENORMIN) 50 MG tablet Take 1 tablet by mouth 2 times daily Hold for systolic blood pressure less than or equal to 110 8/13/20   Sammie Andrade MD   DULoxetine (CYMBALTA) 60 MG extended release capsule Take 60 mg by mouth daily  7/24/19   Historical Provider, MD   pantoprazole (PROTONIX) 40 MG tablet Take 40 mg by mouth daily  4/4/19   Historical Provider, MD   atorvastatin (LIPITOR) 20 MG tablet Take 20 mg by mouth daily     Historical Provider, MD   Multiple Vitamins-Minerals (TRUEPLUS DIABETIC MULTIVITAMIN) TABS Take 1 tablet by mouth daily. Historical Provider, MD   Levothyroxine Sodium 175 MCG CAPS Take 175 mcg by mouth Daily     Historical Provider, MD   aspirin 81 MG EC tablet Take 81 mg by mouth daily. Historical Provider, MD       BP (!) 161/83   Pulse 69   Temp 98.5 °F (36.9 °C) (Oral)   Resp 16   Ht 5' (1.524 m)   Wt 220 lb (99.8 kg)   SpO2 98%   BMI 42.97 kg/m²     Physical Exam  Vitals and nursing note reviewed. Constitutional:       General: She is not in acute distress. Appearance: She is not ill-appearing or toxic-appearing. HENT:      Head: Normocephalic and atraumatic. Mouth/Throat:      Mouth: Mucous membranes are dry. Eyes:      Comments: Right eye has irritation consistent with blepharitis, no conjunctival irritation. No ulcerations are appreciated area is mildly erythematous but there is no induration or abscess formation that can be appreciated   Cardiovascular:      Rate and Rhythm: Normal rate. Heart sounds: Murmur heard. Pulmonary:      Effort: Pulmonary effort is normal. No respiratory distress. Breath sounds: Normal breath sounds. Abdominal:      General: Bowel sounds are normal. There is no distension. Palpations: Abdomen is soft. Musculoskeletal:      Cervical back: Neck supple. Comments: Bilateral lower extremity swelling no pain or tenderness   Skin:     General: Skin is warm and dry. Capillary Refill: Capillary refill takes less than 2 seconds. Neurological:      Mental Status: She is alert. Cranial Nerves: No facial asymmetry.       Comments: Patient will look around the room and she will respond to voice patient will squeeze my hand and she is moving all 4 extremities however patient is noncommunicative  Patient will also respond to stimuli by looking towards where the stimulus is coming from she will also pull her hands away. MDM:    Labs Reviewed   CBC WITH AUTO DIFFERENTIAL - Abnormal; Notable for the following components:       Result Value    RBC 4.03 (*)     Hemoglobin 11.6 (*)     Hematocrit 36.0 (*)     Monocytes % 5.3 (*)     Eosinophils % 3.6 (*)     Immature Neutrophil % 0.5 (*)     All other components within normal limits   COMPREHENSIVE METABOLIC PANEL - Abnormal; Notable for the following components:    Sodium 133 (*)     BUN 24 (*)     CREATININE 1.4 (*)     Glucose 326 (*)     GFR Non- 37 (*)     GFR  44 (*)     All other components within normal limits   URINALYSIS - Abnormal; Notable for the following components:    Color, UA YELLOW (*)     Clarity, UA CLOUDY (*)     Blood, Urine TRACE (*)     Nitrite Urine, Quantitative POSITIVE (*)     WBC, UA 10 (*)     Cast Type 0 (*)     Bacteria, UA 2+ (*)     Crystal Type 0 (*)     All other components within normal limits   TROPONIN - Abnormal; Notable for the following components:    Troponin T 0.011 (*)     All other components within normal limits   CULTURE, URINE       XR CHEST PORTABLE   Final Result   1. No acute cardiopulmonary process identified. CT HEAD WO CONTRAST   Final Result   No acute intracranial abnormality. Patient's EKG shows a normal sinus rhythm at 68 bpm.  Patient has nonspecific ST and T wave abnormalities    Saline lock was established on the patient she is been given IV fluids in the emergency department. Patient does not have a fever but she does have urinary tract infection. This urinary tract infection may be contributing to her altered mental status that the  has perceived. The patient has a mildly elevated creatinine and she has not been eating or drinking very much over the past few days this may be contributing some to some very mild dehydration.   Patient CT scan does not show any evidence of acute pathology the patient's ventricle sizes are consistent with all previous EKGs that we have on file she does not have any type of emergent hydrocephalus I did explain this to the patient's family as well. Patient was given antibiotics and I have given additional fluid rehydration as well as IV antibiotics. The patient also has a blepharitis with no associated conjunctivitis. This will require cleaning with just Ralph's baby soap or baby shampoo. It can also be irrigated with normal saline and gently cleaned. Area may also benefit from topical steroids however, once these glands have been unclogged and the area has been cleaned I anticipate rapid healing of this area. I have written for the patient to have Bleph 10 in addition to the Cipro for UTI. She is non toxic and after IVF the patient is interactive and husbands reports at her baseline. She is not septic and she wants to eat. She was given food in ER and she is doing fine. She does not require emergent transfer or admission, especially with her improvement. Her encephalopathy may be due to UTI and since she is at baseline it may have been compounded by the fact she needed IVF and IV antibiotics,.  verifies she is at baseline and she is following commands for me and is very interactive        Final Impression    1. Urinary tract infection with hematuria, site unspecified    2. Dehydration    3. Acute encephalopathy    4.  Blepharitis of right upper eyelid, unspecified type              Clement Hinkle,   04/25/22 2043       Clement Hinkle,   04/25/22 2129

## 2022-04-26 LAB
EKG ATRIAL RATE: 68 BPM
EKG DIAGNOSIS: NORMAL
EKG P AXIS: 58 DEGREES
EKG P-R INTERVAL: 208 MS
EKG Q-T INTERVAL: 464 MS
EKG QRS DURATION: 92 MS
EKG QTC CALCULATION (BAZETT): 493 MS
EKG R AXIS: 2 DEGREES
EKG T AXIS: 95 DEGREES
EKG VENTRICULAR RATE: 68 BPM

## 2022-04-26 PROCEDURE — 93010 ELECTROCARDIOGRAM REPORT: CPT | Performed by: INTERNAL MEDICINE

## 2022-04-28 LAB
CULTURE: ABNORMAL
Lab: ABNORMAL
SPECIMEN: ABNORMAL

## 2022-04-28 RX ORDER — SULFAMETHOXAZOLE AND TRIMETHOPRIM 800; 160 MG/1; MG/1
1 TABLET ORAL 2 TIMES DAILY
Qty: 20 TABLET | Refills: 0 | Status: SHIPPED | OUTPATIENT
Start: 2022-04-28 | End: 2022-05-08

## 2022-04-28 NOTE — ED NOTES
Received orders from Dr. Alfonso Gamez,  Urine culture grew bacteria and resistant to Cipro. Cipro discontinued, Bactrim DS 1 tablet BID x 10 days ordered. Paddy Land, nurse at [facility] notified, verbalized understanding. Results of urine culture faxed to [phone] as requested.      Eliana Cooper RN  04/28/22 2695

## 2022-04-28 NOTE — ED PROVIDER NOTES
Result note: Patient was started on Cipro for urinary tract infection. Her urine culture has grown bacteria that are resistant to Cipro. She will be changed to Bactrim DS. Patient will be contacted and a prescription will be sent to her pharmacy. Bactrim DS 1 p.o. twice daily #20.       Rosalio Morrow, DO  04/28/22 [address] Janee Mon, DO  04/28/22 1409

## 2022-04-29 NOTE — PROGRESS NOTES
Ej Aguirre RN from [facility] did not get the urine culture fax. Resending urine culture result to [facility] @ [phone].

## 2022-05-19 ENCOUNTER — HOSPITAL ENCOUNTER (INPATIENT)
Age: 77
LOS: 2 days | Discharge: NURSING, REHAB OR LONG-TERM CARE | DRG: 638 | End: 2022-05-22
Attending: EMERGENCY MEDICINE | Admitting: INTERNAL MEDICINE
Payer: MEDICARE

## 2022-05-19 DIAGNOSIS — N30.01 ACUTE CYSTITIS WITH HEMATURIA: Primary | ICD-10-CM

## 2022-05-19 DIAGNOSIS — R73.9 HYPERGLYCEMIA: ICD-10-CM

## 2022-05-19 LAB
BASOPHILS ABSOLUTE: 0 K/CU MM
BASOPHILS RELATIVE PERCENT: 0.5 % (ref 0–1)
DIFFERENTIAL TYPE: ABNORMAL
EOSINOPHILS ABSOLUTE: 0.2 K/CU MM
EOSINOPHILS RELATIVE PERCENT: 3.3 % (ref 0–3)
HCT VFR BLD CALC: 38.8 % (ref 37–47)
HEMOGLOBIN: 12.2 GM/DL (ref 12.5–16)
IMMATURE NEUTROPHIL %: 0.5 % (ref 0–0.43)
LYMPHOCYTES ABSOLUTE: 1.9 K/CU MM
LYMPHOCYTES RELATIVE PERCENT: 27.9 % (ref 24–44)
MCH RBC QN AUTO: 28.7 PG (ref 27–31)
MCHC RBC AUTO-ENTMCNC: 31.4 % (ref 32–36)
MCV RBC AUTO: 91.3 FL (ref 78–100)
MONOCYTES ABSOLUTE: 0.4 K/CU MM
MONOCYTES RELATIVE PERCENT: 5.6 % (ref 0–4)
PDW BLD-RTO: 13.8 % (ref 11.7–14.9)
PLATELET # BLD: 228 K/CU MM (ref 140–440)
PMV BLD AUTO: 10.2 FL (ref 7.5–11.1)
RBC # BLD: 4.25 M/CU MM (ref 4.2–5.4)
SEGMENTED NEUTROPHILS ABSOLUTE COUNT: 4.1 K/CU MM
SEGMENTED NEUTROPHILS RELATIVE PERCENT: 62.2 % (ref 36–66)
TOTAL IMMATURE NEUTOROPHIL: 0.03 K/CU MM
WBC # BLD: 6.6 K/CU MM (ref 4–10.5)

## 2022-05-19 PROCEDURE — 99285 EMERGENCY DEPT VISIT HI MDM: CPT

## 2022-05-19 PROCEDURE — 85025 COMPLETE CBC W/AUTO DIFF WBC: CPT

## 2022-05-19 PROCEDURE — 2580000003 HC RX 258: Performed by: EMERGENCY MEDICINE

## 2022-05-19 PROCEDURE — 80053 COMPREHEN METABOLIC PANEL: CPT

## 2022-05-19 PROCEDURE — 81001 URINALYSIS AUTO W/SCOPE: CPT

## 2022-05-19 RX ORDER — SODIUM CHLORIDE 9 MG/ML
INJECTION, SOLUTION INTRAVENOUS CONTINUOUS
Status: DISCONTINUED | OUTPATIENT
Start: 2022-05-19 | End: 2022-05-20

## 2022-05-19 RX ADMIN — SODIUM CHLORIDE: 9 INJECTION, SOLUTION INTRAVENOUS at 23:55

## 2022-05-19 ASSESSMENT — PAIN - FUNCTIONAL ASSESSMENT: PAIN_FUNCTIONAL_ASSESSMENT: NONE - DENIES PAIN

## 2022-05-20 PROBLEM — N39.0 URINARY TRACT INFECTION: Status: ACTIVE | Noted: 2022-05-20

## 2022-05-20 PROBLEM — E11.65 HYPERGLYCEMIA DUE TO DIABETES MELLITUS (HCC): Status: ACTIVE | Noted: 2022-05-20

## 2022-05-20 LAB
ALBUMIN SERPL-MCNC: 2.9 GM/DL (ref 3.4–5)
ALBUMIN SERPL-MCNC: 3.5 GM/DL (ref 3.4–5)
ALP BLD-CCNC: 106 IU/L (ref 40–129)
ALP BLD-CCNC: 137 IU/L (ref 40–129)
ALT SERPL-CCNC: 7 U/L (ref 10–40)
ANION GAP SERPL CALCULATED.3IONS-SCNC: 11 MMOL/L (ref 4–16)
ANION GAP SERPL CALCULATED.3IONS-SCNC: 14 MMOL/L (ref 4–16)
AST SERPL-CCNC: 15 IU/L (ref 15–37)
AST SERPL-CCNC: 17 IU/L (ref 15–37)
BACTERIA: ABNORMAL /HPF
BASE EXCESS MIXED: 0.2 (ref 0–2.3)
BASE EXCESS: ABNORMAL (ref 0–2.4)
BETA-HYDROXYBUTYRATE: 0.6 MG/DL (ref 0–3)
BILIRUB SERPL-MCNC: 0.3 MG/DL (ref 0–1)
BILIRUB SERPL-MCNC: 0.3 MG/DL (ref 0–1)
BILIRUBIN URINE: NEGATIVE MG/DL
BLOOD, URINE: ABNORMAL
BUN BLDV-MCNC: 19 MG/DL (ref 6–23)
BUN BLDV-MCNC: 21 MG/DL (ref 6–23)
CALCIUM SERPL-MCNC: 8.8 MG/DL (ref 8.3–10.6)
CALCIUM SERPL-MCNC: 9.7 MG/DL (ref 8.3–10.6)
CAST TYPE: 0 /HPF
CHLORIDE BLD-SCNC: 94 MMOL/L (ref 99–110)
CHLORIDE BLD-SCNC: 96 MMOL/L (ref 99–110)
CHP ED QC CHECK: NORMAL
CLARITY: CLEAR
CO2 CONTENT: 27.3 MMOL/L (ref 19–24)
CO2: 19 MMOL/L (ref 21–32)
CO2: 26 MMOL/L (ref 21–32)
COLOR: YELLOW
CREAT SERPL-MCNC: 1.3 MG/DL (ref 0.6–1.1)
CREAT SERPL-MCNC: 1.5 MG/DL (ref 0.6–1.1)
CRYSTAL TYPE: 0 /HPF
EPITHELIAL CELLS, UA: ABNORMAL /HPF
ESTIMATED AVERAGE GLUCOSE: 220 MG/DL
GFR AFRICAN AMERICAN: 41 ML/MIN/1.73M2
GFR AFRICAN AMERICAN: 48 ML/MIN/1.73M2
GFR NON-AFRICAN AMERICAN: 34 ML/MIN/1.73M2
GFR NON-AFRICAN AMERICAN: 40 ML/MIN/1.73M2
GLUCOSE BLD-MCNC: 243 MG/DL (ref 70–99)
GLUCOSE BLD-MCNC: 261 MG/DL (ref 70–99)
GLUCOSE BLD-MCNC: 274 MG/DL (ref 70–99)
GLUCOSE BLD-MCNC: 300 MG/DL (ref 70–99)
GLUCOSE BLD-MCNC: 349 MG/DL (ref 70–99)
GLUCOSE BLD-MCNC: 370 MG/DL (ref 70–99)
GLUCOSE BLD-MCNC: 390 MG/DL (ref 70–99)
GLUCOSE BLD-MCNC: 390 MG/DL (ref 70–99)
GLUCOSE BLD-MCNC: 402 MG/DL (ref 70–99)
GLUCOSE BLD-MCNC: 434 MG/DL (ref 70–99)
GLUCOSE BLD-MCNC: 515 MG/DL (ref 70–99)
GLUCOSE BLD-MCNC: 572 MG/DL
GLUCOSE BLD-MCNC: 572 MG/DL (ref 70–99)
GLUCOSE BLD-MCNC: 626 MG/DL (ref 70–99)
GLUCOSE, URINE: >=1000 MG/DL
HBA1C MFR BLD: 9.3 % (ref 4.2–6.3)
HCO3 VENOUS: 25.9 MMOL/L (ref 19–25)
KETONES, URINE: NEGATIVE MG/DL
LEUKOCYTE ESTERASE, URINE: ABNORMAL
MAGNESIUM: 1.7 MG/DL (ref 1.8–2.4)
MAGNESIUM: 1.9 MG/DL (ref 1.8–2.4)
NITRITE URINE, QUANTITATIVE: NEGATIVE
O2 SAT, VEN: 82.6 % (ref 50–70)
PCO2, VEN: 46.9 MMHG (ref 38–52)
PH VENOUS: 7.35 (ref 7.32–7.42)
PH, URINE: 7 (ref 5–8)
PHOSPHORUS: 2.1 MG/DL (ref 2.5–4.9)
PHOSPHORUS: 2.8 MG/DL (ref 2.5–4.9)
PO2, VEN: 49.8 MMHG (ref 28–48)
POTASSIUM SERPL-SCNC: 3.9 MMOL/L (ref 3.5–5.1)
POTASSIUM SERPL-SCNC: 5.1 MMOL/L (ref 3.5–5.1)
PROTEIN UA: >=300 MG/DL
RBC URINE: 0 /HPF (ref 0–6)
SODIUM BLD-SCNC: 129 MMOL/L (ref 135–145)
SODIUM BLD-SCNC: 131 MMOL/L (ref 135–145)
SOURCE, BLOOD GAS: ABNORMAL
SPECIFIC GRAVITY UA: 1.02 (ref 1–1.03)
TOTAL PROTEIN: 6.5 GM/DL (ref 6.4–8.2)
TOTAL PROTEIN: 7.6 GM/DL (ref 6.4–8.2)
UROBILINOGEN, URINE: 0.2 MG/DL (ref 0.2–1)
WBC UA: 20 /HPF (ref 0–5)

## 2022-05-20 PROCEDURE — 2580000003 HC RX 258: Performed by: PHYSICIAN ASSISTANT

## 2022-05-20 PROCEDURE — 82010 KETONE BODYS QUAN: CPT

## 2022-05-20 PROCEDURE — 87086 URINE CULTURE/COLONY COUNT: CPT

## 2022-05-20 PROCEDURE — 87186 SC STD MICRODIL/AGAR DIL: CPT

## 2022-05-20 PROCEDURE — 6360000002 HC RX W HCPCS: Performed by: NURSE PRACTITIONER

## 2022-05-20 PROCEDURE — 80053 COMPREHEN METABOLIC PANEL: CPT

## 2022-05-20 PROCEDURE — 6370000000 HC RX 637 (ALT 250 FOR IP): Performed by: EMERGENCY MEDICINE

## 2022-05-20 PROCEDURE — 6360000002 HC RX W HCPCS: Performed by: PHYSICIAN ASSISTANT

## 2022-05-20 PROCEDURE — 2500000003 HC RX 250 WO HCPCS: Performed by: NURSE PRACTITIONER

## 2022-05-20 PROCEDURE — 83735 ASSAY OF MAGNESIUM: CPT

## 2022-05-20 PROCEDURE — 6370000000 HC RX 637 (ALT 250 FOR IP): Performed by: NURSE PRACTITIONER

## 2022-05-20 PROCEDURE — 6360000002 HC RX W HCPCS: Performed by: EMERGENCY MEDICINE

## 2022-05-20 PROCEDURE — 84100 ASSAY OF PHOSPHORUS: CPT

## 2022-05-20 PROCEDURE — 84439 ASSAY OF FREE THYROXINE: CPT

## 2022-05-20 PROCEDURE — 82803 BLOOD GASES ANY COMBINATION: CPT

## 2022-05-20 PROCEDURE — 2580000003 HC RX 258: Performed by: NURSE PRACTITIONER

## 2022-05-20 PROCEDURE — 94761 N-INVAS EAR/PLS OXIMETRY MLT: CPT

## 2022-05-20 PROCEDURE — 83036 HEMOGLOBIN GLYCOSYLATED A1C: CPT

## 2022-05-20 PROCEDURE — 87088 URINE BACTERIA CULTURE: CPT

## 2022-05-20 PROCEDURE — 2580000003 HC RX 258: Performed by: EMERGENCY MEDICINE

## 2022-05-20 PROCEDURE — 82962 GLUCOSE BLOOD TEST: CPT

## 2022-05-20 PROCEDURE — 2140000000 HC CCU INTERMEDIATE R&B

## 2022-05-20 PROCEDURE — 36415 COLL VENOUS BLD VENIPUNCTURE: CPT

## 2022-05-20 PROCEDURE — 6370000000 HC RX 637 (ALT 250 FOR IP): Performed by: PHYSICIAN ASSISTANT

## 2022-05-20 PROCEDURE — 84443 ASSAY THYROID STIM HORMONE: CPT

## 2022-05-20 RX ORDER — M-VIT,TX,IRON,MINS/CALC/FOLIC 27MG-0.4MG
1 TABLET ORAL DAILY
Status: DISCONTINUED | OUTPATIENT
Start: 2022-05-20 | End: 2022-05-22 | Stop reason: HOSPADM

## 2022-05-20 RX ORDER — AMLODIPINE BESYLATE 10 MG/1
10 TABLET ORAL DAILY
Status: DISCONTINUED | OUTPATIENT
Start: 2022-05-20 | End: 2022-05-22 | Stop reason: HOSPADM

## 2022-05-20 RX ORDER — SODIUM CHLORIDE 0.9 % (FLUSH) 0.9 %
5-40 SYRINGE (ML) INJECTION PRN
Status: DISCONTINUED | OUTPATIENT
Start: 2022-05-20 | End: 2022-05-22 | Stop reason: HOSPADM

## 2022-05-20 RX ORDER — ONDANSETRON 4 MG/1
4 TABLET, FILM COATED ORAL EVERY 8 HOURS PRN
COMMUNITY

## 2022-05-20 RX ORDER — DEXTROSE AND SODIUM CHLORIDE 5; .45 G/100ML; G/100ML
INJECTION, SOLUTION INTRAVENOUS CONTINUOUS PRN
Status: DISCONTINUED | OUTPATIENT
Start: 2022-05-20 | End: 2022-05-22 | Stop reason: HOSPADM

## 2022-05-20 RX ORDER — PANTOPRAZOLE SODIUM 40 MG/1
40 TABLET, DELAYED RELEASE ORAL DAILY
Status: DISCONTINUED | OUTPATIENT
Start: 2022-05-20 | End: 2022-05-22 | Stop reason: HOSPADM

## 2022-05-20 RX ORDER — SODIUM CHLORIDE 0.9 % (FLUSH) 0.9 %
5-40 SYRINGE (ML) INJECTION EVERY 12 HOURS SCHEDULED
Status: DISCONTINUED | OUTPATIENT
Start: 2022-05-20 | End: 2022-05-22 | Stop reason: HOSPADM

## 2022-05-20 RX ORDER — DEXTROSE MONOHYDRATE 50 MG/ML
100 INJECTION, SOLUTION INTRAVENOUS PRN
Status: DISCONTINUED | OUTPATIENT
Start: 2022-05-20 | End: 2022-05-22 | Stop reason: HOSPADM

## 2022-05-20 RX ORDER — HYDROXYZINE HYDROCHLORIDE 10 MG/1
10 TABLET, FILM COATED ORAL 3 TIMES DAILY PRN
Status: DISCONTINUED | OUTPATIENT
Start: 2022-05-20 | End: 2022-05-22 | Stop reason: HOSPADM

## 2022-05-20 RX ORDER — MAGNESIUM SULFATE 1 G/100ML
1000 INJECTION INTRAVENOUS PRN
Status: DISCONTINUED | OUTPATIENT
Start: 2022-05-20 | End: 2022-05-22 | Stop reason: HOSPADM

## 2022-05-20 RX ORDER — INSULIN LISPRO 100 [IU]/ML
0-6 INJECTION, SOLUTION INTRAVENOUS; SUBCUTANEOUS
Status: DISCONTINUED | OUTPATIENT
Start: 2022-05-20 | End: 2022-05-20

## 2022-05-20 RX ORDER — INSULIN LISPRO 100 [IU]/ML
6 INJECTION, SOLUTION INTRAVENOUS; SUBCUTANEOUS
COMMUNITY

## 2022-05-20 RX ORDER — INSULIN LISPRO 100 [IU]/ML
0-3 INJECTION, SOLUTION INTRAVENOUS; SUBCUTANEOUS NIGHTLY
Status: DISCONTINUED | OUTPATIENT
Start: 2022-05-20 | End: 2022-05-22 | Stop reason: HOSPADM

## 2022-05-20 RX ORDER — ATENOLOL 50 MG/1
50 TABLET ORAL 2 TIMES DAILY
Status: DISCONTINUED | OUTPATIENT
Start: 2022-05-20 | End: 2022-05-22 | Stop reason: HOSPADM

## 2022-05-20 RX ORDER — SODIUM CHLORIDE 9 MG/ML
INJECTION, SOLUTION INTRAVENOUS PRN
Status: DISCONTINUED | OUTPATIENT
Start: 2022-05-20 | End: 2022-05-22 | Stop reason: HOSPADM

## 2022-05-20 RX ORDER — DULOXETIN HYDROCHLORIDE 60 MG/1
60 CAPSULE, DELAYED RELEASE ORAL DAILY
Status: DISCONTINUED | OUTPATIENT
Start: 2022-05-20 | End: 2022-05-22 | Stop reason: HOSPADM

## 2022-05-20 RX ORDER — SODIUM CHLORIDE 9 MG/ML
1000 INJECTION, SOLUTION INTRAVENOUS CONTINUOUS
Status: DISCONTINUED | OUTPATIENT
Start: 2022-05-20 | End: 2022-05-21

## 2022-05-20 RX ORDER — ENOXAPARIN SODIUM 100 MG/ML
40 INJECTION SUBCUTANEOUS DAILY
Status: DISCONTINUED | OUTPATIENT
Start: 2022-05-20 | End: 2022-05-22 | Stop reason: HOSPADM

## 2022-05-20 RX ORDER — INSULIN LISPRO 100 [IU]/ML
4 INJECTION, SOLUTION INTRAVENOUS; SUBCUTANEOUS
Status: DISCONTINUED | OUTPATIENT
Start: 2022-05-20 | End: 2022-05-21

## 2022-05-20 RX ORDER — INSULIN GLARGINE 100 [IU]/ML
29 INJECTION, SOLUTION SUBCUTANEOUS NIGHTLY
Status: ON HOLD | COMMUNITY
End: 2022-05-22 | Stop reason: HOSPADM

## 2022-05-20 RX ORDER — ATORVASTATIN CALCIUM 20 MG/1
20 TABLET, FILM COATED ORAL DAILY
Status: DISCONTINUED | OUTPATIENT
Start: 2022-05-20 | End: 2022-05-20

## 2022-05-20 RX ORDER — INSULIN LISPRO 100 [IU]/ML
INJECTION, SOLUTION INTRAVENOUS; SUBCUTANEOUS
COMMUNITY

## 2022-05-20 RX ORDER — ACETAMINOPHEN 650 MG/1
650 SUPPOSITORY RECTAL EVERY 6 HOURS PRN
Status: DISCONTINUED | OUTPATIENT
Start: 2022-05-20 | End: 2022-05-22 | Stop reason: HOSPADM

## 2022-05-20 RX ORDER — ALBUTEROL SULFATE 90 UG/1
2 AEROSOL, METERED RESPIRATORY (INHALATION) EVERY 6 HOURS PRN
Status: DISCONTINUED | OUTPATIENT
Start: 2022-05-20 | End: 2022-05-22 | Stop reason: HOSPADM

## 2022-05-20 RX ORDER — HYDRALAZINE HYDROCHLORIDE 25 MG/1
25 TABLET, FILM COATED ORAL 2 TIMES DAILY
Status: DISCONTINUED | OUTPATIENT
Start: 2022-05-20 | End: 2022-05-21

## 2022-05-20 RX ORDER — ASPIRIN 81 MG/1
81 TABLET ORAL DAILY
Status: DISCONTINUED | OUTPATIENT
Start: 2022-05-20 | End: 2022-05-22 | Stop reason: HOSPADM

## 2022-05-20 RX ORDER — METHOCARBAMOL 500 MG/1
500 TABLET, FILM COATED ORAL 3 TIMES DAILY
Status: DISCONTINUED | OUTPATIENT
Start: 2022-05-20 | End: 2022-05-22 | Stop reason: HOSPADM

## 2022-05-20 RX ORDER — POLYETHYLENE GLYCOL 3350 17 G/17G
17 POWDER, FOR SOLUTION ORAL DAILY PRN
Status: DISCONTINUED | OUTPATIENT
Start: 2022-05-20 | End: 2022-05-22 | Stop reason: HOSPADM

## 2022-05-20 RX ORDER — ACETAMINOPHEN 325 MG/1
650 TABLET ORAL EVERY 6 HOURS PRN
Status: DISCONTINUED | OUTPATIENT
Start: 2022-05-20 | End: 2022-05-22 | Stop reason: HOSPADM

## 2022-05-20 RX ORDER — ONDANSETRON 4 MG/1
4 TABLET, ORALLY DISINTEGRATING ORAL EVERY 8 HOURS PRN
Status: DISCONTINUED | OUTPATIENT
Start: 2022-05-20 | End: 2022-05-22 | Stop reason: HOSPADM

## 2022-05-20 RX ORDER — INSULIN LISPRO 100 [IU]/ML
0-3 INJECTION, SOLUTION INTRAVENOUS; SUBCUTANEOUS NIGHTLY
Status: DISCONTINUED | OUTPATIENT
Start: 2022-05-20 | End: 2022-05-20

## 2022-05-20 RX ORDER — INSULIN LISPRO 100 [IU]/ML
0-6 INJECTION, SOLUTION INTRAVENOUS; SUBCUTANEOUS
Status: DISCONTINUED | OUTPATIENT
Start: 2022-05-20 | End: 2022-05-22 | Stop reason: HOSPADM

## 2022-05-20 RX ORDER — ONDANSETRON 2 MG/ML
4 INJECTION INTRAMUSCULAR; INTRAVENOUS EVERY 6 HOURS PRN
Status: DISCONTINUED | OUTPATIENT
Start: 2022-05-20 | End: 2022-05-22 | Stop reason: HOSPADM

## 2022-05-20 RX ORDER — ATORVASTATIN CALCIUM 20 MG/1
20 TABLET, FILM COATED ORAL NIGHTLY
Status: DISCONTINUED | OUTPATIENT
Start: 2022-05-20 | End: 2022-05-22 | Stop reason: HOSPADM

## 2022-05-20 RX ORDER — ONDANSETRON 4 MG/1
4 TABLET, ORALLY DISINTEGRATING ORAL EVERY 8 HOURS PRN
Status: ON HOLD | COMMUNITY
End: 2022-05-20

## 2022-05-20 RX ORDER — POTASSIUM CHLORIDE 7.45 MG/ML
10 INJECTION INTRAVENOUS PRN
Status: DISCONTINUED | OUTPATIENT
Start: 2022-05-20 | End: 2022-05-22 | Stop reason: HOSPADM

## 2022-05-20 RX ORDER — LEVOTHYROXINE SODIUM 175 UG/1
175 CAPSULE ORAL DAILY
Status: DISCONTINUED | OUTPATIENT
Start: 2022-05-20 | End: 2022-05-20

## 2022-05-20 RX ORDER — ACETAMINOPHEN 325 MG/1
650 TABLET ORAL EVERY 6 HOURS PRN
Status: DISCONTINUED | OUTPATIENT
Start: 2022-05-20 | End: 2022-05-20

## 2022-05-20 RX ORDER — CIPROFLOXACIN 2 MG/ML
400 INJECTION, SOLUTION INTRAVENOUS EVERY 12 HOURS
Status: DISCONTINUED | OUTPATIENT
Start: 2022-05-20 | End: 2022-05-20

## 2022-05-20 RX ORDER — ALBUTEROL SULFATE 90 UG/1
2 AEROSOL, METERED RESPIRATORY (INHALATION) EVERY 6 HOURS PRN
COMMUNITY

## 2022-05-20 RX ORDER — CIPROFLOXACIN 2 MG/ML
400 INJECTION, SOLUTION INTRAVENOUS ONCE
Status: COMPLETED | OUTPATIENT
Start: 2022-05-20 | End: 2022-05-20

## 2022-05-20 RX ORDER — SODIUM CHLORIDE 450 MG/100ML
INJECTION, SOLUTION INTRAVENOUS CONTINUOUS
Status: DISCONTINUED | OUTPATIENT
Start: 2022-05-20 | End: 2022-05-20

## 2022-05-20 RX ORDER — INSULIN LISPRO 100 [IU]/ML
0-6 INJECTION, SOLUTION INTRAVENOUS; SUBCUTANEOUS EVERY 6 HOURS
Status: DISCONTINUED | OUTPATIENT
Start: 2022-05-20 | End: 2022-05-20

## 2022-05-20 RX ORDER — SODIUM CHLORIDE 9 MG/ML
INJECTION, SOLUTION INTRAVENOUS CONTINUOUS
Status: DISCONTINUED | OUTPATIENT
Start: 2022-05-20 | End: 2022-05-20

## 2022-05-20 RX ADMIN — DULOXETINE 60 MG: 60 CAPSULE, DELAYED RELEASE ORAL at 10:12

## 2022-05-20 RX ADMIN — ATENOLOL 50 MG: 50 TABLET ORAL at 20:50

## 2022-05-20 RX ADMIN — SODIUM CHLORIDE, PRESERVATIVE FREE 10 ML: 5 INJECTION INTRAVENOUS at 20:52

## 2022-05-20 RX ADMIN — CIPROFLOXACIN 400 MG: 2 INJECTION, SOLUTION INTRAVENOUS at 01:25

## 2022-05-20 RX ADMIN — ASPIRIN 81 MG: 81 TABLET, COATED ORAL at 10:11

## 2022-05-20 RX ADMIN — CEFTRIAXONE SODIUM 1000 MG: 1 INJECTION, POWDER, FOR SOLUTION INTRAMUSCULAR; INTRAVENOUS at 10:28

## 2022-05-20 RX ADMIN — HYDRALAZINE HYDROCHLORIDE 25 MG: 25 TABLET, FILM COATED ORAL at 20:50

## 2022-05-20 RX ADMIN — SODIUM CHLORIDE: 4.5 INJECTION, SOLUTION INTRAVENOUS at 08:12

## 2022-05-20 RX ADMIN — SODIUM CHLORIDE: 9 INJECTION, SOLUTION INTRAVENOUS at 10:26

## 2022-05-20 RX ADMIN — SODIUM CHLORIDE, PRESERVATIVE FREE 10 ML: 5 INJECTION INTRAVENOUS at 10:22

## 2022-05-20 RX ADMIN — ATENOLOL 50 MG: 50 TABLET ORAL at 10:12

## 2022-05-20 RX ADMIN — ENOXAPARIN SODIUM 40 MG: 100 INJECTION SUBCUTANEOUS at 10:12

## 2022-05-20 RX ADMIN — AMLODIPINE BESYLATE 10 MG: 10 TABLET ORAL at 10:12

## 2022-05-20 RX ADMIN — SODIUM CHLORIDE 1000 ML: 9 INJECTION, SOLUTION INTRAVENOUS at 18:36

## 2022-05-20 RX ADMIN — INSULIN LISPRO 4 UNITS: 100 INJECTION, SOLUTION INTRAVENOUS; SUBCUTANEOUS at 18:04

## 2022-05-20 RX ADMIN — Medication 1 TABLET: at 10:11

## 2022-05-20 RX ADMIN — SODIUM CHLORIDE 6 UNITS/HR: 9 INJECTION, SOLUTION INTRAVENOUS at 01:23

## 2022-05-20 RX ADMIN — INSULIN GLARGINE 22 UNITS: 100 INJECTION, SOLUTION SUBCUTANEOUS at 20:51

## 2022-05-20 RX ADMIN — SODIUM CHLORIDE 1000 ML: 9 INJECTION, SOLUTION INTRAVENOUS at 10:24

## 2022-05-20 RX ADMIN — SODIUM CHLORIDE: 4.5 INJECTION, SOLUTION INTRAVENOUS at 03:24

## 2022-05-20 RX ADMIN — METHOCARBAMOL 500 MG: 500 TABLET ORAL at 20:51

## 2022-05-20 RX ADMIN — METHOCARBAMOL 500 MG: 500 TABLET ORAL at 10:11

## 2022-05-20 RX ADMIN — METHOCARBAMOL 500 MG: 500 TABLET ORAL at 15:36

## 2022-05-20 RX ADMIN — INSULIN LISPRO 2 UNITS: 100 INJECTION, SOLUTION INTRAVENOUS; SUBCUTANEOUS at 20:51

## 2022-05-20 RX ADMIN — HYDRALAZINE HYDROCHLORIDE 25 MG: 25 TABLET, FILM COATED ORAL at 10:12

## 2022-05-20 RX ADMIN — ATORVASTATIN CALCIUM 20 MG: 20 TABLET, FILM COATED ORAL at 20:51

## 2022-05-20 RX ADMIN — INSULIN LISPRO 3 UNITS: 100 INJECTION, SOLUTION INTRAVENOUS; SUBCUTANEOUS at 12:56

## 2022-05-20 RX ADMIN — MICONAZOLE NITRATE: 2 POWDER TOPICAL at 20:51

## 2022-05-20 ASSESSMENT — ENCOUNTER SYMPTOMS
BLURRED VISION: 0
RESPIRATORY NEGATIVE: 1
EYES NEGATIVE: 1
ABDOMINAL PAIN: 0
SHORTNESS OF BREATH: 0
NAUSEA: 1

## 2022-05-20 ASSESSMENT — PAIN SCALES - GENERAL
PAINLEVEL_OUTOF10: 2
PAINLEVEL_OUTOF10: 0

## 2022-05-20 NOTE — PROGRESS NOTES
Medication reconciliation in progress. However, as needed medications and insulins were only medications listed on medication record received by hospital. This RN spoke with Jessica Bazzi, Pharmacist who stated she would call [facility] and verify medications.

## 2022-05-20 NOTE — ED PROVIDER NOTES
The history is provided by the EMS personnel and the patient. Hyperglycemia  Blood sugar level PTA:  Greater than 600  Severity:  Severe  Onset quality:  Unable to specify  Timing:  Constant  Progression:  Unchanged  Chronicity:  Recurrent  Diabetes status:  Controlled with insulin  Context: recent illness    Context: not change in medication, not insulin pump use, not new diabetes diagnosis, not noncompliance and not recent change in diet    Context comment:  Patient has had this happen when she has an infection. She was given insulin at assited living but her glucose remained elevated so they called EMS  Relieved by:  Nothing  Ineffective treatments:  Insulin  Associated symptoms: dehydration, dysuria, increased thirst, malaise and nausea    Associated symptoms: no abdominal pain, no altered mental status, no blurred vision, no confusion, no fever, no increased appetite and no shortness of breath        Review of Systems   Constitutional: Negative. Negative for fever. HENT: Negative. Eyes: Negative. Negative for blurred vision. Respiratory: Negative. Negative for shortness of breath. Cardiovascular: Negative. Gastrointestinal: Positive for nausea. Negative for abdominal pain. Endocrine: Positive for polydipsia. Genitourinary: Positive for dysuria. Musculoskeletal: Negative. Skin: Negative. Neurological: Negative. Psychiatric/Behavioral: Negative for confusion. All other systems reviewed and are negative.       Family History   Problem Relation Age of Onset    Arthritis Mother     Cancer Mother     Diabetes Mother     High Blood Pressure Mother     Asthma Father     Heart Disease Father     Diabetes Brother      Social History     Socioeconomic History    Marital status:      Spouse name: Not on file    Number of children: Not on file    Years of education: Not on file    Highest education level: Not on file   Occupational History    Not on file   Tobacco Use  Smoking status: Never Smoker    Smokeless tobacco: Never Used   Vaping Use    Vaping Use: Never used   Substance and Sexual Activity    Alcohol use: No    Drug use: No    Sexual activity: Yes   Other Topics Concern    Not on file   Social History Narrative    Not on file     Social Determinants of Health     Financial Resource Strain:     Difficulty of Paying Living Expenses: Not on file   Food Insecurity:     Worried About Running Out of Food in the Last Year: Not on file    Juliane of Food in the Last Year: Not on file   Transportation Needs:     Lack of Transportation (Medical): Not on file    Lack of Transportation (Non-Medical): Not on file   Physical Activity:     Days of Exercise per Week: Not on file    Minutes of Exercise per Session: Not on file   Stress:     Feeling of Stress : Not on file   Social Connections:     Frequency of Communication with Friends and Family: Not on file    Frequency of Social Gatherings with Friends and Family: Not on file    Attends [employer] Services: Not on file    Active Member of [address] [place] [address] or Organizations: Not on file    Attends Club or Organization Meetings: Not on file    Marital Status: Not on file   Intimate Partner Violence:     Fear of Current or Ex-Partner: Not on file    Emotionally Abused: Not on file    Physically Abused: Not on file    Sexually Abused: Not on file   Housing Stability:     Unable to Pay for Housing in the Last Year: Not on file    Number of [place] in the Last Year: Not on file    Unstable Housing in the Last Year: Not on file     Past Surgical History:   Procedure Laterality Date    ABDOMEN SURGERY      CYST REMOVAL      from kidney.     DILATATION, ESOPHAGUS      ENDOSCOPY, COLON, DIAGNOSTIC      FOOT SURGERY  12/30/11    had infection  and a biopsy was sent away for analysis    HERNIA REPAIR      HYSTERECTOMY      KIDNEY REMOVAL  1973     Past Medical History:   Diagnosis Date    Anemia     Arthritis  Assistance needed for personal hygiene     Asthma     Cellulitis of right foot     Chronic kidney disease     Cognitive communication deficit     Depression     Diabetes mellitus ([facility]     Frequent falls     GERD (gastroesophageal reflux disease)     H/O Doppler ultrasound 10/01/2020     No evidence of significant venous insufficiency bilaterally. No evidence of DVT or SVT in the bilaterally.  H/O echocardiogram 10/01/2020    EF 60-65%. No significant valvular disease.  Hallux valgus (acquired), right foot 05/01/2017    Hyperlipemia     Hypertension     Muscle weakness     Other disorders of electrolyte and fluid balance, not elsewhere classified     Other disorders of kidney and ureter     1 kidney.  Shingles 06/2021    SIRS (systemic inflammatory response syndrome) ([facility] 01/18/2012    Thyroid disease     hypothyroid    Type 2 diabetes mellitus with foot ulcer, with long-term current use of insulin ([facility] 01/18/2012    Ul[facility] of right [facility] layer exposed [facility] 02/06/2013    WD-Diabetic ulcer of toe of left foot associated with type 2 diabetes mellitus, with fat layer exposed ([facility] 03/04/2021    Weakness      Allergies   Allergen Reactions    Ritalin [Methylphenidate] Rash    Rocephin [Ceftriaxone Sodium-Lidocaine] Rash     Prior to Admission medications    Medication Sig Start Date End Date Taking? Authorizing Provider   hydrALAZINE (APRESOLINE) 25 MG tablet Take 1 tablet by mouth 2 times daily 10/8/21   Darrick Bran MD   furosemide (LASIX) 20 MG tablet Take 1 tablet by mouth daily 10/9/21   Darrick Bran MD   methocarbamol (ROBAXIN) 500 MG tablet Take 500 mg by mouth 3 times daily For shingles    Historical Provider, MD   mupirocin (BACTROBAN) 2 % ointment Apply topically 2 times daily Apply topically 3 times daily.     Historical Provider, MD   acetaminophen (TYLENOL) 500 MG tablet Take 1,000 mg by mouth every 6 hours as needed for Pain    Historical Provider, Pupils are equal, round, and reactive to light. Cardiovascular:      Rate and Rhythm: Normal rate. Pulmonary:      Effort: Pulmonary effort is normal.      Breath sounds: Normal breath sounds. Abdominal:      General: Abdomen is flat. Tenderness: There is no abdominal tenderness. There is no guarding or rebound. Skin:     General: Skin is dry. Capillary Refill: Capillary refill takes 2 to 3 seconds. Coloration: Skin is pale. Neurological:      General: No focal deficit present. Mental Status: She is alert. Mental status is at baseline. MDM:    Labs Reviewed   CBC WITH AUTO DIFFERENTIAL - Abnormal; Notable for the following components:       Result Value    Hemoglobin 12.2 (*)     MCHC 31.4 (*)     Monocytes % 5.6 (*)     Eosinophils % 3.3 (*)     Immature Neutrophil % 0.5 (*)     All other components within normal limits   COMPREHENSIVE METABOLIC PANEL - Abnormal; Notable for the following components:    Sodium 131 (*)     Chloride 94 (*)     CREATININE 1.5 (*)     Glucose 626 (*)     Alkaline Phosphatase 137 (*)     GFR Non- 34 (*)     GFR  41 (*)     All other components within normal limits   URINALYSIS - Abnormal; Notable for the following components:    Color, UA YELLOW (*)     WBC, UA 20 (*)     Cast Type 0 (*)     Bacteria, UA 3+ (*)     Crystal Type 0 (*)     All other components within normal limits   CULTURE, URINE       No orders to display        Patient will be given IVF and insulin drip was started. In addition, she was given cipro IV due to her allergy to Rocephin. The patient will be admitted to hospital due to her hyperglycemia, infection and comorbid conditions. Urine culture was sent. Patient is afebrile and has no elevated WBC. Final Impression    1. Acute cystitis with hematuria    2.  Hyperglycemia             [address] [facility]  05/20/22 9977

## 2022-05-20 NOTE — PROGRESS NOTES
V2.0  [facility] [place]      Name:  Jo Gomez /Age/Sex:   (77 y.o. female)   MRN & CSN:  [id number] & [id number] Encounter Date/Time: 2022 4:39 PM EDT    Location:   PCP: Zhao Chavez, [address] Day: 2    Assessment and Plan:   Jo Gomez is a 77 y.o. female with pmh of hydrocephalus, ptosis right eye, recurrent UTIs, DM, obesity, general weakness, HTN, diabetic peripheral neuropathy, zoster left leg and who presents with Hyperglycemia due to diabetes mellitus ([facility]      Plan:     #  Hyperglycemia  Related to uncontrolled diabetes mellitus II  This patient presenting with ED blood sugar 626 and mild altered mental status   This patient according to the nursing home notes is on Lantus 20 units every afternoon, log insulin 6 units AC and sliding scale. It does appear according to the nursing records the patient did receive her usual dose of Lantus 29 units last night in addition she did receive additional 25 units of regular insulin without reduction in blood sugar and thus transferred to ED for further evaluation. The patient made hydroxybutyrate 0.6. VBG not showing acidosis. The patient's hemoglobin A1c 9.3 with an average blood sugar 220. Previous hemoglobin A1c 2022 7.8 with an average blood sugar of 177. Patient initially on insulin drip with excellent results down to the low 200s. At that point she was converted to sliding scale insulin. After discussion with attending physician the patient will continue with sliding scale and will reinitiate Lantus and log insulin. I will initiate 22 units of Lantus and 4 units of log insulin AC. I have also changed her Accu-Cheks from every hour now at before meals and at bedtime. Initial IV fluids of 0.45 NaCl has been changed to NaCl and flow rate has decreased.     I assume the assisted living nursing staff administer his medication as they signed off on the medication list.     # UTI  Culture pending at this time. Previous UDS revealing MDRO as of April 25, 2022 with sensitivity to meropenem/ertapenem as well as cephalosporins. Discontinue Cipro  Start Rocephin 1 g every 24 hours  We will adjust antibiotic based on culture which is pending at this time. # Hypertension  The patient's blood pressure today is 145/51. Continue atenolol 50 mg, hydralazine 25 mg twice daily and amlodipine 10 mg without change at this time.      # Hypothyroidism  TSH and free T4 pending. Continue levothyroxine 175 mcg     # Hyperlipidemia  Continue Lipitor 20 mg     # Obesity  BMI 36.2  Lifestyle of diet and exercise encouraged     # Gerd  Continue Protonix 40 mg       Diet ADULT DIET; Regular; 3 carb choices (45 gm/meal)   DVT Prophylaxis [x] Lovenox, []  Heparin, [] SCDs, [] Ambulation,  [] Eliquis, [] Xarelto  [] Coumadin   Code Status Full Code   Disposition From: Independent living facility  Expected Disposition: Independent living facility/TBD  Estimated Date of Discharge: TBD  Patient requires continued admission due to nonketotic hyperglycemia and UTI   Surrogate Decision Maker/ POA      Subjective:     Chief Complaint: Hyperglycemia       Gale Nobles is a 77 y.o. female who presents with independent living facility with elevated blood sugar at 626. Venous pH normal, beta hydroxybutyrate not elevated, A1c 9.3. At facility the patient on Lantus 29 units every afternoon and mealtime insulin at 6 units. After insulin drip the patient improves and transferred to low-dose sliding scale for correction. Spoke with attending physician. We will now initiate Lantus at 22 units based on calculation and splitting of the current Lantus dose by facility and also will add mealtime insulin at 4 units. Patient rather soft-spoken, tearful, emotional and quiet. Wants to go back home. I did indicate that we would need to manage her UTI and correct her sugar. The patient with recurrent UTIs. The patient last UTI and culture April 25, 2022 showing MDRO. Sensitivity to ertapenem/meropenem as well as cephalosporins. At this time I will initiate Rocephin 1 g daily and transition patient to p.o. Omnicef at discharge. I will adjust antibiotic based on culture during her hospitalization. Review of Systems:    Review of Systems    10 point system review    Objective: Intake/Output Summary (Last 24 hours) at 5/20/2022 1639  Last data filed at 5/20/2022 1537  Gross per 24 hour   Intake 2695.4 ml   Output --   Net 2695.4 ml        Vitals:   Vitals:    05/20/22 0816   BP:    Pulse:    Resp:    Temp:    SpO2: 97%       Physical Exam:     General: NAD  Eyes: EOMI  ENT: neck supple  Cardiovascular: Regular rate. Respiratory: Clear to auscultation  Gastrointestinal: Soft, non tender  Genitourinary: no suprapubic tenderness  Musculoskeletal: Trace-1+ edema  Skin: warm, dry  Neuro: Alert. Psych: Patient is rather quiet, emotional and tearful at times wanting to return back to her living facility. May consider psychiatry consult.     Medications:   Medications:    amLODIPine  10 mg Oral Daily    aspirin  81 mg Oral Daily    atenolol  50 mg Oral BID    DULoxetine  60 mg Oral Daily    hydrALAZINE  25 mg Oral BID    methocarbamol  500 mg Oral TID    therapeutic multivitamin-minerals  1 tablet Oral Daily    miconazole   Topical BID    pantoprazole  40 mg Oral Daily    enoxaparin  40 mg SubCUTAneous Daily    sodium chloride flush  5-40 mL IntraVENous 2 times per day    levothyroxine  175 mcg Oral Daily    atorvastatin  20 mg Oral Nightly    cefTRIAXone (ROCEPHIN) IV  1,000 mg IntraVENous Q24H    insulin lispro  0-6 Units SubCUTAneous TID WC    insulin lispro  0-3 Units SubCUTAneous Nightly    insulin glargine  22 Units SubCUTAneous Nightly    insulin lispro  4 Units SubCUTAneous TID WC      Infusions:    sodium chloride Stopped (05/20/22 1104)    dextrose 5 % and 0.45 % NaCl      dextrose  sodium chloride 125 mL/hr at 05/20/22 1537     PRN Meds: albuterol sulfate HFA, 2 puff, Q6H PRN  hydrOXYzine, 10 mg, TID PRN  dextrose bolus, 125 mL, PRN   Or  dextrose bolus, 250 mL, PRN  potassium chloride, 10 mEq, PRN  magnesium sulfate, 1,000 mg, PRN  sodium phosphate IVPB, 10 mmol, PRN   Or  sodium phosphate IVPB, 15 mmol, PRN   Or  sodium phosphate IVPB, 20 mmol, PRN  polyethylene glycol, 17 g, Daily PRN  sodium chloride flush, 5-40 mL, PRN  sodium chloride, , PRN  ondansetron, 4 mg, Q8H PRN   Or  ondansetron, 4 mg, Q6H PRN  acetaminophen, 650 mg, Q6H PRN   Or  acetaminophen, 650 mg, Q6H PRN  dextrose 5 % and 0.45 % NaCl, , Continuous PRN  glucose, 4 tablet, PRN  glucagon (rDNA), 1 mg, PRN  dextrose, 100 mL/hr, PRN        Labs      Recent Results (from the past 24 hour(s))   CBC with Auto Differential    Collection Time: 05/19/22 11:30 PM   Result Value Ref Range    WBC 6.6 4.0 - 10.5 K/CU MM    RBC 4.25 4.2 - 5.4 M/CU MM    Hemoglobin 12.2 (L) 12.5 - 16.0 GM/DL    Hematocrit 38.8 37 - 47 %    MCV 91.3 78 - 100 FL    MCH 28.7 27 - 31 PG    MCHC 31.4 (L) 32.0 - 36.0 %    RDW 13.8 11.7 - 14.9 %    Platelets 950 495 - 371 K/CU MM    MPV 10.2 7.5 - 11.1 FL    Differential Type AUTOMATED DIFFERENTIAL     Segs Relative 62.2 36 - 66 %    Lymphocytes % 27.9 24 - 44 %    Monocytes % 5.6 (H) 0 - 4 %    Eosinophils % 3.3 (H) 0 - 3 %    Basophils % 0.5 0 - 1 %    Segs Absolute 4.1 K/CU MM    Lymphocytes Absolute 1.9 K/CU MM    Monocytes Absolute 0.4 K/CU MM    Eosinophils Absolute 0.2 K/CU MM    Basophils Absolute 0.0 K/CU MM    Immature Neutrophil % 0.5 (H) 0 - 0.43 %    Total Immature Neutrophil 0.03 K/CU MM   Comprehensive Metabolic Panel    Collection Time: 05/19/22 11:30 PM   Result Value Ref Range    Sodium 131 (L) 135 - 145 MMOL/L    Potassium 5.1 3.5 - 5.1 MMOL/L    Chloride 94 (L) 99 - 110 mMol/L    CO2 26 21 - 32 MMOL/L    BUN 21 6 - 23 MG/DL    CREATININE 1.5 (H) 0.6 - 1.1 MG/DL    Glucose 626 (HH) 70 - Result Value Ref Range    Magnesium 1.9 1.8 - 2.4 mg/dl   Phosphorus    Collection Time: 05/20/22  5:00 AM   Result Value Ref Range    Phosphorus 2.8 2.5 - 4.9 MG/DL   Beta-Hydroxybutyrate    Collection Time: 05/20/22  5:00 AM   Result Value Ref Range    Beta-Hydroxybutyrate 0.6 0.0 - 3.0 MG/DL   Comprehensive Metabolic Panel    Collection Time: 05/20/22  5:00 AM   Result Value Ref Range    Sodium 129 (L) 135 - 145 MMOL/L    Potassium 3.9 3.5 - 5.1 MMOL/L    Chloride 96 (L) 99 - 110 mMol/L    CO2 19 (L) 21 - 32 MMOL/L    BUN 19 6 - 23 MG/DL    CREATININE 1.3 (H) 0.6 - 1.1 MG/DL    Glucose 390 (H) 70 - 99 MG/DL    Calcium 8.8 8.3 - 10.6 MG/DL    Albumin 2.9 (L) 3.4 - 5.0 GM/DL    Total Protein 6.5 6.4 - 8.2 GM/DL    Total Bilirubin 0.3 0.0 - 1.0 MG/DL    ALT 7 (L) 10 - 40 U/L    AST 15 15 - 37 IU/L    Alkaline Phosphatase 106 40 - 129 IU/L    GFR Non- 40 (L) >60 mL/min/1.73m2    GFR  48 (L) >60 mL/min/1.73m2    Anion Gap 14 4 - 16   Hemoglobin A1c    Collection Time: 05/20/22  5:00 AM   Result Value Ref Range    Hemoglobin A1C 9.3 (H) 4.2 - 6.3 %    eAG 220 mg/dL   POCT Glucose    Collection Time: 05/20/22  5:41 AM   Result Value Ref Range    POC Glucose 402 (H) 70 - 99 MG/DL   POCT Glucose    Collection Time: 05/20/22  6:51 AM   Result Value Ref Range    POC Glucose 390 (H) 70 - 99 MG/DL   POCT Glucose    Collection Time: 05/20/22  7:07 AM   Result Value Ref Range    POC Glucose 370 (H) 70 - 99 MG/DL   POCT Glucose    Collection Time: 05/20/22  8:02 AM   Result Value Ref Range    POC Glucose 349 (H) 70 - 99 MG/DL   POCT Glucose    Collection Time: 05/20/22  9:14 AM   Result Value Ref Range    POC Glucose 243 (H) 70 - 99 MG/DL   Magnesium    Collection Time: 05/20/22  9:30 AM   Result Value Ref Range    Magnesium 1.7 (L) 1.8 - 2.4 mg/dl   Phosphorus    Collection Time: 05/20/22  9:30 AM   Result Value Ref Range    Phosphorus 2.1 (L) 2.5 - 4.9 MG/DL   POCT Glucose    Collection Time: 05/20/22 11:39 AM   Result Value Ref Range    POC Glucose 274 (H) 70 - 99 MG/DL   POCT Glucose    Collection Time: 05/20/22  4:07 PM   Result Value Ref Range    POC Glucose 300 (H) 70 - 99 MG/DL        Imaging/Diagnostics Last 24 Hours   No results found.     Electronically signed by Isac Abdullahi PA-C on 5/20/2022 at 4:39 PM

## 2022-05-20 NOTE — H&P
History and Physical      Name:  Chito Macias /Age/Sex:   (77 y.o. female)   MRN & CSN:  [id number] & [id number] Admission Date/Time: 2022 11:12 PM   Location:   PCP: Sixto Beyer, [address] Day: 2    Assessment and Plan:   Chito Macias is a 77 y.o.  female  who presents with Hyperglycemia due to diabetes mellitus ([facility]    #  Hyperglycemia  Related to uncontrolled diabetes mellitus II  Glucose 626 High Panic  MG/DL    pH, Ven7.35 pCO2, Ven46.9   Polyuria, polydipsia, nausea, dry lips   Not in DKA  NPO, Insulin drip, POCT glucose, telemetry,  Hypoglycemic protocol, fluid, sliding scale, Hgb A1c  GI and DVT prophylaxis    # urinary tract infection  Dysuria, burning on urination  Urinalysis Bacteria, UA3+ Abnormal    WBC, UA20 High    Leukocyte Esterase, UrineSMALL   Culture, fluid, Cipro,    # Hypertension   on home atenolol and amlodipine    # Hypothyroidism    Continue synthroid     # Hyperlipidemia   on home Lipitor    # Obesity   BMI 36.2  Lifestyle of diet and exercise encouraged    # Alhaji Bonilla   takes protonix    ,     Diet Carb control   DVT Prophylaxis [x] Lovenox, []  Heparin, [] SCDs, [] Ambulation   GI Prophylaxis [x] PPI,  [] H2 Blocker,  [] Carafate,  [] Diet/Tube Feeds   Code Status Full code   Disposition Patient requires continued admission due to  Hyperglycemia   MDM [] Low, [] Moderate,[x]  High  Patient's risk as above due to  Acute cystitis      History of Present Illness:     Chief Complaint: Hyperglycemia due to diabetes mellitus ([facility]       Chito Macias is a 77 y.o.  female her medical history include diabetes mellitus, GERD, arthritis, hypertension, hyperlipidemia, and CKD. Patient provided information on her chief complaint. She  presents with hyperglycemia with a blood glucose of 626. Patient lives in extended-care facility. Tells me her insulin is administered by the facility.   Endorsed polydipsia, polyphagia, nausea, fatigue and diaphoresis. She was brought to ER by EMS who observed her blood sugar to be in the 600s in transit. Initial work-up in the ER also showed patient has urinary tract infection. She was started on insulin drip in the ER to be transition to sliding scale insulin. Sample was collected for culture. Patient started on empiric antibiotics Cipro pending culture. Patient tells me her last treatment for UTI appeared not to have been effective. Patient is being admitted for further evaluation and management of hyperglycemia. Her hemoglobin A1c will also be measured. Patient presentation and plan of care was discussed with telemedicine attending physician. Plan of care was also discussed with patient who verbalized understanding and consent. Ten point ROS reviewed negative, unless as noted  In HPI above    Objective:   No intake or output data in the 24 hours ending 05/20/22 0137   Vitals:   Vitals:    05/19/22 2314   BP: (!) 181/50   Pulse: 64   Resp: 16   Temp: 98.5 °F (36.9 °C)   SpO2: 95%     Physical Exam:     GEN Awake female, sitting upright in bed in  apparent distress ill appearing . Appears given age. EYES Pupils are equally round. No scleral erythema, discharge, or conjunctivitis. HENT Mucous membranes are  Dry  Oral pharynx without exudates, no evidence of thrush. NECK Supple, no apparent thyromegaly or masses. RESP Clear to auscultation, no wheezes, rales or rhonchi. Symmetric chest movement while on room air. CARDIO/VASC S1/S2 auscultated. Regular rate without appreciable murmurs, rubs, or gallops. No JVD or carotid bruits. Peripheral pulses equal bilaterally and palpable. No peripheral edema. GI Abdomen is soft  Non distended without significant tenderness, masses, or guarding. Bowel sounds are normoactive. Rectal exam deferred.  No costovertebral angle tenderness. Normal appearing external genitalia. Wilkins catheter is not present.   HEME/LYMPH No palpable cervical lymphadenopathy and no hepatosplenomegaly. No petechiae or ecchymoses. MSK No gross joint deformities. SKIN Normal coloration, warm, dry. NEURO Cranial nerves appear grossly intact, normal speech, no lateralizing weakness. PSYCH Awake, alert, oriented x 4. Affect appropriate. Past Medical History:      Past Medical History:   Diagnosis Date    Anemia     Arthritis     Assistance needed for personal hygiene     Asthma     Cellulitis of right foot     Chronic kidney disease     Cognitive communication deficit     Depression     Diabetes mellitus ([address]     Frequent falls     GERD (gastroesophageal reflux disease)     H/O Doppler ultrasound 10/01/2020     No evidence of significant venous insufficiency bilaterally. No evidence of DVT or SVT in the bilaterally.  H/O echocardiogram 10/01/2020    EF 60-65%. No significant valvular disease.  Hallux valgus (acquired), right foot 05/01/2017    Hyperlipemia     Hypertension     Muscle weakness     Other disorders of electrolyte and fluid balance, not elsewhere classified     Other disorders of kidney and ureter     1 kidney.  Shingles 06/2021    SIRS (systemic inflammatory response syndrome) ([address] 01/18/2012    Thyroid disease     hypothyroid    Type 2 diabetes mellitus with foot ulcer, with long-term current use of insulin ([address] 01/18/2012    Ulcer of right foot with fat layer exposed ([address] 02/06/2013    WD-Diabetic ulcer of toe of left foot associated with type 2 diabetes mellitus, with fat layer exposed ([address] 03/04/2021    Weakness      PSHX:  has a past surgical history that includes Hysterectomy; Foot surgery (12/30/11); cyst removal; Kidney removal (1973); Abdomen surgery; hernia repair; Endoscopy, colon, diagnostic; and Dilatation, esophagus. Allergies:    Allergies   Allergen Reactions    Ritalin [Methylphenidate] Rash    Rocephin [Ceftriaxone Sodium-Lidocaine] Rash       FAM HX: family history includes Arthritis in her mother; Asthma in her father; Thea Ga in her mother; Diabetes in her brother and mother; Heart Disease in her father; High Blood Pressure in her mother. Soc HX:   Social History     Socioeconomic History    Marital status:      Spouse name: Not on file    Number of children: Not on file    Years of education: Not on file    Highest education level: Not on file   Occupational History    Not on file   Tobacco Use    Smoking status: Never Smoker    Smokeless tobacco: Never Used   Vaping Use    Vaping Use: Never used   Substance and Sexual Activity    Alcohol use: No    Drug use: No    Sexual activity: Yes   Other Topics Concern    Not on file   Social History Narrative    Not on file     Social Determinants of Health     Financial Resource Strain:     Difficulty of Paying Living Expenses: Not on file   Food Insecurity:     Worried About Running Out of Food in the Last Year: Not on file    Juliane of Food in the Last Year: Not on file   Transportation Needs:     Lack of Transportation (Medical): Not on file    Lack of Transportation (Non-Medical):  Not on file   Physical Activity:     Days of Exercise per Week: Not on file    Minutes of Exercise per Session: Not on file   Stress:     Feeling of Stress : Not on file   Social Connections:     Frequency of Communication with Friends and Family: Not on file    Frequency of Social Gatherings with Friends and Family: Not on file    Attends Religion Services: Not on file    Active Member of [address] [place] [address] or Organizations: Not on file    Attends Club or Organization Meetings: Not on file    Marital Status: Not on file   Intimate Partner Violence:     Fear of Current or Ex-Partner: Not on file    Emotionally Abused: Not on file    Physically Abused: Not on file    Sexually Abused: Not on file   Housing Stability:     Unable to Pay for Housing in the Last Year: Not on file    Number of [place] in the Last Year: Not on file    Unstable Housing in the Last Year: Not on file       Medications:   Medications:    ciprofloxacin  400 mg IntraVENous Once    insulin lispro  0-6 Units SubCUTAneous TID WC    insulin lispro  0-3 Units SubCUTAneous Nightly      Infusions:    insulin (HUMAN R) non-weight based infusion 6 Units/hr (05/20/22 0123)    sodium chloride      dextrose      sodium chloride 150 mL/hr at 05/20/22 0121     PRN Meds: glucose, 4 tablet, PRN  dextrose bolus, 125 mL, PRN   Or  dextrose bolus, 250 mL, PRN  glucagon (rDNA), 1 mg, PRN  dextrose, 100 mL/hr, PRN          Electronically signed by MILADIS Boggs CNP on 5/20/2022 at 1:37 AM

## 2022-05-20 NOTE — PROGRESS NOTES
RT unsuccessful in obtaining ABG and NP Victoriano notified and ok to obtain VBG instead. SHERRIE Mauricio aware.

## 2022-05-20 NOTE — ED NOTES
On bedpan to collect urine sample.  Pericare done and dry attends put on.     Suzanne Lei RN  05/20/22 2894

## 2022-05-21 LAB
ALBUMIN SERPL-MCNC: 2.7 GM/DL (ref 3.4–5)
ALP BLD-CCNC: 72 IU/L (ref 40–129)
ALT SERPL-CCNC: <5 U/L (ref 7–35)
ANION GAP SERPL CALCULATED.3IONS-SCNC: 9 MMOL/L (ref 4–16)
AST SERPL-CCNC: 16 IU/L (ref 15–37)
BASOPHILS ABSOLUTE: 0 K/CU MM
BASOPHILS RELATIVE PERCENT: 0.5 % (ref 0–1)
BILIRUB SERPL-MCNC: 0.3 MG/DL (ref 0–1)
BUN BLDV-MCNC: 16 MG/DL (ref 6–23)
CALCIUM SERPL-MCNC: 8.1 MG/DL (ref 8.3–10.6)
CHLORIDE BLD-SCNC: 106 MMOL/L (ref 99–110)
CO2: 21 MMOL/L (ref 21–32)
CREAT SERPL-MCNC: 1.3 MG/DL (ref 0.6–1.1)
DIFFERENTIAL TYPE: ABNORMAL
EOSINOPHILS ABSOLUTE: 0.4 K/CU MM
EOSINOPHILS RELATIVE PERCENT: 5.3 % (ref 0–3)
GFR AFRICAN AMERICAN: 48 ML/MIN/1.73M2
GFR NON-AFRICAN AMERICAN: 40 ML/MIN/1.73M2
GLUCOSE BLD-MCNC: 148 MG/DL (ref 70–99)
GLUCOSE BLD-MCNC: 162 MG/DL (ref 70–99)
GLUCOSE BLD-MCNC: 181 MG/DL (ref 70–99)
GLUCOSE BLD-MCNC: 215 MG/DL (ref 70–99)
GLUCOSE BLD-MCNC: 226 MG/DL (ref 70–99)
GLUCOSE BLD-MCNC: 244 MG/DL (ref 70–99)
HCT VFR BLD CALC: 34.8 % (ref 37–47)
HEMOGLOBIN: 10.3 GM/DL (ref 12.5–16)
IMMATURE NEUTROPHIL %: 0.4 % (ref 0–0.43)
LYMPHOCYTES ABSOLUTE: 1.7 K/CU MM
LYMPHOCYTES RELATIVE PERCENT: 22.4 % (ref 24–44)
MAGNESIUM: 1.7 MG/DL (ref 1.8–2.4)
MCH RBC QN AUTO: 28.4 PG (ref 27–31)
MCHC RBC AUTO-ENTMCNC: 29.6 % (ref 32–36)
MCV RBC AUTO: 95.9 FL (ref 78–100)
MONOCYTES ABSOLUTE: 0.6 K/CU MM
MONOCYTES RELATIVE PERCENT: 7.7 % (ref 0–4)
PDW BLD-RTO: 14.5 % (ref 11.7–14.9)
PH VENOUS: 7.37 (ref 7.32–7.42)
PLATELET # BLD: 184 K/CU MM (ref 140–440)
PMV BLD AUTO: 10.2 FL (ref 7.5–11.1)
POTASSIUM SERPL-SCNC: 3.8 MMOL/L (ref 3.5–5.1)
RBC # BLD: 3.63 M/CU MM (ref 4.2–5.4)
SEGMENTED NEUTROPHILS ABSOLUTE COUNT: 4.7 K/CU MM
SEGMENTED NEUTROPHILS RELATIVE PERCENT: 63.7 % (ref 36–66)
SODIUM BLD-SCNC: 136 MMOL/L (ref 135–145)
TOTAL IMMATURE NEUTOROPHIL: 0.03 K/CU MM
TOTAL PROTEIN: 5.8 GM/DL (ref 6.4–8.2)
TSH HIGH SENSITIVITY: 4.27 UIU/ML (ref 0.27–4.2)
WBC # BLD: 7.4 K/CU MM (ref 4–10.5)

## 2022-05-21 PROCEDURE — 6360000002 HC RX W HCPCS: Performed by: NURSE PRACTITIONER

## 2022-05-21 PROCEDURE — 6360000002 HC RX W HCPCS: Performed by: PHYSICIAN ASSISTANT

## 2022-05-21 PROCEDURE — 49083 ABD PARACENTESIS W/IMAGING: CPT

## 2022-05-21 PROCEDURE — 05HF33Z INSERTION OF INFUSION DEVICE INTO LEFT CEPHALIC VEIN, PERCUTANEOUS APPROACH: ICD-10-PCS | Performed by: INTERNAL MEDICINE

## 2022-05-21 PROCEDURE — C1751 CATH, INF, PER/CENT/MIDLINE: HCPCS

## 2022-05-21 PROCEDURE — 6370000000 HC RX 637 (ALT 250 FOR IP): Performed by: PHYSICIAN ASSISTANT

## 2022-05-21 PROCEDURE — 82962 GLUCOSE BLOOD TEST: CPT

## 2022-05-21 PROCEDURE — 2580000003 HC RX 258: Performed by: PHYSICIAN ASSISTANT

## 2022-05-21 PROCEDURE — 2140000000 HC CCU INTERMEDIATE R&B

## 2022-05-21 PROCEDURE — 36410 VNPNXR 3YR/> PHY/QHP DX/THER: CPT

## 2022-05-21 PROCEDURE — 6370000000 HC RX 637 (ALT 250 FOR IP): Performed by: NURSE PRACTITIONER

## 2022-05-21 PROCEDURE — 85025 COMPLETE CBC W/AUTO DIFF WBC: CPT

## 2022-05-21 PROCEDURE — 84100 ASSAY OF PHOSPHORUS: CPT

## 2022-05-21 PROCEDURE — 2580000003 HC RX 258: Performed by: NURSE PRACTITIONER

## 2022-05-21 PROCEDURE — 84443 ASSAY THYROID STIM HORMONE: CPT

## 2022-05-21 PROCEDURE — 80053 COMPREHEN METABOLIC PANEL: CPT

## 2022-05-21 PROCEDURE — 2500000003 HC RX 250 WO HCPCS: Performed by: NURSE PRACTITIONER

## 2022-05-21 PROCEDURE — 83735 ASSAY OF MAGNESIUM: CPT

## 2022-05-21 PROCEDURE — 82800 BLOOD PH: CPT

## 2022-05-21 RX ORDER — SODIUM CHLORIDE 9 MG/ML
1000 INJECTION, SOLUTION INTRAVENOUS CONTINUOUS
Status: DISCONTINUED | OUTPATIENT
Start: 2022-05-21 | End: 2022-05-22

## 2022-05-21 RX ORDER — HYDRALAZINE HYDROCHLORIDE 25 MG/1
25 TABLET, FILM COATED ORAL 3 TIMES DAILY
Status: DISCONTINUED | OUTPATIENT
Start: 2022-05-21 | End: 2022-05-22 | Stop reason: HOSPADM

## 2022-05-21 RX ORDER — INSULIN LISPRO 100 [IU]/ML
6 INJECTION, SOLUTION INTRAVENOUS; SUBCUTANEOUS
Status: DISCONTINUED | OUTPATIENT
Start: 2022-05-21 | End: 2022-05-22 | Stop reason: HOSPADM

## 2022-05-21 RX ADMIN — ENOXAPARIN SODIUM 40 MG: 100 INJECTION SUBCUTANEOUS at 08:47

## 2022-05-21 RX ADMIN — INSULIN LISPRO 1 UNITS: 100 INJECTION, SOLUTION INTRAVENOUS; SUBCUTANEOUS at 17:25

## 2022-05-21 RX ADMIN — INSULIN LISPRO 4 UNITS: 100 INJECTION, SOLUTION INTRAVENOUS; SUBCUTANEOUS at 08:51

## 2022-05-21 RX ADMIN — INSULIN LISPRO 1 UNITS: 100 INJECTION, SOLUTION INTRAVENOUS; SUBCUTANEOUS at 20:35

## 2022-05-21 RX ADMIN — ATENOLOL 50 MG: 50 TABLET ORAL at 08:47

## 2022-05-21 RX ADMIN — INSULIN LISPRO 6 UNITS: 100 INJECTION, SOLUTION INTRAVENOUS; SUBCUTANEOUS at 17:25

## 2022-05-21 RX ADMIN — Medication 1 TABLET: at 08:47

## 2022-05-21 RX ADMIN — MICONAZOLE NITRATE: 2 POWDER TOPICAL at 20:35

## 2022-05-21 RX ADMIN — SODIUM CHLORIDE 1000 ML: 9 INJECTION, SOLUTION INTRAVENOUS at 10:56

## 2022-05-21 RX ADMIN — CEFTRIAXONE SODIUM 1000 MG: 1 INJECTION, POWDER, FOR SOLUTION INTRAMUSCULAR; INTRAVENOUS at 09:01

## 2022-05-21 RX ADMIN — ATORVASTATIN CALCIUM 20 MG: 20 TABLET, FILM COATED ORAL at 20:32

## 2022-05-21 RX ADMIN — SODIUM CHLORIDE 1000 ML: 9 INJECTION, SOLUTION INTRAVENOUS at 21:01

## 2022-05-21 RX ADMIN — ASPIRIN 81 MG: 81 TABLET, COATED ORAL at 08:47

## 2022-05-21 RX ADMIN — PANTOPRAZOLE SODIUM 40 MG: 40 TABLET, DELAYED RELEASE ORAL at 05:32

## 2022-05-21 RX ADMIN — INSULIN LISPRO 2 UNITS: 100 INJECTION, SOLUTION INTRAVENOUS; SUBCUTANEOUS at 08:52

## 2022-05-21 RX ADMIN — HYDRALAZINE HYDROCHLORIDE 25 MG: 25 TABLET, FILM COATED ORAL at 17:21

## 2022-05-21 RX ADMIN — SODIUM CHLORIDE 1000 ML: 9 INJECTION, SOLUTION INTRAVENOUS at 02:39

## 2022-05-21 RX ADMIN — SODIUM CHLORIDE 20 ML: 9 INJECTION, SOLUTION INTRAVENOUS at 09:00

## 2022-05-21 RX ADMIN — INSULIN LISPRO 2 UNITS: 100 INJECTION, SOLUTION INTRAVENOUS; SUBCUTANEOUS at 11:54

## 2022-05-21 RX ADMIN — HYDRALAZINE HYDROCHLORIDE 25 MG: 25 TABLET, FILM COATED ORAL at 08:47

## 2022-05-21 RX ADMIN — METHOCARBAMOL 500 MG: 500 TABLET ORAL at 08:47

## 2022-05-21 RX ADMIN — AMLODIPINE BESYLATE 10 MG: 10 TABLET ORAL at 08:47

## 2022-05-21 RX ADMIN — INSULIN LISPRO 4 UNITS: 100 INJECTION, SOLUTION INTRAVENOUS; SUBCUTANEOUS at 11:54

## 2022-05-21 RX ADMIN — LEVOTHYROXINE SODIUM 175 MCG: 0.15 TABLET ORAL at 05:32

## 2022-05-21 RX ADMIN — METHOCARBAMOL 500 MG: 500 TABLET ORAL at 17:21

## 2022-05-21 RX ADMIN — HYDRALAZINE HYDROCHLORIDE 25 MG: 25 TABLET, FILM COATED ORAL at 20:32

## 2022-05-21 RX ADMIN — MICONAZOLE NITRATE: 2 POWDER TOPICAL at 08:48

## 2022-05-21 RX ADMIN — ATENOLOL 50 MG: 50 TABLET ORAL at 20:32

## 2022-05-21 RX ADMIN — DULOXETINE 60 MG: 60 CAPSULE, DELAYED RELEASE ORAL at 08:47

## 2022-05-21 RX ADMIN — METHOCARBAMOL 500 MG: 500 TABLET ORAL at 20:32

## 2022-05-21 RX ADMIN — SODIUM CHLORIDE, PRESERVATIVE FREE 10 ML: 5 INJECTION INTRAVENOUS at 20:33

## 2022-05-21 RX ADMIN — SODIUM CHLORIDE 1000 ML: 9 INJECTION, SOLUTION INTRAVENOUS at 17:25

## 2022-05-21 ASSESSMENT — PAIN SCALES - GENERAL
PAINLEVEL_OUTOF10: 0

## 2022-05-21 NOTE — PLAN OF CARE
Problem: Discharge Planning  Goal: Discharge to home or other facility with appropriate resources  Outcome: Progressing  Flowsheets (Taken 5/20/2022 0010 by Chikis Kramer RN)  Discharge to home or other facility with appropriate resources: Identify barriers to discharge with patient and caregiver     Problem: Skin/Tissue Integrity  Goal: Absence of new skin breakdown  Description: 1. Monitor for areas of redness and/or skin breakdown  2. Assess vascular access sites hourly  3. Every 4-6 hours minimum:  Change oxygen saturation probe site  4. Every 4-6 hours:  If on nasal continuous positive airway pressure, respiratory therapy assess nares and determine need for appliance change or resting period.   Outcome: Progressing     Problem: Safety - Adult  Goal: Free from fall injury  Outcome: Progressing     Problem: ABCDS Injury Assessment  Goal: Absence of physical injury  Outcome: Progressing

## 2022-05-21 NOTE — PROGRESS NOTES
[place]2.[place]  [facility] [place]      Name:  Raissa Leon /Age/Sex: [birth year]  (77 y.o. female)   MRN & CSN:  [id number] & [id number] Encounter Date/Time: 2022 3:30 PM EDT    Location:   PCP: Jailyn Lloyd, [address] Day: 3    Assessment and Plan:   Raissa Leon is a 77 y.o. female with pmh ofhydrocephalus, ptosis right eye, recurrent UTIs, DM, obesity, general weakness, HTN, diabetic peripheral neuropathy, zoster left leg and who presents with Hyperglycemia   who presents with Hyperglycemia due to diabetes mellitus ([facility]      Plan:  #  Hyperglycemia  Related to uncontrolled diabetes mellitus II  This patient presenting with ED blood sugar 626 and mild altered mental status   This patient according to the nursing home notes is on Lantus 29 units every evening, log insulin 6 units AC and sliding scale. It does appear according to the nursing records the patient did receive her usual dose of Lantus 29 units last night in addition she did receive additional 25 units of regular insulin without reduction in blood sugar and thus transferred to ED for further evaluation. This morning the patient's POC glucose 215. The patient's hemoglobin A1c 9.3 with an average blood sugar 220. Previous hemoglobin A1c 2022 resulted at 7.8 with an average blood sugar of 177. Yesterday I did resume 22 units of Lantus and 4 units of log insulin at mealtime. Today I will increase the patient's Lantus up to 30 units and mealtime insulin up to 6 units. I will continue her Accu-Cheks at before meals and at bedtime. I will adjust NaCl from 125/h down to 75/h. Consider saline lock transition tomorrow. # UTI  Urine culture results as E. coli greater 100,000. Sensitivities pending at this time. Previous urine culture revealing MDRO as of 2022 with sensitivity to meropenem/ertapenem as well as cephalosporins.   Start Rocephin 1 g every 24 hours x3 days and will decide to switch to oral Omnicef or Ceftin at that time. We will adjust antibiotic based on culture which is pending at this time.     # Hypertension  The patient's blood pressure today is 162/63. Continue atenolol 50 mg and amlodipine 10 mg without change.   Increase hydralazine 25 mg from twice daily up to 3 times daily. Continue monitoring vital signs.     # Hypothyroidism  TSH and free T4 pending.    Continue levothyroxine 175 mcg      # Hyperlipidemia  Continue Lipitor 20 mg     # Obesity  BMI 36.2  Lifestyle of diet and exercise encouraged     # Gerd  Continue Protonix 40 mg       Diet ADULT DIET; Regular; 3 carb choices (45 gm/meal)   DVT Prophylaxis [x] Lovenox, []  Heparin, [] SCDs, [] Ambulation,  [] Eliquis, [] Xarelto  [] Coumadin   Code Status Full Code   Disposition From: [facility]  Expected Disposition back to facility  Estimated Date of Discharge: TBD  Patient requires continued admission due to hyperglycemia and UTI   Surrogate Decision Maker/ POA      Subjective:     Chief Complaint: Hyperglycemia       Sandor Reyes is a 77 y.o. female who presents from [facility] facility with elevated blood sugar at 626. Venous pH normal, beta hydroxybutyrate not elevated, A1c 9.3. At facility the patient on Lantus 29 units every afternoon and mealtime insulin at 6 units. With patient's blood sugar in the mid and upper 200s I initiated Lantus at 22 units and mealtime insulin at 4 units. Today I will increase the patient's Lantus up to 30 units every afternoon and mealtime insulin up to 6 units. We will continue checking blood sugars before meals and at bedtime. Patient rather soft-spoken, tearful, emotional and quiet.       The patient with recurrent UTIs. The patient last UTI and culture April 25, 2022 showing MDRO. Sensitivity to ertapenem/meropenem as well as cephalosporins.   At this time I will  Rocephin 1 g daily and transition patient to Omnicef at discharge. I will adjust antibiotic based on culture during her hospitalization. Today  in room. Discussed goals during the hospitalization. He questions how good her blood sugar get out of control with healthcare supervision at the care facility. I did indicate that we would attempt to establish a new diabetic treatment regimen and treat the UTI. Review of Systems:    Review of Systems    10 point review    Objective: Intake/Output Summary (Last 24 hours) at 5/21/2022 1614  Last data filed at 5/21/2022 1320  Gross per 24 hour   Intake 913.5 ml   Output 400 ml   Net 513.5 ml        Vitals:   Vitals:    05/21/22 0716   BP: (!) 162/63   Pulse: 77   Resp: 16   Temp: 96.9 °F (36.1 °C)   SpO2: 96%       Physical Exam:     To be a bed but awakens.  in room. Had conversation with . He does indicate daughter will be in tomorrow. We will need to review CODE STATUS at that time. General: NAD  Eyes: EOMI  ENT: neck supple  Cardiovascular: Regular rate. Respiratory: Clear to auscultation  Gastrointestinal: Soft, non tender  Genitourinary: no suprapubic tenderness  Musculoskeletal: Trace edema  Skin: warm, dry  Neuro: Alert. Psych: Mood appropriate.      Medications:   Medications:    insulin glargine  30 Units SubCUTAneous Nightly    insulin lispro  6 Units SubCUTAneous TID     hydrALAZINE  25 mg Oral TID    amLODIPine  10 mg Oral Daily    aspirin  81 mg Oral Daily    atenolol  50 mg Oral BID    DULoxetine  60 mg Oral Daily    methocarbamol  500 mg Oral TID    therapeutic multivitamin-minerals  1 tablet Oral Daily    miconazole   Topical BID    pantoprazole  40 mg Oral Daily    enoxaparin  40 mg SubCUTAneous Daily    sodium chloride flush  5-40 mL IntraVENous 2 times per day    levothyroxine  175 mcg Oral Daily    atorvastatin  20 mg Oral Nightly    cefTRIAXone (ROCEPHIN) IV  1,000 mg IntraVENous Q24H    insulin lispro  0-6 Units SubCUTAneous TID WC    insulin lispro  0-3 Units SubCUTAneous Nightly      Infusions:    sodium chloride      sodium chloride 20 mL (05/21/22 0900)    dextrose 5 % and 0.45 % NaCl      dextrose       PRN Meds: albuterol sulfate HFA, 2 puff, Q6H PRN  hydrOXYzine, 10 mg, TID PRN  dextrose bolus, 125 mL, PRN   Or  dextrose bolus, 250 mL, PRN  potassium chloride, 10 mEq, PRN  magnesium sulfate, 1,000 mg, PRN  sodium phosphate IVPB, 10 mmol, PRN   Or  sodium phosphate IVPB, 15 mmol, PRN   Or  sodium phosphate IVPB, 20 mmol, PRN  polyethylene glycol, 17 g, Daily PRN  sodium chloride flush, 5-40 mL, PRN  sodium chloride, , PRN  ondansetron, 4 mg, Q8H PRN   Or  ondansetron, 4 mg, Q6H PRN  acetaminophen, 650 mg, Q6H PRN   Or  acetaminophen, 650 mg, Q6H PRN  dextrose 5 % and 0.45 % NaCl, , Continuous PRN  glucose, 4 tablet, PRN  glucagon (rDNA), 1 mg, PRN  dextrose, 100 mL/hr, PRN        Labs      Recent Results (from the past 24 hour(s))   POCT Glucose    Collection Time: 05/20/22  8:08 PM   Result Value Ref Range    POC Glucose 261 (H) 70 - 99 MG/DL   POCT Glucose    Collection Time: 05/21/22  7:45 AM   Result Value Ref Range    POC Glucose 244 (H) 70 - 99 MG/DL   POCT Glucose    Collection Time: 05/21/22  8:32 AM   Result Value Ref Range    POC Glucose 226 (H) 70 - 99 MG/DL   POCT Glucose    Collection Time: 05/21/22 11:53 AM   Result Value Ref Range    POC Glucose 215 (H) 70 - 99 MG/DL        Imaging/Diagnostics Last 24 Hours   No results found.     Electronically signed by Duran Larson PA-C on 5/21/2022 at 4:14 PM

## 2022-05-22 VITALS
OXYGEN SATURATION: 91 % | HEIGHT: 60 IN | SYSTOLIC BLOOD PRESSURE: 159 MMHG | RESPIRATION RATE: 17 BRPM | HEART RATE: 82 BPM | WEIGHT: 184.3 LBS | DIASTOLIC BLOOD PRESSURE: 57 MMHG | TEMPERATURE: 96.2 F | BODY MASS INDEX: 36.18 KG/M2

## 2022-05-22 LAB
ALBUMIN SERPL-MCNC: 2.5 GM/DL (ref 3.4–5)
ALP BLD-CCNC: 71 IU/L (ref 40–129)
ALT SERPL-CCNC: 11 U/L (ref 10–40)
ANION GAP SERPL CALCULATED.3IONS-SCNC: 10 MMOL/L (ref 4–16)
AST SERPL-CCNC: 19 IU/L (ref 15–37)
BASOPHILS ABSOLUTE: 0 K/CU MM
BASOPHILS RELATIVE PERCENT: 0.3 % (ref 0–1)
BILIRUB SERPL-MCNC: 0.6 MG/DL (ref 0–1)
BUN BLDV-MCNC: 16 MG/DL (ref 6–23)
CALCIUM SERPL-MCNC: 7.7 MG/DL (ref 8.3–10.6)
CHLORIDE BLD-SCNC: 107 MMOL/L (ref 99–110)
CO2: 20 MMOL/L (ref 21–32)
CREAT SERPL-MCNC: 1.3 MG/DL (ref 0.6–1.1)
CULTURE: ABNORMAL
CULTURE: ABNORMAL
DIFFERENTIAL TYPE: ABNORMAL
EOSINOPHILS ABSOLUTE: 0.3 K/CU MM
EOSINOPHILS RELATIVE PERCENT: 4.9 % (ref 0–3)
GFR AFRICAN AMERICAN: 48 ML/MIN/1.73M2
GFR NON-AFRICAN AMERICAN: 40 ML/MIN/1.73M2
GLUCOSE BLD-MCNC: 208 MG/DL (ref 70–99)
GLUCOSE BLD-MCNC: 222 MG/DL (ref 70–99)
GLUCOSE BLD-MCNC: 237 MG/DL (ref 70–99)
HCT VFR BLD CALC: 29.3 % (ref 37–47)
HEMOGLOBIN: 9.1 GM/DL (ref 12.5–16)
IMMATURE NEUTROPHIL %: 0.5 % (ref 0–0.43)
LYMPHOCYTES ABSOLUTE: 1.2 K/CU MM
LYMPHOCYTES RELATIVE PERCENT: 18.1 % (ref 24–44)
Lab: ABNORMAL
MCH RBC QN AUTO: 29 PG (ref 27–31)
MCHC RBC AUTO-ENTMCNC: 31.1 % (ref 32–36)
MCV RBC AUTO: 93.3 FL (ref 78–100)
MONOCYTES ABSOLUTE: 0.4 K/CU MM
MONOCYTES RELATIVE PERCENT: 6.2 % (ref 0–4)
PDW BLD-RTO: 14.6 % (ref 11.7–14.9)
PLATELET # BLD: 167 K/CU MM (ref 140–440)
PMV BLD AUTO: 10.8 FL (ref 7.5–11.1)
POTASSIUM SERPL-SCNC: 3.8 MMOL/L (ref 3.5–5.1)
RBC # BLD: 3.14 M/CU MM (ref 4.2–5.4)
SEGMENTED NEUTROPHILS ABSOLUTE COUNT: 4.5 K/CU MM
SEGMENTED NEUTROPHILS RELATIVE PERCENT: 70 % (ref 36–66)
SODIUM BLD-SCNC: 137 MMOL/L (ref 135–145)
SPECIMEN: ABNORMAL
TOTAL IMMATURE NEUTOROPHIL: 0.03 K/CU MM
TOTAL PROTEIN: 5.4 GM/DL (ref 6.4–8.2)
WBC # BLD: 6.5 K/CU MM (ref 4–10.5)

## 2022-05-22 PROCEDURE — 6370000000 HC RX 637 (ALT 250 FOR IP): Performed by: NURSE PRACTITIONER

## 2022-05-22 PROCEDURE — 2580000003 HC RX 258: Performed by: NURSE PRACTITIONER

## 2022-05-22 PROCEDURE — 85025 COMPLETE CBC W/AUTO DIFF WBC: CPT

## 2022-05-22 PROCEDURE — 2580000003 HC RX 258: Performed by: PHYSICIAN ASSISTANT

## 2022-05-22 PROCEDURE — 6360000002 HC RX W HCPCS: Performed by: NURSE PRACTITIONER

## 2022-05-22 PROCEDURE — 82962 GLUCOSE BLOOD TEST: CPT

## 2022-05-22 PROCEDURE — 6360000002 HC RX W HCPCS: Performed by: PHYSICIAN ASSISTANT

## 2022-05-22 PROCEDURE — 80053 COMPREHEN METABOLIC PANEL: CPT

## 2022-05-22 PROCEDURE — 36592 COLLECT BLOOD FROM PICC: CPT

## 2022-05-22 PROCEDURE — 2500000003 HC RX 250 WO HCPCS: Performed by: NURSE PRACTITIONER

## 2022-05-22 PROCEDURE — 6370000000 HC RX 637 (ALT 250 FOR IP): Performed by: PHYSICIAN ASSISTANT

## 2022-05-22 RX ORDER — NYSTATIN 10B UNIT
1 POWDER (EA) MISCELLANEOUS 2 TIMES DAILY
Qty: 1 EACH | Refills: 0
Start: 2022-05-22

## 2022-05-22 RX ORDER — ALBUTEROL SULFATE 90 UG/1
2 AEROSOL, METERED RESPIRATORY (INHALATION) EVERY 6 HOURS PRN
Qty: 18 G | Refills: 3
Start: 2022-05-22

## 2022-05-22 RX ORDER — CEFADROXIL 500 MG/1
500 CAPSULE ORAL 2 TIMES DAILY
Qty: 14 CAPSULE | Refills: 0
Start: 2022-05-22 | End: 2022-05-29

## 2022-05-22 RX ORDER — INSULIN GLARGINE 100 [IU]/ML
35 INJECTION, SOLUTION SUBCUTANEOUS NIGHTLY
Qty: 10 ML | Refills: 0
Start: 2022-05-22

## 2022-05-22 RX ORDER — INSULIN HUMAN 500 [IU]/ML
INJECTION, SOLUTION SUBCUTANEOUS
Qty: 10 ML | Refills: 0
Start: 2022-05-22 | End: 2022-05-22 | Stop reason: HOSPADM

## 2022-05-22 RX ORDER — POLYETHYLENE GLYCOL 3350 17 G/17G
17 POWDER, FOR SOLUTION ORAL DAILY PRN
Qty: 527 G | Refills: 1
Start: 2022-05-22 | End: 2022-06-21

## 2022-05-22 RX ADMIN — METHOCARBAMOL 500 MG: 500 TABLET ORAL at 08:08

## 2022-05-22 RX ADMIN — SODIUM CHLORIDE: 9 INJECTION, SOLUTION INTRAVENOUS at 09:14

## 2022-05-22 RX ADMIN — SODIUM CHLORIDE 1000 ML: 9 INJECTION, SOLUTION INTRAVENOUS at 09:10

## 2022-05-22 RX ADMIN — INSULIN LISPRO 2 UNITS: 100 INJECTION, SOLUTION INTRAVENOUS; SUBCUTANEOUS at 12:22

## 2022-05-22 RX ADMIN — SODIUM CHLORIDE, PRESERVATIVE FREE 10 ML: 5 INJECTION INTRAVENOUS at 08:08

## 2022-05-22 RX ADMIN — POLYETHYLENE GLYCOL 3350 17 G: 17 POWDER, FOR SOLUTION ORAL at 11:18

## 2022-05-22 RX ADMIN — HYDRALAZINE HYDROCHLORIDE 25 MG: 25 TABLET, FILM COATED ORAL at 14:06

## 2022-05-22 RX ADMIN — INSULIN LISPRO 2 UNITS: 100 INJECTION, SOLUTION INTRAVENOUS; SUBCUTANEOUS at 08:10

## 2022-05-22 RX ADMIN — CEFTRIAXONE SODIUM 1000 MG: 1 INJECTION, POWDER, FOR SOLUTION INTRAMUSCULAR; INTRAVENOUS at 09:14

## 2022-05-22 RX ADMIN — AMLODIPINE BESYLATE 10 MG: 10 TABLET ORAL at 08:08

## 2022-05-22 RX ADMIN — ENOXAPARIN SODIUM 40 MG: 100 INJECTION SUBCUTANEOUS at 08:08

## 2022-05-22 RX ADMIN — Medication 1 TABLET: at 08:07

## 2022-05-22 RX ADMIN — MICONAZOLE NITRATE: 2 POWDER TOPICAL at 08:10

## 2022-05-22 RX ADMIN — INSULIN LISPRO 6 UNITS: 100 INJECTION, SOLUTION INTRAVENOUS; SUBCUTANEOUS at 12:22

## 2022-05-22 RX ADMIN — ATENOLOL 50 MG: 50 TABLET ORAL at 08:07

## 2022-05-22 RX ADMIN — LEVOTHYROXINE SODIUM 175 MCG: 0.15 TABLET ORAL at 05:27

## 2022-05-22 RX ADMIN — HYDRALAZINE HYDROCHLORIDE 25 MG: 25 TABLET, FILM COATED ORAL at 05:27

## 2022-05-22 RX ADMIN — METHOCARBAMOL 500 MG: 500 TABLET ORAL at 14:06

## 2022-05-22 RX ADMIN — DULOXETINE 60 MG: 60 CAPSULE, DELAYED RELEASE ORAL at 08:07

## 2022-05-22 RX ADMIN — PANTOPRAZOLE SODIUM 40 MG: 40 TABLET, DELAYED RELEASE ORAL at 05:27

## 2022-05-22 RX ADMIN — ASPIRIN 81 MG: 81 TABLET, COATED ORAL at 08:08

## 2022-05-22 RX ADMIN — INSULIN LISPRO 6 UNITS: 100 INJECTION, SOLUTION INTRAVENOUS; SUBCUTANEOUS at 08:11

## 2022-05-22 ASSESSMENT — PAIN SCALES - GENERAL
PAINLEVEL_OUTOF10: 0
PAINLEVEL_OUTOF10: 2

## 2022-05-22 ASSESSMENT — PAIN - FUNCTIONAL ASSESSMENT: PAIN_FUNCTIONAL_ASSESSMENT: ACTIVITIES ARE NOT PREVENTED

## 2022-05-22 ASSESSMENT — PAIN DESCRIPTION - LOCATION: LOCATION: BACK

## 2022-05-22 ASSESSMENT — PAIN DESCRIPTION - ORIENTATION: ORIENTATION: LOWER

## 2022-05-22 ASSESSMENT — PAIN DESCRIPTION - DESCRIPTORS: DESCRIPTORS: ACHING

## 2022-05-22 NOTE — DISCHARGE SUMMARY
V2.0  Discharge Summary    Name:  Alyssa Ga /Age/Sex: 12[birth year] (70 y.o. female)   Admit Date: 2022  Discharge Date: 22    MRN & CSN:  [id number] & [id number] Encounter Date and Time 22 1:44 PM EDT    Attending:  Ammy Reese MD Discharging Provider: Memo Murrieta [facility] Course:     Brief HPI: Alyssa Ga is a 77 y.o. female who presented with hyperglycemia blood sugar 626. Patient also with UTI. Brief Problem Based Course:   #  Hyperglycemia  Related to uncontrolled diabetes mellitus II  This patient presenting with ED blood sugar 626 and mild altered mental status   This patient according to the nursing home notes is on Lantus 29 units every evening, log insulin 6 units AC and sliding scale.  It does appear according to the nursing records the patient did receive her usual dose of Lantus 29 units night of admission and did receive additional 25 units total of regular insulin without reduction in blood sugar and thus transferred to ED for further evaluation. Patient initially on insulin drip which was decreased to sliding scale and then transition to Lantus and log insulin at mealtime.     This morning the patient's POC glucose 237. And last night was 148. The patient's hemoglobin A1c 9.3 with an average blood sugar 220.  Previous hemoglobin A1c 2022 resulted at 7.8 with an average blood sugar of 177. Yesterday I did advance 30 units of Lantus and 6 units of log insulin at mealtime.    NaCl fluids discontinued today.     # UTI  Urine culture results as E. coli greater 100,000.    Sensitivities to the cephalosporins noted. Resistance similar to 2022. The patient has received Rocephin 1 g daily while admitted. Patient we discharged with cefadroxil/Duricef 500 twice daily x1 week.     # Hypertension  The patient's blood pressure today is 162/63.   Continue atenolol 50 mg and amlodipine 10 mg without change.   Patient blood pressure twin elevated. Hydralazine 25 mg increased 3 times daily. Blood pressure 159/57. Recommendation to care facility continue 3 times daily dosing and monitor blood pressure. Continue monitoring vital signs.     # Hypothyroidism  Continue levothyroxine 175 mcg      # Hyperlipidemia  Continue Lipitor 20 mg     # Obesity  BMI 36.2  Lifestyle of diet and exercise encouraged     # Gerd  Continue Protonix 40 mg      The patient expressed appropriate understanding of, and agreement with the discharge recommendations, medications, and plan. Consults this admission:  IP CONSULT TO DIABETES EDUCATOR    Discharge Diagnosis:   Hyperglycemia due to diabetes mellitus ([facility] [address]  UTI-E. coli-MDRO    Discharge Instruction:   Follow up appointments:   Primary care physician: TOÑITO Delgado within 2 weeks  Diet: diabetic diet   Activity: As per care facility treatment team/PT. Disposition: Discharged to:   []Home, [][facility], []SNF, []Acute Rehab, []Hospice   - Assisted living facility  Condition on discharge: Stable  Labs and Tests to be Followed up as an outpatient by PCP or Specialist:     Discharge Medications:        Medication List      START taking these medications    cefadroxil 500 MG capsule  Commonly known as: DURICEF  Take 1 capsule by mouth 2 times daily for 7 days     polyethylene glycol 17 g packet  Commonly known as: GLYCOLAX  Take 17 g by mouth daily as needed for Constipation        CHANGE how you take these medications    insulin glargine 100 UNIT/ML injection vial  Commonly known as: Lantus  Inject 35 Units into the skin nightly  What changed: how much to take     mupirocin 2 % ointment  Commonly known as: BACTROBAN  Apply topically 2 times daily as needed (skin wounds) Apply topically 3 times daily.   What changed:   · when to take this  · reasons to take this     nystatin Powd powder  Commonly known as: MYCOSTATIN  Apply 1 each topically 2 times daily Apply to bilateral groin topically every shift for skin care  What changed:   · how much to take  · how to take this  · when to take this     * Ventolin  (90 Base) MCG/ACT inhaler  Generic drug: albuterol sulfate HFA  What changed:   · Another medication with the same name was added. Make sure you understand how and when to take each. · Another medication with the same name was removed. Continue taking this medication, and follow the directions you see here. * albuterol sulfate  (90 Base) MCG/ACT inhaler  Inhale 2 puffs into the lungs every 6 hours as needed (for cough or wheezing)  What changed: You were already taking a medication with the same name, and this prescription was added. Make sure you understand how and when to take each. Replaces: Albuterol Sulfate Powd         * This list has 2 medication(s) that are the same as other medications prescribed for you. Read the directions carefully, and ask your doctor or other care provider to review them with you.             CONTINUE taking these medications    acetaminophen 500 MG tablet  Commonly known as: TYLENOL     amLODIPine 10 MG tablet  Commonly known as: Norvasc  Take 1 tablet by mouth daily Hold for systolic blood pressure less than or equal to 110     ARTIFICIAL TEARS OP     aspirin 81 MG EC tablet     atenolol 50 MG tablet  Commonly known as: TENORMIN  Take 1 tablet by mouth 2 times daily Hold for systolic blood pressure less than or equal to 110     DULoxetine 60 MG extended release capsule  Commonly known as: CYMBALTA     furosemide 20 MG tablet  Commonly known as: LASIX  Take 1 tablet by mouth daily     Glucagon HCl 1 MG Solr     * HumaLOG KwikPen 100 UNIT/ML Sopn  Generic drug: insulin lispro (1 Unit Dial)     * HumaLOG KwikPen 100 UNIT/ML Sopn  Generic drug: insulin lispro (1 Unit Dial)     hydrALAZINE 25 MG tablet  Commonly known as: APRESOLINE  Take 1 tablet by mouth 2 times daily     hydrOXYzine 10 MG tablet  Commonly known as: ATARAX     levothyroxine 175 MCG tablet  Commonly known as: SYNTHROID     Lipitor 20 MG tablet  Generic drug: atorvastatin     methocarbamol 500 MG tablet  Commonly known as: ROBAXIN     ondansetron 4 MG tablet  Commonly known as: ZOFRAN     pantoprazole 40 MG tablet  Commonly known as: PROTONIX     TRUEplus Diabetic Multivitamin Tabs         * This list has 2 medication(s) that are the same as other medications prescribed for you. Read the directions carefully, and ask your doctor or other care provider to review them with you. STOP taking these medications    Albuterol Sulfate Powd  Replaced by: albuterol sulfate  (90 Base) MCG/ACT inhaler  You also have another medication with the same name that you need to continue taking as instructed. HumuLIN R U-500 KwikPen 500 UNIT/ML Sopn concentrated injection pen  Generic drug: insulin regular human           Where to Get Your Medications      Information about where to get these medications is not yet available    Ask your nurse or doctor about these medications  · albuterol sulfate  (90 Base) MCG/ACT inhaler  · cefadroxil 500 MG capsule  · insulin glargine 100 UNIT/ML injection vial  · mupirocin 2 % ointment  · nystatin Powd powder  · polyethylene glycol 17 g packet        Objective Findings at Discharge:   BP (!) 159/57   Pulse 82   Temp 96.2 °F (35.7 °C)   Resp 17   Ht 5' (1.524 m)   Wt 184 lb 4.8 oz (83.6 kg)   SpO2 91%   BMI 35.99 kg/m²       Physical Exam:   General: NAD  Eyes: EOMI  ENT: neck supple  Cardiovascular: Regular rate. Respiratory: Clear to auscultation  Gastrointestinal: Soft, non tender  Genitourinary: no suprapubic tenderness  Musculoskeletal: Baseline edema, just compression stockings at facility. Skin: warm, dry  Neuro: Alert. Psych: Mood appropriate. Labs and Imaging   No results found.     CBC:   Recent Labs     05/19/22  2330 05/21/22  2000 05/22/22  0600   WBC 6.6 7.4 6.5   HGB 12.2* 10.3* 9.1*    184 167     BMP:    Recent Labs 05/20/22  0500 05/21/22 2000 05/22/22  0600   * 136 137   K 3.9 3.8 3.8   CL 96* 106 107   CO2 19* 21 20*   BUN 19 16 16   CREATININE 1.3* 1.3* 1.3*   GLUCOSE 390* 162* 208*     Hepatic:   Recent Labs     05/20/22  0500 05/21/22 2000 05/22/22  0600   AST 15 16 19   ALT 7* <5 11   BILITOT 0.3 0.3 0.6   ALKPHOS 106 72 71     Lipids:   Lab Results   Component Value Date    CHOL 179 12/05/2019    HDL 39 12/05/2019    TRIG 164 12/05/2019     Hemoglobin A1C:   Lab Results   Component Value Date    LABA1C 9.3 05/20/2022     TSH: No results found for: TSH  Troponin:   Lab Results   Component Value Date    TROPONINT 0.011 04/25/2022    TROPONINT <0.010 10/06/2021    TROPONINT <0.010 05/09/2021     Lactic Acid: No results for input(s): LACTA in the last 72 hours. BNP: No results for input(s): PROBNP in the last 72 hours.   UA:  Lab Results   Component Value Date    NITRU NEGATIVE 05/19/2022    COLORU YELLOW 05/19/2022    WBCUA 20 05/19/2022    RBCUA 0 05/19/2022    MUCUS NEGATIVE 06/23/2021    TRICHOMONAS NONE SEEN 08/05/2020    BACTERIA 3+ 05/19/2022    CLARITYU CLEAR 05/19/2022    SPECGRAV 1.020 05/19/2022    LEUKOCYTESUR SMALL 05/19/2022    UROBILINOGEN 0.2 05/19/2022    BILIRUBINUR NEGATIVE 05/19/2022    BLOODU SMALL 05/19/2022    KETUA NEGATIVE 05/19/2022     Urine Cultures: No results found for: LABURIN  Blood Cultures: No results found for: BC  No results found for: BLOODCULT2  Organism:   Lab Results   Component Value Date    ORG ECOL 08/07/2017       Time Spent Discharging patient 40 minutes    Electronically signed by Gonzalo Haas PA-C on 5/22/2022 at 2:11 PM

## 2022-05-22 NOTE — PROGRESS NOTES
Report called to Maggie Skelton at [facility]. [facility] here to p/u pt for dc. Pt's  here and aware of dc.

## 2022-05-22 NOTE — PLAN OF CARE
Problem: Discharge Planning  Goal: Discharge to home or other facility with appropriate resources  5/22/2022 0846 by Carmen Hamlin RN  Outcome: Progressing  5/21/2022 2140 by Tammie Espinosa LPN  Outcome: Progressing     Problem: Skin/Tissue Integrity  Goal: Absence of new skin breakdown  Description: 1. Monitor for areas of redness and/or skin breakdown  2. Assess vascular access sites hourly  3. Every 4-6 hours minimum:  Change oxygen saturation probe site  4. Every 4-6 hours:  If on nasal continuous positive airway pressure, respiratory therapy assess nares and determine need for appliance change or resting period.   5/22/2022 0846 by Carmen Hamlin RN  Outcome: Progressing  5/21/2022 2140 by Tammie Espinosa LPN  Outcome: Progressing     Problem: Safety - Adult  Goal: Free from fall injury  5/22/2022 0846 by Carmen Hamlin RN  Outcome: Progressing  5/21/2022 2140 by Tammie Espinosa LPN  Outcome: Progressing     Problem: ABCDS Injury Assessment  Goal: Absence of physical injury  5/22/2022 0846 by Carmen Hamlin RN  Outcome: Progressing  5/21/2022 2140 by Tammie Espinosa LPN  Outcome: Progressing  Flowsheets (Taken 5/21/2022 1503 by Esteban Rangel RN)  Absence of Physical Injury: Implement safety measures based on patient assessment     Problem: Pain  Goal: Verbalizes/displays adequate comfort level or baseline comfort level  Outcome: Progressing

## 2022-05-22 NOTE — PROGRESS NOTES
V2.0  [facility] Hospitalist Progress Note      Name:  Shauna Moyer /Age/Sex:   (77 y.o. female)   MRN & CSN:  [id number] & [id number] Encounter Date/Time: 2022 8:30 AM EDT    Location:   PCP: Devi Avila, [address] Day: 4    Assessment and Plan:   Shauna Moyer is a 77 y.o. female with pmh of hydrocephalus, ptosis right eye, recurrent UTIs, DM, obesity, general weakness, HTN, diabetic peripheral neuropathy, zoster left leg and who presents with Hyperglycemia due to diabetes mellitus ([facility] [address]      Plan:  #  Hyperglycemia  Related to uncontrolled diabetes mellitus II  This patient presenting with ED blood sugar 626 and mild altered mental status   This patient according to the nursing home notes is on Lantus 29 units every evening, log insulin 6 units AC and sliding scale. It does appear according to the nursing records the patient did receive her usual dose of Lantus 29 units last night in addition she did receive additional 25 units of regular insulin without reduction in blood sugar and thus transferred to ED for further evaluation. This morning the patient's POC glucose 215. The patient's hemoglobin A1c 9.3 with an average blood sugar 220. Previous hemoglobin A1c 2022 resulted at 7.8 with an average blood sugar of 177. Yesterday I did resume 22 units of Lantus and 4 units of log insulin at mealtime. The patient's Lantus is up to 30 units and mealtime insulin up to 6 units. I will continue her Accu-Cheks at before meals and at bedtime. I will adjust NaCl discontinued Consider saline lock transition tomorrow. # UTI  Urine culture results as E. coli greater 100,000. Sensitivities pending at this time. Previous urine culture revealing MDRO as of 2022 with sensitivity to meropenem/ertapenem as well as cephalosporins. Start Rocephin 1 g every 24 hours x3 days and will decide to switch to oral Omnicef or Ceftin at that time.   We will adjust antibiotic based on culture which is pending at this time. # Hypertension  The patient's blood pressure today is  159/57  Continue atenolol 50 mg and amlodipine 10 mg without change. She is currently on hydralazine 25 mg 3 times daily. Continue monitoring vital signs. # Hypothyroidism  TSH and free T4 pending. Continue levothyroxine 175 mcg      # Hyperlipidemia  Continue Lipitor 20 mg     # Obesity  BMI 36.2  Lifestyle of diet and exercise encouraged     # Gerd  Continue Protonix 40 mg         Subjective:     Chief Complaint: Hyperglycemia       Sandeep Canseco is a 77 y.o. female who presents from [facility] with elevated blood sugar at 626. Venous pH normal, beta hydroxybutyrate not elevated, A1c 9.3. At facility the patient on Lantus 29 units every afternoon and mealtime insulin at 6 units. With patient's blood sugar in the mid and upper 200s I increase Lantus up to 30 units and mealtime insulin at 4 units. Patient rather soft-spoken, tearful, emotional and quiet. The patient with recurrent UTIs. The patient last UTI and culture April 25, 2022 showing MDRO. Sensitivity to ertapenem/meropenem as well as cephalosporins. Continue Rocephin 1 g daily and transition patient to NISSA FINLEY at discharge. I will adjust antibiotic based on culture during her hospitalization. Today  in room. Informing this patient/his wife will be discharged today. Review of Systems:    Review of Systems    10 point systems review. Objective:     No intake or output data in the 24 hours ending 05/24/22 0707     Vitals:   Vitals:    05/22/22 1406   BP: (!) 159/57   Pulse:    Resp:    Temp:    SpO2:        Physical Exam:     General: NAD  Eyes: EOMI  ENT: neck supple  Cardiovascular: Regular rate.   Respiratory: Clear to auscultation  Gastrointestinal: Soft, non tender  Genitourinary: no suprapubic tenderness  Musculoskeletal: No edema  Skin: warm, dry  Neuro: Alert. Psych: Mood appropriate. Medications:   Medications:      Infusions:     PRN Meds:     Labs      No results found for this or any previous visit (from the past 24 hour(s)). Imaging/Diagnostics Last 24 Hours   No results found.     Electronically signed by Porfirio Petersen PA-C on 5/24/2022 at 7:07 AM

## 2022-05-22 NOTE — PROGRESS NOTES
Pt's belongings packed,  took belongings to [facility]. [facility] here and pt discharged via cart.

## 2022-05-22 NOTE — PLAN OF CARE
Problem: Discharge Planning  Goal: Discharge to home or other facility with appropriate resources  5/21/2022 2140 by Raghav Mcmanus LPN  Outcome: Progressing  5/21/2022 1502 by Saima Franco RN  Outcome: Progressing     Problem: Skin/Tissue Integrity  Goal: Absence of new skin breakdown  Description: 1. Monitor for areas of redness and/or skin breakdown  2. Assess vascular access sites hourly  3. Every 4-6 hours minimum:  Change oxygen saturation probe site  4. Every 4-6 hours:  If on nasal continuous positive airway pressure, respiratory therapy assess nares and determine need for appliance change or resting period.   5/21/2022 2140 by Raghav Mcmanus LPN  Outcome: Progressing  5/21/2022 1502 by Saima Franco RN  Outcome: Progressing     Problem: Safety - Adult  Goal: Free from fall injury  5/21/2022 2140 by Raghav Mcmanus LPN  Outcome: Progressing  5/21/2022 1502 by Saima Franco RN  Outcome: Progressing     Problem: ABCDS Injury Assessment  Goal: Absence of physical injury  5/21/2022 2140 by Raghav Mcmanus LPN  Outcome: Progressing  Flowsheets (Taken 5/21/2022 1503 by Saima Franco RN)  Absence of Physical Injury: Implement safety measures based on patient assessment  5/21/2022 1502 by Saima Franco RN  Outcome: Progressing  Flowsheets (Taken 5/21/2022 1502)  Absence of Physical Injury: Implement safety measures based on patient assessment

## 2022-05-22 NOTE — DISCHARGE INSTR - COC
Continuity of Care Form    Patient Name: Michelle Ma   :    MRN:  [id number]    Admit date:  2022  Discharge date:  ***    Code Status Order: Full Code   Advance Directives:      Admitting Physician:  Chang Dsouza MD  PCP: TOÑITO Bucio    Discharging Nurse: Mack Sierra. [facility] Unit/Room#: 002/002-01  Discharging Unit Phone Number: [phone]    Emergency Contact:   Extended Emergency Contact Information  Primary Emergency Contact: Milagros POOLE  Address: [address] [address] [address], [address] [place] [address] Phone: [phone]  Mobile Phone: [phone]  Relation: Spouse  Secondary Emergency Contact: [address] Phone: [phone]  Mobile Phone: [phone]  Relation: Child    Past Surgical History:  Past Surgical History:   Procedure Laterality Date    ABDOMEN SURGERY      CYST REMOVAL      from kidney.     DILATATION, ESOPHAGUS      ENDOSCOPY, COLON, DIAGNOSTIC      FOOT SURGERY  12    had infection  and a biopsy was sent away for analysis    HERNIA REPAIR      HYSTERECTOMY      KIDNEY REMOVAL         Immunization History:   Immunization History   Administered Date(s) Administered    COVID-19, J&J, PF, 0.5 mL 2021    Influenza A ([id number]) Vaccine PF IM 2010    Influenza Virus Vaccine 10/25/2006, 2009, 2012, 10/19/2013, 2019    Influenza, MDCK Quadv, IM, PF (Flucelvax 2 yrs and older) 2018    Influenza, Quadv, IM, PF (6 mo and older Fluzone, Flulaval, Fluarix, and 3 yrs and older Afluria) 2019    Pneumococcal Conjugate 13-valent ([id number]) 2018    Pneumococcal Polysaccharide ([id number]) 2012       Active Problems:  Patient Active Problem List   Diagnosis Code    Type 2 diabetes mellitus with foot ulcer, with long-term current use of insulin ([address] E11.621, L97.509, Z79.4    Ulcer of right foot with fat layer exposed ([address] L97.512    Hallux valgus (acquired), right foot M20.11    Chronic pain syndrome G89.4    Hypertension I10    Diabetes mellitus type 2 in obese (HCC) E11.69, E66.9    GERD (gastroesophageal reflux disease) K21.9    Thyroid disease E07.9    Generalized weakness R53.1    Hyperthyroidism E05.90    Palpitations R00.2    AMS (altered mental status) R41.82    WD-Diabetic ulcer of toe of left foot associated with type 2 diabetes mellitus, with fat layer exposed ([facility] [address] E11.621, L97.522    Altered mental status R41.82    Acquired hypothyroidism E03.9    Hypoglycemia E16.2    Herpes zoster B02.9    Pulmonary edema cardiac cause ([facility] [address] I50.1    Solitary kidney, congenital Q60.0    Dyslipidemia E78.5    Acute on chronic heart failure with preserved ejection fraction (HCC) I50.33    Acute cystitis without hematuria N30.00    Hyperglycemia due to diabetes mellitus ([facility] [address] E11.65    Urinary tract infection N39.0       Isolation/Infection:   Isolation            Contact          Patient Infection Status       Infection Onset Added Last Indicated Last Indicated By Review Planned Expiration Resolved Resolved By    MDRO (multi-drug resistant organism) 04/25/22 04/28/22 04/28/22 Pierre Jeornimo RN        ESCHERICHIA COLI     Resolved    COVID-19 (Rule Out) 10/06/21 10/06/21 10/06/21 COVID-19, Rapid (Ordered)   10/06/21 Rule-Out Test Resulted    COVID-19 (Rule Out) 05/09/21 05/09/21 05/09/21 COVID-19, Rapid (Ordered)   05/09/21 Rule-Out Test Resulted    COVID-19 (Rule Out) 12/30/20 12/30/20 12/30/20 COVID-19 (Ordered)   12/30/20 Rule-Out Test Resulted            Nurse Assessment:  Last Vital Signs: BP (!) 170/74   Pulse 82   Temp 96.2 °F (35.7 °C)   Resp 17   Ht 5' (1.524 m)   Wt 184 lb 4.8 oz (83.6 kg)   SpO2 91%   BMI 35.99 kg/m²     Last documented pain score (0-10 scale): Pain Level: 2  Last Weight:   Wt Readings from Last 1 Encounters:   05/22/22 184 lb 4.8 oz (83.6 kg)     Mental Status:  disoriented and alert    IV Access:  - None    Nursing Mobility/ADLs:  Walking   Dependent  Transfer  Dependent  Bathing  Dependent  Dressing  Dependent  Toileting  Dependent  Feeding  Independent  Med Admin  Dependent  Med Delivery   whole    Wound Care Documentation and Therapy:  Wound 10/06/21 (Active)   Number of days: 228       Wound 10/06/21 Coccyx Right 3 open areas (Active)   Number of days: 227        Elimination:  Continence: Bowel: No  Bladder: No  Urinary Catheter: None   Colostomy/Ileostomy/Ileal Conduit: No       Date of Last BM: 5/19/22    Intake/Output Summary (Last 24 hours) at 5/22/2022 1337  Last data filed at 5/22/2022 1327  Gross per 24 hour   Intake 4365.86 ml   Output 450 ml   Net 3915.86 ml     I/O last 3 completed shifts: In: 4079.5 [P.O.:360; I.V.:3669.5; IV Piggyback:50]  Out: 700 [Urine:700]    Safety Concerns:     History of Falls (last 30 days)    Impairments/Disabilities:      Speech, Language Barrier - ***, and Vision    Nutrition Therapy:  Current Nutrition Therapy:   - Oral Diet:  Carb Control 4 carbs/meal (1800kcals/day)    Routes of Feeding: Oral  Liquids: Thin Liquids  Daily Fluid Restriction: no  Last Modified Barium Swallow with Video (Video Swallowing Test): not done    Treatments at the Time of Hospital Discharge:   Respiratory Treatments: na  Oxygen Therapy:  is not on home oxygen therapy.   Ventilator:    - No ventilator support    Rehab Therapies: Physical Therapy and Occupational Therapy  Weight Bearing Status/Restrictions: No weight bearing restrictions  Other Medical Equipment (for information only, NOT a DME order):  wheelchair  Other Treatments: na    Patient's personal belongings (please select all that are sent with patient):  [place]    RN SIGNATURE:  Electronically signed by Jose Menjivar RN on 5/22/22 at 1:54 PM EDT    CASE MANAGEMENT/SOCIAL WORK SECTION    Inpatient Status Date: ***    Readmission Risk Assessment Score:  Readmission Risk              Risk of Unplanned Readmission:  31           Discharging to Facility/ Agency   Name:   Address:  Phone:  Fax:    Dialysis Facility (if applicable)   Name:  Address:  Dialysis Schedule:  Phone:  Fax:    / signature: {Esignature:[id number]}    PHYSICIAN SECTION    Prognosis: Good    Condition at Discharge: Stable    Rehab Potential (if transferring to Rehab): Fair    Recommended Labs or Other Treatments After Discharge: Recommending recheck CBC and BMP in 5 days. Increase Lantus up to 35 units nightly and continue mealtime insulin at 6 units. If hypoglycemic events decrease mealtime insulin. UTI positive E. coli and sensitivity indicating MDRO. Do not use Cipro or Levaquin in the future. Patient given Rocephin during hospitalization. Will be discharged with [facility], first generation cephalosporin, twice daily x1 week. Patient lower extremity edema. I do recommend compression stockings under direction of facility health care provider. Physician Certification: I certify the above information and transfer of Apolonia Nails  is necessary for the continuing treatment of the diagnosis listed and that she requires Assisted Living for greater 30 days.      Update Admission H&P: No change in H&P    PHYSICIAN SIGNATURE:  Electronically signed by Bridget Olvera PA-C on 5/22/22 at 1:40 PM EDT

## 2022-06-19 PROBLEM — N39.0 URINARY TRACT INFECTION: Status: RESOLVED | Noted: 2022-05-20 | Resolved: 2022-06-19

## 2022-11-11 ENCOUNTER — APPOINTMENT (OUTPATIENT)
Dept: CT IMAGING | Age: 77
DRG: 689 | End: 2022-11-11
Payer: MEDICARE

## 2022-11-11 ENCOUNTER — HOSPITAL ENCOUNTER (INPATIENT)
Age: 77
LOS: 2 days | Discharge: HOME | DRG: 689 | End: 2022-11-14
Attending: EMERGENCY MEDICINE | Admitting: INTERNAL MEDICINE
Payer: MEDICARE

## 2022-11-11 ENCOUNTER — APPOINTMENT (OUTPATIENT)
Dept: GENERAL RADIOLOGY | Age: 77
DRG: 689 | End: 2022-11-11
Payer: MEDICARE

## 2022-11-11 DIAGNOSIS — J18.9 PNEUMONIA OF RIGHT UPPER LOBE DUE TO INFECTIOUS ORGANISM: ICD-10-CM

## 2022-11-11 DIAGNOSIS — G93.41 METABOLIC ENCEPHALOPATHY: Primary | ICD-10-CM

## 2022-11-11 DIAGNOSIS — N30.00 ACUTE CYSTITIS WITHOUT HEMATURIA: ICD-10-CM

## 2022-11-11 PROBLEM — Z79.4 TYPE 2 DIABETES MELLITUS WITH COMPLICATION, WITH LONG-TERM CURRENT USE OF INSULIN (HCC): Status: ACTIVE | Noted: 2019-08-08

## 2022-11-11 PROBLEM — I70.0 AORTIC ATHEROSCLEROSIS (HCC): Status: ACTIVE | Noted: 2022-11-11

## 2022-11-11 PROBLEM — E11.8 TYPE 2 DIABETES MELLITUS WITH COMPLICATION, WITH LONG-TERM CURRENT USE OF INSULIN (HCC): Status: ACTIVE | Noted: 2019-08-08

## 2022-11-11 LAB
ACETAMINOPHEN LEVEL: <5 UG/ML (ref 15–30)
ADENOVIRUS DETECTION BY PCR: NOT DETECTED
ALBUMIN SERPL-MCNC: 3.2 GM/DL (ref 3.4–5)
ALCOHOL SCREEN SERUM: <0.01 %WT/VOL
ALP BLD-CCNC: 75 IU/L (ref 40–129)
ALT SERPL-CCNC: 9 U/L (ref 10–40)
AMMONIA: 28 UMOL/L (ref 11–51)
AMPHETAMINES: NEGATIVE
ANION GAP SERPL CALCULATED.3IONS-SCNC: 11 MMOL/L (ref 4–16)
AST SERPL-CCNC: 16 IU/L (ref 15–37)
BACTERIA: NEGATIVE /HPF
BARBITURATE SCREEN URINE: NEGATIVE
BASOPHILS ABSOLUTE: 0.1 K/CU MM
BASOPHILS RELATIVE PERCENT: 0.5 % (ref 0–1)
BENZODIAZEPINE SCREEN, URINE: NEGATIVE
BILIRUB SERPL-MCNC: 0.6 MG/DL (ref 0–1)
BILIRUBIN URINE: NEGATIVE MG/DL
BLOOD, URINE: ABNORMAL
BORDETELLA PARAPERTUSSIS BY PCR: NOT DETECTED
BORDETELLA PERTUSSIS PCR: NOT DETECTED
BUN BLDV-MCNC: 26 MG/DL (ref 6–23)
CALCIUM SERPL-MCNC: 9.2 MG/DL (ref 8.3–10.6)
CANNABINOID SCREEN URINE: NEGATIVE
CAST TYPE: NORMAL /HPF
CHLAMYDOPHILA PNEUMONIA PCR: NOT DETECTED
CHLORIDE BLD-SCNC: 103 MMOL/L (ref 99–110)
CLARITY: CLEAR
CO2: 26 MMOL/L (ref 21–32)
COCAINE METABOLITE: NEGATIVE
COLOR: YELLOW
CORONAVIRUS 229E PCR: NOT DETECTED
CORONAVIRUS HKU1 PCR: NOT DETECTED
CORONAVIRUS NL63 PCR: ABNORMAL
CORONAVIRUS OC43 PCR: NOT DETECTED
CREAT SERPL-MCNC: 1.3 MG/DL (ref 0.6–1.1)
DIFFERENTIAL TYPE: ABNORMAL
DOSE AMOUNT: ABNORMAL
DOSE AMOUNT: ABNORMAL
DOSE TIME: ABNORMAL
DOSE TIME: ABNORMAL
EOSINOPHILS ABSOLUTE: 0.3 K/CU MM
EOSINOPHILS RELATIVE PERCENT: 3.7 % (ref 0–3)
EPITHELIAL CELLS, UA: 1 /HPF
GFR SERPL CREATININE-BSD FRML MDRD: 43 ML/MIN/1.73M2
GLUCOSE BLD-MCNC: 239 MG/DL (ref 70–99)
GLUCOSE, URINE: NEGATIVE MG/DL
HCT VFR BLD CALC: 35.3 % (ref 37–47)
HEMOGLOBIN: 11.1 GM/DL (ref 12.5–16)
HUMAN METAPNEUMOVIRUS PCR: NOT DETECTED
IMMATURE NEUTROPHIL %: 0.4 % (ref 0–0.43)
INFLUENZA A BY PCR: NOT DETECTED
INFLUENZA A H1 (2009) PCR: NOT DETECTED
INFLUENZA A H1 PANDEMIC PCR: NOT DETECTED
INFLUENZA A H3 PCR: NOT DETECTED
INFLUENZA B BY PCR: NOT DETECTED
KETONES, URINE: ABNORMAL MG/DL
LACTIC ACID, SEPSIS: 1 MMOL/L (ref 0.5–1.9)
LACTIC ACID, SEPSIS: 1 MMOL/L (ref 0.5–1.9)
LEUKOCYTE ESTERASE, URINE: ABNORMAL
LIPASE: 11 IU/L (ref 13–60)
LYMPHOCYTES ABSOLUTE: 3.1 K/CU MM
LYMPHOCYTES RELATIVE PERCENT: 32.9 % (ref 24–44)
MCH RBC QN AUTO: 29.2 PG (ref 27–31)
MCHC RBC AUTO-ENTMCNC: 31.4 % (ref 32–36)
MCV RBC AUTO: 92.9 FL (ref 78–100)
MONOCYTES ABSOLUTE: 0.7 K/CU MM
MONOCYTES RELATIVE PERCENT: 7.5 % (ref 0–4)
MYCOPLASMA PNEUMONIAE PCR: NOT DETECTED
NITRITE URINE, QUANTITATIVE: POSITIVE
OPIATES, URINE: NEGATIVE
OXYCODONE: NEGATIVE
PARAINFLUENZA 1 PCR: NOT DETECTED
PARAINFLUENZA 2 PCR: NOT DETECTED
PARAINFLUENZA 3 PCR: NOT DETECTED
PARAINFLUENZA 4 PCR: NOT DETECTED
PDW BLD-RTO: 14.2 % (ref 11.7–14.9)
PH, URINE: 5.5 (ref 5–8)
PHENCYCLIDINE, URINE: NEGATIVE
PLATELET # BLD: 205 K/CU MM (ref 140–440)
PMV BLD AUTO: 9.9 FL (ref 7.5–11.1)
POTASSIUM SERPL-SCNC: 4.6 MMOL/L (ref 3.5–5.1)
PROTEIN UA: >300 MG/DL
RBC # BLD: 3.8 M/CU MM (ref 4.2–5.4)
RBC URINE: NORMAL /HPF (ref 0–6)
RHINOVIRUS ENTEROVIRUS PCR: NOT DETECTED
RSV PCR: NOT DETECTED
SALICYLATE LEVEL: <0.3 MG/DL (ref 15–30)
SARS-COV-2: NOT DETECTED
SEGMENTED NEUTROPHILS ABSOLUTE COUNT: 5.1 K/CU MM
SEGMENTED NEUTROPHILS RELATIVE PERCENT: 55 % (ref 36–66)
SODIUM BLD-SCNC: 140 MMOL/L (ref 135–145)
SPECIFIC GRAVITY UA: >1.03 (ref 1–1.03)
TOTAL IMMATURE NEUTOROPHIL: 0.04 K/CU MM
TOTAL PROTEIN: 6.5 GM/DL (ref 6.4–8.2)
TROPONIN T: 0.01 NG/ML
UROBILINOGEN, URINE: 0.2 MG/DL (ref 0.2–1)
WBC # BLD: 9.3 K/CU MM (ref 4–10.5)
WBC UA: NORMAL /HPF (ref 0–5)

## 2022-11-11 PROCEDURE — 84484 ASSAY OF TROPONIN QUANT: CPT

## 2022-11-11 PROCEDURE — 80307 DRUG TEST PRSMV CHEM ANLYZR: CPT

## 2022-11-11 PROCEDURE — 99285 EMERGENCY DEPT VISIT HI MDM: CPT

## 2022-11-11 PROCEDURE — 70450 CT HEAD/BRAIN W/O DYE: CPT

## 2022-11-11 PROCEDURE — 85025 COMPLETE CBC W/AUTO DIFF WBC: CPT

## 2022-11-11 PROCEDURE — 6360000002 HC RX W HCPCS: Performed by: EMERGENCY MEDICINE

## 2022-11-11 PROCEDURE — 87086 URINE CULTURE/COLONY COUNT: CPT

## 2022-11-11 PROCEDURE — 0202U NFCT DS 22 TRGT SARS-COV-2: CPT

## 2022-11-11 PROCEDURE — G0480 DRUG TEST DEF 1-7 CLASSES: HCPCS

## 2022-11-11 PROCEDURE — 2580000003 HC RX 258: Performed by: EMERGENCY MEDICINE

## 2022-11-11 PROCEDURE — 96367 TX/PROPH/DG ADDL SEQ IV INF: CPT

## 2022-11-11 PROCEDURE — 80061 LIPID PANEL: CPT

## 2022-11-11 PROCEDURE — 6360000004 HC RX CONTRAST MEDICATION: Performed by: EMERGENCY MEDICINE

## 2022-11-11 PROCEDURE — 83690 ASSAY OF LIPASE: CPT

## 2022-11-11 PROCEDURE — 87186 SC STD MICRODIL/AGAR DIL: CPT

## 2022-11-11 PROCEDURE — 83036 HEMOGLOBIN GLYCOSYLATED A1C: CPT

## 2022-11-11 PROCEDURE — 81001 URINALYSIS AUTO W/SCOPE: CPT

## 2022-11-11 PROCEDURE — 87077 CULTURE AEROBIC IDENTIFY: CPT

## 2022-11-11 PROCEDURE — 51701 INSERT BLADDER CATHETER: CPT

## 2022-11-11 PROCEDURE — 74177 CT ABD & PELVIS W/CONTRAST: CPT

## 2022-11-11 PROCEDURE — 84145 PROCALCITONIN (PCT): CPT

## 2022-11-11 PROCEDURE — 84443 ASSAY THYROID STIM HORMONE: CPT

## 2022-11-11 PROCEDURE — 96365 THER/PROPH/DIAG IV INF INIT: CPT

## 2022-11-11 PROCEDURE — 83605 ASSAY OF LACTIC ACID: CPT

## 2022-11-11 PROCEDURE — 82140 ASSAY OF AMMONIA: CPT

## 2022-11-11 PROCEDURE — 80053 COMPREHEN METABOLIC PANEL: CPT

## 2022-11-11 PROCEDURE — 87040 BLOOD CULTURE FOR BACTERIA: CPT

## 2022-11-11 PROCEDURE — 93005 ELECTROCARDIOGRAM TRACING: CPT | Performed by: EMERGENCY MEDICINE

## 2022-11-11 PROCEDURE — 71045 X-RAY EXAM CHEST 1 VIEW: CPT

## 2022-11-11 RX ORDER — INSULIN HUMAN 500 [IU]/ML
INJECTION, SOLUTION SUBCUTANEOUS
COMMUNITY
End: 2022-11-11

## 2022-11-11 RX ORDER — SENNA PLUS 8.6 MG/1
1 TABLET ORAL DAILY
COMMUNITY

## 2022-11-11 RX ORDER — VANCOMYCIN 1.25 G/250ML
1750 INJECTION, SOLUTION INTRAVENOUS ONCE
Status: COMPLETED | OUTPATIENT
Start: 2022-11-11 | End: 2022-11-12

## 2022-11-11 RX ORDER — DOCUSATE SODIUM 100 MG/1
100 CAPSULE, LIQUID FILLED ORAL 2 TIMES DAILY
COMMUNITY

## 2022-11-11 RX ORDER — SODIUM CHLORIDE 9 MG/ML
INJECTION, SOLUTION INTRAVENOUS CONTINUOUS
Status: DISCONTINUED | OUTPATIENT
Start: 2022-11-11 | End: 2022-11-12

## 2022-11-11 RX ORDER — POLYVINYL ALCOHOL 14 MG/ML
1 SOLUTION/ DROPS OPHTHALMIC 4 TIMES DAILY PRN
COMMUNITY

## 2022-11-11 RX ORDER — INSULIN GLARGINE 100 [IU]/ML
40 INJECTION, SOLUTION SUBCUTANEOUS NIGHTLY
COMMUNITY

## 2022-11-11 RX ADMIN — AZITHROMYCIN 500 MG: 500 INJECTION, POWDER, LYOPHILIZED, FOR SOLUTION INTRAVENOUS at 23:26

## 2022-11-11 RX ADMIN — IOPAMIDOL 50 ML: 755 INJECTION, SOLUTION INTRAVENOUS at 20:57

## 2022-11-11 RX ADMIN — PIPERACILLIN AND TAZOBACTAM 3375 MG: 3; .375 INJECTION, POWDER, FOR SOLUTION INTRAVENOUS at 22:24

## 2022-11-11 RX ADMIN — SODIUM CHLORIDE: 9 INJECTION, SOLUTION INTRAVENOUS at 22:22

## 2022-11-11 ASSESSMENT — PAIN - FUNCTIONAL ASSESSMENT: PAIN_FUNCTIONAL_ASSESSMENT: PAIN ASSESSMENT IN ADVANCED DEMENTIA (PAINAD)

## 2022-11-11 ASSESSMENT — PAIN SCALES - PAIN ASSESSMENT IN ADVANCED DEMENTIA (PAINAD)
TOTALSCORE: 5
BREATHING: 1
CONSOLABILITY: 0
FACIALEXPRESSION: 2
NEGVOCALIZATION: 1
BODYLANGUAGE: 1

## 2022-11-12 PROBLEM — R41.82 AMS (ALTERED MENTAL STATUS): Status: ACTIVE | Noted: 2022-11-12

## 2022-11-12 LAB
ALBUMIN SERPL-MCNC: 2.9 GM/DL (ref 3.4–5)
ALP BLD-CCNC: 68 IU/L (ref 40–129)
ALT SERPL-CCNC: 9 U/L (ref 10–40)
ANION GAP SERPL CALCULATED.3IONS-SCNC: 13 MMOL/L (ref 4–16)
AST SERPL-CCNC: 15 IU/L (ref 15–37)
BASOPHILS ABSOLUTE: 0.1 K/CU MM
BASOPHILS RELATIVE PERCENT: 0.6 % (ref 0–1)
BILIRUB SERPL-MCNC: 0.4 MG/DL (ref 0–1)
BUN BLDV-MCNC: 27 MG/DL (ref 6–23)
CALCIUM SERPL-MCNC: 8.8 MG/DL (ref 8.3–10.6)
CHLORIDE BLD-SCNC: 102 MMOL/L (ref 99–110)
CHOLESTEROL: 233 MG/DL
CO2: 23 MMOL/L (ref 21–32)
CREAT SERPL-MCNC: 1.3 MG/DL (ref 0.6–1.1)
DIFFERENTIAL TYPE: ABNORMAL
EKG ATRIAL RATE: 62 BPM
EKG ATRIAL RATE: 66 BPM
EKG DIAGNOSIS: NORMAL
EKG DIAGNOSIS: NORMAL
EKG P AXIS: 42 DEGREES
EKG P AXIS: 49 DEGREES
EKG P-R INTERVAL: 164 MS
EKG P-R INTERVAL: 198 MS
EKG Q-T INTERVAL: 430 MS
EKG Q-T INTERVAL: 482 MS
EKG QRS DURATION: 86 MS
EKG QRS DURATION: 92 MS
EKG QTC CALCULATION (BAZETT): 450 MS
EKG QTC CALCULATION (BAZETT): 489 MS
EKG R AXIS: 0 DEGREES
EKG R AXIS: 5 DEGREES
EKG T AXIS: 106 DEGREES
EKG T AXIS: 66 DEGREES
EKG VENTRICULAR RATE: 62 BPM
EKG VENTRICULAR RATE: 66 BPM
EOSINOPHILS ABSOLUTE: 0.5 K/CU MM
EOSINOPHILS RELATIVE PERCENT: 5.3 % (ref 0–3)
ESTIMATED AVERAGE GLUCOSE: 200 MG/DL
FOLATE: >20 NG/ML (ref 3.1–17.5)
GFR SERPL CREATININE-BSD FRML MDRD: 43 ML/MIN/1.73M2
GLUCOSE BLD-MCNC: 227 MG/DL (ref 70–99)
GLUCOSE BLD-MCNC: 233 MG/DL (ref 70–99)
GLUCOSE BLD-MCNC: 236 MG/DL (ref 70–99)
GLUCOSE BLD-MCNC: 260 MG/DL (ref 70–99)
GLUCOSE BLD-MCNC: 297 MG/DL (ref 70–99)
HBA1C MFR BLD: 8.6 % (ref 4.2–6.3)
HCT VFR BLD CALC: 34.3 % (ref 37–47)
HDLC SERPL-MCNC: 39 MG/DL
HEMOGLOBIN: 10.8 GM/DL (ref 12.5–16)
IMMATURE NEUTROPHIL %: 0.5 % (ref 0–0.43)
LDL CHOLESTEROL CALCULATED: 144 MG/DL
LEGIONELLA URINARY AG: NEGATIVE
LYMPHOCYTES ABSOLUTE: 2.2 K/CU MM
LYMPHOCYTES RELATIVE PERCENT: 26.2 % (ref 24–44)
MCH RBC QN AUTO: 29.3 PG (ref 27–31)
MCHC RBC AUTO-ENTMCNC: 31.5 % (ref 32–36)
MCV RBC AUTO: 93 FL (ref 78–100)
MONOCYTES ABSOLUTE: 0.4 K/CU MM
MONOCYTES RELATIVE PERCENT: 5.2 % (ref 0–4)
PDW BLD-RTO: 14 % (ref 11.7–14.9)
PLATELET # BLD: 196 K/CU MM (ref 140–440)
PMV BLD AUTO: 10.1 FL (ref 7.5–11.1)
POTASSIUM SERPL-SCNC: 4.1 MMOL/L (ref 3.5–5.1)
PROCALCITONIN: 0.36
RBC # BLD: 3.69 M/CU MM (ref 4.2–5.4)
SEGMENTED NEUTROPHILS ABSOLUTE COUNT: 5.3 K/CU MM
SEGMENTED NEUTROPHILS RELATIVE PERCENT: 62.2 % (ref 36–66)
SODIUM BLD-SCNC: 138 MMOL/L (ref 135–145)
STREP PNEUMONIAE ANTIGEN: NORMAL
TOTAL IMMATURE NEUTOROPHIL: 0.04 K/CU MM
TOTAL PROTEIN: 5.9 GM/DL (ref 6.4–8.2)
TRIGL SERPL-MCNC: 251 MG/DL
TROPONIN T: 0.01 NG/ML
TROPONIN T: <0.01 NG/ML
TSH HIGH SENSITIVITY: 0.94 UIU/ML (ref 0.27–4.2)
VITAMIN B-12: 370.7 PG/ML (ref 211–911)
WBC # BLD: 8.5 K/CU MM (ref 4–10.5)

## 2022-11-12 PROCEDURE — 93010 ELECTROCARDIOGRAM REPORT: CPT | Performed by: INTERNAL MEDICINE

## 2022-11-12 PROCEDURE — 6360000002 HC RX W HCPCS: Performed by: NURSE PRACTITIONER

## 2022-11-12 PROCEDURE — 93005 ELECTROCARDIOGRAM TRACING: CPT | Performed by: NURSE PRACTITIONER

## 2022-11-12 PROCEDURE — 82607 VITAMIN B-12: CPT

## 2022-11-12 PROCEDURE — 87449 NOS EACH ORGANISM AG IA: CPT

## 2022-11-12 PROCEDURE — 80053 COMPREHEN METABOLIC PANEL: CPT

## 2022-11-12 PROCEDURE — 94761 N-INVAS EAR/PLS OXIMETRY MLT: CPT

## 2022-11-12 PROCEDURE — 82962 GLUCOSE BLOOD TEST: CPT

## 2022-11-12 PROCEDURE — 87040 BLOOD CULTURE FOR BACTERIA: CPT

## 2022-11-12 PROCEDURE — 82746 ASSAY OF FOLIC ACID SERUM: CPT

## 2022-11-12 PROCEDURE — 6360000002 HC RX W HCPCS: Performed by: EMERGENCY MEDICINE

## 2022-11-12 PROCEDURE — 85025 COMPLETE CBC W/AUTO DIFF WBC: CPT

## 2022-11-12 PROCEDURE — 6370000000 HC RX 637 (ALT 250 FOR IP): Performed by: NURSE PRACTITIONER

## 2022-11-12 PROCEDURE — 1200000000 HC SEMI PRIVATE

## 2022-11-12 PROCEDURE — 92610 EVALUATE SWALLOWING FUNCTION: CPT

## 2022-11-12 PROCEDURE — 87899 AGENT NOS ASSAY W/OPTIC: CPT

## 2022-11-12 RX ORDER — ATORVASTATIN CALCIUM 20 MG/1
20 TABLET, FILM COATED ORAL DAILY
Status: DISCONTINUED | OUTPATIENT
Start: 2022-11-12 | End: 2022-11-14

## 2022-11-12 RX ORDER — INSULIN GLARGINE 100 [IU]/ML
7 INJECTION, SOLUTION SUBCUTANEOUS
COMMUNITY

## 2022-11-12 RX ORDER — ALBUTEROL SULFATE 90 UG/1
2 AEROSOL, METERED RESPIRATORY (INHALATION) EVERY 6 HOURS PRN
Status: DISCONTINUED | OUTPATIENT
Start: 2022-11-12 | End: 2022-11-14 | Stop reason: HOSPADM

## 2022-11-12 RX ORDER — SODIUM CHLORIDE 9 MG/ML
25 INJECTION, SOLUTION INTRAVENOUS PRN
Status: DISCONTINUED | OUTPATIENT
Start: 2022-11-12 | End: 2022-11-14 | Stop reason: HOSPADM

## 2022-11-12 RX ORDER — METHOCARBAMOL 500 MG/1
500 TABLET, FILM COATED ORAL 3 TIMES DAILY
Status: DISCONTINUED | OUTPATIENT
Start: 2022-11-12 | End: 2022-11-14 | Stop reason: HOSPADM

## 2022-11-12 RX ORDER — LEVOFLOXACIN 5 MG/ML
750 INJECTION, SOLUTION INTRAVENOUS
Status: DISCONTINUED | OUTPATIENT
Start: 2022-11-12 | End: 2022-11-14 | Stop reason: HOSPADM

## 2022-11-12 RX ORDER — SENNA PLUS 8.6 MG/1
1 TABLET ORAL DAILY
Status: DISCONTINUED | OUTPATIENT
Start: 2022-11-12 | End: 2022-11-14 | Stop reason: HOSPADM

## 2022-11-12 RX ORDER — ATENOLOL 50 MG/1
50 TABLET ORAL 2 TIMES DAILY
Status: DISCONTINUED | OUTPATIENT
Start: 2022-11-12 | End: 2022-11-14 | Stop reason: HOSPADM

## 2022-11-12 RX ORDER — PANTOPRAZOLE SODIUM 40 MG/1
40 TABLET, DELAYED RELEASE ORAL DAILY
Status: DISCONTINUED | OUTPATIENT
Start: 2022-11-12 | End: 2022-11-14 | Stop reason: HOSPADM

## 2022-11-12 RX ORDER — HYDRALAZINE HYDROCHLORIDE 25 MG/1
25 TABLET, FILM COATED ORAL 2 TIMES DAILY
Status: DISCONTINUED | OUTPATIENT
Start: 2022-11-12 | End: 2022-11-14 | Stop reason: HOSPADM

## 2022-11-12 RX ORDER — ENOXAPARIN SODIUM 100 MG/ML
40 INJECTION SUBCUTANEOUS DAILY
Status: DISCONTINUED | OUTPATIENT
Start: 2022-11-13 | End: 2022-11-14 | Stop reason: HOSPADM

## 2022-11-12 RX ORDER — SODIUM CHLORIDE 0.9 % (FLUSH) 0.9 %
5-40 SYRINGE (ML) INJECTION PRN
Status: DISCONTINUED | OUTPATIENT
Start: 2022-11-12 | End: 2022-11-14 | Stop reason: HOSPADM

## 2022-11-12 RX ORDER — DEXTROSE MONOHYDRATE 100 MG/ML
INJECTION, SOLUTION INTRAVENOUS CONTINUOUS PRN
Status: DISCONTINUED | OUTPATIENT
Start: 2022-11-12 | End: 2022-11-14 | Stop reason: HOSPADM

## 2022-11-12 RX ORDER — ASPIRIN 81 MG/1
81 TABLET ORAL DAILY
Status: DISCONTINUED | OUTPATIENT
Start: 2022-11-12 | End: 2022-11-14 | Stop reason: HOSPADM

## 2022-11-12 RX ORDER — INSULIN LISPRO 100 [IU]/ML
0-4 INJECTION, SOLUTION INTRAVENOUS; SUBCUTANEOUS NIGHTLY
Status: DISCONTINUED | OUTPATIENT
Start: 2022-11-12 | End: 2022-11-14 | Stop reason: HOSPADM

## 2022-11-12 RX ORDER — LEVOFLOXACIN 5 MG/ML
500 INJECTION, SOLUTION INTRAVENOUS ONCE
Status: DISCONTINUED | OUTPATIENT
Start: 2022-11-12 | End: 2022-11-12

## 2022-11-12 RX ORDER — HYDRALAZINE HYDROCHLORIDE 20 MG/ML
10 INJECTION INTRAMUSCULAR; INTRAVENOUS ONCE
Status: COMPLETED | OUTPATIENT
Start: 2022-11-12 | End: 2022-11-12

## 2022-11-12 RX ORDER — BISACODYL 10 MG
10 SUPPOSITORY, RECTAL RECTAL DAILY
Status: ACTIVE | OUTPATIENT
Start: 2022-11-12 | End: 2022-11-13

## 2022-11-12 RX ORDER — SODIUM CHLORIDE 0.9 % (FLUSH) 0.9 %
5-40 SYRINGE (ML) INJECTION EVERY 12 HOURS SCHEDULED
Status: DISCONTINUED | OUTPATIENT
Start: 2022-11-12 | End: 2022-11-14 | Stop reason: HOSPADM

## 2022-11-12 RX ORDER — DOCUSATE SODIUM 100 MG/1
100 CAPSULE, LIQUID FILLED ORAL 2 TIMES DAILY
Status: DISCONTINUED | OUTPATIENT
Start: 2022-11-12 | End: 2022-11-14 | Stop reason: HOSPADM

## 2022-11-12 RX ORDER — ENOXAPARIN SODIUM 100 MG/ML
30 INJECTION SUBCUTANEOUS DAILY
Status: DISCONTINUED | OUTPATIENT
Start: 2022-11-12 | End: 2022-11-12

## 2022-11-12 RX ORDER — ACETAMINOPHEN 650 MG/1
650 SUPPOSITORY RECTAL EVERY 6 HOURS PRN
Status: DISCONTINUED | OUTPATIENT
Start: 2022-11-12 | End: 2022-11-14 | Stop reason: HOSPADM

## 2022-11-12 RX ORDER — ACETAMINOPHEN 325 MG/1
650 TABLET ORAL EVERY 6 HOURS PRN
Status: DISCONTINUED | OUTPATIENT
Start: 2022-11-12 | End: 2022-11-14 | Stop reason: HOSPADM

## 2022-11-12 RX ORDER — INSULIN LISPRO 100 [IU]/ML
0-4 INJECTION, SOLUTION INTRAVENOUS; SUBCUTANEOUS
Status: DISCONTINUED | OUTPATIENT
Start: 2022-11-12 | End: 2022-11-14 | Stop reason: HOSPADM

## 2022-11-12 RX ORDER — DULOXETIN HYDROCHLORIDE 60 MG/1
60 CAPSULE, DELAYED RELEASE ORAL DAILY
Status: DISCONTINUED | OUTPATIENT
Start: 2022-11-12 | End: 2022-11-14 | Stop reason: HOSPADM

## 2022-11-12 RX ORDER — AMLODIPINE BESYLATE 10 MG/1
10 TABLET ORAL DAILY
Status: DISCONTINUED | OUTPATIENT
Start: 2022-11-12 | End: 2022-11-14 | Stop reason: HOSPADM

## 2022-11-12 RX ORDER — LEVOTHYROXINE SODIUM 0.15 MG/1
150 TABLET ORAL DAILY
Status: DISCONTINUED | OUTPATIENT
Start: 2022-11-12 | End: 2022-11-14 | Stop reason: HOSPADM

## 2022-11-12 RX ORDER — LEVOFLOXACIN 5 MG/ML
250 INJECTION, SOLUTION INTRAVENOUS EVERY 24 HOURS
Status: DISCONTINUED | OUTPATIENT
Start: 2022-11-13 | End: 2022-11-12

## 2022-11-12 RX ORDER — SODIUM CHLORIDE 9 MG/ML
INJECTION, SOLUTION INTRAVENOUS CONTINUOUS
Status: DISCONTINUED | OUTPATIENT
Start: 2022-11-12 | End: 2022-11-12

## 2022-11-12 RX ADMIN — ATENOLOL 50 MG: 50 TABLET ORAL at 02:59

## 2022-11-12 RX ADMIN — PANTOPRAZOLE SODIUM 40 MG: 40 TABLET, DELAYED RELEASE ORAL at 05:42

## 2022-11-12 RX ADMIN — HYDRALAZINE HYDROCHLORIDE 10 MG: 20 INJECTION INTRAMUSCULAR; INTRAVENOUS at 06:55

## 2022-11-12 RX ADMIN — DOCUSATE SODIUM 100 MG: 100 CAPSULE, LIQUID FILLED ORAL at 08:29

## 2022-11-12 RX ADMIN — AMLODIPINE BESYLATE 10 MG: 10 TABLET ORAL at 08:29

## 2022-11-12 RX ADMIN — ATENOLOL 50 MG: 50 TABLET ORAL at 20:55

## 2022-11-12 RX ADMIN — LEVOTHYROXINE SODIUM 150 MCG: 150 TABLET ORAL at 05:42

## 2022-11-12 RX ADMIN — INSULIN LISPRO 1 UNITS: 100 INJECTION, SOLUTION INTRAVENOUS; SUBCUTANEOUS at 17:15

## 2022-11-12 RX ADMIN — ASPIRIN 81 MG: 81 TABLET, COATED ORAL at 08:29

## 2022-11-12 RX ADMIN — DULOXETINE 60 MG: 60 CAPSULE, DELAYED RELEASE ORAL at 08:29

## 2022-11-12 RX ADMIN — METHOCARBAMOL 500 MG: 500 TABLET ORAL at 12:37

## 2022-11-12 RX ADMIN — METHOCARBAMOL 500 MG: 500 TABLET ORAL at 08:29

## 2022-11-12 RX ADMIN — ATORVASTATIN CALCIUM 20 MG: 20 TABLET, FILM COATED ORAL at 08:29

## 2022-11-12 RX ADMIN — DOCUSATE SODIUM 100 MG: 100 CAPSULE, LIQUID FILLED ORAL at 20:55

## 2022-11-12 RX ADMIN — ATENOLOL 50 MG: 50 TABLET ORAL at 08:29

## 2022-11-12 RX ADMIN — INSULIN LISPRO 2 UNITS: 100 INJECTION, SOLUTION INTRAVENOUS; SUBCUTANEOUS at 12:37

## 2022-11-12 RX ADMIN — METHOCARBAMOL 500 MG: 500 TABLET ORAL at 20:55

## 2022-11-12 RX ADMIN — LEVOFLOXACIN 750 MG: 5 INJECTION, SOLUTION INTRAVENOUS at 08:38

## 2022-11-12 RX ADMIN — SENNOSIDES 8.6 MG: 8.6 TABLET, FILM COATED ORAL at 08:29

## 2022-11-12 RX ADMIN — HYDRALAZINE HYDROCHLORIDE 25 MG: 25 TABLET, FILM COATED ORAL at 20:55

## 2022-11-12 RX ADMIN — ENOXAPARIN SODIUM 30 MG: 100 INJECTION SUBCUTANEOUS at 08:28

## 2022-11-12 RX ADMIN — BENZOCAINE AND MENTHOL 1 LOZENGE: 15; 3.6 LOZENGE ORAL at 21:28

## 2022-11-12 RX ADMIN — HYDRALAZINE HYDROCHLORIDE 25 MG: 25 TABLET, FILM COATED ORAL at 08:29

## 2022-11-12 RX ADMIN — DOCUSATE SODIUM 100 MG: 100 CAPSULE, LIQUID FILLED ORAL at 02:59

## 2022-11-12 RX ADMIN — INSULIN GLARGINE 7 UNITS: 100 INJECTION, SOLUTION SUBCUTANEOUS at 10:11

## 2022-11-12 RX ADMIN — VANCOMYCIN 1750 MG: 1.25 INJECTION, SOLUTION INTRAVENOUS at 00:44

## 2022-11-12 RX ADMIN — INSULIN LISPRO 1 UNITS: 100 INJECTION, SOLUTION INTRAVENOUS; SUBCUTANEOUS at 10:10

## 2022-11-12 RX ADMIN — HYDRALAZINE HYDROCHLORIDE 25 MG: 25 TABLET, FILM COATED ORAL at 02:59

## 2022-11-12 NOTE — ASSESSMENT & PLAN NOTE
Sharee Dodd neurological changes, Acute metabolic encephalopathy   Unknown last known normal AN)x 1 baseline with known hx of cognitive imaprirment delay    Suspect due to metabolic vs  infection. CT HEAD wo: nothing acute . CXR: right upper lobe airospace disease   no acute pulmonary process. EKG: unable to view due to down time, ED physician reports no new changes. Will repeat EKG in AM  . Troponin 0.015 most likely related to renal insuf. trending trops next one was WNL . TSH / A1c pending   pending  Last echo:10/2021 EF was normal   ABCD2 score:3.    -Admit to med/surg as inpatient   -swallow eval and if pass, can have meds and food  - ETOH and ammonia levels WNL   -neuro checks   -orthostatics   -B12, TSH  A1c, lipid  pending   -Echo   -carotid US   - will defer for further evaluation such as MRI at this time    -ASA and statin continued

## 2022-11-12 NOTE — PROGRESS NOTES
Speech Language Pathology  Facility/Department: [facility] UNIT   CLINICAL BEDSIDE SWALLOW EVALUATION    NAME: Bradford Butler  :   MRN: [id number]    ADMISSION DATE: 2022  ADMITTING DIAGNOSIS: has Ulcer of right foot with fat layer exposed ([facility] [address] Type 2 diabetes mellitus with complication, with long-term current use of insulin ([facility] [address] Acute cystitis without hematuria; Aortic atherosclerosis ([facility] [address] and AMS (altered mental status) on their problem list.  ONSET DATE: this admission    Recent Chest Xray/CT of Chest: (22 )    Date of Eval: 2022  Evaluating Therapist: SLIME Flores      Bradford Butler was referred for a bedside swallowing evaluation following admission to [facility] w/ metabolic encephalopathy. No prior dysphagia hx noted. Medical hx includes DM 2, aortic atherosclerosis, and AMS. Pt was upright and seated during the evaluation. Pt was alert and pleasant. Oral mechanism exam revealed that the pt's oral mechanism was intact. PO trials of thins and regulars were administered. Oral phase appears WFL characterized by adequate ,mastication. Pharyngeal phase appears WFL characterized by adequate laryngeal elevation. No overt s/s of aspiration were noted. Recommend regular diet with thin liquids. Recommend general aspiration precautions. No further ST recommended.      Current Diet level:  Current Diet : Regular      Primary Complaint       Pain:  Pain Assessment  Pain Assessment: Pain Assessment in Advanced Dementia (PAINAD)  Pain Assessment in Advanced Dementia (PAINAD)  Breathing Independent of Vocalization: occasional labored breathing, short period of hyperventilation  Negative Vocalization: occasional moan/groan, low speech, negative/disapproving quality  Facial Expression: facial grimacing  Body Language: tense, distressed pacing, fidgeting  Consolability: no need to console  PAINAD Score: 5    Reason for Referral  Bradford Butler was referred for a bedside swallow evaluation to assess the efficiency of her swallow function, identify signs and symptoms of aspiration and make recommendations regarding safe dietary consistencies, effective compensatory strategies, and safe eating environment. Impression  Dysphagia Diagnosis: Swallow function appears [facility]  Dysphagia Impression : appears [facility]  Dysphagia Outcome Severity Scale: Level 6: Within functional limits/Modified independence     Treatment Plan  Requires SLP Intervention: No  Duration of Treatment: n/a  D/C Recommendations: To be determined       Recommended Diet and Intervention        Recommended Form of Meds: PO          Compensatory Swallowing Strategies  Compensatory Swallowing Strategies : Alternate solids and liquids;Eat/Feed slowly;Upright as possible for all oral intake;Small bites/sips; Remain upright for 30-45 minutes after meals    Treatment/Goals  Short-term Goals  Goal 1: n/a    General  Chart Reviewed: Yes  Comments: AMS  Behavior/Cognition: Alert; Cooperative;Pleasant mood  Temperature Spikes Noted: No  Respiratory Status: Room air  O2 Device: None (Room air)  Communication Observation: Functional  Follows Directions: Simple  Dentition: Adequate  Patient Positioning: Upright in bed  Baseline Vocal Quality: Normal  Volitional Cough: Strong  Prior Dysphagia History: none  Consistencies Administered:  Thin - straw;Regular           Vision/Hearing  Vision  Vision: Within Functional Limits  Hearing  Hearing: Within functional limits    Oral Motor Deficits       Oral Phase Dysfunction  Oral Phase  Oral Phase: WFL     Indicators of Pharyngeal Phase Dysfunction        Prognosis       Education  Patient Education: results/recommendations  Patient Education Response: Verbalizes understanding             Therapy Time               SLIME Yoder  11/12/2022 11:58 AM

## 2022-11-12 NOTE — ED PROVIDER NOTES
Emergency Department Encounter    Patient: Pema Esparza  MRN: [id number]  : 1945  Date of Evaluation: 2022  ED Provider:  Carmen Lugo DO    Triage Chief Complaint:   Altered Mental Status (Arrived per [facility] from [facility].  reported patient was not herself today. Last known well unknown. When asking patient questions looks at me with no verbal response. )    HPI:  Pema Esparza is a 69 y.o. female with past medical history as listed below, including hypertension, hyperlipidemia, insulin-dependent diabetes who presents with altered mental status. History is limited secondary to patient being nonverbal and altered. History provided by  at bedside. Patient currently resides at a skilled nursing facility. At baseline patient is typically verbal, able to answer simple questions, typically is able to tell where she is located, and what her name is, and is intermittently able to tell the year. She does sometimes get confused with dates.  states that last night patient was not quite acting like her self, she was talking, however not as talkative as she typically is, was complaining of nausea. On Wednesday, 2 days ago she did receive the influenza vaccine.  does note that approximately 1 week ago patient had her insulin regimen changed, he is unsure if they increase or decrease her insulin regimen. EMS was called by skilled nursing facility today, per , as patient was nonverbal, and she typically is verbal.  Unknown when last known normal was.     ROS:  Review of Systems   Unable to perform ROS: Mental status change       Past Medical History:   Diagnosis Date    Anemia     Arthritis     Assistance needed for personal hygiene     Asthma     Cellulitis of right foot     Chronic kidney disease     Cognitive communication deficit     Depression     Diabetes mellitus ([facility] [address]     Frequent falls     GERD (gastroesophageal reflux disease)     H/O Doppler ultrasound 10/01/2020     No evidence of significant venous insufficiency bilaterally. No evidence of DVT or SVT in the bilaterally. H/O echocardiogram 10/01/2020    EF 60-65%. No significant valvular disease. Hallux valgus (acquired), right foot 05/01/2017    Hyperlipemia     Hypertension     Muscle weakness     Other disorders of electrolyte and fluid balance, not elsewhere classified     Other disorders of kidney and ureter     1 kidney. Shingles 06/2021    SIRS (systemic inflammatory response syndrome) ([address] 01/18/2012    Thyroid disease     hypothyroid    Type 2 diabetes mellitus with foot ulcer, with long-term current use of insulin ([address] 01/18/2012    Ulcer of right foot with fat layer exposed ([address] 02/06/2013    WD-Diabetic ulcer of toe of left foot associated with type 2 diabetes mellitus, with fat layer exposed ([address] 03/04/2021    Weakness      Past Surgical History:   Procedure Laterality Date    ABDOMEN SURGERY      CYST REMOVAL      from kidney.     DILATATION, ESOPHAGUS      ENDOSCOPY, COLON, DIAGNOSTIC      FOOT SURGERY  12/30/11    had infection  and a biopsy was sent away for analysis    HERNIA REPAIR      HYSTERECTOMY ([address] [place] [address])      KIDNEY REMOVAL  1973     Family History   Problem Relation Age of Onset    Arthritis Mother     Cancer Mother     Diabetes Mother     High Blood Pressure Mother     Asthma Father     Heart Disease Father     Diabetes Brother      Social History     Socioeconomic History    Marital status:      Spouse name: Not on file    Number of children: Not on file    Years of education: Not on file    Highest education level: Not on file   Occupational History    Not on file   Tobacco Use    Smoking status: Never    Smokeless tobacco: Never   Vaping Use    Vaping Use: Never used   Substance and Sexual Activity    Alcohol use: No    Drug use: No    Sexual activity: Yes   Other Topics Concern    Not on file   Social History Narrative    Not on file Social Determinants of Health     Financial Resource Strain: Not on file   Food Insecurity: Not on file   Transportation Needs: Not on file   Physical Activity: Not on file   Stress: Not on file   Social Connections: Not on file   Intimate Partner Violence: Not on file   Housing Stability: Not on file     Current Facility-Administered Medications   Medication Dose Route Frequency Provider Last Rate Last Admin    0.9 % sodium chloride infusion   IntraVENous Continuous Christie Dennis, DO        piperacillin-tazobactam (ZOSYN) 3,375 mg in dextrose 5 % 50 mL IVPB (mini-bag)  3,375 mg IntraVENous Once Christie Dennis, DO         Current Outpatient Medications   Medication Sig Dispense Refill    docusate sodium (COLACE) 100 MG capsule Take 100 mg by mouth 2 times daily      senna (SENOKOT) 8.6 MG tablet Take 1 tablet by mouth daily      insulin regular human (HUMULIN R U-500 KWIKPEN) 500 UNIT/ML SOPN concentrated injection pen Inject into the skin 3 times daily (before meals) Sliding Scale      polyvinyl alcohol (ARTIFICIAL TEARS) 1.4 % ophthalmic solution Place 1 drop into both eyes 4 times daily as needed      insulin glargine (LANTUS) 100 UNIT/ML injection vial Inject 40 Units into the skin nightly      nystatin (MYCOSTATIN) POWD powder Apply 1 each topically 2 times daily Apply to bilateral groin topically every shift for skin care 1 each 0    mupirocin (BACTROBAN) 2 % ointment Apply topically 2 times daily as needed (skin wounds) Apply topically 3 times daily.  30 g 0    albuterol sulfate  (90 Base) MCG/ACT inhaler Inhale 2 puffs into the lungs every 6 hours as needed (for cough or wheezing) 18 g 3    insulin glargine (LANTUS) 100 UNIT/ML injection vial Inject 35 Units into the skin nightly (Patient taking differently: Inject 7 Units into the skin daily With breakfast) 10 mL 0    Carboxymethylcellulose Sodium (ARTIFICIAL TEARS OP) Place 1 drop into both eyes every 4 hours as needed (dry eyes) ondansetron (ZOFRAN) 4 MG tablet Take 4 mg by mouth every 8 hours as needed for Nausea or Vomiting      albuterol sulfate HFA (PROVENTIL;VENTOLIN;PROAIR) 108 (90 Base) MCG/ACT inhaler Inhale 2 puffs into the lungs every 6 hours as needed for Wheezing (Patient not taking: Reported on 11/11/2022)      Glucagon HCl 1 MG SOLR Inject 1 Syringe as directed every 12 hours as needed (hypoglycemia) (Patient not taking: Reported on 11/11/2022)      insulin lispro, 1 Unit Dial, (HUMALOG/ADMELOG) 100 UNIT/ML SOPN Inject 6 Units into the skin 3 times daily (with meals) (Patient not taking: Reported on 11/11/2022)      insulin lispro, 1 Unit Dial, (HUMALOG/ADMELOG) 100 UNIT/ML SOPN Inject into the skin 3 times daily (before meals) Sliding scale: 70 - 199 = 0 units, 200 - 249 = 1 unit, 250 - 299 = 2 units, 300 - 349 = 3 units, 350 - 399 = 4 units, 400+ call PCP (Patient not taking: Reported on 11/11/2022)      hydrALAZINE (APRESOLINE) 25 MG tablet Take 1 tablet by mouth 2 times daily 90 tablet 0    furosemide (LASIX) 20 MG tablet Take 1 tablet by mouth daily 60 tablet 0    methocarbamol (ROBAXIN) 500 MG tablet Take 500 mg by mouth 3 times daily For shingles      acetaminophen (TYLENOL) 500 MG tablet Take 1,000 mg by mouth every 6 hours as needed for Pain      hydrOXYzine (ATARAX) 10 MG tablet Take 10 mg by mouth 3 times daily as needed for Itching      amLODIPine (NORVASC) 10 MG tablet Take 1 tablet by mouth daily Hold for systolic blood pressure less than or equal to 110 30 tablet 3    atenolol (TENORMIN) 50 MG tablet Take 1 tablet by mouth 2 times daily Hold for systolic blood pressure less than or equal to 110 30 tablet 3    DULoxetine (CYMBALTA) 60 MG extended release capsule Take 60 mg by mouth daily   1    pantoprazole (PROTONIX) 40 MG tablet Take 40 mg by mouth daily   3    atorvastatin (LIPITOR) 20 MG tablet Take 20 mg by mouth daily       Multiple Vitamins-Minerals (TRUEPLUS DIABETIC MULTIVITAMIN) TABS Take 1 tablet by mouth daily. levothyroxine (SYNTHROID) 175 MCG tablet Take 150 mcg by mouth Daily      aspirin 81 MG EC tablet Take 81 mg by mouth daily. Allergies   Allergen Reactions    Ritalin [Methylphenidate] Rash    Rocephin [Ceftriaxone Sodium-Lidocaine] Rash       Nursing Notes Reviewed    Physical Exam:  Triage VS:    ED Triage Vitals [11/11/22 1845]   Enc Vitals Group      BP (!) 177/72      Heart Rate 68      Resp 18      Temp 98.4 °F (36.9 °C)      Temp Source Axillary      SpO2 94 %      Weight       Height       Head Circumference       Peak Flow       Pain Score       Pain Loc       Pain Edu? Excl. in [address]? Physical Exam  Vitals and nursing note reviewed. Constitutional:       Comments: Eyes closed upon entering the room, does not open eyes to verbal stimulus, does open eyes to sternal rub. HENT:      Head: Normocephalic and atraumatic. Nose: Nose normal.      Mouth/Throat:      Mouth: Mucous membranes are dry. Eyes:      Conjunctiva/sclera: Conjunctivae normal.      Pupils: Pupils are equal, round, and reactive to light. Cardiovascular:      Rate and Rhythm: Normal rate and regular rhythm. Pulses:           Dorsalis pedis pulses are 2+ on the right side and 2+ on the left side. Posterior tibial pulses are 2+ on the right side and 2+ on the left side. Heart sounds: Normal heart sounds. Pulmonary:      Effort: Pulmonary effort is normal. No respiratory distress. Breath sounds: Normal breath sounds. No wheezing, rhonchi or rales. Abdominal:      General: Abdomen is flat. Bowel sounds are normal. There is no distension. Palpations: Abdomen is soft. Tenderness: There is abdominal tenderness. There is no guarding or rebound. Comments: Glucometer in place, clean and dry. Generalized tenderness to palpation without peritoneal signs. Skin:     General: Skin is warm. Capillary Refill: Capillary refill takes less than 2 seconds.    Neurological: GCS: GCS eye subscore is 2. GCS verbal subscore is 1. GCS motor subscore is 5. Comments: Neuro exam is limited secondary to patient's altered mental status, she does withdraw and push my hands away with sternal rub, and does open her eyes and look at me angrily with sternal rub.             I have reviewed and interpreted all of the currently available lab results from this visit (if applicable):  Results for orders placed or performed during the hospital encounter of 11/11/22   Respiratory Panel, Molecular, with COVID-19 (Restricted: peds pts or suitable admitted adults)    Specimen: Nasopharyngeal   Result Value Ref Range    Adenovirus Detection by PCR NOT DETECTED NOT DETECTED    Coronavirus 229E PCR NOT DETECTED NOT DETECTED    Coronavirus HKU1 PCR NOT DETECTED NOT DETECTED    Coronavirus NL63 PCR DETECTED BY PCR (A) NOT DETECTED    Coronavirus OC43 PCR NOT DETECTED NOT DETECTED    SARS-CoV-2 NOT DETECTED NOT DETECTED    Human Metapneumovirus PCR NOT DETECTED NOT DETECTED    Rhinovirus Enterovirus PCR NOT DETECTED NOT DETECTED    Influenza A by PCR NOT DETECTED NOT DETECTED    Influenza A H1 Pandemic PCR NOT DETECTED NOT DETECTED    Influenza A H1 (2009) PCR NOT DETECTED NOT DETECTED    Influenza A H3 PCR NOT DETECTED NOT DETECTED    Influenza B by PCR NOT DETECTED NOT DETECTED    Parainfluenza 1 PCR NOT DETECTED NOT DETECTED    Parainfluenza 2 PCR NOT DETECTED NOT DETECTED    Parainfluenza 3 PCR NOT DETECTED NOT DETECTED    Parainfluenza 4 PCR NOT DETECTED NOT DETECTED    RSV PCR NOT DETECTED NOT DETECTED    Bordetella parapertussis by PCR NOT DETECTED NOT DETECTED    B Pertussis by PCR NOT DETECTED NOT DETECTED    Chlamydophila Pneumonia PCR NOT DETECTED NOT DETECTED    Mycoplasma pneumo by PCR NOT DETECTED NOT DETECTED   CBC with Auto Differential   Result Value Ref Range    WBC 9.3 4.0 - 10.5 K/CU MM    RBC 3.80 (L) 4.2 - 5.4 M/CU MM    Hemoglobin 11.1 (L) 12.5 - 16.0 GM/DL    Hematocrit 35.3 (L) 37 - 47 %    MCV 92.9 78 - 100 FL    MCH 29.2 27 - 31 PG    MCHC 31.4 (L) 32.0 - 36.0 %    RDW 14.2 11.7 - 14.9 %    Platelets 929 021 - 090 K/CU MM    MPV 9.9 7.5 - 11.1 FL    Differential Type AUTOMATED DIFFERENTIAL     Segs Relative 55.0 36 - 66 %    Lymphocytes % 32.9 24 - 44 %    Monocytes % 7.5 (H) 0 - 4 %    Eosinophils % 3.7 (H) 0 - 3 %    Basophils % 0.5 0 - 1 %    Segs Absolute 5.1 K/CU MM    Lymphocytes Absolute 3.1 K/CU MM    Monocytes Absolute 0.7 K/CU MM    Eosinophils Absolute 0.3 K/CU MM    Basophils Absolute 0.1 K/CU MM    Immature Neutrophil % 0.4 0 - 0.43 %    Total Immature Neutrophil 0.04 K/CU MM   Comprehensive Metabolic Panel   Result Value Ref Range    Sodium 140 135 - 145 MMOL/L    Potassium 4.6 3.5 - 5.1 MMOL/L    Chloride 103 99 - 110 mMol/L    CO2 26 21 - 32 MMOL/L    BUN 26 (H) 6 - 23 MG/DL    Creatinine 1.3 (H) 0.6 - 1.1 MG/DL    Est, Glom Filt Rate 43 (L) >60 mL/min/1.73m2    Glucose 239 (H) 70 - 99 MG/DL    Calcium 9.2 8.3 - 10.6 MG/DL    Albumin 3.2 (L) 3.4 - 5.0 GM/DL    Total Protein 6.5 6.4 - 8.2 GM/DL    Total Bilirubin 0.6 0.0 - 1.0 MG/DL    ALT 9 (L) 10 - 40 U/L    AST 16 15 - 37 IU/L    Alkaline Phosphatase 75 40 - 129 IU/L    Anion Gap 11 4 - 16   Troponin   Result Value Ref Range    Troponin T 0.015 (H) <0.01 NG/ML   Lipase   Result Value Ref Range    Lipase 11 (L) 13 - 60 IU/L   Acetaminophen Level   Result Value Ref Range    Acetaminophen Level <5.0 (L) 15 - 30 ug/ml    DOSE AMOUNT DOSE AMT. GIVEN - NOT DONE     DOSE TIME DOSE TIME GIVEN - NOT DONE    Salicylate   Result Value Ref Range    Salicylate Lvl <8.8 (L) 15 - 30 MG/DL    DOSE AMOUNT DOSE AMT.  GIVEN - NOT DONE     DOSE TIME DOSE TIME GIVEN - NOT DONE    Ethanol   Result Value Ref Range    Alcohol Scrn <0.01 <0.01 %WT/VOL   Urine Drug Screen   Result Value Ref Range    Cannabinoid Scrn, Ur NEGATIVE NEGATIVE    Amphetamines NEGATIVE NEGATIVE    Cocaine Metabolite NEGATIVE NEGATIVE    Benzodiazepine Screen, Urine NEGATIVE NEGATIVE    Barbiturate Screen, Ur NEGATIVE NEGATIVE    Opiates, Urine NEGATIVE NEGATIVE    Phencyclidine, Urine NEGATIVE NEGATIVE    Oxycodone NEGATIVE NEGATIVE   Urinalysis   Result Value Ref Range    Color, UA YELLOW YELLOW    Clarity, UA CLEAR CLEAR    Glucose, Urine NEGATIVE NEGATIVE MG/DL    Bilirubin Urine NEGATIVE NEGATIVE MG/DL    Ketones, Urine TRACE (A) NEGATIVE MG/DL    Specific Gravity, UA >1.030 1.001 - 1.035    Blood, Urine SMALL NUMBER OR AMOUNT OBSERVED (A) NEGATIVE    pH, Urine 5.5 5.0 - 8.0    Protein, UA >300 (HH) NEGATIVE MG/DL    Urobilinogen, Urine 0.2 0.2 - 1.0 MG/DL    Nitrite Urine, Quantitative POSITIVE (A) NEGATIVE    Leukocyte Esterase, Urine MODERATE NUMBER OR AMOUNT OBSERVED (A) NEGATIVE   Lactate, Sepsis   Result Value Ref Range    Lactic Acid, Sepsis 1.0 0.5 - 1.9 mMOL/L   Lactate, Sepsis   Result Value Ref Range    Lactic Acid, Sepsis 1.0 0.5 - 1.9 mMOL/L   Microscopic Urinalysis   Result Value Ref Range    RBC, UA TOO NUMEROUS TO COUNT 0 - 6 /HPF    WBC, UA TOO NUMEROUS TO COUNT 0 - 5 /HPF    Epithelial Cells, UA 1 /HPF    Cast Type NO CAST FORMS SEEN NO CAST FORMS SEEN /HPF    Bacteria, UA NEGATIVE NEGATIVE /HPF   EKG 12 Lead   Result Value Ref Range    Ventricular Rate 66 BPM    Atrial Rate 66 BPM    P-R Interval 198 ms    QRS Duration 86 ms    Q-T Interval 430 ms    QTc Calculation (Bazett) 450 ms    P Axis 42 degrees    R Axis 0 degrees    T Axis 66 degrees    Diagnosis       Normal sinus rhythm  Septal infarct , age undetermined  Abnormal ECG  When compared with ECG of 25-APR-2022 19:14,  Septal infarct is now present        Radiographs (if obtained):    [] Radiologist's Report Reviewed:  CT ABDOMEN PELVIS W IV CONTRAST Additional Contrast? None   Final Result   Diffuse bladder wall thickening and air in the urinary bladder which may be   seen with gas-forming organism and cystitis. Recommend correlation with   urinalysis.       Large amount of stool seen throughout the colon. Absent right kidney      Splenomegaly         CT HEAD WO CONTRAST   Final Result   1. No acute intracranial abnormality. 2.  Findings compatible with chronic microvascular ischemic disease and   involutional change. XR CHEST PORTABLE   Final Result   Low lung volumes and possible right upper lobe airspace disease. EKG (if obtained): (All EKG's are interpreted by myself in the absence of a cardiologist)  Sinus rhythm, rate 66, , QRS 86, QTc 450, no acute ST elevation. There is artifact noted. Significant changes from EKG done on 4/25/2022. Chart review shows recent radiographs:  No results found. MDM:  Patient seen and examined. Labs and imaging obtained. EKG without acute changes. Patient without leukocytosis, hemoglobin and platelets within acceptable limits. Electrolytes within acceptable limits, BUN and creatinine similar to previous. ALT, AST and lipase within acceptable limits. Canal, salicylate and acetaminophen not detected. Mild elevation in troponin 0.015. Pittore panel positive for coronavirus. Lactic acid 1.0.  UDS negative. UA positive for nitrate and moderate leukocyte esterase, too numerous to count RBC and WBC, negative for bacteria. This was a straight cath sample. Given patient's urine is nitrite positive, and too numerous to count WBC, will treat for urinary tract infection, concern that this may be contributing to her altered mental status. Did review previous urine cultures, and did show positive for E. coli susceptible to Zosyn, this is ordered. New urine cultures ordered for today as well as blood cultures pending. Chest x-ray with low lung volumes and possible right upper lobe airspace disease. We will add vancomycin and azithromycin for this given she does reside at a skilled nursing facility. CT with no acute intracranial abnormality, findings compatible with chronic microvascular ischemic disease.   CT of the abdomen pelvis with diffuse bladder wall thickening and air in the urinary bladder which may be seen with gas-forming organism and cystitis. Patient did recently have a straight cath prior to CT this is most likely why there is air in the bladder, UA is consistent with cystitis and patient is placed on Zosyn for this. Low suspicion for sepsis at this time, patient does not have a leukocytosis, she is afebrile, blood pressure and heart rate within acceptable limits, lactic acid is 1.0. Patient's pneumonia as well as UTI, will admit for further evaluation and management. Labs and imaging discussed with patient and family at bedside who are agreeable with plan of care. Case discussed with French Boothe, NP, who agrees with admission. Is this patient to be included in the SEP-1 Core Measure due to severe sepsis or septic shock? No   Exclusion criteria - the patient is NOT to be included for SEP-1 Core Measure due to:  2+ SIRS criteria are not met         Clinical Impression:  1. Metabolic encephalopathy    2. Acute cystitis without hematuria    3. Pneumonia of right upper lobe due to infectious organism      Disposition referral (if applicable):  No follow-up provider specified. Disposition medications (if applicable):  New Prescriptions    No medications on file       Comment: Please note this report has been produced using speech recognition software and may contain errors related to that system including errors in grammar, punctuation, and spelling, as well as words and phrases that may be inappropriate. Efforts were made to edit the dictations.        [id number] [place] [address]  11/11/22 8315

## 2022-11-12 NOTE — H&P
History and Physical  This patient was seen and examined autonomously  A hospitalist attending Dr. Shukri Angel was available for questions/consultation as needed and plan discussed. Name:  Hi Delaney /Age/Sex:   (78 y.o. female)   MRN & CSN:  [id number] & [id number] Admission Date/Time: 2022  6:29 PM   Location:   PCP: TOÑITO Rodriguez           Assessment and Plan:   Hi Delaney is a 69 y.o. female who presents with  Altered Mental Status (Arrived per [facility] from [facility].  reported patient was not herself today. Last known well unknown. When asking patient questions looks at me with no verbal response. )      Present on Admission:   Aortic atherosclerosis ([address]   Ulcer of right foot with fat layer exposed ([address]   Acute cystitis without hematuria   AMS (altered mental status)     Circulatory  Aortic atherosclerosis ([address]  Assessment & Plan  - continue home Statin dose, ASA, norvasc, lipid pannel pending     Endocrine  Type 2 diabetes mellitus with complication, with long-term current use of insulin (HCC)  Assessment & Plan   Low dose S/S  Diet NPO until passes swallow screen  Then cab count  Acuceck, hypoglycemia protocol, a1c and tsh pending     Genitourinary  Acute cystitis without hematuria  Assessment & Plan   Afibrile, no WBC count, urine cult pending   UA pos for: blood protein (300) nitrates, leuks,ketones  Ct abd: pos for bladder wall thickening, cystitis   Drug allergies to rocephin started on: vanc, zoysn, zithromycin in ED will start on lequin renal dosing to cover un- complicated UTI and pos PNA. Other  Ulcer of right foot with fat layer exposed ([address]  Assessment & Plan  - resolved    * AMS (altered mental status)  Assessment & Plan  . Acute neurological changes, Acute metabolic encephalopathy   Unknown last known normal AN)x 1 baseline     Suspect due to metabolic vs  infection. CT HEAD wo: nothing acute .    CXR: right upper lobe airospace disease   no acute pulmonary process. EKG: unable to view due to down time, ED physician reports no new changes. Will repeat EKG in AM  . Troponin 0.015 most likely related to renal insuf. trending trops next one was WNL . TSH / A1c pending   pending  Last echo:10/2021 EF was normal   ABCD2 score:3.    -Admit to med/surg as inpatient   -swallow eval and if pass, can have meds and food  - ETOH and ammonia levels WNL   -neuro checks   -orthostatics   -trend troponins   -B12, TSH  A1c, lipid  pending   -Echo   -carotid US   - will defer for further evaluation such as MRI at this time    -ASA and statin continued        Possible PNA dx in ED? Chest xray: right lobe arospace  Started on multiple abx in ED see above will start on renal dose on IV Levaquin and bridge to PO to cover pos PNA and UTI  Leg, urine strep, procal pending       Essential HTN: con home meds and trend on time dose IV hydralazine ordered. 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 Non- symptomatic            Patient case discussed with ED provider    Diet No diet orders on file   DVT Prophylaxis [x] Lovenox, []  Heparin, [] SCDs, [] Ambulation  [] Long term AC   GI Prophylaxis [] PPI,  [] H2 Blocker,  [] Carafate,  [x] Diet/Tube Feeds   Code Status Full    Disposition Admit to med surg. Patient plans to return home upon discharge     -Patient assessment and plan discussed and reviewed with admitting physician: Trish Bloom MD.       History of Present Illness:     Chief Complaint: Altered Mental Status (Arrived per [facility] from [facility].  reported patient was not herself today. Last known well unknown. When asking patient questions looks at me with no verbal response. )      Josiane Smith is a 69 y.o. female past medical history as listed below, including hypertension, hyperlipidemia, insulin-dependent diabetes. who presents with patient is resting in bed her  is at bedside and does most of the talking for her. He reports she lives in a nursing home, for the last several days he believes she has declines medically due to changing her insulin. At baseline she is verbal ANOX 1 to herself. He reports she did have flu vaccine two days ago and was reporting nausea after getting the vaccine. Patient is alert her eyes are open she will mouth answers like no to basic questions she denied SOB, pain, N/V however she did sleep during most of my exam.         History obtained from patients . Ten point ROS: reviewed negative, unless as noted in above HPI. Unable to fully obtain due to AMS    Objective:   No intake or output data in the 24 hours ending 11/12/22 0023     Vitals:   Vitals:    11/11/22 1845 11/11/22 2032 11/11/22 2205 11/11/22 2247   BP: (!) 177/72 (!) 144/59  (!) 159/57   Pulse: 68 65  61   Resp: 18 11  13   Temp: 98.4 °F (36.9 °C)      TempSrc: Axillary      SpO2: 94% 91%  94%   Weight:   184 lb (83.5 kg)    Height:   5' 4\" (1.626 m)        Physical Exam: 11/12/22     GEN -Awake ill pale appearing female, sitting upright in bed , NAD. obese body habitus. Appears given age. EYES -No scleral erythema, discharge, or conjunctivitis. HENT -MM are dry, tenting  NECK -Supple, no apparent thyromegaly or masses. RESP -CTA, no wheezes, rales or rhonchi. Symmetric chest movement while on RA  C/V -S1/S2 auscultated. +2 bilateral  lower leg peripheral edema. GI -Abdomen is soft non distended and without significant TTP. + BS. No masses or guarding.  -No CVA/ flank tenderness. MS -No gross joint deformities, multiple DM toe ulcers , escar noted  SKIN -Normal coloration, warm, dry. NEURO- unable to assess at this time. PSYC-Awake, alert will  mouth some words but will not talk.   Past Medical History:      Past Medical History:   Diagnosis Date    Acquired hypothyroidism 5/19/2021    Aortic atherosclerosis ([facility]     Cellulitis of right foot     Chronic kidney disease (CKD), stage III (moderate) ([facility])     Chronic pain syndrome 8/7/2017    Cognitive communication deficit     Essential hypertension     Frequent falls     Gastroesophageal reflux disease with esophagitis     Hallux valgus (acquired), right foot 05/01/2017    Hyperlipemia     Shingles 06/2021    Solitary kidney, congenital 10/8/2021    Type 2 diabetes mellitus with complication, with long-term current use of insulin ([facility])     Ulcer of right foot with fat layer exposed ([facility] 02/06/2013    Ulcer of right foot with fat layer exposed ([facility] 2/6/2013       Past Surgical  History:    has a past surgical history that includes Hysterectomy; Foot surgery (12/30/11); cyst removal; Kidney removal (1973); Abdomen surgery; hernia repair; Endoscopy, colon, diagnostic; and Dilatation, esophagus. Social History:    FAM HX: Reviewed and noncontributory   family history includes Arthritis in her mother; Asthma in her father; Cancer in her mother; Diabetes in her brother and mother; Heart Disease in her father; High Blood Pressure in her mother. Soc HX:   Social History     Socioeconomic History    Marital status:      Spouse name: None    Number of children: None    Years of education: None    Highest education level: None   Tobacco Use    Smoking status: Never    Smokeless tobacco: Never   Vaping Use    Vaping Use: Never used   Substance and Sexual Activity    Alcohol use: No    Drug use: No    Sexual activity: Yes     TOBACCO:   reports that she has never smoked. She has never used smokeless tobacco.  ETOH:   reports no history of alcohol use. Drugs:  reports no history of drug use. Allergies: Allergies   Allergen Reactions    Ritalin [Methylphenidate] Rash    Rocephin [Ceftriaxone Sodium-Lidocaine] Rash       Home Medications:     Prior to Admission medications    Medication Sig Start Date End Date Taking?  Authorizing Provider   docusate sodium (COLACE) 100 MG capsule Take 100 mg by mouth 2 times daily   Yes Historical Provider, MD   senna (SENOKOT) 8.6 MG tablet Take 1 tablet by mouth daily   Yes Historical Provider, MD   polyvinyl alcohol (ARTIFICIAL TEARS) 1.4 % ophthalmic solution Place 1 drop into both eyes 4 times daily as needed   Yes Historical Provider, MD   insulin glargine (LANTUS) 100 UNIT/ML injection vial Inject 40 Units into the skin nightly   Yes Historical Provider, MD   mupirocin (BACTROBAN) 2 % ointment Apply topically 2 times daily as needed (skin wounds) Apply topically 3 times daily.  5/22/22   Mariam Dhillon PA-C   albuterol sulfate  (90 Base) MCG/ACT inhaler Inhale 2 puffs into the lungs every 6 hours as needed (for cough or wheezing) 5/22/22   Mariam Dhillon PA-C   Carboxymethylcellulose Sodium (ARTIFICIAL TEARS OP) Place 1 drop into both eyes every 4 hours as needed (dry eyes)     Historical Provider, MD   albuterol sulfate HFA (PROVENTIL;VENTOLIN;PROAIR) 108 (90 Base) MCG/ACT inhaler Inhale 2 puffs into the lungs every 6 hours as needed for Wheezing  Patient not taking: Reported on 11/11/2022    Historical Provider, MD   Glucagon HCl 1 MG SOLR Inject 1 Syringe as directed every 12 hours as needed (hypoglycemia)  Patient not taking: Reported on 11/11/2022    Historical Provider, MD   insulin lispro, 1 Unit Dial, (HUMALOG/ADMELOG) 100 UNIT/ML SOPN Inject 6 Units into the skin 3 times daily (with meals)  Patient not taking: Reported on 11/11/2022    Historical Provider, MD   insulin lispro, 1 Unit Dial, (HUMALOG/ADMELOG) 100 UNIT/ML SOPN Inject into the skin 3 times daily (before meals) Sliding scale: 70 - 199 = 0 units, 200 - 249 = 1 unit, 250 - 299 = 2 units, 300 - 349 = 3 units, 350 - 399 = 4 units, 400+ call PCP  Patient not taking: Reported on 11/11/2022    Historical Provider, MD   hydrALAZINE (APRESOLINE) 25 MG tablet Take 1 tablet by mouth 2 times daily 10/8/21   Karin Shah MD   furosemide (LASIX) 20 MG tablet Take 1 tablet by mouth daily 10/9/21   Lucina Alamo MD   methocarbamol (ROBAXIN) 500 MG tablet Take 500 mg by mouth 3 times daily For shingles    Historical Provider, MD   hydrOXYzine (ATARAX) 10 MG tablet Take 10 mg by mouth 3 times daily as needed for Itching    Historical Provider, MD   amLODIPine (NORVASC) 10 MG tablet Take 1 tablet by mouth daily Hold for systolic blood pressure less than or equal to 110 8/13/20   Lourdes Steward MD   atenolol (TENORMIN) 50 MG tablet Take 1 tablet by mouth 2 times daily Hold for systolic blood pressure less than or equal to 110 8/13/20   Lourdes Steward MD   DULoxetine (CYMBALTA) 60 MG extended release capsule Take 60 mg by mouth daily  7/24/19   Historical Provider, MD   pantoprazole (PROTONIX) 40 MG tablet Take 40 mg by mouth daily  4/4/19   Historical Provider, MD   atorvastatin (LIPITOR) 20 MG tablet Take 20 mg by mouth daily     Historical Provider, MD   levothyroxine (SYNTHROID) 175 MCG tablet Take 150 mcg by mouth Daily    Historical Provider, MD   aspirin 81 MG EC tablet Take 81 mg by mouth daily. Historical Provider, MD         Medications:   Medications:    azithromycin  500 mg IntraVENous Once    vancomycin  1,750 mg IntraVENous Once      Infusions:    sodium chloride 100 mL/hr at 11/11/22 2222     PRN Meds:      Data:     Laboratory this visit:  Reviewed  Recent Labs     11/11/22  1850   WBC 9.3   HGB 11.1*   HCT 35.3*         Recent Labs     11/11/22  1850      K 4.6      CO2 26   BUN 26*   CREATININE 1.3*     Recent Labs     11/11/22  1850   AST 16   ALT 9*   BILITOT 0.6   ALKPHOS 75     No results for input(s): INR in the last 72 hours. Radiology this visit:  Reviewed.     CT HEAD WO CONTRAST    Result Date: 11/11/2022  EXAMINATION: CT OF THE HEAD WITHOUT CONTRAST  11/11/2022 8:28 pm TECHNIQUE: CT of the head was performed without the administration of intravenous contrast. Automated exposure control, iterative reconstruction, and/or weight based adjustment of the mA/kV was utilized to reduce the radiation dose to as low as reasonably achievable. COMPARISON: 04/25/2022 CT head HISTORY: ORDERING SYSTEM PROVIDED HISTORY: AMS TECHNOLOGIST PROVIDED HISTORY: Reason for exam:->AMS Has a \"code stroke\" or \"stroke alert\" been called? ->No Decision Support Exception - unselect if not a suspected or confirmed emergency medical condition->Emergency Medical Condition (MA) Reason for Exam: AMS FINDINGS: BRAIN/VENTRICLES: There is no acute intracranial hemorrhage, mass effect or midline shift. No abnormal extra-axial fluid collection. The gray-white differentiation is maintained without evidence of an acute infarct. There is no evidence of hydrocephalus. There are nonspecific areas of hypoattenuation within the periventricular and subcortical white matter, which likely represent chronic microvascular ischemic change. There is prominence of the ventricles and sulci due to global parenchymal volume loss. ORBITS: The visualized portion of the orbits demonstrate no acute abnormality. SINUSES: Mild mucosal thickening of the paranasal sinuses. The mastoid air cells are clear. SOFT TISSUES/SKULL:  No acute abnormality of the visualized skull or soft tissues. 1.  No acute intracranial abnormality. 2.  Findings compatible with chronic microvascular ischemic disease and involutional change. CT ABDOMEN PELVIS W IV CONTRAST Additional Contrast? None    Result Date: 11/11/2022  EXAMINATION: CT OF THE ABDOMEN AND PELVIS WITH CONTRAST 11/11/2022 8:47 pm TECHNIQUE: CT of the abdomen and pelvis was performed with the administration of intravenous contrast. Multiplanar reformatted images are provided for review. Automated exposure control, iterative reconstruction, and/or weight based adjustment of the mA/kV was utilized to reduce the radiation dose to as low as reasonably achievable. COMPARISON: 05/09/2021.  HISTORY: ORDERING SYSTEM PROVIDED HISTORY: abdominal pain TECHNOLOGIST PROVIDED HISTORY: IV contrast only. Thank you. Additional Contrast?->None Reason for exam:->abdominal pain Reason for Exam: abd pain, AMS FINDINGS: Lower Chest: There is question of infiltrate versus pulmonary nodule in the right middle lobe. No pleural effusion is seen. Organs: There is splenomegaly. The right kidney is absent. There is a tiny low-density area in the hilum of the spleen too small to characterize but statistically likely represents a small cyst or hemangioma. GI/Bowel: There is a large amount of stool seen throughout the colon Pelvis: There is diffuse bladder wall thickening and there is air within the bladder which can be caused by gas-forming bacteria. Peritoneum/Retroperitoneum: There is calcific atherosclerotic disease in the aorta without evidence for aneurysm Bones/Soft Tissues: No acute abnormality     Diffuse bladder wall thickening and air in the urinary bladder which may be seen with gas-forming organism and cystitis. Recommend correlation with urinalysis. Large amount of stool seen throughout the colon. Absent right kidney Splenomegaly     XR CHEST PORTABLE    Result Date: 11/11/2022  EXAMINATION: ONE XRAY VIEW OF THE CHEST 11/11/2022 7:36 pm COMPARISON: 04/25/2022 chest radiograph HISTORY: ORDERING SYSTEM PROVIDED HISTORY: AMS TECHNOLOGIST PROVIDED HISTORY: Reason for exam:->AMS Reason for Exam: AMS FINDINGS: The cardiomediastinal silhouette is within normal limits of size for portable technique. Atherosclerotic calcifications of the thoracic aorta. Possible right upper lobe airspace disease. No pleural effusion or pneumothorax. No acute osseous abnormality. Low lung volumes and possible right upper lobe airspace disease.              Electronically signed by MILADIS Edmond CNP on 11/12/2022 at 12:23 AM

## 2022-11-12 NOTE — PROGRESS NOTES
V2.0  [facility] Hospitalist Progress Note      Name:  Nubia Jones /Age/Sex:   (78 y.o. female)   MRN & CSN:  [id number] & [id number] Encounter Date/Time: 2022 1:24 PM EST    Location:   PCP: Angela Fox, [address] Day: 2    Assessment and Plan:   Nubia Jones is a 69 y.o. female with who presents with AMS (altered mental status)    1. Metabolic encephalopathy, most likely secondary to UTI patient has been started on Levaquin. Unknown last normal alert and oriented x1 his baseline  CT head negative for any acute processes  Chest x-ray shows only right upper lobe airspace disease  Patient is back to her baseline mental status. 2.  Acute cystitis without hematuria, placed on Levaquin, awaiting urine culture  3. Dementia, at baseline mental status is alert and oriented x1 only. 4.  Possible pneumonia diagnosed in ED, doubt pneumonia procalcitonin is unremarkable urine strep and urine Legionella are both negative. Patient is on IV Levaquin for UTI so will cover both  No leukocytosis. 5.  Hypertension, continue patient's current home medications  6. LAURA on CKD stage IIIb at baseline, creatinine is consistently 1.3  7. Mixed hyperlipidemia, continue statin  8. Diabetes mellitus type 2 with long-term use of insulin  Continue sliding scale before meals and at bedtime  Hypoglycemic protocol  9. Aortic atherosclerosis, continue statin and aspirin  10. Incidental finding of CAD virus and now 61 detected on respiratory panel asymptomatic. 11.  Mildly elevated troponin resolved  12. Anemia hemoglobin 10.8  Baseline appears to be between 9.1 and 12.2 monitor      Diet ADULT DIET;  Regular; 3 carb choices (45 gm/meal)   DVT Prophylaxis [x] Lovenox, []  Heparin, [] SCDs, [] Ambulation,  [] Eliquis, [] Xarelto  [] Coumadin   Code Status Full Code   Disposition From: ECF  Expected Disposition: ECF  Estimated Date of Discharge: 2022  Patient requires continued admission due to UTI   Surrogate Decision Maker/ POA Page Ambrosia     Subjective:     Chief Complaint: Altered Mental Status (Arrived per [facility] from [facility].  reported patient was not herself today. Last known well unknown. When asking patient questions looks at me with no verbal response. )       Patient seen and examined today, she is alert she is only oriented to self. She is not sure why she is in the hospital when told. She tells me she has no complaints and she would like her breakfast.         Review of Systems:    10 point review of systems completed unless stated above    Objective: Intake/Output Summary (Last 24 hours) at 11/12/2022 1324  Last data filed at 11/12/2022 0900  Gross per 24 hour   Intake 240 ml   Output --   Net 240 ml        Vitals:   Vitals:    11/12/22 0954   BP:    Pulse:    Resp:    Temp:    SpO2: 96%       Physical Exam:     General: NAD  Eyes: EOMI  ENT: neck supple  Cardiovascular: Regular rate. Respiratory: Clear to auscultation  Gastrointestinal: Soft, non tender  Genitourinary: no suprapubic tenderness  Musculoskeletal: No edema  Skin: warm, dry  Neuro: Alert. Psych: Mood appropriate.      Medications:   Medications:    amLODIPine  10 mg Oral Daily    aspirin  81 mg Oral Daily    atenolol  50 mg Oral BID    atorvastatin  20 mg Oral Daily    docusate sodium  100 mg Oral BID    DULoxetine  60 mg Oral Daily    hydrALAZINE  25 mg Oral BID    insulin glargine  7 Units SubCUTAneous Daily    levothyroxine  150 mcg Oral Daily    methocarbamol  500 mg Oral TID    pantoprazole  40 mg Oral Daily    senna  1 tablet Oral Daily    sodium chloride flush  5-40 mL IntraVENous 2 times per day    bisacodyl  10 mg Rectal Daily    levofloxacin  750 mg IntraVENous Q48H    insulin lispro  0-4 Units SubCUTAneous TID     insulin lispro  0-4 Units SubCUTAneous Nightly    [START ON 11/13/2022] enoxaparin  40 mg SubCUTAneous Daily      Infusions:    sodium chloride      dextrose       PRN Meds: albuterol sulfate HFA, 2 puff, Q6H PRN  mupirocin, , BID PRN  sodium chloride flush, 5-40 mL, PRN  sodium chloride, 25 mL, PRN  acetaminophen, 650 mg, Q6H PRN   Or  acetaminophen, 650 mg, Q6H PRN  glucose, 4 tablet, PRN  dextrose bolus, 125 mL, PRN   Or  dextrose bolus, 250 mL, PRN  glucagon (rDNA), 1 mg, PRN  dextrose, , Continuous PRN        Labs      Recent Results (from the past 24 hour(s))   CBC with Auto Differential    Collection Time: 11/11/22  6:50 PM   Result Value Ref Range    WBC 9.3 4.0 - 10.5 K/CU MM    RBC 3.80 (L) 4.2 - 5.4 M/CU MM    Hemoglobin 11.1 (L) 12.5 - 16.0 GM/DL    Hematocrit 35.3 (L) 37 - 47 %    MCV 92.9 78 - 100 FL    MCH 29.2 27 - 31 PG    MCHC 31.4 (L) 32.0 - 36.0 %    RDW 14.2 11.7 - 14.9 %    Platelets 490 123 - 515 K/CU MM    MPV 9.9 7.5 - 11.1 FL    Differential Type AUTOMATED DIFFERENTIAL     Segs Relative 55.0 36 - 66 %    Lymphocytes % 32.9 24 - 44 %    Monocytes % 7.5 (H) 0 - 4 %    Eosinophils % 3.7 (H) 0 - 3 %    Basophils % 0.5 0 - 1 %    Segs Absolute 5.1 K/CU MM    Lymphocytes Absolute 3.1 K/CU MM    Monocytes Absolute 0.7 K/CU MM    Eosinophils Absolute 0.3 K/CU MM    Basophils Absolute 0.1 K/CU MM    Immature Neutrophil % 0.4 0 - 0.43 %    Total Immature Neutrophil 0.04 K/CU MM   Comprehensive Metabolic Panel    Collection Time: 11/11/22  6:50 PM   Result Value Ref Range    Sodium 140 135 - 145 MMOL/L    Potassium 4.6 3.5 - 5.1 MMOL/L    Chloride 103 99 - 110 mMol/L    CO2 26 21 - 32 MMOL/L    BUN 26 (H) 6 - 23 MG/DL    Creatinine 1.3 (H) 0.6 - 1.1 MG/DL    Est, Glom Filt Rate 43 (L) >60 mL/min/1.73m2    Glucose 239 (H) 70 - 99 MG/DL    Calcium 9.2 8.3 - 10.6 MG/DL    Albumin 3.2 (L) 3.4 - 5.0 GM/DL    Total Protein 6.5 6.4 - 8.2 GM/DL    Total Bilirubin 0.6 0.0 - 1.0 MG/DL    ALT 9 (L) 10 - 40 U/L    AST 16 15 - 37 IU/L    Alkaline Phosphatase 75 40 - 129 IU/L    Anion Gap 11 4 - 16   Troponin    Collection Time: 11/11/22  6:50 PM   Result Value Ref Range Troponin T 0.015 (H) <0.01 NG/ML   Lipase    Collection Time: 11/11/22  6:50 PM   Result Value Ref Range    Lipase 11 (L) 13 - 60 IU/L   Acetaminophen Level    Collection Time: 11/11/22  6:50 PM   Result Value Ref Range    Acetaminophen Level <5.0 (L) 15 - 30 ug/ml    DOSE AMOUNT DOSE AMT. GIVEN - NOT DONE     DOSE TIME DOSE TIME GIVEN - NOT DONE    Salicylate    Collection Time: 11/11/22  6:50 PM   Result Value Ref Range    Salicylate Lvl <6.1 (L) 15 - 30 MG/DL    DOSE AMOUNT DOSE AMT.  GIVEN - NOT DONE     DOSE TIME DOSE TIME GIVEN - NOT DONE    Ethanol    Collection Time: 11/11/22  6:50 PM   Result Value Ref Range    Alcohol Scrn <0.01 <0.01 %WT/VOL   Lactate, Sepsis    Collection Time: 11/11/22  6:50 PM   Result Value Ref Range    Lactic Acid, Sepsis 1.0 0.5 - 1.9 mMOL/L   Respiratory Panel, Molecular, with COVID-19 (Restricted: peds pts or suitable admitted adults)    Collection Time: 11/11/22  7:15 PM    Specimen: Nasopharyngeal   Result Value Ref Range    Adenovirus Detection by PCR NOT DETECTED NOT DETECTED    Coronavirus 229E PCR NOT DETECTED NOT DETECTED    Coronavirus HKU1 PCR NOT DETECTED NOT DETECTED    Coronavirus NL63 PCR DETECTED BY PCR (A) NOT DETECTED    Coronavirus OC43 PCR NOT DETECTED NOT DETECTED    SARS-CoV-2 NOT DETECTED NOT DETECTED    Human Metapneumovirus PCR NOT DETECTED NOT DETECTED    Rhinovirus Enterovirus PCR NOT DETECTED NOT DETECTED    Influenza A by PCR NOT DETECTED NOT DETECTED    Influenza A H1 Pandemic PCR NOT DETECTED NOT DETECTED    Influenza A H1 (2009) PCR NOT DETECTED NOT DETECTED    Influenza A H3 PCR NOT DETECTED NOT DETECTED    Influenza B by PCR NOT DETECTED NOT DETECTED    Parainfluenza 1 PCR NOT DETECTED NOT DETECTED    Parainfluenza 2 PCR NOT DETECTED NOT DETECTED    Parainfluenza 3 PCR NOT DETECTED NOT DETECTED    Parainfluenza 4 PCR NOT DETECTED NOT DETECTED    RSV PCR NOT DETECTED NOT DETECTED    Bordetella parapertussis by PCR NOT DETECTED NOT DETECTED    B Pertussis by PCR NOT DETECTED NOT DETECTED    Chlamydophila Pneumonia PCR NOT DETECTED NOT DETECTED    Mycoplasma pneumo by PCR NOT DETECTED NOT DETECTED   TSH    Collection Time: 11/11/22  7:15 PM   Result Value Ref Range    TSH, High Sensitivity 0.943 0.270 - 4.20 uIu/ml   Hemoglobin A1C    Collection Time: 11/11/22  7:15 PM   Result Value Ref Range    Hemoglobin A1C 8.6 (H) 4.2 - 6.3 %    eAG 200 mg/dL   Procalcitonin    Collection Time: 11/11/22  7:15 PM   Result Value Ref Range    Procalcitonin 0.358    Ammonia    Collection Time: 11/11/22  7:45 PM   Result Value Ref Range    Ammonia 28 11 - 51 UMOL/L   Lactate, Sepsis    Collection Time: 11/11/22  7:45 PM   Result Value Ref Range    Lactic Acid, Sepsis 1.0 0.5 - 1.9 mMOL/L   Urine Drug Screen    Collection Time: 11/11/22  8:00 PM   Result Value Ref Range    Cannabinoid Scrn, Ur NEGATIVE NEGATIVE    Amphetamines NEGATIVE NEGATIVE    Cocaine Metabolite NEGATIVE NEGATIVE    Benzodiazepine Screen, Urine NEGATIVE NEGATIVE    Barbiturate Screen, Ur NEGATIVE NEGATIVE    Opiates, Urine NEGATIVE NEGATIVE    Phencyclidine, Urine NEGATIVE NEGATIVE    Oxycodone NEGATIVE NEGATIVE   Urinalysis    Collection Time: 11/11/22  8:00 PM   Result Value Ref Range    Color, UA YELLOW YELLOW    Clarity, UA CLEAR CLEAR    Glucose, Urine NEGATIVE NEGATIVE MG/DL    Bilirubin Urine NEGATIVE NEGATIVE MG/DL    Ketones, Urine TRACE (A) NEGATIVE MG/DL    Specific Gravity, UA >1.030 1.001 - 1.035    Blood, Urine SMALL NUMBER OR AMOUNT OBSERVED (A) NEGATIVE    pH, Urine 5.5 5.0 - 8.0    Protein, UA >300 (HH) NEGATIVE MG/DL    Urobilinogen, Urine 0.2 0.2 - 1.0 MG/DL    Nitrite Urine, Quantitative POSITIVE (A) NEGATIVE    Leukocyte Esterase, Urine MODERATE NUMBER OR AMOUNT OBSERVED (A) NEGATIVE   Microscopic Urinalysis    Collection Time: 11/11/22  8:00 PM   Result Value Ref Range    RBC, UA TOO NUMEROUS TO COUNT 0 - 6 /HPF    WBC, UA TOO NUMEROUS TO COUNT 0 - 5 /HPF    Epithelial Cells, UA 1 /HPF    Cast Type NO CAST FORMS SEEN NO CAST FORMS SEEN /HPF    Bacteria, UA NEGATIVE NEGATIVE /HPF   EKG 12 Lead    Collection Time: 11/11/22  9:19 PM   Result Value Ref Range    Ventricular Rate 66 BPM    Atrial Rate 66 BPM    P-R Interval 198 ms    QRS Duration 86 ms    Q-T Interval 430 ms    QTc Calculation (Bazett) 450 ms    P Axis 42 degrees    R Axis 0 degrees    T Axis 66 degrees    Diagnosis       Normal sinus rhythm  Septal infarct , age undetermined  Abnormal ECG  When compared with ECG of 25-APR-2022 19:14,  Septal infarct is now present     Troponin    Collection Time: 11/11/22 10:30 PM   Result Value Ref Range    Troponin T <0.010 <0.01 NG/ML   Lipid Panel    Collection Time: 11/11/22 10:30 PM   Result Value Ref Range    Triglycerides 251 (H) <150 MG/DL    Cholesterol 233 (H) <200 MG/DL    HDL 39 (L) >40 MG/DL    LDL Calculated 144 (H) <100 MG/DL   Legionella antigen, urine    Collection Time: 11/12/22  2:17 AM    Specimen: Urine   Result Value Ref Range    Legionella Urinary Ag NEGATIVE NEGATIVE   Strep Pneumoniae Antigen    Collection Time: 11/12/22  2:17 AM    Specimen: CSF   Result Value Ref Range    Strep pneumo Ag URINE NEGATIVE    Comprehensive Metabolic Panel w/ Reflex to MG    Collection Time: 11/12/22  6:00 AM   Result Value Ref Range    Sodium 138 135 - 145 MMOL/L    Potassium 4.1 3.5 - 5.1 MMOL/L    Chloride 102 99 - 110 mMol/L    CO2 23 21 - 32 MMOL/L    BUN 27 (H) 6 - 23 MG/DL    Creatinine 1.3 (H) 0.6 - 1.1 MG/DL    Est, Glom Filt Rate 43 (L) >60 mL/min/1.73m2    Glucose 233 (H) 70 - 99 MG/DL    Calcium 8.8 8.3 - 10.6 MG/DL    Albumin 2.9 (L) 3.4 - 5.0 GM/DL    Total Protein 5.9 (L) 6.4 - 8.2 GM/DL    Total Bilirubin 0.4 0.0 - 1.0 MG/DL    ALT 9 (L) 10 - 40 U/L    AST 15 15 - 37 IU/L    Alkaline Phosphatase 68 40 - 129 IU/L    Anion Gap 13 4 - 16   CBC auto differential    Collection Time: 11/12/22  6:00 AM   Result Value Ref Range    WBC 8.5 4.0 - 10.5 K/CU MM    RBC 3.69 (L) 4.2 - 5.4 M/CU MM    Hemoglobin 10.8 (L) 12.5 - 16.0 GM/DL    Hematocrit 34.3 (L) 37 - 47 %    MCV 93.0 78 - 100 FL    MCH 29.3 27 - 31 PG    MCHC 31.5 (L) 32.0 - 36.0 %    RDW 14.0 11.7 - 14.9 %    Platelets 362 545 - 780 K/CU MM    MPV 10.1 7.5 - 11.1 FL    Differential Type AUTOMATED DIFFERENTIAL     Segs Relative 62.2 36 - 66 %    Lymphocytes % 26.2 24 - 44 %    Monocytes % 5.2 (H) 0 - 4 %    Eosinophils % 5.3 (H) 0 - 3 %    Basophils % 0.6 0 - 1 %    Segs Absolute 5.3 K/CU MM    Lymphocytes Absolute 2.2 K/CU MM    Monocytes Absolute 0.4 K/CU MM    Eosinophils Absolute 0.5 K/CU MM    Basophils Absolute 0.1 K/CU MM    Immature Neutrophil % 0.5 (H) 0 - 0.43 %    Total Immature Neutrophil 0.04 K/CU MM   Troponin    Collection Time: 11/12/22  6:00 AM   Result Value Ref Range    Troponin T 0.015 (H) <0.01 NG/ML   Vitamin B12 & Folate    Collection Time: 11/12/22  6:00 AM   Result Value Ref Range    Vitamin B-12 370.7 211 - 911 pg/ml    Folate >20.0 (H) 3.1 - 17.5 NG/ML   POCT Glucose    Collection Time: 11/12/22  7:37 AM   Result Value Ref Range    POC Glucose 227 (H) 70 - 99 MG/DL   EKG 12 Lead    Collection Time: 11/12/22  8:45 AM   Result Value Ref Range    Ventricular Rate 62 BPM    Atrial Rate 62 BPM    P-R Interval 164 ms    QRS Duration 92 ms    Q-T Interval 482 ms    QTc Calculation (Bazett) 489 ms    P Axis 49 degrees    R Axis 5 degrees    T Axis 106 degrees    Diagnosis       Normal sinus rhythm  Nonspecific ST and T wave abnormality  Abnormal ECG  When compared with ECG of 11-NOV-2022 21:19,  Criteria for Septal infarct are no longer present     POCT Glucose    Collection Time: 11/12/22 12:14 PM   Result Value Ref Range    POC Glucose 260 (H) 70 - 99 MG/DL        Imaging/Diagnostics Last 24 Hours   CT HEAD WO CONTRAST    Result Date: 11/11/2022  EXAMINATION: CT OF THE HEAD WITHOUT CONTRAST  11/11/2022 8:28 pm TECHNIQUE: CT of the head was performed without the administration of intravenous contrast. Automated exposure control, iterative reconstruction, and/or weight based adjustment of the mA/kV was utilized to reduce the radiation dose to as low as reasonably achievable. COMPARISON: 04/25/2022 CT head HISTORY: ORDERING SYSTEM PROVIDED HISTORY: AMS TECHNOLOGIST PROVIDED HISTORY: Reason for exam:->AMS Has a \"code stroke\" or \"stroke alert\" been called? ->No Decision Support Exception - unselect if not a suspected or confirmed emergency medical condition->Emergency Medical Condition (MA) Reason for Exam: AMS FINDINGS: BRAIN/VENTRICLES: There is no acute intracranial hemorrhage, mass effect or midline shift. No abnormal extra-axial fluid collection. The gray-white differentiation is maintained without evidence of an acute infarct. There is no evidence of hydrocephalus. There are nonspecific areas of hypoattenuation within the periventricular and subcortical white matter, which likely represent chronic microvascular ischemic change. There is prominence of the ventricles and sulci due to global parenchymal volume loss. ORBITS: The visualized portion of the orbits demonstrate no acute abnormality. SINUSES: Mild mucosal thickening of the paranasal sinuses. The mastoid air cells are clear. SOFT TISSUES/SKULL:  No acute abnormality of the visualized skull or soft tissues. 1.  No acute intracranial abnormality. 2.  Findings compatible with chronic microvascular ischemic disease and involutional change. CT ABDOMEN PELVIS W IV CONTRAST Additional Contrast? None    Result Date: 11/11/2022  EXAMINATION: CT OF THE ABDOMEN AND PELVIS WITH CONTRAST 11/11/2022 8:47 pm TECHNIQUE: CT of the abdomen and pelvis was performed with the administration of intravenous contrast. Multiplanar reformatted images are provided for review. Automated exposure control, iterative reconstruction, and/or weight based adjustment of the mA/kV was utilized to reduce the radiation dose to as low as reasonably achievable. COMPARISON: 05/09/2021.  HISTORY: ORDERING SYSTEM PROVIDED HISTORY: abdominal pain TECHNOLOGIST PROVIDED HISTORY: IV contrast only. Thank you. Additional Contrast?->None Reason for exam:->abdominal pain Reason for Exam: abd pain, AMS FINDINGS: Lower Chest: There is question of infiltrate versus pulmonary nodule in the right middle lobe. No pleural effusion is seen. Organs: There is splenomegaly. The right kidney is absent. There is a tiny low-density area in the hilum of the spleen too small to characterize but statistically likely represents a small cyst or hemangioma. GI/Bowel: There is a large amount of stool seen throughout the colon Pelvis: There is diffuse bladder wall thickening and there is air within the bladder which can be caused by gas-forming bacteria. Peritoneum/Retroperitoneum: There is calcific atherosclerotic disease in the aorta without evidence for aneurysm Bones/Soft Tissues: No acute abnormality     Diffuse bladder wall thickening and air in the urinary bladder which may be seen with gas-forming organism and cystitis. Recommend correlation with urinalysis. Large amount of stool seen throughout the colon. Absent right kidney Splenomegaly     XR CHEST PORTABLE    Result Date: 11/11/2022  EXAMINATION: ONE XRAY VIEW OF THE CHEST 11/11/2022 7:36 pm COMPARISON: 04/25/2022 chest radiograph HISTORY: ORDERING SYSTEM PROVIDED HISTORY: AMS TECHNOLOGIST PROVIDED HISTORY: Reason for exam:->AMS Reason for Exam: AMS FINDINGS: The cardiomediastinal silhouette is within normal limits of size for portable technique. Atherosclerotic calcifications of the thoracic aorta. Possible right upper lobe airspace disease. No pleural effusion or pneumothorax. No acute osseous abnormality. Low lung volumes and possible right upper lobe airspace disease.        Electronically signed by MILADIS Ceron NP on 11/12/2022 at 1:24 PM

## 2022-11-12 NOTE — PROGRESS NOTES
[address] [place] [address]    Joanette Schwab is a 69 y.o. female started on vancomycin for pneumonia. Pharmacy consulted by ED provider Dr. Blaire Llanes to order a dose of vancomycin in the emergency department. Other antimicrobials: zosyn    Ht Readings from Last 1 Encounters:   11/11/22 5' 4\" (1.626 m)     Wt Readings from Last 3 Encounters:   11/11/22 184 lb (83.5 kg)   05/22/22 184 lb 4.8 oz (83.6 kg)   04/25/22 220 lb (99.8 kg)        Pertinent Laboratory Values:   Temp Readings from Last 3 Encounters:   11/11/22 98.4 °F (36.9 °C) (Axillary)   05/22/22 96.2 °F (35.7 °C)   04/25/22 98.5 °F (36.9 °C) (Oral)     Recent Labs     11/11/22  1850   WBC 9.3     Recent Labs     11/11/22  1850   BUN 26*   CREATININE 1.3*     Estimated Creatinine Clearance: 38 mL/min (A) (based on SCr of 1.3 mg/dL (H)). No intake or output data in the 24 hours ending 11/11/22 2213    Assessment/Plan:  Pharmacy will order vancomycin 1750 mg (21 mg/kg). Please note, pharmacy will order a one-time dose of vancomycin for the Emergency Department. The consult will need to be re-ordered if vancomycin is to continue upon admission. Thank you for the consult.   Brianna Lott [facility]  11/11/2022 10:13 PM

## 2022-11-12 NOTE — ED NOTES
Pt reacted to swab in her nose and tried to grab the swab and turn head away.        Delia Dinh RN  11/12/22 1490

## 2022-11-12 NOTE — ASSESSMENT & PLAN NOTE
Afibrile, no WBC count, urine cult pending   UA pos for: blood protein (300) nitrates, leuks,ketones  Ct abd: pos for bladder wall thickening, cystitis   Drug allergies to rocephin started on: vanc, zoysn, zithromycin in ED will start on lequin renal dosing to cover un- complicated UTI and pos PNA.

## 2022-11-12 NOTE — ED NOTES
Straight Cath patient, got 200 out of patient of dark urine, took sample down to the Lab.       Bevely Libman Stuhlemmer  11/11/22 2028

## 2022-11-12 NOTE — PROGRESS NOTES
RENAL DOSE ADJUSTMENT MADE PER P/T PROTOCOL    PREVIOUS ORDER:  Levofloxacin 500 mg x 1, followed by 250 mg q24h    Estimated Creatinine Clearance: 38 mL/min (A) (based on SCr of 1.3 mg/dL (H)).   Recent Labs     11/11/22  1850   BUN 26*   CREATININE 1.3*        NEW RENALLY ADJUSTED ORDER:  Levofloxacin 750 mg Maynor [address] [facility]  11/12/2022 6:32 AM

## 2022-11-12 NOTE — ED NOTES
Blood culture #2 drawn from [facility] without difficulty.        Puja Thibodeaux RN  11/11/22 [id number] unsure

## 2022-11-12 NOTE — ASSESSMENT & PLAN NOTE
Low dose S/S  Diet NPO until passes swallow screen  Then cab count  Acuceck, hypoglycemia protocol, a1c and tsh pending

## 2022-11-12 NOTE — PROGRESS NOTES
DOSE ADJUSTMENT MADE PER P/T PROTOCOL    PREVIOUS ORDER:  Lovenox 30mg SQ daily    Patient weight:   Wt Readings from Last 1 Encounters:   11/11/22 184 lb (83.5 kg)       Estimated Creatinine Clearance: 38 mL/min (A) (based on SCr of 1.3 mg/dL (H)). Recent Labs     11/11/22  1850 11/12/22  0600   BUN 26* 27*   CREATININE 1.3* 1.3*    196       TABLE 1.   ENOXAPARIN ROUTINE PROPHYLAXIS DOSING (Medically ill, routine surgery)   Patient Weight (kg)     50.9 and below 51 - 100.9 101 - 150.9 151 - 174.9 175 or greater         Estimated CrCl  (ml/min) 30 or greater   30 mg SUBQ daily   40 mg SUBQ daily 30 mg SUBQ BID  40 mg SUBQ BID 60mg SUBQ BID      15-29 UFH 5000 units SUBQ BID   30 mg SUBQ daily 30 mg SUBQ daily 40 mg SUBQ daily   60 mg SUBQ daily      Less than 15 or Dialysis UFH 5000 units SUBQ BID   UFH 5000 units SUBQ TID UFH 7500 units SUBQ TID     NEW ADJUSTED ORDER:  Lovenox 40mg SQ daily    Veronika Jarvis, [address] [facility]  11/12/2022 10:27 AM

## 2022-11-12 NOTE — ED NOTES
Pt awakens now to voice. Expressions seemed as though she did not know where she was. When telling her she is in the hospitals, she responded \"how did I get here? I thought I was at a restaurant and I had not ordered yet\".        Maritza Bassett RN  11/11/22 4049

## 2022-11-13 LAB
GLUCOSE BLD-MCNC: 260 MG/DL (ref 70–99)
GLUCOSE BLD-MCNC: 354 MG/DL (ref 70–99)
GLUCOSE BLD-MCNC: 388 MG/DL (ref 70–99)
GLUCOSE BLD-MCNC: 399 MG/DL (ref 70–99)

## 2022-11-13 PROCEDURE — 1200000000 HC SEMI PRIVATE

## 2022-11-13 PROCEDURE — 2580000003 HC RX 258: Performed by: NURSE PRACTITIONER

## 2022-11-13 PROCEDURE — 6360000002 HC RX W HCPCS: Performed by: NURSE PRACTITIONER

## 2022-11-13 PROCEDURE — 82962 GLUCOSE BLOOD TEST: CPT

## 2022-11-13 PROCEDURE — 6370000000 HC RX 637 (ALT 250 FOR IP): Performed by: NURSE PRACTITIONER

## 2022-11-13 RX ADMIN — INSULIN GLARGINE 7 UNITS: 100 INJECTION, SOLUTION SUBCUTANEOUS at 08:26

## 2022-11-13 RX ADMIN — HYDRALAZINE HYDROCHLORIDE 25 MG: 25 TABLET, FILM COATED ORAL at 20:12

## 2022-11-13 RX ADMIN — ATENOLOL 50 MG: 50 TABLET ORAL at 08:25

## 2022-11-13 RX ADMIN — INSULIN LISPRO 4 UNITS: 100 INJECTION, SOLUTION INTRAVENOUS; SUBCUTANEOUS at 17:26

## 2022-11-13 RX ADMIN — AMLODIPINE BESYLATE 10 MG: 10 TABLET ORAL at 08:24

## 2022-11-13 RX ADMIN — METHOCARBAMOL 500 MG: 500 TABLET ORAL at 14:20

## 2022-11-13 RX ADMIN — ATENOLOL 50 MG: 50 TABLET ORAL at 20:12

## 2022-11-13 RX ADMIN — SENNOSIDES 8.6 MG: 8.6 TABLET, FILM COATED ORAL at 08:24

## 2022-11-13 RX ADMIN — ATORVASTATIN CALCIUM 20 MG: 20 TABLET, FILM COATED ORAL at 08:24

## 2022-11-13 RX ADMIN — INSULIN LISPRO 4 UNITS: 100 INJECTION, SOLUTION INTRAVENOUS; SUBCUTANEOUS at 20:12

## 2022-11-13 RX ADMIN — LEVOTHYROXINE SODIUM 150 MCG: 150 TABLET ORAL at 05:57

## 2022-11-13 RX ADMIN — INSULIN LISPRO 4 UNITS: 100 INJECTION, SOLUTION INTRAVENOUS; SUBCUTANEOUS at 13:20

## 2022-11-13 RX ADMIN — HYDRALAZINE HYDROCHLORIDE 25 MG: 25 TABLET, FILM COATED ORAL at 08:24

## 2022-11-13 RX ADMIN — METHOCARBAMOL 500 MG: 500 TABLET ORAL at 08:24

## 2022-11-13 RX ADMIN — ASPIRIN 81 MG: 81 TABLET, COATED ORAL at 08:24

## 2022-11-13 RX ADMIN — DULOXETINE 60 MG: 60 CAPSULE, DELAYED RELEASE ORAL at 08:24

## 2022-11-13 RX ADMIN — PANTOPRAZOLE SODIUM 40 MG: 40 TABLET, DELAYED RELEASE ORAL at 05:57

## 2022-11-13 RX ADMIN — INSULIN LISPRO 2 UNITS: 100 INJECTION, SOLUTION INTRAVENOUS; SUBCUTANEOUS at 08:25

## 2022-11-13 RX ADMIN — SODIUM CHLORIDE, PRESERVATIVE FREE 10 ML: 5 INJECTION INTRAVENOUS at 20:21

## 2022-11-13 RX ADMIN — ENOXAPARIN SODIUM 40 MG: 100 INJECTION SUBCUTANEOUS at 08:24

## 2022-11-13 RX ADMIN — SODIUM CHLORIDE, PRESERVATIVE FREE 10 ML: 5 INJECTION INTRAVENOUS at 08:24

## 2022-11-13 RX ADMIN — DOCUSATE SODIUM 100 MG: 100 CAPSULE, LIQUID FILLED ORAL at 20:12

## 2022-11-13 RX ADMIN — METHOCARBAMOL 500 MG: 500 TABLET ORAL at 20:54

## 2022-11-13 RX ADMIN — DOCUSATE SODIUM 100 MG: 100 CAPSULE, LIQUID FILLED ORAL at 08:24

## 2022-11-13 NOTE — PROGRESS NOTES
4 Eyes Skin Assessment     NAME:  Steven Route  YOB: 1945  MEDICAL RECORD NUMBER:  [id number]    The patient is being assess for  Admission    I agree that 2 RN's have performed a thorough Head to Toe Skin Assessment on the patient. ALL assessment sites listed below have been assessed. Areas assessed by both nurses:    Head, Face, Ears, Shoulders, Back, Chest, Arms, Elbows, Hands, Sacrum. Buttock, Coccyx, Ischium, and Legs. Feet and Heels        Does the Patient have a Wound?  No noted wound(s)       Shiraz Prevention initiated:  No   Wound Care Orders initiated:  No    Pressure Injury (Stage 3,4, Unstageable, DTI, NWPT, and Complex wounds) if present place referral/consult order under [de-identified] No    New and Established Ostomies if present place consult order under : No      Nurse 1 eSignature: Electronically signed by Nimo Yadav RN on 11/13/22 at 2:42 AM EST    **SHARE this note so that the co-signing nurse is able to place an eSignature**    Nurse 2 eSignature: Electronically signed by Jen Vasquez RN on 11/13/22 at 2:44 AM EST

## 2022-11-13 NOTE — PROGRESS NOTES
V2.0  [facility] Hospitalist Progress Note      Name:  Yunior Harris /Age/Sex: [birth year]  (78 y.o. female)   MRN & CSN:  [id number] & [id number] Encounter Date/Time: 2022 1:24 PM EST    Location:  012 PCP: TOÑITO Lr       Hospital Day: 3    Assessment and Plan:   Yunior Harris is a 69 y.o. female with who presents with AMS (altered mental status)    1. Metabolic encephalopathy, resolved alert and oriented x1 his baseline  CT head negative for any acute processes  Chest x-ray shows only right upper lobe airspace disease  Patient is back to her baseline mental status. 2.  Acute cystitis without hematuria, placed on Levaquin, urine culture shows E. coli awaiting sensitivities  Blood cultures pending  3. Dementia, at baseline mental status is alert and oriented x1 only. 4.  Possible pneumonia diagnosed in ED, doubt pneumonia procalcitonin is unremarkable urine strep and urine Legionella are both negative. Patient is on IV Levaquin for UTI so will cover both  No leukocytosis. 5.  Hypertension, continue patient's current home medications  6. LAURA on CKD stage IIIb at baseline, creatinine is consistently 1.3  7. Mixed hyperlipidemia, continue statin  8. Diabetes mellitus type 2 with long-term use of insulin  Continue sliding scale before meals and at bedtime  Hypoglycemic protocol  9. Aortic atherosclerosis, continue statin and aspirin  10. Incidental finding of CAD virus and now 61 detected on respiratory panel asymptomatic. 11.  Mildly elevated troponin resolved  12. Anemia hemoglobin 10.8  Baseline appears to be between 9.1 and 12.2 monitor      Diet ADULT DIET;  Regular; 3 carb choices (45 gm/meal)   DVT Prophylaxis [x] Lovenox, []  Heparin, [] SCDs, [] Ambulation,  [] Eliquis, [] Xarelto  [] Coumadin   Code Status Full Code   Disposition From: ECF  Expected Disposition: ECF  Estimated Date of Discharge: 2022  Patient requires continued admission due to UTI Surrogate Decision Maker/ ELIEL Crocker     Subjective:     Chief Complaint: Altered Mental Status (Arrived per [facility] from [facility].  reported patient was not herself today. Last known well unknown. When asking patient questions looks at me with no verbal response. )       Patient seen and examined this morning she is doing well she is able to carry on a conversation she can also tell me where she lives which is at [facility] with her . She has no further complaints. Most likely will discharge home tomorrow. Awaiting urine culture sensitivities         Review of Systems:    10 point review of systems completed unless stated above    Objective: Intake/Output Summary (Last 24 hours) at 11/13/2022 1439  Last data filed at 11/12/2022 1808  Gross per 24 hour   Intake 240 ml   Output --   Net 240 ml          Vitals:   Vitals:    11/13/22 0815   BP: 125/62   Pulse: 62   Resp: 16   Temp: 97.7 °F (36.5 °C)   SpO2: 100%       Physical Exam:     General: NAD  Eyes: EOMI  ENT: neck supple  Cardiovascular: Regular rate. Respiratory: Clear to auscultation  Gastrointestinal: Soft, non tender  Genitourinary: no suprapubic tenderness  Musculoskeletal: No edema  Skin: warm, dry  Neuro: Alert. Psych: Mood appropriate.      Medications:   Medications:    amLODIPine  10 mg Oral Daily    aspirin  81 mg Oral Daily    atenolol  50 mg Oral BID    atorvastatin  20 mg Oral Daily    docusate sodium  100 mg Oral BID    DULoxetine  60 mg Oral Daily    hydrALAZINE  25 mg Oral BID    insulin glargine  7 Units SubCUTAneous Daily    levothyroxine  150 mcg Oral Daily    methocarbamol  500 mg Oral TID    pantoprazole  40 mg Oral Daily    senna  1 tablet Oral Daily    sodium chloride flush  5-40 mL IntraVENous 2 times per day    levofloxacin  750 mg IntraVENous Q48H    insulin lispro  0-4 Units SubCUTAneous TID WC    insulin lispro  0-4 Units SubCUTAneous Nightly    enoxaparin  40 mg SubCUTAneous Daily      Infusions: sodium chloride      dextrose       PRN Meds: albuterol sulfate HFA, 2 puff, Q6H PRN  mupirocin, , BID PRN  sodium chloride flush, 5-40 mL, PRN  sodium chloride, 25 mL, PRN  acetaminophen, 650 mg, Q6H PRN   Or  acetaminophen, 650 mg, Q6H PRN  glucose, 4 tablet, PRN  dextrose bolus, 125 mL, PRN   Or  dextrose bolus, 250 mL, PRN  glucagon (rDNA), 1 mg, PRN  dextrose, , Continuous PRN  benzocaine-menthol, 1 lozenge, Q2H PRN      Labs      Recent Results (from the past 24 hour(s))   POCT Glucose    Collection Time: 11/12/22  5:14 PM   Result Value Ref Range    POC Glucose 236 (H) 70 - 99 MG/DL   POCT Glucose    Collection Time: 11/12/22  8:53 PM   Result Value Ref Range    POC Glucose 297 (H) 70 - 99 MG/DL   POCT Glucose    Collection Time: 11/13/22  8:21 AM   Result Value Ref Range    POC Glucose 260 (H) 70 - 99 MG/DL   POCT Glucose    Collection Time: 11/13/22 12:40 PM   Result Value Ref Range    POC Glucose 399 (H) 70 - 99 MG/DL        Imaging/Diagnostics Last 24 Hours   CT HEAD WO CONTRAST    Result Date: 11/11/2022  EXAMINATION: CT OF THE HEAD WITHOUT CONTRAST  11/11/2022 8:28 pm TECHNIQUE: CT of the head was performed without the administration of intravenous contrast. Automated exposure control, iterative reconstruction, and/or weight based adjustment of the mA/kV was utilized to reduce the radiation dose to as low as reasonably achievable. COMPARISON: 04/25/2022 CT head HISTORY: ORDERING SYSTEM PROVIDED HISTORY: AMS TECHNOLOGIST PROVIDED HISTORY: Reason for exam:->AMS Has a \"code stroke\" or \"stroke alert\" been called? ->No Decision Support Exception - unselect if not a suspected or confirmed emergency medical condition->Emergency Medical Condition (MA) Reason for Exam: AMS FINDINGS: BRAIN/VENTRICLES: There is no acute intracranial hemorrhage, mass effect or midline shift. No abnormal extra-axial fluid collection. The gray-white differentiation is maintained without evidence of an acute infarct.   There is no evidence of hydrocephalus. There are nonspecific areas of hypoattenuation within the periventricular and subcortical white matter, which likely represent chronic microvascular ischemic change. There is prominence of the ventricles and sulci due to global parenchymal volume loss. ORBITS: The visualized portion of the orbits demonstrate no acute abnormality. SINUSES: Mild mucosal thickening of the paranasal sinuses. The mastoid air cells are clear. SOFT TISSUES/SKULL:  No acute abnormality of the visualized skull or soft tissues. 1.  No acute intracranial abnormality. 2.  Findings compatible with chronic microvascular ischemic disease and involutional change. CT ABDOMEN PELVIS W IV CONTRAST Additional Contrast? None    Result Date: 11/11/2022  EXAMINATION: CT OF THE ABDOMEN AND PELVIS WITH CONTRAST 11/11/2022 8:47 pm TECHNIQUE: CT of the abdomen and pelvis was performed with the administration of intravenous contrast. Multiplanar reformatted images are provided for review. Automated exposure control, iterative reconstruction, and/or weight based adjustment of the mA/kV was utilized to reduce the radiation dose to as low as reasonably achievable. COMPARISON: 05/09/2021. HISTORY: ORDERING SYSTEM PROVIDED HISTORY: abdominal pain TECHNOLOGIST PROVIDED HISTORY: IV contrast only. Thank you. Additional Contrast?->None Reason for exam:->abdominal pain Reason for Exam: abd pain, AMS FINDINGS: Lower Chest: There is question of infiltrate versus pulmonary nodule in the right middle lobe. No pleural effusion is seen. Organs: There is splenomegaly. The right kidney is absent. There is a tiny low-density area in the hilum of the spleen too small to characterize but statistically likely represents a small cyst or hemangioma. GI/Bowel: There is a large amount of stool seen throughout the colon Pelvis: There is diffuse bladder wall thickening and there is air within the bladder which can be caused by gas-forming bacteria. Peritoneum/Retroperitoneum: There is calcific atherosclerotic disease in the aorta without evidence for aneurysm Bones/Soft Tissues: No acute abnormality     Diffuse bladder wall thickening and air in the urinary bladder which may be seen with gas-forming organism and cystitis. Recommend correlation with urinalysis. Large amount of stool seen throughout the colon. Absent right kidney Splenomegaly     XR CHEST PORTABLE    Result Date: 11/11/2022  EXAMINATION: ONE XRAY VIEW OF THE CHEST 11/11/2022 7:36 pm COMPARISON: 04/25/2022 chest radiograph HISTORY: ORDERING SYSTEM PROVIDED HISTORY: AMS TECHNOLOGIST PROVIDED HISTORY: Reason for exam:->AMS Reason for Exam: AMS FINDINGS: The cardiomediastinal silhouette is within normal limits of size for portable technique. Atherosclerotic calcifications of the thoracic aorta. Possible right upper lobe airspace disease. No pleural effusion or pneumothorax. No acute osseous abnormality. Low lung volumes and possible right upper lobe airspace disease.        Electronically signed by MILADIS Mckeon NP on 11/13/2022 at 2:39 PM

## 2022-11-13 NOTE — PROGRESS NOTES
Attempted to call Ryder Macias at [facility] to verify what we needed to sent the patient back to [address] [place] [address] today. No answer and VM left. Will try to call again.

## 2022-11-14 ENCOUNTER — APPOINTMENT (OUTPATIENT)
Dept: ULTRASOUND IMAGING | Age: 77
DRG: 689 | End: 2022-11-14
Payer: MEDICARE

## 2022-11-14 VITALS
HEIGHT: 64 IN | BODY MASS INDEX: 31.41 KG/M2 | OXYGEN SATURATION: 96 % | SYSTOLIC BLOOD PRESSURE: 141 MMHG | DIASTOLIC BLOOD PRESSURE: 79 MMHG | RESPIRATION RATE: 16 BRPM | TEMPERATURE: 97.6 F | HEART RATE: 62 BPM | WEIGHT: 184 LBS

## 2022-11-14 LAB
ANION GAP SERPL CALCULATED.3IONS-SCNC: 14 MMOL/L (ref 4–16)
BUN BLDV-MCNC: 29 MG/DL (ref 6–23)
CALCIUM SERPL-MCNC: 8.7 MG/DL (ref 8.3–10.6)
CHLORIDE BLD-SCNC: 101 MMOL/L (ref 99–110)
CO2: 20 MMOL/L (ref 21–32)
CREAT SERPL-MCNC: 1.6 MG/DL (ref 0.6–1.1)
CULTURE: ABNORMAL
CULTURE: ABNORMAL
GFR SERPL CREATININE-BSD FRML MDRD: 33 ML/MIN/1.73M2
GLUCOSE BLD-MCNC: 295 MG/DL (ref 70–99)
GLUCOSE BLD-MCNC: 304 MG/DL (ref 70–99)
GLUCOSE BLD-MCNC: 333 MG/DL (ref 70–99)
HCT VFR BLD CALC: 32.3 % (ref 37–47)
HEMOGLOBIN: 10.4 GM/DL (ref 12.5–16)
LV EF: 55 %
LVEF MODALITY: NORMAL
Lab: ABNORMAL
MCH RBC QN AUTO: 28.7 PG (ref 27–31)
MCHC RBC AUTO-ENTMCNC: 32.2 % (ref 32–36)
MCV RBC AUTO: 89.2 FL (ref 78–100)
PDW BLD-RTO: 13.6 % (ref 11.7–14.9)
PLATELET # BLD: 219 K/CU MM (ref 140–440)
PMV BLD AUTO: 9.9 FL (ref 7.5–11.1)
POTASSIUM SERPL-SCNC: 3.8 MMOL/L (ref 3.5–5.1)
RBC # BLD: 3.62 M/CU MM (ref 4.2–5.4)
SARS-COV-2, NAAT: DETECTED
SODIUM BLD-SCNC: 135 MMOL/L (ref 135–145)
SPECIMEN: ABNORMAL
WBC # BLD: 7.4 K/CU MM (ref 4–10.5)

## 2022-11-14 PROCEDURE — 6360000002 HC RX W HCPCS: Performed by: NURSE PRACTITIONER

## 2022-11-14 PROCEDURE — 93880 EXTRACRANIAL BILAT STUDY: CPT

## 2022-11-14 PROCEDURE — 2580000003 HC RX 258: Performed by: NURSE PRACTITIONER

## 2022-11-14 PROCEDURE — 6370000000 HC RX 637 (ALT 250 FOR IP): Performed by: NURSE PRACTITIONER

## 2022-11-14 PROCEDURE — 85027 COMPLETE CBC AUTOMATED: CPT

## 2022-11-14 PROCEDURE — 93306 TTE W/DOPPLER COMPLETE: CPT

## 2022-11-14 PROCEDURE — 94761 N-INVAS EAR/PLS OXIMETRY MLT: CPT

## 2022-11-14 PROCEDURE — 82962 GLUCOSE BLOOD TEST: CPT

## 2022-11-14 PROCEDURE — 87635 SARS-COV-2 COVID-19 AMP PRB: CPT

## 2022-11-14 PROCEDURE — 80048 BASIC METABOLIC PNL TOTAL CA: CPT

## 2022-11-14 RX ORDER — ATORVASTATIN CALCIUM 20 MG/1
20 TABLET, FILM COATED ORAL NIGHTLY
Status: DISCONTINUED | OUTPATIENT
Start: 2022-11-14 | End: 2022-11-14 | Stop reason: HOSPADM

## 2022-11-14 RX ORDER — NITROFURANTOIN 25; 75 MG/1; MG/1
100 CAPSULE ORAL 2 TIMES DAILY
Qty: 14 CAPSULE | Refills: 0 | Status: SHIPPED | OUTPATIENT
Start: 2022-11-14 | End: 2022-11-21

## 2022-11-14 RX ADMIN — SODIUM CHLORIDE, PRESERVATIVE FREE 10 ML: 5 INJECTION INTRAVENOUS at 08:37

## 2022-11-14 RX ADMIN — INSULIN LISPRO 3 UNITS: 100 INJECTION, SOLUTION INTRAVENOUS; SUBCUTANEOUS at 12:11

## 2022-11-14 RX ADMIN — LEVOFLOXACIN 750 MG: 5 INJECTION, SOLUTION INTRAVENOUS at 08:45

## 2022-11-14 RX ADMIN — LEVOTHYROXINE SODIUM 150 MCG: 150 TABLET ORAL at 05:57

## 2022-11-14 RX ADMIN — DULOXETINE 60 MG: 60 CAPSULE, DELAYED RELEASE ORAL at 08:34

## 2022-11-14 RX ADMIN — INSULIN GLARGINE 7 UNITS: 100 INJECTION, SOLUTION SUBCUTANEOUS at 08:36

## 2022-11-14 RX ADMIN — BENZOCAINE AND MENTHOL 1 LOZENGE: 15; 3.6 LOZENGE ORAL at 02:17

## 2022-11-14 RX ADMIN — DOCUSATE SODIUM 100 MG: 100 CAPSULE, LIQUID FILLED ORAL at 08:34

## 2022-11-14 RX ADMIN — ASPIRIN 81 MG: 81 TABLET, COATED ORAL at 08:34

## 2022-11-14 RX ADMIN — HYDRALAZINE HYDROCHLORIDE 25 MG: 25 TABLET, FILM COATED ORAL at 08:34

## 2022-11-14 RX ADMIN — SENNOSIDES 8.6 MG: 8.6 TABLET, FILM COATED ORAL at 08:35

## 2022-11-14 RX ADMIN — ATENOLOL 50 MG: 50 TABLET ORAL at 08:34

## 2022-11-14 RX ADMIN — AMLODIPINE BESYLATE 10 MG: 10 TABLET ORAL at 08:34

## 2022-11-14 RX ADMIN — INSULIN LISPRO 3 UNITS: 100 INJECTION, SOLUTION INTRAVENOUS; SUBCUTANEOUS at 08:36

## 2022-11-14 RX ADMIN — ENOXAPARIN SODIUM 40 MG: 100 INJECTION SUBCUTANEOUS at 08:33

## 2022-11-14 RX ADMIN — METHOCARBAMOL 500 MG: 500 TABLET ORAL at 08:34

## 2022-11-14 RX ADMIN — PANTOPRAZOLE SODIUM 40 MG: 40 TABLET, DELAYED RELEASE ORAL at 05:57

## 2022-11-14 ASSESSMENT — PAIN SCALES - GENERAL
PAINLEVEL_OUTOF10: 4
PAINLEVEL_OUTOF10: 1

## 2022-11-14 ASSESSMENT — PAIN DESCRIPTION - LOCATION: LOCATION: BACK;LEG

## 2022-11-14 ASSESSMENT — PAIN DESCRIPTION - DESCRIPTORS: DESCRIPTORS: SPASM

## 2022-11-14 ASSESSMENT — PAIN DESCRIPTION - ORIENTATION: ORIENTATION: RIGHT;LEFT

## 2022-11-14 ASSESSMENT — PAIN - FUNCTIONAL ASSESSMENT: PAIN_FUNCTIONAL_ASSESSMENT: PREVENTS OR INTERFERES SOME ACTIVE ACTIVITIES AND ADLS

## 2022-11-14 NOTE — PLAN OF CARE
Problem: Discharge Planning  Goal: Discharge to home or other facility with appropriate resources  11/14/2022 1018 by Dion Perdue RN  Outcome: Progressing  11/13/2022 2021 by Otoniel Ahuja RN  Outcome: Progressing     Problem: Pain  Goal: Verbalizes/displays adequate comfort level or baseline comfort level  11/14/2022 1018 by Dion Perdue RN  Outcome: Progressing  11/13/2022 2021 by Otoniel Ahuja RN  Outcome: Progressing     Problem: Skin/Tissue Integrity  Goal: Absence of new skin breakdown  Description: 1. Monitor for areas of redness and/or skin breakdown  2. Assess vascular access sites hourly  3. Every 4-6 hours minimum:  Change oxygen saturation probe site  4. Every 4-6 hours:  If on nasal continuous positive airway pressure, respiratory therapy assess nares and determine need for appliance change or resting period.   11/14/2022 1018 by Dion Perdue RN  Outcome: Progressing  11/13/2022 2021 by Otoniel Ahuja RN  Outcome: Progressing     Problem: Safety - Adult  Goal: Free from fall injury  11/14/2022 1018 by Dion Perdue RN  Outcome: Progressing  11/13/2022 2021 by Otoniel Ahuja RN  Outcome: Progressing     Problem: ABCDS Injury Assessment  Goal: Absence of physical injury  11/14/2022 1018 by Dion Perdue RN  Outcome: Progressing  11/13/2022 2021 by Otoniel Ahuja RN  Outcome: Progressing

## 2022-11-14 NOTE — CARE COORDINATION
NURSING: The patient will be discharged today and is a resident at [facility] (assisted living). Please call report to [facility] at [phone] and notify the patient's family. Medical transportation has been arranged at 2:30 PM through [facility].

## 2022-11-14 NOTE — DISCHARGE SUMMARY
V2.0  Discharge Summary    Name:  Marilee Gray /Age/Sex:  (61 y.o. female)   Admit Date: 2022  Discharge Date: 22    MRN & CSN:  [id number] & [id number] Encounter Date and Time 22 11:07 AM EST    Attending:  Rina Lynn MD Discharging Provider: MILADIS Miner NP       Hospital Course:     Brief HPI:   Marilee Gray is a 69 y.o. female past medical history as listed below, including hypertension, hyperlipidemia, insulin-dependent diabetes. who presents with patient is resting in bed her  is at bedside and does most of the talking for her. He reports she lives in a nursing home, for the last several days he believes she has declines medically due to changing her insulin. At baseline she is verbal ANOX 1 to herself. He reports she did have flu vaccine two days ago and was reporting nausea after getting the vaccine. Patient is alert her eyes are open she will mouth answers like no to basic questions she denied SOB, pain, N/V. Patient seen and examined today, she is doing well she is awake she is alert she is able to answer questions. She has no complaints. She will be discharged back to [facility] assisted living today. Brief Problem Based Course:   1. Metabolic encephalopathy, resolved alert and oriented x1 his baseline  CT head negative for any acute processes  Chest x-ray shows only right upper lobe airspace disease  Patient is back to her baseline mental status. 2.  Acute cystitis without hematuria, patient will be discharged with nitrofurantoin and it is sensitive to E. coli per sensitivities  Blood cultures no growth to date  3. Dementia, at baseline mental status is alert and oriented x1-2  Patient is back to baseline she is alert she can carry on a conversation and answer questions appropriately. 4.  Possible pneumonia diagnosed in ED, doubt pneumonia procalcitonin is unremarkable urine strep and urine Legionella are both negative.   No leukocytosis. Doubt pneumonia  5. Hypertension, continue patient's current home medications  6. LAURA on CKD stage IIIb at baseline  7. Mixed hyperlipidemia, continue statin  8. Diabetes mellitus type 2 with long-term use of insulin  Resume current home medications  9. Aortic atherosclerosis, continue statin and aspirin  10. Incidental finding of corona virus NL 63 on respiratory panel asymptomatic. 11.  Mildly elevated troponin resolved  12. Anemia hemoglobin 10.8  Baseline appears to be between 9.1 and 12.2 monitor      The patient expressed appropriate understanding of, and agreement with the discharge recommendations, medications, and plan. Consults this admission:  PHARMACY TO [address] [place] [address] TO HOSPITALIST    Discharge Diagnosis:   AMS (altered mental status)        Discharge Instruction:   Follow up appointments:   Primary care physician: TOÑITO Lr within 2 weeks  Diet: diabetic diet   Activity: activity as tolerated  Disposition: Discharged to:   [x]Home, [][facility], []SNF, []Acute Rehab, []Hospice assisted living [address] on discharge: Stable  Labs and Tests to be Followed up as an outpatient by PCP or Specialist:    Follow-up with PCP for repeat BMP to monitor creatinine follow-up with nephrology as scheduled  Discharge Medications:        Medication List        START taking these medications      nitrofurantoin (macrocrystal-monohydrate) 100 MG capsule  Commonly known as: MACROBID  Take 1 capsule by mouth 2 times daily for 7 days            CHANGE how you take these medications      albuterol sulfate  (90 Base) MCG/ACT inhaler  Commonly known as: PROVENTIL;VENTOLIN;PROAIR  Inhale 2 puffs into the lungs every 6 hours as needed (for cough or wheezing)  What changed: Another medication with the same name was removed. Continue taking this medication, and follow the directions you see here.             CONTINUE taking these medications      amLODIPine 10 MG tablet  Commonly known as: Norvasc  Take 1 tablet by mouth daily Hold for systolic blood pressure less than or equal to 110     ARTIFICIAL TEARS OP     aspirin 81 MG EC tablet     atenolol 50 MG tablet  Commonly known as: TENORMIN  Take 1 tablet by mouth 2 times daily Hold for systolic blood pressure less than or equal to 110     atorvastatin 20 MG tablet  Commonly known as: LIPITOR     bisacodyl 5 MG EC tablet  Commonly known as: DULCOLAX     docusate sodium 100 MG capsule  Commonly known as: COLACE     DULoxetine 60 MG extended release capsule  Commonly known as: CYMBALTA     furosemide 20 MG tablet  Commonly known as: LASIX  Take 1 tablet by mouth daily     hydrALAZINE 25 MG tablet  Commonly known as: APRESOLINE  Take 1 tablet by mouth 2 times daily     hydrOXYzine HCl 10 MG tablet  Commonly known as: ATARAX     * insulin glargine 100 UNIT/ML injection vial  Commonly known as: LANTUS     * insulin glargine 100 UNIT/ML injection vial  Commonly known as: LANTUS     levothyroxine 175 MCG tablet  Commonly known as: SYNTHROID     methocarbamol 500 MG tablet  Commonly known as: ROBAXIN     mupirocin 2 % ointment  Commonly known as: BACTROBAN  Apply topically 2 times daily as needed (skin wounds) Apply topically 3 times daily. pantoprazole 40 MG tablet  Commonly known as: PROTONIX     polyvinyl alcohol 1.4 % ophthalmic solution  Commonly known as: LIQUIFILM TEARS     senna 8.6 MG tablet  Commonly known as: SENOKOT           * This list has 2 medication(s) that are the same as other medications prescribed for you. Read the directions carefully, and ask your doctor or other care provider to review them with you.                 STOP taking these medications      Glucagon HCl 1 MG Solr     insulin lispro (1 Unit Dial) 100 UNIT/ML Sopn  Commonly known as: HUMALOG/ADMELOG               Where to Get Your Medications        These medications were sent to [facility] [phone] [facility] [phone]  [address] [address]      Phone: [phone]   nitrofurantoin (macrocrystal-monohydrate) 100 MG capsule        Objective Findings at Discharge:   BP (!) 141/79   Pulse 62   Temp 97.6 °F (36.4 °C) (Oral)   Resp 16   Ht 5' 4\" (1.626 m)   Wt 184 lb (83.5 kg)   SpO2 96%   BMI 31.58 kg/m²       Physical Exam:   General: NAD  Eyes: EOMI  ENT: neck supple  Cardiovascular: Regular rate. Respiratory: Clear to auscultation  Gastrointestinal: Soft, non tender  Genitourinary: no suprapubic tenderness  Musculoskeletal: No edema  Skin: warm, dry  Neuro: Alert. Psych: Mood appropriate. Labs and Imaging   CT HEAD WO CONTRAST    Result Date: 11/11/2022  EXAMINATION: CT OF THE HEAD WITHOUT CONTRAST  11/11/2022 8:28 pm TECHNIQUE: CT of the head was performed without the administration of intravenous contrast. Automated exposure control, iterative reconstruction, and/or weight based adjustment of the mA/kV was utilized to reduce the radiation dose to as low as reasonably achievable. COMPARISON: 04/25/2022 CT head HISTORY: ORDERING SYSTEM PROVIDED HISTORY: AMS TECHNOLOGIST PROVIDED HISTORY: Reason for exam:->AMS Has a \"code stroke\" or \"stroke alert\" been called? ->No Decision Support Exception - unselect if not a suspected or confirmed emergency medical condition->Emergency Medical Condition (MA) Reason for Exam: AMS FINDINGS: BRAIN/VENTRICLES: There is no acute intracranial hemorrhage, mass effect or midline shift. No abnormal extra-axial fluid collection. The gray-white differentiation is maintained without evidence of an acute infarct. There is no evidence of hydrocephalus. There are nonspecific areas of hypoattenuation within the periventricular and subcortical white matter, which likely represent chronic microvascular ischemic change. There is prominence of the ventricles and sulci due to global parenchymal volume loss.  ORBITS: The visualized portion of the orbits demonstrate no acute abnormality. SINUSES: Mild mucosal thickening of the paranasal sinuses. The mastoid air cells are clear. SOFT TISSUES/SKULL:  No acute abnormality of the visualized skull or soft tissues. 1.  No acute intracranial abnormality. 2.  Findings compatible with chronic microvascular ischemic disease and involutional change. CT ABDOMEN PELVIS W IV CONTRAST Additional Contrast? None    Result Date: 11/11/2022  EXAMINATION: CT OF THE ABDOMEN AND PELVIS WITH CONTRAST 11/11/2022 8:47 pm TECHNIQUE: CT of the abdomen and pelvis was performed with the administration of intravenous contrast. Multiplanar reformatted images are provided for review. Automated exposure control, iterative reconstruction, and/or weight based adjustment of the mA/kV was utilized to reduce the radiation dose to as low as reasonably achievable. COMPARISON: 05/09/2021. HISTORY: ORDERING SYSTEM PROVIDED HISTORY: abdominal pain TECHNOLOGIST PROVIDED HISTORY: IV contrast only. Thank you. Additional Contrast?->None Reason for exam:->abdominal pain Reason for Exam: abd pain, AMS FINDINGS: Lower Chest: There is question of infiltrate versus pulmonary nodule in the right middle lobe. No pleural effusion is seen. Organs: There is splenomegaly. The right kidney is absent. There is a tiny low-density area in the hilum of the spleen too small to characterize but statistically likely represents a small cyst or hemangioma. GI/Bowel: There is a large amount of stool seen throughout the colon Pelvis: There is diffuse bladder wall thickening and there is air within the bladder which can be caused by gas-forming bacteria. Peritoneum/Retroperitoneum: There is calcific atherosclerotic disease in the aorta without evidence for aneurysm Bones/Soft Tissues: No acute abnormality     Diffuse bladder wall thickening and air in the urinary bladder which may be seen with gas-forming organism and cystitis. Recommend correlation with urinalysis.  Large amount of stool seen throughout the colon. Absent right kidney Splenomegaly     XR CHEST PORTABLE    Result Date: 11/11/2022  EXAMINATION: ONE XRAY VIEW OF THE CHEST 11/11/2022 7:36 pm COMPARISON: 04/25/2022 chest radiograph HISTORY: ORDERING SYSTEM PROVIDED HISTORY: AMS TECHNOLOGIST PROVIDED HISTORY: Reason for exam:->AMS Reason for Exam: AMS FINDINGS: The cardiomediastinal silhouette is within normal limits of size for portable technique. Atherosclerotic calcifications of the thoracic aorta. Possible right upper lobe airspace disease. No pleural effusion or pneumothorax. No acute osseous abnormality. Low lung volumes and possible right upper lobe airspace disease. VL DUP CAROTID BILATERAL    The right internal carotid artery demonstrates 0-50% stenosis. The left internal carotid artery demonstrates 0-50% stenosis. Bilateral vertebral arteries are patent with flow in the normal direction.        CBC:   Recent Labs     11/11/22 1850 11/12/22  0600 11/14/22  0615   WBC 9.3 8.5 7.4   HGB 11.1* 10.8* 10.4*    196 219     BMP:    Recent Labs     11/11/22  1850 11/12/22  0600 11/14/22  0615    138 135   K 4.6 4.1 3.8    102 101   CO2 26 23 20*   BUN 26* 27* 29*   CREATININE 1.3* 1.3* 1.6*   GLUCOSE 239* 233* 295*     Hepatic:   Recent Labs     11/11/22  1850 11/12/22  0600   AST 16 15   ALT 9* 9*   BILITOT 0.6 0.4   ALKPHOS 75 68     Lipids:   Lab Results   Component Value Date/Time    CHOL 233 11/11/2022 10:30 PM    HDL 39 11/11/2022 10:30 PM    TRIG 251 11/11/2022 10:30 PM     Hemoglobin A1C:   Lab Results   Component Value Date/Time    LABA1C 8.6 11/11/2022 07:15 PM       Troponin:   Lab Results   Component Value Date/Time    TROPONINT 0.015 11/12/2022 06:00 AM    TROPONINT <0.010 11/11/2022 10:30 PM    TROPONINT 0.015 11/11/2022 06:50 PM       UA:  Lab Results   Component Value Date/Time    NITRU POSITIVE 11/11/2022 08:00 PM    COLORU YELLOW 11/11/2022 08:00 PM    WBCUA TOO NUMEROUS TO COUNT 11/11/2022 08:00 PM    RBCUA TOO NUMEROUS TO COUNT 11/11/2022 08:00 PM    MUCUS NEGATIVE 06/23/2021 06:15 PM    TRICHOMONAS NONE SEEN 08/05/2020 04:20 PM    BACTERIA NEGATIVE 11/11/2022 08:00 PM    CLARITYU CLEAR 11/11/2022 08:00 PM    SPECGRAV >1.030 11/11/2022 08:00 PM    LEUKOCYTESUR MODERATE NUMBER OR AMOUNT OBSERVED 11/11/2022 08:00 PM    UROBILINOGEN 0.2 11/11/2022 08:00 PM    BILIRUBINUR NEGATIVE 11/11/2022 08:00 PM    BLOODU SMALL NUMBER OR AMOUNT OBSERVED 11/11/2022 08:00 PM    [address] 11/11/2022 08:00 PM       Organism:   Lab Results   Component Value Date/Time    ORG ECOL 08/07/2017 02:15 PM       Time Spent Discharging patient 35 minutes. Discharge plan discussed with my supervising physician Dr. Tashia Bermudez.     Electronically signed by MILADIS Dwyer NP on 11/14/2022 at 11:07 AM

## 2022-11-14 NOTE — CARE COORDINATION
BLANCHE spoke with Valerie Dubois at [address] [place] [address] to notify him that the patient would be discharged today. Kaela Sesay advised that they would prefer that medical transportation be arranged for the patient's return to their facility. 11:30 AM  BLANCHE arranged transportation to [facility] at 2:30 PM through [employer]. BLANCHE spoke with Kaela Sesay at [facility] to notify of the transportation time. 12:22 PM  BLANCHE faxed the discharge summary and the patient's discharge instructions to [address] [place] [address].      1:55 PM  BLANCHE notified by the patient's nurse Bill RN that the patient's rapid COVID-19 test is positive but the patient is asymptomatic. 2 PM  BLANCHE spoke with Razia at [facility] to notify her that the patient's rapid COVID-19 test was positive but that the patient is asymptomatic. Razia confirmed that the patient is fine to return to the facility as planned.

## 2022-11-14 NOTE — CARE COORDINATION
CM reviewed chart and is familiar with patient from previous admissions. Patient is a resident at [facility] (assisted living) and the plan is for her to return upon discharge. CM will follow.

## 2022-11-16 LAB
CULTURE: NORMAL
CULTURE: NORMAL
Lab: NORMAL
Lab: NORMAL
SPECIMEN: NORMAL
SPECIMEN: NORMAL

## 2022-11-17 LAB
CULTURE: NORMAL
Lab: NORMAL
SPECIMEN: NORMAL

## 2023-05-20 ENCOUNTER — APPOINTMENT (OUTPATIENT)
Dept: GENERAL RADIOLOGY | Age: 78
End: 2023-05-20
Payer: MEDICARE

## 2023-05-20 ENCOUNTER — HOSPITAL ENCOUNTER (INPATIENT)
Age: 78
LOS: 1 days | Discharge: OTHER INSTITUTION | End: 2023-05-23
Attending: EMERGENCY MEDICINE | Admitting: INTERNAL MEDICINE
Payer: MEDICARE

## 2023-05-20 ENCOUNTER — APPOINTMENT (OUTPATIENT)
Dept: CT IMAGING | Age: 78
End: 2023-05-20
Payer: MEDICARE

## 2023-05-20 DIAGNOSIS — R50.9 FEBRILE ILLNESS: Primary | ICD-10-CM

## 2023-05-20 DIAGNOSIS — R41.82 MENTAL STATUS, DECREASED: ICD-10-CM

## 2023-05-20 DIAGNOSIS — J06.9 UPPER RESPIRATORY TRACT INFECTION, UNSPECIFIED TYPE: ICD-10-CM

## 2023-05-20 PROBLEM — N18.9 ACUTE KIDNEY INJURY SUPERIMPOSED ON CKD (HCC): Status: ACTIVE | Noted: 2023-05-20

## 2023-05-20 PROBLEM — N17.9 ACUTE KIDNEY INJURY SUPERIMPOSED ON CKD (HCC): Status: ACTIVE | Noted: 2023-05-20

## 2023-05-20 LAB
ALBUMIN SERPL-MCNC: 3.1 GM/DL (ref 3.4–5)
ALP BLD-CCNC: 91 IU/L (ref 40–129)
ALT SERPL-CCNC: 11 U/L (ref 10–40)
ANION GAP SERPL CALCULATED.3IONS-SCNC: 10 MMOL/L (ref 4–16)
AST SERPL-CCNC: 14 IU/L (ref 15–37)
B PARAP IS1001 DNA NPH QL NAA+NON-PROBE: NOT DETECTED
B PERT.PT PRMT NPH QL NAA+NON-PROBE: NOT DETECTED
BACTERIA: NEGATIVE /HPF
BASOPHILS ABSOLUTE: 0 K/CU MM
BASOPHILS RELATIVE PERCENT: 0.4 % (ref 0–1)
BILIRUB SERPL-MCNC: 0.3 MG/DL (ref 0–1)
BILIRUBIN URINE: NEGATIVE MG/DL
BLOOD, URINE: ABNORMAL
BUN SERPL-MCNC: 31 MG/DL (ref 6–23)
C PNEUM DNA NPH QL NAA+NON-PROBE: NOT DETECTED
CALCIUM SERPL-MCNC: 9.3 MG/DL (ref 8.3–10.6)
CAST TYPE: ABNORMAL /HPF
CHLORIDE BLD-SCNC: 109 MMOL/L (ref 99–110)
CHP ED QC CHECK: YES
CLARITY: CLEAR
CO2: 24 MMOL/L (ref 21–32)
COLOR: YELLOW
CREAT SERPL-MCNC: 1.7 MG/DL (ref 0.6–1.1)
CRYSTAL TYPE: NEGATIVE /HPF
DIFFERENTIAL TYPE: ABNORMAL
EOSINOPHILS ABSOLUTE: 0.4 K/CU MM
EOSINOPHILS RELATIVE PERCENT: 3.1 % (ref 0–3)
EPITHELIAL CELLS, UA: 5 /HPF
FLUAV H1 2009 PAN RNA NPH NAA+NON-PROBE: NOT DETECTED
FLUAV H1 RNA NPH QL NAA+NON-PROBE: NOT DETECTED
FLUAV H3 RNA NPH QL NAA+NON-PROBE: NOT DETECTED
FLUAV RNA NPH QL NAA+NON-PROBE: NOT DETECTED
FLUBV RNA NPH QL NAA+NON-PROBE: NOT DETECTED
GFR SERPL CREATININE-BSD FRML MDRD: 31 ML/MIN/1.73M2
GLUCOSE BLD-MCNC: 138 MG/DL
GLUCOSE BLD-MCNC: 138 MG/DL (ref 70–99)
GLUCOSE BLD-MCNC: 202 MG/DL (ref 70–99)
GLUCOSE BLD-MCNC: 294 MG/DL (ref 70–99)
GLUCOSE SERPL-MCNC: 149 MG/DL (ref 70–99)
GLUCOSE, URINE: 250 MG/DL
HADV DNA NPH QL NAA+NON-PROBE: NOT DETECTED
HCOV 229E RNA NPH QL NAA+NON-PROBE: NOT DETECTED
HCOV HKU1 RNA NPH QL NAA+NON-PROBE: NOT DETECTED
HCOV NL63 RNA NPH QL NAA+NON-PROBE: NOT DETECTED
HCOV OC43 RNA NPH QL NAA+NON-PROBE: NOT DETECTED
HCT VFR BLD CALC: 33.2 % (ref 37–47)
HEMOGLOBIN: 10 GM/DL (ref 12.5–16)
HIGH SENSITIVE C-REACTIVE PROTEIN: 10.4 MG/L (ref 0–5)
HMPV RNA NPH QL NAA+NON-PROBE: NOT DETECTED
HPIV1 RNA NPH QL NAA+NON-PROBE: NOT DETECTED
HPIV2 RNA NPH QL NAA+NON-PROBE: NOT DETECTED
HPIV3 RNA NPH QL NAA+NON-PROBE: NOT DETECTED
HPIV4 RNA NPH QL NAA+NON-PROBE: NOT DETECTED
IMMATURE NEUTROPHIL %: 0.4 % (ref 0–0.43)
KETONES, URINE: NEGATIVE MG/DL
LACTIC ACID, SEPSIS: 1.5 MMOL/L (ref 0.5–1.9)
LEUKOCYTE ESTERASE, URINE: NEGATIVE
LYMPHOCYTES ABSOLUTE: 2.5 K/CU MM
LYMPHOCYTES RELATIVE PERCENT: 22.7 % (ref 24–44)
M PNEUMO DNA NPH QL NAA+NON-PROBE: NOT DETECTED
MCH RBC QN AUTO: 28.4 PG (ref 27–31)
MCHC RBC AUTO-ENTMCNC: 30.1 % (ref 32–36)
MCV RBC AUTO: 94.3 FL (ref 78–100)
MONOCYTES ABSOLUTE: 0.7 K/CU MM
MONOCYTES RELATIVE PERCENT: 5.9 % (ref 0–4)
NITRITE URINE, QUANTITATIVE: NEGATIVE
PDW BLD-RTO: 14 % (ref 11.7–14.9)
PH, URINE: 7 (ref 5–8)
PLATELET # BLD: 179 K/CU MM (ref 140–440)
PMV BLD AUTO: 10 FL (ref 7.5–11.1)
POTASSIUM SERPL-SCNC: 4.4 MMOL/L (ref 3.5–5.1)
PROTEIN UA: >300 MG/DL
RBC # BLD: 3.52 M/CU MM (ref 4.2–5.4)
RBC URINE: 2 /HPF (ref 0–6)
RSV RNA NPH QL NAA+NON-PROBE: NOT DETECTED
RV+EV RNA NPH QL NAA+NON-PROBE: NOT DETECTED
SARS-COV-2 RNA NPH QL NAA+NON-PROBE: NOT DETECTED
SEGMENTED NEUTROPHILS ABSOLUTE COUNT: 7.6 K/CU MM
SEGMENTED NEUTROPHILS RELATIVE PERCENT: 67.5 % (ref 36–66)
SODIUM BLD-SCNC: 143 MMOL/L (ref 135–145)
SPECIFIC GRAVITY UA: 1.02 (ref 1–1.03)
TOTAL IMMATURE NEUTOROPHIL: 0.04 K/CU MM
TOTAL PROTEIN: 6.3 GM/DL (ref 6.4–8.2)
TROPONIN T: 0.01 NG/ML
TROPONIN T: 0.02 NG/ML
TSH SERPL DL<=0.005 MIU/L-ACNC: 8.75 UIU/ML (ref 0.27–4.2)
UROBILINOGEN, URINE: 0.2 MG/DL (ref 0.2–1)
WBC # BLD: 11.2 K/CU MM (ref 4–10.5)
WBC UA: 2 /HPF (ref 0–5)

## 2023-05-20 PROCEDURE — 83550 IRON BINDING TEST: CPT

## 2023-05-20 PROCEDURE — 2580000003 HC RX 258: Performed by: EMERGENCY MEDICINE

## 2023-05-20 PROCEDURE — 82746 ASSAY OF FOLIC ACID SERUM: CPT

## 2023-05-20 PROCEDURE — 83935 ASSAY OF URINE OSMOLALITY: CPT

## 2023-05-20 PROCEDURE — 94761 N-INVAS EAR/PLS OXIMETRY MLT: CPT

## 2023-05-20 PROCEDURE — 6370000000 HC RX 637 (ALT 250 FOR IP): Performed by: EMERGENCY MEDICINE

## 2023-05-20 PROCEDURE — 85025 COMPLETE CBC W/AUTO DIFF WBC: CPT

## 2023-05-20 PROCEDURE — 87899 AGENT NOS ASSAY W/OPTIC: CPT

## 2023-05-20 PROCEDURE — 99285 EMERGENCY DEPT VISIT HI MDM: CPT

## 2023-05-20 PROCEDURE — 0202U NFCT DS 22 TRGT SARS-COV-2: CPT

## 2023-05-20 PROCEDURE — 71045 X-RAY EXAM CHEST 1 VIEW: CPT

## 2023-05-20 PROCEDURE — 84145 PROCALCITONIN (PCT): CPT

## 2023-05-20 PROCEDURE — 82570 ASSAY OF URINE CREATININE: CPT

## 2023-05-20 PROCEDURE — 87081 CULTURE SCREEN ONLY: CPT

## 2023-05-20 PROCEDURE — G0378 HOSPITAL OBSERVATION PER HR: HCPCS

## 2023-05-20 PROCEDURE — 87449 NOS EACH ORGANISM AG IA: CPT

## 2023-05-20 PROCEDURE — 82140 ASSAY OF AMMONIA: CPT

## 2023-05-20 PROCEDURE — 6360000002 HC RX W HCPCS: Performed by: NURSE PRACTITIONER

## 2023-05-20 PROCEDURE — 80053 COMPREHEN METABOLIC PANEL: CPT

## 2023-05-20 PROCEDURE — 2580000003 HC RX 258: Performed by: NURSE PRACTITIONER

## 2023-05-20 PROCEDURE — 94640 AIRWAY INHALATION TREATMENT: CPT

## 2023-05-20 PROCEDURE — 81001 URINALYSIS AUTO W/SCOPE: CPT

## 2023-05-20 PROCEDURE — 82306 VITAMIN D 25 HYDROXY: CPT

## 2023-05-20 PROCEDURE — 6370000000 HC RX 637 (ALT 250 FOR IP): Performed by: NURSE PRACTITIONER

## 2023-05-20 PROCEDURE — 94664 DEMO&/EVAL PT USE INHALER: CPT

## 2023-05-20 PROCEDURE — 82962 GLUCOSE BLOOD TEST: CPT

## 2023-05-20 PROCEDURE — 82607 VITAMIN B-12: CPT

## 2023-05-20 PROCEDURE — 86141 C-REACTIVE PROTEIN HS: CPT

## 2023-05-20 PROCEDURE — 84484 ASSAY OF TROPONIN QUANT: CPT

## 2023-05-20 PROCEDURE — 70450 CT HEAD/BRAIN W/O DYE: CPT

## 2023-05-20 PROCEDURE — 84156 ASSAY OF PROTEIN URINE: CPT

## 2023-05-20 PROCEDURE — 84443 ASSAY THYROID STIM HORMONE: CPT

## 2023-05-20 PROCEDURE — 84300 ASSAY OF URINE SODIUM: CPT

## 2023-05-20 PROCEDURE — 87040 BLOOD CULTURE FOR BACTERIA: CPT

## 2023-05-20 PROCEDURE — 83605 ASSAY OF LACTIC ACID: CPT

## 2023-05-20 PROCEDURE — 93005 ELECTROCARDIOGRAM TRACING: CPT | Performed by: EMERGENCY MEDICINE

## 2023-05-20 PROCEDURE — 87641 MR-STAPH DNA AMP PROBE: CPT

## 2023-05-20 PROCEDURE — 83540 ASSAY OF IRON: CPT

## 2023-05-20 RX ORDER — INSULIN LISPRO 100 [IU]/ML
0-4 INJECTION, SOLUTION INTRAVENOUS; SUBCUTANEOUS NIGHTLY
Status: DISCONTINUED | OUTPATIENT
Start: 2023-05-20 | End: 2023-05-23 | Stop reason: HOSPADM

## 2023-05-20 RX ORDER — ACETAMINOPHEN 325 MG/1
650 TABLET ORAL EVERY 6 HOURS PRN
Status: DISCONTINUED | OUTPATIENT
Start: 2023-05-20 | End: 2023-05-23 | Stop reason: HOSPADM

## 2023-05-20 RX ORDER — ONDANSETRON 4 MG/1
4 TABLET, ORALLY DISINTEGRATING ORAL EVERY 8 HOURS PRN
COMMUNITY

## 2023-05-20 RX ORDER — HYDRALAZINE HYDROCHLORIDE 25 MG/1
25 TABLET, FILM COATED ORAL 2 TIMES DAILY
Status: DISCONTINUED | OUTPATIENT
Start: 2023-05-20 | End: 2023-05-23 | Stop reason: HOSPADM

## 2023-05-20 RX ORDER — ATORVASTATIN CALCIUM 40 MG/1
40 TABLET, FILM COATED ORAL DAILY
Status: DISCONTINUED | OUTPATIENT
Start: 2023-05-21 | End: 2023-05-22

## 2023-05-20 RX ORDER — AMLODIPINE BESYLATE 10 MG/1
10 TABLET ORAL DAILY
Status: DISCONTINUED | OUTPATIENT
Start: 2023-05-20 | End: 2023-05-23 | Stop reason: HOSPADM

## 2023-05-20 RX ORDER — ENOXAPARIN SODIUM 100 MG/ML
40 INJECTION SUBCUTANEOUS DAILY
Status: DISCONTINUED | OUTPATIENT
Start: 2023-05-20 | End: 2023-05-23 | Stop reason: HOSPADM

## 2023-05-20 RX ORDER — ACETAMINOPHEN 500 MG
1000 TABLET ORAL ONCE
Status: DISCONTINUED | OUTPATIENT
Start: 2023-05-20 | End: 2023-05-20

## 2023-05-20 RX ORDER — NYSTATIN 10B UNIT
POWDER (EA) MISCELLANEOUS DAILY
COMMUNITY

## 2023-05-20 RX ORDER — IPRATROPIUM BROMIDE AND ALBUTEROL SULFATE 2.5; .5 MG/3ML; MG/3ML
1 SOLUTION RESPIRATORY (INHALATION)
Status: DISCONTINUED | OUTPATIENT
Start: 2023-05-20 | End: 2023-05-23 | Stop reason: HOSPADM

## 2023-05-20 RX ORDER — ATORVASTATIN CALCIUM 20 MG/1
20 TABLET, FILM COATED ORAL DAILY
Status: DISCONTINUED | OUTPATIENT
Start: 2023-05-20 | End: 2023-05-20

## 2023-05-20 RX ORDER — SODIUM CHLORIDE 0.9 % (FLUSH) 0.9 %
5-40 SYRINGE (ML) INJECTION PRN
Status: DISCONTINUED | OUTPATIENT
Start: 2023-05-20 | End: 2023-05-23 | Stop reason: HOSPADM

## 2023-05-20 RX ORDER — DEXTROSE MONOHYDRATE 100 MG/ML
INJECTION, SOLUTION INTRAVENOUS CONTINUOUS PRN
Status: DISCONTINUED | OUTPATIENT
Start: 2023-05-20 | End: 2023-05-23 | Stop reason: HOSPADM

## 2023-05-20 RX ORDER — ONDANSETRON 4 MG/1
4 TABLET, ORALLY DISINTEGRATING ORAL EVERY 8 HOURS PRN
Status: DISCONTINUED | OUTPATIENT
Start: 2023-05-20 | End: 2023-05-23 | Stop reason: HOSPADM

## 2023-05-20 RX ORDER — POLYVINYL ALCOHOL 14 MG/ML
1 SOLUTION/ DROPS OPHTHALMIC EVERY 4 HOURS PRN
Status: DISCONTINUED | OUTPATIENT
Start: 2023-05-20 | End: 2023-05-23 | Stop reason: HOSPADM

## 2023-05-20 RX ORDER — ACETAMINOPHEN 160 MG
2000 TABLET,DISINTEGRATING ORAL DAILY
COMMUNITY

## 2023-05-20 RX ORDER — INSULIN LISPRO 100 [IU]/ML
0-8 INJECTION, SOLUTION INTRAVENOUS; SUBCUTANEOUS
Status: DISCONTINUED | OUTPATIENT
Start: 2023-05-20 | End: 2023-05-23 | Stop reason: HOSPADM

## 2023-05-20 RX ORDER — ACETAMINOPHEN 650 MG/1
650 SUPPOSITORY RECTAL EVERY 6 HOURS PRN
Status: DISCONTINUED | OUTPATIENT
Start: 2023-05-20 | End: 2023-05-23 | Stop reason: HOSPADM

## 2023-05-20 RX ORDER — BENZONATATE 100 MG/1
100 CAPSULE ORAL 3 TIMES DAILY PRN
Status: DISCONTINUED | OUTPATIENT
Start: 2023-05-20 | End: 2023-05-23 | Stop reason: HOSPADM

## 2023-05-20 RX ORDER — SODIUM CHLORIDE 9 MG/ML
INJECTION, SOLUTION INTRAVENOUS CONTINUOUS
Status: DISPENSED | OUTPATIENT
Start: 2023-05-20 | End: 2023-05-21

## 2023-05-20 RX ORDER — SODIUM CHLORIDE 9 MG/ML
INJECTION, SOLUTION INTRAVENOUS PRN
Status: DISCONTINUED | OUTPATIENT
Start: 2023-05-20 | End: 2023-05-23 | Stop reason: HOSPADM

## 2023-05-20 RX ORDER — SODIUM CHLORIDE 0.9 % (FLUSH) 0.9 %
5-40 SYRINGE (ML) INJECTION EVERY 12 HOURS SCHEDULED
Status: DISCONTINUED | OUTPATIENT
Start: 2023-05-20 | End: 2023-05-23 | Stop reason: HOSPADM

## 2023-05-20 RX ORDER — ONDANSETRON 2 MG/ML
4 INJECTION INTRAMUSCULAR; INTRAVENOUS EVERY 6 HOURS PRN
Status: DISCONTINUED | OUTPATIENT
Start: 2023-05-20 | End: 2023-05-23 | Stop reason: HOSPADM

## 2023-05-20 RX ORDER — PANTOPRAZOLE SODIUM 40 MG/1
40 TABLET, DELAYED RELEASE ORAL DAILY
Status: DISCONTINUED | OUTPATIENT
Start: 2023-05-20 | End: 2023-05-23 | Stop reason: HOSPADM

## 2023-05-20 RX ORDER — INSULIN GLARGINE 100 [IU]/ML
40 INJECTION, SOLUTION SUBCUTANEOUS NIGHTLY
Status: DISCONTINUED | OUTPATIENT
Start: 2023-05-20 | End: 2023-05-23 | Stop reason: HOSPADM

## 2023-05-20 RX ORDER — ASPIRIN 81 MG/1
81 TABLET ORAL DAILY
Status: DISCONTINUED | OUTPATIENT
Start: 2023-05-20 | End: 2023-05-23 | Stop reason: HOSPADM

## 2023-05-20 RX ORDER — SODIUM CHLORIDE 9 MG/ML
1000 INJECTION, SOLUTION INTRAVENOUS CONTINUOUS
Status: DISCONTINUED | OUTPATIENT
Start: 2023-05-20 | End: 2023-05-20

## 2023-05-20 RX ORDER — ACETAMINOPHEN 650 MG/1
650 SUPPOSITORY RECTAL ONCE
Status: COMPLETED | OUTPATIENT
Start: 2023-05-20 | End: 2023-05-20

## 2023-05-20 RX ORDER — DULOXETIN HYDROCHLORIDE 60 MG/1
60 CAPSULE, DELAYED RELEASE ORAL DAILY
Status: DISCONTINUED | OUTPATIENT
Start: 2023-05-20 | End: 2023-05-23 | Stop reason: HOSPADM

## 2023-05-20 RX ORDER — 0.9 % SODIUM CHLORIDE 0.9 %
1000 INTRAVENOUS SOLUTION INTRAVENOUS ONCE
Status: COMPLETED | OUTPATIENT
Start: 2023-05-20 | End: 2023-05-20

## 2023-05-20 RX ORDER — DIPHENHYDRAMINE HYDROCHLORIDE 50 MG/ML
12.5 INJECTION INTRAMUSCULAR; INTRAVENOUS EVERY 6 HOURS PRN
Status: DISCONTINUED | OUTPATIENT
Start: 2023-05-20 | End: 2023-05-23 | Stop reason: HOSPADM

## 2023-05-20 RX ORDER — LEVOFLOXACIN 5 MG/ML
500 INJECTION, SOLUTION INTRAVENOUS ONCE
Status: DISCONTINUED | OUTPATIENT
Start: 2023-05-20 | End: 2023-05-20

## 2023-05-20 RX ORDER — MAGNESIUM SULFATE IN WATER 40 MG/ML
2000 INJECTION, SOLUTION INTRAVENOUS ONCE
Status: DISCONTINUED | OUTPATIENT
Start: 2023-05-20 | End: 2023-05-20

## 2023-05-20 RX ADMIN — ASPIRIN 81 MG: 81 TABLET, COATED ORAL at 16:03

## 2023-05-20 RX ADMIN — ENOXAPARIN SODIUM 40 MG: 100 INJECTION SUBCUTANEOUS at 16:03

## 2023-05-20 RX ADMIN — IPRATROPIUM BROMIDE AND ALBUTEROL SULFATE 1 AMPULE: .5; 2.5 SOLUTION RESPIRATORY (INHALATION) at 19:17

## 2023-05-20 RX ADMIN — SODIUM CHLORIDE: 9 INJECTION, SOLUTION INTRAVENOUS at 15:58

## 2023-05-20 RX ADMIN — AMLODIPINE BESYLATE 10 MG: 10 TABLET ORAL at 16:03

## 2023-05-20 RX ADMIN — INSULIN LISPRO 2 UNITS: 100 INJECTION, SOLUTION INTRAVENOUS; SUBCUTANEOUS at 18:23

## 2023-05-20 RX ADMIN — SODIUM CHLORIDE: 9 INJECTION, SOLUTION INTRAVENOUS at 16:00

## 2023-05-20 RX ADMIN — IPRATROPIUM BROMIDE AND ALBUTEROL SULFATE 1 AMPULE: .5; 2.5 SOLUTION RESPIRATORY (INHALATION) at 15:40

## 2023-05-20 RX ADMIN — INSULIN GLARGINE 40 UNITS: 100 INJECTION, SOLUTION SUBCUTANEOUS at 20:41

## 2023-05-20 RX ADMIN — PANTOPRAZOLE SODIUM 40 MG: 40 TABLET, DELAYED RELEASE ORAL at 16:03

## 2023-05-20 RX ADMIN — SODIUM CHLORIDE 1000 ML: 9 INJECTION, SOLUTION INTRAVENOUS at 11:40

## 2023-05-20 RX ADMIN — CEFEPIME 2000 MG: 2 INJECTION, POWDER, FOR SOLUTION INTRAVENOUS at 16:01

## 2023-05-20 RX ADMIN — DULOXETINE 60 MG: 60 CAPSULE, DELAYED RELEASE ORAL at 16:03

## 2023-05-20 RX ADMIN — ACETAMINOPHEN 650 MG: 650 SUPPOSITORY RECTAL at 11:42

## 2023-05-20 ASSESSMENT — LIFESTYLE VARIABLES
HOW OFTEN DO YOU HAVE A DRINK CONTAINING ALCOHOL: NEVER
HOW MANY STANDARD DRINKS CONTAINING ALCOHOL DO YOU HAVE ON A TYPICAL DAY: PATIENT DOES NOT DRINK

## 2023-05-20 ASSESSMENT — PAIN - FUNCTIONAL ASSESSMENT: PAIN_FUNCTIONAL_ASSESSMENT: NONE - DENIES PAIN

## 2023-05-20 ASSESSMENT — PAIN SCALES - GENERAL: PAINLEVEL_OUTOF10: 0

## 2023-05-21 ENCOUNTER — APPOINTMENT (OUTPATIENT)
Dept: CT IMAGING | Age: 78
End: 2023-05-21
Payer: MEDICARE

## 2023-05-21 LAB
25(OH)D3 SERPL-MCNC: 16.95 NG/ML
ALBUMIN SERPL-MCNC: 2.8 GM/DL (ref 3.4–5)
ALP BLD-CCNC: 87 IU/L (ref 40–129)
ALT SERPL-CCNC: 11 U/L (ref 10–40)
AMMONIA: 37 UMOL/L (ref 11–51)
ANION GAP SERPL CALCULATED.3IONS-SCNC: 10 MMOL/L (ref 4–16)
AST SERPL-CCNC: 16 IU/L (ref 15–37)
BASE EXCESS MIXED: 5.2 (ref 0–2.3)
BASE EXCESS MIXED: 5.3 (ref 0–2.3)
BASE EXCESS: ABNORMAL (ref 0–2.4)
BASE EXCESS: ABNORMAL (ref 0–2.4)
BASOPHILS ABSOLUTE: 0 K/CU MM
BASOPHILS RELATIVE PERCENT: 0.3 % (ref 0–1)
BILIRUB SERPL-MCNC: 0.2 MG/DL (ref 0–1)
BUN SERPL-MCNC: 28 MG/DL (ref 6–23)
CALCIUM SERPL-MCNC: 8.6 MG/DL (ref 8.3–10.6)
CHLORIDE BLD-SCNC: 113 MMOL/L (ref 99–110)
CO2 CONTENT: 22.3 MMOL/L (ref 19–24)
CO2 CONTENT: 22.9 MMOL/L (ref 19–24)
CO2: 21 MMOL/L (ref 21–32)
CREAT SERPL-MCNC: 1.5 MG/DL (ref 0.6–1.1)
CREAT UR-MCNC: 48.5 MG/DL (ref 28–217)
CREATININE URINE: 4.2 MG/DL (ref 28–217)
DIFFERENTIAL TYPE: ABNORMAL
EOSINOPHILS ABSOLUTE: 0.5 K/CU MM
EOSINOPHILS RELATIVE PERCENT: 4.1 % (ref 0–3)
FOLATE SERPL-MCNC: 19.8 NG/ML (ref 3.1–17.5)
GFR SERPL CREATININE-BSD FRML MDRD: 36 ML/MIN/1.73M2
GLUCOSE BLD-MCNC: 102 MG/DL (ref 70–99)
GLUCOSE BLD-MCNC: 137 MG/DL (ref 70–99)
GLUCOSE BLD-MCNC: 180 MG/DL (ref 70–99)
GLUCOSE BLD-MCNC: 220 MG/DL (ref 70–99)
GLUCOSE SERPL-MCNC: 120 MG/DL (ref 70–99)
HCO3 VENOUS: 21 MMOL/L (ref 19–25)
HCO3 VENOUS: 21.5 MMOL/L (ref 19–25)
HCT VFR BLD CALC: 29.4 % (ref 37–47)
HEMOGLOBIN: 9 GM/DL (ref 12.5–16)
IMMATURE NEUTROPHIL %: 0.6 % (ref 0–0.43)
IRON: 24 UG/DL (ref 37–145)
L PNEUMO AG UR QL IA: NEGATIVE
LACTATE: 2.2 MMOL/L (ref 0.5–1.9)
LYMPHOCYTES ABSOLUTE: 1.7 K/CU MM
LYMPHOCYTES RELATIVE PERCENT: 14.4 % (ref 24–44)
MAGNESIUM: 2.5 MG/DL (ref 1.8–2.4)
MCH RBC QN AUTO: 29 PG (ref 27–31)
MCHC RBC AUTO-ENTMCNC: 30.6 % (ref 32–36)
MCV RBC AUTO: 94.8 FL (ref 78–100)
MONOCYTES ABSOLUTE: 0.6 K/CU MM
MONOCYTES RELATIVE PERCENT: 5.4 % (ref 0–4)
O2 SAT, VEN: 74.5 % (ref 50–70)
O2 SAT, VEN: 79.3 % (ref 50–70)
OSMOLALITY UR: 391 MOS/L (ref 292–1090)
PCO2, VEN: 43.1 MMHG (ref 38–52)
PCO2, VEN: 46.2 MMHG (ref 38–52)
PCT TRANSFERRIN: 13 % (ref 10–44)
PDW BLD-RTO: 13.8 % (ref 11.7–14.9)
PH VENOUS: 7.28 (ref 7.32–7.42)
PH VENOUS: 7.3 (ref 7.32–7.42)
PLATELET # BLD: 175 K/CU MM (ref 140–440)
PMV BLD AUTO: 10.1 FL (ref 7.5–11.1)
PO2, VEN: 45.1 MMHG (ref 28–48)
PO2, VEN: 48.4 MMHG (ref 28–48)
POTASSIUM SERPL-SCNC: 5.2 MMOL/L (ref 3.5–5.1)
PROCALCITONIN SERPL-MCNC: 0.13 NG/ML
PROT/CREAT RATIO, UR: 1.2
RBC # BLD: 3.1 M/CU MM (ref 4.2–5.4)
S PNEUM AG CSF QL: NORMAL
SEGMENTED NEUTROPHILS ABSOLUTE COUNT: 8.7 K/CU MM
SEGMENTED NEUTROPHILS RELATIVE PERCENT: 75.2 % (ref 36–66)
SODIUM BLD-SCNC: 144 MMOL/L (ref 135–145)
SODIUM URINE: 10 MMOL/L (ref 35–167)
SOURCE, BLOOD GAS: ABNORMAL
SOURCE, BLOOD GAS: ABNORMAL
TOTAL CK: 30 IU/L (ref 26–140)
TOTAL IMMATURE NEUTOROPHIL: 0.07 K/CU MM
TOTAL IRON BINDING CAPACITY: 192 UG/DL (ref 250–450)
TOTAL PROTEIN: 5.5 GM/DL (ref 6.4–8.2)
TROPONIN T: 0.01 NG/ML
UNSATURATED IRON BINDING CAPACITY: 168 UG/DL (ref 110–370)
URINE TOTAL PROTEIN: 5.1 MG/DL
VITAMIN B-12: 478.9 PG/ML (ref 211–911)
WBC # BLD: 11.6 K/CU MM (ref 4–10.5)

## 2023-05-21 PROCEDURE — 6360000002 HC RX W HCPCS: Performed by: NURSE PRACTITIONER

## 2023-05-21 PROCEDURE — 6370000000 HC RX 637 (ALT 250 FOR IP): Performed by: NURSE PRACTITIONER

## 2023-05-21 PROCEDURE — 6360000002 HC RX W HCPCS

## 2023-05-21 PROCEDURE — 82550 ASSAY OF CK (CPK): CPT

## 2023-05-21 PROCEDURE — 80053 COMPREHEN METABOLIC PANEL: CPT

## 2023-05-21 PROCEDURE — G0378 HOSPITAL OBSERVATION PER HR: HCPCS

## 2023-05-21 PROCEDURE — 2580000003 HC RX 258: Performed by: NURSE PRACTITIONER

## 2023-05-21 PROCEDURE — 71250 CT THORAX DX C-: CPT

## 2023-05-21 PROCEDURE — 83735 ASSAY OF MAGNESIUM: CPT

## 2023-05-21 PROCEDURE — 82140 ASSAY OF AMMONIA: CPT

## 2023-05-21 PROCEDURE — 85025 COMPLETE CBC W/AUTO DIFF WBC: CPT

## 2023-05-21 PROCEDURE — 83605 ASSAY OF LACTIC ACID: CPT

## 2023-05-21 PROCEDURE — 84484 ASSAY OF TROPONIN QUANT: CPT

## 2023-05-21 PROCEDURE — 82962 GLUCOSE BLOOD TEST: CPT

## 2023-05-21 PROCEDURE — 94640 AIRWAY INHALATION TREATMENT: CPT

## 2023-05-21 PROCEDURE — 82800 BLOOD PH: CPT

## 2023-05-21 RX ORDER — FUROSEMIDE 10 MG/ML
40 INJECTION INTRAMUSCULAR; INTRAVENOUS ONCE
Status: COMPLETED | OUTPATIENT
Start: 2023-05-21 | End: 2023-05-21

## 2023-05-21 RX ORDER — GUAIFENESIN 600 MG/1
600 TABLET, EXTENDED RELEASE ORAL 2 TIMES DAILY PRN
Status: DISCONTINUED | OUTPATIENT
Start: 2023-05-21 | End: 2023-05-22

## 2023-05-21 RX ORDER — FERROUS SULFATE 325(65) MG
325 TABLET ORAL 2 TIMES DAILY WITH MEALS
Status: DISCONTINUED | OUTPATIENT
Start: 2023-05-21 | End: 2023-05-23 | Stop reason: HOSPADM

## 2023-05-21 RX ADMIN — FERROUS SULFATE TAB 325 MG (65 MG ELEMENTAL FE) 325 MG: 325 (65 FE) TAB at 15:20

## 2023-05-21 RX ADMIN — CEFEPIME 2000 MG: 1 INJECTION, POWDER, FOR SOLUTION INTRAMUSCULAR; INTRAVENOUS at 15:25

## 2023-05-21 RX ADMIN — HYDRALAZINE HYDROCHLORIDE 25 MG: 25 TABLET, FILM COATED ORAL at 08:35

## 2023-05-21 RX ADMIN — FUROSEMIDE 40 MG: 10 INJECTION, SOLUTION INTRAMUSCULAR; INTRAVENOUS at 20:14

## 2023-05-21 RX ADMIN — SODIUM CHLORIDE, PRESERVATIVE FREE 5 ML: 5 INJECTION INTRAVENOUS at 20:21

## 2023-05-21 RX ADMIN — AMLODIPINE BESYLATE 10 MG: 10 TABLET ORAL at 08:35

## 2023-05-21 RX ADMIN — IPRATROPIUM BROMIDE AND ALBUTEROL SULFATE 1 AMPULE: .5; 2.5 SOLUTION RESPIRATORY (INHALATION) at 19:21

## 2023-05-21 RX ADMIN — INSULIN GLARGINE 40 UNITS: 100 INJECTION, SOLUTION SUBCUTANEOUS at 20:21

## 2023-05-21 RX ADMIN — ENOXAPARIN SODIUM 40 MG: 100 INJECTION SUBCUTANEOUS at 08:34

## 2023-05-21 RX ADMIN — SODIUM CHLORIDE: 9 INJECTION, SOLUTION INTRAVENOUS at 05:34

## 2023-05-21 RX ADMIN — IPRATROPIUM BROMIDE AND ALBUTEROL SULFATE 1 AMPULE: .5; 2.5 SOLUTION RESPIRATORY (INHALATION) at 10:45

## 2023-05-21 RX ADMIN — IPRATROPIUM BROMIDE AND ALBUTEROL SULFATE 1 AMPULE: .5; 2.5 SOLUTION RESPIRATORY (INHALATION) at 07:46

## 2023-05-21 RX ADMIN — CEFEPIME 2000 MG: 2 INJECTION, POWDER, FOR SOLUTION INTRAVENOUS at 04:00

## 2023-05-21 RX ADMIN — DULOXETINE 60 MG: 60 CAPSULE, DELAYED RELEASE ORAL at 08:35

## 2023-05-21 RX ADMIN — HYDRALAZINE HYDROCHLORIDE 25 MG: 25 TABLET, FILM COATED ORAL at 20:17

## 2023-05-21 RX ADMIN — IPRATROPIUM BROMIDE AND ALBUTEROL SULFATE 1 AMPULE: .5; 2.5 SOLUTION RESPIRATORY (INHALATION) at 14:21

## 2023-05-21 RX ADMIN — PANTOPRAZOLE SODIUM 40 MG: 40 TABLET, DELAYED RELEASE ORAL at 08:35

## 2023-05-21 RX ADMIN — ATORVASTATIN CALCIUM 40 MG: 40 TABLET, FILM COATED ORAL at 08:35

## 2023-05-21 RX ADMIN — LEVOTHYROXINE SODIUM 175 MCG: 0.03 TABLET ORAL at 06:14

## 2023-05-21 RX ADMIN — ASPIRIN 81 MG: 81 TABLET, COATED ORAL at 08:35

## 2023-05-22 PROBLEM — J96.01 ACUTE RESPIRATORY FAILURE WITH HYPOXIA (HCC): Status: ACTIVE | Noted: 2023-05-22

## 2023-05-22 LAB
ALBUMIN SERPL-MCNC: 2.6 GM/DL (ref 3.4–5)
ALP BLD-CCNC: 83 IU/L (ref 40–129)
ALT SERPL-CCNC: <5 U/L (ref 10–40)
ANION GAP SERPL CALCULATED.3IONS-SCNC: 8 MMOL/L (ref 4–16)
AST SERPL-CCNC: 15 IU/L (ref 15–37)
BILIRUB SERPL-MCNC: 0.2 MG/DL (ref 0–1)
BUN SERPL-MCNC: 27 MG/DL (ref 6–23)
CALCIUM SERPL-MCNC: 8.8 MG/DL (ref 8.3–10.6)
CHLORIDE BLD-SCNC: 111 MMOL/L (ref 99–110)
CO2: 21 MMOL/L (ref 21–32)
CREAT SERPL-MCNC: 1.5 MG/DL (ref 0.6–1.1)
EKG ATRIAL RATE: 68 BPM
EKG DIAGNOSIS: NORMAL
EKG P AXIS: 39 DEGREES
EKG P-R INTERVAL: 208 MS
EKG Q-T INTERVAL: 410 MS
EKG QRS DURATION: 84 MS
EKG QTC CALCULATION (BAZETT): 435 MS
EKG R AXIS: 15 DEGREES
EKG T AXIS: 91 DEGREES
EKG VENTRICULAR RATE: 68 BPM
GFR SERPL CREATININE-BSD FRML MDRD: 36 ML/MIN/1.73M2
GLUCOSE BLD-MCNC: 108 MG/DL (ref 70–99)
GLUCOSE BLD-MCNC: 126 MG/DL (ref 70–99)
GLUCOSE BLD-MCNC: 172 MG/DL (ref 70–99)
GLUCOSE BLD-MCNC: 222 MG/DL (ref 70–99)
GLUCOSE SERPL-MCNC: 128 MG/DL (ref 70–99)
HCT VFR BLD CALC: 27.9 % (ref 37–47)
HEMOGLOBIN: 8.5 GM/DL (ref 12.5–16)
HIGH SENSITIVE C-REACTIVE PROTEIN: 34.6 MG/L (ref 0–5)
LACTIC ACID, SEPSIS: 1 MMOL/L (ref 0.5–1.9)
MAGNESIUM: 1.8 MG/DL (ref 1.8–2.4)
MCH RBC QN AUTO: 29.1 PG (ref 27–31)
MCHC RBC AUTO-ENTMCNC: 30.5 % (ref 32–36)
MCV RBC AUTO: 95.5 FL (ref 78–100)
MRSA, DNA, NASAL: ABNORMAL
PDW BLD-RTO: 13.6 % (ref 11.7–14.9)
PLATELET # BLD: 157 K/CU MM (ref 140–440)
PMV BLD AUTO: 10.4 FL (ref 7.5–11.1)
POTASSIUM SERPL-SCNC: 4.5 MMOL/L (ref 3.5–5.1)
PROCALCITONIN SERPL-MCNC: 0.23 NG/ML
RBC # BLD: 2.92 M/CU MM (ref 4.2–5.4)
SODIUM BLD-SCNC: 140 MMOL/L (ref 135–145)
SPECIMEN DESCRIPTION: ABNORMAL
TOTAL PROTEIN: 6.4 GM/DL (ref 6.4–8.2)
WBC # BLD: 9.2 K/CU MM (ref 4–10.5)

## 2023-05-22 PROCEDURE — 6360000002 HC RX W HCPCS: Performed by: NURSE PRACTITIONER

## 2023-05-22 PROCEDURE — 93010 ELECTROCARDIOGRAM REPORT: CPT | Performed by: INTERNAL MEDICINE

## 2023-05-22 PROCEDURE — 94667 MNPJ CHEST WALL 1ST: CPT

## 2023-05-22 PROCEDURE — 86141 C-REACTIVE PROTEIN HS: CPT

## 2023-05-22 PROCEDURE — 6370000000 HC RX 637 (ALT 250 FOR IP): Performed by: NURSE PRACTITIONER

## 2023-05-22 PROCEDURE — G0378 HOSPITAL OBSERVATION PER HR: HCPCS

## 2023-05-22 PROCEDURE — 6370000000 HC RX 637 (ALT 250 FOR IP)

## 2023-05-22 PROCEDURE — 36415 COLL VENOUS BLD VENIPUNCTURE: CPT

## 2023-05-22 PROCEDURE — 80053 COMPREHEN METABOLIC PANEL: CPT

## 2023-05-22 PROCEDURE — 84145 PROCALCITONIN (PCT): CPT

## 2023-05-22 PROCEDURE — 85027 COMPLETE CBC AUTOMATED: CPT

## 2023-05-22 PROCEDURE — 2580000003 HC RX 258: Performed by: NURSE PRACTITIONER

## 2023-05-22 PROCEDURE — 83735 ASSAY OF MAGNESIUM: CPT

## 2023-05-22 PROCEDURE — 83605 ASSAY OF LACTIC ACID: CPT

## 2023-05-22 PROCEDURE — 82962 GLUCOSE BLOOD TEST: CPT

## 2023-05-22 PROCEDURE — 94640 AIRWAY INHALATION TREATMENT: CPT

## 2023-05-22 PROCEDURE — 1200000000 HC SEMI PRIVATE

## 2023-05-22 RX ORDER — FLUTICASONE PROPIONATE 110 UG/1
4 AEROSOL, METERED RESPIRATORY (INHALATION) 2 TIMES DAILY
Status: DISCONTINUED | OUTPATIENT
Start: 2023-05-22 | End: 2023-05-23 | Stop reason: HOSPADM

## 2023-05-22 RX ORDER — VANCOMYCIN 2 G/400ML
2000 INJECTION, SOLUTION INTRAVENOUS ONCE
Status: COMPLETED | OUTPATIENT
Start: 2023-05-22 | End: 2023-05-22

## 2023-05-22 RX ORDER — ACETYLCYSTEINE 100 MG/ML
4 SOLUTION ORAL; RESPIRATORY (INHALATION) EVERY 4 HOURS
Status: COMPLETED | OUTPATIENT
Start: 2023-05-22 | End: 2023-05-22

## 2023-05-22 RX ORDER — GUAIFENESIN 600 MG/1
600 TABLET, EXTENDED RELEASE ORAL 2 TIMES DAILY
Status: DISCONTINUED | OUTPATIENT
Start: 2023-05-22 | End: 2023-05-23 | Stop reason: HOSPADM

## 2023-05-22 RX ORDER — FLUTICASONE PROPIONATE 220 UG/1
2 AEROSOL, METERED RESPIRATORY (INHALATION) 2 TIMES DAILY
Status: DISCONTINUED | OUTPATIENT
Start: 2023-05-22 | End: 2023-05-22 | Stop reason: RX

## 2023-05-22 RX ORDER — ATORVASTATIN CALCIUM 40 MG/1
40 TABLET, FILM COATED ORAL NIGHTLY
Status: DISCONTINUED | OUTPATIENT
Start: 2023-05-22 | End: 2023-05-23 | Stop reason: HOSPADM

## 2023-05-22 RX ORDER — VANCOMYCIN 1 G/200ML
1000 INJECTION, SOLUTION INTRAVENOUS EVERY 24 HOURS
Status: DISCONTINUED | OUTPATIENT
Start: 2023-05-23 | End: 2023-05-23

## 2023-05-22 RX ADMIN — SODIUM CHLORIDE, PRESERVATIVE FREE 10 ML: 5 INJECTION INTRAVENOUS at 20:36

## 2023-05-22 RX ADMIN — HYDRALAZINE HYDROCHLORIDE 25 MG: 25 TABLET, FILM COATED ORAL at 20:38

## 2023-05-22 RX ADMIN — PANTOPRAZOLE SODIUM 40 MG: 40 TABLET, DELAYED RELEASE ORAL at 10:07

## 2023-05-22 RX ADMIN — ASPIRIN 81 MG: 81 TABLET, COATED ORAL at 10:07

## 2023-05-22 RX ADMIN — GUAIFENESIN 600 MG: 600 TABLET, EXTENDED RELEASE ORAL at 10:07

## 2023-05-22 RX ADMIN — HYDRALAZINE HYDROCHLORIDE 25 MG: 25 TABLET, FILM COATED ORAL at 10:07

## 2023-05-22 RX ADMIN — FERROUS SULFATE TAB 325 MG (65 MG ELEMENTAL FE) 325 MG: 325 (65 FE) TAB at 10:07

## 2023-05-22 RX ADMIN — FLUTICASONE PROPIONATE 4 PUFF: 110 AEROSOL, METERED RESPIRATORY (INHALATION) at 07:31

## 2023-05-22 RX ADMIN — CEFEPIME 2000 MG: 2 INJECTION, POWDER, FOR SOLUTION INTRAVENOUS at 16:33

## 2023-05-22 RX ADMIN — CEFEPIME 2000 MG: 1 INJECTION, POWDER, FOR SOLUTION INTRAMUSCULAR; INTRAVENOUS at 03:14

## 2023-05-22 RX ADMIN — DULOXETINE 60 MG: 60 CAPSULE, DELAYED RELEASE ORAL at 10:07

## 2023-05-22 RX ADMIN — IPRATROPIUM BROMIDE AND ALBUTEROL SULFATE 1 AMPULE: .5; 2.5 SOLUTION RESPIRATORY (INHALATION) at 07:32

## 2023-05-22 RX ADMIN — ACETYLCYSTEINE 400 MG: 100 INHALANT RESPIRATORY (INHALATION) at 07:31

## 2023-05-22 RX ADMIN — FLUTICASONE PROPIONATE 4 PUFF: 110 AEROSOL, METERED RESPIRATORY (INHALATION) at 19:25

## 2023-05-22 RX ADMIN — IPRATROPIUM BROMIDE AND ALBUTEROL SULFATE 1 AMPULE: .5; 2.5 SOLUTION RESPIRATORY (INHALATION) at 19:17

## 2023-05-22 RX ADMIN — SODIUM CHLORIDE, PRESERVATIVE FREE 10 ML: 5 INJECTION INTRAVENOUS at 10:10

## 2023-05-22 RX ADMIN — SODIUM CHLORIDE: 9 INJECTION, SOLUTION INTRAVENOUS at 13:09

## 2023-05-22 RX ADMIN — INSULIN GLARGINE 40 UNITS: 100 INJECTION, SOLUTION SUBCUTANEOUS at 20:38

## 2023-05-22 RX ADMIN — ATORVASTATIN CALCIUM 40 MG: 40 TABLET, FILM COATED ORAL at 20:38

## 2023-05-22 RX ADMIN — AMLODIPINE BESYLATE 10 MG: 10 TABLET ORAL at 10:07

## 2023-05-22 RX ADMIN — ENOXAPARIN SODIUM 40 MG: 100 INJECTION SUBCUTANEOUS at 10:07

## 2023-05-22 RX ADMIN — VANCOMYCIN 2000 MG: 2 INJECTION, SOLUTION INTRAVENOUS at 13:11

## 2023-05-22 RX ADMIN — GUAIFENESIN 600 MG: 600 TABLET, EXTENDED RELEASE ORAL at 20:38

## 2023-05-22 RX ADMIN — Medication 1 TABLET: at 13:13

## 2023-05-22 RX ADMIN — IPRATROPIUM BROMIDE AND ALBUTEROL SULFATE 1 AMPULE: .5; 2.5 SOLUTION RESPIRATORY (INHALATION) at 10:30

## 2023-05-22 RX ADMIN — FERROUS SULFATE TAB 325 MG (65 MG ELEMENTAL FE) 325 MG: 325 (65 FE) TAB at 16:33

## 2023-05-22 RX ADMIN — IPRATROPIUM BROMIDE AND ALBUTEROL SULFATE 1 AMPULE: .5; 2.5 SOLUTION RESPIRATORY (INHALATION) at 14:47

## 2023-05-22 NOTE — CARE COORDINATION
05/22/23 0715   Service Assessment   Patient Orientation Other (see comment)  (A&O X 2)   Cognition Dementia / Early Alzheimer's   History Provided By Medical Record   Primary Caregiver Other (Comment)  (Resident at [address] [place] [address] (assisted living))   [address] is: Legal Next of Elisha Roldan   PCP Verified by CM Yes   Prior Functional Level Assistance with the following:;Cooking;Housework; Shopping;Mobility; Bathing;Dressing; Toileting   Current Functional Level Cooking;Housework; Shopping;Mobility;Assistance with the following:;Bathing;Dressing; Toileting   Can patient return to prior living arrangement Yes   Ability to make needs known: Poor   Family able to assist with home care needs: Other (comment)  (Resident in an assisted living facility)   Would you like for me to discuss the discharge plan with any other family members/significant others, and if so, who? Yes  (Family)   Financial Resources [facility]   Social/Functional History   Lives With Other (comment)  ([facility] (assisted living))   Type of [address] One level   Home Access Level entry   [address] Help From Other (comment)  (Resident at an assisted living facility)   ADL Assistance Needs assistance   Toileting Needs assistance   [address] Responsibilities No   Ambulation Assistance Needs assistance   Transfer Assistance Needs assistance   Active  No   Patient's [address] Other ([place])  ([facility] (assisted living))   Potential Assistance Needed N/A   DME Ordered?  No   Potential Assistance Purchasing Medications No   Type of Home Care Services None   Patient expects to be discharged to: Assisted living

## 2023-05-23 VITALS
RESPIRATION RATE: 16 BRPM | WEIGHT: 180.6 LBS | SYSTOLIC BLOOD PRESSURE: 163 MMHG | HEIGHT: 64 IN | HEART RATE: 78 BPM | TEMPERATURE: 97.6 F | DIASTOLIC BLOOD PRESSURE: 58 MMHG | OXYGEN SATURATION: 96 % | BODY MASS INDEX: 30.83 KG/M2

## 2023-05-23 LAB
ANION GAP SERPL CALCULATED.3IONS-SCNC: 12 MMOL/L (ref 4–16)
BUN SERPL-MCNC: 27 MG/DL (ref 6–23)
CALCIUM SERPL-MCNC: 9.2 MG/DL (ref 8.3–10.6)
CHLORIDE BLD-SCNC: 109 MMOL/L (ref 99–110)
CO2: 18 MMOL/L (ref 21–32)
CREAT SERPL-MCNC: 1.6 MG/DL (ref 0.6–1.1)
GFR SERPL CREATININE-BSD FRML MDRD: 33 ML/MIN/1.73M2
GLUCOSE BLD-MCNC: 203 MG/DL (ref 70–99)
GLUCOSE BLD-MCNC: 80 MG/DL (ref 70–99)
GLUCOSE SERPL-MCNC: 84 MG/DL (ref 70–99)
HCT VFR BLD CALC: 26.6 % (ref 37–47)
HEMOGLOBIN: 8.1 GM/DL (ref 12.5–16)
MCH RBC QN AUTO: 28.5 PG (ref 27–31)
MCHC RBC AUTO-ENTMCNC: 30.5 % (ref 32–36)
MCV RBC AUTO: 93.7 FL (ref 78–100)
PDW BLD-RTO: 13.8 % (ref 11.7–14.9)
PLATELET # BLD: 162 K/CU MM (ref 140–440)
PMV BLD AUTO: 9.7 FL (ref 7.5–11.1)
POTASSIUM SERPL-SCNC: 4.2 MMOL/L (ref 3.5–5.1)
PROCALCITONIN SERPL-MCNC: 0.26 NG/ML
RBC # BLD: 2.84 M/CU MM (ref 4.2–5.4)
SODIUM BLD-SCNC: 139 MMOL/L (ref 135–145)
WBC # BLD: 7.9 K/CU MM (ref 4–10.5)

## 2023-05-23 PROCEDURE — 6370000000 HC RX 637 (ALT 250 FOR IP): Performed by: NURSE PRACTITIONER

## 2023-05-23 PROCEDURE — 36415 COLL VENOUS BLD VENIPUNCTURE: CPT

## 2023-05-23 PROCEDURE — 94761 N-INVAS EAR/PLS OXIMETRY MLT: CPT

## 2023-05-23 PROCEDURE — 82962 GLUCOSE BLOOD TEST: CPT

## 2023-05-23 PROCEDURE — 85027 COMPLETE CBC AUTOMATED: CPT

## 2023-05-23 PROCEDURE — 84145 PROCALCITONIN (PCT): CPT

## 2023-05-23 PROCEDURE — 2580000003 HC RX 258: Performed by: NURSE PRACTITIONER

## 2023-05-23 PROCEDURE — 80048 BASIC METABOLIC PNL TOTAL CA: CPT

## 2023-05-23 PROCEDURE — 6360000002 HC RX W HCPCS: Performed by: NURSE PRACTITIONER

## 2023-05-23 PROCEDURE — 94640 AIRWAY INHALATION TREATMENT: CPT

## 2023-05-23 RX ORDER — ACETAMINOPHEN 500 MG
500 TABLET ORAL EVERY 6 HOURS PRN
COMMUNITY

## 2023-05-23 RX ORDER — LEVOTHYROXINE SODIUM 175 UG/1
175 TABLET ORAL DAILY
Qty: 30 TABLET | Refills: 0 | Status: SHIPPED | OUTPATIENT
Start: 2023-05-24

## 2023-05-23 RX ORDER — POLYETHYLENE GLYCOL 3350 17 G/17G
17 POWDER, FOR SOLUTION ORAL DAILY PRN
COMMUNITY

## 2023-05-23 RX ORDER — M-VIT,TX,IRON,MINS/CALC/FOLIC 27MG-0.4MG
1 TABLET ORAL DAILY
COMMUNITY

## 2023-05-23 RX ORDER — INSULIN HUMAN 500 [IU]/ML
15 INJECTION, SOLUTION SUBCUTANEOUS
COMMUNITY

## 2023-05-23 RX ORDER — FERROUS SULFATE 325(65) MG
325 TABLET ORAL 2 TIMES DAILY WITH MEALS
Qty: 60 TABLET | Refills: 0 | Status: SHIPPED | OUTPATIENT
Start: 2023-05-23

## 2023-05-23 RX ORDER — GUAIFENESIN 600 MG/1
600 TABLET, EXTENDED RELEASE ORAL 2 TIMES DAILY
COMMUNITY
Start: 2023-05-23

## 2023-05-23 RX ADMIN — ASPIRIN 81 MG: 81 TABLET, COATED ORAL at 08:55

## 2023-05-23 RX ADMIN — DULOXETINE 60 MG: 60 CAPSULE, DELAYED RELEASE ORAL at 08:56

## 2023-05-23 RX ADMIN — FERROUS SULFATE TAB 325 MG (65 MG ELEMENTAL FE) 325 MG: 325 (65 FE) TAB at 08:55

## 2023-05-23 RX ADMIN — AMLODIPINE BESYLATE 10 MG: 10 TABLET ORAL at 08:55

## 2023-05-23 RX ADMIN — GUAIFENESIN 600 MG: 600 TABLET, EXTENDED RELEASE ORAL at 08:55

## 2023-05-23 RX ADMIN — SODIUM CHLORIDE, PRESERVATIVE FREE 10 ML: 5 INJECTION INTRAVENOUS at 08:55

## 2023-05-23 RX ADMIN — FLUTICASONE PROPIONATE 4 PUFF: 110 AEROSOL, METERED RESPIRATORY (INHALATION) at 08:00

## 2023-05-23 RX ADMIN — ENOXAPARIN SODIUM 40 MG: 100 INJECTION SUBCUTANEOUS at 08:56

## 2023-05-23 RX ADMIN — MUPIROCIN: 20 OINTMENT TOPICAL at 08:55

## 2023-05-23 RX ADMIN — PANTOPRAZOLE SODIUM 40 MG: 40 TABLET, DELAYED RELEASE ORAL at 08:56

## 2023-05-23 RX ADMIN — LEVOTHYROXINE SODIUM 175 MCG: 0.03 TABLET ORAL at 05:10

## 2023-05-23 RX ADMIN — IPRATROPIUM BROMIDE AND ALBUTEROL SULFATE 1 AMPULE: .5; 2.5 SOLUTION RESPIRATORY (INHALATION) at 08:01

## 2023-05-23 RX ADMIN — IPRATROPIUM BROMIDE AND ALBUTEROL SULFATE 1 AMPULE: .5; 2.5 SOLUTION RESPIRATORY (INHALATION) at 11:14

## 2023-05-23 RX ADMIN — CEFEPIME 2000 MG: 2 INJECTION, POWDER, FOR SOLUTION INTRAVENOUS at 03:10

## 2023-05-23 RX ADMIN — HYDRALAZINE HYDROCHLORIDE 25 MG: 25 TABLET, FILM COATED ORAL at 08:56

## 2023-05-23 NOTE — PROGRESS NOTES
[facility]  consulted by Otto Madrid CNP for monitoring and adjustment. Indication for treatment: Vancomycin indication: CAP with risk factors  Goal trough: Trough Goal: 15-20 mcg/mL  AUC/JOHNSON: 400-600    Risk Factors for MRSA Identified:   Documented isolation of MRSA within 1 year     Pertinent Laboratory Values:   Temp Readings from Last 3 Encounters:   05/22/23 98.3 °F (36.8 °C) (Oral)   11/14/22 97.6 °F (36.4 °C) (Oral)   05/22/22 96.2 °F (35.7 °C)     Recent Labs     05/20/23  1100 05/21/23  0525   WBC 11.2* 11.6*   LACTATE  --  2.2*     Recent Labs     05/20/23  1100 05/21/23  0525 05/22/23  0826   BUN 31* 28* 27*   CREATININE 1.7* 1.5* 1.5*     Estimated Creatinine Clearance: 33 mL/min (A) (based on SCr of 1.5 mg/dL (H)). Intake/Output Summary (Last 24 hours) at 5/22/2023 1043  Last data filed at 5/22/2023 2182  Gross per 24 hour   Intake 2425.2 ml   Output 1400 ml   Net 1025.2 ml       Pertinent Cultures:   Date    Source    Results  5/20   Blood    No growth  5/20   Urine Antigens   Negative  5/20   MRSA by PCR  POSITIVE  5/20   Respiratory Panel  Negative    Vancomycin level:   TROUGH:  No results for input(s): VANCOTROUGH in the last 72 hours. RANDOM:  No results for input(s): VANCORANDOM in the last 72 hours. Assessment:  HPI: Sara Torres is a 69 y.o. female who presented with fever and fatigue. Arrived from [facility]. Report patient had a temp of 100.2.  SCr, BUN, and urine output: LAURA on CKD3b, Solitary kidney, Baseline approximately 1.3  Day(s) of therapy: 1 of 5  Vancomycin concentration: to be collected    Plan:  Vancomycin 2000mg IV loading dose followed by vancomycin 1000mg IV daily  Predicted trough 16.7 and   Pharmacy will continue to monitor patient and adjust therapy as indicated    VANCOMYCIN CONCENTRATION SCHEDULED FOR 5/24 @ 0600    Thank you for the consult.   Dariusz Peña, [address] [place] [address]  5/22/2023 10:43 AM
Patient refused to have AM blood draw done. States \"do not stick me with that needle\" and attempted to remove it from the RN's hand.  Hospitalist Lex Yepez notified of refusal.
Patient transferred to [facility] by [facility] with all belongings.
Phone call to , Michael Miguel - informed of transfer to [facility], he is upset stating Lonsergio Silva didn't  someone tell me, we could have transported her, the last time it cost me $250.00 dollars, I'm trying to watch my money, someone needs to communicate\" apologized and informed him I would let case management know for future visits.
Report called to Vera at [facility], all questions answered, verbalized understanding, informed patient will be transferred at 1230.
The lungs are without acute focal process. There is no effusion or pneumothorax. The cardiomediastinal silhouette is stable. The osseous structures are stable. No acute process.              Electronically signed by MILADIS Leon CNP on 5/22/2023 at 10:51 AM

## 2023-05-23 NOTE — PLAN OF CARE
Problem: Discharge Planning  Goal: Discharge to home or other facility with appropriate resources  5/23/2023 1236 by April Lawrence RN  Outcome: Completed  5/22/2023 2313 by Sebas Inman RN  Outcome: Progressing     Problem: Skin/Tissue Integrity  Goal: Absence of new skin breakdown  Description: 1. Monitor for areas of redness and/or skin breakdown  2. Assess vascular access sites hourly  3. Every 4-6 hours minimum:  Change oxygen saturation probe site  4. Every 4-6 hours:  If on nasal continuous positive airway pressure, respiratory therapy assess nares and determine need for appliance change or resting period.   5/23/2023 1236 by April Lawrence RN  Outcome: Completed  5/22/2023 2313 by Sebas Inman RN  Outcome: Progressing     Problem: Safety - Adult  Goal: Free from fall injury  5/23/2023 1236 by April Lawrence RN  Outcome: Completed  5/22/2023 2313 by Sebas Inman RN  Outcome: Progressing     Problem: Respiratory - Adult  Goal: Achieves optimal ventilation and oxygenation  5/23/2023 1236 by April Lawrence RN  Outcome: Completed  5/22/2023 2313 by Sebas Inman RN  Outcome: Progressing     Problem: Cardiovascular - Adult  Goal: Maintains optimal cardiac output and hemodynamic stability  5/23/2023 1236 by April Lawrence RN  Outcome: Completed  5/22/2023 2313 by Sebas Inman RN  Outcome: Progressing     Problem: Infection - Adult  Goal: Absence of infection at discharge  5/23/2023 1236 by April Lawrence RN  Outcome: Completed  5/22/2023 2313 by Sebas Inman RN  Outcome: Progressing     Problem: Metabolic/Fluid and Electrolytes - Adult  Goal: Electrolytes maintained within normal limits  5/23/2023 1236 by April Lawrence RN  Outcome: Completed  5/22/2023 2313 by Sebas Inman RN  Outcome: Progressing

## 2023-05-23 NOTE — PLAN OF CARE
Problem: Discharge Planning  Goal: Discharge to home or other facility with appropriate resources  5/22/2023 2313 by Patrica Benavides RN  Outcome: Progressing  5/22/2023 1032 by Pernell Harada, RN  Outcome: Progressing     Problem: Skin/Tissue Integrity  Goal: Absence of new skin breakdown  Description: 1. Monitor for areas of redness and/or skin breakdown  2. Assess vascular access sites hourly  3. Every 4-6 hours minimum:  Change oxygen saturation probe site  4. Every 4-6 hours:  If on nasal continuous positive airway pressure, respiratory therapy assess nares and determine need for appliance change or resting period.   5/22/2023 2313 by Patrica Benavides RN  Outcome: Progressing  5/22/2023 1032 by Pernell Harada, RN  Outcome: Progressing     Problem: Safety - Adult  Goal: Free from fall injury  5/22/2023 2313 by Patrica Benavides RN  Outcome: Progressing  5/22/2023 1032 by Pernell Harada, RN  Outcome: Progressing     Problem: Respiratory - Adult  Goal: Achieves optimal ventilation and oxygenation  Outcome: Progressing     Problem: Cardiovascular - Adult  Goal: Maintains optimal cardiac output and hemodynamic stability  Outcome: Progressing     Problem: Infection - Adult  Goal: Absence of infection at discharge  Outcome: Progressing     Problem: Metabolic/Fluid and Electrolytes - Adult  Goal: Electrolytes maintained within normal limits  Outcome: Progressing

## 2023-05-23 NOTE — CARE COORDINATION
BLANCHE spoke with Emmett Melendez at [facility] to notify her of the patient's discharge today and provide the time of transportation. 1:05 PM  BLANCHE faxed the patient's discharge instructions to [facility].

## 2023-05-23 NOTE — DISCHARGE SUMMARY
in the right upper lobe peripherally, and conglomerate measuring 1.4 x 0.7 cm. These may be within bronchiectatic airways. Elsewhere, there is diffuse bronchial wall thickening. There are probable filling defects seen within the lower lobe airways. Upper Abdomen: Spleen is moderately enlarged. Otherwise images of the upper abdomen are unremarkable. Soft Tissues/Bones: No acute bony abnormality identified. Visualized extrathoracic soft tissues unremarkable. Mild diffuse ground-glass opacity within both lungs, probably reflecting microatelectasis in the setting of low lung volumes, but pulmonary edema and infectious and inflammatory pneumonitis remain in the differential. Cluster of soft tissue nodules in the right upper lobe peripherally measuring 1.4 x 0.7 cm maximally. These have an inflammatory/infectious appearance and may actually reflect mucous plugging within a dilated airway. However, neoplasm does remain a differential consideration, and a short-term follow-up in 3 months is recommended to ensure stability. There are probable filling defects within the lower lung airways bilaterally, likely reflecting mucous plugging versus aspiration. XR CHEST PORTABLE    Result Date: 5/20/2023  EXAMINATION: ONE XRAY VIEW OF THE CHEST 5/20/2023 11:12 am COMPARISON: 11/11/2022 HISTORY: ORDERING SYSTEM PROVIDED HISTORY: chest pain TECHNOLOGIST PROVIDED HISTORY: Reason for exam:->chest pain Reason for Exam: chest pain Additional signs and symptoms: chest pain Relevant Medical/Surgical History: chest pain FINDINGS: The lungs are without acute focal process. There is no effusion or pneumothorax. The cardiomediastinal silhouette is stable. The osseous structures are stable. No acute process.        CBC:   Recent Labs     05/21/23  0525 05/22/23  0826 05/23/23  0730   WBC 11.6* 9.2 7.9   HGB 9.0* 8.5* 8.1*    157 162     BMP:    Recent Labs     05/21/23  0525 05/22/23  0826 05/23/23  0730    140 139

## 2023-05-23 NOTE — CARE COORDINATION
NURSING: The patient will be discharged to [facility] (assisted living) today. The [address] form is not required, please call report to [phone], and notify the patient's family. A rapid COVID-19 test is not required.

## 2023-05-26 LAB
CULTURE: NORMAL
CULTURE: NORMAL
Lab: NORMAL
Lab: NORMAL
SPECIMEN: NORMAL
SPECIMEN: NORMAL

## 2023-11-29 ENCOUNTER — HOSPITAL ENCOUNTER (INPATIENT)
Age: 78
LOS: 5 days | Discharge: ANOTHER HOSPITAL | End: 2023-12-04
Attending: EMERGENCY MEDICINE | Admitting: FAMILY MEDICINE
Payer: MEDICARE

## 2023-11-29 ENCOUNTER — APPOINTMENT (OUTPATIENT)
Dept: GENERAL RADIOLOGY | Age: 78
End: 2023-11-29
Payer: MEDICARE

## 2023-11-29 DIAGNOSIS — B34.8 RHINOVIRUS INFECTION: Primary | ICD-10-CM

## 2023-11-29 DIAGNOSIS — R09.02 HYPOXIA: ICD-10-CM

## 2023-11-29 DIAGNOSIS — J96.01 ACUTE RESPIRATORY FAILURE WITH HYPOXIA (HCC): ICD-10-CM

## 2023-11-29 LAB
ALBUMIN SERPL-MCNC: 2.4 GM/DL (ref 3.4–5)
ALP BLD-CCNC: 79 IU/L (ref 40–129)
ALT SERPL-CCNC: 7 U/L (ref 10–40)
ANION GAP SERPL CALCULATED.3IONS-SCNC: 13 MMOL/L (ref 4–16)
AST SERPL-CCNC: 10 IU/L (ref 15–37)
B PARAP IS1001 DNA NPH QL NAA+NON-PROBE: NOT DETECTED
B PERT.PT PRMT NPH QL NAA+NON-PROBE: NOT DETECTED
BACTERIA: ABNORMAL /HPF
BASOPHILS ABSOLUTE: 0.1 K/CU MM
BASOPHILS RELATIVE PERCENT: 0.4 % (ref 0–1)
BILIRUB SERPL-MCNC: 0.2 MG/DL (ref 0–1)
BILIRUBIN URINE: NEGATIVE MG/DL
BLOOD, URINE: ABNORMAL
BUN SERPL-MCNC: 35 MG/DL (ref 6–23)
C PNEUM DNA NPH QL NAA+NON-PROBE: NOT DETECTED
CALCIUM SERPL-MCNC: 8.8 MG/DL (ref 8.3–10.6)
CAST TYPE: ABNORMAL /HPF
CHLORIDE BLD-SCNC: 107 MMOL/L (ref 99–110)
CLARITY: ABNORMAL
CO2: 18 MMOL/L (ref 21–32)
COLOR: YELLOW
CREAT SERPL-MCNC: 2.5 MG/DL (ref 0.6–1.1)
CRYSTAL TYPE: ABNORMAL /HPF
DIFFERENTIAL TYPE: ABNORMAL
EOSINOPHILS ABSOLUTE: 0.5 K/CU MM
EOSINOPHILS RELATIVE PERCENT: 3.9 % (ref 0–3)
EPITHELIAL CELLS, UA: 15 /HPF
FLUAV H1 2009 PAN RNA NPH NAA+NON-PROBE: NOT DETECTED
FLUAV H1 RNA NPH QL NAA+NON-PROBE: NOT DETECTED
FLUAV H3 RNA NPH QL NAA+NON-PROBE: NOT DETECTED
FLUAV RNA NPH QL NAA+NON-PROBE: NOT DETECTED
FLUBV RNA NPH QL NAA+NON-PROBE: NOT DETECTED
GFR SERPL CREATININE-BSD FRML MDRD: 19 ML/MIN/1.73M2
GLUCOSE BLD-MCNC: 172 MG/DL (ref 70–99)
GLUCOSE BLD-MCNC: 358 MG/DL (ref 70–99)
GLUCOSE SERPL-MCNC: 139 MG/DL (ref 70–99)
GLUCOSE, URINE: 250 MG/DL
HADV DNA NPH QL NAA+NON-PROBE: NOT DETECTED
HCOV 229E RNA NPH QL NAA+NON-PROBE: NOT DETECTED
HCOV HKU1 RNA NPH QL NAA+NON-PROBE: NOT DETECTED
HCOV NL63 RNA NPH QL NAA+NON-PROBE: NOT DETECTED
HCOV OC43 RNA NPH QL NAA+NON-PROBE: NOT DETECTED
HCT VFR BLD CALC: 27.9 % (ref 37–47)
HEMOGLOBIN: 8.5 GM/DL (ref 12.5–16)
HIGH SENSITIVE C-REACTIVE PROTEIN: 69.6 MG/L (ref 0–5)
HMPV RNA NPH QL NAA+NON-PROBE: NOT DETECTED
HPIV1 RNA NPH QL NAA+NON-PROBE: NOT DETECTED
HPIV2 RNA NPH QL NAA+NON-PROBE: NOT DETECTED
HPIV3 RNA NPH QL NAA+NON-PROBE: NOT DETECTED
HPIV4 RNA NPH QL NAA+NON-PROBE: NOT DETECTED
IMMATURE NEUTROPHIL %: 0.4 % (ref 0–0.43)
KETONES, URINE: 15 MG/DL
LACTIC ACID, SEPSIS: 0.5 MMOL/L (ref 0.4–2)
LACTIC ACID, SEPSIS: 0.5 MMOL/L (ref 0.4–2)
LEUKOCYTE ESTERASE, URINE: ABNORMAL
LYMPHOCYTES ABSOLUTE: 2.9 K/CU MM
LYMPHOCYTES RELATIVE PERCENT: 25.1 % (ref 24–44)
M PNEUMO DNA NPH QL NAA+NON-PROBE: NOT DETECTED
MCH RBC QN AUTO: 29.8 PG (ref 27–31)
MCHC RBC AUTO-ENTMCNC: 30.5 % (ref 32–36)
MCV RBC AUTO: 97.9 FL (ref 78–100)
MONOCYTES ABSOLUTE: 0.7 K/CU MM
MONOCYTES RELATIVE PERCENT: 6.3 % (ref 0–4)
NITRITE URINE, QUANTITATIVE: NEGATIVE
PDW BLD-RTO: 13.5 % (ref 11.7–14.9)
PH, URINE: 6.5 (ref 5–8)
PLATELET # BLD: 203 K/CU MM (ref 140–440)
PMV BLD AUTO: 9.2 FL (ref 7.5–11.1)
POTASSIUM SERPL-SCNC: 4.3 MMOL/L (ref 3.5–5.1)
PRO-BNP: 6177 PG/ML
PROTEIN UA: >300 MG/DL
RBC # BLD: 2.85 M/CU MM (ref 4.2–5.4)
RBC URINE: 2 /HPF (ref 0–6)
RSV RNA NPH QL NAA+NON-PROBE: NOT DETECTED
RV+EV RNA NPH QL NAA+NON-PROBE: ABNORMAL
SARS-COV-2 RNA NPH QL NAA+NON-PROBE: NOT DETECTED
SEGMENTED NEUTROPHILS ABSOLUTE COUNT: 7.4 K/CU MM
SEGMENTED NEUTROPHILS RELATIVE PERCENT: 63.9 % (ref 36–66)
SODIUM BLD-SCNC: 138 MMOL/L (ref 135–145)
SPECIFIC GRAVITY UA: 1.02 (ref 1–1.03)
TOTAL IMMATURE NEUTOROPHIL: 0.05 K/CU MM
TOTAL PROTEIN: 6.1 GM/DL (ref 6.4–8.2)
TROPONIN, HIGH SENSITIVITY: 55 NG/L (ref 0–14)
TROPONIN, HIGH SENSITIVITY: 55 NG/L (ref 0–14)
UROBILINOGEN, URINE: 0.2 MG/DL (ref 0.2–1)
WBC # BLD: 11.7 K/CU MM (ref 4–10.5)
WBC UA: 20 /HPF (ref 0–5)

## 2023-11-29 PROCEDURE — 87086 URINE CULTURE/COLONY COUNT: CPT

## 2023-11-29 PROCEDURE — 82800 BLOOD PH: CPT

## 2023-11-29 PROCEDURE — 85025 COMPLETE CBC W/AUTO DIFF WBC: CPT

## 2023-11-29 PROCEDURE — 81001 URINALYSIS AUTO W/SCOPE: CPT

## 2023-11-29 PROCEDURE — 6360000002 HC RX W HCPCS: Performed by: NURSE PRACTITIONER

## 2023-11-29 PROCEDURE — 84300 ASSAY OF URINE SODIUM: CPT

## 2023-11-29 PROCEDURE — 2580000003 HC RX 258: Performed by: NURSE PRACTITIONER

## 2023-11-29 PROCEDURE — 6370000000 HC RX 637 (ALT 250 FOR IP): Performed by: EMERGENCY MEDICINE

## 2023-11-29 PROCEDURE — 6370000000 HC RX 637 (ALT 250 FOR IP): Performed by: NURSE PRACTITIONER

## 2023-11-29 PROCEDURE — 84133 ASSAY OF URINE POTASSIUM: CPT

## 2023-11-29 PROCEDURE — 0202U NFCT DS 22 TRGT SARS-COV-2: CPT

## 2023-11-29 PROCEDURE — 94640 AIRWAY INHALATION TREATMENT: CPT

## 2023-11-29 PROCEDURE — 93005 ELECTROCARDIOGRAM TRACING: CPT | Performed by: EMERGENCY MEDICINE

## 2023-11-29 PROCEDURE — 82436 ASSAY OF URINE CHLORIDE: CPT

## 2023-11-29 PROCEDURE — 87088 URINE BACTERIA CULTURE: CPT

## 2023-11-29 PROCEDURE — 87186 SC STD MICRODIL/AGAR DIL: CPT

## 2023-11-29 PROCEDURE — 83605 ASSAY OF LACTIC ACID: CPT

## 2023-11-29 PROCEDURE — 87150 DNA/RNA AMPLIFIED PROBE: CPT

## 2023-11-29 PROCEDURE — 84156 ASSAY OF PROTEIN URINE: CPT

## 2023-11-29 PROCEDURE — 80053 COMPREHEN METABOLIC PANEL: CPT

## 2023-11-29 PROCEDURE — 84484 ASSAY OF TROPONIN QUANT: CPT

## 2023-11-29 PROCEDURE — 86141 C-REACTIVE PROTEIN HS: CPT

## 2023-11-29 PROCEDURE — 83935 ASSAY OF URINE OSMOLALITY: CPT

## 2023-11-29 PROCEDURE — 2580000003 HC RX 258: Performed by: EMERGENCY MEDICINE

## 2023-11-29 PROCEDURE — 87449 NOS EACH ORGANISM AG IA: CPT

## 2023-11-29 PROCEDURE — 99285 EMERGENCY DEPT VISIT HI MDM: CPT

## 2023-11-29 PROCEDURE — 94150 VITAL CAPACITY TEST: CPT

## 2023-11-29 PROCEDURE — 87899 AGENT NOS ASSAY W/OPTIC: CPT

## 2023-11-29 PROCEDURE — 94761 N-INVAS EAR/PLS OXIMETRY MLT: CPT

## 2023-11-29 PROCEDURE — 82962 GLUCOSE BLOOD TEST: CPT

## 2023-11-29 PROCEDURE — 71045 X-RAY EXAM CHEST 1 VIEW: CPT

## 2023-11-29 PROCEDURE — 84145 PROCALCITONIN (PCT): CPT

## 2023-11-29 PROCEDURE — 87040 BLOOD CULTURE FOR BACTERIA: CPT

## 2023-11-29 PROCEDURE — 1200000000 HC SEMI PRIVATE

## 2023-11-29 PROCEDURE — 82570 ASSAY OF URINE CREATININE: CPT

## 2023-11-29 PROCEDURE — 83880 ASSAY OF NATRIURETIC PEPTIDE: CPT

## 2023-11-29 RX ORDER — BENZONATATE 100 MG/1
100 CAPSULE ORAL 3 TIMES DAILY PRN
Status: DISCONTINUED | OUTPATIENT
Start: 2023-11-29 | End: 2023-12-04 | Stop reason: HOSPADM

## 2023-11-29 RX ORDER — SODIUM CHLORIDE 0.9 % (FLUSH) 0.9 %
5-40 SYRINGE (ML) INJECTION EVERY 12 HOURS SCHEDULED
Status: DISCONTINUED | OUTPATIENT
Start: 2023-11-29 | End: 2023-12-04 | Stop reason: HOSPADM

## 2023-11-29 RX ORDER — SODIUM CHLORIDE 9 MG/ML
INJECTION, SOLUTION INTRAVENOUS CONTINUOUS
Status: DISCONTINUED | OUTPATIENT
Start: 2023-11-29 | End: 2023-11-30

## 2023-11-29 RX ORDER — 0.9 % SODIUM CHLORIDE 0.9 %
500 INTRAVENOUS SOLUTION INTRAVENOUS ONCE
Status: COMPLETED | OUTPATIENT
Start: 2023-11-29 | End: 2023-11-29

## 2023-11-29 RX ORDER — POLYVINYL ALCOHOL 14 MG/ML
1 SOLUTION/ DROPS OPHTHALMIC EVERY 4 HOURS PRN
Status: DISCONTINUED | OUTPATIENT
Start: 2023-11-29 | End: 2023-11-30 | Stop reason: RX

## 2023-11-29 RX ORDER — PANTOPRAZOLE SODIUM 40 MG/1
40 TABLET, DELAYED RELEASE ORAL DAILY
Status: DISCONTINUED | OUTPATIENT
Start: 2023-11-29 | End: 2023-12-04 | Stop reason: HOSPADM

## 2023-11-29 RX ORDER — AZITHROMYCIN 250 MG/1
250 TABLET, FILM COATED ORAL DAILY
Status: COMPLETED | OUTPATIENT
Start: 2023-11-29 | End: 2023-12-03

## 2023-11-29 RX ORDER — GUAIFENESIN 600 MG/1
600 TABLET, EXTENDED RELEASE ORAL 2 TIMES DAILY
Status: DISCONTINUED | OUTPATIENT
Start: 2023-11-29 | End: 2023-12-04 | Stop reason: HOSPADM

## 2023-11-29 RX ORDER — IPRATROPIUM BROMIDE AND ALBUTEROL SULFATE 2.5; .5 MG/3ML; MG/3ML
1 SOLUTION RESPIRATORY (INHALATION)
Status: DISCONTINUED | OUTPATIENT
Start: 2023-11-29 | End: 2023-12-04 | Stop reason: HOSPADM

## 2023-11-29 RX ORDER — INSULIN LISPRO 100 [IU]/ML
0.05 INJECTION, SOLUTION INTRAVENOUS; SUBCUTANEOUS
Status: DISCONTINUED | OUTPATIENT
Start: 2023-11-29 | End: 2023-12-01

## 2023-11-29 RX ORDER — INSULIN GLARGINE 100 [IU]/ML
40 INJECTION, SOLUTION SUBCUTANEOUS EVERY MORNING
Status: DISCONTINUED | OUTPATIENT
Start: 2023-11-30 | End: 2023-12-01

## 2023-11-29 RX ORDER — AMLODIPINE BESYLATE 10 MG/1
10 TABLET ORAL DAILY
Status: DISCONTINUED | OUTPATIENT
Start: 2023-11-29 | End: 2023-12-04 | Stop reason: HOSPADM

## 2023-11-29 RX ORDER — ASPIRIN 81 MG/1
81 TABLET ORAL DAILY
Status: DISCONTINUED | OUTPATIENT
Start: 2023-11-29 | End: 2023-12-04 | Stop reason: HOSPADM

## 2023-11-29 RX ORDER — ACETAMINOPHEN 500 MG
1000 TABLET ORAL ONCE
Status: COMPLETED | OUTPATIENT
Start: 2023-11-29 | End: 2023-11-29

## 2023-11-29 RX ORDER — SODIUM CHLORIDE 0.9 % (FLUSH) 0.9 %
5-40 SYRINGE (ML) INJECTION PRN
Status: DISCONTINUED | OUTPATIENT
Start: 2023-11-29 | End: 2023-12-04 | Stop reason: HOSPADM

## 2023-11-29 RX ORDER — PREDNISONE 20 MG/1
40 TABLET ORAL DAILY
Status: DISCONTINUED | OUTPATIENT
Start: 2023-11-29 | End: 2023-11-30

## 2023-11-29 RX ORDER — ACETAMINOPHEN 325 MG/1
650 TABLET ORAL EVERY 6 HOURS PRN
Status: DISCONTINUED | OUTPATIENT
Start: 2023-11-29 | End: 2023-12-04 | Stop reason: HOSPADM

## 2023-11-29 RX ORDER — ALBUTEROL SULFATE 90 UG/1
2 AEROSOL, METERED RESPIRATORY (INHALATION) ONCE
Status: COMPLETED | OUTPATIENT
Start: 2023-11-29 | End: 2023-11-29

## 2023-11-29 RX ORDER — ONDANSETRON 4 MG/1
4 TABLET, ORALLY DISINTEGRATING ORAL EVERY 8 HOURS PRN
Status: DISCONTINUED | OUTPATIENT
Start: 2023-11-29 | End: 2023-12-04 | Stop reason: HOSPADM

## 2023-11-29 RX ORDER — INSULIN LISPRO 100 [IU]/ML
0-4 INJECTION, SOLUTION INTRAVENOUS; SUBCUTANEOUS NIGHTLY
Status: DISCONTINUED | OUTPATIENT
Start: 2023-11-29 | End: 2023-11-30

## 2023-11-29 RX ORDER — SODIUM CHLORIDE 9 MG/ML
INJECTION, SOLUTION INTRAVENOUS CONTINUOUS
Status: DISCONTINUED | OUTPATIENT
Start: 2023-11-29 | End: 2023-11-29

## 2023-11-29 RX ORDER — ENOXAPARIN SODIUM 100 MG/ML
30 INJECTION SUBCUTANEOUS EVERY 24 HOURS
Status: DISCONTINUED | OUTPATIENT
Start: 2023-11-29 | End: 2023-12-04 | Stop reason: HOSPADM

## 2023-11-29 RX ORDER — ONDANSETRON 2 MG/ML
4 INJECTION INTRAMUSCULAR; INTRAVENOUS EVERY 6 HOURS PRN
Status: DISCONTINUED | OUTPATIENT
Start: 2023-11-29 | End: 2023-12-04 | Stop reason: HOSPADM

## 2023-11-29 RX ORDER — DEXTROSE MONOHYDRATE 100 MG/ML
INJECTION, SOLUTION INTRAVENOUS CONTINUOUS PRN
Status: DISCONTINUED | OUTPATIENT
Start: 2023-11-29 | End: 2023-12-04 | Stop reason: HOSPADM

## 2023-11-29 RX ORDER — HYDRALAZINE HYDROCHLORIDE 25 MG/1
25 TABLET, FILM COATED ORAL 2 TIMES DAILY
Status: DISCONTINUED | OUTPATIENT
Start: 2023-11-29 | End: 2023-12-04 | Stop reason: HOSPADM

## 2023-11-29 RX ORDER — INSULIN LISPRO 100 [IU]/ML
0-4 INJECTION, SOLUTION INTRAVENOUS; SUBCUTANEOUS
Status: DISCONTINUED | OUTPATIENT
Start: 2023-11-29 | End: 2023-11-30

## 2023-11-29 RX ORDER — ANORECTAL OINTMENT 15.7; .44; 24; 20.6 G/100G; G/100G; G/100G; G/100G
1 OINTMENT TOPICAL 2 TIMES DAILY PRN
Status: ON HOLD | COMMUNITY

## 2023-11-29 RX ORDER — SODIUM CHLORIDE 9 MG/ML
INJECTION, SOLUTION INTRAVENOUS PRN
Status: DISCONTINUED | OUTPATIENT
Start: 2023-11-29 | End: 2023-12-04 | Stop reason: HOSPADM

## 2023-11-29 RX ORDER — DULOXETIN HYDROCHLORIDE 60 MG/1
60 CAPSULE, DELAYED RELEASE ORAL DAILY
Status: DISCONTINUED | OUTPATIENT
Start: 2023-11-29 | End: 2023-11-30

## 2023-11-29 RX ORDER — INSULIN GLARGINE 100 [IU]/ML
12 INJECTION, SOLUTION SUBCUTANEOUS NIGHTLY
Status: ON HOLD | COMMUNITY

## 2023-11-29 RX ORDER — ACETAMINOPHEN 650 MG/1
650 SUPPOSITORY RECTAL EVERY 6 HOURS PRN
Status: DISCONTINUED | OUTPATIENT
Start: 2023-11-29 | End: 2023-12-04 | Stop reason: HOSPADM

## 2023-11-29 RX ORDER — ATENOLOL 50 MG/1
50 TABLET ORAL 2 TIMES DAILY
Status: DISCONTINUED | OUTPATIENT
Start: 2023-11-29 | End: 2023-12-04 | Stop reason: HOSPADM

## 2023-11-29 RX ADMIN — SODIUM CHLORIDE: 9 INJECTION, SOLUTION INTRAVENOUS at 15:12

## 2023-11-29 RX ADMIN — AZITHROMYCIN DIHYDRATE 250 MG: 250 TABLET, FILM COATED ORAL at 16:47

## 2023-11-29 RX ADMIN — ALBUTEROL SULFATE 2 PUFF: 108 AEROSOL, METERED RESPIRATORY (INHALATION) at 12:55

## 2023-11-29 RX ADMIN — ATENOLOL 50 MG: 50 TABLET ORAL at 21:05

## 2023-11-29 RX ADMIN — LEVOTHYROXINE SODIUM 175 MCG: 0.03 TABLET ORAL at 16:46

## 2023-11-29 RX ADMIN — AMLODIPINE BESYLATE 10 MG: 10 TABLET ORAL at 16:47

## 2023-11-29 RX ADMIN — INSULIN LISPRO 4 UNITS: 100 INJECTION, SOLUTION INTRAVENOUS; SUBCUTANEOUS at 21:09

## 2023-11-29 RX ADMIN — SODIUM CHLORIDE 500 ML: 9 INJECTION, SOLUTION INTRAVENOUS at 13:57

## 2023-11-29 RX ADMIN — IPRATROPIUM BROMIDE AND ALBUTEROL SULFATE 1 DOSE: 2.5; .5 SOLUTION RESPIRATORY (INHALATION) at 19:15

## 2023-11-29 RX ADMIN — ASPIRIN 81 MG: 81 TABLET, COATED ORAL at 16:47

## 2023-11-29 RX ADMIN — IPRATROPIUM BROMIDE AND ALBUTEROL SULFATE 1 DOSE: 2.5; .5 SOLUTION RESPIRATORY (INHALATION) at 15:16

## 2023-11-29 RX ADMIN — DULOXETINE HYDROCHLORIDE 60 MG: 60 CAPSULE, DELAYED RELEASE ORAL at 16:46

## 2023-11-29 RX ADMIN — INSULIN LISPRO 4 UNITS: 100 INJECTION, SOLUTION INTRAVENOUS; SUBCUTANEOUS at 17:43

## 2023-11-29 RX ADMIN — GUAIFENESIN 600 MG: 600 TABLET ORAL at 21:05

## 2023-11-29 RX ADMIN — SODIUM CHLORIDE, PRESERVATIVE FREE 10 ML: 5 INJECTION INTRAVENOUS at 21:05

## 2023-11-29 RX ADMIN — PREDNISONE 40 MG: 20 TABLET ORAL at 16:46

## 2023-11-29 RX ADMIN — ACETAMINOPHEN 1000 MG: 500 TABLET ORAL at 10:49

## 2023-11-29 RX ADMIN — PANTOPRAZOLE SODIUM 40 MG: 40 TABLET, DELAYED RELEASE ORAL at 16:47

## 2023-11-29 RX ADMIN — HYDRALAZINE HYDROCHLORIDE 25 MG: 25 TABLET, FILM COATED ORAL at 21:05

## 2023-11-29 RX ADMIN — ENOXAPARIN SODIUM 30 MG: 100 INJECTION SUBCUTANEOUS at 16:47

## 2023-11-29 ASSESSMENT — PAIN SCALES - GENERAL
PAINLEVEL_OUTOF10: 0

## 2023-11-29 ASSESSMENT — PAIN - FUNCTIONAL ASSESSMENT: PAIN_FUNCTIONAL_ASSESSMENT: 0-10

## 2023-11-29 NOTE — CARE COORDINATION
11/29/23 4876   Service Assessment   Patient Orientation Other (see comment)  (A&O X 1)   Cognition Dementia / Early Alzheimer's   History Provided By Medical Record   Primary Caregiver Other (Comment)  (Resident at [facility] (assisted living))   [address] [facility] [place] is: Legal Next of [facility]   PCP Verified by CM Yes   Prior Functional Level Assistance with the following:;Cooking;Housework; Shopping;Mobility; Bathing;Dressing; Toileting   Current Functional Level Cooking;Housework; Shopping;Mobility;Assistance with the following:;Bathing;Dressing; Toileting   Can patient return to prior living arrangement Yes   Ability to make needs known: Poor   Family able to assist with home care needs: Other (comment)  (Resident in an assisted living facility)   Would you like for me to discuss the discharge plan with any other family members/significant others, and if so, who? Yes  (Family)   Financial Resources [facility]   Social/Functional History   Lives With Other (comment)  ([facility] (assisted living))   Type of [address] [facility] One level   Home Access Level entry   [facility] Help From Other (comment)  (Resident at an assisted living facility)   ADL Assistance Needs assistance   Toileting Needs assistance   [address] [facility] Responsibilities No   Ambulation Assistance Needs assistance   Transfer Assistance Needs assistance   Active  No   Patient's [address] Other ([place])  ([facility] (assisted living))   Potential Assistance Needed N/A   DME Ordered?  No   Potential Assistance Purchasing Medications No   Type of Home Care Services None   Patient expects to be discharged to: Assisted living   History

## 2023-11-29 NOTE — ED PROVIDER NOTES
Emergency Department Encounter  Location: [address] [place] [address]    Patient: Chela Trujillo  MRN: [id number]  : 1945  Date of evaluation: 2023  ED Provider: Evi Kan DO, FACEP    Chief Complaint:    Shortness of Breath (Sp02 89% upon arrival to ER on room air ) and Cough (X3 days )    [place]:  Chela Trujillo is a 69 y.o. female that presents to the emergency department by squad from local nursing home. The patient was noted to be short of breath. Upon arrival to the ER the patient is 89% on room air. She had a cough for the past 3 days. Upon arrival to the ER the patient denies feeling short of breath and denies cough. She is denying chest pain. She denies nausea vomiting or diarrhea or other associated signs and symptoms. ROS - see HPI, below listed is current ROS at time of my eval:  At least 10 systems reviewed and otherwise acutely negative except as in the [address]   General:  No fevers, no chills, no weakness  Eyes:  No recent vison changes, no discharge  ENT:  No sore throat, no nasal congestion, no hearing changes  Cardiovascular:  No chest pain, no palpitations  Respiratory:  No shortness of breath, no cough, no wheezing  Gastrointestinal:  No pain, no nausea, no vomiting, no diarrhea  Musculoskeletal:  No muscle pain, no joint pain  Skin:  No rash, no pruritis, no easy bruising  Neurologic:  No speech problems, no headache, no extremity numbness, no extremity tingling, no extremity weakness  Psychiatric:  No anxiety  Genitourinary:  No dysuria, no hematuria  Endocrine:  No unexpected weight gain, no unexpected weight loss  Extremities:  no edema, no pain    Past Medical History:   Diagnosis Date    Acquired hypothyroidism 2021    Aortic atherosclerosis (HCC)     Cellulitis of right foot     CHF (congestive heart failure) (HCC)     Chronic kidney disease (CKD), stage III (moderate) (HCC)     Chronic pain syndrome 2017    Cognitive communication Result Value Ref Range    Ventricular Rate 66 BPM    Atrial Rate 66 BPM    QRS Duration 84 ms    Q-T Interval 440 ms    QTc Calculation (Bazett) 461 ms    R Axis -8 degrees    T Axis 19 degrees    Diagnosis       Accelerated Junctional rhythm  Possible Anterior infarct (cited on or before 11-NOV-2022)  Abnormal ECG  When compared with ECG of 20-MAY-2023 11:17,  Junctional rhythm has replaced Sinus rhythm  Questionable change in initial forces of Septal leads         EKG (if obtained): (All EKG's are interpreted by myself in the absence of a cardiologist)  The Ekg interpreted by me in the absence of a cardiologist shows. normal sinus rhythm with a rate of 66  Axis is   Normal  QTc is   461  Intervals and Durations are unremarkable. No specific ST-T wave changes appreciated. No evidence of acute ischemia. No significant change from prior EKG dated 20 May 2023    Radiographs (if obtained):  [] The following radiograph was interpreted by myself in the absence of a radiologist:  [x] Radiologist's Report reviewed at time of ED visit:  XR CHEST PORTABLE   Final Result   Findings consistent with bronchitis/pneumonitis worst in the hilar/perihilar   regions. Early/mild congestive heart failure, underlying chronic lung   disease and low lung volumes may contribute to this appearance. Otherwise,   radiographically nonacute portable chest.             ED Course and MDM:  Patient presents to the emergency department with complaints of shortness of breath. She is positive for rhinovirus and x-rays consistent with bronchitis pneumonitis. She also appears dry but her proBNP is elevated. Serum lactate is normal.  CC/HPI Summary, DDx, ED Course, and Reassessment: Here in the ER the patient has been given an albuterol breathing treatment. She has remained stable in her ER course and will be admitted.   Discussed her case with Dario Clemons, who is on-call for the hospitalist service and he has agreed to accept for

## 2023-11-29 NOTE — H&P
V2.0  History and Physical      Name:  Karlee Vargas /Age/Sex: [age]  (80 y.o. female)   MRN & CSN:  [id number] & [id number] Encounter Date/Time: 2023 1:59 PM EST   Location:   PCP: Juan Randall, [address] [place] [address],[address] Day: 1    Assessment and Plan:   Karlee Vargas is a 69 y.o. female  who presents with Acute kidney injury superimposed on CKD [facility]    Hospital Problems             Last Modified POA    * (Principal) Acute kidney injury superimposed on CKD ([address]) 2023 Yes       Plan:    Acute respiratory failure with hypoxia  Acute bronchitis  Rhinovirus infection  CXR \"Findings consistent with bronchitis/pneumonitis worst in the hilar/perihilar   regions. Early/mild congestive heart failure, underlying chronic lung disease and low lung volumes may contribute to this appearance. Otherwise, radiographically nonacute portable chest. \".    symptomatic with new oxygen requirement   Admit to Med/Surg  Respiratory interventions- IS, supplemental oxygen, continuous pulse oximetry, and TCDB. Wean oxygen as tolerated   Continue bronchodilators   Prednisone  Pending sputum culture, VBG, inflammatory/infectious markers  Antibiotics: azithromycin     LAURA on CKD stage IIIb  Solitary kidney  sCr 2.5 with baseline 1.3-1.5. Monitor lab trends with IVF  Pending UA/urine indices   Holding nephrotoxic agents-and renally dosing meds     Elevated troponin  With out chest pain  Chronically elevated- flat trend. 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 Last A1c 8.6 (2022)  Continue glargine.  Monitor FSBS and cover with SSI  Hold PO medications while inpatient     HTN-continue Norvasc/hydralazine/atenolol     Hypothyroid-continue Synthroid  Check TSH      Depression-continue Cymbalta     Hx of dementia

## 2023-11-29 NOTE — PLAN OF CARE
Problem: Pain  Goal: Verbalizes/displays adequate comfort level or baseline comfort level  Outcome: Progressing  Flowsheets (Taken 11/29/2023 1630)  Verbalizes/displays adequate comfort level or baseline comfort level:   Encourage patient to monitor pain and request assistance   Assess pain using appropriate pain scale   Administer analgesics based on type and severity of pain and evaluate response   Implement non-pharmacological measures as appropriate and evaluate response     Problem: Chronic Conditions and Co-morbidities  Goal: Patient's chronic conditions and co-morbidity symptoms are monitored and maintained or improved  Outcome: Progressing  Flowsheets (Taken 11/29/2023 1630)  Care Plan - Patient's Chronic Conditions and Co-Morbidity Symptoms are Monitored and Maintained or Improved: Monitor and assess patient's chronic conditions and comorbid symptoms for stability, deterioration, or improvement     Problem: Skin/Tissue Integrity  Goal: Absence of new skin breakdown  Description: 1. Monitor for areas of redness and/or skin breakdown  2. Assess vascular access sites hourly  3. Every 4-6 hours minimum:  Change oxygen saturation probe site  4. Every 4-6 hours:  If on nasal continuous positive airway pressure, respiratory therapy assess nares and determine need for appliance change or resting period.   Outcome: Progressing     Problem: Safety - Adult  Goal: Free from fall injury  Outcome: Progressing  Flowsheets (Taken 11/29/2023 1630)  Free From Fall Injury: Instruct family/caregiver on patient safety     Problem: ABCDS Injury Assessment  Goal: Absence of physical injury  Outcome: Progressing  Flowsheets (Taken 11/29/2023 1630)  Absence of Physical Injury: Implement safety measures based on patient assessment

## 2023-11-29 NOTE — PROGRESS NOTES
Tried to instruct patient on IS. Patient unable to follow instructions. Unable to do.     [address] [facility]

## 2023-11-30 ENCOUNTER — APPOINTMENT (OUTPATIENT)
Age: 78
End: 2023-11-30
Payer: MEDICARE

## 2023-11-30 LAB
ALBUMIN SERPL-MCNC: 2.5 GM/DL (ref 3.4–5)
ALP BLD-CCNC: 88 IU/L (ref 40–129)
ALT SERPL-CCNC: 9 U/L (ref 10–40)
ANION GAP SERPL CALCULATED.3IONS-SCNC: 13 MMOL/L (ref 4–16)
AST SERPL-CCNC: 10 IU/L (ref 15–37)
BASOPHILS ABSOLUTE: 0 K/CU MM
BASOPHILS RELATIVE PERCENT: 0.1 % (ref 0–1)
BILIRUB SERPL-MCNC: 0.2 MG/DL (ref 0–1)
BUN SERPL-MCNC: 39 MG/DL (ref 6–23)
CALCIUM SERPL-MCNC: 8.5 MG/DL (ref 8.3–10.6)
CHLORIDE BLD-SCNC: 109 MMOL/L (ref 99–110)
CHLORIDE URINE RANDOM: 58 MMOL/L (ref 43–210)
CO2: 16 MMOL/L (ref 21–32)
CREAT SERPL-MCNC: 2.7 MG/DL (ref 0.6–1.1)
DIFFERENTIAL TYPE: ABNORMAL
ECHO AO ROOT DIAM: 3 CM
ECHO AO ROOT INDEX: 1.68 CM/M2
ECHO AV AREA PEAK VELOCITY: 2.3 CM2
ECHO AV AREA VTI: 2.1 CM2
ECHO AV AREA/BSA PEAK VELOCITY: 1.3 CM2/M2
ECHO AV AREA/BSA VTI: 1.2 CM2/M2
ECHO AV MEAN GRADIENT: 4 MMHG
ECHO AV MEAN VELOCITY: 1 M/S
ECHO AV PEAK GRADIENT: 7 MMHG
ECHO AV PEAK VELOCITY: 1.3 M/S
ECHO AV VELOCITY RATIO: 0.77
ECHO AV VTI: 35.1 CM
ECHO BSA: 1.83 M2
ECHO EST RA PRESSURE: 5 MMHG
ECHO LV E' SEPTAL VELOCITY: 6 CM/S
ECHO LV FRACTIONAL SHORTENING: 42 % (ref 28–44)
ECHO LV INTERNAL DIMENSION DIASTOLE INDEX: 2.96 CM/M2
ECHO LV INTERNAL DIMENSION DIASTOLIC: 5.3 CM (ref 3.9–5.3)
ECHO LV INTERNAL DIMENSION SYSTOLIC INDEX: 1.73 CM/M2
ECHO LV INTERNAL DIMENSION SYSTOLIC: 3.1 CM
ECHO LV IVSD: 0.9 CM (ref 0.6–0.9)
ECHO LV MASS 2D: 187.3 G (ref 67–162)
ECHO LV MASS INDEX 2D: 104.6 G/M2 (ref 43–95)
ECHO LV POSTERIOR WALL DIASTOLIC: 1 CM (ref 0.6–0.9)
ECHO LV RELATIVE WALL THICKNESS RATIO: 0.38
ECHO LVOT AREA: 3.1 CM2
ECHO LVOT AV VTI INDEX: 0.67
ECHO LVOT DIAM: 2 CM
ECHO LVOT MEAN GRADIENT: 2 MMHG
ECHO LVOT PEAK GRADIENT: 4 MMHG
ECHO LVOT PEAK VELOCITY: 1 M/S
ECHO LVOT STROKE VOLUME INDEX: 41.4 ML/M2
ECHO LVOT SV: 74.1 ML
ECHO LVOT VTI: 23.6 CM
ECHO MV A VELOCITY: 1.46 M/S
ECHO MV E DECELERATION TIME (DT): 254 MS
ECHO MV E VELOCITY: 1.27 M/S
ECHO MV E/A RATIO: 0.87
ECHO RIGHT VENTRICULAR SYSTOLIC PRESSURE (RVSP): 25 MMHG
ECHO TV REGURGITANT MAX VELOCITY: 2.23 M/S
ECHO TV REGURGITANT PEAK GRADIENT: 20 MMHG
EOSINOPHILS ABSOLUTE: 0 K/CU MM
EOSINOPHILS RELATIVE PERCENT: 0 % (ref 0–3)
ESTIMATED AVERAGE GLUCOSE: 134 MG/DL
GFR SERPL CREATININE-BSD FRML MDRD: 18 ML/MIN/1.73M2
GLUCOSE BLD-MCNC: 318 MG/DL (ref 70–99)
GLUCOSE BLD-MCNC: 340 MG/DL (ref 70–99)
GLUCOSE BLD-MCNC: 362 MG/DL (ref 70–99)
GLUCOSE BLD-MCNC: 381 MG/DL (ref 70–99)
GLUCOSE SERPL-MCNC: 360 MG/DL (ref 70–99)
HBA1C MFR BLD: 6.3 % (ref 4.2–6.3)
HCT VFR BLD CALC: 27.2 % (ref 37–47)
HEMOGLOBIN: 8.5 GM/DL (ref 12.5–16)
HIGH SENSITIVE C-REACTIVE PROTEIN: 93.4 MG/L (ref 0–5)
IMMATURE NEUTROPHIL %: 1 % (ref 0–0.43)
L PNEUMO AG UR QL IA: NEGATIVE
LYMPHOCYTES ABSOLUTE: 0.9 K/CU MM
LYMPHOCYTES RELATIVE PERCENT: 10.9 % (ref 24–44)
MAGNESIUM: 2.2 MG/DL (ref 1.8–2.4)
MCH RBC QN AUTO: 30.1 PG (ref 27–31)
MCHC RBC AUTO-ENTMCNC: 31.3 % (ref 32–36)
MCV RBC AUTO: 96.5 FL (ref 78–100)
MONOCYTES ABSOLUTE: 0.1 K/CU MM
MONOCYTES RELATIVE PERCENT: 1.5 % (ref 0–4)
OSMOLALITY UR: 530 MOS/L (ref 292–1090)
PDW BLD-RTO: 13.2 % (ref 11.7–14.9)
PLATELET # BLD: 197 K/CU MM (ref 140–440)
PMV BLD AUTO: 10 FL (ref 7.5–11.1)
POTASSIUM SERPL-SCNC: 4.9 MMOL/L (ref 3.5–5.1)
POTASSIUM, UR: 48.3 MMOL/L (ref 22–119)
PRO-BNP: 5991 PG/ML
PROCALCITONIN SERPL-MCNC: 0.16 NG/ML
PROCALCITONIN SERPL-MCNC: 0.18 NG/ML
RBC # BLD: 2.82 M/CU MM (ref 4.2–5.4)
S PNEUM AG CSF QL: NORMAL
SEGMENTED NEUTROPHILS ABSOLUTE COUNT: 7 K/CU MM
SEGMENTED NEUTROPHILS RELATIVE PERCENT: 86.5 % (ref 36–66)
SODIUM BLD-SCNC: 138 MMOL/L (ref 135–145)
SODIUM URINE: 86 MMOL/L (ref 35–167)
TOTAL IMMATURE NEUTOROPHIL: 0.08 K/CU MM
TOTAL PROTEIN: 6.1 GM/DL (ref 6.4–8.2)
TSH SERPL DL<=0.005 MIU/L-ACNC: 0.57 UIU/ML (ref 0.27–4.2)
WBC # BLD: 8.1 K/CU MM (ref 4–10.5)

## 2023-11-30 PROCEDURE — 84145 PROCALCITONIN (PCT): CPT

## 2023-11-30 PROCEDURE — 84443 ASSAY THYROID STIM HORMONE: CPT

## 2023-11-30 PROCEDURE — 6370000000 HC RX 637 (ALT 250 FOR IP): Performed by: NURSE PRACTITIONER

## 2023-11-30 PROCEDURE — 80053 COMPREHEN METABOLIC PANEL: CPT

## 2023-11-30 PROCEDURE — 1200000000 HC SEMI PRIVATE

## 2023-11-30 PROCEDURE — 93306 TTE W/DOPPLER COMPLETE: CPT | Performed by: INTERNAL MEDICINE

## 2023-11-30 PROCEDURE — 94761 N-INVAS EAR/PLS OXIMETRY MLT: CPT

## 2023-11-30 PROCEDURE — 2580000003 HC RX 258: Performed by: NURSE PRACTITIONER

## 2023-11-30 PROCEDURE — 6360000002 HC RX W HCPCS: Performed by: NURSE PRACTITIONER

## 2023-11-30 PROCEDURE — 82962 GLUCOSE BLOOD TEST: CPT

## 2023-11-30 PROCEDURE — 93306 TTE W/DOPPLER COMPLETE: CPT

## 2023-11-30 PROCEDURE — 83880 ASSAY OF NATRIURETIC PEPTIDE: CPT

## 2023-11-30 PROCEDURE — 83735 ASSAY OF MAGNESIUM: CPT

## 2023-11-30 PROCEDURE — 36415 COLL VENOUS BLD VENIPUNCTURE: CPT

## 2023-11-30 PROCEDURE — 86141 C-REACTIVE PROTEIN HS: CPT

## 2023-11-30 PROCEDURE — 85025 COMPLETE CBC W/AUTO DIFF WBC: CPT

## 2023-11-30 PROCEDURE — 94640 AIRWAY INHALATION TREATMENT: CPT

## 2023-11-30 PROCEDURE — 83036 HEMOGLOBIN GLYCOSYLATED A1C: CPT

## 2023-11-30 RX ORDER — DULOXETIN HYDROCHLORIDE 30 MG/1
30 CAPSULE, DELAYED RELEASE ORAL DAILY
Status: DISCONTINUED | OUTPATIENT
Start: 2023-12-01 | End: 2023-12-04 | Stop reason: HOSPADM

## 2023-11-30 RX ORDER — INSULIN LISPRO 100 [IU]/ML
0-4 INJECTION, SOLUTION INTRAVENOUS; SUBCUTANEOUS NIGHTLY
Status: DISCONTINUED | OUTPATIENT
Start: 2023-11-30 | End: 2023-12-04 | Stop reason: HOSPADM

## 2023-11-30 RX ORDER — INSULIN LISPRO 100 [IU]/ML
0-16 INJECTION, SOLUTION INTRAVENOUS; SUBCUTANEOUS
Status: DISCONTINUED | OUTPATIENT
Start: 2023-11-30 | End: 2023-12-04 | Stop reason: HOSPADM

## 2023-11-30 RX ORDER — CARBOXYMETHYLCELLULOSE SODIUM 5 MG/ML
1 SOLUTION/ DROPS OPHTHALMIC EVERY 4 HOURS PRN
Status: DISCONTINUED | OUTPATIENT
Start: 2023-11-30 | End: 2023-12-04 | Stop reason: HOSPADM

## 2023-11-30 RX ORDER — PREDNISONE 20 MG/1
40 TABLET ORAL DAILY
Status: DISCONTINUED | OUTPATIENT
Start: 2023-12-01 | End: 2023-12-01

## 2023-11-30 RX ADMIN — IPRATROPIUM BROMIDE AND ALBUTEROL SULFATE 1 DOSE: 2.5; .5 SOLUTION RESPIRATORY (INHALATION) at 19:15

## 2023-11-30 RX ADMIN — INSULIN LISPRO 16 UNITS: 100 INJECTION, SOLUTION INTRAVENOUS; SUBCUTANEOUS at 13:21

## 2023-11-30 RX ADMIN — ATENOLOL 50 MG: 50 TABLET ORAL at 20:57

## 2023-11-30 RX ADMIN — INSULIN LISPRO 4 UNITS: 100 INJECTION, SOLUTION INTRAVENOUS; SUBCUTANEOUS at 13:22

## 2023-11-30 RX ADMIN — AZITHROMYCIN DIHYDRATE 250 MG: 250 TABLET, FILM COATED ORAL at 09:05

## 2023-11-30 RX ADMIN — ASPIRIN 81 MG: 81 TABLET, COATED ORAL at 09:00

## 2023-11-30 RX ADMIN — ENOXAPARIN SODIUM 30 MG: 100 INJECTION SUBCUTANEOUS at 17:33

## 2023-11-30 RX ADMIN — INSULIN LISPRO 12 UNITS: 100 INJECTION, SOLUTION INTRAVENOUS; SUBCUTANEOUS at 17:33

## 2023-11-30 RX ADMIN — INSULIN LISPRO 4 UNITS: 100 INJECTION, SOLUTION INTRAVENOUS; SUBCUTANEOUS at 17:33

## 2023-11-30 RX ADMIN — INSULIN LISPRO 4 UNITS: 100 INJECTION, SOLUTION INTRAVENOUS; SUBCUTANEOUS at 21:00

## 2023-11-30 RX ADMIN — ATENOLOL 50 MG: 50 TABLET ORAL at 09:00

## 2023-11-30 RX ADMIN — IPRATROPIUM BROMIDE AND ALBUTEROL SULFATE 1 DOSE: 2.5; .5 SOLUTION RESPIRATORY (INHALATION) at 07:45

## 2023-11-30 RX ADMIN — AMLODIPINE BESYLATE 10 MG: 10 TABLET ORAL at 09:00

## 2023-11-30 RX ADMIN — HYDRALAZINE HYDROCHLORIDE 25 MG: 25 TABLET, FILM COATED ORAL at 09:00

## 2023-11-30 RX ADMIN — INSULIN LISPRO 4 UNITS: 100 INJECTION, SOLUTION INTRAVENOUS; SUBCUTANEOUS at 09:11

## 2023-11-30 RX ADMIN — LEVOTHYROXINE SODIUM 175 MCG: 0.03 TABLET ORAL at 06:08

## 2023-11-30 RX ADMIN — IPRATROPIUM BROMIDE AND ALBUTEROL SULFATE 1 DOSE: 2.5; .5 SOLUTION RESPIRATORY (INHALATION) at 11:15

## 2023-11-30 RX ADMIN — PANTOPRAZOLE SODIUM 40 MG: 40 TABLET, DELAYED RELEASE ORAL at 09:00

## 2023-11-30 RX ADMIN — SODIUM CHLORIDE, PRESERVATIVE FREE 10 ML: 5 INJECTION INTRAVENOUS at 21:01

## 2023-11-30 RX ADMIN — DULOXETINE HYDROCHLORIDE 60 MG: 60 CAPSULE, DELAYED RELEASE ORAL at 08:59

## 2023-11-30 RX ADMIN — INSULIN GLARGINE 40 UNITS: 100 INJECTION, SOLUTION SUBCUTANEOUS at 09:07

## 2023-11-30 RX ADMIN — GUAIFENESIN 600 MG: 600 TABLET ORAL at 09:05

## 2023-11-30 RX ADMIN — INSULIN LISPRO 3 UNITS: 100 INJECTION, SOLUTION INTRAVENOUS; SUBCUTANEOUS at 09:06

## 2023-11-30 RX ADMIN — PREDNISONE 40 MG: 20 TABLET ORAL at 09:00

## 2023-11-30 RX ADMIN — GUAIFENESIN 600 MG: 600 TABLET ORAL at 20:57

## 2023-11-30 RX ADMIN — HYDRALAZINE HYDROCHLORIDE 25 MG: 25 TABLET, FILM COATED ORAL at 20:57

## 2023-11-30 ASSESSMENT — PAIN SCALES - GENERAL
PAINLEVEL_OUTOF10: 0
PAINLEVEL_OUTOF10: 0

## 2023-11-30 NOTE — PROGRESS NOTES
4 Eyes Skin Assessment     NAME:  Geraldo Doherty  YOB: 1945  MEDICAL RECORD NUMBER:  [id number]    The patient is being assessed for  Admission    I agree that at least one RN has performed a thorough Head to Toe Skin Assessment on the patient. ALL assessment sites listed below have been assessed. Areas assessed by both nurses:    Head, Face, Ears, Shoulders, Back, Chest, Arms, Elbows, Hands, Sacrum. Buttock, Coccyx, Ischium, and Legs. Feet and Heels        Does the Patient have a Wound?  No noted wound(s)       Shiraz Prevention initiated by RN: Yes  Wound Care Orders initiated by RN: No    Pressure Injury (Stage 3,4, Unstageable, DTI, NWPT, and Complex wounds) if present, place Wound referral order by RN under : No    New Ostomies, if present place, Ostomy referral order under : No     Nurse 1 eSignature: Electronically signed by Marylu Escalante RN on 11/29/23 at 7:19 PM EST    **SHARE this note so that the co-signing nurse can place an eSignature**    Nurse 2 eSignature: Electronically signed by Adelita Cha LPN on 85/35/50 at 40:23 PM EST

## 2023-11-30 NOTE — PLAN OF CARE
Problem: Pain  Goal: Verbalizes/displays adequate comfort level or baseline comfort level  11/29/2023 2320 by Adelita Cha LPN  Outcome: Progressing  11/29/2023 1630 by Marylu Escalante RN  Outcome: Progressing  Flowsheets (Taken 11/29/2023 1630)  Verbalizes/displays adequate comfort level or baseline comfort level:   Encourage patient to monitor pain and request assistance   Assess pain using appropriate pain scale   Administer analgesics based on type and severity of pain and evaluate response   Implement non-pharmacological measures as appropriate and evaluate response     Problem: Chronic Conditions and Co-morbidities  Goal: Patient's chronic conditions and co-morbidity symptoms are monitored and maintained or improved  11/29/2023 2320 by Adelita Cha LPN  Outcome: Progressing  11/29/2023 1630 by Marylu Escalante RN  Outcome: Progressing  Flowsheets (Taken 11/29/2023 1630)  Care Plan - Patient's Chronic Conditions and Co-Morbidity Symptoms are Monitored and Maintained or Improved: Monitor and assess patient's chronic conditions and comorbid symptoms for stability, deterioration, or improvement     Problem: Skin/Tissue Integrity  Goal: Absence of new skin breakdown  Description: 1. Monitor for areas of redness and/or skin breakdown  2. Assess vascular access sites hourly  3. Every 4-6 hours minimum:  Change oxygen saturation probe site  4. Every 4-6 hours:  If on nasal continuous positive airway pressure, respiratory therapy assess nares and determine need for appliance change or resting period.   11/29/2023 2320 by Adelita Cha LPN  Outcome: Progressing  11/29/2023 1630 by Marylu Escalante RN  Outcome: Progressing     Problem: Safety - Adult  Goal: Free from fall injury  11/29/2023 2320 by Adelita Cha LPN  Outcome: Progressing  11/29/2023 1630 by Marylu Escalante RN  Outcome: Progressing  Flowsheets (Taken 11/29/2023 1630)  Free From Fall Injury: Instruct family/caregiver on patient safety

## 2023-12-01 LAB
ALBUMIN SERPL-MCNC: 3.1 GM/DL (ref 3.4–5)
ALP BLD-CCNC: 125 IU/L (ref 40–129)
ALT SERPL-CCNC: 10 U/L (ref 10–40)
ANION GAP SERPL CALCULATED.3IONS-SCNC: 14 MMOL/L (ref 4–16)
AST SERPL-CCNC: 12 IU/L (ref 15–37)
BILIRUB SERPL-MCNC: 0.2 MG/DL (ref 0–1)
BUN SERPL-MCNC: 40 MG/DL (ref 6–23)
CALCIUM SERPL-MCNC: 9 MG/DL (ref 8.3–10.6)
CHLORIDE BLD-SCNC: 108 MMOL/L (ref 99–110)
CO2: 18 MMOL/L (ref 21–32)
CREAT SERPL-MCNC: 2.5 MG/DL (ref 0.6–1.1)
CREATININE URINE: 107.7 MG/DL (ref 28–217)
EKG ATRIAL RATE: 66 BPM
EKG DIAGNOSIS: NORMAL
EKG Q-T INTERVAL: 440 MS
EKG QRS DURATION: 84 MS
EKG QTC CALCULATION (BAZETT): 461 MS
EKG R AXIS: -8 DEGREES
EKG T AXIS: 19 DEGREES
EKG VENTRICULAR RATE: 66 BPM
GFR SERPL CREATININE-BSD FRML MDRD: 19 ML/MIN/1.73M2
GLUCOSE BLD-MCNC: 236 MG/DL (ref 70–99)
GLUCOSE BLD-MCNC: 262 MG/DL (ref 70–99)
GLUCOSE BLD-MCNC: 391 MG/DL (ref 70–99)
GLUCOSE BLD-MCNC: 420 MG/DL (ref 70–99)
GLUCOSE SERPL-MCNC: 407 MG/DL (ref 70–99)
HCT VFR BLD CALC: 31.2 % (ref 37–47)
HEMOGLOBIN: 9.8 GM/DL (ref 12.5–16)
MCH RBC QN AUTO: 30 PG (ref 27–31)
MCHC RBC AUTO-ENTMCNC: 31.4 % (ref 32–36)
MCV RBC AUTO: 95.4 FL (ref 78–100)
PDW BLD-RTO: 13.1 % (ref 11.7–14.9)
PLATELET # BLD: 253 K/CU MM (ref 140–440)
PMV BLD AUTO: 9.8 FL (ref 7.5–11.1)
POTASSIUM SERPL-SCNC: 4.5 MMOL/L (ref 3.5–5.1)
PROT/CREAT RATIO, UR: ABNORMAL
RBC # BLD: 3.27 M/CU MM (ref 4.2–5.4)
SODIUM BLD-SCNC: 140 MMOL/L (ref 135–145)
TOTAL PROTEIN: 6.7 GM/DL (ref 6.4–8.2)
URINE TOTAL PROTEIN: >600 MG/DL
WBC # BLD: 10.9 K/CU MM (ref 4–10.5)

## 2023-12-01 PROCEDURE — 94640 AIRWAY INHALATION TREATMENT: CPT

## 2023-12-01 PROCEDURE — 93010 ELECTROCARDIOGRAM REPORT: CPT | Performed by: INTERNAL MEDICINE

## 2023-12-01 PROCEDURE — 82962 GLUCOSE BLOOD TEST: CPT

## 2023-12-01 PROCEDURE — 6370000000 HC RX 637 (ALT 250 FOR IP): Performed by: NURSE PRACTITIONER

## 2023-12-01 PROCEDURE — 85027 COMPLETE CBC AUTOMATED: CPT

## 2023-12-01 PROCEDURE — 6360000002 HC RX W HCPCS: Performed by: NURSE PRACTITIONER

## 2023-12-01 PROCEDURE — 36415 COLL VENOUS BLD VENIPUNCTURE: CPT

## 2023-12-01 PROCEDURE — 1200000000 HC SEMI PRIVATE

## 2023-12-01 PROCEDURE — 80053 COMPREHEN METABOLIC PANEL: CPT

## 2023-12-01 PROCEDURE — 2580000003 HC RX 258: Performed by: NURSE PRACTITIONER

## 2023-12-01 PROCEDURE — 94761 N-INVAS EAR/PLS OXIMETRY MLT: CPT

## 2023-12-01 RX ORDER — FUROSEMIDE 20 MG/1
20 TABLET ORAL DAILY
Status: DISCONTINUED | OUTPATIENT
Start: 2023-12-01 | End: 2023-12-04 | Stop reason: HOSPADM

## 2023-12-01 RX ORDER — INSULIN GLARGINE 100 [IU]/ML
45 INJECTION, SOLUTION SUBCUTANEOUS EVERY MORNING
Status: DISCONTINUED | OUTPATIENT
Start: 2023-12-01 | End: 2023-12-04 | Stop reason: HOSPADM

## 2023-12-01 RX ORDER — INSULIN LISPRO 100 [IU]/ML
8 INJECTION, SOLUTION INTRAVENOUS; SUBCUTANEOUS
Status: DISCONTINUED | OUTPATIENT
Start: 2023-12-01 | End: 2023-12-02

## 2023-12-01 RX ADMIN — INSULIN LISPRO 16 UNITS: 100 INJECTION, SOLUTION INTRAVENOUS; SUBCUTANEOUS at 10:20

## 2023-12-01 RX ADMIN — HYDRALAZINE HYDROCHLORIDE 25 MG: 25 TABLET, FILM COATED ORAL at 20:56

## 2023-12-01 RX ADMIN — IPRATROPIUM BROMIDE AND ALBUTEROL SULFATE 1 DOSE: 2.5; .5 SOLUTION RESPIRATORY (INHALATION) at 21:39

## 2023-12-01 RX ADMIN — SODIUM CHLORIDE, PRESERVATIVE FREE 10 ML: 5 INJECTION INTRAVENOUS at 10:25

## 2023-12-01 RX ADMIN — IPRATROPIUM BROMIDE AND ALBUTEROL SULFATE 1 DOSE: 2.5; .5 SOLUTION RESPIRATORY (INHALATION) at 11:35

## 2023-12-01 RX ADMIN — SODIUM CHLORIDE, PRESERVATIVE FREE 10 ML: 5 INJECTION INTRAVENOUS at 20:56

## 2023-12-01 RX ADMIN — INSULIN LISPRO 16 UNITS: 100 INJECTION, SOLUTION INTRAVENOUS; SUBCUTANEOUS at 13:41

## 2023-12-01 RX ADMIN — IPRATROPIUM BROMIDE AND ALBUTEROL SULFATE 1 DOSE: 2.5; .5 SOLUTION RESPIRATORY (INHALATION) at 07:40

## 2023-12-01 RX ADMIN — INSULIN LISPRO 8 UNITS: 100 INJECTION, SOLUTION INTRAVENOUS; SUBCUTANEOUS at 10:21

## 2023-12-01 RX ADMIN — ASPIRIN 81 MG: 81 TABLET, COATED ORAL at 10:24

## 2023-12-01 RX ADMIN — ATENOLOL 50 MG: 50 TABLET ORAL at 10:24

## 2023-12-01 RX ADMIN — ATENOLOL 50 MG: 50 TABLET ORAL at 20:55

## 2023-12-01 RX ADMIN — FUROSEMIDE 20 MG: 20 TABLET ORAL at 10:28

## 2023-12-01 RX ADMIN — ENOXAPARIN SODIUM 30 MG: 100 INJECTION SUBCUTANEOUS at 18:49

## 2023-12-01 RX ADMIN — AZITHROMYCIN DIHYDRATE 250 MG: 250 TABLET, FILM COATED ORAL at 10:24

## 2023-12-01 RX ADMIN — INSULIN GLARGINE 45 UNITS: 100 INJECTION, SOLUTION SUBCUTANEOUS at 10:22

## 2023-12-01 RX ADMIN — GUAIFENESIN 600 MG: 600 TABLET ORAL at 10:23

## 2023-12-01 RX ADMIN — LEVOTHYROXINE SODIUM 175 MCG: 0.03 TABLET ORAL at 05:29

## 2023-12-01 RX ADMIN — PREDNISONE 40 MG: 20 TABLET ORAL at 10:24

## 2023-12-01 RX ADMIN — HYDRALAZINE HYDROCHLORIDE 25 MG: 25 TABLET, FILM COATED ORAL at 10:24

## 2023-12-01 RX ADMIN — INSULIN LISPRO 8 UNITS: 100 INJECTION, SOLUTION INTRAVENOUS; SUBCUTANEOUS at 18:49

## 2023-12-01 RX ADMIN — DULOXETINE 30 MG: 30 CAPSULE, DELAYED RELEASE ORAL at 10:24

## 2023-12-01 RX ADMIN — GUAIFENESIN 600 MG: 600 TABLET ORAL at 20:56

## 2023-12-01 RX ADMIN — PANTOPRAZOLE SODIUM 40 MG: 40 TABLET, DELAYED RELEASE ORAL at 10:24

## 2023-12-01 RX ADMIN — INSULIN LISPRO 4 UNITS: 100 INJECTION, SOLUTION INTRAVENOUS; SUBCUTANEOUS at 18:49

## 2023-12-01 RX ADMIN — INSULIN LISPRO 8 UNITS: 100 INJECTION, SOLUTION INTRAVENOUS; SUBCUTANEOUS at 13:42

## 2023-12-01 RX ADMIN — AMLODIPINE BESYLATE 10 MG: 10 TABLET ORAL at 10:24

## 2023-12-01 ASSESSMENT — PAIN SCALES - GENERAL
PAINLEVEL_OUTOF10: 0
PAINLEVEL_OUTOF10: 0

## 2023-12-01 NOTE — PROGRESS NOTES
This RN has reviewed and agrees with Jody Cox LPN's data collection and has collaborated with this LPN regarding the patient's care plan.

## 2023-12-01 NOTE — PROGRESS NOTES
V2.0    [facility] Progress Note      Name:  Savage Johns /Age/Sex:   (80 y.o. female)   MRN & CSN:  [id number] & [id number] Encounter Date/Time: 2023 9:28 AM EST   Location:  [facility]-01 PCP: TOÑITO Perez     Attending:Eduardo, [address] [address] [place] Day: 3    Assessment and Recommendations   Savage Johns is a 69 y.o. female  who presents with Acute kidney injury superimposed on CKD ([address] )      Plan:   Acute respiratory failure with hypoxia  Acute bronchitis  Rhinovirus infection  CXR \"Findings consistent with bronchitis/pneumonitis worst in the hilar/perihilar   regions. Early/mild congestive heart failure, underlying chronic lung disease and low lung volumes may contribute to this appearance. Otherwise, radiographically nonacute portable chest. \".    -symptomatic with new oxygen requirement on admission now weaned to room air   -resolving   -Continue bronchodilators/ pulmonary hygiene   -Prednisone 40mg daily x 3 days but discontinued d/t hyperglycemia  -Antibiotics: azithromycin day 3   -Dispo: hopefully discharge in next 24-48 hrs if renal function improves                 LAURA on CKD stage IIIb  -Solitary kidney  -sCr 2.5 and improved today   -Baseline 1.3-1.5.   -Monitor lab trends   -Urine indices essentially normal but significant proteinuria   -Holding nephrotoxic agents-and renally dosing meds     Elevated troponin  -With out chest pain  -Chronically elevated- flat trend. -EKG without acute ischemic changes   -Likely in the setting of renal dysfunction     Elevated BNP (6177)  -Chronically elevated but above baseline-repeat slightly improved   -Does not appear clinically hypervolemic .  Will restart PO lasix today   -550 mL urine output overnight   -possible CRS contributing to worsening renal function  -Weight trend review -17 pounds from previous admission (2023)  -UA is somewhat suggestive of infection but denies urinary symptoms-will hold on antibiotics

## 2023-12-01 NOTE — CARE COORDINATION
NURSING: Discharge is anticipated Saturday 12/2/23 or Foreign 12/3/23, the patient will return to her assisted living facility [facility] (formally [facility]). The [address] [facility] form is not required nor is a COVID test.  Please fax the discharge summary (if available) and the patient's discharge instructions to [phone] and call report to [phone]. Please also call the patient's family to notify them of the patient's discharge. Medical transportation can be arranged through [address] ([phone]).

## 2023-12-01 NOTE — PROGRESS NOTES
Spoke to Jhonatan Solomon CNP, informed of blood sugar 391 before lunch, he was aware, discontinued prednisone and changed insulin orders this morning, Lispro 24 units given to patient per sliding scale, currently still eating lunch.

## 2023-12-01 NOTE — PLAN OF CARE
Problem: Pain  Goal: Verbalizes/displays adequate comfort level or baseline comfort level  Outcome: Progressing     Problem: Chronic Conditions and Co-morbidities  Goal: Patient's chronic conditions and co-morbidity symptoms are monitored and maintained or improved  Outcome: Progressing     Problem: Skin/Tissue Integrity  Goal: Absence of new skin breakdown  Description: 1. Monitor for areas of redness and/or skin breakdown  2. Assess vascular access sites hourly  3. Every 4-6 hours minimum:  Change oxygen saturation probe site  4. Every 4-6 hours:  If on nasal continuous positive airway pressure, respiratory therapy assess nares and determine need for appliance change or resting period.   Outcome: Progressing     Problem: Safety - Adult  Goal: Free from fall injury  Outcome: Progressing     Problem: ABCDS Injury Assessment  Goal: Absence of physical injury  Outcome: Progressing

## 2023-12-01 NOTE — CARE COORDINATION
BLANCHE left a voicemail for Kaye Shepherd at [facility] notifying her that discharge is anticipated either Saturday 12/2/23 or Foreign 12/3/23.

## 2023-12-02 LAB
ANION GAP SERPL CALCULATED.3IONS-SCNC: 14 MMOL/L (ref 4–16)
BUN SERPL-MCNC: 46 MG/DL (ref 6–23)
CALCIUM SERPL-MCNC: 8.6 MG/DL (ref 8.3–10.6)
CHLORIDE BLD-SCNC: 106 MMOL/L (ref 99–110)
CO2: 16 MMOL/L (ref 21–32)
CREAT SERPL-MCNC: 2.4 MG/DL (ref 0.6–1.1)
GFR SERPL CREATININE-BSD FRML MDRD: 20 ML/MIN/1.73M2
GLUCOSE BLD-MCNC: 175 MG/DL (ref 70–99)
GLUCOSE BLD-MCNC: 206 MG/DL (ref 70–99)
GLUCOSE BLD-MCNC: 345 MG/DL (ref 70–99)
GLUCOSE BLD-MCNC: 409 MG/DL (ref 70–99)
GLUCOSE SERPL-MCNC: 429 MG/DL (ref 70–99)
POTASSIUM SERPL-SCNC: 5.4 MMOL/L (ref 3.5–5.1)
SODIUM BLD-SCNC: 136 MMOL/L (ref 135–145)

## 2023-12-02 PROCEDURE — 94640 AIRWAY INHALATION TREATMENT: CPT

## 2023-12-02 PROCEDURE — 94761 N-INVAS EAR/PLS OXIMETRY MLT: CPT

## 2023-12-02 PROCEDURE — 82962 GLUCOSE BLOOD TEST: CPT

## 2023-12-02 PROCEDURE — 6360000002 HC RX W HCPCS: Performed by: NURSE PRACTITIONER

## 2023-12-02 PROCEDURE — 6370000000 HC RX 637 (ALT 250 FOR IP): Performed by: NURSE PRACTITIONER

## 2023-12-02 PROCEDURE — 1200000000 HC SEMI PRIVATE

## 2023-12-02 PROCEDURE — 2580000003 HC RX 258: Performed by: NURSE PRACTITIONER

## 2023-12-02 PROCEDURE — 36415 COLL VENOUS BLD VENIPUNCTURE: CPT

## 2023-12-02 PROCEDURE — 80048 BASIC METABOLIC PNL TOTAL CA: CPT

## 2023-12-02 RX ORDER — INSULIN LISPRO 100 [IU]/ML
12 INJECTION, SOLUTION INTRAVENOUS; SUBCUTANEOUS
Status: DISCONTINUED | OUTPATIENT
Start: 2023-12-02 | End: 2023-12-03

## 2023-12-02 RX ORDER — SULFAMETHOXAZOLE AND TRIMETHOPRIM 800; 160 MG/1; MG/1
1 TABLET ORAL ONCE
Status: COMPLETED | OUTPATIENT
Start: 2023-12-02 | End: 2023-12-02

## 2023-12-02 RX ORDER — SULFAMETHOXAZOLE AND TRIMETHOPRIM 800; 160 MG/1; MG/1
0.5 TABLET ORAL EVERY 12 HOURS SCHEDULED
Status: DISCONTINUED | OUTPATIENT
Start: 2023-12-03 | End: 2023-12-03

## 2023-12-02 RX ORDER — SULFAMETHOXAZOLE AND TRIMETHOPRIM 800; 160 MG/1; MG/1
1 TABLET ORAL EVERY 12 HOURS SCHEDULED
Status: DISCONTINUED | OUTPATIENT
Start: 2023-12-02 | End: 2023-12-02 | Stop reason: DRUGHIGH

## 2023-12-02 RX ADMIN — INSULIN LISPRO 16 UNITS: 100 INJECTION, SOLUTION INTRAVENOUS; SUBCUTANEOUS at 09:11

## 2023-12-02 RX ADMIN — INSULIN LISPRO 4 UNITS: 100 INJECTION, SOLUTION INTRAVENOUS; SUBCUTANEOUS at 17:36

## 2023-12-02 RX ADMIN — IPRATROPIUM BROMIDE AND ALBUTEROL SULFATE 1 DOSE: 2.5; .5 SOLUTION RESPIRATORY (INHALATION) at 15:45

## 2023-12-02 RX ADMIN — FUROSEMIDE 20 MG: 20 TABLET ORAL at 09:09

## 2023-12-02 RX ADMIN — HYDRALAZINE HYDROCHLORIDE 25 MG: 25 TABLET, FILM COATED ORAL at 21:03

## 2023-12-02 RX ADMIN — INSULIN LISPRO 12 UNITS: 100 INJECTION, SOLUTION INTRAVENOUS; SUBCUTANEOUS at 12:30

## 2023-12-02 RX ADMIN — IPRATROPIUM BROMIDE AND ALBUTEROL SULFATE 1 DOSE: 2.5; .5 SOLUTION RESPIRATORY (INHALATION) at 21:38

## 2023-12-02 RX ADMIN — ACETAMINOPHEN 650 MG: 325 TABLET ORAL at 22:37

## 2023-12-02 RX ADMIN — INSULIN LISPRO 12 UNITS: 100 INJECTION, SOLUTION INTRAVENOUS; SUBCUTANEOUS at 17:38

## 2023-12-02 RX ADMIN — INSULIN LISPRO 12 UNITS: 100 INJECTION, SOLUTION INTRAVENOUS; SUBCUTANEOUS at 12:25

## 2023-12-02 RX ADMIN — LEVOTHYROXINE SODIUM 175 MCG: 0.03 TABLET ORAL at 05:57

## 2023-12-02 RX ADMIN — ASPIRIN 81 MG: 81 TABLET, COATED ORAL at 09:10

## 2023-12-02 RX ADMIN — PANTOPRAZOLE SODIUM 40 MG: 40 TABLET, DELAYED RELEASE ORAL at 09:09

## 2023-12-02 RX ADMIN — IPRATROPIUM BROMIDE AND ALBUTEROL SULFATE 1 DOSE: 2.5; .5 SOLUTION RESPIRATORY (INHALATION) at 07:55

## 2023-12-02 RX ADMIN — HYDRALAZINE HYDROCHLORIDE 25 MG: 25 TABLET, FILM COATED ORAL at 09:09

## 2023-12-02 RX ADMIN — ATENOLOL 50 MG: 50 TABLET ORAL at 21:03

## 2023-12-02 RX ADMIN — SULFAMETHOXAZOLE AND TRIMETHOPRIM 1 TABLET: 800; 160 TABLET ORAL at 21:06

## 2023-12-02 RX ADMIN — GUAIFENESIN 600 MG: 600 TABLET ORAL at 09:09

## 2023-12-02 RX ADMIN — ATENOLOL 50 MG: 50 TABLET ORAL at 09:09

## 2023-12-02 RX ADMIN — GUAIFENESIN 600 MG: 600 TABLET ORAL at 21:03

## 2023-12-02 RX ADMIN — AZITHROMYCIN DIHYDRATE 250 MG: 250 TABLET, FILM COATED ORAL at 09:09

## 2023-12-02 RX ADMIN — AMLODIPINE BESYLATE 10 MG: 10 TABLET ORAL at 09:09

## 2023-12-02 RX ADMIN — SODIUM CHLORIDE, PRESERVATIVE FREE 10 ML: 5 INJECTION INTRAVENOUS at 09:10

## 2023-12-02 RX ADMIN — SODIUM CHLORIDE, PRESERVATIVE FREE 10 ML: 5 INJECTION INTRAVENOUS at 21:03

## 2023-12-02 RX ADMIN — INSULIN LISPRO 8 UNITS: 100 INJECTION, SOLUTION INTRAVENOUS; SUBCUTANEOUS at 09:10

## 2023-12-02 RX ADMIN — ENOXAPARIN SODIUM 30 MG: 100 INJECTION SUBCUTANEOUS at 17:39

## 2023-12-02 RX ADMIN — DULOXETINE 30 MG: 30 CAPSULE, DELAYED RELEASE ORAL at 09:10

## 2023-12-02 RX ADMIN — INSULIN GLARGINE 45 UNITS: 100 INJECTION, SOLUTION SUBCUTANEOUS at 09:11

## 2023-12-02 ASSESSMENT — PAIN DESCRIPTION - PAIN TYPE: TYPE: CHRONIC PAIN

## 2023-12-02 ASSESSMENT — PAIN DESCRIPTION - FREQUENCY: FREQUENCY: CONTINUOUS

## 2023-12-02 ASSESSMENT — PAIN DESCRIPTION - ONSET: ONSET: GRADUAL

## 2023-12-02 ASSESSMENT — PAIN DESCRIPTION - DESCRIPTORS: DESCRIPTORS: ACHING

## 2023-12-02 ASSESSMENT — PAIN - FUNCTIONAL ASSESSMENT: PAIN_FUNCTIONAL_ASSESSMENT: ACTIVITIES ARE NOT PREVENTED

## 2023-12-02 ASSESSMENT — PAIN DESCRIPTION - LOCATION: LOCATION: GENERALIZED

## 2023-12-02 ASSESSMENT — PAIN SCALES - GENERAL
PAINLEVEL_OUTOF10: 3
PAINLEVEL_OUTOF10: 0

## 2023-12-02 NOTE — PROGRESS NOTES
V2.0    [facility] Progress Note      Name:  Sepideh Melton /Age/Sex:   (80 y.o. female)   MRN & CSN:  [id number] & [id number] Encounter Date/Time: 2023 9:28 AM EST   Location:  [address] [place] [address] PCP: TOÑITO Perla     Attending:Lety Vegas, [address] Day: 4    Assessment and Recommendations   Sepideh Melton is a 69 y.o. female  who presents with Acute kidney injury superimposed on CKD ([address])      Plan:   Acute respiratory failure with hypoxia  Acute bronchitis  Rhinovirus infection  CXR \"Findings consistent with bronchitis/pneumonitis worst in the hilar/perihilar   regions. Early/mild congestive heart failure, underlying chronic lung disease and low lung volumes may contribute to this appearance. Otherwise, radiographically nonacute portable chest. \".    -symptomatic with new oxygen requirement on admission now weaned to room air   -resolving   -Continue bronchodilators/ pulmonary hygiene   -Prednisone 40mg daily x 3 days but discontinued d/t hyperglycemia  -Antibiotics: azithromycin day 4   -Dispo: hopefully discharge back to [facility] in a.m. if renal function continues to improve                LAURA on CKD stage IIIb  -Solitary kidney  -sCr 2.4 and slightly improved today   -Baseline 1.3-1.5.   -Monitor lab trends   -Urine indices essentially normal but significant proteinuria   -Holding nephrotoxic agents-and renally dosing meds     Elevated troponin  -With out chest pain  -Chronically elevated- flat trend. -EKG without acute ischemic changes   -Likely in the setting of renal dysfunction     Elevated BNP (6154)  -Chronically elevated but above baseline-repeat slightly improved   -Does not appear clinically hypervolemic .  Will restart PO lasix today   -300 mL urine output overnight   -possible CRS contributing to worsening renal function  -Weight trend review -17 pounds from previous admission (2023)  -UA is somewhat suggestive of infection but denies urinary symptoms-will hold on TROPONINT 0.021 05/20/2023 11:00 AM     Lactic Acid: No results for input(s): \"LACTA\" in the last 72 hours.   BNP:   Recent Labs     11/30/23  1420   PROBNP 5,991*       UA:  Lab Results   Component Value Date/Time    NITRU NEGATIVE 11/29/2023 10:30 AM    COLORU YELLOW 11/29/2023 10:30 AM    WBCUA 20 11/29/2023 10:30 AM    RBCUA 2 11/29/2023 10:30 AM    MUCUS NEGATIVE 06/23/2021 06:15 PM    TRICHOMONAS NONE SEEN 08/05/2020 04:20 PM    BACTERIA OCCASIONAL 11/29/2023 10:30 AM    CLARITYU CLOUDY 11/29/2023 10:30 AM    SPECGRAV 1.020 11/29/2023 10:30 AM    LEUKOCYTESUR TRACE 11/29/2023 10:30 AM    UROBILINOGEN 0.2 11/29/2023 10:30 AM    BILIRUBINUR NEGATIVE 11/29/2023 10:30 AM    BLOODU TRACE 11/29/2023 10:30 AM    KETUA 15 11/29/2023 10:30 AM     Urine Cultures: No results found for: \"LABURIN\"  Blood Cultures: No results found for: \"BC\"  No results found for: \"BLOODCULT2\"  Organism:   Lab Results   Component Value Date/Time    ORG ECOL 08/07/2017 02:15 PM         Electronically signed by Sharen Harada, APRN - CNP on 12/2/2023 at 11:39 AM

## 2023-12-03 LAB
ALBUMIN SERPL-MCNC: 3 GM/DL (ref 3.4–5)
ALP BLD-CCNC: 75 IU/L (ref 40–129)
ALT SERPL-CCNC: 9 U/L (ref 10–40)
ANION GAP SERPL CALCULATED.3IONS-SCNC: 10 MMOL/L (ref 4–16)
ANION GAP SERPL CALCULATED.3IONS-SCNC: 12 MMOL/L (ref 4–16)
AST SERPL-CCNC: 13 IU/L (ref 15–37)
BASOPHILS ABSOLUTE: 0 K/CU MM
BASOPHILS RELATIVE PERCENT: 0.3 % (ref 0–1)
BILIRUB SERPL-MCNC: 0.2 MG/DL (ref 0–1)
BUN SERPL-MCNC: 38 MG/DL (ref 6–23)
BUN SERPL-MCNC: 50 MG/DL (ref 6–23)
CALCIUM SERPL-MCNC: 6.1 MG/DL (ref 8.3–10.6)
CALCIUM SERPL-MCNC: 8.9 MG/DL (ref 8.3–10.6)
CHLORIDE BLD-SCNC: 109 MMOL/L (ref 99–110)
CHLORIDE BLD-SCNC: 118 MMOL/L (ref 99–110)
CO2: 13 MMOL/L (ref 21–32)
CO2: 19 MMOL/L (ref 21–32)
CREAT SERPL-MCNC: 1.8 MG/DL (ref 0.6–1.1)
CREAT SERPL-MCNC: 2.3 MG/DL (ref 0.6–1.1)
CULTURE: ABNORMAL
CULTURE: ABNORMAL
DIFFERENTIAL TYPE: ABNORMAL
EOSINOPHILS ABSOLUTE: 0.1 K/CU MM
EOSINOPHILS RELATIVE PERCENT: 1 % (ref 0–3)
GFR SERPL CREATININE-BSD FRML MDRD: 21 ML/MIN/1.73M2
GFR SERPL CREATININE-BSD FRML MDRD: 29 ML/MIN/1.73M2
GLUCOSE BLD-MCNC: 127 MG/DL (ref 70–99)
GLUCOSE BLD-MCNC: 219 MG/DL (ref 70–99)
GLUCOSE BLD-MCNC: 248 MG/DL (ref 70–99)
GLUCOSE BLD-MCNC: 52 MG/DL (ref 70–99)
GLUCOSE BLD-MCNC: 96 MG/DL (ref 70–99)
GLUCOSE SERPL-MCNC: 123 MG/DL (ref 70–99)
GLUCOSE SERPL-MCNC: 46 MG/DL (ref 70–99)
HCT VFR BLD CALC: 29.7 % (ref 37–47)
HEMOGLOBIN: 9.3 GM/DL (ref 12.5–16)
IMMATURE NEUTROPHIL %: 3.2 % (ref 0–0.43)
LYMPHOCYTES ABSOLUTE: 3.2 K/CU MM
LYMPHOCYTES RELATIVE PERCENT: 22.8 % (ref 24–44)
Lab: ABNORMAL
MCH RBC QN AUTO: 30 PG (ref 27–31)
MCHC RBC AUTO-ENTMCNC: 31.3 % (ref 32–36)
MCV RBC AUTO: 95.8 FL (ref 78–100)
MONOCYTES ABSOLUTE: 1.1 K/CU MM
MONOCYTES RELATIVE PERCENT: 8.1 % (ref 0–4)
PDW BLD-RTO: 13.3 % (ref 11.7–14.9)
PLATELET # BLD: 294 K/CU MM (ref 140–440)
PMV BLD AUTO: 9.4 FL (ref 7.5–11.1)
POTASSIUM SERPL-SCNC: 3.4 MMOL/L (ref 3.5–5.1)
POTASSIUM SERPL-SCNC: 4.6 MMOL/L (ref 3.5–5.1)
RBC # BLD: 3.1 M/CU MM (ref 4.2–5.4)
SEGMENTED NEUTROPHILS ABSOLUTE COUNT: 9 K/CU MM
SEGMENTED NEUTROPHILS RELATIVE PERCENT: 64.6 % (ref 36–66)
SODIUM BLD-SCNC: 140 MMOL/L (ref 135–145)
SODIUM BLD-SCNC: 141 MMOL/L (ref 135–145)
SPECIMEN: ABNORMAL
TOTAL IMMATURE NEUTOROPHIL: 0.45 K/CU MM
TOTAL PROTEIN: 5.8 GM/DL (ref 6.4–8.2)
WBC # BLD: 14 K/CU MM (ref 4–10.5)

## 2023-12-03 PROCEDURE — 85025 COMPLETE CBC W/AUTO DIFF WBC: CPT

## 2023-12-03 PROCEDURE — 94640 AIRWAY INHALATION TREATMENT: CPT

## 2023-12-03 PROCEDURE — 2500000003 HC RX 250 WO HCPCS: Performed by: NURSE PRACTITIONER

## 2023-12-03 PROCEDURE — 6370000000 HC RX 637 (ALT 250 FOR IP): Performed by: NURSE PRACTITIONER

## 2023-12-03 PROCEDURE — 80053 COMPREHEN METABOLIC PANEL: CPT

## 2023-12-03 PROCEDURE — 80048 BASIC METABOLIC PNL TOTAL CA: CPT

## 2023-12-03 PROCEDURE — 1200000000 HC SEMI PRIVATE

## 2023-12-03 PROCEDURE — 82962 GLUCOSE BLOOD TEST: CPT

## 2023-12-03 PROCEDURE — 6360000002 HC RX W HCPCS: Performed by: NURSE PRACTITIONER

## 2023-12-03 PROCEDURE — 2580000003 HC RX 258: Performed by: NURSE PRACTITIONER

## 2023-12-03 RX ORDER — CALCIUM GLUCONATE 20 MG/ML
1000 INJECTION, SOLUTION INTRAVENOUS ONCE
Status: COMPLETED | OUTPATIENT
Start: 2023-12-03 | End: 2023-12-03

## 2023-12-03 RX ORDER — CEFDINIR 300 MG/1
300 CAPSULE ORAL DAILY
Status: DISCONTINUED | OUTPATIENT
Start: 2023-12-03 | End: 2023-12-04

## 2023-12-03 RX ORDER — DIPHENHYDRAMINE HYDROCHLORIDE 50 MG/ML
25 INJECTION INTRAMUSCULAR; INTRAVENOUS EVERY 6 HOURS PRN
Status: DISCONTINUED | OUTPATIENT
Start: 2023-12-03 | End: 2023-12-04 | Stop reason: HOSPADM

## 2023-12-03 RX ADMIN — SODIUM CHLORIDE, PRESERVATIVE FREE 10 ML: 5 INJECTION INTRAVENOUS at 10:09

## 2023-12-03 RX ADMIN — FUROSEMIDE 20 MG: 20 TABLET ORAL at 10:07

## 2023-12-03 RX ADMIN — CEFDINIR 300 MG: 300 CAPSULE ORAL at 10:08

## 2023-12-03 RX ADMIN — ASPIRIN 81 MG: 81 TABLET, COATED ORAL at 10:07

## 2023-12-03 RX ADMIN — GUAIFENESIN 600 MG: 600 TABLET ORAL at 20:23

## 2023-12-03 RX ADMIN — LEVOTHYROXINE SODIUM 175 MCG: 0.03 TABLET ORAL at 06:42

## 2023-12-03 RX ADMIN — Medication 16 G: at 07:50

## 2023-12-03 RX ADMIN — INSULIN LISPRO 4 UNITS: 100 INJECTION, SOLUTION INTRAVENOUS; SUBCUTANEOUS at 13:52

## 2023-12-03 RX ADMIN — DULOXETINE 30 MG: 30 CAPSULE, DELAYED RELEASE ORAL at 10:07

## 2023-12-03 RX ADMIN — IPRATROPIUM BROMIDE AND ALBUTEROL SULFATE 1 DOSE: 2.5; .5 SOLUTION RESPIRATORY (INHALATION) at 16:34

## 2023-12-03 RX ADMIN — ATENOLOL 50 MG: 50 TABLET ORAL at 20:23

## 2023-12-03 RX ADMIN — IPRATROPIUM BROMIDE AND ALBUTEROL SULFATE 1 DOSE: 2.5; .5 SOLUTION RESPIRATORY (INHALATION) at 20:24

## 2023-12-03 RX ADMIN — IPRATROPIUM BROMIDE AND ALBUTEROL SULFATE 1 DOSE: 2.5; .5 SOLUTION RESPIRATORY (INHALATION) at 12:46

## 2023-12-03 RX ADMIN — HYDRALAZINE HYDROCHLORIDE 25 MG: 25 TABLET, FILM COATED ORAL at 10:08

## 2023-12-03 RX ADMIN — HYDRALAZINE HYDROCHLORIDE 25 MG: 25 TABLET, FILM COATED ORAL at 20:23

## 2023-12-03 RX ADMIN — ENOXAPARIN SODIUM 30 MG: 100 INJECTION SUBCUTANEOUS at 18:51

## 2023-12-03 RX ADMIN — ATENOLOL 50 MG: 50 TABLET ORAL at 10:08

## 2023-12-03 RX ADMIN — AZITHROMYCIN DIHYDRATE 250 MG: 250 TABLET, FILM COATED ORAL at 10:07

## 2023-12-03 RX ADMIN — AMLODIPINE BESYLATE 10 MG: 10 TABLET ORAL at 10:07

## 2023-12-03 RX ADMIN — IPRATROPIUM BROMIDE AND ALBUTEROL SULFATE 1 DOSE: 2.5; .5 SOLUTION RESPIRATORY (INHALATION) at 09:31

## 2023-12-03 RX ADMIN — PANTOPRAZOLE SODIUM 40 MG: 40 TABLET, DELAYED RELEASE ORAL at 10:08

## 2023-12-03 RX ADMIN — SODIUM CHLORIDE, PRESERVATIVE FREE 10 ML: 5 INJECTION INTRAVENOUS at 20:15

## 2023-12-03 RX ADMIN — GUAIFENESIN 600 MG: 600 TABLET ORAL at 10:08

## 2023-12-03 RX ADMIN — CALCIUM GLUCONATE 1000 MG: 20 INJECTION, SOLUTION INTRAVENOUS at 20:15

## 2023-12-03 ASSESSMENT — PAIN SCALES - GENERAL
PAINLEVEL_OUTOF10: 0

## 2023-12-03 NOTE — PLAN OF CARE
Problem: Pain  Goal: Verbalizes/displays adequate comfort level or baseline comfort level  12/2/2023 2305 by Donato Diaz RN  Outcome: Progressing  12/2/2023 1045 by Barbra Stephens RN  Outcome: Progressing     Problem: Chronic Conditions and Co-morbidities  Goal: Patient's chronic conditions and co-morbidity symptoms are monitored and maintained or improved  12/2/2023 2305 by Donato Diaz RN  Outcome: Progressing  12/2/2023 1045 by Barbra Stephens RN  Outcome: Progressing     Problem: Skin/Tissue Integrity  Goal: Absence of new skin breakdown  Description: 1. Monitor for areas of redness and/or skin breakdown  2. Assess vascular access sites hourly  3. Every 4-6 hours minimum:  Change oxygen saturation probe site  4. Every 4-6 hours:  If on nasal continuous positive airway pressure, respiratory therapy assess nares and determine need for appliance change or resting period.   12/2/2023 2305 by Donato Diaz RN  Outcome: Progressing  12/2/2023 1045 by Barbra Stephens RN  Outcome: Progressing     Problem: Safety - Adult  Goal: Free from fall injury  12/2/2023 2305 by Donato Diaz RN  Outcome: Progressing  12/2/2023 1045 by Barbra Stephens RN  Outcome: Progressing     Problem: ABCDS Injury Assessment  Goal: Absence of physical injury  12/2/2023 2305 by Donato Diaz RN  Outcome: Progressing  12/2/2023 1045 by Barbra Stephens RN  Outcome: Progressing

## 2023-12-03 NOTE — PLAN OF CARE
Problem: Pain  Goal: Verbalizes/displays adequate comfort level or baseline comfort level  Outcome: Progressing     Problem: Chronic Conditions and Co-morbidities  Goal: Patient's chronic conditions and co-morbidity symptoms are monitored and maintained or improved  Outcome: Progressing  Flowsheets (Taken 12/3/2023 0800)  Care Plan - Patient's Chronic Conditions and Co-Morbidity Symptoms are Monitored and Maintained or Improved:   Monitor and assess patient's chronic conditions and comorbid symptoms for stability, deterioration, or improvement   Collaborate with multidisciplinary team to address chronic and comorbid conditions and prevent exacerbation or deterioration   Update acute care plan with appropriate goals if chronic or comorbid symptoms are exacerbated and prevent overall improvement and discharge     Problem: Skin/Tissue Integrity  Goal: Absence of new skin breakdown  Description: 1. Monitor for areas of redness and/or skin breakdown  2. Assess vascular access sites hourly  3. Every 4-6 hours minimum:  Change oxygen saturation probe site  4. Every 4-6 hours:  If on nasal continuous positive airway pressure, respiratory therapy assess nares and determine need for appliance change or resting period.   Outcome: Progressing     Problem: Safety - Adult  Goal: Free from fall injury  Outcome: Progressing     Problem: ABCDS Injury Assessment  Goal: Absence of physical injury  Outcome: Progressing

## 2023-12-03 NOTE — PROGRESS NOTES
V2.0    [facility] Progress Note      Name:  Alfredo Mitchell /Age/Sex:   (80 y.o. female)   MRN & CSN:  [id number] & [id number] Encounter Date/Time: 12/3/2023 9:28 AM EST   Location:  [facility]/230-21 PCP: TOÑITO David     Attending:Drew Vegas, [address] [place] [address] Day: 5    Assessment and Recommendations   Alfredo Mitchell is a 69 y.o. female  who presents with Acute kidney injury superimposed on CKD ([address] )      Plan:   Acute respiratory failure with hypoxia  Acute bronchitis  Rhinovirus infection  CXR \"Findings consistent with bronchitis/pneumonitis worst in the hilar/perihilar   regions. Early/mild congestive heart failure, underlying chronic lung disease and low lung volumes may contribute to this appearance. Otherwise, radiographically nonacute portable chest. \".    -symptomatic with new oxygen requirement on admission now weaned to room air   -resolving   -Continue bronchodilators/ pulmonary hygiene   -Prednisone 40mg daily x 3 days but discontinued d/t hyperglycemia  -Completed 5-day azithromycin course  -Dispo: Back to [facility] pending clinical course                LAURA on CKD stage IIIb  -Solitary kidney  -sCr 2.3 and slightly improved today   -Baseline 1.3-1.5.   -Monitor lab trends   -Urine indices essentially normal but significant proteinuria   -Holding nephrotoxic agents-and renally dosing meds     Elevated troponin  -With out chest pain  -Chronically elevated- flat trend. -EKG without acute ischemic changes   -Likely in the setting of renal dysfunction     Elevated BNP (6177)  -Chronically elevated but above baseline-repeat slightly improved   -Does not appear clinically hypervolemic .  Will restart PO lasix today   -300 mL urine output overnight   -possible CRS contributing to worsening renal function  -Weight trend review -17 pounds from previous admission (2023)    Acute cystitis without hematuria  Urine culture growth MDRO E. Coli but low colony count however given renal 11/11/2022 10:30 PM     Hemoglobin A1C:   Lab Results   Component Value Date/Time    LABA1C 6.3 11/30/2023 05:50 AM     TSH: No results found for: \"TSH\"  Troponin:   Lab Results   Component Value Date/Time    TROPONINT 0.013 05/21/2023 05:25 AM    TROPONINT 0.014 05/20/2023 01:15 PM    TROPONINT 0.021 05/20/2023 11:00 AM     Lactic Acid: No results for input(s): \"LACTA\" in the last 72 hours.   BNP:   Recent Labs     11/30/23  1420   PROBNP 5,991*       UA:  Lab Results   Component Value Date/Time    NITRU NEGATIVE 11/29/2023 10:30 AM    COLORU YELLOW 11/29/2023 10:30 AM    WBCUA 20 11/29/2023 10:30 AM    RBCUA 2 11/29/2023 10:30 AM    MUCUS NEGATIVE 06/23/2021 06:15 PM    TRICHOMONAS NONE SEEN 08/05/2020 04:20 PM    BACTERIA OCCASIONAL 11/29/2023 10:30 AM    CLARITYU CLOUDY 11/29/2023 10:30 AM    SPECGRAV 1.020 11/29/2023 10:30 AM    LEUKOCYTESUR TRACE 11/29/2023 10:30 AM    UROBILINOGEN 0.2 11/29/2023 10:30 AM    BILIRUBINUR NEGATIVE 11/29/2023 10:30 AM    BLOODU TRACE 11/29/2023 10:30 AM    KETUA 15 11/29/2023 10:30 AM     Urine Cultures: No results found for: \"LABURIN\"  Blood Cultures: No results found for: \"BC\"  No results found for: \"BLOODCULT2\"  Organism:   Lab Results   Component Value Date/Time    ORG ECOL 08/07/2017 02:15 PM         Electronically signed by MILADIS Gray CNP on 12/3/2023 at 10:29 AM

## 2023-12-04 ENCOUNTER — HOSPITAL ENCOUNTER (INPATIENT)
Age: 78
LOS: 8 days | Discharge: NURSING, REHAB OR LONG-TERM CARE | DRG: 682 | End: 2023-12-12
Attending: STUDENT IN AN ORGANIZED HEALTH CARE EDUCATION/TRAINING PROGRAM | Admitting: STUDENT IN AN ORGANIZED HEALTH CARE EDUCATION/TRAINING PROGRAM
Payer: MEDICARE

## 2023-12-04 VITALS
RESPIRATION RATE: 16 BRPM | OXYGEN SATURATION: 98 % | SYSTOLIC BLOOD PRESSURE: 149 MMHG | HEIGHT: 64 IN | TEMPERATURE: 97.7 F | WEIGHT: 168.6 LBS | BODY MASS INDEX: 28.79 KG/M2 | DIASTOLIC BLOOD PRESSURE: 47 MMHG | HEART RATE: 65 BPM

## 2023-12-04 PROBLEM — N17.9 ACUTE KIDNEY INJURY SUPERIMPOSED ON CHRONIC KIDNEY DISEASE (HCC): Status: ACTIVE | Noted: 2023-12-04

## 2023-12-04 PROBLEM — N18.9 ACUTE KIDNEY INJURY SUPERIMPOSED ON CHRONIC KIDNEY DISEASE (HCC): Status: ACTIVE | Noted: 2023-12-04

## 2023-12-04 LAB
ANION GAP SERPL CALCULATED.3IONS-SCNC: 12 MMOL/L (ref 4–16)
ANION GAP SERPL CALCULATED.3IONS-SCNC: 12 MMOL/L (ref 4–16)
BUN SERPL-MCNC: 50 MG/DL (ref 6–23)
BUN SERPL-MCNC: 51 MG/DL (ref 6–23)
CALCIUM IONIZED: 4.96 MG/DL (ref 4.48–5.28)
CALCIUM SERPL-MCNC: 8.7 MG/DL (ref 8.3–10.6)
CALCIUM SERPL-MCNC: 8.8 MG/DL (ref 8.3–10.6)
CHLORIDE BLD-SCNC: 108 MMOL/L (ref 99–110)
CHLORIDE BLD-SCNC: 110 MMOL/L (ref 99–110)
CO2: 17 MMOL/L (ref 21–32)
CO2: 18 MMOL/L (ref 21–32)
CREAT SERPL-MCNC: 2.4 MG/DL (ref 0.6–1.1)
CREAT SERPL-MCNC: 2.5 MG/DL (ref 0.6–1.1)
CULTURE: ABNORMAL
CULTURE: ABNORMAL
CULTURE: NORMAL
GFR SERPL CREATININE-BSD FRML MDRD: 19 ML/MIN/1.73M2
GFR SERPL CREATININE-BSD FRML MDRD: 20 ML/MIN/1.73M2
GLUCOSE BLD-MCNC: 122 MG/DL (ref 70–99)
GLUCOSE BLD-MCNC: 176 MG/DL (ref 70–99)
GLUCOSE BLD-MCNC: 243 MG/DL (ref 70–99)
GLUCOSE BLD-MCNC: 267 MG/DL (ref 70–99)
GLUCOSE SERPL-MCNC: 114 MG/DL (ref 70–99)
GLUCOSE SERPL-MCNC: 232 MG/DL (ref 70–99)
HCT VFR BLD CALC: 25.7 % (ref 37–47)
HEMOGLOBIN: 8.2 GM/DL (ref 12.5–16)
IONIZED CA: 1.24 MMOL/L (ref 1.12–1.32)
Lab: ABNORMAL
Lab: NORMAL
MCH RBC QN AUTO: 30.6 PG (ref 27–31)
MCHC RBC AUTO-ENTMCNC: 31.9 % (ref 32–36)
MCV RBC AUTO: 95.9 FL (ref 78–100)
PDW BLD-RTO: 13.4 % (ref 11.7–14.9)
PLATELET # BLD: 198 K/CU MM (ref 140–440)
PMV BLD AUTO: 9.6 FL (ref 7.5–11.1)
POTASSIUM SERPL-SCNC: 4.8 MMOL/L (ref 3.5–5.1)
POTASSIUM SERPL-SCNC: 5.2 MMOL/L (ref 3.5–5.1)
RBC # BLD: 2.68 M/CU MM (ref 4.2–5.4)
SODIUM BLD-SCNC: 138 MMOL/L (ref 135–145)
SODIUM BLD-SCNC: 139 MMOL/L (ref 135–145)
SPECIMEN: ABNORMAL
SPECIMEN: NORMAL
WBC # BLD: 8.7 K/CU MM (ref 4–10.5)

## 2023-12-04 PROCEDURE — 6360000002 HC RX W HCPCS: Performed by: NURSE PRACTITIONER

## 2023-12-04 PROCEDURE — 85027 COMPLETE CBC AUTOMATED: CPT

## 2023-12-04 PROCEDURE — 2580000003 HC RX 258: Performed by: NURSE PRACTITIONER

## 2023-12-04 PROCEDURE — 94761 N-INVAS EAR/PLS OXIMETRY MLT: CPT

## 2023-12-04 PROCEDURE — 36415 COLL VENOUS BLD VENIPUNCTURE: CPT

## 2023-12-04 PROCEDURE — 6370000000 HC RX 637 (ALT 250 FOR IP): Performed by: NURSE PRACTITIONER

## 2023-12-04 PROCEDURE — 1200000000 HC SEMI PRIVATE

## 2023-12-04 PROCEDURE — 82330 ASSAY OF CALCIUM: CPT

## 2023-12-04 PROCEDURE — 82962 GLUCOSE BLOOD TEST: CPT

## 2023-12-04 PROCEDURE — 87070 CULTURE OTHR SPECIMN AEROBIC: CPT

## 2023-12-04 PROCEDURE — 80048 BASIC METABOLIC PNL TOTAL CA: CPT

## 2023-12-04 PROCEDURE — 94640 AIRWAY INHALATION TREATMENT: CPT

## 2023-12-04 RX ORDER — ONDANSETRON 2 MG/ML
4 INJECTION INTRAMUSCULAR; INTRAVENOUS EVERY 6 HOURS PRN
Status: DISCONTINUED | OUTPATIENT
Start: 2023-12-04 | End: 2023-12-12 | Stop reason: HOSPADM

## 2023-12-04 RX ORDER — CALCIUM GLUCONATE 20 MG/ML
2000 INJECTION, SOLUTION INTRAVENOUS ONCE
Status: DISCONTINUED | OUTPATIENT
Start: 2023-12-04 | End: 2023-12-04

## 2023-12-04 RX ORDER — INSULIN LISPRO 100 [IU]/ML
0-4 INJECTION, SOLUTION INTRAVENOUS; SUBCUTANEOUS NIGHTLY
Status: DISCONTINUED | OUTPATIENT
Start: 2023-12-04 | End: 2023-12-08

## 2023-12-04 RX ORDER — ACETAMINOPHEN 650 MG/1
650 SUPPOSITORY RECTAL EVERY 6 HOURS PRN
Status: DISCONTINUED | OUTPATIENT
Start: 2023-12-04 | End: 2023-12-12 | Stop reason: HOSPADM

## 2023-12-04 RX ORDER — CARBOXYMETHYLCELLULOSE SODIUM 10 MG/ML
1 GEL OPHTHALMIC EVERY 4 HOURS PRN
Status: DISCONTINUED | OUTPATIENT
Start: 2023-12-04 | End: 2023-12-12 | Stop reason: HOSPADM

## 2023-12-04 RX ORDER — ONDANSETRON 2 MG/ML
4 INJECTION INTRAMUSCULAR; INTRAVENOUS EVERY 6 HOURS PRN
Status: CANCELLED | OUTPATIENT
Start: 2023-12-04

## 2023-12-04 RX ORDER — DULOXETIN HYDROCHLORIDE 30 MG/1
30 CAPSULE, DELAYED RELEASE ORAL DAILY
Status: DISCONTINUED | OUTPATIENT
Start: 2023-12-05 | End: 2023-12-12 | Stop reason: HOSPADM

## 2023-12-04 RX ORDER — SODIUM CHLORIDE 0.9 % (FLUSH) 0.9 %
5-40 SYRINGE (ML) INJECTION PRN
Status: CANCELLED | OUTPATIENT
Start: 2023-12-04

## 2023-12-04 RX ORDER — PANTOPRAZOLE SODIUM 40 MG/1
40 TABLET, DELAYED RELEASE ORAL DAILY
Status: CANCELLED | OUTPATIENT
Start: 2023-12-05

## 2023-12-04 RX ORDER — ACETAMINOPHEN 325 MG/1
650 TABLET ORAL EVERY 6 HOURS PRN
Status: CANCELLED | OUTPATIENT
Start: 2023-12-04

## 2023-12-04 RX ORDER — FUROSEMIDE 20 MG/1
20 TABLET ORAL DAILY
Status: DISCONTINUED | OUTPATIENT
Start: 2023-12-05 | End: 2023-12-05

## 2023-12-04 RX ORDER — SODIUM CHLORIDE 9 MG/ML
INJECTION, SOLUTION INTRAVENOUS PRN
Status: DISCONTINUED | OUTPATIENT
Start: 2023-12-04 | End: 2023-12-12 | Stop reason: HOSPADM

## 2023-12-04 RX ORDER — IPRATROPIUM BROMIDE AND ALBUTEROL SULFATE 2.5; .5 MG/3ML; MG/3ML
1 SOLUTION RESPIRATORY (INHALATION)
Status: CANCELLED | OUTPATIENT
Start: 2023-12-04

## 2023-12-04 RX ORDER — CARBOXYMETHYLCELLULOSE SODIUM 5 MG/ML
1 SOLUTION/ DROPS OPHTHALMIC EVERY 4 HOURS PRN
Status: CANCELLED | OUTPATIENT
Start: 2023-12-04

## 2023-12-04 RX ORDER — AMLODIPINE BESYLATE 10 MG/1
10 TABLET ORAL DAILY
Status: CANCELLED | OUTPATIENT
Start: 2023-12-05

## 2023-12-04 RX ORDER — GUAIFENESIN 600 MG/1
600 TABLET, EXTENDED RELEASE ORAL 2 TIMES DAILY
Status: CANCELLED | OUTPATIENT
Start: 2023-12-04

## 2023-12-04 RX ORDER — INSULIN LISPRO 100 [IU]/ML
0-16 INJECTION, SOLUTION INTRAVENOUS; SUBCUTANEOUS
Status: DISCONTINUED | OUTPATIENT
Start: 2023-12-05 | End: 2023-12-08

## 2023-12-04 RX ORDER — ATENOLOL 50 MG/1
50 TABLET ORAL 2 TIMES DAILY
Status: CANCELLED | OUTPATIENT
Start: 2023-12-04

## 2023-12-04 RX ORDER — HYDRALAZINE HYDROCHLORIDE 25 MG/1
25 TABLET, FILM COATED ORAL 2 TIMES DAILY
Status: DISCONTINUED | OUTPATIENT
Start: 2023-12-04 | End: 2023-12-05

## 2023-12-04 RX ORDER — DIPHENHYDRAMINE HYDROCHLORIDE 50 MG/ML
25 INJECTION INTRAMUSCULAR; INTRAVENOUS EVERY 6 HOURS PRN
Status: DISCONTINUED | OUTPATIENT
Start: 2023-12-04 | End: 2023-12-12 | Stop reason: HOSPADM

## 2023-12-04 RX ORDER — ONDANSETRON 4 MG/1
4 TABLET, ORALLY DISINTEGRATING ORAL EVERY 8 HOURS PRN
Status: CANCELLED | OUTPATIENT
Start: 2023-12-04

## 2023-12-04 RX ORDER — ASPIRIN 81 MG/1
81 TABLET, CHEWABLE ORAL DAILY
Status: DISCONTINUED | OUTPATIENT
Start: 2023-12-05 | End: 2023-12-12 | Stop reason: HOSPADM

## 2023-12-04 RX ORDER — ENOXAPARIN SODIUM 100 MG/ML
30 INJECTION SUBCUTANEOUS EVERY 24 HOURS
Status: CANCELLED | OUTPATIENT
Start: 2023-12-04

## 2023-12-04 RX ORDER — DIPHENHYDRAMINE HYDROCHLORIDE 50 MG/ML
25 INJECTION INTRAMUSCULAR; INTRAVENOUS EVERY 6 HOURS PRN
Status: CANCELLED | OUTPATIENT
Start: 2023-12-04

## 2023-12-04 RX ORDER — SODIUM CHLORIDE 9 MG/ML
INJECTION, SOLUTION INTRAVENOUS PRN
Status: CANCELLED | OUTPATIENT
Start: 2023-12-04

## 2023-12-04 RX ORDER — INSULIN GLARGINE 100 [IU]/ML
45 INJECTION, SOLUTION SUBCUTANEOUS EVERY MORNING
Status: CANCELLED | OUTPATIENT
Start: 2023-12-05

## 2023-12-04 RX ORDER — AMLODIPINE BESYLATE 10 MG/1
10 TABLET ORAL DAILY
Status: DISCONTINUED | OUTPATIENT
Start: 2023-12-05 | End: 2023-12-12 | Stop reason: HOSPADM

## 2023-12-04 RX ORDER — ENOXAPARIN SODIUM 100 MG/ML
30 INJECTION SUBCUTANEOUS EVERY 24 HOURS
Status: DISCONTINUED | OUTPATIENT
Start: 2023-12-05 | End: 2023-12-08

## 2023-12-04 RX ORDER — INSULIN GLARGINE 100 [IU]/ML
45 INJECTION, SOLUTION SUBCUTANEOUS EVERY MORNING
Status: DISCONTINUED | OUTPATIENT
Start: 2023-12-05 | End: 2023-12-08

## 2023-12-04 RX ORDER — ATENOLOL 25 MG/1
50 TABLET ORAL 2 TIMES DAILY
Status: DISCONTINUED | OUTPATIENT
Start: 2023-12-04 | End: 2023-12-12 | Stop reason: HOSPADM

## 2023-12-04 RX ORDER — SODIUM CHLORIDE 0.9 % (FLUSH) 0.9 %
5-40 SYRINGE (ML) INJECTION EVERY 12 HOURS SCHEDULED
Status: DISCONTINUED | OUTPATIENT
Start: 2023-12-04 | End: 2023-12-12 | Stop reason: HOSPADM

## 2023-12-04 RX ORDER — SODIUM CHLORIDE 0.9 % (FLUSH) 0.9 %
5-40 SYRINGE (ML) INJECTION PRN
Status: DISCONTINUED | OUTPATIENT
Start: 2023-12-04 | End: 2023-12-12 | Stop reason: HOSPADM

## 2023-12-04 RX ORDER — DULOXETIN HYDROCHLORIDE 30 MG/1
30 CAPSULE, DELAYED RELEASE ORAL DAILY
Status: CANCELLED | OUTPATIENT
Start: 2023-12-05

## 2023-12-04 RX ORDER — PANTOPRAZOLE SODIUM 40 MG/1
40 TABLET, DELAYED RELEASE ORAL DAILY
Status: DISCONTINUED | OUTPATIENT
Start: 2023-12-05 | End: 2023-12-12 | Stop reason: HOSPADM

## 2023-12-04 RX ORDER — GUAIFENESIN 600 MG/1
600 TABLET, EXTENDED RELEASE ORAL 2 TIMES DAILY
Status: DISCONTINUED | OUTPATIENT
Start: 2023-12-04 | End: 2023-12-12 | Stop reason: HOSPADM

## 2023-12-04 RX ORDER — IPRATROPIUM BROMIDE AND ALBUTEROL SULFATE 2.5; .5 MG/3ML; MG/3ML
1 SOLUTION RESPIRATORY (INHALATION)
Status: DISCONTINUED | OUTPATIENT
Start: 2023-12-05 | End: 2023-12-07

## 2023-12-04 RX ORDER — ONDANSETRON 4 MG/1
4 TABLET, ORALLY DISINTEGRATING ORAL EVERY 8 HOURS PRN
Status: DISCONTINUED | OUTPATIENT
Start: 2023-12-04 | End: 2023-12-12 | Stop reason: HOSPADM

## 2023-12-04 RX ORDER — DEXTROSE MONOHYDRATE 100 MG/ML
INJECTION, SOLUTION INTRAVENOUS CONTINUOUS PRN
Status: DISCONTINUED | OUTPATIENT
Start: 2023-12-04 | End: 2023-12-12 | Stop reason: HOSPADM

## 2023-12-04 RX ORDER — GLUCAGON 1 MG/ML
1 KIT INJECTION PRN
Status: DISCONTINUED | OUTPATIENT
Start: 2023-12-04 | End: 2023-12-12 | Stop reason: HOSPADM

## 2023-12-04 RX ORDER — ASPIRIN 81 MG/1
81 TABLET ORAL DAILY
Status: CANCELLED | OUTPATIENT
Start: 2023-12-05

## 2023-12-04 RX ORDER — BENZONATATE 100 MG/1
100 CAPSULE ORAL 3 TIMES DAILY PRN
Status: CANCELLED | OUTPATIENT
Start: 2023-12-04

## 2023-12-04 RX ORDER — FUROSEMIDE 20 MG/1
20 TABLET ORAL DAILY
Status: CANCELLED | OUTPATIENT
Start: 2023-12-05

## 2023-12-04 RX ORDER — ACETAMINOPHEN 650 MG/1
650 SUPPOSITORY RECTAL EVERY 6 HOURS PRN
Status: CANCELLED | OUTPATIENT
Start: 2023-12-04

## 2023-12-04 RX ORDER — DEXTROSE MONOHYDRATE 100 MG/ML
INJECTION, SOLUTION INTRAVENOUS CONTINUOUS PRN
Status: CANCELLED | OUTPATIENT
Start: 2023-12-04

## 2023-12-04 RX ORDER — ACETAMINOPHEN 325 MG/1
650 TABLET ORAL EVERY 6 HOURS PRN
Status: DISCONTINUED | OUTPATIENT
Start: 2023-12-04 | End: 2023-12-12 | Stop reason: HOSPADM

## 2023-12-04 RX ORDER — INSULIN LISPRO 100 [IU]/ML
0-16 INJECTION, SOLUTION INTRAVENOUS; SUBCUTANEOUS
Status: CANCELLED | OUTPATIENT
Start: 2023-12-04

## 2023-12-04 RX ORDER — SODIUM CHLORIDE 0.9 % (FLUSH) 0.9 %
5-40 SYRINGE (ML) INJECTION EVERY 12 HOURS SCHEDULED
Status: CANCELLED | OUTPATIENT
Start: 2023-12-04

## 2023-12-04 RX ORDER — BENZONATATE 100 MG/1
100 CAPSULE ORAL 3 TIMES DAILY PRN
Status: DISCONTINUED | OUTPATIENT
Start: 2023-12-04 | End: 2023-12-12 | Stop reason: HOSPADM

## 2023-12-04 RX ORDER — INSULIN LISPRO 100 [IU]/ML
0-4 INJECTION, SOLUTION INTRAVENOUS; SUBCUTANEOUS NIGHTLY
Status: CANCELLED | OUTPATIENT
Start: 2023-12-04

## 2023-12-04 RX ORDER — HYDRALAZINE HYDROCHLORIDE 25 MG/1
25 TABLET, FILM COATED ORAL 2 TIMES DAILY
Status: CANCELLED | OUTPATIENT
Start: 2023-12-04

## 2023-12-04 RX ADMIN — ATENOLOL 50 MG: 25 TABLET ORAL at 21:53

## 2023-12-04 RX ADMIN — FUROSEMIDE 20 MG: 20 TABLET ORAL at 09:25

## 2023-12-04 RX ADMIN — AMLODIPINE BESYLATE 10 MG: 10 TABLET ORAL at 09:25

## 2023-12-04 RX ADMIN — SODIUM CHLORIDE, PRESERVATIVE FREE 10 ML: 5 INJECTION INTRAVENOUS at 21:53

## 2023-12-04 RX ADMIN — SODIUM CHLORIDE, PRESERVATIVE FREE 10 ML: 5 INJECTION INTRAVENOUS at 09:25

## 2023-12-04 RX ADMIN — DULOXETINE 30 MG: 30 CAPSULE, DELAYED RELEASE ORAL at 09:25

## 2023-12-04 RX ADMIN — HYDRALAZINE HYDROCHLORIDE 25 MG: 25 TABLET, FILM COATED ORAL at 21:53

## 2023-12-04 RX ADMIN — ATENOLOL 50 MG: 50 TABLET ORAL at 09:25

## 2023-12-04 RX ADMIN — GUAIFENESIN 600 MG: 600 TABLET, EXTENDED RELEASE ORAL at 21:53

## 2023-12-04 RX ADMIN — HYDRALAZINE HYDROCHLORIDE 25 MG: 25 TABLET, FILM COATED ORAL at 09:25

## 2023-12-04 RX ADMIN — ENOXAPARIN SODIUM 30 MG: 100 INJECTION SUBCUTANEOUS at 17:12

## 2023-12-04 RX ADMIN — IPRATROPIUM BROMIDE AND ALBUTEROL SULFATE 1 DOSE: 2.5; .5 SOLUTION RESPIRATORY (INHALATION) at 11:56

## 2023-12-04 RX ADMIN — GUAIFENESIN 600 MG: 600 TABLET ORAL at 09:25

## 2023-12-04 RX ADMIN — INSULIN LISPRO 8 UNITS: 100 INJECTION, SOLUTION INTRAVENOUS; SUBCUTANEOUS at 17:12

## 2023-12-04 RX ADMIN — CEFDINIR 300 MG: 300 CAPSULE ORAL at 09:25

## 2023-12-04 RX ADMIN — LEVOTHYROXINE SODIUM 175 MCG: 0.03 TABLET ORAL at 06:27

## 2023-12-04 RX ADMIN — IPRATROPIUM BROMIDE AND ALBUTEROL SULFATE 1 DOSE: 2.5; .5 SOLUTION RESPIRATORY (INHALATION) at 16:32

## 2023-12-04 RX ADMIN — IPRATROPIUM BROMIDE AND ALBUTEROL SULFATE 1 DOSE: 2.5; .5 SOLUTION RESPIRATORY (INHALATION) at 07:57

## 2023-12-04 RX ADMIN — PANTOPRAZOLE SODIUM 40 MG: 40 TABLET, DELAYED RELEASE ORAL at 09:25

## 2023-12-04 RX ADMIN — ASPIRIN 81 MG: 81 TABLET, COATED ORAL at 09:25

## 2023-12-04 ASSESSMENT — PAIN SCALES - GENERAL
PAINLEVEL_OUTOF10: 0

## 2023-12-04 NOTE — PLAN OF CARE
Problem: Pain  Goal: Verbalizes/displays adequate comfort level or baseline comfort level  12/4/2023 0919 by Ashu Thomas RN  Outcome: Progressing  12/3/2023 2323 by Parish Brown RN  Outcome: Progressing     Problem: Chronic Conditions and Co-morbidities  Goal: Patient's chronic conditions and co-morbidity symptoms are monitored and maintained or improved  12/4/2023 0919 by Ashu Thomas RN  Outcome: Progressing  12/3/2023 2323 by Parish Brown RN  Outcome: Progressing     Problem: Skin/Tissue Integrity  Goal: Absence of new skin breakdown  Description: 1. Monitor for areas of redness and/or skin breakdown  2. Assess vascular access sites hourly  3. Every 4-6 hours minimum:  Change oxygen saturation probe site  4. Every 4-6 hours:  If on nasal continuous positive airway pressure, respiratory therapy assess nares and determine need for appliance change or resting period.   12/4/2023 0919 by Ashu Thomas RN  Outcome: Progressing  12/3/2023 2323 by Parish Brown RN  Outcome: Progressing     Problem: Safety - Adult  Goal: Free from fall injury  12/4/2023 0919 by Ashu Thomas RN  Outcome: Progressing  12/3/2023 2323 by Parish Brown RN  Outcome: Progressing     Problem: ABCDS Injury Assessment  Goal: Absence of physical injury  12/4/2023 0919 by Ashu Thomas RN  Outcome: Progressing  12/3/2023 2323 by Parish Brown RN  Outcome: Progressing     Problem: Neurosensory - Adult  Goal: Absence of seizures  Outcome: Progressing  Goal: Achieves maximal functionality and self care  Outcome: Progressing     Problem: Respiratory - Adult  Goal: Achieves optimal ventilation and oxygenation  Outcome: Progressing     Problem: Cardiovascular - Adult  Goal: Maintains optimal cardiac output and hemodynamic stability  Outcome: Progressing  Goal: Absence of cardiac dysrhythmias or at baseline  Outcome: Progressing     Problem: Skin/Tissue Integrity - Adult  Goal: Incisions, wounds, or

## 2023-12-04 NOTE — DISCHARGE SUMMARY
Hospital Medicine Discharge Summary    Patient: Noel New   : [birth year]     Admit Date: 2023   Discharge Date:   23    Disposition:  [x] Transfer to tertiary facility for higher level care  Code status:  [x]Full  []DNR/CCA  []Limited (DNR/CCA with Do Not Intubate)  []DNRCC  Condition at Discharge: Stable  Primary Care Provider: Candis Garcia    Admitting Provider: Queen Dave MD  Discharge Provider: MILADIS Bishop CNP     Discharge Diagnoses: Active Hospital Problems    Diagnosis     Acute kidney injury superimposed on CKD ([address]) [N17.9, N18.9]        Presenting Admission History:      The patient was initially admitted on 2023 for concerns of hypoxia at her ECF. She was noted to be hypoxic and placed on supplemental oxygen prehospital.  Viral panel positive for rhinovirus and symptoms attributed to acute bronchitis. She was quickly weaned back to and stable on room air. On admission was noted to have an LAURA with a history of CKD stage III with a solitary kidney. She was started on gentle IV hydration with no improvement in renal function after 24 hours. BNP was slightly elevated admission to which she was then transition to oral diuretic. Renal function had marginal improvement throughout admission. Urine indices or essentially normal but she had significant proteinuria to the extent unable to calculated creatinine/ protein ratio. Urine was questionable for infection and culture once again grew MDRO E. coli. Antibiotics were started however sensitivities showed significant resistance. Has a cephalosporin allergy (Rocephin-rash) but tolerated previously. Has not been evaluated by nephrology in a very long time per family. With concern for proteinuria and acute interstitial nephritis family/attending agreeable  in the patient's best interest to transfer to tertiary facility with nephrology consultation available.   She would also benefit from

## 2023-12-04 NOTE — PLAN OF CARE
Problem: Pain  Goal: Verbalizes/displays adequate comfort level or baseline comfort level  12/3/2023 2323 by Alexander Glez RN  Outcome: Progressing  12/3/2023 1506 by Elif Fenton RN  Outcome: Progressing     Problem: Chronic Conditions and Co-morbidities  Goal: Patient's chronic conditions and co-morbidity symptoms are monitored and maintained or improved  12/3/2023 2323 by Alexander Glez RN  Outcome: Progressing  12/3/2023 1506 by Elif Fenton RN  Outcome: Progressing  Flowsheets (Taken 12/3/2023 0800)  Care Plan - Patient's Chronic Conditions and Co-Morbidity Symptoms are Monitored and Maintained or Improved:   Monitor and assess patient's chronic conditions and comorbid symptoms for stability, deterioration, or improvement   Collaborate with multidisciplinary team to address chronic and comorbid conditions and prevent exacerbation or deterioration   Update acute care plan with appropriate goals if chronic or comorbid symptoms are exacerbated and prevent overall improvement and discharge     Problem: Skin/Tissue Integrity  Goal: Absence of new skin breakdown  Description: 1. Monitor for areas of redness and/or skin breakdown  2. Assess vascular access sites hourly  3. Every 4-6 hours minimum:  Change oxygen saturation probe site  4. Every 4-6 hours:  If on nasal continuous positive airway pressure, respiratory therapy assess nares and determine need for appliance change or resting period.   12/3/2023 2323 by Alexander Glez RN  Outcome: Progressing  12/3/2023 1506 by Elif Fenton RN  Outcome: Progressing     Problem: Safety - Adult  Goal: Free from fall injury  12/3/2023 2323 by Alexander Glez RN  Outcome: Progressing  12/3/2023 1506 by Elif Fenton RN  Outcome: Progressing     Problem: ABCDS Injury Assessment  Goal: Absence of physical injury  12/3/2023 2323 by Alexander Glez RN  Outcome: Progressing  12/3/2023 1506 by Elif Fenton RN  Outcome: Progressing

## 2023-12-05 ENCOUNTER — APPOINTMENT (OUTPATIENT)
Dept: ULTRASOUND IMAGING | Age: 78
DRG: 682 | End: 2023-12-05
Attending: STUDENT IN AN ORGANIZED HEALTH CARE EDUCATION/TRAINING PROGRAM
Payer: MEDICARE

## 2023-12-05 PROBLEM — R82.71 ASYMPTOMATIC BACTERIURIA: Status: ACTIVE | Noted: 2023-12-05

## 2023-12-05 LAB
ALBUMIN SERPL ELPH-MCNC: ABNORMAL GM/DL (ref 3.2–5.6)
ALBUMIN, U: NORMAL %
ALPHA-1-GLOBULIN, U: NORMAL %
ALPHA-1-GLOBULIN: ABNORMAL GM/DL (ref 0.1–0.4)
ALPHA-2-GLOBULIN, U: NORMAL %
ALPHA-2-GLOBULIN: ABNORMAL GM/DL (ref 0.4–1.2)
BACTERIA: ABNORMAL /HPF
BETA GLOBULIN, U: NORMAL %
BETA GLOBULIN: ABNORMAL GM/DL (ref 0.5–1.3)
BILIRUBIN URINE: NEGATIVE MG/DL
BLOOD, URINE: ABNORMAL
CHLORIDE URINE RANDOM: 68 MMOL/L (ref 43–210)
CLARITY: CLEAR
COLOR: YELLOW
CRP SERPL HS-MCNC: 7.3 MG/L
GAMMA GLOBULIN, U: NORMAL %
GAMMA GLOBULIN: ABNORMAL GM/DL (ref 0.5–1.6)
GLUCOSE BLD-MCNC: 168 MG/DL (ref 70–99)
GLUCOSE BLD-MCNC: 208 MG/DL (ref 70–99)
GLUCOSE BLD-MCNC: 236 MG/DL (ref 70–99)
GLUCOSE BLD-MCNC: 91 MG/DL (ref 70–99)
GLUCOSE, URINE: 500 MG/DL
HAV IGM SERPL QL IA: NON REACTIVE
HBV CORE IGM SERPL QL IA: NON REACTIVE
HBV SURFACE AG SERPL QL IA: NON REACTIVE
HCV AB SERPL QL IA: NON REACTIVE
HYALINE CASTS: 0 /LPF
KETONES, URINE: NEGATIVE MG/DL
LEUKOCYTE ESTERASE, URINE: ABNORMAL
NITRITE URINE, QUANTITATIVE: NEGATIVE
PH, URINE: 7 (ref 5–8)
POTASSIUM, UR: 26.3 MMOL/L (ref 22–119)
PROCALCITONIN SERPL-MCNC: 0.14 NG/ML
PROTEIN UA: >300 MG/DL
RBC URINE: 7 /HPF (ref 0–6)
SODIUM URINE: 91 MMOL/L (ref 35–167)
SPECIFIC GRAVITY UA: 1.02 (ref 1–1.03)
SPEP INTERPRETATION: ABNORMAL
SPEP INTERPRETATION: NORMAL
SQUAMOUS EPITHELIAL: 1 /HPF
TOTAL PROTEIN: 5.9 GM/DL (ref 6.4–8.2)
TRICHOMONAS: ABNORMAL /HPF
URINE TOTAL PROTEIN: >600 MG/DL
UROBILINOGEN, URINE: 0.2 MG/DL (ref 0.2–1)
WBC UA: 3 /HPF (ref 0–5)

## 2023-12-05 PROCEDURE — 82962 GLUCOSE BLOOD TEST: CPT

## 2023-12-05 PROCEDURE — 86140 C-REACTIVE PROTEIN: CPT

## 2023-12-05 PROCEDURE — 84155 ASSAY OF PROTEIN SERUM: CPT

## 2023-12-05 PROCEDURE — APPSS60 APP SPLIT SHARED TIME 46-60 MINUTES: Performed by: NURSE PRACTITIONER

## 2023-12-05 PROCEDURE — 86038 ANTINUCLEAR ANTIBODIES: CPT

## 2023-12-05 PROCEDURE — 1200000000 HC SEMI PRIVATE

## 2023-12-05 PROCEDURE — 36415 COLL VENOUS BLD VENIPUNCTURE: CPT

## 2023-12-05 PROCEDURE — 86160 COMPLEMENT ANTIGEN: CPT

## 2023-12-05 PROCEDURE — 84156 ASSAY OF PROTEIN URINE: CPT

## 2023-12-05 PROCEDURE — 6360000002 HC RX W HCPCS: Performed by: NURSE PRACTITIONER

## 2023-12-05 PROCEDURE — 87086 URINE CULTURE/COLONY COUNT: CPT

## 2023-12-05 PROCEDURE — 6370000000 HC RX 637 (ALT 250 FOR IP): Performed by: NURSE PRACTITIONER

## 2023-12-05 PROCEDURE — 84145 PROCALCITONIN (PCT): CPT

## 2023-12-05 PROCEDURE — 84300 ASSAY OF URINE SODIUM: CPT

## 2023-12-05 PROCEDURE — 81001 URINALYSIS AUTO W/SCOPE: CPT

## 2023-12-05 PROCEDURE — 80074 ACUTE HEPATITIS PANEL: CPT

## 2023-12-05 PROCEDURE — 6370000000 HC RX 637 (ALT 250 FOR IP): Performed by: INTERNAL MEDICINE

## 2023-12-05 PROCEDURE — 84165 PROTEIN E-PHORESIS SERUM: CPT

## 2023-12-05 PROCEDURE — 82570 ASSAY OF URINE CREATININE: CPT

## 2023-12-05 PROCEDURE — 82436 ASSAY OF URINE CHLORIDE: CPT

## 2023-12-05 PROCEDURE — 94761 N-INVAS EAR/PLS OXIMETRY MLT: CPT

## 2023-12-05 PROCEDURE — 84166 PROTEIN E-PHORESIS/URINE/CSF: CPT

## 2023-12-05 PROCEDURE — 84133 ASSAY OF URINE POTASSIUM: CPT

## 2023-12-05 PROCEDURE — 76775 US EXAM ABDO BACK WALL LIM: CPT

## 2023-12-05 PROCEDURE — 99223 1ST HOSP IP/OBS HIGH 75: CPT | Performed by: INTERNAL MEDICINE

## 2023-12-05 PROCEDURE — 2580000003 HC RX 258: Performed by: NURSE PRACTITIONER

## 2023-12-05 RX ORDER — HYDRALAZINE HYDROCHLORIDE 50 MG/1
50 TABLET, FILM COATED ORAL 2 TIMES DAILY
Status: DISCONTINUED | OUTPATIENT
Start: 2023-12-05 | End: 2023-12-07

## 2023-12-05 RX ORDER — ISOSORBIDE MONONITRATE 30 MG/1
30 TABLET, EXTENDED RELEASE ORAL DAILY
Status: DISCONTINUED | OUTPATIENT
Start: 2023-12-05 | End: 2023-12-07

## 2023-12-05 RX ADMIN — ACETAMINOPHEN 650 MG: 325 TABLET ORAL at 21:05

## 2023-12-05 RX ADMIN — INSULIN LISPRO 4 UNITS: 100 INJECTION, SOLUTION INTRAVENOUS; SUBCUTANEOUS at 09:06

## 2023-12-05 RX ADMIN — ENOXAPARIN SODIUM 30 MG: 100 INJECTION SUBCUTANEOUS at 21:05

## 2023-12-05 RX ADMIN — SODIUM CHLORIDE, PRESERVATIVE FREE 10 ML: 5 INJECTION INTRAVENOUS at 09:01

## 2023-12-05 RX ADMIN — ATENOLOL 50 MG: 25 TABLET ORAL at 08:56

## 2023-12-05 RX ADMIN — ISOSORBIDE MONONITRATE 30 MG: 30 TABLET, EXTENDED RELEASE ORAL at 10:33

## 2023-12-05 RX ADMIN — GUAIFENESIN 600 MG: 600 TABLET, EXTENDED RELEASE ORAL at 08:57

## 2023-12-05 RX ADMIN — GUAIFENESIN 600 MG: 600 TABLET, EXTENDED RELEASE ORAL at 21:05

## 2023-12-05 RX ADMIN — INSULIN LISPRO 4 UNITS: 100 INJECTION, SOLUTION INTRAVENOUS; SUBCUTANEOUS at 12:57

## 2023-12-05 RX ADMIN — FUROSEMIDE 20 MG: 20 TABLET ORAL at 09:06

## 2023-12-05 RX ADMIN — PANTOPRAZOLE SODIUM 40 MG: 40 TABLET, DELAYED RELEASE ORAL at 06:17

## 2023-12-05 RX ADMIN — INSULIN GLARGINE 45 UNITS: 100 INJECTION, SOLUTION SUBCUTANEOUS at 09:06

## 2023-12-05 RX ADMIN — CARBOXYMETHYLCELLULOSE SODIUM 1 DROP: 10 GEL OPHTHALMIC at 21:06

## 2023-12-05 RX ADMIN — LEVOTHYROXINE SODIUM 175 MCG: 25 TABLET ORAL at 06:17

## 2023-12-05 RX ADMIN — ASPIRIN 81 MG: 81 TABLET, CHEWABLE ORAL at 09:05

## 2023-12-05 RX ADMIN — HYDRALAZINE HYDROCHLORIDE 50 MG: 50 TABLET, FILM COATED ORAL at 21:14

## 2023-12-05 RX ADMIN — HYDRALAZINE HYDROCHLORIDE 25 MG: 25 TABLET, FILM COATED ORAL at 08:57

## 2023-12-05 RX ADMIN — SODIUM CHLORIDE, PRESERVATIVE FREE 10 ML: 5 INJECTION INTRAVENOUS at 21:06

## 2023-12-05 RX ADMIN — DULOXETINE HYDROCHLORIDE 30 MG: 30 CAPSULE, DELAYED RELEASE ORAL at 09:05

## 2023-12-05 RX ADMIN — ATENOLOL 50 MG: 25 TABLET ORAL at 21:05

## 2023-12-05 RX ADMIN — AMLODIPINE BESYLATE 10 MG: 10 TABLET ORAL at 08:57

## 2023-12-05 ASSESSMENT — PAIN SCALES - GENERAL
PAINLEVEL_OUTOF10: 3
PAINLEVEL_OUTOF10: 0
PAINLEVEL_OUTOF10: 0

## 2023-12-05 ASSESSMENT — PAIN DESCRIPTION - LOCATION: LOCATION: GENERALIZED

## 2023-12-05 ASSESSMENT — PAIN SCALES - WONG BAKER: WONGBAKER_NUMERICALRESPONSE: 0

## 2023-12-05 ASSESSMENT — PAIN DESCRIPTION - DESCRIPTORS: DESCRIPTORS: DISCOMFORT

## 2023-12-05 ASSESSMENT — PAIN - FUNCTIONAL ASSESSMENT: PAIN_FUNCTIONAL_ASSESSMENT: ACTIVITIES ARE NOT PREVENTED

## 2023-12-05 NOTE — PROGRESS NOTES
4 Eyes Skin Assessment     NAME:  Gianluca Buenrostro  YOB: 1945  MEDICAL RECORD NUMBER:  [id number]    The patient is being assessed for  Admission    I agree that at least one RN has performed a thorough Head to Toe Skin Assessment on the patient. ALL assessment sites listed below have been assessed. Areas assessed by both nurses:    Head, Face, Ears, Shoulders, Back, Chest, Arms, Elbows, Hands, Sacrum. Buttock, Coccyx, Ischium, Legs. Feet and Heels, and Under Medical Devices         Does the Patient have a Wound? No noted wound(s) patient has scattered scabs bilat knee caps, across abd, redness on bottom no open areas.         Shiraz Prevention initiated by RN: No  Wound Care Orders initiated by RN: No    Pressure Injury (Stage 3,4, Unstageable, DTI, NWPT, and Complex wounds) if present, place Wound referral order by RN under : No    New Ostomies, if present place, Ostomy referral order under : No     Nurse 1 eSignature: Electronically signed by Hannah Reyes RN on 12/4/23 at 8:05 PM EST    **SHARE this note so that the co-signing nurse can place an eSignature**    Nurse 2 eSignature: Electronically signed by Coleman Cobb RN on 12/4/23 at 8:08 PM EST

## 2023-12-05 NOTE — CONSULTS
11/29/2023 10:30 AM    UROBILINOGEN 0.2 11/29/2023 10:30 AM    BILIRUBINUR NEGATIVE 11/29/2023 10:30 AM    BLOODU TRACE 11/29/2023 10:30 AM    KETUA 15 11/29/2023 10:30 AM     ABG:    Lab Results   Component Value Date/Time    QUN7NLB 38.9 10/07/2021 09:43 AM    PO2ART 69.9 10/07/2021 09:43 AM    WJI3OYT 23.7 10/07/2021 09:43 AM     HgBA1c:    Lab Results   Component Value Date/Time    LABA1C 6.3 11/30/2023 05:50 AM     Microalbumen/Creatinine ratio:  No components found for: \"RUCREAT\"  TSH:  No results found for: \"TSH\"  IRON:    Lab Results   Component Value Date/Time    IRON 24 05/20/2023 11:00 AM     Iron Saturation:  No components found for: \"PERCENTFE\"  TIBC:    Lab Results   Component Value Date/Time    TIBC 192 05/20/2023 11:00 AM     FERRITIN:    Lab Results   Component Value Date/Time    FERRITIN 52 08/10/2019 08:50 AM     RPR:  No results found for: \"RPR\"  ABA:  No results found for: \"ANATITER\", \"ABA\"  24 Hour Urine for Creatinine Clearance:  No components found for: \"CREAT4\", \"UHRS10\", \"UTV10\"  -----------------------------------------------------------------  COMPARISON:  05/09/2021. HISTORY:  ORDERING SYSTEM PROVIDED HISTORY: abdominal pain  TECHNOLOGIST PROVIDED HISTORY:  IV contrast only. Thank you. Additional Contrast?->None  Reason for exam:->abdominal pain  Reason for Exam: abd pain, AMS     FINDINGS:  Lower Chest: There is question of infiltrate versus pulmonary nodule in the  right middle lobe. No pleural effusion is seen. Organs: There is splenomegaly. The right kidney is absent. There is a tiny  low-density area in the hilum of the spleen too small to characterize but  statistically likely represents a small cyst or hemangioma. GI/Bowel: There is a large amount of stool seen throughout the colon     Pelvis: There is diffuse bladder wall thickening and there is air within the  bladder which can be caused by gas-forming bacteria.      Peritoneum/Retroperitoneum: There is calcific

## 2023-12-05 NOTE — PLAN OF CARE
Problem: Safety - Adult  Goal: Free from fall injury  Outcome: Progressing     Problem: Discharge Planning  Goal: Discharge to home or other facility with appropriate resources  Outcome: Progressing     Problem: Skin/Tissue Integrity  Goal: Absence of new skin breakdown  Description: 1. Monitor for areas of redness and/or skin breakdown  2. Assess vascular access sites hourly  3. Every 4-6 hours minimum:  Change oxygen saturation probe site  4. Every 4-6 hours:  If on nasal continuous positive airway pressure, respiratory therapy assess nares and determine need for appliance change or resting period.   Outcome: Progressing     Problem: ABCDS Injury Assessment  Goal: Absence of physical injury  Outcome: Progressing

## 2023-12-05 NOTE — CONSULTS
Infectious Disease Consult Note  2023   Patient Name: Debara Rinne : 1945   Impression  E.coli Asymptomatic Bacteruria:  Rhinovirus:  Acute Hypoxic Respiratory Failure: Resolved  Reported allergy to ceftriaxone (rash)  CrCl 20 on CKD3b  T-max 99.5 to afebrile, leukocytosis 14 max, now normalized, hypoalbuminemia, CRP 93, Pct 0.158  -Resp panel: Rhinovirus enterovirus by PCR  2023 Urine culture E.coli MDRO (sensi to cefazolin, cefepime, ceftriaxone, ertapenem, and Zosyn)  -UA WBC 3, RBC 7, culture: Pending  -Retroperitoneal US: Pending  -Portable CXR: Pending  CKD 3b/ Congenital Solitary Left Kidney:  Dr. Michelle Amaya onboard  DMII:  Metabolic Encephalopathy/Alzheimer's Dementia: Baseline is A&O:  HTN:  Multi-morbidity: per PMHx: CKD 3b with a congenital solitary kidney, DMII, hypothyroidism, depression, HF, chronic pain syndrome,   Alzheimer's dementia (A&O at baseline), HTN, frequent falls, GERD, right foot hallux valgus, HLD  Plan: Will not start antimicrobial therapy at this time as patient is asymptomatic with urinary symptoms  Trend CRP and Pct, ordered  Await repeat urine culture, if growth appears, will probably still not treat with ABX at this time  Thank you for allowing me to consult in the care of this patient.  ------------------------  REASON FOR CONSULT: Infective syndrome \"Recurrent MDRO E.coli in urine\"  Requested by: MILADIS Moses  HPI:Patient is a 69 y.o.  female with a history of CKD 3b with a congenital solitary kidney, DMII, hypothyroidism, HF, chronic pain syndrome, dementia, HTN, frequent falls, GERD, right foot hallux valgus, HLD, hospitalized form -2023 with rhinovirus and acute hypoxic respiratory failure, then presented back to the [facility] ED on 2023 with similar symptoms who was admitted/ transferred to [facility] 2023 for further evaluation and management of hypoxia while at the [facility] ([place], [place]).  She was found to be hypoxic, placed on

## 2023-12-05 NOTE — H&P
History and Physical      Name:  Eva Marin /Age/Sex:   (80 y.o. female)   MRN & CSN:  [id number] & [id number] Encounter Date/Time: 2023 7:57 PM EST   Location:  [address] [place] [address]-A PCP: Bijan Hernández, [address] [place] [address],[address] Floor Day: 1    Assessment and Plan:     Patient is a 71-year-old female who presented with SOB. Transferred from [facility] inpatient due to LAURA and UTI with MDRO E. coli. LAURA on CKD stage IIIb  -Solitary kidney  -sCr 2.4 with minimal improvement  -Baseline 1.3-1.5.   -Monitor lab trends   -Urine indices essentially normal but significant proteinuria   -Holding nephrotoxic agents-and renally dosing meds  -Transferred for Nephrology input, consulted. Acute respiratory failure with hypoxia  Acute bronchitis  Rhinovirus infection  CXR \"Findings consistent with bronchitis/pneumonitis worst in the hilar/perihilar   regions. Early/mild congestive heart failure, underlying chronic lung disease and low lung volumes may contribute to this appearance. Otherwise, radiographically nonacute portable chest. \".    -symptomatic with new oxygen requirement on admission now weaned to room air   -resolvrf  -Continue bronchodilators/ pulmonary hygiene   -Prednisone 40mg daily x 3 days but discontinued d/t hyperglycemia  -Completed 5-day azithromycin course                Elevated troponin  -With out chest pain  -Chronically elevated- flat trend. -EKG without acute ischemic changes   -Likely in the setting of renal dysfunction      Elevated BNP (6177)  -Chronically elevated but above baseline-repeat slightly improved   -Does not appear clinically hypervolemic . Will restart PO lasix today   -1100 mL urine output overnight   -Weight trend review: -17 pounds from previous admission (2023)     Acute cystitis without hematuria  Urine culture growth MDRO E. Coli but low colony count however given renal function/worsening leukocytosis initiated treatment.   Started on Bactrim however

## 2023-12-05 NOTE — PROGRESS NOTES
[place]2.0  [facility] [place]      Name:  Theador Ill /Age/Sex:   (80 y.o. female)   MRN & CSN:  [id number] & [id number] Encounter Date/Time: 2023 12:12 PM EST    Location:  [address] [place] [address]-A PCP: Leann Rodrigues, [address] [place] [address],[address] Day: 2    Assessment and Plan:   Patient is a 67-year-old female who presented with SOB. Transferred from [facility] due to LAURA and UTI with MDRO E. coli. LAURA on CKD stage IIIb  -Solitary kidney  -sCr 2.4 with minimal improvement  -Baseline 1.3-1.5.   -Monitor lab trends   -Urine indices essentially normal but significant proteinuria   -Holding nephrotoxic agents-and renally dosing meds  -Transferred for Nephrology input, consulted. Acute respiratory failure with hypoxia  Acute bronchitis  Rhinovirus infection  CXR \"Findings consistent with bronchitis/pneumonitis worst in the hilar/perihilar   regions. Early/mild congestive heart failure, underlying chronic lung disease and low lung volumes may contribute to this appearance. Otherwise, radiographically nonacute portable chest. \".    -symptomatic with new oxygen requirement on admission now weaned to room air   -resolvrf  -Continue bronchodilators/ pulmonary hygiene   -Prednisone 40mg daily x 3 days but discontinued d/t hyperglycemia  -Completed 5-day azithromycin course                Elevated troponin  -With out chest pain  -Chronically elevated- flat trend. -EKG without acute ischemic changes   -Likely in the setting of renal dysfunction      Elevated BNP (6177)  -Chronically elevated but above baseline-repeat slightly improved   -Does not appear clinically hypervolemic . Will restart PO lasix today   -1100 mL urine output overnight   -Weight trend review: -17 pounds from previous admission (2023)     Acute cystitis without hematuria  Urine culture growth MDRO E. Coli but low colony count however given renal function/worsening leukocytosis initiated treatment.   Started on Bactrim MMOL/L    Chloride 68 43 - 210 MMOL/L   Protein / creatinine ratio, urine    Collection Time: 12/05/23 10:30 AM   Result Value Ref Range    Urine Total Protein >600 (H) <12 MG/DL    Creatinine, Ur 56.2 28 - 217 MG/DL   Urinalysis    Collection Time: 12/05/23 10:30 AM   Result Value Ref Range    Color, UA YELLOW YELLOW    Clarity, UA CLEAR CLEAR    Glucose, Urine 500 (A) NEGATIVE MG/DL    Bilirubin Urine NEGATIVE NEGATIVE MG/DL    Ketones, Urine NEGATIVE NEGATIVE MG/DL    Specific Gravity, UA 1.020 1.001 - 1.035    Blood, Urine MODERATE NUMBER OR AMOUNT OBSERVED (A) NEGATIVE    pH, Urine 7.0 5.0 - 8.0    Protein, UA >300 (HH) NEGATIVE MG/DL    Urobilinogen, Urine 0.2 0.2 - 1.0 MG/DL    Nitrite Urine, Quantitative NEGATIVE NEGATIVE    Leukocyte Esterase, Urine TRACE (A) NEGATIVE   Protein Electrophoresis, Urine    Collection Time: 12/05/23 10:30 AM   Result Value Ref Range    Urine Total Protein >600 MG/DL   Microscopic Urinalysis    Collection Time: 12/05/23 10:30 AM   Result Value Ref Range    RBC, UA 7 (H) 0 - 6 /HPF    WBC, UA 3 0 - 5 /HPF    Bacteria, UA RARE (A) NEGATIVE /HPF    Squam Epithel, UA 1 /HPF    Trichomonas NONE SEEN NONE SEEN /HPF    Hyaline Casts, UA 0 /LPF   POCT Glucose    Collection Time: 12/05/23 11:52 AM   Result Value Ref Range    POC Glucose 236 (H) 70 - 99 MG/DL        Imaging/Diagnostics Last 24 Hours   No results found.     Electronically signed by Lyn Godwin MD on 12/5/2023 at 12:12 PM

## 2023-12-06 ENCOUNTER — APPOINTMENT (OUTPATIENT)
Dept: GENERAL RADIOLOGY | Age: 78
DRG: 682 | End: 2023-12-06
Attending: STUDENT IN AN ORGANIZED HEALTH CARE EDUCATION/TRAINING PROGRAM
Payer: MEDICARE

## 2023-12-06 LAB
ALBUMIN SERPL-MCNC: 2.7 GM/DL (ref 3.4–5)
ANION GAP SERPL CALCULATED.3IONS-SCNC: 11 MMOL/L (ref 4–16)
BASOPHILS ABSOLUTE: 0 K/CU MM
BASOPHILS RELATIVE PERCENT: 0.2 % (ref 0–1)
BUN SERPL-MCNC: 46 MG/DL (ref 6–23)
CALCIUM SERPL-MCNC: 8.6 MG/DL (ref 8.3–10.6)
CHLORIDE BLD-SCNC: 110 MMOL/L (ref 99–110)
CO2: 18 MMOL/L (ref 21–32)
CREAT SERPL-MCNC: 2.6 MG/DL (ref 0.6–1.1)
CREATININE URINE: 56.2 MG/DL (ref 28–217)
CRP SERPL HS-MCNC: 6.4 MG/L
DIFFERENTIAL TYPE: ABNORMAL
EOSINOPHILS ABSOLUTE: 0.4 K/CU MM
EOSINOPHILS RELATIVE PERCENT: 4.9 % (ref 0–3)
GFR SERPL CREATININE-BSD FRML MDRD: 18 ML/MIN/1.73M2
GLUCOSE BLD-MCNC: 140 MG/DL (ref 70–99)
GLUCOSE BLD-MCNC: 226 MG/DL (ref 70–99)
GLUCOSE BLD-MCNC: 244 MG/DL (ref 70–99)
GLUCOSE BLD-MCNC: 81 MG/DL (ref 70–99)
GLUCOSE SERPL-MCNC: 91 MG/DL (ref 70–99)
HCT VFR BLD CALC: 25.2 % (ref 37–47)
HEMOGLOBIN: 7.9 GM/DL (ref 12.5–16)
IMMATURE NEUTROPHIL %: 2.5 % (ref 0–0.43)
LYMPHOCYTES ABSOLUTE: 2.3 K/CU MM
LYMPHOCYTES RELATIVE PERCENT: 25.7 % (ref 24–44)
MCH RBC QN AUTO: 30.2 PG (ref 27–31)
MCHC RBC AUTO-ENTMCNC: 31.3 % (ref 32–36)
MCV RBC AUTO: 96.2 FL (ref 78–100)
MONOCYTES ABSOLUTE: 0.5 K/CU MM
MONOCYTES RELATIVE PERCENT: 5.3 % (ref 0–4)
NUCLEATED RBC %: 0 %
PDW BLD-RTO: 13.4 % (ref 11.7–14.9)
PHOSPHORUS: 4.4 MG/DL (ref 2.5–4.9)
PLATELET # BLD: 188 K/CU MM (ref 140–440)
PMV BLD AUTO: 9.7 FL (ref 7.5–11.1)
POTASSIUM SERPL-SCNC: 4.4 MMOL/L (ref 3.5–5.1)
PROCALCITONIN SERPL-MCNC: 0.15 NG/ML
PROT/CREAT RATIO, UR: ABNORMAL
RBC # BLD: 2.62 M/CU MM (ref 4.2–5.4)
SEGMENTED NEUTROPHILS ABSOLUTE COUNT: 5.6 K/CU MM
SEGMENTED NEUTROPHILS RELATIVE PERCENT: 61.4 % (ref 36–66)
SODIUM BLD-SCNC: 139 MMOL/L (ref 135–145)
TOTAL IMMATURE NEUTOROPHIL: 0.23 K/CU MM
TOTAL NUCLEATED RBC: 0 K/CU MM
URINE TOTAL PROTEIN: >600 MG/DL
WBC # BLD: 9.1 K/CU MM (ref 4–10.5)

## 2023-12-06 PROCEDURE — 94150 VITAL CAPACITY TEST: CPT

## 2023-12-06 PROCEDURE — 6360000002 HC RX W HCPCS: Performed by: NURSE PRACTITIONER

## 2023-12-06 PROCEDURE — 6370000000 HC RX 637 (ALT 250 FOR IP): Performed by: INTERNAL MEDICINE

## 2023-12-06 PROCEDURE — 6370000000 HC RX 637 (ALT 250 FOR IP): Performed by: NURSE PRACTITIONER

## 2023-12-06 PROCEDURE — 36415 COLL VENOUS BLD VENIPUNCTURE: CPT

## 2023-12-06 PROCEDURE — 86140 C-REACTIVE PROTEIN: CPT

## 2023-12-06 PROCEDURE — 80069 RENAL FUNCTION PANEL: CPT

## 2023-12-06 PROCEDURE — 1200000000 HC SEMI PRIVATE

## 2023-12-06 PROCEDURE — 2580000003 HC RX 258: Performed by: INTERNAL MEDICINE

## 2023-12-06 PROCEDURE — 99232 SBSQ HOSP IP/OBS MODERATE 35: CPT | Performed by: NURSE PRACTITIONER

## 2023-12-06 PROCEDURE — 2580000003 HC RX 258: Performed by: NURSE PRACTITIONER

## 2023-12-06 PROCEDURE — 94640 AIRWAY INHALATION TREATMENT: CPT

## 2023-12-06 PROCEDURE — 71045 X-RAY EXAM CHEST 1 VIEW: CPT

## 2023-12-06 PROCEDURE — 85025 COMPLETE CBC W/AUTO DIFF WBC: CPT

## 2023-12-06 PROCEDURE — 6360000002 HC RX W HCPCS: Performed by: INTERNAL MEDICINE

## 2023-12-06 PROCEDURE — 84145 PROCALCITONIN (PCT): CPT

## 2023-12-06 RX ORDER — SODIUM CHLORIDE 9 MG/ML
INJECTION, SOLUTION INTRAVENOUS CONTINUOUS
Status: DISCONTINUED | OUTPATIENT
Start: 2023-12-06 | End: 2023-12-08

## 2023-12-06 RX ORDER — FUROSEMIDE 10 MG/ML
20 INJECTION INTRAMUSCULAR; INTRAVENOUS ONCE
Status: COMPLETED | OUTPATIENT
Start: 2023-12-06 | End: 2023-12-06

## 2023-12-06 RX ADMIN — LEVOTHYROXINE SODIUM 175 MCG: 25 TABLET ORAL at 05:44

## 2023-12-06 RX ADMIN — ASPIRIN 81 MG: 81 TABLET, CHEWABLE ORAL at 09:50

## 2023-12-06 RX ADMIN — SODIUM CHLORIDE, PRESERVATIVE FREE 10 ML: 5 INJECTION INTRAVENOUS at 09:51

## 2023-12-06 RX ADMIN — ISOSORBIDE MONONITRATE 30 MG: 30 TABLET, EXTENDED RELEASE ORAL at 09:50

## 2023-12-06 RX ADMIN — SODIUM CHLORIDE: 9 INJECTION, SOLUTION INTRAVENOUS at 09:43

## 2023-12-06 RX ADMIN — HYDRALAZINE HYDROCHLORIDE 50 MG: 50 TABLET, FILM COATED ORAL at 09:50

## 2023-12-06 RX ADMIN — IPRATROPIUM BROMIDE AND ALBUTEROL SULFATE 1 DOSE: 2.5; .5 SOLUTION RESPIRATORY (INHALATION) at 16:45

## 2023-12-06 RX ADMIN — IPRATROPIUM BROMIDE AND ALBUTEROL SULFATE 1 DOSE: 2.5; .5 SOLUTION RESPIRATORY (INHALATION) at 20:20

## 2023-12-06 RX ADMIN — PANTOPRAZOLE SODIUM 40 MG: 40 TABLET, DELAYED RELEASE ORAL at 05:44

## 2023-12-06 RX ADMIN — IPRATROPIUM BROMIDE AND ALBUTEROL SULFATE 1 DOSE: 2.5; .5 SOLUTION RESPIRATORY (INHALATION) at 12:28

## 2023-12-06 RX ADMIN — FUROSEMIDE 20 MG: 10 INJECTION, SOLUTION INTRAMUSCULAR; INTRAVENOUS at 09:57

## 2023-12-06 RX ADMIN — GUAIFENESIN 600 MG: 600 TABLET, EXTENDED RELEASE ORAL at 22:48

## 2023-12-06 RX ADMIN — ENOXAPARIN SODIUM 30 MG: 100 INJECTION SUBCUTANEOUS at 22:47

## 2023-12-06 RX ADMIN — ATENOLOL 50 MG: 25 TABLET ORAL at 22:48

## 2023-12-06 RX ADMIN — HYDRALAZINE HYDROCHLORIDE 50 MG: 50 TABLET, FILM COATED ORAL at 22:48

## 2023-12-06 RX ADMIN — SODIUM CHLORIDE: 9 INJECTION, SOLUTION INTRAVENOUS at 22:49

## 2023-12-06 RX ADMIN — AMLODIPINE BESYLATE 10 MG: 10 TABLET ORAL at 09:50

## 2023-12-06 RX ADMIN — ATENOLOL 50 MG: 25 TABLET ORAL at 09:50

## 2023-12-06 RX ADMIN — DULOXETINE HYDROCHLORIDE 30 MG: 30 CAPSULE, DELAYED RELEASE ORAL at 09:50

## 2023-12-06 RX ADMIN — INSULIN LISPRO 4 UNITS: 100 INJECTION, SOLUTION INTRAVENOUS; SUBCUTANEOUS at 17:35

## 2023-12-06 RX ADMIN — GUAIFENESIN 600 MG: 600 TABLET, EXTENDED RELEASE ORAL at 09:50

## 2023-12-06 ASSESSMENT — PAIN SCALES - WONG BAKER
WONGBAKER_NUMERICALRESPONSE: 0

## 2023-12-06 ASSESSMENT — PAIN SCALES - GENERAL
PAINLEVEL_OUTOF10: 0
PAINLEVEL_OUTOF10: 0

## 2023-12-06 NOTE — CARE COORDINATION
CM in to see Pt to follow up on discharge planning. Plan remains to return to her assisted living at [facility]. Pt daughters present, deny any needs at this time.     CM following

## 2023-12-06 NOTE — PROGRESS NOTES
[place]  [facility] [place]      Name:  Savage Johns /Age/Sex:   (80 y.o. female)   MRN & CSN:  [id number] & [id number] Encounter Date/Time: 2023 12:12 PM EST    Location:  [address] [place] [address]-A PCP: Kath Reyes, [address] [place] [address],[address] Floor Day: 3    Assessment and Plan:   Patient is a 67-year-old female who presented with SOB. Transferred from [facility] due to LAURA and UTI with MDRO E. coli. LAURA on CKD stage IIIb  -Solitary kidney  -sCr 2.4 with minimal improvement  -Baseline 1.3-1.5.   -Monitor lab trends   -Urine indices essentially normal but significant proteinuria   -Holding nephrotoxic agents-and renally dosing meds  -Transferred for Nephrology input, consulted. Acute respiratory failure with hypoxia  Acute bronchitis  Rhinovirus infection  CXR \"Findings consistent with bronchitis/pneumonitis worst in the hilar/perihilar   regions. Early/mild congestive heart failure, underlying chronic lung disease and low lung volumes may contribute to this appearance. Otherwise, radiographically nonacute portable chest. \".    -symptomatic with new oxygen requirement on admission now weaned to room air   -resolvrf  -Continue bronchodilators/ pulmonary hygiene   -Prednisone 40mg daily x 3 days but discontinued d/t hyperglycemia  -Completed 5-day azithromycin course  -Supportive care for today                Elevated troponin  -With out chest pain  -Chronically elevated- flat trend. -EKG without acute ischemic changes   -Likely in the setting of renal dysfunction      Elevated BNP (6177)  -Chronically elevated but above baseline-repeat slightly improved   -Does not appear clinically hypervolemic .  Will restart PO lasix today   -1100 mL urine output overnight   -Weight trend review: -17 pounds from previous admission (2023)     Asymptomatic bacteriuria  Urine culture growth MDRO E. Coli but low colony count however given renal function/worsening leukocytosis initiated

## 2023-12-07 LAB
ALBUMIN SERPL-MCNC: 2.7 GM/DL (ref 3.4–5)
ANION GAP SERPL CALCULATED.3IONS-SCNC: 10 MMOL/L (ref 4–16)
BACTERIA: NEGATIVE /HPF
BASOPHILS ABSOLUTE: 0 K/CU MM
BASOPHILS RELATIVE PERCENT: 0.3 % (ref 0–1)
BILIRUBIN URINE: NEGATIVE MG/DL
BLOOD, URINE: ABNORMAL
BUN SERPL-MCNC: 43 MG/DL (ref 6–23)
CALCIUM SERPL-MCNC: 8.3 MG/DL (ref 8.3–10.6)
CHLORIDE BLD-SCNC: 110 MMOL/L (ref 99–110)
CLARITY: CLEAR
CO2: 17 MMOL/L (ref 21–32)
COLOR: YELLOW
CREAT SERPL-MCNC: 2.8 MG/DL (ref 0.6–1.1)
CRP SERPL HS-MCNC: 6 MG/L
CULTURE: NORMAL
DIFFERENTIAL TYPE: ABNORMAL
EOSINOPHIL, URINE: POSITIVE /HPF
EOSINOPHILS ABSOLUTE: 0.4 K/CU MM
EOSINOPHILS RELATIVE PERCENT: 4 % (ref 0–3)
GFR SERPL CREATININE-BSD FRML MDRD: 17 ML/MIN/1.73M2
GLUCOSE BLD-MCNC: 228 MG/DL (ref 70–99)
GLUCOSE BLD-MCNC: 277 MG/DL (ref 70–99)
GLUCOSE BLD-MCNC: 324 MG/DL (ref 70–99)
GLUCOSE BLD-MCNC: 362 MG/DL (ref 70–99)
GLUCOSE BLD-MCNC: 366 MG/DL (ref 70–99)
GLUCOSE SERPL-MCNC: 243 MG/DL (ref 70–99)
GLUCOSE, URINE: >1000 MG/DL
HAV IGM SERPL QL IA: NON REACTIVE
HBV CORE IGM SERPL QL IA: NON REACTIVE
HBV SURFACE AG SERPL QL IA: NON REACTIVE
HCT VFR BLD CALC: 25.8 % (ref 37–47)
HCV AB SERPL QL IA: NON REACTIVE
HEMOGLOBIN: 8 GM/DL (ref 12.5–16)
IMMATURE NEUTROPHIL %: 1.9 % (ref 0–0.43)
KETONES, URINE: NEGATIVE MG/DL
LEUKOCYTE ESTERASE, URINE: ABNORMAL
LYMPHOCYTES ABSOLUTE: 2 K/CU MM
LYMPHOCYTES RELATIVE PERCENT: 22 % (ref 24–44)
Lab: NORMAL
MCH RBC QN AUTO: 30.4 PG (ref 27–31)
MCHC RBC AUTO-ENTMCNC: 31 % (ref 32–36)
MCV RBC AUTO: 98.1 FL (ref 78–100)
MONOCYTES ABSOLUTE: 0.4 K/CU MM
MONOCYTES RELATIVE PERCENT: 4.7 % (ref 0–4)
NITRITE URINE, QUANTITATIVE: NEGATIVE
NUCLEAR IGG SER QL IA: NORMAL
NUCLEATED RBC %: 0 %
PDW BLD-RTO: 13.6 % (ref 11.7–14.9)
PH, URINE: 6 (ref 5–8)
PHOSPHORUS: 5.1 MG/DL (ref 2.5–4.9)
PLATELET # BLD: 173 K/CU MM (ref 140–440)
PMV BLD AUTO: 9.6 FL (ref 7.5–11.1)
POTASSIUM SERPL-SCNC: 5.1 MMOL/L (ref 3.5–5.1)
PROTEIN UA: >300 MG/DL
RBC # BLD: 2.63 M/CU MM (ref 4.2–5.4)
RBC URINE: 45 /HPF (ref 0–6)
SEGMENTED NEUTROPHILS ABSOLUTE COUNT: 5.9 K/CU MM
SEGMENTED NEUTROPHILS RELATIVE PERCENT: 67.1 % (ref 36–66)
SODIUM BLD-SCNC: 137 MMOL/L (ref 135–145)
SPECIFIC GRAVITY UA: 1.02 (ref 1–1.03)
SPECIMEN: NORMAL
SQUAMOUS EPITHELIAL: 7 /HPF
TOTAL IMMATURE NEUTOROPHIL: 0.17 K/CU MM
TOTAL NUCLEATED RBC: 0 K/CU MM
TRICHOMONAS: ABNORMAL /HPF
UROBILINOGEN, URINE: 0.2 MG/DL (ref 0.2–1)
WBC # BLD: 8.9 K/CU MM (ref 4–10.5)
WBC UA: 189 /HPF (ref 0–5)

## 2023-12-07 PROCEDURE — 81001 URINALYSIS AUTO W/SCOPE: CPT

## 2023-12-07 PROCEDURE — 94761 N-INVAS EAR/PLS OXIMETRY MLT: CPT

## 2023-12-07 PROCEDURE — 6370000000 HC RX 637 (ALT 250 FOR IP): Performed by: NURSE PRACTITIONER

## 2023-12-07 PROCEDURE — 85025 COMPLETE CBC W/AUTO DIFF WBC: CPT

## 2023-12-07 PROCEDURE — 6360000002 HC RX W HCPCS: Performed by: INTERNAL MEDICINE

## 2023-12-07 PROCEDURE — 1200000000 HC SEMI PRIVATE

## 2023-12-07 PROCEDURE — 2580000003 HC RX 258: Performed by: INTERNAL MEDICINE

## 2023-12-07 PROCEDURE — 6370000000 HC RX 637 (ALT 250 FOR IP): Performed by: STUDENT IN AN ORGANIZED HEALTH CARE EDUCATION/TRAINING PROGRAM

## 2023-12-07 PROCEDURE — 80074 ACUTE HEPATITIS PANEL: CPT

## 2023-12-07 PROCEDURE — 84156 ASSAY OF PROTEIN URINE: CPT

## 2023-12-07 PROCEDURE — 80069 RENAL FUNCTION PANEL: CPT

## 2023-12-07 PROCEDURE — 6370000000 HC RX 637 (ALT 250 FOR IP): Performed by: INTERNAL MEDICINE

## 2023-12-07 PROCEDURE — 84166 PROTEIN E-PHORESIS/URINE/CSF: CPT

## 2023-12-07 PROCEDURE — 94150 VITAL CAPACITY TEST: CPT

## 2023-12-07 PROCEDURE — 84165 PROTEIN E-PHORESIS SERUM: CPT

## 2023-12-07 PROCEDURE — 82962 GLUCOSE BLOOD TEST: CPT

## 2023-12-07 PROCEDURE — 87205 SMEAR GRAM STAIN: CPT

## 2023-12-07 PROCEDURE — 2580000003 HC RX 258: Performed by: NURSE PRACTITIONER

## 2023-12-07 PROCEDURE — 94664 DEMO&/EVAL PT USE INHALER: CPT

## 2023-12-07 PROCEDURE — 86160 COMPLEMENT ANTIGEN: CPT

## 2023-12-07 PROCEDURE — 84155 ASSAY OF PROTEIN SERUM: CPT

## 2023-12-07 PROCEDURE — 86140 C-REACTIVE PROTEIN: CPT

## 2023-12-07 PROCEDURE — 36415 COLL VENOUS BLD VENIPUNCTURE: CPT

## 2023-12-07 PROCEDURE — 94640 AIRWAY INHALATION TREATMENT: CPT

## 2023-12-07 PROCEDURE — 6360000002 HC RX W HCPCS: Performed by: NURSE PRACTITIONER

## 2023-12-07 PROCEDURE — 82570 ASSAY OF URINE CREATININE: CPT

## 2023-12-07 RX ORDER — IPRATROPIUM BROMIDE AND ALBUTEROL SULFATE 2.5; .5 MG/3ML; MG/3ML
1 SOLUTION RESPIRATORY (INHALATION)
Status: DISCONTINUED | OUTPATIENT
Start: 2023-12-07 | End: 2023-12-12 | Stop reason: HOSPADM

## 2023-12-07 RX ORDER — HYDRALAZINE HYDROCHLORIDE 50 MG/1
50 TABLET, FILM COATED ORAL EVERY 8 HOURS SCHEDULED
Status: DISCONTINUED | OUTPATIENT
Start: 2023-12-07 | End: 2023-12-12 | Stop reason: HOSPADM

## 2023-12-07 RX ORDER — ISOSORBIDE MONONITRATE 60 MG/1
60 TABLET, EXTENDED RELEASE ORAL DAILY
Status: DISCONTINUED | OUTPATIENT
Start: 2023-12-07 | End: 2023-12-12 | Stop reason: HOSPADM

## 2023-12-07 RX ORDER — SODIUM BICARBONATE 650 MG/1
650 TABLET ORAL 4 TIMES DAILY
Status: DISCONTINUED | OUTPATIENT
Start: 2023-12-07 | End: 2023-12-08

## 2023-12-07 RX ADMIN — INSULIN GLARGINE 45 UNITS: 100 INJECTION, SOLUTION SUBCUTANEOUS at 09:29

## 2023-12-07 RX ADMIN — HYDRALAZINE HYDROCHLORIDE 50 MG: 50 TABLET ORAL at 20:58

## 2023-12-07 RX ADMIN — DULOXETINE HYDROCHLORIDE 30 MG: 30 CAPSULE, DELAYED RELEASE ORAL at 09:28

## 2023-12-07 RX ADMIN — PANTOPRAZOLE SODIUM 40 MG: 40 TABLET, DELAYED RELEASE ORAL at 06:14

## 2023-12-07 RX ADMIN — INSULIN LISPRO 4 UNITS: 100 INJECTION, SOLUTION INTRAVENOUS; SUBCUTANEOUS at 09:29

## 2023-12-07 RX ADMIN — ISOSORBIDE MONONITRATE 60 MG: 60 TABLET, EXTENDED RELEASE ORAL at 09:48

## 2023-12-07 RX ADMIN — ENOXAPARIN SODIUM 30 MG: 100 INJECTION SUBCUTANEOUS at 20:54

## 2023-12-07 RX ADMIN — SODIUM BICARBONATE 650 MG: 650 TABLET ORAL at 20:58

## 2023-12-07 RX ADMIN — METHYLPREDNISOLONE SODIUM SUCCINATE 40 MG: 40 INJECTION, POWDER, FOR SOLUTION INTRAMUSCULAR; INTRAVENOUS at 18:07

## 2023-12-07 RX ADMIN — SODIUM CHLORIDE, PRESERVATIVE FREE 10 ML: 5 INJECTION INTRAVENOUS at 09:29

## 2023-12-07 RX ADMIN — SODIUM CHLORIDE: 9 INJECTION, SOLUTION INTRAVENOUS at 18:54

## 2023-12-07 RX ADMIN — INSULIN LISPRO 4 UNITS: 100 INJECTION, SOLUTION INTRAVENOUS; SUBCUTANEOUS at 20:58

## 2023-12-07 RX ADMIN — GUAIFENESIN 600 MG: 600 TABLET, EXTENDED RELEASE ORAL at 20:54

## 2023-12-07 RX ADMIN — IPRATROPIUM BROMIDE AND ALBUTEROL SULFATE 1 DOSE: 2.5; .5 SOLUTION RESPIRATORY (INHALATION) at 21:59

## 2023-12-07 RX ADMIN — ATENOLOL 50 MG: 25 TABLET ORAL at 09:28

## 2023-12-07 RX ADMIN — METHYLPREDNISOLONE SODIUM SUCCINATE 40 MG: 40 INJECTION, POWDER, FOR SOLUTION INTRAMUSCULAR; INTRAVENOUS at 09:29

## 2023-12-07 RX ADMIN — SODIUM CHLORIDE, PRESERVATIVE FREE 10 ML: 5 INJECTION INTRAVENOUS at 20:54

## 2023-12-07 RX ADMIN — ATENOLOL 50 MG: 25 TABLET ORAL at 20:54

## 2023-12-07 RX ADMIN — CARBOXYMETHYLCELLULOSE SODIUM 1 DROP: 10 GEL OPHTHALMIC at 20:54

## 2023-12-07 RX ADMIN — HYDRALAZINE HYDROCHLORIDE 50 MG: 50 TABLET ORAL at 13:31

## 2023-12-07 RX ADMIN — ACETAMINOPHEN 650 MG: 325 TABLET ORAL at 20:53

## 2023-12-07 RX ADMIN — SODIUM BICARBONATE 650 MG: 650 TABLET ORAL at 18:07

## 2023-12-07 RX ADMIN — AMLODIPINE BESYLATE 10 MG: 10 TABLET ORAL at 09:28

## 2023-12-07 RX ADMIN — ASPIRIN 81 MG: 81 TABLET, CHEWABLE ORAL at 09:28

## 2023-12-07 RX ADMIN — GUAIFENESIN 600 MG: 600 TABLET, EXTENDED RELEASE ORAL at 09:28

## 2023-12-07 RX ADMIN — INSULIN LISPRO 8 UNITS: 100 INJECTION, SOLUTION INTRAVENOUS; SUBCUTANEOUS at 13:30

## 2023-12-07 RX ADMIN — SODIUM BICARBONATE 650 MG: 650 TABLET ORAL at 13:31

## 2023-12-07 RX ADMIN — IPRATROPIUM BROMIDE AND ALBUTEROL SULFATE 1 DOSE: 2.5; .5 SOLUTION RESPIRATORY (INHALATION) at 08:10

## 2023-12-07 RX ADMIN — LEVOTHYROXINE SODIUM 175 MCG: 25 TABLET ORAL at 06:14

## 2023-12-07 RX ADMIN — INSULIN LISPRO 12 UNITS: 100 INJECTION, SOLUTION INTRAVENOUS; SUBCUTANEOUS at 18:06

## 2023-12-07 ASSESSMENT — PAIN SCALES - GENERAL: PAINLEVEL_OUTOF10: 3

## 2023-12-07 ASSESSMENT — PAIN DESCRIPTION - DESCRIPTORS: DESCRIPTORS: DISCOMFORT

## 2023-12-07 ASSESSMENT — PAIN SCALES - WONG BAKER: WONGBAKER_NUMERICALRESPONSE: 0

## 2023-12-07 ASSESSMENT — PAIN - FUNCTIONAL ASSESSMENT: PAIN_FUNCTIONAL_ASSESSMENT: ACTIVITIES ARE NOT PREVENTED

## 2023-12-07 ASSESSMENT — PAIN DESCRIPTION - PAIN TYPE: TYPE: CHRONIC PAIN

## 2023-12-07 ASSESSMENT — PAIN DESCRIPTION - LOCATION: LOCATION: GENERALIZED

## 2023-12-07 NOTE — PROGRESS NOTES
Nephrology Progress Note  12/7/2023 8:51 AM  Subjective: Interval History: Guru Muller is a 69 y.o. female who is weak in bed and stable uop with ext agrawal and still increase renal function now off abx        Data:   Scheduled Meds:   ipratropium 0.5 mg-albuterol 2.5 mg  1 Dose Inhalation BID RT    hydrALAZINE  50 mg Oral BID    isosorbide mononitrate  30 mg Oral Daily    sodium chloride flush  5-40 mL IntraVENous 2 times per day    enoxaparin  30 mg SubCUTAneous Q24H    guaiFENesin  600 mg Oral BID    amLODIPine  10 mg Oral Daily    atenolol  50 mg Oral BID    aspirin  81 mg Oral Daily    pantoprazole  40 mg Oral Daily    levothyroxine  175 mcg Oral Daily    insulin lispro  0-16 Units SubCUTAneous TID WC    insulin lispro  0-4 Units SubCUTAneous Nightly    DULoxetine  30 mg Oral Daily    insulin glargine  45 Units SubCUTAneous QAM     Continuous Infusions:   sodium chloride 75 mL/hr at 12/06/23 2249    sodium chloride      dextrose           CBC   Recent Labs     12/06/23  0249 12/07/23  0149   WBC 9.1 8.9   HGB 7.9* 8.0*   HCT 25.2* 25.8*    173      BMP   Recent Labs     12/04/23  1330 12/06/23  0249 12/07/23  0149    139 137   K 5.2* 4.4 5.1    110 110   CO2 18* 18* 17*   PHOS  --  4.4 5.1*   BUN 50* 46* 43*   CREATININE 2.4* 2.6* 2.8*     Hepatic: No results for input(s): \"AST\", \"ALT\", \"ALB\", \"BILITOT\", \"ALKPHOS\" in the last 72 hours. Troponin: No results for input(s): \"TROPONINI\" in the last 72 hours. BNP: No results for input(s): \"BNP\" in the last 72 hours. Lipids: No results for input(s): \"CHOL\", \"HDL\" in the last 72 hours. Invalid input(s): \"LDLCALCU\"  ABGs:   Lab Results   Component Value Date/Time    PO2ART 69.9 10/07/2021 09:43 AM    OJM0OHW 38.9 10/07/2021 09:43 AM     INR: No results for input(s): \"INR\" in the last 72 hours.   Renal Labs  Albumin:    Lab Results   Component Value Date/Time    LABALBU 2.7 12/07/2023 01:49 AM     Calcium:    Lab Results   Component

## 2023-12-08 LAB
ALBUMIN SERPL-MCNC: 2.7 GM/DL (ref 3.4–5)
ANION GAP SERPL CALCULATED.3IONS-SCNC: 10 MMOL/L (ref 4–16)
BANDED NEUTROPHILS ABSOLUTE COUNT: 0.06 K/CU MM
BANDED NEUTROPHILS RELATIVE PERCENT: 1 % (ref 5–11)
BUN SERPL-MCNC: 46 MG/DL (ref 6–23)
C3 SERPL-MCNC: 133 MG/DL (ref 90–180)
C4 SERPL-MCNC: 28 MG/DL (ref 10–40)
CALCIUM SERPL-MCNC: 8.1 MG/DL (ref 8.3–10.6)
CHLORIDE BLD-SCNC: 109 MMOL/L (ref 99–110)
CO2: 17 MMOL/L (ref 21–32)
CREAT SERPL-MCNC: 2.5 MG/DL (ref 0.6–1.1)
CREATININE URINE: 70.8 MG/DL (ref 28–217)
DIFFERENTIAL TYPE: ABNORMAL
GFR SERPL CREATININE-BSD FRML MDRD: 19 ML/MIN/1.73M2
GLUCOSE BLD-MCNC: 218 MG/DL (ref 70–99)
GLUCOSE BLD-MCNC: 281 MG/DL (ref 70–99)
GLUCOSE BLD-MCNC: 377 MG/DL (ref 70–99)
GLUCOSE BLD-MCNC: 399 MG/DL (ref 70–99)
GLUCOSE SERPL-MCNC: 382 MG/DL (ref 70–99)
HCT VFR BLD CALC: 25.6 % (ref 37–47)
HEMOGLOBIN: 7.8 GM/DL (ref 12.5–16)
LYMPHOCYTES ABSOLUTE: 0.7 K/CU MM
LYMPHOCYTES RELATIVE PERCENT: 12 % (ref 24–44)
MCH RBC QN AUTO: 29.4 PG (ref 27–31)
MCHC RBC AUTO-ENTMCNC: 30.5 % (ref 32–36)
MCV RBC AUTO: 96.6 FL (ref 78–100)
MYELOCYTE PERCENT: 3 %
MYELOCYTES ABSOLUTE COUNT: 0.18 K/CU MM
PDW BLD-RTO: 13.4 % (ref 11.7–14.9)
PHOSPHORUS: 4.3 MG/DL (ref 2.5–4.9)
PLATELET # BLD: 158 K/CU MM (ref 140–440)
PMV BLD AUTO: 10.1 FL (ref 7.5–11.1)
POTASSIUM SERPL-SCNC: 5.3 MMOL/L (ref 3.5–5.1)
POTASSIUM SERPL-SCNC: 5.6 MMOL/L (ref 3.5–5.1)
PROT/CREAT RATIO, UR: ABNORMAL
RBC # BLD: 2.65 M/CU MM (ref 4.2–5.4)
SEGMENTED NEUTROPHILS ABSOLUTE COUNT: 5 K/CU MM
SEGMENTED NEUTROPHILS RELATIVE PERCENT: 84 % (ref 36–66)
SODIUM BLD-SCNC: 136 MMOL/L (ref 135–145)
URINE TOTAL PROTEIN: >600 MG/DL
WBC # BLD: 5.9 K/CU MM (ref 4–10.5)

## 2023-12-08 PROCEDURE — 1200000000 HC SEMI PRIVATE

## 2023-12-08 PROCEDURE — 6370000000 HC RX 637 (ALT 250 FOR IP): Performed by: STUDENT IN AN ORGANIZED HEALTH CARE EDUCATION/TRAINING PROGRAM

## 2023-12-08 PROCEDURE — 86255 FLUORESCENT ANTIBODY SCREEN: CPT

## 2023-12-08 PROCEDURE — 2580000003 HC RX 258: Performed by: NURSE PRACTITIONER

## 2023-12-08 PROCEDURE — 6370000000 HC RX 637 (ALT 250 FOR IP): Performed by: INTERNAL MEDICINE

## 2023-12-08 PROCEDURE — 80069 RENAL FUNCTION PANEL: CPT

## 2023-12-08 PROCEDURE — 85027 COMPLETE CBC AUTOMATED: CPT

## 2023-12-08 PROCEDURE — 6370000000 HC RX 637 (ALT 250 FOR IP): Performed by: NURSE PRACTITIONER

## 2023-12-08 PROCEDURE — 94640 AIRWAY INHALATION TREATMENT: CPT

## 2023-12-08 PROCEDURE — 6360000002 HC RX W HCPCS: Performed by: INTERNAL MEDICINE

## 2023-12-08 PROCEDURE — 85007 BL SMEAR W/DIFF WBC COUNT: CPT

## 2023-12-08 PROCEDURE — 82962 GLUCOSE BLOOD TEST: CPT

## 2023-12-08 PROCEDURE — 36415 COLL VENOUS BLD VENIPUNCTURE: CPT

## 2023-12-08 PROCEDURE — 84132 ASSAY OF SERUM POTASSIUM: CPT

## 2023-12-08 RX ORDER — INSULIN GLARGINE 100 [IU]/ML
55 INJECTION, SOLUTION SUBCUTANEOUS EVERY MORNING
Status: DISCONTINUED | OUTPATIENT
Start: 2023-12-09 | End: 2023-12-08

## 2023-12-08 RX ORDER — INSULIN LISPRO 100 [IU]/ML
0-8 INJECTION, SOLUTION INTRAVENOUS; SUBCUTANEOUS
Status: DISCONTINUED | OUTPATIENT
Start: 2023-12-08 | End: 2023-12-08

## 2023-12-08 RX ORDER — FUROSEMIDE 10 MG/ML
80 INJECTION INTRAMUSCULAR; INTRAVENOUS 2 TIMES DAILY
Status: DISCONTINUED | OUTPATIENT
Start: 2023-12-08 | End: 2023-12-12

## 2023-12-08 RX ORDER — INSULIN GLARGINE 100 [IU]/ML
40 INJECTION, SOLUTION SUBCUTANEOUS NIGHTLY
Status: DISCONTINUED | OUTPATIENT
Start: 2023-12-08 | End: 2023-12-08

## 2023-12-08 RX ORDER — INSULIN LISPRO 100 [IU]/ML
5 INJECTION, SOLUTION INTRAVENOUS; SUBCUTANEOUS
Status: DISCONTINUED | OUTPATIENT
Start: 2023-12-08 | End: 2023-12-08

## 2023-12-08 RX ORDER — INSULIN LISPRO 100 [IU]/ML
20 INJECTION, SOLUTION INTRAVENOUS; SUBCUTANEOUS
Status: DISCONTINUED | OUTPATIENT
Start: 2023-12-08 | End: 2023-12-08

## 2023-12-08 RX ORDER — INSULIN LISPRO 100 [IU]/ML
15 INJECTION, SOLUTION INTRAVENOUS; SUBCUTANEOUS
Status: DISCONTINUED | OUTPATIENT
Start: 2023-12-08 | End: 2023-12-08

## 2023-12-08 RX ORDER — INSULIN LISPRO 100 [IU]/ML
0-4 INJECTION, SOLUTION INTRAVENOUS; SUBCUTANEOUS
Status: DISCONTINUED | OUTPATIENT
Start: 2023-12-08 | End: 2023-12-08

## 2023-12-08 RX ADMIN — HYDRALAZINE HYDROCHLORIDE 50 MG: 50 TABLET ORAL at 05:57

## 2023-12-08 RX ADMIN — ATENOLOL 50 MG: 25 TABLET ORAL at 08:23

## 2023-12-08 RX ADMIN — IPRATROPIUM BROMIDE AND ALBUTEROL SULFATE 1 DOSE: 2.5; .5 SOLUTION RESPIRATORY (INHALATION) at 20:25

## 2023-12-08 RX ADMIN — SODIUM CHLORIDE, PRESERVATIVE FREE 10 ML: 5 INJECTION INTRAVENOUS at 08:25

## 2023-12-08 RX ADMIN — ISOSORBIDE MONONITRATE 60 MG: 60 TABLET, EXTENDED RELEASE ORAL at 08:24

## 2023-12-08 RX ADMIN — SODIUM CHLORIDE, PRESERVATIVE FREE 10 ML: 5 INJECTION INTRAVENOUS at 21:44

## 2023-12-08 RX ADMIN — INSULIN HUMAN 15 UNITS: 500 INJECTION, SOLUTION SUBCUTANEOUS at 19:37

## 2023-12-08 RX ADMIN — SODIUM ZIRCONIUM CYCLOSILICATE 10 G: 10 POWDER, FOR SUSPENSION ORAL at 21:55

## 2023-12-08 RX ADMIN — SODIUM ZIRCONIUM CYCLOSILICATE 10 G: 10 POWDER, FOR SUSPENSION ORAL at 08:24

## 2023-12-08 RX ADMIN — GUAIFENESIN 600 MG: 600 TABLET, EXTENDED RELEASE ORAL at 08:24

## 2023-12-08 RX ADMIN — AMLODIPINE BESYLATE 10 MG: 10 TABLET ORAL at 08:24

## 2023-12-08 RX ADMIN — METHYLPREDNISOLONE SODIUM SUCCINATE 40 MG: 40 INJECTION, POWDER, FOR SOLUTION INTRAMUSCULAR; INTRAVENOUS at 02:00

## 2023-12-08 RX ADMIN — FUROSEMIDE 80 MG: 10 INJECTION, SOLUTION INTRAMUSCULAR; INTRAVENOUS at 18:12

## 2023-12-08 RX ADMIN — GUAIFENESIN 600 MG: 600 TABLET, EXTENDED RELEASE ORAL at 21:44

## 2023-12-08 RX ADMIN — IPRATROPIUM BROMIDE AND ALBUTEROL SULFATE 1 DOSE: 2.5; .5 SOLUTION RESPIRATORY (INHALATION) at 12:23

## 2023-12-08 RX ADMIN — HYDRALAZINE HYDROCHLORIDE 50 MG: 50 TABLET ORAL at 21:45

## 2023-12-08 RX ADMIN — ATENOLOL 50 MG: 25 TABLET ORAL at 21:46

## 2023-12-08 RX ADMIN — HYDRALAZINE HYDROCHLORIDE 50 MG: 50 TABLET ORAL at 13:33

## 2023-12-08 RX ADMIN — INSULIN HUMAN 20 UNITS: 100 INJECTION, SUSPENSION SUBCUTANEOUS at 14:00

## 2023-12-08 RX ADMIN — INSULIN LISPRO 16 UNITS: 100 INJECTION, SOLUTION INTRAVENOUS; SUBCUTANEOUS at 08:24

## 2023-12-08 RX ADMIN — INSULIN HUMAN 20 UNITS: 500 INJECTION, SOLUTION SUBCUTANEOUS at 18:11

## 2023-12-08 RX ADMIN — LEVOTHYROXINE SODIUM 175 MCG: 25 TABLET ORAL at 05:57

## 2023-12-08 RX ADMIN — DULOXETINE HYDROCHLORIDE 30 MG: 30 CAPSULE, DELAYED RELEASE ORAL at 08:24

## 2023-12-08 RX ADMIN — INSULIN GLARGINE 45 UNITS: 100 INJECTION, SOLUTION SUBCUTANEOUS at 08:24

## 2023-12-08 RX ADMIN — FUROSEMIDE 80 MG: 10 INJECTION, SOLUTION INTRAMUSCULAR; INTRAVENOUS at 08:30

## 2023-12-08 RX ADMIN — INSULIN LISPRO 16 UNITS: 100 INJECTION, SOLUTION INTRAVENOUS; SUBCUTANEOUS at 12:25

## 2023-12-08 RX ADMIN — ASPIRIN 81 MG: 81 TABLET, CHEWABLE ORAL at 08:24

## 2023-12-08 RX ADMIN — INSULIN LISPRO 15 UNITS: 100 INJECTION, SOLUTION INTRAVENOUS; SUBCUTANEOUS at 13:32

## 2023-12-08 RX ADMIN — PANTOPRAZOLE SODIUM 40 MG: 40 TABLET, DELAYED RELEASE ORAL at 05:57

## 2023-12-08 NOTE — CONSULTS
Endocrinology   Consult Note  12/4/2023  7:48 PM     Primary Care provider: Kenneth Haines,  [address]     Referring physician:  No admitting provider for patient encounter. Dear Doctor Rosa Gore for the Consult     Pt. Was Admitted for : Patient is transferring from [facility] for acute kidney injury and UTI and acute respiratory failure with hypoxia    Reason for Consult: Better control of blood glucose      History Obtained From:  EMR   Note patient is from nursing home and unable to give much history seem to be confused      HISTORY OF PRESENT ILLNESS:                The patient is a 69 y.o. female with significant past medical history of hypothyroidism, aortic atherosclerosis, congestive heart failure, CKD, chronic pain syndrome, cognitive communication deficit, hyperlipidemia, diabetes mellitus type 2 with insulin resistance  Left taking a large amount of U-500 insulin plus Lantus insulin twice a day was admitted to hospital for multiple issues including acute kidney injury, UTI, acute respiratory failure with hypoxia with acute bronchitis and found to have rhinovirus infection and I also increased troponin levels and acute cystitis without hematuria has Alzheimer's dementia and moderate obesity. Patient initially admitted to admitted to [facility] she was transferred here because of multiple complicated issues she has. Patient has been large amount of insulin U-500 insulin as mentioned and also taking twice daily Lantus insulin once a week. Achalasia leading to hypoglycemic episodes. , I was  consulted for better control of blood glucose. ROS:   Pt's ROS done in detail. Abnormal ROS are noted in Medical and Surgical History Section below:      Other Medical History:        Diagnosis Date    Acquired hypothyroidism 05/19/2021    Aortic atherosclerosis (HCC)     Cellulitis of right foot     CHF (congestive heart failure) (HCC)     Chronic kidney disease (CKD), stage III

## 2023-12-08 NOTE — PROGRESS NOTES
V2.0  [facility] [place]      Name:  Sandeep Lawson /Age/Sex:   (80 y.o. female)   MRN & CSN:  [id number] & [id number] Encounter Date/Time: 2023 12:12 PM EST    Location:  [address] [place] [address]-A PCP: Parul Cortez, [address] [place] [address],[facility] Floor Day: 5    Assessment and Plan:   Patient is a 81-year-old female who presented with SOB. Transferred from [facility] due to LAURA and UTI with MDRO E. coli. LAURA on CKD stage IIIb, likely acute interstitial nephritis from recent antibiotic  -Solitary kidney  -Creatinine is coming down and now 2.5, yesterday it was 2.8  -Baseline 1.3-1.5.   - Nephrology on board. 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  -Started on steroid regimen by nephrology. Agree with the plan  -Monitor lab trends   -Urine indices essentially normal but significant proteinuria UA also shows eosinophils  -Holding nephrotoxic agents-and renally dosing meds       Acute respiratory failure with hypoxia  Acute bronchitis  Rhinovirus infection  CXR \"Findings consistent with bronchitis/pneumonitis worst in the hilar/perihilar   regions. Early/mild congestive heart failure, underlying chronic lung disease and low lung volumes may contribute to this appearance. Otherwise, radiographically nonacute portable chest. \".    -symptomatic with new oxygen requirement on admission now weaned to room air   -resolvrf  -Continue bronchodilators/ pulmonary hygiene   -Prednisone 40mg daily x 3 days but discontinued d/t hyperglycemia  -Completed 5-day azithromycin course  -Supportive care for now                Elevated troponin  -With out chest pain  -Chronically elevated- flat trend. -EKG without acute ischemic changes   -Likely in the setting of renal dysfunction      Elevated BNP (1425)  -Chronically elevated but above baseline-repeat slightly improved   -Does not appear clinically hypervolemic .  Will restart PO lasix today   -1100 mL urine output overnight

## 2023-12-08 NOTE — PLAN OF CARE
Problem: Safety - Adult  Goal: Free from fall injury  12/8/2023 1546 by Rigo Santoro RN  Outcome: Progressing  12/8/2023 0424 by Ruby Quijano RN  Outcome: Progressing     Problem: Discharge Planning  Goal: Discharge to home or other facility with appropriate resources  12/8/2023 1546 by Rigo Santoro RN  Outcome: Progressing  12/8/2023 8845 by Ruby Quijano RN  Outcome: Progressing     Problem: Skin/Tissue Integrity  Goal: Absence of new skin breakdown  Description: 1. Monitor for areas of redness and/or skin breakdown  2. Assess vascular access sites hourly  3. Every 4-6 hours minimum:  Change oxygen saturation probe site  4. Every 4-6 hours:  If on nasal continuous positive airway pressure, respiratory therapy assess nares and determine need for appliance change or resting period.   12/8/2023 1546 by Rigo Santoro RN  Outcome: Progressing  12/8/2023 0551 by Ruby Quijano RN  Outcome: Progressing     Problem: ABCDS Injury Assessment  Goal: Absence of physical injury  12/8/2023 1546 by Rigo Santoro RN  Outcome: Progressing  12/8/2023 3826 by Ruby Quijano RN  Outcome: Progressing     Problem: Pain  Goal: Verbalizes/displays adequate comfort level or baseline comfort level  12/8/2023 1546 by Rigo Santoro RN  Outcome: Progressing  12/8/2023 4962 by Ruby Quijano RN  Outcome: Progressing

## 2023-12-09 LAB
ANION GAP SERPL CALCULATED.3IONS-SCNC: 13 MMOL/L (ref 4–16)
BUN SERPL-MCNC: 55 MG/DL (ref 6–23)
CALCIUM SERPL-MCNC: 8.9 MG/DL (ref 8.3–10.6)
CHLORIDE BLD-SCNC: 108 MMOL/L (ref 99–110)
CO2: 18 MMOL/L (ref 21–32)
CREAT SERPL-MCNC: 2.9 MG/DL (ref 0.6–1.1)
GFR SERPL CREATININE-BSD FRML MDRD: 16 ML/MIN/1.73M2
GLUCOSE BLD-MCNC: 113 MG/DL (ref 70–99)
GLUCOSE BLD-MCNC: 124 MG/DL (ref 70–99)
GLUCOSE BLD-MCNC: 125 MG/DL (ref 70–99)
GLUCOSE BLD-MCNC: 182 MG/DL (ref 70–99)
GLUCOSE BLD-MCNC: 99 MG/DL (ref 70–99)
GLUCOSE SERPL-MCNC: 196 MG/DL (ref 70–99)
HCT VFR BLD CALC: 26.8 % (ref 37–47)
HEMOGLOBIN: 8.3 GM/DL (ref 12.5–16)
MAGNESIUM: 1.9 MG/DL (ref 1.8–2.4)
MCH RBC QN AUTO: 30.3 PG (ref 27–31)
MCHC RBC AUTO-ENTMCNC: 31 % (ref 32–36)
MCV RBC AUTO: 97.8 FL (ref 78–100)
PDW BLD-RTO: 14.1 % (ref 11.7–14.9)
PHOSPHORUS: 4.3 MG/DL (ref 2.5–4.9)
PLATELET # BLD: 271 K/CU MM (ref 140–440)
PMV BLD AUTO: 10.4 FL (ref 7.5–11.1)
POTASSIUM SERPL-SCNC: 4.7 MMOL/L (ref 3.5–5.1)
RBC # BLD: 2.74 M/CU MM (ref 4.2–5.4)
SODIUM BLD-SCNC: 139 MMOL/L (ref 135–145)
WBC # BLD: 12.5 K/CU MM (ref 4–10.5)

## 2023-12-09 PROCEDURE — 6370000000 HC RX 637 (ALT 250 FOR IP): Performed by: STUDENT IN AN ORGANIZED HEALTH CARE EDUCATION/TRAINING PROGRAM

## 2023-12-09 PROCEDURE — 85025 COMPLETE CBC W/AUTO DIFF WBC: CPT

## 2023-12-09 PROCEDURE — 81050 URINALYSIS VOLUME MEASURE: CPT

## 2023-12-09 PROCEDURE — 6370000000 HC RX 637 (ALT 250 FOR IP): Performed by: INTERNAL MEDICINE

## 2023-12-09 PROCEDURE — 82962 GLUCOSE BLOOD TEST: CPT

## 2023-12-09 PROCEDURE — 83883 ASSAY NEPHELOMETRY NOT SPEC: CPT

## 2023-12-09 PROCEDURE — 80048 BASIC METABOLIC PNL TOTAL CA: CPT

## 2023-12-09 PROCEDURE — 6370000000 HC RX 637 (ALT 250 FOR IP): Performed by: NURSE PRACTITIONER

## 2023-12-09 PROCEDURE — 94150 VITAL CAPACITY TEST: CPT

## 2023-12-09 PROCEDURE — 80053 COMPREHEN METABOLIC PANEL: CPT

## 2023-12-09 PROCEDURE — 85027 COMPLETE CBC AUTOMATED: CPT

## 2023-12-09 PROCEDURE — 83735 ASSAY OF MAGNESIUM: CPT

## 2023-12-09 PROCEDURE — 94640 AIRWAY INHALATION TREATMENT: CPT

## 2023-12-09 PROCEDURE — 84156 ASSAY OF PROTEIN URINE: CPT

## 2023-12-09 PROCEDURE — 2580000003 HC RX 258: Performed by: NURSE PRACTITIONER

## 2023-12-09 PROCEDURE — 84100 ASSAY OF PHOSPHORUS: CPT

## 2023-12-09 PROCEDURE — 94761 N-INVAS EAR/PLS OXIMETRY MLT: CPT

## 2023-12-09 PROCEDURE — 1200000000 HC SEMI PRIVATE

## 2023-12-09 PROCEDURE — 36415 COLL VENOUS BLD VENIPUNCTURE: CPT

## 2023-12-09 PROCEDURE — 6360000002 HC RX W HCPCS: Performed by: INTERNAL MEDICINE

## 2023-12-09 RX ADMIN — ISOSORBIDE MONONITRATE 60 MG: 60 TABLET, EXTENDED RELEASE ORAL at 08:45

## 2023-12-09 RX ADMIN — PANTOPRAZOLE SODIUM 40 MG: 40 TABLET, DELAYED RELEASE ORAL at 06:24

## 2023-12-09 RX ADMIN — SODIUM ZIRCONIUM CYCLOSILICATE 10 G: 10 POWDER, FOR SUSPENSION ORAL at 08:47

## 2023-12-09 RX ADMIN — SODIUM CHLORIDE, PRESERVATIVE FREE 10 ML: 5 INJECTION INTRAVENOUS at 20:51

## 2023-12-09 RX ADMIN — SODIUM ZIRCONIUM CYCLOSILICATE 10 G: 10 POWDER, FOR SUSPENSION ORAL at 20:50

## 2023-12-09 RX ADMIN — AMLODIPINE BESYLATE 10 MG: 10 TABLET ORAL at 08:46

## 2023-12-09 RX ADMIN — ASPIRIN 81 MG: 81 TABLET, CHEWABLE ORAL at 08:45

## 2023-12-09 RX ADMIN — GUAIFENESIN 600 MG: 600 TABLET, EXTENDED RELEASE ORAL at 08:45

## 2023-12-09 RX ADMIN — IPRATROPIUM BROMIDE AND ALBUTEROL SULFATE 1 DOSE: 2.5; .5 SOLUTION RESPIRATORY (INHALATION) at 07:35

## 2023-12-09 RX ADMIN — HYDRALAZINE HYDROCHLORIDE 50 MG: 50 TABLET ORAL at 14:58

## 2023-12-09 RX ADMIN — ACETAMINOPHEN 650 MG: 325 TABLET ORAL at 20:50

## 2023-12-09 RX ADMIN — GUAIFENESIN 600 MG: 600 TABLET, EXTENDED RELEASE ORAL at 20:50

## 2023-12-09 RX ADMIN — FUROSEMIDE 80 MG: 10 INJECTION, SOLUTION INTRAMUSCULAR; INTRAVENOUS at 17:56

## 2023-12-09 RX ADMIN — DULOXETINE HYDROCHLORIDE 30 MG: 30 CAPSULE, DELAYED RELEASE ORAL at 08:45

## 2023-12-09 RX ADMIN — ATENOLOL 50 MG: 25 TABLET ORAL at 08:45

## 2023-12-09 RX ADMIN — INSULIN HUMAN 5 UNITS: 500 INJECTION, SOLUTION SUBCUTANEOUS at 13:53

## 2023-12-09 RX ADMIN — ACETAMINOPHEN 650 MG: 325 TABLET ORAL at 11:08

## 2023-12-09 RX ADMIN — HYDRALAZINE HYDROCHLORIDE 50 MG: 50 TABLET ORAL at 06:23

## 2023-12-09 RX ADMIN — IPRATROPIUM BROMIDE AND ALBUTEROL SULFATE 1 DOSE: 2.5; .5 SOLUTION RESPIRATORY (INHALATION) at 22:50

## 2023-12-09 RX ADMIN — INSULIN HUMAN 20 UNITS: 500 INJECTION, SOLUTION SUBCUTANEOUS at 13:52

## 2023-12-09 RX ADMIN — LEVOTHYROXINE SODIUM 175 MCG: 25 TABLET ORAL at 06:23

## 2023-12-09 RX ADMIN — SODIUM CHLORIDE, PRESERVATIVE FREE 10 ML: 5 INJECTION INTRAVENOUS at 08:46

## 2023-12-09 RX ADMIN — FUROSEMIDE 80 MG: 10 INJECTION, SOLUTION INTRAMUSCULAR; INTRAVENOUS at 08:44

## 2023-12-09 ASSESSMENT — PAIN DESCRIPTION - ORIENTATION
ORIENTATION: RIGHT;LEFT
ORIENTATION: RIGHT;LEFT

## 2023-12-09 ASSESSMENT — PAIN SCALES - GENERAL: PAINLEVEL_OUTOF10: 3

## 2023-12-09 ASSESSMENT — PAIN DESCRIPTION - LOCATION
LOCATION: LEG
LOCATION: LEG

## 2023-12-09 ASSESSMENT — PAIN - FUNCTIONAL ASSESSMENT
PAIN_FUNCTIONAL_ASSESSMENT: ACTIVITIES ARE NOT PREVENTED
PAIN_FUNCTIONAL_ASSESSMENT: ACTIVITIES ARE NOT PREVENTED

## 2023-12-09 ASSESSMENT — PAIN DESCRIPTION - DESCRIPTORS
DESCRIPTORS: ACHING
DESCRIPTORS: ACHING

## 2023-12-09 NOTE — PLAN OF CARE
Problem: Safety - Adult  Goal: Free from fall injury  12/9/2023 1156 by Christopher Houston RN  Outcome: Progressing  12/8/2023 2159 by Hannah Jacobs RN  Outcome: Progressing     Problem: Discharge Planning  Goal: Discharge to home or other facility with appropriate resources  12/9/2023 1156 by Christopher Houston RN  Outcome: Progressing  12/8/2023 2159 by Hannah Jacobs RN  Outcome: Progressing     Problem: Skin/Tissue Integrity  Goal: Absence of new skin breakdown  Description: 1. Monitor for areas of redness and/or skin breakdown  2. Assess vascular access sites hourly  3. Every 4-6 hours minimum:  Change oxygen saturation probe site  4. Every 4-6 hours:  If on nasal continuous positive airway pressure, respiratory therapy assess nares and determine need for appliance change or resting period.   12/9/2023 1156 by Christopher Houston RN  Outcome: Progressing  12/8/2023 2159 by Hannah Jacobs RN  Outcome: Progressing     Problem: ABCDS Injury Assessment  Goal: Absence of physical injury  12/9/2023 1156 by Christopher Houston RN  Outcome: Progressing  12/8/2023 2159 by Hannah Jacobs RN  Outcome: Progressing     Problem: Pain  Goal: Verbalizes/displays adequate comfort level or baseline comfort level  12/9/2023 1156 by Christopher Houston RN  Outcome: Progressing  12/8/2023 2159 by Hannah Jacobs RN  Outcome: Progressing

## 2023-12-09 NOTE — PLAN OF CARE
Problem: Safety - Adult  Goal: Free from fall injury  12/8/2023 2159 by Almeda Oppenheim, RN  Outcome: Progressing  12/8/2023 1546 by Rigo Santoro RN  Outcome: Progressing     Problem: Discharge Planning  Goal: Discharge to home or other facility with appropriate resources  12/8/2023 2159 by Almeda Oppenheim, RN  Outcome: Progressing  12/8/2023 1546 by Rigo Santoro RN  Outcome: Progressing     Problem: Skin/Tissue Integrity  Goal: Absence of new skin breakdown  Description: 1. Monitor for areas of redness and/or skin breakdown  2. Assess vascular access sites hourly  3. Every 4-6 hours minimum:  Change oxygen saturation probe site  4. Every 4-6 hours:  If on nasal continuous positive airway pressure, respiratory therapy assess nares and determine need for appliance change or resting period.   12/8/2023 2159 by Almeda Oppenheim, RN  Outcome: Progressing  12/8/2023 1546 by Rigo Santoro RN  Outcome: Progressing     Problem: ABCDS Injury Assessment  Goal: Absence of physical injury  12/8/2023 2159 by Almeda Oppenheim, RN  Outcome: Progressing  12/8/2023 1546 by Rigo Santoro RN  Outcome: Progressing     Problem: Pain  Goal: Verbalizes/displays adequate comfort level or baseline comfort level  12/8/2023 2159 by Almeda Oppenheim, RN  Outcome: Progressing  12/8/2023 1546 by Rigo Santoro RN  Outcome: Progressing

## 2023-12-09 NOTE — PROGRESS NOTES
Patient has had episodes during this shift where she has been uncooperative. She refused to have her blood draws for (6am) the morning done.

## 2023-12-10 LAB
ANION GAP SERPL CALCULATED.3IONS-SCNC: 13 MMOL/L (ref 4–16)
BUN SERPL-MCNC: 63 MG/DL (ref 6–23)
C3 SERPL-MCNC: 139 MG/DL (ref 90–180)
C4 SERPL-MCNC: 30 MG/DL (ref 10–40)
CALCIUM SERPL-MCNC: 8.5 MG/DL (ref 8.3–10.6)
CHLORIDE BLD-SCNC: 111 MMOL/L (ref 99–110)
CO2: 19 MMOL/L (ref 21–32)
CREAT SERPL-MCNC: 3 MG/DL (ref 0.6–1.1)
GFR SERPL CREATININE-BSD FRML MDRD: 16 ML/MIN/1.73M2
GLUCOSE BLD-MCNC: 165 MG/DL (ref 70–99)
GLUCOSE BLD-MCNC: 195 MG/DL (ref 70–99)
GLUCOSE BLD-MCNC: 204 MG/DL (ref 70–99)
GLUCOSE BLD-MCNC: 84 MG/DL (ref 70–99)
GLUCOSE SERPL-MCNC: 100 MG/DL (ref 70–99)
HCT VFR BLD CALC: 24.5 % (ref 37–47)
HEMOGLOBIN: 7.4 GM/DL (ref 12.5–16)
Lab: 24 HRS
MAGNESIUM: 1.9 MG/DL (ref 1.8–2.4)
MCH RBC QN AUTO: 30 PG (ref 27–31)
MCHC RBC AUTO-ENTMCNC: 30.2 % (ref 32–36)
MCV RBC AUTO: 99.2 FL (ref 78–100)
PDW BLD-RTO: 13.8 % (ref 11.7–14.9)
PHOSPHORUS: 4.6 MG/DL (ref 2.5–4.9)
PLATELET # BLD: 164 K/CU MM (ref 140–440)
PMV BLD AUTO: 10.3 FL (ref 7.5–11.1)
POTASSIUM SERPL-SCNC: 3.9 MMOL/L (ref 3.5–5.1)
PROTEIN 24 HOUR URINE: 2398 MG/24 HR
RBC # BLD: 2.47 M/CU MM (ref 4.2–5.4)
SODIUM BLD-SCNC: 143 MMOL/L (ref 135–145)
VOLUME, (UVOL): 800 MLS
WBC # BLD: 7.2 K/CU MM (ref 4–10.5)

## 2023-12-10 PROCEDURE — 6370000000 HC RX 637 (ALT 250 FOR IP): Performed by: INTERNAL MEDICINE

## 2023-12-10 PROCEDURE — 94640 AIRWAY INHALATION TREATMENT: CPT

## 2023-12-10 PROCEDURE — 80048 BASIC METABOLIC PNL TOTAL CA: CPT

## 2023-12-10 PROCEDURE — 94150 VITAL CAPACITY TEST: CPT

## 2023-12-10 PROCEDURE — 1200000000 HC SEMI PRIVATE

## 2023-12-10 PROCEDURE — 83735 ASSAY OF MAGNESIUM: CPT

## 2023-12-10 PROCEDURE — 94761 N-INVAS EAR/PLS OXIMETRY MLT: CPT

## 2023-12-10 PROCEDURE — 6370000000 HC RX 637 (ALT 250 FOR IP): Performed by: STUDENT IN AN ORGANIZED HEALTH CARE EDUCATION/TRAINING PROGRAM

## 2023-12-10 PROCEDURE — 84100 ASSAY OF PHOSPHORUS: CPT

## 2023-12-10 PROCEDURE — 6370000000 HC RX 637 (ALT 250 FOR IP): Performed by: NURSE PRACTITIONER

## 2023-12-10 PROCEDURE — 85027 COMPLETE CBC AUTOMATED: CPT

## 2023-12-10 PROCEDURE — 82962 GLUCOSE BLOOD TEST: CPT

## 2023-12-10 PROCEDURE — 36415 COLL VENOUS BLD VENIPUNCTURE: CPT

## 2023-12-10 PROCEDURE — 2580000003 HC RX 258: Performed by: NURSE PRACTITIONER

## 2023-12-10 RX ADMIN — HYDRALAZINE HYDROCHLORIDE 50 MG: 50 TABLET ORAL at 06:27

## 2023-12-10 RX ADMIN — ATENOLOL 50 MG: 25 TABLET ORAL at 19:54

## 2023-12-10 RX ADMIN — ASPIRIN 81 MG: 81 TABLET, CHEWABLE ORAL at 09:33

## 2023-12-10 RX ADMIN — SODIUM CHLORIDE, PRESERVATIVE FREE 10 ML: 5 INJECTION INTRAVENOUS at 19:54

## 2023-12-10 RX ADMIN — LEVOTHYROXINE SODIUM 175 MCG: 25 TABLET ORAL at 06:27

## 2023-12-10 RX ADMIN — ACETAMINOPHEN 650 MG: 325 TABLET ORAL at 13:13

## 2023-12-10 RX ADMIN — EMPAGLIFLOZIN 10 MG: 10 TABLET, FILM COATED ORAL at 18:39

## 2023-12-10 RX ADMIN — DULOXETINE HYDROCHLORIDE 30 MG: 30 CAPSULE, DELAYED RELEASE ORAL at 09:33

## 2023-12-10 RX ADMIN — IPRATROPIUM BROMIDE AND ALBUTEROL SULFATE 1 DOSE: 2.5; .5 SOLUTION RESPIRATORY (INHALATION) at 09:11

## 2023-12-10 RX ADMIN — ISOSORBIDE MONONITRATE 60 MG: 60 TABLET, EXTENDED RELEASE ORAL at 09:33

## 2023-12-10 RX ADMIN — GUAIFENESIN 600 MG: 600 TABLET, EXTENDED RELEASE ORAL at 19:54

## 2023-12-10 RX ADMIN — INSULIN HUMAN 20 UNITS: 500 INJECTION, SOLUTION SUBCUTANEOUS at 13:11

## 2023-12-10 RX ADMIN — INSULIN HUMAN 20 UNITS: 500 INJECTION, SOLUTION SUBCUTANEOUS at 18:39

## 2023-12-10 RX ADMIN — INSULIN HUMAN 5 UNITS: 500 INJECTION, SOLUTION SUBCUTANEOUS at 13:11

## 2023-12-10 RX ADMIN — GUAIFENESIN 600 MG: 600 TABLET, EXTENDED RELEASE ORAL at 09:32

## 2023-12-10 RX ADMIN — AMLODIPINE BESYLATE 10 MG: 10 TABLET ORAL at 09:32

## 2023-12-10 RX ADMIN — ATENOLOL 50 MG: 25 TABLET ORAL at 09:33

## 2023-12-10 RX ADMIN — INSULIN HUMAN 10 UNITS: 500 INJECTION, SOLUTION SUBCUTANEOUS at 18:39

## 2023-12-10 RX ADMIN — SODIUM CHLORIDE, PRESERVATIVE FREE 10 ML: 5 INJECTION INTRAVENOUS at 09:32

## 2023-12-10 RX ADMIN — HYDRALAZINE HYDROCHLORIDE 50 MG: 50 TABLET ORAL at 13:11

## 2023-12-10 RX ADMIN — PANTOPRAZOLE SODIUM 40 MG: 40 TABLET, DELAYED RELEASE ORAL at 06:27

## 2023-12-10 ASSESSMENT — PAIN DESCRIPTION - ORIENTATION: ORIENTATION: RIGHT

## 2023-12-10 ASSESSMENT — PAIN SCALES - GENERAL: PAINLEVEL_OUTOF10: 8

## 2023-12-10 ASSESSMENT — PAIN - FUNCTIONAL ASSESSMENT: PAIN_FUNCTIONAL_ASSESSMENT: ACTIVITIES ARE NOT PREVENTED

## 2023-12-10 ASSESSMENT — PAIN DESCRIPTION - DESCRIPTORS: DESCRIPTORS: ACHING

## 2023-12-10 ASSESSMENT — PAIN DESCRIPTION - LOCATION: LOCATION: LEG

## 2023-12-10 NOTE — PROGRESS NOTES
V2.0  [facility] [place], [place] Hospitalist Progress Note      Name:  Gabino Sullivan /Age/Sex:   (80 y.o. female)   MRN & CSN:  [id number] & [id number] Encounter Date/Time: 12/10/2023 12:12 PM EST    Location:  [address] [place] [address]-A PCP: John Agee, [address] [place] [address],[facility] Floor Day: 7    Assessment and Plan:   Patient is a 70-year-old female who presented with SOB. Transferred from [facility] due to LAURA and UTI with MDRO E. coli. LAURA on CKD stage IIIb, likely acute interstitial nephritis from recent antibiotic, also has component of nephrotic syndrome  -Solitary kidney  -Creatinine is coming down and now 2.5, yesterday it was 2.8  -Baseline 1.3-1.5.   - Nephrology on board. 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  -Started on steroid regimen by nephrology. Agree with the plan  -Monitor lab trends   -Urine indices essentially normal but significant proteinuria UA also shows eosinophils  -Holding nephrotoxic agents-and renally dosing meds  -Peak creatinine of 3.0. Discussed with nephrology in detail. Patient has apparent significant proteinuria. Urine incontinence with inability to do accurate calculation for albuminuria. Current number is underestimating the proteinuria. Discussed about ACE ARB's, could consider to do so. Trial of diuretic was done yesterday creatinine got significantly worse. Hold off on the idea. Nephrology on board avoid nephrotoxic agents monitor BMP on a regular basis       Acute respiratory failure with hypoxia  Acute bronchitis  Rhinovirus infection  CXR \"Findings consistent with bronchitis/pneumonitis worst in the hilar/perihilar   regions. Early/mild congestive heart failure, underlying chronic lung disease and low lung volumes may contribute to this appearance.   Otherwise, radiographically nonacute portable chest. \".    -symptomatic with new oxygen requirement on admission now weaned to room air   -resolvrf  -Continue bronchodilators/ pulmonary Exam  Vitals reviewed. Constitutional:       Appearance: Normal appearance. She is obese. HENT:      Head: Normocephalic. Right Ear: Tympanic membrane normal.      Left Ear: Tympanic membrane normal.      Nose: Nose normal.      Mouth/Throat:      Mouth: Mucous membranes are moist.   Eyes:      Conjunctiva/sclera: Conjunctivae normal.      Pupils: Pupils are equal, round, and reactive to light. Cardiovascular:      Rate and Rhythm: Normal rate and regular rhythm. Pulses: Normal pulses. Heart sounds: Normal heart sounds. No murmur heard. Pulmonary:      Effort: Pulmonary effort is normal.      Breath sounds: Normal breath sounds. No wheezing, rhonchi or rales. Abdominal:      General: Abdomen is flat. Bowel sounds are normal. There is distension. Palpations: Abdomen is soft. Tenderness: There is no abdominal tenderness. Musculoskeletal:         General: No deformity. Normal range of motion. Cervical back: Normal range of motion and neck supple. Right lower leg: Edema present. Left lower leg: Edema present. Skin:     Coloration: Skin is not jaundiced or pale. Neurological:      General: No focal deficit present. Mental Status: She is alert and oriented to person, place, and time. Mental status is at baseline. Motor: Weakness present.    Psychiatric:         Mood and Affect: Mood normal.         Behavior: Behavior normal.            Medications:   Medications:    sodium zirconium cyclosilicate  10 g Oral BID    [Held by provider] furosemide  80 mg IntraVENous BID    insulin regular human  20 Units SubCUTAneous TID WC    insulin regular human  5-30 Units SubCUTAneous TID WC    ipratropium 0.5 mg-albuterol 2.5 mg  1 Dose Inhalation BID RT    isosorbide mononitrate  60 mg Oral Daily    hydrALAZINE  50 mg Oral 3 times per day    sodium chloride flush  5-40 mL IntraVENous 2 times per day    guaiFENesin  600 mg Oral BID    amLODIPine  10 mg Oral Daily

## 2023-12-11 LAB
ANION GAP SERPL CALCULATED.3IONS-SCNC: 14 MMOL/L (ref 4–16)
BUN SERPL-MCNC: 64 MG/DL (ref 6–23)
CALCIUM SERPL-MCNC: 8.3 MG/DL (ref 8.3–10.6)
CHLORIDE BLD-SCNC: 111 MMOL/L (ref 99–110)
CO2: 18 MMOL/L (ref 21–32)
CREAT SERPL-MCNC: 3 MG/DL (ref 0.6–1.1)
GFR SERPL CREATININE-BSD FRML MDRD: 16 ML/MIN/1.73M2
GLUCOSE BLD-MCNC: 106 MG/DL (ref 70–99)
GLUCOSE BLD-MCNC: 118 MG/DL (ref 70–99)
GLUCOSE BLD-MCNC: 168 MG/DL (ref 70–99)
GLUCOSE BLD-MCNC: 194 MG/DL (ref 70–99)
GLUCOSE BLD-MCNC: 214 MG/DL (ref 70–99)
GLUCOSE BLD-MCNC: 40 MG/DL (ref 70–99)
GLUCOSE BLD-MCNC: 54 MG/DL (ref 70–99)
GLUCOSE BLD-MCNC: 81 MG/DL (ref 70–99)
GLUCOSE SERPL-MCNC: 102 MG/DL (ref 70–99)
HCT VFR BLD CALC: 29.1 % (ref 37–47)
HEMOGLOBIN: 8.2 GM/DL (ref 12.5–16)
MAGNESIUM: 1.9 MG/DL (ref 1.8–2.4)
MCH RBC QN AUTO: 30.4 PG (ref 27–31)
MCHC RBC AUTO-ENTMCNC: 28.2 % (ref 32–36)
MCV RBC AUTO: 107.8 FL (ref 78–100)
PDW BLD-RTO: 13.9 % (ref 11.7–14.9)
PHOSPHORUS: 4.8 MG/DL (ref 2.5–4.9)
PLATELET # BLD: 111 K/CU MM (ref 140–440)
PMV BLD AUTO: 11.2 FL (ref 7.5–11.1)
POTASSIUM SERPL-SCNC: 3.9 MMOL/L (ref 3.5–5.1)
RBC # BLD: 2.7 M/CU MM (ref 4.2–5.4)
SODIUM BLD-SCNC: 143 MMOL/L (ref 135–145)
WBC # BLD: 8.5 K/CU MM (ref 4–10.5)

## 2023-12-11 PROCEDURE — 97162 PT EVAL MOD COMPLEX 30 MIN: CPT

## 2023-12-11 PROCEDURE — 6370000000 HC RX 637 (ALT 250 FOR IP): Performed by: NURSE PRACTITIONER

## 2023-12-11 PROCEDURE — 6370000000 HC RX 637 (ALT 250 FOR IP): Performed by: INTERNAL MEDICINE

## 2023-12-11 PROCEDURE — 2580000003 HC RX 258: Performed by: NURSE PRACTITIONER

## 2023-12-11 PROCEDURE — 94761 N-INVAS EAR/PLS OXIMETRY MLT: CPT

## 2023-12-11 PROCEDURE — 97530 THERAPEUTIC ACTIVITIES: CPT

## 2023-12-11 PROCEDURE — 36415 COLL VENOUS BLD VENIPUNCTURE: CPT

## 2023-12-11 PROCEDURE — 6370000000 HC RX 637 (ALT 250 FOR IP): Performed by: STUDENT IN AN ORGANIZED HEALTH CARE EDUCATION/TRAINING PROGRAM

## 2023-12-11 PROCEDURE — 84100 ASSAY OF PHOSPHORUS: CPT

## 2023-12-11 PROCEDURE — 94640 AIRWAY INHALATION TREATMENT: CPT

## 2023-12-11 PROCEDURE — 97535 SELF CARE MNGMENT TRAINING: CPT

## 2023-12-11 PROCEDURE — 85027 COMPLETE CBC AUTOMATED: CPT

## 2023-12-11 PROCEDURE — 83735 ASSAY OF MAGNESIUM: CPT

## 2023-12-11 PROCEDURE — 97166 OT EVAL MOD COMPLEX 45 MIN: CPT

## 2023-12-11 PROCEDURE — 94150 VITAL CAPACITY TEST: CPT

## 2023-12-11 PROCEDURE — 80048 BASIC METABOLIC PNL TOTAL CA: CPT

## 2023-12-11 PROCEDURE — 1200000000 HC SEMI PRIVATE

## 2023-12-11 PROCEDURE — 82962 GLUCOSE BLOOD TEST: CPT

## 2023-12-11 RX ADMIN — ATENOLOL 50 MG: 25 TABLET ORAL at 08:38

## 2023-12-11 RX ADMIN — HYDRALAZINE HYDROCHLORIDE 50 MG: 50 TABLET ORAL at 21:04

## 2023-12-11 RX ADMIN — LEVOTHYROXINE SODIUM 175 MCG: 25 TABLET ORAL at 04:27

## 2023-12-11 RX ADMIN — DEXTROSE MONOHYDRATE 125 ML: 100 INJECTION, SOLUTION INTRAVENOUS at 03:14

## 2023-12-11 RX ADMIN — INSULIN HUMAN 5 UNITS: 500 INJECTION, SOLUTION SUBCUTANEOUS at 17:16

## 2023-12-11 RX ADMIN — GUAIFENESIN 600 MG: 600 TABLET, EXTENDED RELEASE ORAL at 08:38

## 2023-12-11 RX ADMIN — PANTOPRAZOLE SODIUM 40 MG: 40 TABLET, DELAYED RELEASE ORAL at 04:27

## 2023-12-11 RX ADMIN — ASPIRIN 81 MG: 81 TABLET, CHEWABLE ORAL at 08:38

## 2023-12-11 RX ADMIN — SODIUM CHLORIDE, PRESERVATIVE FREE 10 ML: 5 INJECTION INTRAVENOUS at 08:37

## 2023-12-11 RX ADMIN — INSULIN HUMAN 5 UNITS: 500 INJECTION, SOLUTION SUBCUTANEOUS at 13:50

## 2023-12-11 RX ADMIN — EMPAGLIFLOZIN 10 MG: 10 TABLET, FILM COATED ORAL at 08:38

## 2023-12-11 RX ADMIN — IPRATROPIUM BROMIDE AND ALBUTEROL SULFATE 1 DOSE: 2.5; .5 SOLUTION RESPIRATORY (INHALATION) at 08:48

## 2023-12-11 RX ADMIN — HYDRALAZINE HYDROCHLORIDE 50 MG: 50 TABLET ORAL at 14:00

## 2023-12-11 RX ADMIN — ISOSORBIDE MONONITRATE 60 MG: 60 TABLET, EXTENDED RELEASE ORAL at 08:38

## 2023-12-11 RX ADMIN — AMLODIPINE BESYLATE 10 MG: 10 TABLET ORAL at 08:38

## 2023-12-11 RX ADMIN — SODIUM CHLORIDE, PRESERVATIVE FREE 10 ML: 5 INJECTION INTRAVENOUS at 21:04

## 2023-12-11 RX ADMIN — DULOXETINE HYDROCHLORIDE 30 MG: 30 CAPSULE, DELAYED RELEASE ORAL at 08:38

## 2023-12-11 RX ADMIN — GUAIFENESIN 600 MG: 600 TABLET, EXTENDED RELEASE ORAL at 21:04

## 2023-12-11 RX ADMIN — ATENOLOL 50 MG: 25 TABLET ORAL at 21:04

## 2023-12-11 RX ADMIN — DEXTROSE MONOHYDRATE 125 ML: 100 INJECTION, SOLUTION INTRAVENOUS at 04:20

## 2023-12-11 NOTE — PROGRESS NOTES
Progress Note( Dr. Bonnie Jalloh)  12/10/2023  Subjective:   Admit Date: 12/4/2023  PCP: Candis Padron    Admitted For :  Patient is transferring from [facility] for acute kidney injury and UTI and acute respiratory failure with hypoxia       Consulted For:  Better control of blood glucose     Interval History: Seems to be doing okay but unable to communicate well  Review of the patient blood glucose seems to be much better on a much lower dose of insulin. I discontinue her Lantus that was ordered twice a day and only giving a U-500 insulin smaller doses than what she was taking at nursing home    Denies any chest pains,   Yes SOB . Denies nausea or vomiting. No new bowel or bladder symptoms.        Intake/Output Summary (Last 24 hours) at 12/10/2023 2002  Last data filed at 12/10/2023 1728  Gross per 24 hour   Intake 720 ml   Output 900 ml   Net -180 ml       DATA    CBC:   Recent Labs     12/08/23  0417 12/09/23  1355 12/10/23  0315   WBC 5.9 12.5* 7.2   HGB 7.8* 8.3* 7.4*    271 164    CMP:  Recent Labs     12/08/23  0417 12/08/23  1013 12/09/23  1355 12/10/23  0315     --  139 143   K 5.6* 5.3* 4.7 3.9     --  108 111*   CO2 17*  --  18* 19*   BUN 46*  --  55* 63*   CREATININE 2.5*  --  2.9* 3.0*   CALCIUM 8.1*  --  8.9 8.5   LABALBU 2.7*  --   --   --      Lipids:   Lab Results   Component Value Date/Time    CHOL 233 11/11/2022 10:30 PM    HDL 39 11/11/2022 10:30 PM    TRIG 251 11/11/2022 10:30 PM     Glucose:  Recent Labs     12/10/23  1224 12/10/23  1728 12/10/23  1943   POCGLU 195* 204* 165*     [id number]:  Lab Results   Component Value Date/Time    LABA1C 6.3 11/30/2023 05:50 AM     High Sensitivity TSH:   Lab Results   Component Value Date/Time    TSHHS 0.570 11/30/2023 05:50 AM     Free T3: No results found for: \"FT3\"  Free T4:  Lab Results   Component Value Date/Time    T4FREE 1.59 06/24/2021 08:00 AM       XR CHEST PORTABLE   Final Result      Lungs are clear

## 2023-12-11 NOTE — DISCHARGE INSTR - DIET

## 2023-12-11 NOTE — DISCHARGE SUMMARY
V2.0  Discharge Summary    Name:  Gabino Sullivan /Age/Sex: [id number] (80 y.o. female)   Admit Date: 2023  Discharge Date: 23    MRN & CSN:  [id number] & [id number] Encounter Date and Time 23 2:56 PM EST    Attending:  Nicanor Cushing, MD Discharging Provider: Nicanor Cushing, MD       Hospital Course:     Brief HPI: Patient is a 28-year-old female who presented with SOB. Transferred from [facility] due to LAURA and UTI with MDRO E. coli. LAURA on CKD stage IIIb, likely acute interstitial nephritis from recent antibiotic, also has component of nephrotic syndrome  -Solitary kidney  -Creatinine is coming down and now 2.5, yesterday it was 2.8  -Baseline 1.3-1.5.   - Nephrology on board. 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  -Started on steroid regimen by nephrology. Agree with the plan  -Monitor lab trends   -Urine indices essentially normal but significant proteinuria UA also shows eosinophils  -Holding nephrotoxic agents-and renally dosing meds  -Peak creatinine of 3.0. Discussed with nephrology in detail. Patient has apparent significant proteinuria. Urine incontinence with inability to do accurate calculation for albuminuria. Current number is underestimating the proteinuria. Trial of diuretic was done yesterday creatinine got significantly worse. Hold off on the idea. Nephrology on board avoid nephrotoxic agents   Will be discharged to SNF with ultimate follow-up with nephrology for regular BMPs. Started on Jardiance 10 mg daily        Acute respiratory failure with hypoxia  Acute bronchitis  Rhinovirus infection  CXR \"Findings consistent with bronchitis/pneumonitis worst in the hilar/perihilar   regions. Early/mild congestive heart failure, underlying chronic lung disease and low lung volumes may contribute to this appearance.   Otherwise, radiographically nonacute portable chest. \".    -symptomatic with new oxygen requirement on admission now

## 2023-12-11 NOTE — PROGRESS NOTES
Pt Glucose was 40mg/dL given D10 PRN as per orders, recheck was 118. MD notified. No new orders, pt is alert and responsive, VSS. Will continue monitoring.

## 2023-12-11 NOTE — PROGRESS NOTES
Nephrology Progress Note  12/11/2023 8:58 AM  Subjective: Interval History: Yulissa Bunn is a 69 y.o. female arousable weak resting in bed    Data:   Scheduled Meds:   insulin regular human  5 Units SubCUTAneous Dinner    insulin regular human  5 Units SubCUTAneous Lunch    insulin regular human  5 Units SubCUTAneous Daily with breakfast    empagliflozin  10 mg Oral Daily    [Held by provider] insulin regular human  0-30 Units SubCUTAneous TID WC    [Held by provider] furosemide  80 mg IntraVENous BID    ipratropium 0.5 mg-albuterol 2.5 mg  1 Dose Inhalation BID RT    isosorbide mononitrate  60 mg Oral Daily    hydrALAZINE  50 mg Oral 3 times per day    sodium chloride flush  5-40 mL IntraVENous 2 times per day    guaiFENesin  600 mg Oral BID    amLODIPine  10 mg Oral Daily    atenolol  50 mg Oral BID    aspirin  81 mg Oral Daily    pantoprazole  40 mg Oral Daily    levothyroxine  175 mcg Oral Daily    DULoxetine  30 mg Oral Daily     Continuous Infusions:   sodium chloride      dextrose           CBC   Recent Labs     12/09/23  1355 12/10/23  0315 12/11/23  0429   WBC 12.5* 7.2 8.5   HGB 8.3* 7.4* 8.2*   HCT 26.8* 24.5* 29.1*    164 111*      BMP   Recent Labs     12/09/23  1355 12/10/23  0315 12/11/23  0429    143 143   K 4.7 3.9 3.9    111* 111*   CO2 18* 19* 18*   PHOS 4.3 4.6 4.8   BUN 55* 63* 64*   CREATININE 2.9* 3.0* 3.0*     Hepatic: No results for input(s): \"AST\", \"ALT\", \"ALB\", \"BILITOT\", \"ALKPHOS\" in the last 72 hours. Troponin: No results for input(s): \"TROPONINI\" in the last 72 hours. BNP: No results for input(s): \"BNP\" in the last 72 hours. Lipids: No results for input(s): \"CHOL\", \"HDL\" in the last 72 hours. Invalid input(s): \"LDLCALCU\"  ABGs:   Lab Results   Component Value Date/Time    PO2ART 69.9 10/07/2021 09:43 AM    DJS1BLL 38.9 10/07/2021 09:43 AM     INR: No results for input(s): \"INR\" in the last 72 hours.   Renal Labs  Albumin:    Lab Results   Component

## 2023-12-11 NOTE — PROGRESS NOTES
Oriented to place;Oriented to situation;Oriented to person  (oriented to month/year but not date/day)   Cognition   Overall Cognitive Status Exceptions   Arousal/Alertness Delayed responses to stimuli   Following Commands Follows multistep commands with repitition; Follows multistep commands with increased time   Attention Span Appears intact   Memory Decreased recall of biographical Information;Decreased recall of precautions   Initiation Requires cues for some   Sequencing Requires cues for some     Mobility:  Supine to sit: moderate assist  x2  Sit to supine: moderate assist  x2  Sit to stand: not attempted (pt deferred OOB activity on this date)  Stand to sit: not attempted  Functional Mobility:  not attempted  Toilet Transfers: not attempted    Balance:   Static sitting balance: minimal assist  Dynamic sitting balance: maximal assist  Static standing balance: not attempted  Dynamic standing balance: not attempted    Pt educated on role of therapy, POC, and d/c recommendations     Treatment:  Self Care Training:   Cues were given for safety, sequence, UE/LE placement, visual cues, and balance. Supine to/from EOB transfer with mod A x 2 for trunk control, LE maneuvering, and hip pivot, mod verbal cues for initiation/sequencing, HOB elevated and use of bed rails     Sat EOB for ~10 min with max A initially, progressed to min A with increased time and mod verbal/tactile cues, L hand on bed rail and R hand hand held assist from therapist    Combed hair with mod A for thoroughness of tasks d/t very knotted back of hair, partially completed sitting EOB, partially completed supine d/t decreased sitting tolerance, extended time for task       Safety: Patient left in bed with alarm on, call light within reach, RN notified, gait belt used. Assessment:  Pt is a 68 y/o female admitted for There were no encounter diagnoses. . Pt at baseline is assist with ADLs and assist with functional transfers/mobility using manual w/c. Pt currently presents w/ deficits relating to ADLs, IADLs, UE strength/ROM, functional activity tolerance, cognition, safety awareness, and functional mobility. Pt would benefit from continued acute care OT services w/ discharge to SNF    Complexity: Moderate   Prognosis: Good, no significant barriers to participation at this time. Occupational Therapy Plan  Times Per Week: 3-4x wk  Times Per Day: Once a day  Current Treatment Recommendations: Strengthening, Balance training, Functional mobility training, Endurance training, Neuromuscular re-education, Cognitive reorientation, Pain management, Safety education & training, Patient/Caregiver education & training, Equipment evaluation, education, & procurement, Positioning, Self-Care / ADL       Goals:  - Pt will perform UE ADLs with minimal assist using AE PRN by d/c  - Pt will perform LE ADLs with minimal assist using AE PRN by d/c  - Pt will perform toileting with minimal assist using AE PRN by d/c  - Pt will perform functional transfers to/from bed, chair, and toilet with minimal assist using AE PRN by d/c  - Pt will perform therex/theract in order to increase functional activity tolerance    Treatment plan:  Pt will perform therex/theract in order to increase functional activity tolerance in preparation for ADL participation.      Recommendations for NURSING activity: Up to chair for all 3 meals and up to bed pan for all toileting needs    Time:   Time in: 13:04  Time out: 13:26  Timed treatment minutes: 12  Total time: 22    Electronically signed by:    JAMES Diallo/DEBI [id number]  12/11/2023, 2:04 PM

## 2023-12-11 NOTE — CARE COORDINATION
CM call to Pt  No Shah to follow up on discharge planning. No Shah deferred decision making to daughter Sarah. CM call to [place], who is agreeable with referral to [facility]. PT/OT ordered, following for recommendations. CM call to [facility] with referral.  Pt will not require precert when medically ready and accepted. 4:26 PM   CM updated by [facility], they can accept Pt tomorrow pending medical readiness.     PS to Dr. Cary Leon to update

## 2023-12-11 NOTE — PROGRESS NOTES
Rechecked Glucose 1hr after bolus and it was 54, pt still alert and given another bolus and helped to take apple juice. Repeat was 106.  Liz Wynne continue to monitor

## 2023-12-11 NOTE — DISCHARGE INSTR - COC
Continuity of Care Form    Patient Name: Britt Huffman   :    MRN:  [id number]    Admit date:  2023  Discharge date:  2023    Code Status Order: Full Code   Advance Directives:     Admitting Physician:  No admitting provider for patient encounter. PCP: TOÑITO Salvador    Discharging Nurse: Sherley Ramirez [facility] Unit/Room#: 3030/3030-A  Discharging Unit Phone Number: [phone]    Emergency Contact:   Extended Emergency Contact Information  Primary Emergency Contact: Jayleen Heart  Address: [address] [address] [place] [address], [facility]  Work Phone: [phone]  Mobile Phone: [phone]  Relation: Spouse  Secondary Emergency Contact: [address] Phone: [phone]  Mobile Phone: [phone]  Relation: Child    Past Surgical History:  Past Surgical History:   Procedure Laterality Date    ABDOMEN SURGERY      CYST REMOVAL      from kidney.     DILATATION, ESOPHAGUS      ENDOSCOPY, COLON, DIAGNOSTIC      FOOT SURGERY  12    had infection  and a biopsy was sent away for analysis    HERNIA REPAIR      HYSTERECTOMY (CERVIX STATUS UNKNOWN)      KIDNEY REMOVAL         Immunization History:   Immunization History   Administered Date(s) Administered    COVID-19, J&J, (age 18y+), IM, 0.5 mL 2021    Influenza A (F6Y4-12) Vaccine PF IM 2010    Influenza Virus Vaccine 10/25/2006, 2009, 2012, 10/19/2013, 2019    Influenza, FLUARIX, FLULAVAL, FLUZONE (age 10 mo+) AND AFLURIA, (age 1 y+), PF, 0.5mL 2019    Influenza, FLUCELVAX, (age 10 mo+), MDCK, PF, 0.5mL 2018    Pneumococcal, PCV-13, PREVNAR 15, (age 6w+), IM, 0.5mL 2018    Pneumococcal, PPSV23, PNEUMOVAX 23, (age 2y+), SC/IM, 0.5mL 2012       Active Problems:  Patient Active Problem List   Diagnosis Code    Ulcer of right foot with fat layer exposed ([address]) L97.512    Type 2 diabetes mellitus with complication, with symptoms and fingerstick glucose readings are less than 100, you may need to take juices/glucose tablets until your blood sugar improves to > 100mg/dl and symptoms resolve .  If having hypoglycemia and symptoms of drowsiness, or lethargic, you may need glucagon injection to be administered as per written material.  In case of glucose reading greater than 300s or less than 100s, patient need to be evaluated by physician      Update Admission H&P: No change in H&P    PHYSICIAN SIGNATURE:  Electronically signed by Nicki Merlin, MD on 12/11/23 at 11:54 AM EST

## 2023-12-11 NOTE — CONSULTS
[address], 1945, 3030/3030-A, 12/11/2023    History  [place]:  There were no encounter diagnoses. Patient  has a past medical history of Acquired hypothyroidism, Aortic atherosclerosis ([address]), Cellulitis of right foot, CHF (congestive heart failure) ([address]), Chronic kidney disease (CKD), stage III (moderate) ([address]), Chronic pain syndrome, Cognitive communication deficit, Dementia ([address]), Essential hypertension, Frequent falls, Gastroesophageal reflux disease with esophagitis, Hallux valgus (acquired), right foot, Hyperlipemia, Shingles, Solitary kidney, congenital, Type 2 diabetes mellitus with complication, with long-term current use of insulin ([address]), Ulcer of right foot with fat layer exposed ([address]), and Ulcer of right foot with fat layer exposed ([address]). Patient  has a past surgical history that includes Hysterectomy; Foot surgery (12/30/11); cyst removal; Kidney removal (1973); Abdomen surgery; hernia repair; Endoscopy, colon, diagnostic; and Dilatation, esophagus. Discharge Recommendation: LTC with PT    Subjective:    Patient states:  \"I don't live at home anymore. \"      Pain:  denies pain. Communication with other providers:  Handoff to RN, OT    Restrictions: general precautions, fall risk, contact isolation    Home Setup/Prior level of function  Social/Functional History  Type of Home: Facility (pt reports that she has lived in facility for ~1 year)  ADL Assistance: Needs assistance  Bath: Moderate assistance  Dressing:  Moderate assistance  Grooming: Minimal assistance  Feeding:  (set-up A)  Toileting: Needs assistance  Ambulation Assistance: Non-ambulatory (uses W/c)  Transfer Assistance: Needs assistance (reports only transfering with aids assist)    Examination of body systems (includes body structures/functions, activity/participation limitations):  Observation:  pt supine in bed upon arrival and agreeable to therapy  Vision: treatment minutes: 10  Total time: 20    Electronically signed by:    Leyla Sweeney, HAYLEY  12/11/2023, 2:41 PM

## 2023-12-11 NOTE — PROGRESS NOTES
Pt scheduled hydralazine at 2200hrs, her BP was 128/41 with a MAP of 65, NP notified and advised to hold the med.  Will continue to monitor

## 2023-12-11 NOTE — PROGRESS NOTES
Progress Note( Dr. Marguerite Desouza)  12/11/2023  Subjective:   Admit Date: 12/4/2023  PCP: Candis Galarza    Admitted For :  Patient is transferring from [facility] for acute kidney injury and UTI and acute respiratory failure with hypoxia       Consulted For:  Better control of blood glucose     Interval History: Seems to be doing okay but unable to communicate well    Patient had multiple episodes of low low blood glucose yesterday      Denies any chest pains,   Yes SOB . Denies nausea or vomiting. No new bowel or bladder symptoms. Intake/Output Summary (Last 24 hours) at 12/11/2023 0800  Last data filed at 12/11/2023 0704  Gross per 24 hour   Intake 832 ml   Output 1100 ml   Net -268 ml         DATA    CBC:   Recent Labs     12/09/23  1355 12/10/23  0315 12/11/23  0429   WBC 12.5* 7.2 8.5   HGB 8.3* 7.4* 8.2*    164 111*      CMP:  Recent Labs     12/09/23  1355 12/10/23  0315 12/11/23  0429    143 143   K 4.7 3.9 3.9    111* 111*   CO2 18* 19* 18*   BUN 55* 63* 64*   CREATININE 2.9* 3.0* 3.0*   CALCIUM 8.9 8.5 8.3       Lipids:   Lab Results   Component Value Date/Time    CHOL 233 11/11/2022 10:30 PM    HDL 39 11/11/2022 10:30 PM    TRIG 251 11/11/2022 10:30 PM     Glucose:  Recent Labs     12/11/23  0320 12/11/23  0417 12/11/23  0440   POCGLU 118* 54* 106*       [id number]:  Lab Results   Component Value Date/Time    LABA1C 6.3 11/30/2023 05:50 AM     High Sensitivity TSH:   Lab Results   Component Value Date/Time    TSHHS 0.570 11/30/2023 05:50 AM     Free T3: No results found for: \"FT3\"  Free T4:  Lab Results   Component Value Date/Time    T4FREE 1.59 06/24/2021 08:00 AM       XR CHEST PORTABLE   Final Result      Lungs are clear         US RETROPERITONEAL LIMITED   Final Result   The left kidney is unremarkable. The right kidney is absent, stable.               Scheduled Medicines   Medications:    insulin regular human  5 Units SubCUTAneous Dinner    insulin

## 2023-12-12 VITALS
OXYGEN SATURATION: 98 % | SYSTOLIC BLOOD PRESSURE: 163 MMHG | HEART RATE: 71 BPM | TEMPERATURE: 98 F | BODY MASS INDEX: 32.59 KG/M2 | HEIGHT: 64 IN | WEIGHT: 190.92 LBS | DIASTOLIC BLOOD PRESSURE: 50 MMHG | RESPIRATION RATE: 12 BRPM

## 2023-12-12 LAB
ALBUMIN SERPL ELPH-MCNC: 2.6 GM/DL (ref 3.2–5.6)
ALBUMIN SERPL-MCNC: 3 GM/DL (ref 3.4–5)
ALBUMIN, U: 45 %
ALP BLD-CCNC: 89 IU/L (ref 40–129)
ALPHA-1-GLOBULIN, U: 8 %
ALPHA-1-GLOBULIN: 0.3 GM/DL (ref 0.1–0.4)
ALPHA-2-GLOBULIN, U: 11 %
ALPHA-2-GLOBULIN: 1.1 GM/DL (ref 0.4–1.2)
ALT SERPL-CCNC: 12 U/L (ref 10–40)
ANCA AB PATTERN SER IF-IMP: NORMAL
ANCA IGG TITR SER IF: NORMAL {TITER}
ANION GAP SERPL CALCULATED.3IONS-SCNC: 13 MMOL/L (ref 4–16)
AST SERPL-CCNC: 11 IU/L (ref 15–37)
BETA GLOBULIN, U: 15 %
BETA GLOBULIN: 1 GM/DL (ref 0.5–1.3)
BILIRUB SERPL-MCNC: 0.1 MG/DL (ref 0–1)
BUN SERPL-MCNC: 61 MG/DL (ref 6–23)
CALCIUM SERPL-MCNC: 8.5 MG/DL (ref 8.3–10.6)
CHLORIDE BLD-SCNC: 109 MMOL/L (ref 99–110)
CO2: 18 MMOL/L (ref 21–32)
CREAT SERPL-MCNC: 3.1 MG/DL (ref 0.6–1.1)
GAMMA GLOBULIN, U: 21 %
GAMMA GLOBULIN: 0.7 GM/DL (ref 0.5–1.6)
GFR SERPL CREATININE-BSD FRML MDRD: 15 ML/MIN/1.73M2
GLUCOSE BLD-MCNC: 234 MG/DL (ref 70–99)
GLUCOSE SERPL-MCNC: 221 MG/DL (ref 70–99)
POTASSIUM SERPL-SCNC: 4 MMOL/L (ref 3.5–5.1)
SODIUM BLD-SCNC: 140 MMOL/L (ref 135–145)
SPEP INTERPRETATION: ABNORMAL
SPEP INTERPRETATION: NORMAL
TOTAL PROTEIN: 5.2 GM/DL (ref 6.4–8.2)
TOTAL PROTEIN: 5.7 GM/DL (ref 6.4–8.2)
URINE TOTAL PROTEIN: >600 MG/DL

## 2023-12-12 PROCEDURE — 94150 VITAL CAPACITY TEST: CPT

## 2023-12-12 PROCEDURE — 6370000000 HC RX 637 (ALT 250 FOR IP): Performed by: INTERNAL MEDICINE

## 2023-12-12 PROCEDURE — 36415 COLL VENOUS BLD VENIPUNCTURE: CPT

## 2023-12-12 PROCEDURE — 94761 N-INVAS EAR/PLS OXIMETRY MLT: CPT

## 2023-12-12 PROCEDURE — 6370000000 HC RX 637 (ALT 250 FOR IP): Performed by: NURSE PRACTITIONER

## 2023-12-12 PROCEDURE — 80053 COMPREHEN METABOLIC PANEL: CPT

## 2023-12-12 PROCEDURE — 6370000000 HC RX 637 (ALT 250 FOR IP): Performed by: STUDENT IN AN ORGANIZED HEALTH CARE EDUCATION/TRAINING PROGRAM

## 2023-12-12 PROCEDURE — 2580000003 HC RX 258: Performed by: NURSE PRACTITIONER

## 2023-12-12 PROCEDURE — 94640 AIRWAY INHALATION TREATMENT: CPT

## 2023-12-12 RX ADMIN — PANTOPRAZOLE SODIUM 40 MG: 40 TABLET, DELAYED RELEASE ORAL at 05:42

## 2023-12-12 RX ADMIN — HYDRALAZINE HYDROCHLORIDE 50 MG: 50 TABLET ORAL at 05:42

## 2023-12-12 RX ADMIN — ISOSORBIDE MONONITRATE 60 MG: 60 TABLET, EXTENDED RELEASE ORAL at 09:13

## 2023-12-12 RX ADMIN — GUAIFENESIN 600 MG: 600 TABLET, EXTENDED RELEASE ORAL at 09:13

## 2023-12-12 RX ADMIN — ATENOLOL 50 MG: 25 TABLET ORAL at 09:13

## 2023-12-12 RX ADMIN — IPRATROPIUM BROMIDE AND ALBUTEROL SULFATE 1 DOSE: 2.5; .5 SOLUTION RESPIRATORY (INHALATION) at 07:22

## 2023-12-12 RX ADMIN — ASPIRIN 81 MG: 81 TABLET, CHEWABLE ORAL at 09:13

## 2023-12-12 RX ADMIN — AMLODIPINE BESYLATE 10 MG: 10 TABLET ORAL at 09:13

## 2023-12-12 RX ADMIN — EMPAGLIFLOZIN 10 MG: 10 TABLET, FILM COATED ORAL at 09:13

## 2023-12-12 RX ADMIN — SODIUM CHLORIDE, PRESERVATIVE FREE 10 ML: 5 INJECTION INTRAVENOUS at 09:13

## 2023-12-12 RX ADMIN — LEVOTHYROXINE SODIUM 175 MCG: 25 TABLET ORAL at 05:42

## 2023-12-12 RX ADMIN — INSULIN HUMAN 5 UNITS: 500 INJECTION, SOLUTION SUBCUTANEOUS at 09:43

## 2023-12-12 RX ADMIN — DULOXETINE HYDROCHLORIDE 30 MG: 30 CAPSULE, DELAYED RELEASE ORAL at 09:13

## 2023-12-12 NOTE — FLOWSHEET NOTE
[facility] arrived to transport patient to [facility] via ambulance transport.

## 2023-12-12 NOTE — PROGRESS NOTES
Nephrology Progress Note  12/12/2023 8:12 AM  Subjective: Interval History: Shane Salgado is a 69 y.o. femaleis weak more awake and dw her about renal and rehab and will agree to SNF      Data:   Scheduled Meds:   insulin regular human  5 Units SubCUTAneous Dinner    insulin regular human  5 Units SubCUTAneous Lunch    insulin regular human  5 Units SubCUTAneous Daily with breakfast    empagliflozin  10 mg Oral Daily    [Held by provider] insulin regular human  0-30 Units SubCUTAneous TID WC    [Held by provider] furosemide  80 mg IntraVENous BID    ipratropium 0.5 mg-albuterol 2.5 mg  1 Dose Inhalation BID RT    isosorbide mononitrate  60 mg Oral Daily    hydrALAZINE  50 mg Oral 3 times per day    sodium chloride flush  5-40 mL IntraVENous 2 times per day    guaiFENesin  600 mg Oral BID    amLODIPine  10 mg Oral Daily    atenolol  50 mg Oral BID    aspirin  81 mg Oral Daily    pantoprazole  40 mg Oral Daily    levothyroxine  175 mcg Oral Daily    DULoxetine  30 mg Oral Daily     Continuous Infusions:   sodium chloride      dextrose           CBC   Recent Labs     12/09/23  1355 12/10/23  0315 12/11/23  0429   WBC 12.5* 7.2 8.5   HGB 8.3* 7.4* 8.2*   HCT 26.8* 24.5* 29.1*    164 111*      BMP   Recent Labs     12/09/23  1355 12/10/23  0315 12/11/23  0429 12/12/23  0403    143 143 140   K 4.7 3.9 3.9 4.0    111* 111* 109   CO2 18* 19* 18* 18*   PHOS 4.3 4.6 4.8  --    BUN 55* 63* 64* 61*   CREATININE 2.9* 3.0* 3.0* 3.1*     Hepatic:   Recent Labs     12/12/23  0403   AST 11*   ALT 12   BILITOT 0.1   ALKPHOS 89     Troponin: No results for input(s): \"TROPONINI\" in the last 72 hours. BNP: No results for input(s): \"BNP\" in the last 72 hours. Lipids: No results for input(s): \"CHOL\", \"HDL\" in the last 72 hours.     Invalid input(s): \"LDLCALCU\"  ABGs:   Lab Results   Component Value Date/Time    PO2ART 69.9 10/07/2021 09:43 AM    DFT5FRJ 38.9 10/07/2021 09:43 AM     INR: No results for

## 2023-12-12 NOTE — PROGRESS NOTES
Progress Note( Dr. Nina Edwards)  12/12/2023  Subjective:   Admit Date: 12/4/2023  PCP: Candis Dunbar    Admitted For :  Patient is transferring from [facility] for acute kidney injury and UTI and acute respiratory failure with hypoxia       Consulted For:  Better control of blood glucose     Interval History: Seems to be doing okay but unable to communicate well    Patient blood glucose of control better with the lower dose of U-500 insulin    Denies any chest pains,   Yes SOB . Denies nausea or vomiting. No new bowel or bladder symptoms. Intake/Output Summary (Last 24 hours) at 12/12/2023 0828  Last data filed at 12/11/2023 1919  Gross per 24 hour   Intake 480 ml   Output 800 ml   Net -320 ml         DATA    CBC:   Recent Labs     12/09/23  1355 12/10/23  0315 12/11/23  0429   WBC 12.5* 7.2 8.5   HGB 8.3* 7.4* 8.2*    164 111*      CMP:  Recent Labs     12/10/23  0315 12/11/23  0429 12/12/23  0403    143 140   K 3.9 3.9 4.0   * 111* 109   CO2 19* 18* 18*   BUN 63* 64* 61*   CREATININE 3.0* 3.0* 3.1*   CALCIUM 8.5 8.3 8.5   PROT  --   --  5.2*   LABALBU  --   --  3.0*   BILITOT  --   --  0.1   ALKPHOS  --   --  89   AST  --   --  11*   ALT  --   --  12       Lipids:   Lab Results   Component Value Date/Time    CHOL 233 11/11/2022 10:30 PM    HDL 39 11/11/2022 10:30 PM    TRIG 251 11/11/2022 10:30 PM     Glucose:  Recent Labs     12/11/23  1714 12/11/23  2045 12/12/23  0747   POCGLU 214* 168* 234*       NssggimajeP9M:  Lab Results   Component Value Date/Time    LABA1C 6.3 11/30/2023 05:50 AM     High Sensitivity TSH:   Lab Results   Component Value Date/Time    TSHHS 0.570 11/30/2023 05:50 AM     Free T3: No results found for: \"FT3\"  Free T4:  Lab Results   Component Value Date/Time    T4FREE 1.59 06/24/2021 08:00 AM       XR CHEST PORTABLE   Final Result      Lungs are clear         US RETROPERITONEAL LIMITED   Final Result   The left kidney is unremarkable.  The right

## 2023-12-12 NOTE — DISCHARGE SUMMARY
V2.0  Discharge Summary    Name:  Nichole Lipscomb /Age/Sex:  (80 y.o. female)   Admit Date: 2023  Discharge Date: 23    MRN & CSN:  [id number] & [id number] Encounter Date and Time 23 9:40 AM EST    Attending:  Rosa Macias MD Discharging Provider: Rosa Macias MD       Hospital Course:     Brief HPI: Patient is a 67-year-old female who presented with SOB. Transferred from [facility] inpatient due to LAURA and UTI with MDRO E. coli. Brief Problem Based Course:     LAURA on CKD stage IIIb, likely acute interstitial nephritis from recent antibiotic, also has component of nephrotic syndrome  -Solitary kidney  -Creatinine is coming down and now 2.5, yesterday it was 2.8  -Baseline 1.3-1.5.   - Nephrology on board. 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  -Started on steroid regimen by nephrology. Agree with the plan  -Monitor lab trends   -Urine indices essentially normal but significant proteinuria UA also shows eosinophils  -Holding nephrotoxic agents-and renally dosing meds  -Peak creatinine of 3.0. Discussed with nephrology in detail. Patient has apparent significant proteinuria. Urine incontinence with inability to do accurate calculation for albuminuria. Current number is underestimating the proteinuria. Trial of diuretic was done yesterday creatinine got significantly worse. Hold off on the idea. Nephrology on board avoid nephrotoxic agents   Will be discharged to SNF with ultimate follow-up with nephrology for regular BMPs. Started on Jardiance 10 mg daily        Acute respiratory failure with hypoxia  Acute bronchitis  Rhinovirus infection  CXR \"Findings consistent with bronchitis/pneumonitis worst in the hilar/perihilar   regions. Early/mild congestive heart failure, underlying chronic lung disease and low lung volumes may contribute to this appearance.   Otherwise, radiographically nonacute portable chest. \".    -symptomatic with new

## 2023-12-12 NOTE — FLOWSHEET NOTE
Attempted to call telephone report to [facility]/[phone],  took my name and number as the nurse was not available at this time. Informed  patient is scheduled to be picked up from here at 1200, voices understanding.

## 2023-12-12 NOTE — CARE COORDINATION
Pt on discharge.   CM set stretcher transportation with [address]    Pt daughter Rhianna Alcantar updated  Pt SHERRIE Cottrell updated  [employer] updated

## 2023-12-13 LAB
KAPPA LC FREE SER-MCNC: 34.27 MG/L (ref 3.3–19.4)
KAPPA LC FREE/LAMBDA FREE SER NEPH: 0.73 {RATIO} (ref 0.26–1.65)
LAMBDA LC FREE SERPL-MCNC: 47.11 MG/L (ref 5.71–26.3)

## 2023-12-14 LAB
Lab: NORMAL
TEST NAME: NORMAL

## 2023-12-20 NOTE — PROGRESS NOTES
Physician Progress Note      PATIENT:               Aminata Rincon  [facility] #:                  [id number]  :                       1945  ADMIT DATE:       2023 7:48 PM  [facility] DATE:        2023 12:41 PM  RESPONDING  PROVIDER #:        Zulma Lara MD          QUERY TEXT:    Patient transferred with LAURA. Progress notes indicated ATN while discharge   summary indicated this was LAURA on CKD stage IIIb, likely acute interstitial   nephritis from recent antibiotic, also has component of nephrotic syndrome. Last nephrology PN on  states, \"LAURA / AIN. \" After study, please reaffirm   the most appropriate diagnosis: The medical record reflects the following:  Risk Factors: 68 yo, CKD  Clinical Indicators: SCr elevated at 2.5 with a stated baseline of 1.3-1.5.   SCr as follows: - 2.5, - 2.6, - 2.8, - 2.5, - 3. 1. Creatinine increased throughout the admission. Treatment: labs, IVF, nephrology consult    HEATHER Sykes, RN, [facility], [place], operated by [facility]  Clinical   [phone]  Options provided:  -- Acute kidney failure with acute tubular necrosis  -- Acute interstitial nephritis  -- Acute kidney injury, ATN ruled out  -- Other - I will add my own diagnosis  -- Disagree - Not applicable / Not valid  -- Disagree - Clinically unable to determine / Unknown  -- Refer to Clinical Documentation Reviewer    PROVIDER RESPONSE TEXT:    This patient has acute interstitial nephritis.     Query created by: Deena Curry on 2023 2:27 PM      Electronically signed by:  Zulma Lara MD 2023 10:30 PM

## 2023-12-27 ENCOUNTER — HOSPITAL ENCOUNTER (INPATIENT)
Age: 78
LOS: 5 days | Discharge: NURSING, REHAB OR LONG-TERM CARE | DRG: 643 | End: 2024-01-03
Attending: EMERGENCY MEDICINE | Admitting: INTERNAL MEDICINE
Payer: MEDICARE

## 2023-12-27 ENCOUNTER — APPOINTMENT (OUTPATIENT)
Dept: GENERAL RADIOLOGY | Age: 78
DRG: 643 | End: 2023-12-27
Payer: MEDICARE

## 2023-12-27 DIAGNOSIS — N17.9 ACUTE KIDNEY INJURY SUPERIMPOSED ON CKD (HCC): ICD-10-CM

## 2023-12-27 DIAGNOSIS — E11.8 TYPE 2 DIABETES MELLITUS WITH COMPLICATION, WITH LONG-TERM CURRENT USE OF INSULIN (HCC): ICD-10-CM

## 2023-12-27 DIAGNOSIS — R41.82 ALTERED MENTAL STATUS, UNSPECIFIED ALTERED MENTAL STATUS TYPE: Primary | ICD-10-CM

## 2023-12-27 DIAGNOSIS — N18.9 ACUTE KIDNEY INJURY SUPERIMPOSED ON CKD (HCC): ICD-10-CM

## 2023-12-27 DIAGNOSIS — Z79.4 TYPE 2 DIABETES MELLITUS WITH COMPLICATION, WITH LONG-TERM CURRENT USE OF INSULIN (HCC): ICD-10-CM

## 2023-12-27 DIAGNOSIS — E83.42 HYPOMAGNESEMIA: ICD-10-CM

## 2023-12-27 DIAGNOSIS — E03.9 HYPOTHYROIDISM, UNSPECIFIED TYPE: ICD-10-CM

## 2023-12-27 DIAGNOSIS — R82.71 ASYMPTOMATIC BACTERIURIA: ICD-10-CM

## 2023-12-27 LAB
ALBUMIN SERPL-MCNC: 2.5 GM/DL (ref 3.4–5)
ALP BLD-CCNC: 91 IU/L (ref 40–129)
ALT SERPL-CCNC: 9 U/L (ref 10–40)
ANION GAP SERPL CALCULATED.3IONS-SCNC: 11 MMOL/L (ref 7–16)
AST SERPL-CCNC: 17 IU/L (ref 15–37)
BACTERIA: NEGATIVE /HPF
BASOPHILS ABSOLUTE: 0 K/CU MM
BASOPHILS RELATIVE PERCENT: 0.3 % (ref 0–1)
BILIRUB SERPL-MCNC: 0.2 MG/DL (ref 0–1)
BILIRUBIN URINE: NEGATIVE MG/DL
BLOOD, URINE: ABNORMAL
BUN SERPL-MCNC: 20 MG/DL (ref 6–23)
CALCIUM SERPL-MCNC: 8.1 MG/DL (ref 8.3–10.6)
CHLORIDE BLD-SCNC: 112 MMOL/L (ref 99–110)
CLARITY: CLEAR
CO2: 21 MMOL/L (ref 21–32)
COLOR: YELLOW
CREAT SERPL-MCNC: 2.1 MG/DL (ref 0.6–1.1)
DIFFERENTIAL TYPE: ABNORMAL
EKG ATRIAL RATE: 68 BPM
EKG DIAGNOSIS: NORMAL
EKG P AXIS: 60 DEGREES
EKG P-R INTERVAL: 180 MS
EKG Q-T INTERVAL: 474 MS
EKG QRS DURATION: 74 MS
EKG QTC CALCULATION (BAZETT): 492 MS
EKG R AXIS: 7 DEGREES
EKG T AXIS: 94 DEGREES
EKG VENTRICULAR RATE: 65 BPM
EOSINOPHILS ABSOLUTE: 0.5 K/CU MM
EOSINOPHILS RELATIVE PERCENT: 5.1 % (ref 0–3)
GFR SERPL CREATININE-BSD FRML MDRD: 24 ML/MIN/1.73M2
GLUCOSE BLD-MCNC: 192 MG/DL
GLUCOSE BLD-MCNC: 192 MG/DL (ref 70–99)
GLUCOSE BLD-MCNC: 291 MG/DL (ref 70–99)
GLUCOSE SERPL-MCNC: 189 MG/DL (ref 70–99)
GLUCOSE, URINE: 100 MG/DL
HCT VFR BLD CALC: 30.2 % (ref 37–47)
HEMOGLOBIN: 9.2 GM/DL (ref 12.5–16)
IMMATURE NEUTROPHIL %: 1.4 % (ref 0–0.43)
KETONES, URINE: NEGATIVE MG/DL
LACTATE: 1.3 MMOL/L (ref 0.5–1.9)
LACTATE: 1.6 MMOL/L (ref 0.5–1.9)
LEUKOCYTE ESTERASE, URINE: NEGATIVE
LYMPHOCYTES ABSOLUTE: 2.5 K/CU MM
LYMPHOCYTES RELATIVE PERCENT: 26.1 % (ref 24–44)
MAGNESIUM: 1.7 MG/DL (ref 1.8–2.4)
MCH RBC QN AUTO: 28.8 PG (ref 27–31)
MCHC RBC AUTO-ENTMCNC: 30.5 % (ref 32–36)
MCV RBC AUTO: 94.4 FL (ref 78–100)
MONOCYTES ABSOLUTE: 0.6 K/CU MM
MONOCYTES RELATIVE PERCENT: 6.4 % (ref 0–4)
NITRITE URINE, QUANTITATIVE: NEGATIVE
NUCLEATED RBC %: 0 %
PDW BLD-RTO: 13.4 % (ref 11.7–14.9)
PH, URINE: 6.5 (ref 5–8)
PLATELET # BLD: 281 K/CU MM (ref 140–440)
PMV BLD AUTO: 10.3 FL (ref 7.5–11.1)
POTASSIUM SERPL-SCNC: 4.1 MMOL/L (ref 3.5–5.1)
PRO-BNP: 7321 PG/ML
PROTEIN UA: >300 MG/DL
RBC # BLD: 3.2 M/CU MM (ref 4.2–5.4)
RBC URINE: 2 /HPF (ref 0–6)
SARS-COV-2 RDRP RESP QL NAA+PROBE: NOT DETECTED
SEGMENTED NEUTROPHILS ABSOLUTE COUNT: 5.7 K/CU MM
SEGMENTED NEUTROPHILS RELATIVE PERCENT: 60.7 % (ref 36–66)
SODIUM BLD-SCNC: 144 MMOL/L (ref 135–145)
SOURCE: NORMAL
SPECIFIC GRAVITY UA: 1.02 (ref 1–1.03)
SQUAMOUS EPITHELIAL: <1 /HPF
T4 FREE SERPL-MCNC: 0.53 NG/DL (ref 0.9–1.8)
TOTAL IMMATURE NEUTOROPHIL: 0.13 K/CU MM
TOTAL NUCLEATED RBC: 0 K/CU MM
TOTAL PROTEIN: 5.4 GM/DL (ref 6.4–8.2)
TRICHOMONAS: ABNORMAL /HPF
TSH SERPL DL<=0.005 MIU/L-ACNC: 29.52 UIU/ML (ref 0.27–4.2)
UROBILINOGEN, URINE: 0.2 MG/DL (ref 0.2–1)
WBC # BLD: 9.4 K/CU MM (ref 4–10.5)
WBC UA: 10 /HPF (ref 0–5)

## 2023-12-27 PROCEDURE — G0378 HOSPITAL OBSERVATION PER HR: HCPCS

## 2023-12-27 PROCEDURE — 6360000002 HC RX W HCPCS: Performed by: INTERNAL MEDICINE

## 2023-12-27 PROCEDURE — 81001 URINALYSIS AUTO W/SCOPE: CPT

## 2023-12-27 PROCEDURE — 99285 EMERGENCY DEPT VISIT HI MDM: CPT

## 2023-12-27 PROCEDURE — 87635 SARS-COV-2 COVID-19 AMP PRB: CPT

## 2023-12-27 PROCEDURE — 84443 ASSAY THYROID STIM HORMONE: CPT

## 2023-12-27 PROCEDURE — 85025 COMPLETE CBC W/AUTO DIFF WBC: CPT

## 2023-12-27 PROCEDURE — 6370000000 HC RX 637 (ALT 250 FOR IP): Performed by: INTERNAL MEDICINE

## 2023-12-27 PROCEDURE — 83880 ASSAY OF NATRIURETIC PEPTIDE: CPT

## 2023-12-27 PROCEDURE — 87086 URINE CULTURE/COLONY COUNT: CPT

## 2023-12-27 PROCEDURE — 93010 ELECTROCARDIOGRAM REPORT: CPT | Performed by: INTERNAL MEDICINE

## 2023-12-27 PROCEDURE — 94761 N-INVAS EAR/PLS OXIMETRY MLT: CPT

## 2023-12-27 PROCEDURE — 80053 COMPREHEN METABOLIC PANEL: CPT

## 2023-12-27 PROCEDURE — 93005 ELECTROCARDIOGRAM TRACING: CPT

## 2023-12-27 PROCEDURE — 2700000000 HC OXYGEN THERAPY PER DAY

## 2023-12-27 PROCEDURE — 2580000003 HC RX 258: Performed by: INTERNAL MEDICINE

## 2023-12-27 PROCEDURE — 96374 THER/PROPH/DIAG INJ IV PUSH: CPT

## 2023-12-27 PROCEDURE — 83605 ASSAY OF LACTIC ACID: CPT

## 2023-12-27 PROCEDURE — 93005 ELECTROCARDIOGRAM TRACING: CPT | Performed by: EMERGENCY MEDICINE

## 2023-12-27 PROCEDURE — 71045 X-RAY EXAM CHEST 1 VIEW: CPT

## 2023-12-27 PROCEDURE — 6360000002 HC RX W HCPCS

## 2023-12-27 PROCEDURE — 82962 GLUCOSE BLOOD TEST: CPT

## 2023-12-27 PROCEDURE — 83735 ASSAY OF MAGNESIUM: CPT

## 2023-12-27 PROCEDURE — 84439 ASSAY OF FREE THYROXINE: CPT

## 2023-12-27 RX ORDER — ALBUTEROL SULFATE 90 UG/1
2 AEROSOL, METERED RESPIRATORY (INHALATION) EVERY 6 HOURS PRN
Status: DISCONTINUED | OUTPATIENT
Start: 2023-12-27 | End: 2024-01-03 | Stop reason: HOSPADM

## 2023-12-27 RX ORDER — PANTOPRAZOLE SODIUM 40 MG/1
40 TABLET, DELAYED RELEASE ORAL DAILY
Status: DISCONTINUED | OUTPATIENT
Start: 2023-12-27 | End: 2024-01-03 | Stop reason: HOSPADM

## 2023-12-27 RX ORDER — TORSEMIDE 20 MG/1
20 TABLET ORAL
COMMUNITY

## 2023-12-27 RX ORDER — INSULIN LISPRO 100 [IU]/ML
0-4 INJECTION, SOLUTION INTRAVENOUS; SUBCUTANEOUS NIGHTLY
Status: DISCONTINUED | OUTPATIENT
Start: 2023-12-27 | End: 2024-01-03 | Stop reason: HOSPADM

## 2023-12-27 RX ORDER — POLYVINYL ALCOHOL 14 MG/ML
1 SOLUTION/ DROPS OPHTHALMIC 4 TIMES DAILY PRN
Status: DISCONTINUED | OUTPATIENT
Start: 2023-12-27 | End: 2023-12-27 | Stop reason: CLARIF

## 2023-12-27 RX ORDER — INSULIN GLARGINE 100 [IU]/ML
12 INJECTION, SOLUTION SUBCUTANEOUS NIGHTLY
Status: DISCONTINUED | OUTPATIENT
Start: 2023-12-27 | End: 2024-01-02

## 2023-12-27 RX ORDER — HYDRALAZINE HYDROCHLORIDE 25 MG/1
25 TABLET, FILM COATED ORAL 2 TIMES DAILY
Status: DISCONTINUED | OUTPATIENT
Start: 2023-12-27 | End: 2024-01-03 | Stop reason: HOSPADM

## 2023-12-27 RX ORDER — GLUCAGON 1 MG/ML
1 KIT INJECTION PRN
Status: DISCONTINUED | OUTPATIENT
Start: 2023-12-27 | End: 2024-01-03 | Stop reason: HOSPADM

## 2023-12-27 RX ORDER — SODIUM CHLORIDE 9 MG/ML
INJECTION, SOLUTION INTRAVENOUS PRN
Status: DISCONTINUED | OUTPATIENT
Start: 2023-12-27 | End: 2024-01-03 | Stop reason: HOSPADM

## 2023-12-27 RX ORDER — INSULIN LISPRO 100 [IU]/ML
0-8 INJECTION, SOLUTION INTRAVENOUS; SUBCUTANEOUS
Status: DISCONTINUED | OUTPATIENT
Start: 2023-12-28 | End: 2024-01-03 | Stop reason: HOSPADM

## 2023-12-27 RX ORDER — SODIUM CHLORIDE 0.9 % (FLUSH) 0.9 %
5-40 SYRINGE (ML) INJECTION EVERY 12 HOURS SCHEDULED
Status: DISCONTINUED | OUTPATIENT
Start: 2023-12-27 | End: 2024-01-03 | Stop reason: HOSPADM

## 2023-12-27 RX ORDER — VITAMIN B COMPLEX
2000 TABLET ORAL DAILY
Status: DISCONTINUED | OUTPATIENT
Start: 2023-12-27 | End: 2024-01-03 | Stop reason: HOSPADM

## 2023-12-27 RX ORDER — POLYETHYLENE GLYCOL 3350 17 G/17G
17 POWDER, FOR SOLUTION ORAL DAILY PRN
Status: DISCONTINUED | OUTPATIENT
Start: 2023-12-27 | End: 2024-01-03 | Stop reason: HOSPADM

## 2023-12-27 RX ORDER — ONDANSETRON 4 MG/1
4 TABLET, ORALLY DISINTEGRATING ORAL EVERY 8 HOURS PRN
Status: DISCONTINUED | OUTPATIENT
Start: 2023-12-27 | End: 2024-01-03 | Stop reason: HOSPADM

## 2023-12-27 RX ORDER — AMLODIPINE BESYLATE 10 MG/1
10 TABLET ORAL DAILY
Status: DISCONTINUED | OUTPATIENT
Start: 2023-12-27 | End: 2024-01-03 | Stop reason: HOSPADM

## 2023-12-27 RX ORDER — MAGNESIUM SULFATE IN WATER 40 MG/ML
2000 INJECTION, SOLUTION INTRAVENOUS PRN
Status: DISCONTINUED | OUTPATIENT
Start: 2023-12-27 | End: 2024-01-03 | Stop reason: HOSPADM

## 2023-12-27 RX ORDER — MAGNESIUM SULFATE IN WATER 40 MG/ML
2000 INJECTION, SOLUTION INTRAVENOUS ONCE
Status: COMPLETED | OUTPATIENT
Start: 2023-12-27 | End: 2023-12-27

## 2023-12-27 RX ORDER — POTASSIUM CHLORIDE 20 MEQ/1
40 TABLET, EXTENDED RELEASE ORAL PRN
Status: DISCONTINUED | OUTPATIENT
Start: 2023-12-27 | End: 2024-01-03 | Stop reason: HOSPADM

## 2023-12-27 RX ORDER — NICOTINE POLACRILEX 4 MG/1
20 GUM, CHEWING ORAL DAILY
COMMUNITY

## 2023-12-27 RX ORDER — INSULIN GLARGINE 100 [IU]/ML
12 INJECTION, SOLUTION SUBCUTANEOUS NIGHTLY
COMMUNITY
Start: 2023-11-17 | End: 2023-12-27 | Stop reason: SDUPTHER

## 2023-12-27 RX ORDER — ACETAMINOPHEN 325 MG/1
650 TABLET ORAL EVERY 6 HOURS PRN
Status: DISCONTINUED | OUTPATIENT
Start: 2023-12-27 | End: 2024-01-03 | Stop reason: HOSPADM

## 2023-12-27 RX ORDER — IPRATROPIUM BROMIDE AND ALBUTEROL SULFATE 2.5; .5 MG/3ML; MG/3ML
1 SOLUTION RESPIRATORY (INHALATION) EVERY 6 HOURS PRN
COMMUNITY

## 2023-12-27 RX ORDER — POTASSIUM CHLORIDE 20 MEQ/1
20 TABLET, EXTENDED RELEASE ORAL
Status: DISCONTINUED | OUTPATIENT
Start: 2023-12-27 | End: 2024-01-03 | Stop reason: HOSPADM

## 2023-12-27 RX ORDER — ONDANSETRON 2 MG/ML
4 INJECTION INTRAMUSCULAR; INTRAVENOUS EVERY 6 HOURS PRN
Status: DISCONTINUED | OUTPATIENT
Start: 2023-12-27 | End: 2024-01-03 | Stop reason: HOSPADM

## 2023-12-27 RX ORDER — DEXTROSE MONOHYDRATE 100 MG/ML
INJECTION, SOLUTION INTRAVENOUS CONTINUOUS PRN
Status: DISCONTINUED | OUTPATIENT
Start: 2023-12-27 | End: 2024-01-03 | Stop reason: HOSPADM

## 2023-12-27 RX ORDER — ATENOLOL 25 MG/1
50 TABLET ORAL 2 TIMES DAILY
Status: DISCONTINUED | OUTPATIENT
Start: 2023-12-27 | End: 2024-01-03 | Stop reason: HOSPADM

## 2023-12-27 RX ORDER — ATORVASTATIN CALCIUM 40 MG/1
40 TABLET, FILM COATED ORAL NIGHTLY
Status: DISCONTINUED | OUTPATIENT
Start: 2023-12-27 | End: 2024-01-03 | Stop reason: HOSPADM

## 2023-12-27 RX ORDER — IPRATROPIUM BROMIDE AND ALBUTEROL SULFATE 2.5; .5 MG/3ML; MG/3ML
1 SOLUTION RESPIRATORY (INHALATION) EVERY 6 HOURS PRN
Status: DISCONTINUED | OUTPATIENT
Start: 2023-12-27 | End: 2024-01-03 | Stop reason: HOSPADM

## 2023-12-27 RX ORDER — AMOXICILLIN AND CLAVULANATE POTASSIUM 875; 125 MG/1; MG/1
1 TABLET, FILM COATED ORAL 2 TIMES DAILY
Status: ON HOLD | COMMUNITY
End: 2024-01-03 | Stop reason: HOSPADM

## 2023-12-27 RX ORDER — HEPARIN SODIUM 5000 [USP'U]/ML
5000 INJECTION, SOLUTION INTRAVENOUS; SUBCUTANEOUS EVERY 8 HOURS SCHEDULED
Status: DISCONTINUED | OUTPATIENT
Start: 2023-12-27 | End: 2024-01-03 | Stop reason: HOSPADM

## 2023-12-27 RX ORDER — BISACODYL 5 MG/1
5 TABLET, DELAYED RELEASE ORAL DAILY PRN
Status: DISCONTINUED | OUTPATIENT
Start: 2023-12-27 | End: 2024-01-03 | Stop reason: HOSPADM

## 2023-12-27 RX ORDER — DOCUSATE SODIUM 100 MG/1
100 CAPSULE, LIQUID FILLED ORAL DAILY
Status: DISCONTINUED | OUTPATIENT
Start: 2023-12-27 | End: 2024-01-03 | Stop reason: HOSPADM

## 2023-12-27 RX ORDER — TORSEMIDE 20 MG/1
20 TABLET ORAL
Status: DISCONTINUED | OUTPATIENT
Start: 2023-12-27 | End: 2024-01-03 | Stop reason: HOSPADM

## 2023-12-27 RX ORDER — POTASSIUM CHLORIDE 7.45 MG/ML
10 INJECTION INTRAVENOUS PRN
Status: DISCONTINUED | OUTPATIENT
Start: 2023-12-27 | End: 2024-01-03 | Stop reason: HOSPADM

## 2023-12-27 RX ORDER — ACETAMINOPHEN 650 MG/1
650 SUPPOSITORY RECTAL EVERY 6 HOURS PRN
Status: DISCONTINUED | OUTPATIENT
Start: 2023-12-27 | End: 2024-01-03 | Stop reason: HOSPADM

## 2023-12-27 RX ORDER — CARBOXYMETHYLCELLULOSE SODIUM 10 MG/ML
1 GEL OPHTHALMIC 4 TIMES DAILY PRN
Status: DISCONTINUED | OUTPATIENT
Start: 2023-12-27 | End: 2024-01-03 | Stop reason: HOSPADM

## 2023-12-27 RX ORDER — SENNOSIDES A AND B 8.6 MG/1
1 TABLET, FILM COATED ORAL DAILY
Status: DISCONTINUED | OUTPATIENT
Start: 2023-12-27 | End: 2024-01-03 | Stop reason: HOSPADM

## 2023-12-27 RX ORDER — POTASSIUM CHLORIDE 20 MEQ/1
20 TABLET, EXTENDED RELEASE ORAL
COMMUNITY

## 2023-12-27 RX ORDER — ASPIRIN 81 MG/1
81 TABLET, CHEWABLE ORAL DAILY
Status: DISCONTINUED | OUTPATIENT
Start: 2023-12-27 | End: 2024-01-03 | Stop reason: HOSPADM

## 2023-12-27 RX ORDER — SODIUM CHLORIDE 0.9 % (FLUSH) 0.9 %
5-40 SYRINGE (ML) INJECTION PRN
Status: DISCONTINUED | OUTPATIENT
Start: 2023-12-27 | End: 2024-01-03 | Stop reason: HOSPADM

## 2023-12-27 RX ORDER — LACTOBACILLUS RHAMNOSUS GG 10B CELL
1 CAPSULE ORAL DAILY
Status: DISCONTINUED | OUTPATIENT
Start: 2023-12-27 | End: 2024-01-03 | Stop reason: HOSPADM

## 2023-12-27 RX ORDER — ENOXAPARIN SODIUM 100 MG/ML
40 INJECTION SUBCUTANEOUS DAILY
Status: CANCELLED | OUTPATIENT
Start: 2023-12-27

## 2023-12-27 RX ORDER — INSULIN LISPRO 100 [IU]/ML
INJECTION, SOLUTION INTRAVENOUS; SUBCUTANEOUS 3 TIMES DAILY
COMMUNITY

## 2023-12-27 RX ADMIN — HEPARIN SODIUM 5000 UNITS: 5000 INJECTION INTRAVENOUS; SUBCUTANEOUS at 21:32

## 2023-12-27 RX ADMIN — EMPAGLIFLOZIN 10 MG: 10 TABLET, FILM COATED ORAL at 21:21

## 2023-12-27 RX ADMIN — ATORVASTATIN CALCIUM 40 MG: 40 TABLET, FILM COATED ORAL at 21:21

## 2023-12-27 RX ADMIN — DOCUSATE SODIUM 100 MG: 100 CAPSULE, LIQUID FILLED ORAL at 21:21

## 2023-12-27 RX ADMIN — ASPIRIN 81 MG: 81 TABLET, CHEWABLE ORAL at 21:21

## 2023-12-27 RX ADMIN — ATENOLOL 50 MG: 25 TABLET ORAL at 21:20

## 2023-12-27 RX ADMIN — Medication 1 CAPSULE: at 21:32

## 2023-12-27 RX ADMIN — INSULIN GLARGINE 12 UNITS: 100 INJECTION, SOLUTION SUBCUTANEOUS at 21:34

## 2023-12-27 RX ADMIN — PANTOPRAZOLE SODIUM 40 MG: 40 TABLET, DELAYED RELEASE ORAL at 21:21

## 2023-12-27 RX ADMIN — MAGNESIUM SULFATE HEPTAHYDRATE 2000 MG: 40 INJECTION, SOLUTION INTRAVENOUS at 13:35

## 2023-12-27 RX ADMIN — Medication 2000 UNITS: at 21:21

## 2023-12-27 RX ADMIN — SENNOSIDES 8.6 MG: 8.6 TABLET, FILM COATED ORAL at 21:21

## 2023-12-27 RX ADMIN — SODIUM CHLORIDE, PRESERVATIVE FREE 5 ML: 5 INJECTION INTRAVENOUS at 21:37

## 2023-12-27 RX ADMIN — AMLODIPINE BESYLATE 10 MG: 10 TABLET ORAL at 21:21

## 2023-12-27 RX ADMIN — HYDRALAZINE HYDROCHLORIDE 25 MG: 25 TABLET, FILM COATED ORAL at 21:31

## 2023-12-27 ASSESSMENT — PAIN SCALES - GENERAL: PAINLEVEL_OUTOF10: 0

## 2023-12-27 NOTE — CARE COORDINATION
Patient possible readmission. Patient recently admitted to hospital 12/4/23 - 12/12/23 for Acute respiratory failure with hypoxia.     Patient presents to ED today with C/O of Cough and Altered Mental Status.     CM received consult on patient. Sophie Galvez CNP reported that she spoke to patients spouse. Spouse -- Donaldo-- stated that he  had a room for patient at [facility]? And did not want patient to return to [facility]?     CM to call spouse and explain process.      CM in to discuss with Sophie Galvez CNP,  CM called Spouse Donaldo @ [phone] Work.    Donaldo upset and stated that he does not want his spouse to return to [facility] b/c our hospital recommended [facility] to them and this SNF has not been good and the doctor has not seen the patient hardly at all. CM explained to spouse that CM is sorry that they are unhappy but CM is sure that no one from the hospital recommended SNF b/c hospital is not allowed to recommend a SNF to anyone that is why we give a list to patient and family and request that they pick out three facilities and discuss what facilities family would like.     Spouse seemed to calm down a bit and stated that he just wanted his spouse to return to [facility] in [place] and not to return to [facility]. CM explained that patient will need to return to [facility] and then have [facility] transfer patient to [facility]. Spouse requested that patient not be discharged to [facility].    Spouse requested that he would like this CM to call his daughter, Farzana that is currently in [place] @ [phone]. CM placed call to daughter and left  requesting daughter to return call on this Cms phone.     Spoke to Sophie Galvez CNP -- Patient to be admitted due to elevated thyroid level. CM called patients' spouse back and shared with him. Spouse very relieved that patient was being admitted and requested that CM call spouse back with patients' room number once it is on

## 2023-12-27 NOTE — H&P
History and Physical Exam    Patient:  Africa Vega 78 y.o. female MRN: [id number]     Date of Service: 12/27/2023    Hospital Day: 1      Chief complaint: had concerns including Cough and Altered Mental Status (Concern for possible UTI).      Assessment and Plan   Africa Vega, a 78 y.o. female, with a history of Alzheimer's disease, anemia, disease, hypertension, hyperlipidemia, CKD 3/4, IDDM, hypothyroidism, GERD, COPD, chronic cough, class I obesity was admitted on 12/27/2023. They had concerns including Cough and Altered Mental Status (Concern for possible UTI).    Assessment  Acute vs Acute on Chronic Encephalopathy 2/2 Metabolic 2/2 Hypothyroidism   Hypothyroidism   TSH 0.57 on 11/30/2023 and now 29.52  Unsure if she has been getting her synthroid   Supposed to be on synthroid 175mcg daily     Acute hypoxic respiratory failure  Noted to be 81% on 2 L/min O2 in the ER based on chart review    ?Medication non-adherence   Unsure if pt. Has been getting med s    CKD III/IV  On torsemide 20mg daily and KCL supplements   Cr stable     IDDM   On insulin and jardiance 10mg daily     Hypomagnesemia  Mg 1.7    HTN, HLD   Anemia of Chronic Disease   Alzheimer's Dementia   GERD  COPD with Chronic Cough   Class I Obesity       Plan  Place under obs status with tele   F/U FT4 daily   F/U UA with reflex to culture   Resume home meds, see orders   Lantus 12U HS with MDSS. Hypoglycemia protocol  Bedside swallow followed by a diet.       # Peptic ulcer prophylaxis: Pantoprazole   # DVT Prophylaxis: Heparin   #CODE STATUS: Full       Current living situation: SNF   Expected Disposition: SNF   Estimated discharge date: 1-2 days.     Personally reviewed Lab Studies and Imaging     Discussed management of the case with ER provider who recommended admission due to significant hypothyroidism.    EKG interpreted personally and results sinus rhythm with poor R wave progression    Imaging that was interpreted personally

## 2023-12-27 NOTE — ED PROVIDER NOTES
[facility] EMERGENCY DEPARTMENT  EMERGENCY DEPARTMENT ENCOUNTER        Pt Name: Africa Vega  MRN: [id number]  Birthdate 1945  Date of evaluation: 12/27/2023  Provider: [facility] Peralta - KAMILA  PCP: Myra Ordaz PA  Note Started: 1:00 PM EST 12/27/23      I have evaluated this patient with my attending provider Dr. Sheets, DONALDO      CHIEF COMPLAINT       Chief Complaint   Patient presents with    Cough    Altered Mental Status     Concern for possible UTI       HISTORY OF PRESENT ILLNESS: 1 or more Elements     History From: Patient and spouse    Limitations to history : Altered Mental Status    Social Determinants Significantly Affecting Health : None    Chief Complaint: Altered mental status cough recent UTI    Africa Vega is a 78 y.o. female with a recent hospitalization 12/4/2023, LAURA on CKD stage IIIb, acute respiratory failure with hypoxia, acute bronchitis, rhinovirus infection elevated troponin, elevated BNP to 6177, UTI E. coli, anemia, diabetes mellitus type 2, hypothyroidism TSH was 0.570, Alzheimer's dementia, GERD, hyperlipidemia, obesity who presents to ED with altered mental status, continued cough.  Patient on able to provide any medical history.  Patient is able to speak but states nothing is wrong with her and she does not know why she is here.    Spoke with  Donaldo, he stated that he feels as if she has a continued cough, her altered mental status has worsened.  He does not feel as if she is being cared for appropriately.  He stated that her nephrologist told him if he was concerned about a UTI that she needs to be seen in the emergency department.  He denies any recent fevers nausea vomiting diarrhea constipation.      Nursing Notes were all reviewed and agreed with or any disagreements were addressed in the HPI.    REVIEW OF SYSTEMS :      Review of Systems    Positives and Pertinent negatives as per HPI.     SURGICAL

## 2023-12-28 LAB
ALBUMIN SERPL-MCNC: 2.5 GM/DL (ref 3.4–5)
ALP BLD-CCNC: 101 IU/L (ref 40–129)
ALT SERPL-CCNC: 7 U/L (ref 10–40)
AMMONIA: 21 UMOL/L (ref 11–51)
ANION GAP SERPL CALCULATED.3IONS-SCNC: 10 MMOL/L (ref 7–16)
ANION GAP SERPL CALCULATED.3IONS-SCNC: 12 MMOL/L (ref 7–16)
AST SERPL-CCNC: 14 IU/L (ref 15–37)
BASOPHILS ABSOLUTE: 0 K/CU MM
BASOPHILS ABSOLUTE: 0 K/CU MM
BASOPHILS RELATIVE PERCENT: 0.4 % (ref 0–1)
BASOPHILS RELATIVE PERCENT: 0.4 % (ref 0–1)
BILIRUB SERPL-MCNC: 0.3 MG/DL (ref 0–1)
BUN SERPL-MCNC: 19 MG/DL (ref 6–23)
BUN SERPL-MCNC: 22 MG/DL (ref 6–23)
CALCIUM SERPL-MCNC: 7.7 MG/DL (ref 8.3–10.6)
CALCIUM SERPL-MCNC: 8.1 MG/DL (ref 8.3–10.6)
CHLORIDE BLD-SCNC: 111 MMOL/L (ref 99–110)
CHLORIDE BLD-SCNC: 112 MMOL/L (ref 99–110)
CO2: 18 MMOL/L (ref 21–32)
CO2: 19 MMOL/L (ref 21–32)
CREAT SERPL-MCNC: 2 MG/DL (ref 0.6–1.1)
CREAT SERPL-MCNC: 2.2 MG/DL (ref 0.6–1.1)
CULTURE: NORMAL
DIFFERENTIAL TYPE: ABNORMAL
DIFFERENTIAL TYPE: ABNORMAL
EKG ATRIAL RATE: 66 BPM
EKG DIAGNOSIS: NORMAL
EKG P AXIS: 36 DEGREES
EKG P-R INTERVAL: 170 MS
EKG Q-T INTERVAL: 466 MS
EKG QRS DURATION: 80 MS
EKG QTC CALCULATION (BAZETT): 484 MS
EKG R AXIS: 4 DEGREES
EKG T AXIS: 80 DEGREES
EKG VENTRICULAR RATE: 65 BPM
EOSINOPHILS ABSOLUTE: 0.2 K/CU MM
EOSINOPHILS ABSOLUTE: 0.6 K/CU MM
EOSINOPHILS RELATIVE PERCENT: 1.3 % (ref 0–3)
EOSINOPHILS RELATIVE PERCENT: 5.7 % (ref 0–3)
GFR SERPL CREATININE-BSD FRML MDRD: 23 ML/MIN/1.73M2
GFR SERPL CREATININE-BSD FRML MDRD: 25 ML/MIN/1.73M2
GLUCOSE BLD-MCNC: 192 MG/DL (ref 70–99)
GLUCOSE BLD-MCNC: 199 MG/DL (ref 70–99)
GLUCOSE BLD-MCNC: 217 MG/DL (ref 70–99)
GLUCOSE BLD-MCNC: 222 MG/DL (ref 70–99)
GLUCOSE SERPL-MCNC: 186 MG/DL (ref 70–99)
GLUCOSE SERPL-MCNC: 209 MG/DL (ref 70–99)
HCT VFR BLD CALC: 26.2 % (ref 37–47)
HCT VFR BLD CALC: 26.6 % (ref 37–47)
HEMOGLOBIN: 8.2 GM/DL (ref 12.5–16)
HEMOGLOBIN: 8.3 GM/DL (ref 12.5–16)
IMMATURE NEUTROPHIL %: 1.9 % (ref 0–0.43)
IMMATURE NEUTROPHIL %: 2 % (ref 0–0.43)
LYMPHOCYTES ABSOLUTE: 1.2 K/CU MM
LYMPHOCYTES ABSOLUTE: 2.8 K/CU MM
LYMPHOCYTES RELATIVE PERCENT: 10.1 % (ref 24–44)
LYMPHOCYTES RELATIVE PERCENT: 26.4 % (ref 24–44)
Lab: NORMAL
MAGNESIUM: 2.3 MG/DL (ref 1.8–2.4)
MCH RBC QN AUTO: 29.5 PG (ref 27–31)
MCH RBC QN AUTO: 29.5 PG (ref 27–31)
MCHC RBC AUTO-ENTMCNC: 31.2 % (ref 32–36)
MCHC RBC AUTO-ENTMCNC: 31.3 % (ref 32–36)
MCV RBC AUTO: 94.2 FL (ref 78–100)
MCV RBC AUTO: 94.7 FL (ref 78–100)
MONOCYTES ABSOLUTE: 0.3 K/CU MM
MONOCYTES ABSOLUTE: 0.6 K/CU MM
MONOCYTES RELATIVE PERCENT: 2.9 % (ref 0–4)
MONOCYTES RELATIVE PERCENT: 5.9 % (ref 0–4)
NUCLEATED RBC %: 0 %
NUCLEATED RBC %: 0 %
PDW BLD-RTO: 13.4 % (ref 11.7–14.9)
PDW BLD-RTO: 13.6 % (ref 11.7–14.9)
PHOSPHORUS: 3.1 MG/DL (ref 2.5–4.9)
PLATELET # BLD: 337 K/CU MM (ref 140–440)
PLATELET # BLD: 356 K/CU MM (ref 140–440)
PMV BLD AUTO: 10.2 FL (ref 7.5–11.1)
PMV BLD AUTO: 9.9 FL (ref 7.5–11.1)
POTASSIUM SERPL-SCNC: 3.8 MMOL/L (ref 3.5–5.1)
POTASSIUM SERPL-SCNC: 4.1 MMOL/L (ref 3.5–5.1)
RBC # BLD: 2.78 M/CU MM (ref 4.2–5.4)
RBC # BLD: 2.81 M/CU MM (ref 4.2–5.4)
SEGMENTED NEUTROPHILS ABSOLUTE COUNT: 6.3 K/CU MM
SEGMENTED NEUTROPHILS ABSOLUTE COUNT: 9.4 K/CU MM
SEGMENTED NEUTROPHILS RELATIVE PERCENT: 59.7 % (ref 36–66)
SEGMENTED NEUTROPHILS RELATIVE PERCENT: 83.3 % (ref 36–66)
SODIUM BLD-SCNC: 141 MMOL/L (ref 135–145)
SODIUM BLD-SCNC: 141 MMOL/L (ref 135–145)
SPECIMEN: NORMAL
T4 FREE SERPL-MCNC: 0.49 NG/DL (ref 0.9–1.8)
TOTAL IMMATURE NEUTOROPHIL: 0.2 K/CU MM
TOTAL IMMATURE NEUTOROPHIL: 0.23 K/CU MM
TOTAL NUCLEATED RBC: 0 K/CU MM
TOTAL NUCLEATED RBC: 0 K/CU MM
TOTAL PROTEIN: 5.1 GM/DL (ref 6.4–8.2)
WBC # BLD: 10.6 K/CU MM (ref 4–10.5)
WBC # BLD: 11.3 K/CU MM (ref 4–10.5)

## 2023-12-28 PROCEDURE — 84100 ASSAY OF PHOSPHORUS: CPT

## 2023-12-28 PROCEDURE — 80048 BASIC METABOLIC PNL TOTAL CA: CPT

## 2023-12-28 PROCEDURE — G0378 HOSPITAL OBSERVATION PER HR: HCPCS

## 2023-12-28 PROCEDURE — 84439 ASSAY OF FREE THYROXINE: CPT

## 2023-12-28 PROCEDURE — 93010 ELECTROCARDIOGRAM REPORT: CPT | Performed by: INTERNAL MEDICINE

## 2023-12-28 PROCEDURE — 80053 COMPREHEN METABOLIC PANEL: CPT

## 2023-12-28 PROCEDURE — 2580000003 HC RX 258: Performed by: INTERNAL MEDICINE

## 2023-12-28 PROCEDURE — 83735 ASSAY OF MAGNESIUM: CPT

## 2023-12-28 PROCEDURE — 2500000003 HC RX 250 WO HCPCS: Performed by: FAMILY MEDICINE

## 2023-12-28 PROCEDURE — 85025 COMPLETE CBC W/AUTO DIFF WBC: CPT

## 2023-12-28 PROCEDURE — 82140 ASSAY OF AMMONIA: CPT

## 2023-12-28 PROCEDURE — 94761 N-INVAS EAR/PLS OXIMETRY MLT: CPT

## 2023-12-28 PROCEDURE — 82805 BLOOD GASES W/O2 SATURATION: CPT

## 2023-12-28 PROCEDURE — 6370000000 HC RX 637 (ALT 250 FOR IP): Performed by: INTERNAL MEDICINE

## 2023-12-28 PROCEDURE — 87150 DNA/RNA AMPLIFIED PROBE: CPT

## 2023-12-28 PROCEDURE — 87077 CULTURE AEROBIC IDENTIFY: CPT

## 2023-12-28 PROCEDURE — 82962 GLUCOSE BLOOD TEST: CPT

## 2023-12-28 PROCEDURE — 2700000000 HC OXYGEN THERAPY PER DAY

## 2023-12-28 PROCEDURE — 36415 COLL VENOUS BLD VENIPUNCTURE: CPT

## 2023-12-28 PROCEDURE — 2500000003 HC RX 250 WO HCPCS: Performed by: INTERNAL MEDICINE

## 2023-12-28 PROCEDURE — 6360000002 HC RX W HCPCS: Performed by: INTERNAL MEDICINE

## 2023-12-28 PROCEDURE — 87040 BLOOD CULTURE FOR BACTERIA: CPT

## 2023-12-28 RX ORDER — SODIUM CHLORIDE 9 MG/ML
5 INJECTION INTRAVENOUS DAILY
Status: DISCONTINUED | OUTPATIENT
Start: 2023-12-28 | End: 2023-12-31

## 2023-12-28 RX ORDER — LEVOTHYROXINE SODIUM ANHYDROUS 100 UG/5ML
120 INJECTION, POWDER, LYOPHILIZED, FOR SOLUTION INTRAVENOUS DAILY
Status: DISCONTINUED | OUTPATIENT
Start: 2023-12-28 | End: 2023-12-31

## 2023-12-28 RX ADMIN — LEVOTHYROXINE SODIUM ANHYDROUS 120 MCG: 100 INJECTION, POWDER, LYOPHILIZED, FOR SOLUTION INTRAVENOUS at 17:09

## 2023-12-28 RX ADMIN — SODIUM CHLORIDE, PRESERVATIVE FREE 5 ML: 5 INJECTION INTRAVENOUS at 17:10

## 2023-12-28 RX ADMIN — HEPARIN SODIUM 5000 UNITS: 5000 INJECTION INTRAVENOUS; SUBCUTANEOUS at 17:08

## 2023-12-28 RX ADMIN — INSULIN LISPRO 2 UNITS: 100 INJECTION, SOLUTION INTRAVENOUS; SUBCUTANEOUS at 18:29

## 2023-12-28 RX ADMIN — MICONAZOLE NITRATE: 2 POWDER TOPICAL at 23:31

## 2023-12-28 RX ADMIN — SODIUM CHLORIDE, PRESERVATIVE FREE 5 ML: 5 INJECTION INTRAVENOUS at 23:32

## 2023-12-28 RX ADMIN — INSULIN GLARGINE 12 UNITS: 100 INJECTION, SOLUTION SUBCUTANEOUS at 22:58

## 2023-12-28 RX ADMIN — HEPARIN SODIUM 5000 UNITS: 5000 INJECTION INTRAVENOUS; SUBCUTANEOUS at 06:04

## 2023-12-28 RX ADMIN — SODIUM CHLORIDE, PRESERVATIVE FREE 10 ML: 5 INJECTION INTRAVENOUS at 10:26

## 2023-12-28 RX ADMIN — HEPARIN SODIUM 5000 UNITS: 5000 INJECTION INTRAVENOUS; SUBCUTANEOUS at 22:59

## 2023-12-28 RX ADMIN — MICONAZOLE NITRATE: 2 POWDER TOPICAL at 17:06

## 2023-12-28 RX ADMIN — CEFTRIAXONE 1000 MG: 1 INJECTION, POWDER, FOR SOLUTION INTRAMUSCULAR; INTRAVENOUS at 10:20

## 2023-12-28 NOTE — CARE COORDINATION
12/28/23 1729   Service Assessment   Cognition Severely Impaired   History Provided By Medical Record;Spouse   Primary Caregiver Spouse  ([facility])   Support Systems Spouse/Significant Other   PCP Verified by CM Yes   Last Visit to PCP Within last 6 months   Prior Functional Level Assistance with the following:   Current Functional Level Assistance with the following:   Can patient return to prior living arrangement Unknown at present   Ability to make needs known: Poor   Family able to assist with home care needs: No   Financial Resources Medicare   Community Resources ECF/Home Care   Social/Functional History   Lives With Spouse   Type of Home House   Active  No   Condition of Participation: Discharge Planning   The Patient and/or Patient Representative was provided with a Choice of Provider? Patient   The Patient and/Or Patient Representative agree with the Discharge Plan? Yes   Freedom of Choice list was provided with basic dialogue that supports the patient's individualized plan of care/goals, treatment preferences, and shares the quality data associated with the providers?  Yes     Reviewed chart, discussed in IDR and spoke with pt's , he does not want pt to return to [facility].  He has been in contact with AL facility in [place] but not sure that pt is AL appropriate or if pt still needs SNU.  Will give him SNU list to review .         1750  [facility]oke [facility]  again[phone]  Pt was WC bound and only able to do transfers at AL, and has slight confusion.   Explained awaiting therapy evals and cult results then will talk again. He requested that I call their daughter Farzana to discussed discharge plan. CM will call her tomorrow.

## 2023-12-28 NOTE — ED NOTES
Pt has potassium tablets ordereed. Her potassium is 4.1. Karen pat served to see if she wanted pt to receive them still.   
Pts potassium tablet was held per flakita Anne.   
Vitamins-Minerals (THERAPEUTIC MULTIVITAMIN-MINERALS) tablet Take 1 tablet by mouth daily    Val Costa MD   Glucagon HCl, rDNA, (GLUCAGEN HYPOKIT IJ) Inject 1 Syringe as directed every 12 hours as needed Hypoglycemia    ProviderVal MD   polyethylene glycol (GLYCOLAX) 17 g packet Take 1 packet by mouth daily as needed for Constipation    Val Costa MD   guaiFENesin (MUCINEX) 600 MG extended release tablet Take 1 tablet by mouth in the morning and at bedtime 5/23/23   Jacqueline Russ APRN - NP   ferrous sulfate (IRON 325) 325 (65 Fe) MG tablet Take 1 tablet by mouth 2 times daily (with meals) 5/23/23   Jacqueline Russ APRN - NP   levothyroxine (SYNTHROID) 175 MCG tablet Take 1 tablet by mouth Daily 5/24/23   Jacqueline Russ APRN - NP   Cholecalciferol (VITAMIN D3) 50 MCG (2000 UT) CAPS Take 1 capsule by mouth daily    Val Costa MD   ondansetron (ZOFRAN-ODT) 4 MG disintegrating tablet Take 1 tablet by mouth every 8 hours as needed for Nausea or Vomiting    Val Costa MD   bisacodyl (DULCOLAX) 5 MG EC tablet Take 1 tablet by mouth daily as needed for Constipation    Val Costa MD   docusate sodium (COLACE) 100 MG capsule Take 1 capsule by mouth daily    Val Costa MD   senna (SENOKOT) 8.6 MG tablet Take 1 tablet by mouth daily    Val Costa MD   polyvinyl alcohol (LIQUIFILM TEARS) 1.4 % ophthalmic solution Place 1 drop into both eyes 4 times daily as needed    Val Costa MD   albuterol sulfate  (90 Base) MCG/ACT inhaler Inhale 2 puffs into the lungs every 6 hours as needed (for cough or wheezing) 5/22/22   Nate Zarco PA-C   hydrALAZINE (APRESOLINE) 25 MG tablet Take 1 tablet by mouth 2 times daily 10/8/21   Shady German MD   methocarbamol (ROBAXIN) 500 MG tablet Take 1 tablet by mouth 3 times daily Muscle spasms    Val Costa MD   amLODIPine (NORVASC) 10 MG tablet Take 1 tablet by mouth daily

## 2023-12-29 LAB
ANION GAP SERPL CALCULATED.3IONS-SCNC: 11 MMOL/L (ref 7–16)
BASOPHILS ABSOLUTE: 0 K/CU MM
BASOPHILS RELATIVE PERCENT: 0.3 % (ref 0–1)
BUN SERPL-MCNC: 23 MG/DL (ref 6–23)
CALCIUM SERPL-MCNC: 7.5 MG/DL (ref 8.3–10.6)
CHLORIDE BLD-SCNC: 115 MMOL/L (ref 99–110)
CO2: 19 MMOL/L (ref 21–32)
CREAT SERPL-MCNC: 2.1 MG/DL (ref 0.6–1.1)
DIFFERENTIAL TYPE: ABNORMAL
EOSINOPHILS ABSOLUTE: 0.1 K/CU MM
EOSINOPHILS RELATIVE PERCENT: 1.5 % (ref 0–3)
GFR SERPL CREATININE-BSD FRML MDRD: 24 ML/MIN/1.73M2
GLUCOSE BLD-MCNC: 180 MG/DL (ref 70–99)
GLUCOSE BLD-MCNC: 191 MG/DL (ref 70–99)
GLUCOSE BLD-MCNC: 218 MG/DL (ref 70–99)
GLUCOSE BLD-MCNC: 246 MG/DL (ref 70–99)
GLUCOSE SERPL-MCNC: 233 MG/DL (ref 70–99)
HCT VFR BLD CALC: 25.2 % (ref 37–47)
HEMOGLOBIN: 7.9 GM/DL (ref 12.5–16)
IMMATURE NEUTROPHIL %: 2.4 % (ref 0–0.43)
LYMPHOCYTES ABSOLUTE: 1.2 K/CU MM
LYMPHOCYTES RELATIVE PERCENT: 13.4 % (ref 24–44)
MAGNESIUM: 2.2 MG/DL (ref 1.8–2.4)
MCH RBC QN AUTO: 29.3 PG (ref 27–31)
MCHC RBC AUTO-ENTMCNC: 31.3 % (ref 32–36)
MCV RBC AUTO: 93.3 FL (ref 78–100)
MONOCYTES ABSOLUTE: 0.4 K/CU MM
MONOCYTES RELATIVE PERCENT: 4 % (ref 0–4)
NUCLEATED RBC %: 0 %
PDW BLD-RTO: 13.6 % (ref 11.7–14.9)
PHOSPHORUS: 3.5 MG/DL (ref 2.5–4.9)
PLATELET # BLD: 311 K/CU MM (ref 140–440)
PMV BLD AUTO: 10.1 FL (ref 7.5–11.1)
POTASSIUM SERPL-SCNC: 3.6 MMOL/L (ref 3.5–5.1)
RBC # BLD: 2.7 M/CU MM (ref 4.2–5.4)
SEGMENTED NEUTROPHILS ABSOLUTE COUNT: 6.9 K/CU MM
SEGMENTED NEUTROPHILS RELATIVE PERCENT: 78.4 % (ref 36–66)
SODIUM BLD-SCNC: 145 MMOL/L (ref 135–145)
T4 FREE SERPL-MCNC: 0.8 NG/DL (ref 0.9–1.8)
TOTAL IMMATURE NEUTOROPHIL: 0.21 K/CU MM
TOTAL NUCLEATED RBC: 0 K/CU MM
WBC # BLD: 8.8 K/CU MM (ref 4–10.5)

## 2023-12-29 PROCEDURE — 80048 BASIC METABOLIC PNL TOTAL CA: CPT

## 2023-12-29 PROCEDURE — 82962 GLUCOSE BLOOD TEST: CPT

## 2023-12-29 PROCEDURE — 94761 N-INVAS EAR/PLS OXIMETRY MLT: CPT

## 2023-12-29 PROCEDURE — 36415 COLL VENOUS BLD VENIPUNCTURE: CPT

## 2023-12-29 PROCEDURE — 2580000003 HC RX 258: Performed by: INTERNAL MEDICINE

## 2023-12-29 PROCEDURE — 6370000000 HC RX 637 (ALT 250 FOR IP): Performed by: INTERNAL MEDICINE

## 2023-12-29 PROCEDURE — 1200000000 HC SEMI PRIVATE

## 2023-12-29 PROCEDURE — 97112 NEUROMUSCULAR REEDUCATION: CPT

## 2023-12-29 PROCEDURE — 97162 PT EVAL MOD COMPLEX 30 MIN: CPT

## 2023-12-29 PROCEDURE — 6360000002 HC RX W HCPCS: Performed by: INTERNAL MEDICINE

## 2023-12-29 PROCEDURE — 2700000000 HC OXYGEN THERAPY PER DAY

## 2023-12-29 PROCEDURE — G0378 HOSPITAL OBSERVATION PER HR: HCPCS

## 2023-12-29 PROCEDURE — 84100 ASSAY OF PHOSPHORUS: CPT

## 2023-12-29 PROCEDURE — 83735 ASSAY OF MAGNESIUM: CPT

## 2023-12-29 PROCEDURE — 2500000003 HC RX 250 WO HCPCS: Performed by: INTERNAL MEDICINE

## 2023-12-29 PROCEDURE — 85025 COMPLETE CBC W/AUTO DIFF WBC: CPT

## 2023-12-29 PROCEDURE — 92610 EVALUATE SWALLOWING FUNCTION: CPT

## 2023-12-29 PROCEDURE — 97530 THERAPEUTIC ACTIVITIES: CPT

## 2023-12-29 PROCEDURE — 84439 ASSAY OF FREE THYROXINE: CPT

## 2023-12-29 PROCEDURE — 97166 OT EVAL MOD COMPLEX 45 MIN: CPT

## 2023-12-29 RX ORDER — SODIUM CHLORIDE, SODIUM LACTATE, POTASSIUM CHLORIDE, CALCIUM CHLORIDE 600; 310; 30; 20 MG/100ML; MG/100ML; MG/100ML; MG/100ML
INJECTION, SOLUTION INTRAVENOUS CONTINUOUS
Status: DISCONTINUED | OUTPATIENT
Start: 2023-12-29 | End: 2023-12-30

## 2023-12-29 RX ADMIN — INSULIN GLARGINE 12 UNITS: 100 INJECTION, SOLUTION SUBCUTANEOUS at 22:05

## 2023-12-29 RX ADMIN — INSULIN LISPRO 2 UNITS: 100 INJECTION, SOLUTION INTRAVENOUS; SUBCUTANEOUS at 12:46

## 2023-12-29 RX ADMIN — SODIUM CHLORIDE, PRESERVATIVE FREE 5 ML: 5 INJECTION INTRAVENOUS at 09:34

## 2023-12-29 RX ADMIN — LEVOTHYROXINE SODIUM ANHYDROUS 120 MCG: 100 INJECTION, POWDER, LYOPHILIZED, FOR SOLUTION INTRAVENOUS at 09:35

## 2023-12-29 RX ADMIN — POTASSIUM CHLORIDE 20 MEQ: 1500 TABLET, EXTENDED RELEASE ORAL at 12:46

## 2023-12-29 RX ADMIN — MICONAZOLE NITRATE: 2 POWDER TOPICAL at 22:08

## 2023-12-29 RX ADMIN — DOCUSATE SODIUM 100 MG: 100 CAPSULE, LIQUID FILLED ORAL at 09:33

## 2023-12-29 RX ADMIN — HYDRALAZINE HYDROCHLORIDE 25 MG: 25 TABLET, FILM COATED ORAL at 09:34

## 2023-12-29 RX ADMIN — HEPARIN SODIUM 5000 UNITS: 5000 INJECTION INTRAVENOUS; SUBCUTANEOUS at 22:05

## 2023-12-29 RX ADMIN — SODIUM CHLORIDE, PRESERVATIVE FREE 10 ML: 5 INJECTION INTRAVENOUS at 09:38

## 2023-12-29 RX ADMIN — SENNOSIDES 8.6 MG: 8.6 TABLET, FILM COATED ORAL at 09:33

## 2023-12-29 RX ADMIN — MICONAZOLE NITRATE: 2 POWDER TOPICAL at 10:04

## 2023-12-29 RX ADMIN — HEPARIN SODIUM 5000 UNITS: 5000 INJECTION INTRAVENOUS; SUBCUTANEOUS at 06:36

## 2023-12-29 RX ADMIN — PANTOPRAZOLE SODIUM 40 MG: 40 TABLET, DELAYED RELEASE ORAL at 09:33

## 2023-12-29 RX ADMIN — ATENOLOL 50 MG: 25 TABLET ORAL at 09:34

## 2023-12-29 RX ADMIN — Medication 2000 UNITS: at 09:33

## 2023-12-29 RX ADMIN — ATORVASTATIN CALCIUM 40 MG: 40 TABLET, FILM COATED ORAL at 22:06

## 2023-12-29 RX ADMIN — Medication 1 CAPSULE: at 22:05

## 2023-12-29 RX ADMIN — SODIUM CHLORIDE, PRESERVATIVE FREE 10 ML: 5 INJECTION INTRAVENOUS at 22:06

## 2023-12-29 RX ADMIN — AMLODIPINE BESYLATE 10 MG: 10 TABLET ORAL at 09:34

## 2023-12-29 RX ADMIN — ASPIRIN 81 MG: 81 TABLET, CHEWABLE ORAL at 09:34

## 2023-12-29 RX ADMIN — TORSEMIDE 20 MG: 20 TABLET ORAL at 12:46

## 2023-12-29 RX ADMIN — ATENOLOL 50 MG: 25 TABLET ORAL at 22:06

## 2023-12-29 RX ADMIN — HYDRALAZINE HYDROCHLORIDE 25 MG: 25 TABLET, FILM COATED ORAL at 22:05

## 2023-12-29 RX ADMIN — SODIUM CHLORIDE, POTASSIUM CHLORIDE, SODIUM LACTATE AND CALCIUM CHLORIDE: 600; 310; 30; 20 INJECTION, SOLUTION INTRAVENOUS at 09:51

## 2023-12-29 RX ADMIN — INSULIN LISPRO 2 UNITS: 100 INJECTION, SOLUTION INTRAVENOUS; SUBCUTANEOUS at 18:33

## 2023-12-29 RX ADMIN — EMPAGLIFLOZIN 10 MG: 10 TABLET, FILM COATED ORAL at 09:33

## 2023-12-29 NOTE — CONSULTS
[facility] ACUTE CARE PHYSICAL THERAPY EVALUATION  Africa Vega, 1945, 4117/4117-A, 12/29/2023    History  Pala:  The primary encounter diagnosis was Altered mental status, unspecified altered mental status type. Diagnoses of Hypothyroidism, unspecified type and Hypomagnesemia were also pertinent to this visit.  Patient  has a past medical history of Acquired hypothyroidism, Aortic atherosclerosis (HCC), Cellulitis of right foot, CHF (congestive heart failure) (HCC), Chronic kidney disease (CKD), stage III (moderate) (HCC), Chronic pain syndrome, Cognitive communication deficit, Dementia (HCC), Essential hypertension, Frequent falls, Gastroesophageal reflux disease with esophagitis, Hallux valgus (acquired), right foot, Hyperlipemia, Shingles, Solitary kidney, congenital, Type 2 diabetes mellitus with complication, with long-term current use of insulin (HCC), Ulcer of right foot with fat layer exposed (HCC), and Ulcer of right foot with fat layer exposed (HCC).  Patient  has a past surgical history that includes Hysterectomy; Foot surgery (12/30/11); cyst removal; Kidney removal (1973); Abdomen surgery; hernia repair; Endoscopy, colon, diagnostic; and Dilatation, esophagus.    Discharge Recommendation: SNF    Subjective:    Patient states:  \"This is [place] ([place]\"      Pain:  denies pain.      Communication with other providers:  Handoff to RN, OT    Restrictions: general precautions, fall risk    Home Setup/Prior level of function  Pt is a poor historian this date. Per charting, pt is from AL where she stand pivots to/from WC    Examination of body systems (includes body structures/functions, activity/participation limitations):  Observation:  Pt supine in bed upon arrival and agreeable to therapy  Vision:  wears glasses  Hearing:  WFL  Cardiopulmonary:  no O2 needs  Cognition: oriented to self only, see OT/SLP note for further evaluation.    Musculoskeletal  ROM R/L:  WFL.    Strength

## 2023-12-29 NOTE — PLAN OF CARE
Attempted to call daughter X 2 - was redirected to voicemail- in order to update her regarding the patient. I had spoke to Donaldo, patient's , yesterday evening and he wanted me to update the patient's daughter. My concern is the patient may not have been taking her medications at the nursing facility. Her TSH has increased significantly from 0.57 on 11/30/2023 to 29.52 on 12/27/2023.     With Alzheimer's, she may not want to eat/take her medications. Will need to discuss further steps regarding nutrition and medication.     Electronically signed by Aristeo Cross MD on 12/29/2023 at 8:26 AM

## 2023-12-29 NOTE — CARE COORDINATION
Reviewed chart , discussed in IDR and spoke with Jeni Goode at Pt's [facility].  Reviewed PT and speech evals with her, she believes that the AL  can take care of Pt.  Attempted to call daughter Farzana per husbands, request.  VM left and will await her callback.  Plan at this time is to return to AL  vs New SNU.  CM will continue to follow.       1445 Attempted to call Farzana again with no answer.  Spoke with pt's , updated him and he does want pt to return to AL apt at discharge. Called and updated Jeni , then faxed therapy evals to her at [phone]. She states they can not get honey thickened liquid until Tuesday when they get a delivery. She is ordering it today.  Pt can not return to AL until Tuesday. PS to Dr Cross.

## 2023-12-30 LAB
ANION GAP SERPL CALCULATED.3IONS-SCNC: 11 MMOL/L (ref 7–16)
BASOPHILS ABSOLUTE: 0.1 K/CU MM
BASOPHILS RELATIVE PERCENT: 0.6 % (ref 0–1)
BUN SERPL-MCNC: 21 MG/DL (ref 6–23)
CALCIUM SERPL-MCNC: 8.4 MG/DL (ref 8.3–10.6)
CHLORIDE BLD-SCNC: 112 MMOL/L (ref 99–110)
CO2: 22 MMOL/L (ref 21–32)
CREAT SERPL-MCNC: 2.3 MG/DL (ref 0.6–1.1)
DIFFERENTIAL TYPE: ABNORMAL
EOSINOPHILS ABSOLUTE: 0.6 K/CU MM
EOSINOPHILS RELATIVE PERCENT: 6.1 % (ref 0–3)
GFR SERPL CREATININE-BSD FRML MDRD: 21 ML/MIN/1.73M2
GLUCOSE BLD-MCNC: 114 MG/DL (ref 70–99)
GLUCOSE BLD-MCNC: 138 MG/DL (ref 70–99)
GLUCOSE BLD-MCNC: 139 MG/DL (ref 70–99)
GLUCOSE BLD-MCNC: 191 MG/DL (ref 70–99)
GLUCOSE SERPL-MCNC: 127 MG/DL (ref 70–99)
HCT VFR BLD CALC: 27.3 % (ref 37–47)
HEMOGLOBIN: 8.3 GM/DL (ref 12.5–16)
IMMATURE NEUTROPHIL %: 2.6 % (ref 0–0.43)
LYMPHOCYTES ABSOLUTE: 2.6 K/CU MM
LYMPHOCYTES RELATIVE PERCENT: 25.5 % (ref 24–44)
MAGNESIUM: 2 MG/DL (ref 1.8–2.4)
MCH RBC QN AUTO: 28.9 PG (ref 27–31)
MCHC RBC AUTO-ENTMCNC: 30.4 % (ref 32–36)
MCV RBC AUTO: 95.1 FL (ref 78–100)
MONOCYTES ABSOLUTE: 0.6 K/CU MM
MONOCYTES RELATIVE PERCENT: 5.4 % (ref 0–4)
NUCLEATED RBC %: 0 %
PDW BLD-RTO: 13.7 % (ref 11.7–14.9)
PHOSPHORUS: 3.1 MG/DL (ref 2.5–4.9)
PLATELET # BLD: 400 K/CU MM (ref 140–440)
PMV BLD AUTO: 9.8 FL (ref 7.5–11.1)
POTASSIUM SERPL-SCNC: 4.2 MMOL/L (ref 3.5–5.1)
RBC # BLD: 2.87 M/CU MM (ref 4.2–5.4)
SEGMENTED NEUTROPHILS ABSOLUTE COUNT: 6.1 K/CU MM
SEGMENTED NEUTROPHILS RELATIVE PERCENT: 59.8 % (ref 36–66)
SODIUM BLD-SCNC: 145 MMOL/L (ref 135–145)
T4 FREE SERPL-MCNC: 0.97 NG/DL (ref 0.9–1.8)
TOTAL IMMATURE NEUTOROPHIL: 0.27 K/CU MM
TOTAL NUCLEATED RBC: 0 K/CU MM
WBC # BLD: 10.2 K/CU MM (ref 4–10.5)

## 2023-12-30 PROCEDURE — 87040 BLOOD CULTURE FOR BACTERIA: CPT

## 2023-12-30 PROCEDURE — 82962 GLUCOSE BLOOD TEST: CPT

## 2023-12-30 PROCEDURE — 2700000000 HC OXYGEN THERAPY PER DAY

## 2023-12-30 PROCEDURE — 84100 ASSAY OF PHOSPHORUS: CPT

## 2023-12-30 PROCEDURE — 84439 ASSAY OF FREE THYROXINE: CPT

## 2023-12-30 PROCEDURE — 1200000000 HC SEMI PRIVATE

## 2023-12-30 PROCEDURE — 2500000003 HC RX 250 WO HCPCS: Performed by: INTERNAL MEDICINE

## 2023-12-30 PROCEDURE — 36415 COLL VENOUS BLD VENIPUNCTURE: CPT

## 2023-12-30 PROCEDURE — 94761 N-INVAS EAR/PLS OXIMETRY MLT: CPT

## 2023-12-30 PROCEDURE — A4216 STERILE WATER/SALINE, 10 ML: HCPCS | Performed by: INTERNAL MEDICINE

## 2023-12-30 PROCEDURE — 85025 COMPLETE CBC W/AUTO DIFF WBC: CPT

## 2023-12-30 PROCEDURE — 2580000003 HC RX 258: Performed by: INTERNAL MEDICINE

## 2023-12-30 PROCEDURE — 6370000000 HC RX 637 (ALT 250 FOR IP): Performed by: INTERNAL MEDICINE

## 2023-12-30 PROCEDURE — 6360000002 HC RX W HCPCS: Performed by: INTERNAL MEDICINE

## 2023-12-30 PROCEDURE — 83735 ASSAY OF MAGNESIUM: CPT

## 2023-12-30 PROCEDURE — 80048 BASIC METABOLIC PNL TOTAL CA: CPT

## 2023-12-30 RX ADMIN — ASPIRIN 81 MG: 81 TABLET, CHEWABLE ORAL at 09:01

## 2023-12-30 RX ADMIN — HYDRALAZINE HYDROCHLORIDE 25 MG: 25 TABLET, FILM COATED ORAL at 22:50

## 2023-12-30 RX ADMIN — HEPARIN SODIUM 5000 UNITS: 5000 INJECTION INTRAVENOUS; SUBCUTANEOUS at 14:19

## 2023-12-30 RX ADMIN — PANTOPRAZOLE SODIUM 40 MG: 40 TABLET, DELAYED RELEASE ORAL at 09:00

## 2023-12-30 RX ADMIN — Medication 2000 UNITS: at 09:00

## 2023-12-30 RX ADMIN — AMLODIPINE BESYLATE 10 MG: 10 TABLET ORAL at 09:00

## 2023-12-30 RX ADMIN — ATENOLOL 50 MG: 25 TABLET ORAL at 09:00

## 2023-12-30 RX ADMIN — MICONAZOLE NITRATE: 2 POWDER TOPICAL at 09:01

## 2023-12-30 RX ADMIN — LEVOTHYROXINE SODIUM ANHYDROUS 120 MCG: 100 INJECTION, POWDER, LYOPHILIZED, FOR SOLUTION INTRAVENOUS at 09:25

## 2023-12-30 RX ADMIN — HEPARIN SODIUM 5000 UNITS: 5000 INJECTION INTRAVENOUS; SUBCUTANEOUS at 22:50

## 2023-12-30 RX ADMIN — HEPARIN SODIUM 5000 UNITS: 5000 INJECTION INTRAVENOUS; SUBCUTANEOUS at 06:17

## 2023-12-30 RX ADMIN — DOCUSATE SODIUM 100 MG: 100 CAPSULE, LIQUID FILLED ORAL at 09:00

## 2023-12-30 RX ADMIN — Medication 1 CAPSULE: at 22:50

## 2023-12-30 RX ADMIN — POTASSIUM CHLORIDE 20 MEQ: 1500 TABLET, EXTENDED RELEASE ORAL at 14:15

## 2023-12-30 RX ADMIN — ATORVASTATIN CALCIUM 40 MG: 40 TABLET, FILM COATED ORAL at 22:50

## 2023-12-30 RX ADMIN — ATENOLOL 50 MG: 25 TABLET ORAL at 22:50

## 2023-12-30 RX ADMIN — HYDRALAZINE HYDROCHLORIDE 25 MG: 25 TABLET, FILM COATED ORAL at 09:00

## 2023-12-30 RX ADMIN — SODIUM CHLORIDE, PRESERVATIVE FREE 5 ML: 5 INJECTION INTRAVENOUS at 09:26

## 2023-12-30 RX ADMIN — SENNOSIDES 8.6 MG: 8.6 TABLET, FILM COATED ORAL at 09:00

## 2023-12-30 RX ADMIN — INSULIN GLARGINE 12 UNITS: 100 INJECTION, SOLUTION SUBCUTANEOUS at 22:50

## 2023-12-30 RX ADMIN — SODIUM CHLORIDE, PRESERVATIVE FREE 10 ML: 5 INJECTION INTRAVENOUS at 09:08

## 2023-12-30 RX ADMIN — MICONAZOLE NITRATE: 2 POWDER TOPICAL at 22:56

## 2023-12-30 RX ADMIN — EMPAGLIFLOZIN 10 MG: 10 TABLET, FILM COATED ORAL at 09:01

## 2023-12-31 LAB
ANION GAP SERPL CALCULATED.3IONS-SCNC: 10 MMOL/L (ref 7–16)
BASOPHILS ABSOLUTE: 0.1 K/CU MM
BASOPHILS RELATIVE PERCENT: 0.5 % (ref 0–1)
BUN SERPL-MCNC: 18 MG/DL (ref 6–23)
CALCIUM SERPL-MCNC: 8.3 MG/DL (ref 8.3–10.6)
CHLORIDE BLD-SCNC: 107 MMOL/L (ref 99–110)
CO2: 21 MMOL/L (ref 21–32)
CREAT SERPL-MCNC: 2 MG/DL (ref 0.6–1.1)
DIFFERENTIAL TYPE: ABNORMAL
EOSINOPHILS ABSOLUTE: 0.5 K/CU MM
EOSINOPHILS RELATIVE PERCENT: 4.6 % (ref 0–3)
GFR SERPL CREATININE-BSD FRML MDRD: 25 ML/MIN/1.73M2
GLUCOSE BLD-MCNC: 105 MG/DL (ref 70–99)
GLUCOSE BLD-MCNC: 112 MG/DL (ref 70–99)
GLUCOSE BLD-MCNC: 128 MG/DL (ref 70–99)
GLUCOSE BLD-MCNC: 238 MG/DL (ref 70–99)
GLUCOSE BLD-MCNC: 53 MG/DL (ref 70–99)
GLUCOSE SERPL-MCNC: 117 MG/DL (ref 70–99)
HCT VFR BLD CALC: 26.1 % (ref 37–47)
HEMOGLOBIN: 8.4 GM/DL (ref 12.5–16)
IMMATURE NEUTROPHIL %: 2.6 % (ref 0–0.43)
LYMPHOCYTES ABSOLUTE: 2.7 K/CU MM
LYMPHOCYTES RELATIVE PERCENT: 25.4 % (ref 24–44)
MAGNESIUM: 1.9 MG/DL (ref 1.8–2.4)
MCH RBC QN AUTO: 29.3 PG (ref 27–31)
MCHC RBC AUTO-ENTMCNC: 32.2 % (ref 32–36)
MCV RBC AUTO: 90.9 FL (ref 78–100)
MONOCYTES ABSOLUTE: 0.5 K/CU MM
MONOCYTES RELATIVE PERCENT: 4.9 % (ref 0–4)
NUCLEATED RBC %: 0 %
PDW BLD-RTO: 13.9 % (ref 11.7–14.9)
PHOSPHORUS: 3.3 MG/DL (ref 2.5–4.9)
PLATELET # BLD: 406 K/CU MM (ref 140–440)
PMV BLD AUTO: 9.9 FL (ref 7.5–11.1)
POTASSIUM SERPL-SCNC: 4.3 MMOL/L (ref 3.5–5.1)
RBC # BLD: 2.87 M/CU MM (ref 4.2–5.4)
SEGMENTED NEUTROPHILS ABSOLUTE COUNT: 6.5 K/CU MM
SEGMENTED NEUTROPHILS RELATIVE PERCENT: 62 % (ref 36–66)
SODIUM BLD-SCNC: 138 MMOL/L (ref 135–145)
T4 FREE SERPL-MCNC: 1.17 NG/DL (ref 0.9–1.8)
TOTAL IMMATURE NEUTOROPHIL: 0.27 K/CU MM
TOTAL NUCLEATED RBC: 0 K/CU MM
WBC # BLD: 10.5 K/CU MM (ref 4–10.5)

## 2023-12-31 PROCEDURE — 83735 ASSAY OF MAGNESIUM: CPT

## 2023-12-31 PROCEDURE — 82962 GLUCOSE BLOOD TEST: CPT

## 2023-12-31 PROCEDURE — 94761 N-INVAS EAR/PLS OXIMETRY MLT: CPT

## 2023-12-31 PROCEDURE — 6360000002 HC RX W HCPCS: Performed by: INTERNAL MEDICINE

## 2023-12-31 PROCEDURE — 36415 COLL VENOUS BLD VENIPUNCTURE: CPT

## 2023-12-31 PROCEDURE — 84100 ASSAY OF PHOSPHORUS: CPT

## 2023-12-31 PROCEDURE — 85025 COMPLETE CBC W/AUTO DIFF WBC: CPT

## 2023-12-31 PROCEDURE — 80048 BASIC METABOLIC PNL TOTAL CA: CPT

## 2023-12-31 PROCEDURE — 1200000000 HC SEMI PRIVATE

## 2023-12-31 PROCEDURE — 6370000000 HC RX 637 (ALT 250 FOR IP): Performed by: INTERNAL MEDICINE

## 2023-12-31 PROCEDURE — 2580000003 HC RX 258: Performed by: INTERNAL MEDICINE

## 2023-12-31 PROCEDURE — 84439 ASSAY OF FREE THYROXINE: CPT

## 2023-12-31 RX ADMIN — SODIUM CHLORIDE, PRESERVATIVE FREE 5 ML: 5 INJECTION INTRAVENOUS at 00:39

## 2023-12-31 RX ADMIN — DOCUSATE SODIUM 100 MG: 100 CAPSULE, LIQUID FILLED ORAL at 10:08

## 2023-12-31 RX ADMIN — AMLODIPINE BESYLATE 10 MG: 10 TABLET ORAL at 10:07

## 2023-12-31 RX ADMIN — ATENOLOL 50 MG: 25 TABLET ORAL at 10:07

## 2023-12-31 RX ADMIN — Medication 2000 UNITS: at 10:08

## 2023-12-31 RX ADMIN — HEPARIN SODIUM 5000 UNITS: 5000 INJECTION INTRAVENOUS; SUBCUTANEOUS at 13:11

## 2023-12-31 RX ADMIN — HEPARIN SODIUM 5000 UNITS: 5000 INJECTION INTRAVENOUS; SUBCUTANEOUS at 22:46

## 2023-12-31 RX ADMIN — Medication 1 CAPSULE: at 22:46

## 2023-12-31 RX ADMIN — POTASSIUM CHLORIDE 20 MEQ: 1500 TABLET, EXTENDED RELEASE ORAL at 13:11

## 2023-12-31 RX ADMIN — ATENOLOL 50 MG: 25 TABLET ORAL at 22:47

## 2023-12-31 RX ADMIN — HEPARIN SODIUM 5000 UNITS: 5000 INJECTION INTRAVENOUS; SUBCUTANEOUS at 06:04

## 2023-12-31 RX ADMIN — HYDRALAZINE HYDROCHLORIDE 25 MG: 25 TABLET, FILM COATED ORAL at 22:47

## 2023-12-31 RX ADMIN — PANTOPRAZOLE SODIUM 40 MG: 40 TABLET, DELAYED RELEASE ORAL at 10:08

## 2023-12-31 RX ADMIN — EMPAGLIFLOZIN 10 MG: 10 TABLET, FILM COATED ORAL at 10:08

## 2023-12-31 RX ADMIN — MICONAZOLE NITRATE: 2 POWDER TOPICAL at 10:14

## 2023-12-31 RX ADMIN — ATORVASTATIN CALCIUM 40 MG: 40 TABLET, FILM COATED ORAL at 22:47

## 2023-12-31 RX ADMIN — INSULIN GLARGINE 12 UNITS: 100 INJECTION, SOLUTION SUBCUTANEOUS at 22:46

## 2023-12-31 RX ADMIN — HYDRALAZINE HYDROCHLORIDE 25 MG: 25 TABLET, FILM COATED ORAL at 10:08

## 2023-12-31 RX ADMIN — LEVOTHYROXINE SODIUM 175 MCG: 25 TABLET ORAL at 13:11

## 2023-12-31 RX ADMIN — ASPIRIN 81 MG: 81 TABLET, CHEWABLE ORAL at 10:08

## 2023-12-31 RX ADMIN — SODIUM CHLORIDE, PRESERVATIVE FREE 10 ML: 5 INJECTION INTRAVENOUS at 10:14

## 2023-12-31 RX ADMIN — SENNOSIDES 8.6 MG: 8.6 TABLET, FILM COATED ORAL at 10:08

## 2024-01-01 LAB
ANION GAP SERPL CALCULATED.3IONS-SCNC: 10 MMOL/L (ref 7–16)
BASOPHILS ABSOLUTE: 0.1 K/CU MM
BASOPHILS RELATIVE PERCENT: 0.7 % (ref 0–1)
BUN SERPL-MCNC: 16 MG/DL (ref 6–23)
CALCIUM SERPL-MCNC: 7.9 MG/DL (ref 8.3–10.6)
CHLORIDE BLD-SCNC: 112 MMOL/L (ref 99–110)
CO2: 20 MMOL/L (ref 21–32)
CREAT SERPL-MCNC: 2 MG/DL (ref 0.6–1.1)
CULTURE: ABNORMAL
CULTURE: ABNORMAL
DIFFERENTIAL TYPE: ABNORMAL
EOSINOPHILS ABSOLUTE: 0.5 K/CU MM
EOSINOPHILS RELATIVE PERCENT: 5.1 % (ref 0–3)
GFR SERPL CREATININE-BSD FRML MDRD: 25 ML/MIN/1.73M2
GLUCOSE BLD-MCNC: 140 MG/DL (ref 70–99)
GLUCOSE BLD-MCNC: 158 MG/DL (ref 70–99)
GLUCOSE BLD-MCNC: 166 MG/DL (ref 70–99)
GLUCOSE BLD-MCNC: 266 MG/DL (ref 70–99)
GLUCOSE SERPL-MCNC: 130 MG/DL (ref 70–99)
HCT VFR BLD CALC: 25.9 % (ref 37–47)
HEMOGLOBIN: 8.2 GM/DL (ref 12.5–16)
IMMATURE NEUTROPHIL %: 3.1 % (ref 0–0.43)
LYMPHOCYTES ABSOLUTE: 3.2 K/CU MM
LYMPHOCYTES RELATIVE PERCENT: 34.4 % (ref 24–44)
Lab: ABNORMAL
MAGNESIUM: 1.9 MG/DL (ref 1.8–2.4)
MCH RBC QN AUTO: 29.3 PG (ref 27–31)
MCHC RBC AUTO-ENTMCNC: 31.7 % (ref 32–36)
MCV RBC AUTO: 92.5 FL (ref 78–100)
MONOCYTES ABSOLUTE: 0.5 K/CU MM
MONOCYTES RELATIVE PERCENT: 5.7 % (ref 0–4)
NUCLEATED RBC %: 0 %
PDW BLD-RTO: 14 % (ref 11.7–14.9)
PHOSPHORUS: 3.5 MG/DL (ref 2.5–4.9)
PLATELET # BLD: 340 K/CU MM (ref 140–440)
PMV BLD AUTO: 9.5 FL (ref 7.5–11.1)
POTASSIUM SERPL-SCNC: 4.1 MMOL/L (ref 3.5–5.1)
RBC # BLD: 2.8 M/CU MM (ref 4.2–5.4)
SEGMENTED NEUTROPHILS ABSOLUTE COUNT: 4.7 K/CU MM
SEGMENTED NEUTROPHILS RELATIVE PERCENT: 51 % (ref 36–66)
SODIUM BLD-SCNC: 142 MMOL/L (ref 135–145)
SPECIMEN: ABNORMAL
T4 FREE SERPL-MCNC: 1.48 NG/DL (ref 0.9–1.8)
TOTAL IMMATURE NEUTOROPHIL: 0.29 K/CU MM
TOTAL NUCLEATED RBC: 0 K/CU MM
WBC # BLD: 9.2 K/CU MM (ref 4–10.5)

## 2024-01-01 PROCEDURE — 83735 ASSAY OF MAGNESIUM: CPT

## 2024-01-01 PROCEDURE — 6360000002 HC RX W HCPCS: Performed by: INTERNAL MEDICINE

## 2024-01-01 PROCEDURE — 82962 GLUCOSE BLOOD TEST: CPT

## 2024-01-01 PROCEDURE — 1200000000 HC SEMI PRIVATE

## 2024-01-01 PROCEDURE — 2580000003 HC RX 258: Performed by: INTERNAL MEDICINE

## 2024-01-01 PROCEDURE — 36415 COLL VENOUS BLD VENIPUNCTURE: CPT

## 2024-01-01 PROCEDURE — 6370000000 HC RX 637 (ALT 250 FOR IP): Performed by: INTERNAL MEDICINE

## 2024-01-01 PROCEDURE — 85025 COMPLETE CBC W/AUTO DIFF WBC: CPT

## 2024-01-01 PROCEDURE — 80048 BASIC METABOLIC PNL TOTAL CA: CPT

## 2024-01-01 PROCEDURE — 84100 ASSAY OF PHOSPHORUS: CPT

## 2024-01-01 PROCEDURE — 94761 N-INVAS EAR/PLS OXIMETRY MLT: CPT

## 2024-01-01 PROCEDURE — 84439 ASSAY OF FREE THYROXINE: CPT

## 2024-01-01 RX ADMIN — SODIUM CHLORIDE, PRESERVATIVE FREE 10 ML: 5 INJECTION INTRAVENOUS at 11:40

## 2024-01-01 RX ADMIN — Medication 2000 UNITS: at 11:38

## 2024-01-01 RX ADMIN — SENNOSIDES 8.6 MG: 8.6 TABLET, FILM COATED ORAL at 11:39

## 2024-01-01 RX ADMIN — SODIUM CHLORIDE, PRESERVATIVE FREE 10 ML: 5 INJECTION INTRAVENOUS at 22:42

## 2024-01-01 RX ADMIN — Medication 1 CAPSULE: at 22:39

## 2024-01-01 RX ADMIN — ATENOLOL 50 MG: 25 TABLET ORAL at 22:41

## 2024-01-01 RX ADMIN — ASPIRIN 81 MG: 81 TABLET, CHEWABLE ORAL at 11:39

## 2024-01-01 RX ADMIN — HEPARIN SODIUM 5000 UNITS: 5000 INJECTION INTRAVENOUS; SUBCUTANEOUS at 05:58

## 2024-01-01 RX ADMIN — LEVOTHYROXINE SODIUM 175 MCG: 25 TABLET ORAL at 05:59

## 2024-01-01 RX ADMIN — PANTOPRAZOLE SODIUM 40 MG: 40 TABLET, DELAYED RELEASE ORAL at 11:38

## 2024-01-01 RX ADMIN — POTASSIUM CHLORIDE 20 MEQ: 1500 TABLET, EXTENDED RELEASE ORAL at 11:39

## 2024-01-01 RX ADMIN — MICONAZOLE NITRATE: 2 POWDER TOPICAL at 11:41

## 2024-01-01 RX ADMIN — DOCUSATE SODIUM 100 MG: 100 CAPSULE, LIQUID FILLED ORAL at 11:38

## 2024-01-01 RX ADMIN — HYDRALAZINE HYDROCHLORIDE 25 MG: 25 TABLET, FILM COATED ORAL at 11:39

## 2024-01-01 RX ADMIN — HEPARIN SODIUM 5000 UNITS: 5000 INJECTION INTRAVENOUS; SUBCUTANEOUS at 22:39

## 2024-01-01 RX ADMIN — ATORVASTATIN CALCIUM 40 MG: 40 TABLET, FILM COATED ORAL at 22:39

## 2024-01-01 RX ADMIN — AMLODIPINE BESYLATE 10 MG: 10 TABLET ORAL at 11:39

## 2024-01-01 RX ADMIN — HEPARIN SODIUM 5000 UNITS: 5000 INJECTION INTRAVENOUS; SUBCUTANEOUS at 15:20

## 2024-01-01 RX ADMIN — ATENOLOL 50 MG: 25 TABLET ORAL at 11:38

## 2024-01-01 RX ADMIN — HYDRALAZINE HYDROCHLORIDE 25 MG: 25 TABLET, FILM COATED ORAL at 22:41

## 2024-01-01 RX ADMIN — EMPAGLIFLOZIN 10 MG: 10 TABLET, FILM COATED ORAL at 11:39

## 2024-01-01 RX ADMIN — INSULIN GLARGINE 12 UNITS: 100 INJECTION, SOLUTION SUBCUTANEOUS at 22:40

## 2024-01-01 ASSESSMENT — PAIN SCALES - GENERAL: PAINLEVEL_OUTOF10: 0

## 2024-01-01 ASSESSMENT — PAIN DESCRIPTION - DESCRIPTORS: DESCRIPTORS: ACHING

## 2024-01-01 ASSESSMENT — PAIN DESCRIPTION - LOCATION: LOCATION: LEG

## 2024-01-01 ASSESSMENT — PAIN DESCRIPTION - PAIN TYPE: TYPE: CHRONIC PAIN

## 2024-01-01 ASSESSMENT — PAIN SCALES - WONG BAKER: WONGBAKER_NUMERICALRESPONSE: 0

## 2024-01-01 ASSESSMENT — PAIN DESCRIPTION - ORIENTATION: ORIENTATION: RIGHT

## 2024-01-01 ASSESSMENT — ENCOUNTER SYMPTOMS: COUGH: 0

## 2024-01-01 ASSESSMENT — PAIN - FUNCTIONAL ASSESSMENT: PAIN_FUNCTIONAL_ASSESSMENT: ACTIVITIES ARE NOT PREVENTED

## 2024-01-02 LAB
AMMONIA: 25 UMOL/L (ref 11–51)
ANION GAP SERPL CALCULATED.3IONS-SCNC: 9 MMOL/L (ref 7–16)
ANISOCYTOSIS: ABNORMAL
BANDED NEUTROPHILS ABSOLUTE COUNT: 0.6 K/CU MM
BANDED NEUTROPHILS RELATIVE PERCENT: 6 % (ref 5–11)
BUN SERPL-MCNC: 16 MG/DL (ref 6–23)
CALCIUM SERPL-MCNC: 8.1 MG/DL (ref 8.3–10.6)
CHLORIDE BLD-SCNC: 112 MMOL/L (ref 99–110)
CO2: 21 MMOL/L (ref 21–32)
CREAT SERPL-MCNC: 2 MG/DL (ref 0.6–1.1)
DIFFERENTIAL TYPE: ABNORMAL
EOSINOPHILS ABSOLUTE: 0.2 K/CU MM
EOSINOPHILS RELATIVE PERCENT: 2 % (ref 0–3)
GFR SERPL CREATININE-BSD FRML MDRD: 25 ML/MIN/1.73M2
GLUCOSE BLD-MCNC: 102 MG/DL (ref 70–99)
GLUCOSE BLD-MCNC: 139 MG/DL (ref 70–99)
GLUCOSE BLD-MCNC: 218 MG/DL (ref 70–99)
GLUCOSE BLD-MCNC: 61 MG/DL (ref 70–99)
GLUCOSE BLD-MCNC: 77 MG/DL (ref 70–99)
GLUCOSE BLD-MCNC: 80 MG/DL (ref 70–99)
GLUCOSE BLD-MCNC: 89 MG/DL (ref 70–99)
GLUCOSE SERPL-MCNC: 95 MG/DL (ref 70–99)
HCT VFR BLD CALC: 27.4 % (ref 37–47)
HEMOGLOBIN: 8.8 GM/DL (ref 12.5–16)
LYMPHOCYTES ABSOLUTE: 2.8 K/CU MM
LYMPHOCYTES RELATIVE PERCENT: 28 % (ref 24–44)
MAGNESIUM: 2 MG/DL (ref 1.8–2.4)
MCH RBC QN AUTO: 29.7 PG (ref 27–31)
MCHC RBC AUTO-ENTMCNC: 32.1 % (ref 32–36)
MCV RBC AUTO: 92.6 FL (ref 78–100)
METAMYELOCYTES ABSOLUTE COUNT: 0.2 K/CU MM
METAMYELOCYTES PERCENT: 2 %
MONOCYTES ABSOLUTE: 0.5 K/CU MM
MONOCYTES RELATIVE PERCENT: 5 % (ref 0–4)
PDW BLD-RTO: 14.2 % (ref 11.7–14.9)
PHOSPHORUS: 3.4 MG/DL (ref 2.5–4.9)
PLATELET # BLD: 373 K/CU MM (ref 140–440)
PMV BLD AUTO: 9.7 FL (ref 7.5–11.1)
POTASSIUM SERPL-SCNC: 4 MMOL/L (ref 3.5–5.1)
RBC # BLD: 2.96 M/CU MM (ref 4.2–5.4)
SEGMENTED NEUTROPHILS ABSOLUTE COUNT: 5.7 K/CU MM
SEGMENTED NEUTROPHILS RELATIVE PERCENT: 57 % (ref 36–66)
SODIUM BLD-SCNC: 142 MMOL/L (ref 135–145)
T4 FREE SERPL-MCNC: 1.34 NG/DL (ref 0.9–1.8)
WBC # BLD: 10 K/CU MM (ref 4–10.5)
WBC # BLD: ABNORMAL 10*3/UL

## 2024-01-02 PROCEDURE — 2580000003 HC RX 258: Performed by: INTERNAL MEDICINE

## 2024-01-02 PROCEDURE — 94761 N-INVAS EAR/PLS OXIMETRY MLT: CPT

## 2024-01-02 PROCEDURE — 84439 ASSAY OF FREE THYROXINE: CPT

## 2024-01-02 PROCEDURE — 82140 ASSAY OF AMMONIA: CPT

## 2024-01-02 PROCEDURE — 84100 ASSAY OF PHOSPHORUS: CPT

## 2024-01-02 PROCEDURE — 6370000000 HC RX 637 (ALT 250 FOR IP): Performed by: INTERNAL MEDICINE

## 2024-01-02 PROCEDURE — 80048 BASIC METABOLIC PNL TOTAL CA: CPT

## 2024-01-02 PROCEDURE — 85027 COMPLETE CBC AUTOMATED: CPT

## 2024-01-02 PROCEDURE — 85007 BL SMEAR W/DIFF WBC COUNT: CPT

## 2024-01-02 PROCEDURE — 6360000002 HC RX W HCPCS: Performed by: INTERNAL MEDICINE

## 2024-01-02 PROCEDURE — 36415 COLL VENOUS BLD VENIPUNCTURE: CPT

## 2024-01-02 PROCEDURE — 83735 ASSAY OF MAGNESIUM: CPT

## 2024-01-02 PROCEDURE — 1200000000 HC SEMI PRIVATE

## 2024-01-02 PROCEDURE — 82962 GLUCOSE BLOOD TEST: CPT

## 2024-01-02 RX ORDER — INSULIN GLARGINE 100 [IU]/ML
5 INJECTION, SOLUTION SUBCUTANEOUS NIGHTLY
Status: DISCONTINUED | OUTPATIENT
Start: 2024-01-02 | End: 2024-01-03 | Stop reason: HOSPADM

## 2024-01-02 RX ADMIN — LEVOTHYROXINE SODIUM 175 MCG: 25 TABLET ORAL at 05:59

## 2024-01-02 RX ADMIN — POTASSIUM CHLORIDE 20 MEQ: 1500 TABLET, EXTENDED RELEASE ORAL at 11:40

## 2024-01-02 RX ADMIN — Medication 2000 UNITS: at 11:40

## 2024-01-02 RX ADMIN — MICONAZOLE NITRATE: 2 POWDER TOPICAL at 21:40

## 2024-01-02 RX ADMIN — HEPARIN SODIUM 5000 UNITS: 5000 INJECTION INTRAVENOUS; SUBCUTANEOUS at 06:00

## 2024-01-02 RX ADMIN — ASPIRIN 81 MG: 81 TABLET, CHEWABLE ORAL at 11:40

## 2024-01-02 RX ADMIN — SODIUM CHLORIDE, PRESERVATIVE FREE 10 ML: 5 INJECTION INTRAVENOUS at 11:51

## 2024-01-02 RX ADMIN — SODIUM CHLORIDE, PRESERVATIVE FREE 10 ML: 5 INJECTION INTRAVENOUS at 21:39

## 2024-01-02 RX ADMIN — MICONAZOLE NITRATE: 2 POWDER TOPICAL at 11:45

## 2024-01-02 RX ADMIN — SENNOSIDES 8.6 MG: 8.6 TABLET, FILM COATED ORAL at 11:40

## 2024-01-02 RX ADMIN — EMPAGLIFLOZIN 10 MG: 10 TABLET, FILM COATED ORAL at 11:40

## 2024-01-02 RX ADMIN — PANTOPRAZOLE SODIUM 40 MG: 40 TABLET, DELAYED RELEASE ORAL at 11:40

## 2024-01-02 RX ADMIN — ATENOLOL 50 MG: 25 TABLET ORAL at 21:39

## 2024-01-02 RX ADMIN — HEPARIN SODIUM 5000 UNITS: 5000 INJECTION INTRAVENOUS; SUBCUTANEOUS at 21:38

## 2024-01-02 RX ADMIN — Medication 1 CAPSULE: at 21:46

## 2024-01-02 RX ADMIN — ATENOLOL 50 MG: 25 TABLET ORAL at 11:40

## 2024-01-02 RX ADMIN — ATORVASTATIN CALCIUM 40 MG: 40 TABLET, FILM COATED ORAL at 21:39

## 2024-01-02 RX ADMIN — HYDRALAZINE HYDROCHLORIDE 25 MG: 25 TABLET, FILM COATED ORAL at 11:40

## 2024-01-02 RX ADMIN — DOCUSATE SODIUM 100 MG: 100 CAPSULE, LIQUID FILLED ORAL at 11:40

## 2024-01-02 RX ADMIN — AMLODIPINE BESYLATE 10 MG: 10 TABLET ORAL at 11:40

## 2024-01-02 RX ADMIN — HYDRALAZINE HYDROCHLORIDE 25 MG: 25 TABLET, FILM COATED ORAL at 21:38

## 2024-01-02 ASSESSMENT — PAIN SCALES - WONG BAKER
WONGBAKER_NUMERICALRESPONSE: 0
WONGBAKER_NUMERICALRESPONSE: 0

## 2024-01-02 ASSESSMENT — PAIN SCALES - GENERAL
PAINLEVEL_OUTOF10: 0
PAINLEVEL_OUTOF10: 0

## 2024-01-02 NOTE — CARE COORDINATION
Reviewed chart, discussed in IDR, spoke with Jeni at [facility],  they are still willing to take pt once medically stable,  Will need to fax d/c info to them at [phone].  Spoke with pt's Nurse Tamiko and doing ABGs and Ammonia level d/t decreased alertness.  CM will continue to follow.

## 2024-01-02 NOTE — PROGRESS NOTES
In-Patient Progress Note    Patient:  Africa Vega 78 y.o. female MRN: [id number]     Date of Service: 12/29/2023    Hospital Day: 3      Chief complaint: had concerns including Cough and Altered Mental Status (Concern for possible UTI).      Subjective   Patient seen and examined in the morning. Patient states she feels fine. Is somewhat lethargic. Appears to have dry mucous membrane.     Tried to call daughter multiple times - was redirected to voicemail each time.       See ROS    I have reviewed all pertinent PMHx, PSHx, FamHx, SocialHx, medications, and allergies and updated history as appropriate. I have reviewed images as appropriate.     Assessment and Plan   Africa Vega, a 78 y.o. female, with a history of  Alzheimer's disease, anemia, disease, hypertension, hyperlipidemia, CKD 3/4, IDDM, hypothyroidism, GERD, COPD, chronic cough, class I obesity  was admitted on 12/27/2023 with complaints of had concerns including Cough and Altered Mental Status (Concern for possible UTI).       Assessment  Acute vs Acute on Chronic Encephalopathy 2/2 Metabolic 2/2 Hypothyroidism   Hypothyroidism   TSH 0.57 on 11/30/2023 and 29.52 this admission   Unsure if she has been getting her synthroid - may be refusing   Supposed to be on synthroid 175mcg daily PO   Currently on synthroid 120mcg daily IV   FT4 up to 0.8     Acute hypoxic respiratory failure  Noted to be 81% on 2 L/min O2 in the ER based on chart review  Now down to 1L/min      ?Medication non-adherence   Unsure if pt. Has been getting med s     Dehydration   CKD III/IV  On torsemide 20mg daily and KCL supplements   Cr stable      IDDM   On insulin and jardiance 10mg daily      Hypomagnesemia  Mg 2.2     UTI Ruled out   a. Urine culture -ve   b. Was on rocephin 1gm Q24H IV    HTN, HLD   Anemia of Chronic Disease   Alzheimer's Dementia   GERD  COPD with Chronic Cough   Class I Obesity         Plan  Continue IV synthroid 120 mcg daily until pt. Starts PO 
    In-Patient Progress Note    Patient:  Africa Vega 78 y.o. female MRN: [id number]     Date of Service: 1/1/2024    Hospital Day: 6      Chief complaint: had concerns including Cough and Altered Mental Status (Concern for possible UTI).      Subjective   Patient seen and examined in the morning. Patient is AO X 1 today.  States she feels pretty good. Slow to respond.       See ROS    I have reviewed all pertinent PMHx, PSHx, FamHx, SocialHx, medications, and allergies and updated history as appropriate. I have reviewed images as appropriate.     Assessment and Plan   Africa Vega, a 78 y.o. female, with a history of  Alzheimer's disease, anemia, disease, hypertension, hyperlipidemia, CKD 3/4, IDDM, hypothyroidism, GERD, COPD, chronic cough, class I obesity  was admitted on 12/27/2023 with complaints of had concerns including Cough and Altered Mental Status (Concern for possible UTI).       Assessment  Acute on Chronic Encephalopathy 2/2 Metabolic 2/2 Hypothyroidism   Hypothyroidism   TSH 0.57 on 11/30/2023 and 29.52 this admission   FT4 low at 0.49  Unsure if she has been getting her synthroid - may be refusing   Supposed to be on synthroid 175mcg daily PO   Was on synthroid 120mcg daily IV and now back on home synthroid   FT4 up to 1.48 today      Acute hypoxic respiratory failure  Noted to be 81% on 2 L/min O2 in the ER based on chart review  Now off O2      ?Medication non-adherence   Patient likely not taking meds      Dehydration - Resolved     Staph Epidermidis Bacteremia - Likely Contaminant     CKD III/IV  On torsemide 20mg daily and KCL supplements at home - now on hold   Cr stable      8. IDDM with Hypoglycemia   On insulin and jardiance 10mg daily   Had an episode of sugar 53 in the Am     9. Hypomagnesemia, Hypokalemia   Mg 1.9  K 4.1   Chronically on Kcl      10. UTI Ruled out   a. Urine culture -ve   b. Was on rocephin 1gm Q24H IV - already discontinued     11. HTN, HLD   12. Anemia of 
    In-Patient Progress Note    Patient:  Africa Vega 78 y.o. female MRN: [id number]     Date of Service: 12/30/2023    Hospital Day: 4      Chief complaint: had concerns including Cough and Altered Mental Status (Concern for possible UTI).      Subjective   Patient seen and examined in the morning. Patient is much more alert today. Answering some questions.     Tried to call daughter multiple times - was redirected to voicemail each time.       See ROS    I have reviewed all pertinent PMHx, PSHx, FamHx, SocialHx, medications, and allergies and updated history as appropriate. I have reviewed images as appropriate.     Assessment and Plan   Africa Vega, a 78 y.o. female, with a history of  Alzheimer's disease, anemia, disease, hypertension, hyperlipidemia, CKD 3/4, IDDM, hypothyroidism, GERD, COPD, chronic cough, class I obesity  was admitted on 12/27/2023 with complaints of had concerns including Cough and Altered Mental Status (Concern for possible UTI).       Assessment  Acute vs Acute on Chronic Encephalopathy 2/2 Metabolic 2/2 Hypothyroidism   Hypothyroidism   TSH 0.57 on 11/30/2023 and 29.52 this admission   FT4 low at 0.49  Unsure if she has been getting her synthroid - may be refusing   Supposed to be on synthroid 175mcg daily PO   Currently on synthroid 120mcg daily IV   FT4 up to 0.97 today      Acute hypoxic respiratory failure  Noted to be 81% on 2 L/min O2 in the ER based on chart review  Now down to 1L/min      ?Medication non-adherence   Patient likely not taking meds      Dehydration - Resolved     Staph Epidermidis Bacteremia - Likely Contaminant     CKD III/IV  On torsemide 20mg daily and KCL supplements at home - now on hold   Cr stable      8. IDDM   On insulin and jardiance 10mg daily      9. Hypomagnesemia  Mg 2.2     10. UTI Ruled out   a. Urine culture -ve   b. Was on rocephin 1gm Q24H IV - already discontinued     11. HTN, HLD   12. Anemia of Chronic Disease   13. Alzheimer's 
  Facility/Department: [address]   CLINICAL BEDSIDE SWALLOW EVALUATION    NAME: Africa Vega  : 1945  MRN: [id number]    IMPRESSIONS AND RECOMMENDATIONS: Africa Vega was referred for a bedside swallow evaluation following admission to [facility] with acute metabolic encephalopathy, acute respiratory failure. Medical hx includes CKD, DM, HTN, HLD, anemia, dementia, GERD, COPD. No known history of dysphagia prior to admission.    Pt seen for evaluation seated upright in bed, alert, requiring cues for participation. She benefited from increased time for all direction-following. Oral mechanism examination WFL without asymmetry. She accepted PO trials of thin liquids via cup/straw, nectar thick liquids via cup/straw, puree, and regular solids. Oral stage mildly impaired, characterized by intact labial seal, slow mastication/reduced bolus formation, adequate clearance, suspected premature pharyngeal entry. Suspect pharyngeal dysphagia with adequate swallow initiation, reduced laryngeal elevation. Immediate cough inconsistently follows drinks of thin liquids only.    Recommend nectar thick liquids/soft and bite-sized diet. Pt will benefit from assistance with feeding. SLP will continue to follow.      ADMISSION DATE: 2023  ADMITTING DIAGNOSIS: has Ulcer of right foot with fat layer exposed (HCC); Type 2 diabetes mellitus with complication, with long-term current use of insulin (HCC); Acute cystitis without hematuria; Aortic atherosclerosis (HCC); AMS (altered mental status); Acute kidney injury superimposed on CKD (HCC); Acute respiratory failure with hypoxia (HCC); Acute kidney injury superimposed on chronic kidney disease (HCC); Asymptomatic bacteriuria; and Hypothyroidism on their problem list.  ONSET DATE: this admission    Recent Chest Xray/CT of Chest: see chart    Date of Eval: 2023  Evaluating Therapist: SLIME Resendiz    Current Diet level:  Current Diet : Regular  Current Liquid Diet : 
4 Eyes Skin Assessment     NAME:  Africa Vega  YOB: 1945  MEDICAL RECORD NUMBER:  [id number]    The patient is being assessed for  Admission    I agree that at least one RN has performed a thorough Head to Toe Skin Assessment on the patient. ALL assessment sites listed below have been assessed.      Areas assessed by both nurses:    Head, Face, Ears, Shoulders, Back, Chest, Arms, Elbows, Hands, Sacrum. Buttock, Coccyx, Ischium, Legs. Feet and Heels, and Under Medical Devices         Does the Patient have a Wound? Yes wound(s) were present on assessment. LDA wound assessment was Initiated and completed by RN       Shiraz Prevention initiated by RN: Yes  Wound Care Orders initiated by RN: Yes    Pressure Injury (Stage 3,4, Unstageable, DTI, NWPT, and Complex wounds) if present, place Wound referral order by RN under : No    New Ostomies, if present place, Ostomy referral order under : No     Nurse 1 eSignature: Electronically signed by Reed Vivar RN on 12/28/23 at 2:56 AM EST    **SHARE this note so that the co-signing nurse can place an eSignature**    Nurse 2 eSignature: Electronically signed by Juliana Scott LPN on 12/28/23 at 2:57 AM EST   
[facility]  DEPARTMENT OF SPEECH/LANGUAGE PATHOLOGY    Africa Vega  1/2/2024  [id number]    Attempted to see Africa Vega for dysphagia follow-up. Pt sleeping upon SLP entering room and did not adequately wake despite verbal/tactile cues, repositioning. Will reattempt as indicated.    SLIME Resendiz  1/2/2024  11:18 AM    
Dr. Cross notified that pt refusing PO intake, food and meds. Continue to monitor.   
Patient very lethargic today. Very difficult to get her to feed herself and open her eyes, even when  visited.  
eval.  Vision: Wears glasses  Hearing: WFL  Vitals: Stable vitals throughout session on supplemental O2      Body Systems and functions:  ROM: appears WFL distally, unable to follow commands for shoulder ROM   Strength: B UE grossly 3-/5 across all major joints   Tone: Normal  Coordination: F- GM/FM   Perception: WNL      Cognitive and Psychosocial Functioning:  Overall cognitive status: alert, oriented to person only  Affect: Normal   Arousal/Alertness Delayed responses to stimuli; Inconsistent responses to stimuli   Following Commands Inconsistently follows commands   Attention Span Difficulty attending to directions; Attends with cues to redirect   Safety Judgement Decreased awareness of need for safety; Decreased awareness of need for assistance   Problem Solving Decreased awareness of errors   Insights Decreased awareness of deficits   Initiation Requires cues for some   Sequencing Requires cues for some       Functional Mobility:  Bed mobility: supine to sitting EOB w/ max A, Vcs for sequencing. Sit to supine w/ max Ax2, dependent x2 repositioning in bed  Sitting balance:  mod A w/ Vcs for attention to posture    Transfers: NT d/t safety   Standing balance:  NT  Functional Mobility: NT d/t safety  Toilet/Shower Transfers: NT        Activities of Daily Living (ADLs):  Feeding: SBA to drink from cup with straw and lid while long sitting in bed  Grooming: mod A  UB bathing: max A  LB bathing: dependent  UB dressing: max A  LB dressing: dependent  Toileting: dependent    *ADL determined per observation of functional mobility, balance, activity tolerance, cognition, or actual ADL performance.     AM-PAC 6 click short form for inpatient daily activity:   How much help from another person does the patient currently need... Unable  Dep A Lot  Max A A Lot   Mod A A Little  Min A A Little   CGA  SBA None   Mod I  Indep  Sup   1.  Putting on and taking off regular lower body clothing? [x] 1    [] 2   [] 2   [] 3   [] 3   
Oropharynx is clear. No oropharyngeal exudate or posterior oropharyngeal erythema.   Eyes:      General: No scleral icterus.        Right eye: No discharge.         Left eye: No discharge.      Conjunctiva/sclera: Conjunctivae normal.   Cardiovascular:      Rate and Rhythm: Normal rate and regular rhythm.      Pulses: Normal pulses.      Heart sounds: Normal heart sounds. No murmur heard.     No friction rub. No gallop.   Pulmonary:      Effort: Pulmonary effort is normal. No respiratory distress.      Breath sounds: Normal breath sounds. No stridor. No wheezing, rhonchi or rales.   Abdominal:      General: Bowel sounds are normal. There is no distension.      Palpations: Abdomen is soft. There is no mass.      Tenderness: There is no abdominal tenderness. There is no guarding or rebound.      Hernia: No hernia is present.   Musculoskeletal:      Right lower leg: No edema.      Left lower leg: No edema.   Skin:     General: Skin is warm.      Capillary Refill: Capillary refill takes less than 2 seconds.   Neurological:      Mental Status: She is alert.      Comments: Oriented X 1   Psychiatric:         Mood and Affect: Mood normal.           Current Medications      levothyroxine  120 mcg IntraVENous Daily    And    sodium chloride (PF)  5 mL IntraVENous Daily    amLODIPine  10 mg Oral Daily    aspirin  81 mg Oral Daily    atenolol  50 mg Oral BID    atorvastatin  40 mg Oral Nightly    Vitamin D  2,000 Units Oral Daily    docusate sodium  100 mg Oral Daily    empagliflozin  10 mg Oral Daily    hydrALAZINE  25 mg Oral BID    insulin glargine  12 Units SubCUTAneous Nightly    insulin lispro  0-8 Units SubCUTAneous TID WC    insulin lispro  0-4 Units SubCUTAneous Nightly    [Held by provider] levothyroxine  175 mcg Oral Daily    pantoprazole  40 mg Oral Daily    senna  1 tablet Oral Daily    sodium chloride flush  5-40 mL IntraVENous 2 times per day    torsemide  20 mg Oral Lunch    potassium chloride  20 mEq Oral 
lactobacillus  1 capsule Oral Daily    heparin (porcine)  5,000 Units SubCUTAneous 3 times per day    miconazole   Topical BID         Labs and Imaging Studies   Laboratory findings:  XR CHEST PORTABLE    Result Date: 12/27/2023  EXAMINATION: ONE XRAY VIEW OF THE CHEST 12/27/2023 10:41 am COMPARISON: 12/06/2023. HISTORY: ORDERING SYSTEM PROVIDED HISTORY: Altered Mental Status TECHNOLOGIST PROVIDED HISTORY: Reason for exam:->Altered Mental Status Reason for Exam: ams FINDINGS: The cardiac silhouette is mildly enlarged.  Aortic vascular calcification. Stable mild coarsening of the interstitial markings, likely chronic.  No acute infiltrate, significant pleural fluid or evidence of overt failure.     Stable chest.  Cardiomegaly without overt failure.  Chronic interstitial changes within the lungs.     XR CHEST PORTABLE    Result Date: 12/6/2023  EXAMINATION: ONE XRAY VIEW OF THE CHEST 12/6/2023 3:36 pm COMPARISON: 11/29/2023 HISTORY: ORDERING SYSTEM PROVIDED HISTORY: SOB TECHNOLOGIST PROVIDED HISTORY: Reason for exam:->SOB Reason for Exam: SOB Additional signs and symptoms: unknown Relevant Medical/Surgical History: CHF FINDINGS: Heart size is normal  Aorta is normal.  Lungs are under expanded and clear. No pleural effusions. Mild spondylosis     Lungs are clear     US RETROPERITONEAL LIMITED    Result Date: 12/5/2023  EXAMINATION: ULTRASOUND OF THE KIDNEYS 12/5/2023 3:16 pm COMPARISON: 11/11/2022 HISTORY: ORDERING SYSTEM PROVIDED HISTORY: arf TECHNOLOGIST PROVIDED HISTORY: Reason for exam:->arf FINDINGS: The right kidney is absent.  The left kidney measures 12.3 cm in length.  The left kidney demonstrates normal echogenicity.  No mass or hydronephrosis.     The left kidney is unremarkable. The right kidney is absent, stable.          Recent Results (from the past 24 hour(s))   POCT Glucose    Collection Time: 01/01/24  5:12 PM   Result Value Ref Range    POC Glucose 158 (H) 70 - 99 MG/DL   POCT Glucose    Collection 
Narrative:      SETUP DATE/TIME:  12/29/2023 0110    Culture, Blood 2 [[id number]] Collected: 12/28/23 2208    Order Status: Completed Specimen: Blood Updated: 12/31/23 0116     Specimen BLOOD     Special Requests NONE     Culture NO GROWTH AT 48 HOURS    Narrative:      SETUP DATE/TIME:  12/29/2023 0110    Culture, Urine [[id number]] Collected: 12/27/23 1345    Order Status: Canceled Specimen: Urine, clean catch     Culture, Urine [[id number]] Collected: 12/27/23 1130    Order Status: Completed Specimen: Urine, clean catch Updated: 12/28/23 1229     Specimen URINE CLEAN CATCH     Special Requests URINE     Culture Final Report No growth at 18 to 36 hours    Narrative:      SETUP DATE/TIME:  12/27/2023 1829    COVID-19, Rapid [[id number]] Collected: 12/27/23 1015    Order Status: Completed Specimen: Nasopharyngeal Updated: 12/27/23 1050     Source UNKNOWN     SARS-CoV-2, NAAT NOT DETECTED     Comment:         Rapid NAAT: The specimen is NEGATIVE for SARS-CoV-2, the novel coronavirus associated with   COVID-19.  Negative results should be treated as presumptive and, if inconsistent with clinical signs   and symptoms or necessary for patient management, should be tested with an alternate   molecular assay.  Negative results do not preclude SARS-CoV-2 infection and should not be used as the sole   basis for patient management decisions.          This test has been authorized by the FDA under an Emergency Use Authorization (EUA) for use   by authorized laboratories.  The ID NOW COVID-19 assay is designed to detect the virus that causes COVID-19 in patients   with signs and symptoms of infection who are suspected of COVID-19.  An individual without symptoms of COVID-19 and who is not shedding SARS-CoV-2 virus would   expect to have a negative (not detected) result in this assay.          Fact sheet for Healthcare Providers: https://www.fda.gov/media/677128/download  Fact sheet for Patients:

## 2024-01-02 NOTE — PLAN OF CARE
Problem: Safety - Adult  Goal: Free from fall injury  Outcome: Progressing     Problem: ABCDS Injury Assessment  Goal: Absence of physical injury  Outcome: Progressing     Problem: Skin/Tissue Integrity  Goal: Absence of new skin breakdown  Description: 1.  Monitor for areas of redness and/or skin breakdown  2.  Assess vascular access sites hourly  3.  Every 4-6 hours minimum:  Change oxygen saturation probe site  4.  Every 4-6 hours:  If on nasal continuous positive airway pressure, respiratory therapy assess nares and determine need for appliance change or resting period.  Outcome: Progressing     Problem: Discharge Planning  Goal: Discharge to home or other facility with appropriate resources  Outcome: Progressing     Problem: Chronic Conditions and Co-morbidities  Goal: Patient's chronic conditions and co-morbidity symptoms are monitored and maintained or improved  Outcome: Progressing     Problem: Pain  Goal: Verbalizes/displays adequate comfort level or baseline comfort level  Outcome: Progressing     Problem: Safety - Adult  Goal: Free from fall injury  Outcome: Progressing     Problem: ABCDS Injury Assessment  Goal: Absence of physical injury  Outcome: Progressing     Problem: Skin/Tissue Integrity  Goal: Absence of new skin breakdown  Description: 1.  Monitor for areas of redness and/or skin breakdown  2.  Assess vascular access sites hourly  3.  Every 4-6 hours minimum:  Change oxygen saturation probe site  4.  Every 4-6 hours:  If on nasal continuous positive airway pressure, respiratory therapy assess nares and determine need for appliance change or resting period.  Outcome: Progressing     Problem: Discharge Planning  Goal: Discharge to home or other facility with appropriate resources  Outcome: Progressing     Problem: Chronic Conditions and Co-morbidities  Goal: Patient's chronic conditions and co-morbidity symptoms are monitored and maintained or improved  Outcome: Progressing     Problem:

## 2024-01-03 VITALS
WEIGHT: 163.1 LBS | RESPIRATION RATE: 15 BRPM | HEIGHT: 64 IN | SYSTOLIC BLOOD PRESSURE: 147 MMHG | OXYGEN SATURATION: 95 % | BODY MASS INDEX: 27.84 KG/M2 | HEART RATE: 61 BPM | DIASTOLIC BLOOD PRESSURE: 62 MMHG | TEMPERATURE: 97.3 F

## 2024-01-03 LAB
ANION GAP SERPL CALCULATED.3IONS-SCNC: 10 MMOL/L (ref 7–16)
BASOPHILS ABSOLUTE: 0.1 K/CU MM
BASOPHILS RELATIVE PERCENT: 0.7 % (ref 0–1)
BUN SERPL-MCNC: 14 MG/DL (ref 6–23)
CALCIUM SERPL-MCNC: 8 MG/DL (ref 8.3–10.6)
CHLORIDE BLD-SCNC: 116 MMOL/L (ref 99–110)
CO2: 20 MMOL/L (ref 21–32)
CREAT SERPL-MCNC: 2.1 MG/DL (ref 0.6–1.1)
CULTURE: NORMAL
DIFFERENTIAL TYPE: ABNORMAL
EOSINOPHILS ABSOLUTE: 0.4 K/CU MM
EOSINOPHILS RELATIVE PERCENT: 3.3 % (ref 0–3)
GFR SERPL CREATININE-BSD FRML MDRD: 24 ML/MIN/1.73M2
GLUCOSE BLD-MCNC: 153 MG/DL (ref 70–99)
GLUCOSE BLD-MCNC: 203 MG/DL (ref 70–99)
GLUCOSE SERPL-MCNC: 150 MG/DL (ref 70–99)
HCT VFR BLD CALC: 26.2 % (ref 37–47)
HEMOGLOBIN: 8.1 GM/DL (ref 12.5–16)
IMMATURE NEUTROPHIL %: 3 % (ref 0–0.43)
LYMPHOCYTES ABSOLUTE: 3.5 K/CU MM
LYMPHOCYTES RELATIVE PERCENT: 32.1 % (ref 24–44)
Lab: NORMAL
MAGNESIUM: 2 MG/DL (ref 1.8–2.4)
MCH RBC QN AUTO: 29.2 PG (ref 27–31)
MCHC RBC AUTO-ENTMCNC: 30.9 % (ref 32–36)
MCV RBC AUTO: 94.6 FL (ref 78–100)
MONOCYTES ABSOLUTE: 0.6 K/CU MM
MONOCYTES RELATIVE PERCENT: 5.5 % (ref 0–4)
NUCLEATED RBC %: 0 %
PDW BLD-RTO: 14.7 % (ref 11.7–14.9)
PHOSPHORUS: 3.9 MG/DL (ref 2.5–4.9)
PLATELET # BLD: 306 K/CU MM (ref 140–440)
PMV BLD AUTO: 10 FL (ref 7.5–11.1)
POTASSIUM SERPL-SCNC: 4.2 MMOL/L (ref 3.5–5.1)
RBC # BLD: 2.77 M/CU MM (ref 4.2–5.4)
SEGMENTED NEUTROPHILS ABSOLUTE COUNT: 6 K/CU MM
SEGMENTED NEUTROPHILS RELATIVE PERCENT: 55.4 % (ref 36–66)
SODIUM BLD-SCNC: 146 MMOL/L (ref 135–145)
SPECIMEN: NORMAL
TOTAL IMMATURE NEUTOROPHIL: 0.32 K/CU MM
TOTAL NUCLEATED RBC: 0 K/CU MM
WBC # BLD: 10.8 K/CU MM (ref 4–10.5)

## 2024-01-03 PROCEDURE — 6360000002 HC RX W HCPCS: Performed by: INTERNAL MEDICINE

## 2024-01-03 PROCEDURE — 2580000003 HC RX 258: Performed by: INTERNAL MEDICINE

## 2024-01-03 PROCEDURE — 6370000000 HC RX 637 (ALT 250 FOR IP): Performed by: INTERNAL MEDICINE

## 2024-01-03 PROCEDURE — 82962 GLUCOSE BLOOD TEST: CPT

## 2024-01-03 PROCEDURE — 6370000000 HC RX 637 (ALT 250 FOR IP): Performed by: STUDENT IN AN ORGANIZED HEALTH CARE EDUCATION/TRAINING PROGRAM

## 2024-01-03 PROCEDURE — 36415 COLL VENOUS BLD VENIPUNCTURE: CPT

## 2024-01-03 PROCEDURE — 84100 ASSAY OF PHOSPHORUS: CPT

## 2024-01-03 PROCEDURE — 85025 COMPLETE CBC W/AUTO DIFF WBC: CPT

## 2024-01-03 PROCEDURE — 94761 N-INVAS EAR/PLS OXIMETRY MLT: CPT

## 2024-01-03 PROCEDURE — 80048 BASIC METABOLIC PNL TOTAL CA: CPT

## 2024-01-03 PROCEDURE — 83735 ASSAY OF MAGNESIUM: CPT

## 2024-01-03 RX ORDER — LOSARTAN POTASSIUM 50 MG/1
50 TABLET ORAL DAILY
Qty: 30 TABLET | Refills: 3 | Status: SHIPPED | OUTPATIENT
Start: 2024-01-03

## 2024-01-03 RX ORDER — INSULIN GLARGINE 100 [IU]/ML
6 INJECTION, SOLUTION SUBCUTANEOUS NIGHTLY
Qty: 10 ML | Refills: 3 | Status: SHIPPED
Start: 2024-01-03

## 2024-01-03 RX ORDER — LOSARTAN POTASSIUM 25 MG/1
50 TABLET ORAL DAILY
Status: DISCONTINUED | OUTPATIENT
Start: 2024-01-03 | End: 2024-01-03 | Stop reason: HOSPADM

## 2024-01-03 RX ADMIN — HYDRALAZINE HYDROCHLORIDE 25 MG: 25 TABLET, FILM COATED ORAL at 11:07

## 2024-01-03 RX ADMIN — Medication 2000 UNITS: at 11:08

## 2024-01-03 RX ADMIN — ASPIRIN 81 MG: 81 TABLET, CHEWABLE ORAL at 11:07

## 2024-01-03 RX ADMIN — ATENOLOL 50 MG: 25 TABLET ORAL at 11:08

## 2024-01-03 RX ADMIN — SODIUM CHLORIDE, PRESERVATIVE FREE 10 ML: 5 INJECTION INTRAVENOUS at 11:08

## 2024-01-03 RX ADMIN — POTASSIUM CHLORIDE 20 MEQ: 1500 TABLET, EXTENDED RELEASE ORAL at 15:02

## 2024-01-03 RX ADMIN — TORSEMIDE 20 MG: 20 TABLET ORAL at 15:01

## 2024-01-03 RX ADMIN — EMPAGLIFLOZIN 10 MG: 10 TABLET, FILM COATED ORAL at 11:08

## 2024-01-03 RX ADMIN — LEVOTHYROXINE SODIUM 175 MCG: 25 TABLET ORAL at 06:09

## 2024-01-03 RX ADMIN — SENNOSIDES 8.6 MG: 8.6 TABLET, FILM COATED ORAL at 11:07

## 2024-01-03 RX ADMIN — MICONAZOLE NITRATE: 2 POWDER TOPICAL at 11:09

## 2024-01-03 RX ADMIN — DOCUSATE SODIUM 100 MG: 100 CAPSULE, LIQUID FILLED ORAL at 11:08

## 2024-01-03 RX ADMIN — AMLODIPINE BESYLATE 10 MG: 10 TABLET ORAL at 11:08

## 2024-01-03 RX ADMIN — PANTOPRAZOLE SODIUM 40 MG: 40 TABLET, DELAYED RELEASE ORAL at 11:08

## 2024-01-03 RX ADMIN — HEPARIN SODIUM 5000 UNITS: 5000 INJECTION INTRAVENOUS; SUBCUTANEOUS at 06:09

## 2024-01-03 RX ADMIN — HEPARIN SODIUM 5000 UNITS: 5000 INJECTION INTRAVENOUS; SUBCUTANEOUS at 15:01

## 2024-01-03 RX ADMIN — LOSARTAN POTASSIUM 50 MG: 25 TABLET, FILM COATED ORAL at 15:01

## 2024-01-03 ASSESSMENT — PAIN SCALES - WONG BAKER: WONGBAKER_NUMERICALRESPONSE: 0

## 2024-01-03 ASSESSMENT — PAIN SCALES - GENERAL: PAINLEVEL_OUTOF10: 0

## 2024-01-03 NOTE — CARE COORDINATION
Pt discharged to [facility] today.  Spoke with pt's daughter Farzana, she is agreeable, but had medical questions.  Asked Dr Caruso to call her.  Set up with [facility] for 1500. Faxed AVS to Nessa at [facility] [phone] .  Pt's nurse Tamiko also agreeable with plan.

## 2024-01-03 NOTE — DISCHARGE INSTR - COC
insulin (HCC) E11.8, Z79.4    Acute cystitis without hematuria N30.00    Aortic atherosclerosis ([facility]) I70.0    AMS (altered mental status) R41.82    Acute kidney injury superimposed on CKD (HCC) N17.9, N18.9    Acute respiratory failure with hypoxia (HCC) J96.01    Acute kidney injury superimposed on chronic kidney disease (HCC) N17.9, N18.9    Asymptomatic bacteriuria R82.71    Hypothyroidism E03.9       Isolation/Infection:   Isolation            Contact          Patient Infection Status       Infection Onset Added Last Indicated Last Indicated By Review Planned Expiration Resolved Resolved By    MRSA 23 MRSA by PCR        MDRO (multi-drug resistant organism) 22 Anu Bryan RN        23: URINE: Escherichia coli    Resolved    Rhinovirus 23 Respiratory Panel, Molecular, with COVID-19 (Restricted: peds pts or suitable admitted adults)   23 Infection     COVID-19 22 COVID-19, Rapid   22 Infection                        Nurse Assessment:  Last Vital Signs: BP (!) 147/62   Pulse 61   Temp 97.3 °F (36.3 °C) (Axillary)   Resp 15   Ht 1.626 m (5' 4\")   Wt 74 kg (163 lb 1.6 oz)   SpO2 95%   BMI 28.00 kg/m²     Last documented pain score (0-10 scale): Pain Level: 0  Last Weight:   Wt Readings from Last 1 Encounters:   24 74 kg (163 lb 1.6 oz)     Mental Status:  alert and coherent    IV Access:  - None    Nursing Mobility/ADLs:  Walking   Assisted  Transfer  Dependent  Bathing  Dependent  Dressing  Dependent  Toileting  Dependent  Feeding  Assisted  Med Admin  Dependent  Med Delivery   crushed and prefers mixed with applesauce    Wound Care Documentation and Therapy:  Wound 24 Thigh Left open scratch marks (Active)   Number of days: 0        Elimination:  Continence:   Bowel: No  Bladder: No  Urinary Catheter: None   Colostomy/Ileostomy/Ileal Conduit: No       Date of Last

## 2024-01-03 NOTE — DISCHARGE INSTRUCTIONS
- please schedule an appointment to see your PCP  - please schedule an appointment to see nephrologist  - please go to lab in one week for blood work and again in 2 weeks  - please take medications as prescribed

## 2024-01-03 NOTE — PLAN OF CARE
Problem: Safety - Adult  Goal: Free from fall injury  1/3/2024 0010 by Hellen Alvarez RN  Outcome: Progressing  1/2/2024 1701 by Tamiko Franz LPN  Outcome: Progressing     Problem: ABCDS Injury Assessment  Goal: Absence of physical injury  1/3/2024 0010 by Hellen Alvarez RN  Outcome: Progressing  1/2/2024 1701 by Tamiko Franz LPN  Outcome: Progressing     Problem: Skin/Tissue Integrity  Goal: Absence of new skin breakdown  Description: 1.  Monitor for areas of redness and/or skin breakdown  2.  Assess vascular access sites hourly  3.  Every 4-6 hours minimum:  Change oxygen saturation probe site  4.  Every 4-6 hours:  If on nasal continuous positive airway pressure, respiratory therapy assess nares and determine need for appliance change or resting period.  1/3/2024 0010 by Hellen Alvarez RN  Outcome: Progressing  1/2/2024 1701 by Tamiko Franz LPN  Outcome: Progressing     Problem: Discharge Planning  Goal: Discharge to home or other facility with appropriate resources  1/3/2024 0010 by Hellen Alvarez RN  Outcome: Progressing  1/2/2024 1701 by Tamiko Franz LPN  Outcome: Progressing     Problem: Chronic Conditions and Co-morbidities  Goal: Patient's chronic conditions and co-morbidity symptoms are monitored and maintained or improved  1/3/2024 0010 by Hellen Alvarez RN  Outcome: Progressing  1/2/2024 1701 by Tamiko Franz LPN  Outcome: Progressing     Problem: Pain  Goal: Verbalizes/displays adequate comfort level or baseline comfort level  1/3/2024 0010 by Hellen Alvarez RN  Outcome: Progressing  1/2/2024 1701 by Tamiko Franz LPN  Outcome: Progressing

## 2024-01-04 LAB
CULTURE: NORMAL
CULTURE: NORMAL
Lab: NORMAL
Lab: NORMAL
SPECIMEN: NORMAL
SPECIMEN: NORMAL

## 2024-09-01 ENCOUNTER — HOSPITAL ENCOUNTER (OUTPATIENT)
Age: 79
Setting detail: OBSERVATION
Discharge: HOME | End: 2024-09-03
Attending: EMERGENCY MEDICINE | Admitting: HOSPITALIST
Payer: MEDICARE

## 2024-09-01 ENCOUNTER — APPOINTMENT (OUTPATIENT)
Dept: GENERAL RADIOLOGY | Age: 79
End: 2024-09-01
Payer: MEDICARE

## 2024-09-01 ENCOUNTER — APPOINTMENT (OUTPATIENT)
Dept: CT IMAGING | Age: 79
End: 2024-09-01
Payer: MEDICARE

## 2024-09-01 DIAGNOSIS — R53.83 OTHER FATIGUE: Primary | ICD-10-CM

## 2024-09-01 DIAGNOSIS — R62.7 FAILURE TO THRIVE IN ADULT: ICD-10-CM

## 2024-09-01 DIAGNOSIS — U07.1 COVID-19: ICD-10-CM

## 2024-09-01 DIAGNOSIS — N18.4 CKD (CHRONIC KIDNEY DISEASE) STAGE 4, GFR 15-29 ML/MIN (HCC): ICD-10-CM

## 2024-09-01 PROBLEM — E55.9 VITAMIN D DEFICIENCY: Status: ACTIVE | Noted: 2024-09-01

## 2024-09-01 PROBLEM — I10 PRIMARY HYPERTENSION: Status: ACTIVE | Noted: 2017-08-10

## 2024-09-01 PROBLEM — K59.01 SLOW TRANSIT CONSTIPATION: Status: ACTIVE | Noted: 2024-09-01

## 2024-09-01 PROBLEM — E78.2 MIXED HYPERLIPIDEMIA: Status: ACTIVE | Noted: 2017-08-10

## 2024-09-01 PROBLEM — F03.90 DEMENTIA WITHOUT BEHAVIORAL DISTURBANCE (HCC): Status: ACTIVE | Noted: 2024-09-01

## 2024-09-01 PROBLEM — N18.32 STAGE 3B CHRONIC KIDNEY DISEASE (HCC): Status: ACTIVE | Noted: 2024-09-01

## 2024-09-01 PROBLEM — K21.9 GASTROESOPHAGEAL REFLUX DISEASE WITHOUT ESOPHAGITIS: Status: ACTIVE | Noted: 2024-09-01

## 2024-09-01 LAB
ALBUMIN SERPL-MCNC: 2.9 GM/DL (ref 3.4–5)
ALP BLD-CCNC: 130 IU/L (ref 40–129)
ALT SERPL-CCNC: 11 U/L (ref 10–40)
ANION GAP SERPL CALCULATED.3IONS-SCNC: 12 MMOL/L (ref 7–16)
APTT: 31.2 SECONDS (ref 25.1–37.1)
AST SERPL-CCNC: 19 IU/L (ref 15–37)
BACTERIA: NEGATIVE /HPF
BASE EXCESS MIXED: 6.3 (ref 0–3)
BASE EXCESS: ABNORMAL (ref 0–3)
BASOPHILS ABSOLUTE: 0 K/CU MM
BASOPHILS RELATIVE PERCENT: 0.3 % (ref 0–1)
BILIRUB SERPL-MCNC: 0.2 MG/DL (ref 0–1)
BILIRUBIN, URINE: NEGATIVE MG/DL
BLOOD, URINE: NEGATIVE
BUN SERPL-MCNC: 68 MG/DL (ref 6–23)
CALCIUM IONIZED: NORMAL MMOL/L (ref 1.12–1.32)
CALCIUM SERPL-MCNC: 9.2 MG/DL (ref 8.3–10.6)
CAST TYPE: ABNORMAL /HPF
CHLORIDE BLD-SCNC: 112 MMOL/L (ref 99–110)
CLARITY, UA: CLEAR
CO2 CONTENT: 18.6 MMOL/L (ref 21–32)
CO2: 17 MMOL/L (ref 21–32)
COLOR, UA: YELLOW
CREAT SERPL-MCNC: 2.6 MG/DL (ref 0.6–1.1)
CRYSTAL TYPE: NEGATIVE /HPF
DIFFERENTIAL TYPE: ABNORMAL
EOSINOPHILS ABSOLUTE: 0.2 K/CU MM
EOSINOPHILS RELATIVE PERCENT: 3.1 % (ref 0–3)
EPITHELIAL CELLS, UA: 4 /HPF
GFR, ESTIMATED: 18 ML/MIN/1.73M2
GLUCOSE BLD-MCNC: 119 MG/DL (ref 70–99)
GLUCOSE SERPL-MCNC: 117 MG/DL (ref 70–99)
GLUCOSE URINE: 250 MG/DL
HCO3 VENOUS: 17.7 MMOL/L (ref 22–29)
HCT VFR BLD CALC: 27.6 % (ref 37–47)
HEMOGLOBIN: 8.3 GM/DL (ref 12.5–16)
IMMATURE NEUTROPHIL %: 0.3 % (ref 0–0.43)
INR BLD: 1.2 INDEX
KETONES, URINE: NEGATIVE MG/DL
LACTIC ACID, SEPSIS: 0.8 MMOL/L (ref 0.4–2)
LEUKOCYTE ESTERASE, URINE: NEGATIVE
LIPASE: 30 IU/L (ref 13–60)
LYMPHOCYTES ABSOLUTE: 2.5 K/CU MM
LYMPHOCYTES RELATIVE PERCENT: 36.1 % (ref 24–44)
MCH RBC QN AUTO: 30.9 PG (ref 27–31)
MCHC RBC AUTO-ENTMCNC: 30.1 % (ref 32–36)
MCV RBC AUTO: 102.6 FL (ref 78–100)
MONOCYTES ABSOLUTE: 0.6 K/CU MM
MONOCYTES RELATIVE PERCENT: 9 % (ref 0–4)
NEUTROPHILS ABSOLUTE: 3.6 K/CU MM
NEUTROPHILS RELATIVE PERCENT: 51.2 % (ref 36–66)
NITRITE URINE, QUANTITATIVE: NEGATIVE
O2 SAT, VEN: 94.9 % (ref 50–70)
PCO2 VENOUS: 28.4 MMHG (ref 41–51)
PDW BLD-RTO: 13.8 % (ref 11.7–14.9)
PH VENOUS: 7.4 (ref 7.32–7.43)
PH, URINE: 6 (ref 5–8)
PLATELET # BLD: 135 K/CU MM (ref 140–440)
PMV BLD AUTO: 10.6 FL (ref 7.5–11.1)
PO2 VENOUS: 73.5 MMHG (ref 28–48)
POTASSIUM SERPL-SCNC: 5.1 MMOL/L (ref 3.5–5.1)
PRO-BNP: 7526 PG/ML
PROTEIN UA: >300 MG/DL
PROTHROMBIN TIME: 15.7 SECONDS (ref 11.7–14.5)
RBC # BLD: 2.69 M/CU MM (ref 4.2–5.4)
RBC URINE: NEGATIVE /HPF (ref 0–6)
SARS-COV-2 RDRP RESP QL NAA+PROBE: DETECTED
SODIUM BLD-SCNC: 141 MMOL/L (ref 135–145)
SOURCE, BLOOD GAS: ABNORMAL
SOURCE: ABNORMAL
SPECIFIC GRAVITY UA: 1.02 (ref 1–1.03)
TOTAL CK: 16 IU/L (ref 26–140)
TOTAL IMMATURE NEUTOROPHIL: 0.02 K/CU MM
TOTAL PROTEIN: 6.2 GM/DL (ref 6.4–8.2)
TROPONIN, HIGH SENSITIVITY: 72 NG/L (ref 0–14)
TROPONIN, HIGH SENSITIVITY: 78 NG/L (ref 0–14)
UROBILINOGEN, URINE: 0.2 MG/DL (ref 0.2–1)
WBC # BLD: 7 K/CU MM (ref 4–10.5)
WBC UA: 2 /HPF (ref 0–5)

## 2024-09-01 PROCEDURE — 81001 URINALYSIS AUTO W/SCOPE: CPT

## 2024-09-01 PROCEDURE — 86901 BLOOD TYPING SEROLOGIC RH(D): CPT

## 2024-09-01 PROCEDURE — 71045 X-RAY EXAM CHEST 1 VIEW: CPT

## 2024-09-01 PROCEDURE — 85610 PROTHROMBIN TIME: CPT

## 2024-09-01 PROCEDURE — 83540 ASSAY OF IRON: CPT

## 2024-09-01 PROCEDURE — 86900 BLOOD TYPING SEROLOGIC ABO: CPT

## 2024-09-01 PROCEDURE — 80053 COMPREHEN METABOLIC PANEL: CPT

## 2024-09-01 PROCEDURE — G0378 HOSPITAL OBSERVATION PER HR: HCPCS

## 2024-09-01 PROCEDURE — 6370000000 HC RX 637 (ALT 250 FOR IP): Performed by: HOSPITALIST

## 2024-09-01 PROCEDURE — 82330 ASSAY OF CALCIUM: CPT

## 2024-09-01 PROCEDURE — 85730 THROMBOPLASTIN TIME PARTIAL: CPT

## 2024-09-01 PROCEDURE — 82728 ASSAY OF FERRITIN: CPT

## 2024-09-01 PROCEDURE — 6360000004 HC RX CONTRAST MEDICATION: Performed by: EMERGENCY MEDICINE

## 2024-09-01 PROCEDURE — 6360000002 HC RX W HCPCS: Performed by: HOSPITALIST

## 2024-09-01 PROCEDURE — 87086 URINE CULTURE/COLONY COUNT: CPT

## 2024-09-01 PROCEDURE — 2580000003 HC RX 258: Performed by: EMERGENCY MEDICINE

## 2024-09-01 PROCEDURE — 85025 COMPLETE CBC W/AUTO DIFF WBC: CPT

## 2024-09-01 PROCEDURE — 82746 ASSAY OF FOLIC ACID SERUM: CPT

## 2024-09-01 PROCEDURE — 82550 ASSAY OF CK (CPK): CPT

## 2024-09-01 PROCEDURE — 86850 RBC ANTIBODY SCREEN: CPT

## 2024-09-01 PROCEDURE — 83880 ASSAY OF NATRIURETIC PEPTIDE: CPT

## 2024-09-01 PROCEDURE — 96360 HYDRATION IV INFUSION INIT: CPT

## 2024-09-01 PROCEDURE — 84484 ASSAY OF TROPONIN QUANT: CPT

## 2024-09-01 PROCEDURE — 82607 VITAMIN B-12: CPT

## 2024-09-01 PROCEDURE — 74177 CT ABD & PELVIS W/CONTRAST: CPT

## 2024-09-01 PROCEDURE — 51701 INSERT BLADDER CATHETER: CPT

## 2024-09-01 PROCEDURE — 87635 SARS-COV-2 COVID-19 AMP PRB: CPT

## 2024-09-01 PROCEDURE — 83605 ASSAY OF LACTIC ACID: CPT

## 2024-09-01 PROCEDURE — 83690 ASSAY OF LIPASE: CPT

## 2024-09-01 PROCEDURE — 96361 HYDRATE IV INFUSION ADD-ON: CPT

## 2024-09-01 PROCEDURE — 82962 GLUCOSE BLOOD TEST: CPT

## 2024-09-01 PROCEDURE — 96372 THER/PROPH/DIAG INJ SC/IM: CPT

## 2024-09-01 PROCEDURE — 2580000003 HC RX 258: Performed by: HOSPITALIST

## 2024-09-01 PROCEDURE — 99285 EMERGENCY DEPT VISIT HI MDM: CPT

## 2024-09-01 PROCEDURE — 93005 ELECTROCARDIOGRAM TRACING: CPT | Performed by: EMERGENCY MEDICINE

## 2024-09-01 PROCEDURE — 83550 IRON BINDING TEST: CPT

## 2024-09-01 PROCEDURE — 83036 HEMOGLOBIN GLYCOSYLATED A1C: CPT

## 2024-09-01 PROCEDURE — 70450 CT HEAD/BRAIN W/O DYE: CPT

## 2024-09-01 RX ORDER — INSULIN LISPRO 100 [IU]/ML
0-4 INJECTION, SOLUTION INTRAVENOUS; SUBCUTANEOUS NIGHTLY
Status: DISCONTINUED | OUTPATIENT
Start: 2024-09-01 | End: 2024-09-02

## 2024-09-01 RX ORDER — ONDANSETRON 2 MG/ML
4 INJECTION INTRAMUSCULAR; INTRAVENOUS EVERY 30 MIN PRN
Status: DISCONTINUED | OUTPATIENT
Start: 2024-09-01 | End: 2024-09-01

## 2024-09-01 RX ORDER — INSULIN LISPRO 100 [IU]/ML
0.05 INJECTION, SOLUTION INTRAVENOUS; SUBCUTANEOUS
Status: DISCONTINUED | OUTPATIENT
Start: 2024-09-01 | End: 2024-09-03 | Stop reason: HOSPADM

## 2024-09-01 RX ORDER — LEVOTHYROXINE SODIUM 175 UG/1
175 TABLET ORAL DAILY
Status: DISCONTINUED | OUTPATIENT
Start: 2024-09-02 | End: 2024-09-03 | Stop reason: HOSPADM

## 2024-09-01 RX ORDER — QUETIAPINE FUMARATE 25 MG/1
25 TABLET, FILM COATED ORAL NIGHTLY
Status: DISCONTINUED | OUTPATIENT
Start: 2024-09-01 | End: 2024-09-03 | Stop reason: HOSPADM

## 2024-09-01 RX ORDER — HEPARIN SODIUM 5000 [USP'U]/ML
5000 INJECTION, SOLUTION INTRAVENOUS; SUBCUTANEOUS EVERY 8 HOURS SCHEDULED
Status: DISCONTINUED | OUTPATIENT
Start: 2024-09-01 | End: 2024-09-03

## 2024-09-01 RX ORDER — ASPIRIN 81 MG/1
81 TABLET ORAL DAILY
Status: DISCONTINUED | OUTPATIENT
Start: 2024-09-02 | End: 2024-09-03 | Stop reason: HOSPADM

## 2024-09-01 RX ORDER — QUETIAPINE FUMARATE 25 MG/1
25 TABLET, FILM COATED ORAL 2 TIMES DAILY
COMMUNITY
Start: 2024-08-23

## 2024-09-01 RX ORDER — SODIUM CHLORIDE 0.9 % (FLUSH) 0.9 %
5-40 SYRINGE (ML) INJECTION EVERY 12 HOURS SCHEDULED
Status: DISCONTINUED | OUTPATIENT
Start: 2024-09-01 | End: 2024-09-03 | Stop reason: HOSPADM

## 2024-09-01 RX ORDER — ACETAMINOPHEN 650 MG/1
650 SUPPOSITORY RECTAL EVERY 6 HOURS PRN
Status: DISCONTINUED | OUTPATIENT
Start: 2024-09-01 | End: 2024-09-03 | Stop reason: HOSPADM

## 2024-09-01 RX ORDER — BISACODYL 10 MG
10 SUPPOSITORY, RECTAL RECTAL DAILY
Status: DISCONTINUED | OUTPATIENT
Start: 2024-09-02 | End: 2024-09-03 | Stop reason: HOSPADM

## 2024-09-01 RX ORDER — IOPAMIDOL 755 MG/ML
100 INJECTION, SOLUTION INTRAVASCULAR
Status: COMPLETED | OUTPATIENT
Start: 2024-09-01 | End: 2024-09-01

## 2024-09-01 RX ORDER — GLUCAGON 1 MG/ML
1 KIT INJECTION PRN
Status: DISCONTINUED | OUTPATIENT
Start: 2024-09-01 | End: 2024-09-03 | Stop reason: HOSPADM

## 2024-09-01 RX ORDER — DEXTROSE MONOHYDRATE 100 MG/ML
INJECTION, SOLUTION INTRAVENOUS CONTINUOUS PRN
Status: DISCONTINUED | OUTPATIENT
Start: 2024-09-01 | End: 2024-09-03 | Stop reason: HOSPADM

## 2024-09-01 RX ORDER — SODIUM CHLORIDE 0.9 % (FLUSH) 0.9 %
5-40 SYRINGE (ML) INJECTION PRN
Status: DISCONTINUED | OUTPATIENT
Start: 2024-09-01 | End: 2024-09-03 | Stop reason: HOSPADM

## 2024-09-01 RX ORDER — ACETAMINOPHEN 325 MG/1
650 TABLET ORAL EVERY 6 HOURS PRN
Status: DISCONTINUED | OUTPATIENT
Start: 2024-09-01 | End: 2024-09-03 | Stop reason: HOSPADM

## 2024-09-01 RX ORDER — LOSARTAN POTASSIUM 50 MG/1
50 TABLET ORAL DAILY
Status: DISCONTINUED | OUTPATIENT
Start: 2024-09-02 | End: 2024-09-03 | Stop reason: HOSPADM

## 2024-09-01 RX ORDER — POLYETHYLENE GLYCOL 3350 17 G/17G
17 POWDER, FOR SOLUTION ORAL DAILY
Status: DISCONTINUED | OUTPATIENT
Start: 2024-09-02 | End: 2024-09-03 | Stop reason: HOSPADM

## 2024-09-01 RX ORDER — SENNOSIDES 8.8 MG/5ML
5 LIQUID ORAL 2 TIMES DAILY PRN
Status: DISCONTINUED | OUTPATIENT
Start: 2024-09-01 | End: 2024-09-01 | Stop reason: RX

## 2024-09-01 RX ORDER — INSULIN GLARGINE 100 [IU]/ML
0.1 INJECTION, SOLUTION SUBCUTANEOUS NIGHTLY
Status: DISCONTINUED | OUTPATIENT
Start: 2024-09-01 | End: 2024-09-03

## 2024-09-01 RX ORDER — SENNOSIDES A AND B 8.6 MG/1
1 TABLET, FILM COATED ORAL 2 TIMES DAILY PRN
Status: DISCONTINUED | OUTPATIENT
Start: 2024-09-01 | End: 2024-09-03 | Stop reason: HOSPADM

## 2024-09-01 RX ORDER — SODIUM CHLORIDE 9 MG/ML
INJECTION, SOLUTION INTRAVENOUS PRN
Status: DISCONTINUED | OUTPATIENT
Start: 2024-09-01 | End: 2024-09-03 | Stop reason: HOSPADM

## 2024-09-01 RX ORDER — 0.9 % SODIUM CHLORIDE 0.9 %
1000 INTRAVENOUS SOLUTION INTRAVENOUS ONCE
Status: COMPLETED | OUTPATIENT
Start: 2024-09-01 | End: 2024-09-01

## 2024-09-01 RX ORDER — INSULIN LISPRO 100 [IU]/ML
0-4 INJECTION, SOLUTION INTRAVENOUS; SUBCUTANEOUS
Status: DISCONTINUED | OUTPATIENT
Start: 2024-09-01 | End: 2024-09-02

## 2024-09-01 RX ADMIN — IOPAMIDOL 100 ML: 755 INJECTION, SOLUTION INTRAVENOUS at 17:15

## 2024-09-01 RX ADMIN — SODIUM CHLORIDE 1000 ML: 9 INJECTION, SOLUTION INTRAVENOUS at 16:43

## 2024-09-01 RX ADMIN — QUETIAPINE FUMARATE 25 MG: 25 TABLET ORAL at 22:20

## 2024-09-01 RX ADMIN — SODIUM CHLORIDE, PRESERVATIVE FREE 10 ML: 5 INJECTION INTRAVENOUS at 22:36

## 2024-09-01 RX ADMIN — INSULIN GLARGINE 5 UNITS: 100 INJECTION, SOLUTION SUBCUTANEOUS at 22:20

## 2024-09-01 RX ADMIN — HEPARIN SODIUM 5000 UNITS: 5000 INJECTION INTRAVENOUS; SUBCUTANEOUS at 22:20

## 2024-09-01 ASSESSMENT — PAIN SCALES - WONG BAKER
WONGBAKER_NUMERICALRESPONSE: NO HURT
WONGBAKER_NUMERICALRESPONSE: NO HURT

## 2024-09-01 NOTE — ED PROVIDER NOTES
[facility]      TRIAGE CHIEF COMPLAINT:   Fatigue (Not eating or drinking ) and Positive For Covid-19      [place]:  Africa Vega is a 78 y.o. female that presents with complaint of COVID-19, failure to thrive.  Patient arrives from nursing home, they sent her in for COVID-19 and not eating or drinking well for the last 3 to 4 days some confusion.  Patient upon arrival is not talking she is awake she follows commands but not talking.  No further HPI available, no family present initially..    REVIEW OF SYSTEMS:      Review of Systems   Unable to perform ROS: Mental status change   Constitutional:  Positive for activity change, appetite change and fatigue.   Respiratory:  Positive for cough.    Psychiatric/Behavioral:  Positive for confusion.        Past Medical History:   Diagnosis Date    Acquired hypothyroidism 05/19/2021    Aortic atherosclerosis ([facility])     Asymptomatic bacteriuria 12/05/2023    CKD (chronic kidney disease) stage 4, GFR 15-29 ml/min ([facility]) 09/01/2024    Cognitive communication deficit     Dementia without behavioral disturbance ([facility]) 09/01/2024    Essential hypertension     Frequent falls     Gastroesophageal reflux disease with esophagitis     Hallux valgus (acquired), right foot 05/01/2017    Mixed hyperlipidemia 08/10/2017    Shingles 06/2021    Slow transit constipation 09/01/2024    Solitary kidney, congenital 10/08/2021    Type 2 diabetes mellitus with complication, with long-term current use of insulin ([facility])     Ulcer of right foot with fat layer exposed ([facility]) 02/06/2013    Vitamin D deficiency 09/01/2024     Past Surgical History:   Procedure Laterality Date    ABDOMEN SURGERY      CYST REMOVAL      from kidney.    DILATATION, ESOPHAGUS      ENDOSCOPY, COLON, DIAGNOSTIC      FOOT SURGERY  12/30/11    had infection  and a biopsy was sent away for analysis    HERNIA REPAIR      HYSTERECTOMY (CERVIX STATUS UNKNOWN)      KIDNEY REMOVAL  1973     Family History

## 2024-09-01 NOTE — ED NOTES
Dr Dawn in to pt bedside to speak with pt and . Pt  requests pt to stay overnight and receive iv fluids, meds, and increase oral intake.   Pt  states that pt takes pills in CHOCOLATE ensure the best but sometimes will take strawberry.

## 2024-09-01 NOTE — ED NOTES
Pt  who is at bedside states that pt kept down liquids this morning for him but would not have anything yesterday.  Called [facility] for report on pt and background on pt.   Spoke with the Nursing home nurse who was taking care of pt. States that pt has behaviors where she does not talk or refuses to eat.   States pt is covid positive and not eating or drinking for 24 hours. Pt also has a wet cough. [facility] Nurse called PA who stated to send pt to ED. This nurse asks if any medication had been given for nausea and to see if it would help pt to be able to at least drink. Nurse stated that there was no indication of nausea. This nurse requested to see if pt had been given anything for nausea and that ondansetron is ordered prn per copied MAR sent with ems and pt to ER. [facility] nurse states pt has not had that this month or last according to chart.

## 2024-09-01 NOTE — ED NOTES
Black hard as rock stool ball in rectum with smaller stool balls in saturated depends and in groin/vaginal area. Dr Dawn notified and requested nurse to digitally disimpact gently. Digital disimpaction completed and soft stool behind hard stool ball also black. When stool stopped pt cleansed of stool, straight cath completed and urine sample obtained and sent to lab. Pt tolerated both procedures well.

## 2024-09-02 LAB
ABO/RH: NORMAL
ALBUMIN SERPL-MCNC: 2.8 GM/DL (ref 3.4–5)
ALP BLD-CCNC: 121 IU/L (ref 40–129)
ALT SERPL-CCNC: 10 U/L (ref 10–40)
ANION GAP SERPL CALCULATED.3IONS-SCNC: 13 MMOL/L (ref 7–16)
ANTIBODY SCREEN: NEGATIVE
AST SERPL-CCNC: 16 IU/L (ref 15–37)
BILIRUB SERPL-MCNC: 0.2 MG/DL (ref 0–1)
BUN SERPL-MCNC: 68 MG/DL (ref 6–23)
CALCIUM SERPL-MCNC: 8.7 MG/DL (ref 8.3–10.6)
CHLORIDE BLD-SCNC: 113 MMOL/L (ref 99–110)
CO2: 18 MMOL/L (ref 21–32)
CREAT SERPL-MCNC: 2.5 MG/DL (ref 0.6–1.1)
ESTIMATED AVERAGE GLUCOSE: 131 MG/DL
FERRITIN: 145 NG/ML (ref 15–150)
FOLATE SERPL-MCNC: >20 NG/ML (ref 3.1–17.5)
GFR, ESTIMATED: 19 ML/MIN/1.73M2
GLUCOSE BLD-MCNC: 245 MG/DL (ref 70–99)
GLUCOSE BLD-MCNC: 293 MG/DL (ref 70–99)
GLUCOSE BLD-MCNC: 339 MG/DL (ref 70–99)
GLUCOSE BLD-MCNC: 357 MG/DL (ref 70–99)
GLUCOSE SERPL-MCNC: 167 MG/DL (ref 70–99)
HBA1C MFR BLD: 6.2 % (ref 4.2–6.3)
IRON: 17 UG/DL (ref 37–145)
PCT TRANSFERRIN: 11 % (ref 10–44)
PHOSPHORUS: 5.1 MG/DL (ref 2.5–4.9)
POTASSIUM SERPL-SCNC: 5.1 MMOL/L (ref 3.5–5.1)
SODIUM BLD-SCNC: 144 MMOL/L (ref 135–145)
TOTAL IRON BINDING CAPACITY: 153 UG/DL (ref 250–450)
TOTAL PROTEIN: 5.9 GM/DL (ref 6.4–8.2)
UNSATURATED IRON BINDING CAPACITY: 136 UG/DL (ref 110–370)
VITAMIN B-12: 538.4 PG/ML (ref 211–911)

## 2024-09-02 PROCEDURE — 96372 THER/PROPH/DIAG INJ SC/IM: CPT

## 2024-09-02 PROCEDURE — 6370000000 HC RX 637 (ALT 250 FOR IP): Performed by: HOSPITALIST

## 2024-09-02 PROCEDURE — 96361 HYDRATE IV INFUSION ADD-ON: CPT

## 2024-09-02 PROCEDURE — 2580000003 HC RX 258: Performed by: NURSE PRACTITIONER

## 2024-09-02 PROCEDURE — 6370000000 HC RX 637 (ALT 250 FOR IP): Performed by: NURSE PRACTITIONER

## 2024-09-02 PROCEDURE — 96366 THER/PROPH/DIAG IV INF ADDON: CPT

## 2024-09-02 PROCEDURE — 82962 GLUCOSE BLOOD TEST: CPT

## 2024-09-02 PROCEDURE — 80053 COMPREHEN METABOLIC PANEL: CPT

## 2024-09-02 PROCEDURE — 6360000002 HC RX W HCPCS: Performed by: HOSPITALIST

## 2024-09-02 PROCEDURE — 96365 THER/PROPH/DIAG IV INF INIT: CPT

## 2024-09-02 PROCEDURE — G0378 HOSPITAL OBSERVATION PER HR: HCPCS

## 2024-09-02 PROCEDURE — 2580000003 HC RX 258: Performed by: HOSPITALIST

## 2024-09-02 PROCEDURE — 2500000003 HC RX 250 WO HCPCS: Performed by: HOSPITALIST

## 2024-09-02 PROCEDURE — 84100 ASSAY OF PHOSPHORUS: CPT

## 2024-09-02 RX ORDER — INSULIN LISPRO 100 [IU]/ML
0-4 INJECTION, SOLUTION INTRAVENOUS; SUBCUTANEOUS NIGHTLY
Status: DISCONTINUED | OUTPATIENT
Start: 2024-09-02 | End: 2024-09-03 | Stop reason: HOSPADM

## 2024-09-02 RX ORDER — INSULIN LISPRO 100 [IU]/ML
0-16 INJECTION, SOLUTION INTRAVENOUS; SUBCUTANEOUS
Status: DISCONTINUED | OUTPATIENT
Start: 2024-09-02 | End: 2024-09-03 | Stop reason: HOSPADM

## 2024-09-02 RX ORDER — SODIUM CHLORIDE 9 MG/ML
INJECTION, SOLUTION INTRAVENOUS CONTINUOUS
Status: DISCONTINUED | OUTPATIENT
Start: 2024-09-02 | End: 2024-09-03 | Stop reason: HOSPADM

## 2024-09-02 RX ORDER — AMLODIPINE BESYLATE 10 MG/1
10 TABLET ORAL DAILY
Status: DISCONTINUED | OUTPATIENT
Start: 2024-09-02 | End: 2024-09-03 | Stop reason: HOSPADM

## 2024-09-02 RX ADMIN — LEVOTHYROXINE SODIUM 175 MCG: 175 TABLET ORAL at 05:36

## 2024-09-02 RX ADMIN — INSULIN LISPRO 3 UNITS: 100 INJECTION, SOLUTION INTRAVENOUS; SUBCUTANEOUS at 12:12

## 2024-09-02 RX ADMIN — ASPIRIN 81 MG: 81 TABLET, COATED ORAL at 08:33

## 2024-09-02 RX ADMIN — INSULIN GLARGINE 5 UNITS: 100 INJECTION, SOLUTION SUBCUTANEOUS at 20:18

## 2024-09-02 RX ADMIN — HEPARIN SODIUM 5000 UNITS: 5000 INJECTION INTRAVENOUS; SUBCUTANEOUS at 05:36

## 2024-09-02 RX ADMIN — INSULIN LISPRO 16 UNITS: 100 INJECTION, SOLUTION INTRAVENOUS; SUBCUTANEOUS at 17:46

## 2024-09-02 RX ADMIN — QUETIAPINE FUMARATE 25 MG: 25 TABLET ORAL at 20:17

## 2024-09-02 RX ADMIN — SODIUM CHLORIDE: 9 INJECTION, SOLUTION INTRAVENOUS at 18:37

## 2024-09-02 RX ADMIN — LOSARTAN POTASSIUM 50 MG: 50 TABLET, FILM COATED ORAL at 08:33

## 2024-09-02 RX ADMIN — AMLODIPINE BESYLATE 10 MG: 10 TABLET ORAL at 08:33

## 2024-09-02 RX ADMIN — INSULIN LISPRO 4 UNITS: 100 INJECTION, SOLUTION INTRAVENOUS; SUBCUTANEOUS at 20:20

## 2024-09-02 RX ADMIN — HEPARIN SODIUM 5000 UNITS: 5000 INJECTION INTRAVENOUS; SUBCUTANEOUS at 14:17

## 2024-09-02 RX ADMIN — POLYETHYLENE GLYCOL 3350 17 G: 17 POWDER, FOR SOLUTION ORAL at 08:33

## 2024-09-02 RX ADMIN — INSULIN LISPRO 3 UNITS: 100 INJECTION, SOLUTION INTRAVENOUS; SUBCUTANEOUS at 08:34

## 2024-09-02 RX ADMIN — SODIUM CHLORIDE, PRESERVATIVE FREE 10 ML: 5 INJECTION INTRAVENOUS at 08:33

## 2024-09-02 RX ADMIN — SODIUM CHLORIDE: 9 INJECTION, SOLUTION INTRAVENOUS at 08:33

## 2024-09-02 RX ADMIN — INSULIN LISPRO 3 UNITS: 100 INJECTION, SOLUTION INTRAVENOUS; SUBCUTANEOUS at 17:46

## 2024-09-02 RX ADMIN — HEPARIN SODIUM 5000 UNITS: 5000 INJECTION INTRAVENOUS; SUBCUTANEOUS at 20:17

## 2024-09-02 RX ADMIN — SODIUM BICARBONATE: 84 INJECTION, SOLUTION INTRAVENOUS at 00:28

## 2024-09-02 RX ADMIN — INSULIN LISPRO 1 UNITS: 100 INJECTION, SOLUTION INTRAVENOUS; SUBCUTANEOUS at 08:35

## 2024-09-02 RX ADMIN — INSULIN LISPRO 2 UNITS: 100 INJECTION, SOLUTION INTRAVENOUS; SUBCUTANEOUS at 12:11

## 2024-09-02 ASSESSMENT — PAIN SCALES - WONG BAKER

## 2024-09-02 NOTE — ASSESSMENT & PLAN NOTE
- Long term control unclear w/o acute inpt hyperglycemia noted  - Continue Adjusted Wt based Lantus w/ prandial Novolog for inpt management  - Resume low CHO diet as tolerated

## 2024-09-02 NOTE — ASSESSMENT & PLAN NOTE
- Vaccination status unclear with confirmed positive COVID Screening on admission @ at ECF but no symptom present and no evidence of hypoxia, fever or radiographic changes noted on admission  - No indication for IV steroids/remdesivir therapy or inpt management  - Continue supportive care and monitor Temp

## 2024-09-02 NOTE — PROGRESS NOTES
4 Eyes Skin Assessment     NAME:  Africa Vega  YOB: 1945  MEDICAL RECORD NUMBER:  [id number]    The patient is being assessed for  Admission    I agree that at least one RN has performed a thorough Head to Toe Skin Assessment on the patient. ALL assessment sites listed below have been assessed.      Areas assessed by both nurses:    Head, Face, Ears, Shoulders, Back, Chest, Arms, Elbows, Hands, Sacrum. Buttock, Coccyx, Ischium, and Legs. Feet and Heels        Does the Patient have a Wound? No noted wound(s)       Shiraz Prevention initiated by RN: Yes  Wound Care Orders initiated by RN: No    Pressure Injury (Stage 3,4, Unstageable, DTI, NWPT, and Complex wounds) if present, place Wound referral order by RN under : No    New Ostomies, if present place, Ostomy referral order under : No     Nurse 1 eSignature: Electronically signed by Christie Morris RN on 9/1/24 at 9:14 PM EDT    **SHARE this note so that the co-signing nurse can place an eSignature**    Nurse 2 eSignature: Electronically signed by Aylin Taylor LPN on 9/2/24 at 2:01 AM EDT

## 2024-09-02 NOTE — PROGRESS NOTES
V2.0    [facility] Progress Note      Name:  Africa Vega /Age/Sex: 1945  (78 y.o. female)   MRN & CSN:  [id number] & [id number] Encounter Date/Time: 2024 7:23 AM EDT   Location:   PCP: Myra Ordaz PA     Attending:John Thurman MD       Hospital Day: 2    Assessment and Recommendations   Africa Vega is a 78 y.o. female  who presents with Adult failure to thrive      Plan:   Failure to thrive in adult, patient was refusing to eat at [facility] and rehab, however she does this occasionally it is one of her behaviors secondary to her dementia.  Slow transit constipation, patient was disimpacted in the emergency department, continue bowel regimen today.  Type 2 diabetes mellitus with complication, with long-term current use of insulin..  Continue insulin sliding scale before meals and at bedtime, monitor for hypoglycemia due to insulin sliding scale with serial Accu-Cheks, hypoglycemic protocol, diabetic diet.  CKD stage IV, creatinine today is baseline is between 2.1 and 2.8.  GFR is 18, baseline is between 21 and 25.  COVID-19, asymptomatic, no oxygen requirements, no evidence of hypoxia or fever or changes noted in x-ray, no indication for steroids/barcitinib therapy.  Dementia without behavioral disturbances, continue Seroquel.    Hypothyroidism, continue Synthroid  Mixed hyperlipidemia, Lipitor is being held  Solitary kidney, this was congenital.  Hypertension, continue Cozaar, Norvasc, and atenolol, holding atenolol.      Diet ADULT DIET; Dysphagia - Soft and Bite Sized; 4 carb choices (60 gm/meal)  ADULT ORAL NUTRITION SUPPLEMENT; AM Snack, PM Snack; Diabetic Oral Supplement   DVT Prophylaxis [] Lovenox, [x]  Heparin, [] SCDs, [] Ambulation,  [] Eliquis, [] Xarelto  [] Coumadin   Code Status Full Code   Disposition From: [facility]  Expected Disposition: [facility]  Estimated Date of Discharge: 24 hrs  Patient requires continued admission due to dehydration

## 2024-09-02 NOTE — ASSESSMENT & PLAN NOTE
- Baseline GFR~ 25-30 maintained w/ solitary kidney and underlying renovascular dz and DM nephropathy noted  - Meds adjusted for <CrCl and MIV w/ HCO3 support initiated  - No indication for inpt HD or Nephro management @ this time but long term planning with Nephro recommended  - Avoid NSAID @ this time

## 2024-09-02 NOTE — PROGRESS NOTES
This RN has reviewed and agrees with SHIRA Viera's data collection and has collaborated with this LPN regarding the patient's care plan.

## 2024-09-02 NOTE — H&P
V2.0  History and Physical      Name:  Africa Vega /Age/Sex: 1945  (78 y.o. female)   MRN & CSN:  [id number] & [id number] Encounter Date/Time: 24   Location:   PCP: Myra Ordaz PA       Hospital Day: 1    Assessment and Plan:   Africa Vega is a 78 y.o. female with a pmh of dementia, CKD and DM who presents with Adult failure to thrive.  No acute pathology identified on ED evaluation and pt medically stable.  No clear indication for hospitalization or inpt management met but family continued to request overnight IVF support and ED requested observation    Hospital Problems             Last Modified POA    * (Principal) Adult failure to thrive 2024 Yes    Dementia without behavioral disturbance ([facility]) 2024 Yes    COVID-19 2024 Yes    CKD (chronic kidney disease) stage 4, GFR 15-29 ml/min ([facility]) 2024 Yes    Primary hypertension 2024 Yes    Type 2 diabetes mellitus with complication, with long-term current use of insulin ([facility]) 2024 Yes    Slow transit constipation 2024 Yes     Plan:  Slow transit constipation  - Moderate stool burden noted on CT  - Enema with adjusted bowel regimen ordered  - Titrate PRN    Type 2 diabetes mellitus with complication, with long-term current use of insulin ([facility])  - Long term control unclear w/o acute inpt hyperglycemia noted  - Continue Adjusted Wt based Lantus w/ prandial Novolog for inpt management  - Resume low CHO diet as tolerated    Primary hypertension  - Chronic presentation w/ SBP stable & home regimen reviewed  - Meds adjusted given poor oral intake and CKD  - Titrate for goal SBP<140     CKD (chronic kidney disease) stage 4, GFR 15-29 ml/min ([facility])  - Baseline GFR~ 25-30 maintained w/ solitary kidney and underlying renovascular dz and DM nephropathy noted  - Meds adjusted for <CrCl and MIV w/ HCO3 support initiated  - No indication for inpt HD or Nephro management @ this time but long term planning with

## 2024-09-02 NOTE — ASSESSMENT & PLAN NOTE
- Known hx of underlying dementia with progressive behavioral/mood changes @ established ECF reported but no acute psychosis or delerium present  - Decreased oral intake described w/o evidence of gross malnutrition or dehydration present @ this time  - No indication for Parenteral/Enteral support and low suspicion for acute process  - Oral intake w/ supplement use resumed  - Additional dietary rec's as needed

## 2024-09-02 NOTE — ASSESSMENT & PLAN NOTE
- Chronic presentation w/ SBP stable & home regimen reviewed  - Meds adjusted given poor oral intake and CKD  - Titrate for goal SBP<140

## 2024-09-02 NOTE — ASSESSMENT & PLAN NOTE
- Known hx with progressive memory losses and behavioral changes reported but exact baseline MS unclear  - Etiology unclear w/ mild volume loss and chronic small vessel microvascular changes noted on CT Brain  - No clear infectious, metabolic or hepatic derrangment noted and no indication for inpt management or Neuro/Psych eval  - Continue established Seroquel and avoid Narc/Benzo therapy  - Outpt Neuro/Psych testing recommended

## 2024-09-03 VITALS
WEIGHT: 163 LBS | HEART RATE: 67 BPM | SYSTOLIC BLOOD PRESSURE: 156 MMHG | OXYGEN SATURATION: 99 % | RESPIRATION RATE: 16 BRPM | DIASTOLIC BLOOD PRESSURE: 53 MMHG | HEIGHT: 64 IN | BODY MASS INDEX: 27.83 KG/M2 | TEMPERATURE: 98.3 F

## 2024-09-03 LAB
ANION GAP SERPL CALCULATED.3IONS-SCNC: 11 MMOL/L (ref 7–16)
BUN SERPL-MCNC: 60 MG/DL (ref 6–23)
CALCIUM SERPL-MCNC: 8.4 MG/DL (ref 8.3–10.6)
CHLORIDE BLD-SCNC: 113 MMOL/L (ref 99–110)
CO2: 16 MMOL/L (ref 21–32)
CREAT SERPL-MCNC: 2.3 MG/DL (ref 0.6–1.1)
CULTURE: NORMAL
EKG ATRIAL RATE: 65 BPM
EKG DIAGNOSIS: NORMAL
EKG P AXIS: 74 DEGREES
EKG P-R INTERVAL: 174 MS
EKG Q-T INTERVAL: 444 MS
EKG QRS DURATION: 98 MS
EKG QTC CALCULATION (BAZETT): 461 MS
EKG R AXIS: -3 DEGREES
EKG T AXIS: 26 DEGREES
EKG VENTRICULAR RATE: 65 BPM
GFR, ESTIMATED: 21 ML/MIN/1.73M2
GLUCOSE BLD-MCNC: 230 MG/DL (ref 70–99)
GLUCOSE BLD-MCNC: 298 MG/DL (ref 70–99)
GLUCOSE SERPL-MCNC: 204 MG/DL (ref 70–99)
HCT VFR BLD CALC: 27.4 % (ref 37–47)
HEMOGLOBIN: 8.1 GM/DL (ref 12.5–16)
Lab: NORMAL
MAGNESIUM: 2.1 MG/DL (ref 1.8–2.4)
MCH RBC QN AUTO: 29.5 PG (ref 27–31)
MCHC RBC AUTO-ENTMCNC: 29.6 % (ref 32–36)
MCV RBC AUTO: 99.6 FL (ref 78–100)
PDW BLD-RTO: 13.4 % (ref 11.7–14.9)
PHOSPHORUS: 4 MG/DL (ref 2.5–4.9)
PLATELET # BLD: 127 K/CU MM (ref 140–440)
PMV BLD AUTO: 10.5 FL (ref 7.5–11.1)
POTASSIUM SERPL-SCNC: 5.3 MMOL/L (ref 3.5–5.1)
RBC # BLD: 2.75 M/CU MM (ref 4.2–5.4)
SODIUM BLD-SCNC: 140 MMOL/L (ref 135–145)
SPECIMEN: NORMAL
WBC # BLD: 7.3 K/CU MM (ref 4–10.5)

## 2024-09-03 PROCEDURE — 82962 GLUCOSE BLOOD TEST: CPT

## 2024-09-03 PROCEDURE — 96361 HYDRATE IV INFUSION ADD-ON: CPT

## 2024-09-03 PROCEDURE — 85027 COMPLETE CBC AUTOMATED: CPT

## 2024-09-03 PROCEDURE — 2580000003 HC RX 258: Performed by: NURSE PRACTITIONER

## 2024-09-03 PROCEDURE — G0378 HOSPITAL OBSERVATION PER HR: HCPCS

## 2024-09-03 PROCEDURE — 6370000000 HC RX 637 (ALT 250 FOR IP): Performed by: HOSPITALIST

## 2024-09-03 PROCEDURE — 6370000000 HC RX 637 (ALT 250 FOR IP): Performed by: NURSE PRACTITIONER

## 2024-09-03 PROCEDURE — 80048 BASIC METABOLIC PNL TOTAL CA: CPT

## 2024-09-03 PROCEDURE — 96372 THER/PROPH/DIAG INJ SC/IM: CPT

## 2024-09-03 PROCEDURE — 93010 ELECTROCARDIOGRAM REPORT: CPT | Performed by: INTERNAL MEDICINE

## 2024-09-03 PROCEDURE — 83735 ASSAY OF MAGNESIUM: CPT

## 2024-09-03 PROCEDURE — 2580000003 HC RX 258: Performed by: HOSPITALIST

## 2024-09-03 PROCEDURE — 6370000000 HC RX 637 (ALT 250 FOR IP): Performed by: FAMILY MEDICINE

## 2024-09-03 PROCEDURE — 84100 ASSAY OF PHOSPHORUS: CPT

## 2024-09-03 PROCEDURE — 6360000002 HC RX W HCPCS: Performed by: HOSPITALIST

## 2024-09-03 RX ORDER — INSULIN GLARGINE 100 [IU]/ML
8 INJECTION, SOLUTION SUBCUTANEOUS NIGHTLY
Status: DISCONTINUED | OUTPATIENT
Start: 2024-09-03 | End: 2024-09-03 | Stop reason: HOSPADM

## 2024-09-03 RX ORDER — ENOXAPARIN SODIUM 100 MG/ML
30 INJECTION SUBCUTANEOUS DAILY
Status: DISCONTINUED | OUTPATIENT
Start: 2024-09-03 | End: 2024-09-03 | Stop reason: HOSPADM

## 2024-09-03 RX ORDER — HYDRALAZINE HYDROCHLORIDE 25 MG/1
25 TABLET, FILM COATED ORAL EVERY 12 HOURS SCHEDULED
Status: DISCONTINUED | OUTPATIENT
Start: 2024-09-03 | End: 2024-09-03 | Stop reason: HOSPADM

## 2024-09-03 RX ORDER — INSULIN GLARGINE 100 [IU]/ML
8 INJECTION, SOLUTION SUBCUTANEOUS NIGHTLY
Qty: 10 ML | Refills: 0 | Status: SHIPPED | OUTPATIENT
Start: 2024-09-03

## 2024-09-03 RX ADMIN — INSULIN LISPRO 4 UNITS: 100 INJECTION, SOLUTION INTRAVENOUS; SUBCUTANEOUS at 12:19

## 2024-09-03 RX ADMIN — POLYETHYLENE GLYCOL 3350 17 G: 17 POWDER, FOR SOLUTION ORAL at 09:39

## 2024-09-03 RX ADMIN — INSULIN LISPRO 3 UNITS: 100 INJECTION, SOLUTION INTRAVENOUS; SUBCUTANEOUS at 12:19

## 2024-09-03 RX ADMIN — HEPARIN SODIUM 5000 UNITS: 5000 INJECTION INTRAVENOUS; SUBCUTANEOUS at 05:24

## 2024-09-03 RX ADMIN — SODIUM CHLORIDE: 9 INJECTION, SOLUTION INTRAVENOUS at 04:50

## 2024-09-03 RX ADMIN — BISACODYL 10 MG: 10 SUPPOSITORY RECTAL at 09:38

## 2024-09-03 RX ADMIN — SODIUM CHLORIDE, PRESERVATIVE FREE 10 ML: 5 INJECTION INTRAVENOUS at 09:38

## 2024-09-03 RX ADMIN — LEVOTHYROXINE SODIUM 175 MCG: 175 TABLET ORAL at 05:24

## 2024-09-03 RX ADMIN — HYDRALAZINE HYDROCHLORIDE 25 MG: 25 TABLET ORAL at 09:38

## 2024-09-03 RX ADMIN — INSULIN LISPRO 3 UNITS: 100 INJECTION, SOLUTION INTRAVENOUS; SUBCUTANEOUS at 09:39

## 2024-09-03 RX ADMIN — INSULIN LISPRO 8 UNITS: 100 INJECTION, SOLUTION INTRAVENOUS; SUBCUTANEOUS at 09:39

## 2024-09-03 RX ADMIN — ASPIRIN 81 MG: 81 TABLET, COATED ORAL at 09:38

## 2024-09-03 RX ADMIN — AMLODIPINE BESYLATE 10 MG: 10 TABLET ORAL at 09:38

## 2024-09-03 ASSESSMENT — PAIN SCALES - WONG BAKER
WONGBAKER_NUMERICALRESPONSE: NO HURT

## 2024-09-03 NOTE — CARE COORDINATION
09/03/24 0810    Service Assessment   Patient Orientation Other (see comment)  (A&O X 1)   Cognition Dementia / Early Alzheimer's   History Provided By Medical Record   Primary Caregiver Other (Comment)  (Assisted living resident at [facility])   Support Systems Spouse/Significant Other;Children   Patient's Healthcare Decision Maker is: Legal Next of Kin   PCP Verified by CM Yes   Prior Functional Level Assistance with the following:;Cooking;Housework;Shopping;Mobility;Bathing;Dressing;Toileting   Current Functional Level Cooking;Housework;Shopping;Mobility;Assistance with the following:;Bathing;Dressing;Toileting   Can patient return to prior living arrangement Yes   Ability to make needs known: Poor   Family able to assist with home care needs: Other (comment)  (Assisted living resident at [facility])   Would you like for me to discuss the discharge plan with any other family members/significant others, and if so, who? Yes  (Family)   Financial Resources Medicare   Community Resources Assisted Living   Social/Functional History   Lives With Other (comment)  (Assisted living resident at [facility])   Type of Home Assisted living   Home Layout One level   Home Access Level entry   Bathroom Accessibility Accessible   Receives Help From Other (comment)  (Assisted living resident at [facility])   ADL Assistance Needs assistance   Toileting Needs assistance   Homemaking Assistance Needs assistance   Homemaking Responsibilities No   Ambulation Assistance Needs assistance   Transfer Assistance Needs assistance   Active  No   Patient's  Info Family   Discharge Planning   Type of Residence Assisted living   Living Arrangements Other (Comment)  (Assisted living resident at [facility])   Potential Assistance Needed N/A   DME Ordered? No   Potential Assistance Purchasing Medications No   Type of Home Care Services None

## 2024-09-03 NOTE — CARE COORDINATION
NURSING: The patient will return to [facility] today.  Please call report to [phone] and notify the patient's family.  Transportation has been arranged at 2:30 PM through [facility].  The JOSEFINA form is not required.

## 2024-09-03 NOTE — PLAN OF CARE
Problem: Safety - Adult  Goal: Free from fall injury  9/2/2024 1608 by Mayi Jamison, RN  Outcome: Progressing  9/2/2024 0405 by Kalyani Gonzalez, RN  Outcome: Progressing     Problem: Pain  Goal: Verbalizes/displays adequate comfort level or baseline comfort level  Outcome: Progressing     Problem: Chronic Conditions and Co-morbidities  Goal: Patient's chronic conditions and co-morbidity symptoms are monitored and maintained or improved  Outcome: Progressing     
  Problem: Safety - Adult  Goal: Free from fall injury  9/3/2024 0023 by Gianna Blakely RN  Outcome: Progressing  9/2/2024 1608 by Mayi Jamison RN  Outcome: Progressing     Problem: Pain  Goal: Verbalizes/displays adequate comfort level or baseline comfort level  9/3/2024 0023 by Gianna Blakely RN  Outcome: Progressing  9/2/2024 1608 by Mayi Jamison RN  Outcome: Progressing     Problem: Chronic Conditions and Co-morbidities  Goal: Patient's chronic conditions and co-morbidity symptoms are monitored and maintained or improved  9/3/2024 0023 by Gianna Blakely RN  Outcome: Progressing  9/2/2024 1608 by Mayi Jamison RN  Outcome: Progressing     Problem: Skin/Tissue Integrity  Goal: Absence of new skin breakdown  Description: 1.  Monitor for areas of redness and/or skin breakdown  2.  Assess vascular access sites hourly  3.  Every 4-6 hours minimum:  Change oxygen saturation probe site  4.  Every 4-6 hours:  If on nasal continuous positive airway pressure, respiratory therapy assess nares and determine need for appliance change or resting period.  Outcome: Progressing     
redness and/or skin breakdown  2.  Assess vascular access sites hourly  3.  Every 4-6 hours minimum:  Change oxygen saturation probe site  4.  Every 4-6 hours:  If on nasal continuous positive airway pressure, respiratory therapy assess nares and determine need for appliance change or resting period.  9/3/2024 1021 by Edvin Reynoso, RN  Outcome: Progressing  9/3/2024 0023 by Gianna Blakely, RN  Outcome: Progressing

## 2024-09-03 NOTE — PROGRESS NOTES
Donaldo, pt's , notified of pt's dc and transport time [facility].

## 2024-09-03 NOTE — PROGRESS NOTES
This RN has reviewed and agrees with SERGE Taylor LPN's data collection and has collaborated with this LPN regarding the patient's care plan.

## 2024-09-03 NOTE — DISCHARGE SUMMARY
V2.0  Discharge Summary    Name:  Africa Vega /Age/Sex: 1945 (78 y.o. female)   Admit Date: 2024  Discharge Date: 9/3/24    MRN & CSN:  [id number] & [id number] Encounter Date and Time 9/3/24 1:53 PM EDT    Attending:  Gregory Clark MD Discharging Provider: IMLADIS Shelley NP       Hospital Course:     Brief HPI: Africa Vega is a 78 y.o. female who presented with adult failure to thrive patient mated for overnight observation in the setting of    Brief Problem Based Course:     Africa Vega is a 78 y.o. female with a past medical history of dementia, CKD and IDDM who presented with adult failure to thrive from long-term care living facility. patient mated for overnight observation in the setting of dehydration and constipation.  Patient was disimpacted in emergency department and started on bowel regimen.  Patient tested COVID-19 positive, asymptomatic, no oxygen requirements.  No evidence of hypoxia fever or changes noted in x-ray.  She discharges to return to [facility] in stable condition.  Vital signs stable.    The patient expressed appropriate understanding of, and agreement with the discharge recommendations, medications, and plan.     Consults this admission:  None    Discharge Diagnosis:   Adult failure to thrive  Constipation  Dehydration    Discharge Instruction:   Follow up appointments: none  Primary care physician: Myra Ordaz PA within 2 weeks  Diet: diabetic diet   Activity: activity as tolerated  Disposition: Discharged to:   []Home, []C, [x]SNF, []Acute Rehab, []Hospice   Condition on discharge: Stable  Labs and Tests to be Followed up as an outpatient by PCP or Specialist: CBC and BMP in 1 week    Discharge Medications:        Medication List        CHANGE how you take these medications      insulin glargine 100 UNIT/ML injection vial  Commonly known as: LANTUS  Inject 8 Units into the skin nightly  What changed: how much to take

## 2024-09-03 NOTE — CARE COORDINATION
CM spoke with Charity at [facility] who advised that she spoke with the patient's  Donaldo Vega who confirmed that he would prefer that medical transportation be arranged for the patient's return today.  CM will follow.     12:04 PM  CM arranged medical transportation to [facility] at 2:30 PM through [facility].  CM will follow.      12:10 PM  CM left a voicemail for Charity at [facility] notifying her of the time of transport.  CM will follow.     2:01 PM  CM faxed the discharge instructions to [facility].  CM available if needs arise.